# Patient Record
Sex: MALE | Race: WHITE | Employment: OTHER | URBAN - METROPOLITAN AREA
[De-identification: names, ages, dates, MRNs, and addresses within clinical notes are randomized per-mention and may not be internally consistent; named-entity substitution may affect disease eponyms.]

---

## 2017-02-09 ENCOUNTER — TELEPHONE (OUTPATIENT)
Dept: PULMONOLOGY | Age: 67
End: 2017-02-09

## 2017-05-04 ENCOUNTER — OFFICE VISIT (OUTPATIENT)
Dept: PULMONOLOGY | Age: 67
End: 2017-05-04

## 2017-05-04 VITALS
SYSTOLIC BLOOD PRESSURE: 138 MMHG | WEIGHT: 194 LBS | RESPIRATION RATE: 18 BRPM | HEART RATE: 81 BPM | TEMPERATURE: 98 F | DIASTOLIC BLOOD PRESSURE: 84 MMHG | HEIGHT: 72 IN | OXYGEN SATURATION: 92 % | BODY MASS INDEX: 26.28 KG/M2

## 2017-05-04 DIAGNOSIS — F17.200 CURRENT SMOKER: ICD-10-CM

## 2017-05-04 DIAGNOSIS — J44.9 COPD, SEVERE (HCC): Primary | ICD-10-CM

## 2017-05-04 DIAGNOSIS — R09.02 HYPOXIA: ICD-10-CM

## 2017-05-04 PROCEDURE — 99214 OFFICE O/P EST MOD 30 MIN: CPT | Performed by: INTERNAL MEDICINE

## 2017-05-04 RX ORDER — PREDNISONE 10 MG/1
TABLET ORAL
Qty: 30 TABLET | Refills: 0 | Status: SHIPPED | OUTPATIENT
Start: 2017-05-04 | End: 2017-05-14

## 2017-05-04 RX ORDER — BUDESONIDE AND FORMOTEROL FUMARATE DIHYDRATE 160; 4.5 UG/1; UG/1
AEROSOL RESPIRATORY (INHALATION)
Qty: 1 INHALER | Refills: 6 | Status: SHIPPED | OUTPATIENT
Start: 2017-05-04 | End: 2017-10-26 | Stop reason: SDUPTHER

## 2017-05-04 RX ORDER — IPRATROPIUM BROMIDE AND ALBUTEROL SULFATE 2.5; .5 MG/3ML; MG/3ML
1 SOLUTION RESPIRATORY (INHALATION) 4 TIMES DAILY
Qty: 360 ML | Refills: 6 | Status: SHIPPED | OUTPATIENT
Start: 2017-05-04 | End: 2017-10-26 | Stop reason: SDUPTHER

## 2017-05-04 RX ORDER — DOXYCYCLINE HYCLATE 100 MG/1
100 CAPSULE ORAL 2 TIMES DAILY
Qty: 10 CAPSULE | Refills: 0 | Status: SHIPPED | OUTPATIENT
Start: 2017-05-04 | End: 2017-05-09

## 2017-05-11 RX ORDER — BUDESONIDE AND FORMOTEROL FUMARATE DIHYDRATE 160; 4.5 UG/1; UG/1
AEROSOL RESPIRATORY (INHALATION)
Qty: 10.2 G | Refills: 0 | OUTPATIENT
Start: 2017-05-11

## 2017-07-17 ENCOUNTER — TELEPHONE (OUTPATIENT)
Dept: PULMONOLOGY | Age: 67
End: 2017-07-17

## 2017-10-26 ENCOUNTER — HOSPITAL ENCOUNTER (OUTPATIENT)
Dept: CT IMAGING | Age: 67
Discharge: HOME | End: 2017-10-26
Attending: INTERNAL MEDICINE | Admitting: INTERNAL MEDICINE

## 2017-10-26 ENCOUNTER — OFFICE VISIT (OUTPATIENT)
Dept: PULMONOLOGY | Age: 67
End: 2017-10-26

## 2017-10-26 VITALS
SYSTOLIC BLOOD PRESSURE: 128 MMHG | RESPIRATION RATE: 18 BRPM | BODY MASS INDEX: 24.24 KG/M2 | DIASTOLIC BLOOD PRESSURE: 84 MMHG | HEIGHT: 72 IN | WEIGHT: 179 LBS | OXYGEN SATURATION: 93 % | HEART RATE: 80 BPM | TEMPERATURE: 98.3 F

## 2017-10-26 DIAGNOSIS — R09.02 HYPOXIA: ICD-10-CM

## 2017-10-26 DIAGNOSIS — R06.02 SOB (SHORTNESS OF BREATH): ICD-10-CM

## 2017-10-26 DIAGNOSIS — F17.200 CURRENT SMOKER: ICD-10-CM

## 2017-10-26 DIAGNOSIS — Z87.891 FORMER SMOKER: ICD-10-CM

## 2017-10-26 DIAGNOSIS — J44.9 COPD, SEVERE (HCC): ICD-10-CM

## 2017-10-26 DIAGNOSIS — F17.200 NICOTINE DEPENDENCE, UNCOMPLICATED: ICD-10-CM

## 2017-10-26 DIAGNOSIS — J44.1 COPD WITH ACUTE EXACERBATION (HCC): Primary | ICD-10-CM

## 2017-10-26 PROCEDURE — 1036F TOBACCO NON-USER: CPT | Performed by: INTERNAL MEDICINE

## 2017-10-26 PROCEDURE — 3017F COLORECTAL CA SCREEN DOC REV: CPT | Performed by: INTERNAL MEDICINE

## 2017-10-26 PROCEDURE — 1123F ACP DISCUSS/DSCN MKR DOCD: CPT | Performed by: INTERNAL MEDICINE

## 2017-10-26 PROCEDURE — G8420 CALC BMI NORM PARAMETERS: HCPCS | Performed by: INTERNAL MEDICINE

## 2017-10-26 PROCEDURE — 4040F PNEUMOC VAC/ADMIN/RCVD: CPT | Performed by: INTERNAL MEDICINE

## 2017-10-26 PROCEDURE — 3023F SPIROM DOC REV: CPT | Performed by: INTERNAL MEDICINE

## 2017-10-26 PROCEDURE — 99214 OFFICE O/P EST MOD 30 MIN: CPT | Performed by: INTERNAL MEDICINE

## 2017-10-26 PROCEDURE — G8926 SPIRO NO PERF OR DOC: HCPCS | Performed by: INTERNAL MEDICINE

## 2017-10-26 PROCEDURE — G8427 DOCREV CUR MEDS BY ELIG CLIN: HCPCS | Performed by: INTERNAL MEDICINE

## 2017-10-26 PROCEDURE — G8484 FLU IMMUNIZE NO ADMIN: HCPCS | Performed by: INTERNAL MEDICINE

## 2017-10-26 RX ORDER — DOXYCYCLINE HYCLATE 100 MG/1
100 CAPSULE ORAL 2 TIMES DAILY
Qty: 10 CAPSULE | Refills: 0 | Status: SHIPPED | OUTPATIENT
Start: 2017-10-26 | End: 2017-10-31

## 2017-10-26 RX ORDER — PREDNISONE 10 MG/1
TABLET ORAL
Qty: 30 TABLET | Refills: 0 | Status: SHIPPED | OUTPATIENT
Start: 2017-10-26 | End: 2017-11-05

## 2017-10-26 RX ORDER — GUAIFENESIN 600 MG/1
600 TABLET, EXTENDED RELEASE ORAL 2 TIMES DAILY PRN
Qty: 60 TABLET | Refills: 6 | Status: SHIPPED | OUTPATIENT
Start: 2017-10-26 | End: 2018-04-11 | Stop reason: ALTCHOICE

## 2017-10-26 RX ORDER — BUDESONIDE AND FORMOTEROL FUMARATE DIHYDRATE 160; 4.5 UG/1; UG/1
AEROSOL RESPIRATORY (INHALATION)
Qty: 1 INHALER | Refills: 6 | Status: SHIPPED | OUTPATIENT
Start: 2017-10-26 | End: 2018-03-05 | Stop reason: SDUPTHER

## 2017-10-26 RX ORDER — IPRATROPIUM BROMIDE AND ALBUTEROL SULFATE 2.5; .5 MG/3ML; MG/3ML
1 SOLUTION RESPIRATORY (INHALATION) 4 TIMES DAILY
Qty: 360 ML | Refills: 6 | Status: SHIPPED | OUTPATIENT
Start: 2017-10-26 | End: 2018-08-27 | Stop reason: SDUPTHER

## 2017-10-26 NOTE — PROGRESS NOTES
(Mucinex) Extended-release tablet 600 mg every 12 hours as needed  · Acapella QID to mobilize respiratory secretions. · CT chest, low dose protocol, screening for lung cancer 10/2018. Risks, benefits and alternatives including doing nothing were discussed with patient.

## 2017-10-27 ENCOUNTER — TELEPHONE (OUTPATIENT)
Dept: CASE MANAGEMENT | Age: 67
End: 2017-10-27

## 2017-10-27 NOTE — TELEPHONE ENCOUNTER
Baseline lung screen on 10-26-17. LRAD1. Recommended screen in one year. Reviewed by ordering physician. Ordering office has contacted pt with results.

## 2018-01-11 PROBLEM — J96.21 ACUTE ON CHRONIC RESPIRATORY FAILURE WITH HYPOXIA AND HYPERCAPNIA (HCC): Status: ACTIVE | Noted: 2018-01-11

## 2018-01-11 PROBLEM — R79.89 ELEVATED BRAIN NATRIURETIC PEPTIDE (BNP) LEVEL: Status: ACTIVE | Noted: 2018-01-11

## 2018-01-11 PROBLEM — I48.91 ATRIAL FIBRILLATION (HCC): Status: ACTIVE | Noted: 2018-01-11

## 2018-01-11 PROBLEM — A41.9 SEPSIS (HCC): Status: ACTIVE | Noted: 2018-01-11

## 2018-01-11 PROBLEM — E80.6 HYPERBILIRUBINEMIA: Status: ACTIVE | Noted: 2018-01-11

## 2018-01-11 PROBLEM — E87.8 HYPOCHLOREMIA: Status: ACTIVE | Noted: 2018-01-11

## 2018-01-11 PROBLEM — J96.22 ACUTE ON CHRONIC RESPIRATORY FAILURE WITH HYPOXIA AND HYPERCAPNIA (HCC): Status: ACTIVE | Noted: 2018-01-11

## 2018-01-11 PROBLEM — J10.1 INFLUENZA A: Status: ACTIVE | Noted: 2018-01-11

## 2018-01-11 PROBLEM — E87.1 HYPONATREMIA: Status: ACTIVE | Noted: 2018-01-11

## 2018-01-19 PROBLEM — A41.9 SEPSIS (HCC): Status: RESOLVED | Noted: 2018-01-11 | Resolved: 2018-01-19

## 2018-01-19 PROBLEM — J96.21 ACUTE ON CHRONIC RESPIRATORY FAILURE WITH HYPOXIA AND HYPERCAPNIA (HCC): Status: RESOLVED | Noted: 2018-01-11 | Resolved: 2018-01-19

## 2018-01-19 PROBLEM — J96.22 ACUTE ON CHRONIC RESPIRATORY FAILURE WITH HYPOXIA AND HYPERCAPNIA (HCC): Status: RESOLVED | Noted: 2018-01-11 | Resolved: 2018-01-19

## 2018-02-01 ENCOUNTER — TELEPHONE (OUTPATIENT)
Dept: PULMONOLOGY | Age: 68
End: 2018-02-01

## 2018-02-01 ENCOUNTER — OFFICE VISIT (OUTPATIENT)
Dept: PULMONOLOGY | Age: 68
End: 2018-02-01

## 2018-02-01 VITALS
OXYGEN SATURATION: 93 % | SYSTOLIC BLOOD PRESSURE: 122 MMHG | HEART RATE: 72 BPM | TEMPERATURE: 98 F | DIASTOLIC BLOOD PRESSURE: 88 MMHG | RESPIRATION RATE: 20 BRPM | HEIGHT: 68 IN | BODY MASS INDEX: 27.43 KG/M2 | WEIGHT: 181 LBS

## 2018-02-01 DIAGNOSIS — R93.89 ABNORMAL CXR: Primary | ICD-10-CM

## 2018-02-01 DIAGNOSIS — J44.9 COPD, SEVERE (HCC): ICD-10-CM

## 2018-02-01 DIAGNOSIS — R21 SKIN RASH: ICD-10-CM

## 2018-02-01 PROCEDURE — G8926 SPIRO NO PERF OR DOC: HCPCS | Performed by: INTERNAL MEDICINE

## 2018-02-01 PROCEDURE — 1123F ACP DISCUSS/DSCN MKR DOCD: CPT | Performed by: INTERNAL MEDICINE

## 2018-02-01 PROCEDURE — G8484 FLU IMMUNIZE NO ADMIN: HCPCS | Performed by: INTERNAL MEDICINE

## 2018-02-01 PROCEDURE — 4040F PNEUMOC VAC/ADMIN/RCVD: CPT | Performed by: INTERNAL MEDICINE

## 2018-02-01 PROCEDURE — 3017F COLORECTAL CA SCREEN DOC REV: CPT | Performed by: INTERNAL MEDICINE

## 2018-02-01 PROCEDURE — 3023F SPIROM DOC REV: CPT | Performed by: INTERNAL MEDICINE

## 2018-02-01 PROCEDURE — G8427 DOCREV CUR MEDS BY ELIG CLIN: HCPCS | Performed by: INTERNAL MEDICINE

## 2018-02-01 PROCEDURE — G8419 CALC BMI OUT NRM PARAM NOF/U: HCPCS | Performed by: INTERNAL MEDICINE

## 2018-02-01 PROCEDURE — 1111F DSCHRG MED/CURRENT MED MERGE: CPT | Performed by: INTERNAL MEDICINE

## 2018-02-01 PROCEDURE — 99214 OFFICE O/P EST MOD 30 MIN: CPT | Performed by: INTERNAL MEDICINE

## 2018-02-01 PROCEDURE — 1036F TOBACCO NON-USER: CPT | Performed by: INTERNAL MEDICINE

## 2018-02-01 RX ORDER — PREDNISONE 10 MG/1
TABLET ORAL
Qty: 30 TABLET | Refills: 0 | Status: SHIPPED | OUTPATIENT
Start: 2018-02-01 | End: 2018-02-11

## 2018-02-01 NOTE — PATIENT INSTRUCTIONS
Please keep all of your future appointments scheduled by [facility] Pulmonary office.

## 2018-02-01 NOTE — TELEPHONE ENCOUNTER
[facility] received order for oxygen. They need testing within the last 30 days (ie 6MW). Last PFT/6MW was in October 2017. Last OV 2/1/18  Assessment:       · Severe COPD   · Abnormal CXR- Treated for pneumonia   · Nocturnal hypoxia on 2.5 LPM   · Anterior chest skin rash- unclear etiology. Probable drug reaction. · Hypoxia on exertion   · 30 pack year smoking - quit smoking 2012       Plan:       · CXR PA and lateral in 4 weeks to document resolution of infiltrates  · Trial of Prednisone taper   · Benadryl when necessary  · H2 blocker  · ER if worsening  · DC lactobacillus  · Symbicort BID and DuoNeb   · Daliresp 500 mcg PO daily  · O2 2LPM on exertion. Advised to titrate O2 using her pulse oximeter- target O2 sat 90-92%. · Order for O2 concentrator   · Pulmonary rehab referral   · Patient is up to date with Pneumococcal vaccine and influenza vaccine   · Acapella QID to mobilize respiratory secretions.   · CT chest, low dose protocol, screening for lung cancer 10/2018

## 2018-02-01 NOTE — PROGRESS NOTES
showed Bilateral LL ASD   Assessment:       · Severe COPD   · Abnormal CXR- Treated for pneumonia   · Nocturnal hypoxia on 2.5 LPM   · Anterior chest skin rash- unclear etiology. Probable drug reaction. · Hypoxia on exertion   · 30 pack year smoking - quit smoking 2012       Plan:      · CXR PA and lateral in 4 weeks to document resolution of infiltrates  · Trial of Prednisone taper   · Benadryl when necessary  · H2 blocker  · ER if worsening  · DC lactobacillus  · Symbicort BID and DuoNeb   · Daliresp 500 mcg PO daily  · O2 2LPM on exertion. Advised to titrate O2 using her pulse oximeter- target O2 sat 90-92%. · Order for O2 concentrator   · Pulmonary rehab referral   · Patient is up to date with Pneumococcal vaccine and influenza vaccine   · Acapella QID to mobilize respiratory secretions. · CT chest, low dose protocol, screening for lung cancer 10/2018.

## 2018-02-27 RX ORDER — DILTIAZEM HYDROCHLORIDE 180 MG/1
CAPSULE, COATED, EXTENDED RELEASE ORAL
Qty: 30 CAPSULE | Refills: 0 | OUTPATIENT
Start: 2018-02-27

## 2018-03-05 ENCOUNTER — HOSPITAL ENCOUNTER (OUTPATIENT)
Dept: OTHER | Age: 68
Discharge: HOME | End: 2018-03-05
Attending: INTERNAL MEDICINE | Admitting: INTERNAL MEDICINE

## 2018-03-05 ENCOUNTER — OFFICE VISIT (OUTPATIENT)
Dept: PULMONOLOGY | Age: 68
End: 2018-03-05

## 2018-03-05 VITALS
HEART RATE: 84 BPM | SYSTOLIC BLOOD PRESSURE: 126 MMHG | TEMPERATURE: 98.5 F | OXYGEN SATURATION: 94 % | HEIGHT: 68 IN | BODY MASS INDEX: 28.19 KG/M2 | DIASTOLIC BLOOD PRESSURE: 84 MMHG | WEIGHT: 186 LBS | RESPIRATION RATE: 22 BRPM

## 2018-03-05 DIAGNOSIS — R93.89 ABNORMAL CHEST X-RAY: ICD-10-CM

## 2018-03-05 DIAGNOSIS — J44.9 CHRONIC OBSTRUCTIVE PULMONARY DISEASE, UNSPECIFIED COPD TYPE (HCC): Primary | ICD-10-CM

## 2018-03-05 DIAGNOSIS — R93.89 ABNORMAL CXR (CHEST X-RAY): ICD-10-CM

## 2018-03-05 DIAGNOSIS — R09.02 HYPOXIA: ICD-10-CM

## 2018-03-05 PROCEDURE — 3023F SPIROM DOC REV: CPT | Performed by: INTERNAL MEDICINE

## 2018-03-05 PROCEDURE — 4040F PNEUMOC VAC/ADMIN/RCVD: CPT | Performed by: INTERNAL MEDICINE

## 2018-03-05 PROCEDURE — 1123F ACP DISCUSS/DSCN MKR DOCD: CPT | Performed by: INTERNAL MEDICINE

## 2018-03-05 PROCEDURE — G8926 SPIRO NO PERF OR DOC: HCPCS | Performed by: INTERNAL MEDICINE

## 2018-03-05 PROCEDURE — 3017F COLORECTAL CA SCREEN DOC REV: CPT | Performed by: INTERNAL MEDICINE

## 2018-03-05 PROCEDURE — G8484 FLU IMMUNIZE NO ADMIN: HCPCS | Performed by: INTERNAL MEDICINE

## 2018-03-05 PROCEDURE — 99213 OFFICE O/P EST LOW 20 MIN: CPT | Performed by: INTERNAL MEDICINE

## 2018-03-05 PROCEDURE — G8427 DOCREV CUR MEDS BY ELIG CLIN: HCPCS | Performed by: INTERNAL MEDICINE

## 2018-03-05 PROCEDURE — G8419 CALC BMI OUT NRM PARAM NOF/U: HCPCS | Performed by: INTERNAL MEDICINE

## 2018-03-05 PROCEDURE — 1036F TOBACCO NON-USER: CPT | Performed by: INTERNAL MEDICINE

## 2018-03-05 RX ORDER — BUDESONIDE AND FORMOTEROL FUMARATE DIHYDRATE 160; 4.5 UG/1; UG/1
AEROSOL RESPIRATORY (INHALATION)
Qty: 1 INHALER | Refills: 6 | Status: SHIPPED | OUTPATIENT
Start: 2018-03-05 | End: 2018-07-19 | Stop reason: SDUPTHER

## 2018-03-05 NOTE — PROGRESS NOTES
TWICE A DAY, Disp: 1 Inhaler, Rfl: 6    ipratropium-albuterol (DUONEB) 0.5-2.5 (3) MG/3ML SOLN nebulizer solution, Inhale 3 mLs into the lungs 4 times daily DX J44.9 COPD, Disp: 360 mL, Rfl: 6    guaiFENesin (MUCINEX) 600 MG extended release tablet, Take 1 tablet by mouth 2 times daily as needed for Congestion, Disp: 60 tablet, Rfl: 6    VENTOLIN  (90 Base) MCG/ACT inhaler, INHALE 2 PUFFS EVERY 6 HOURS AS NEEDED FOR WHEEZING OR SHORTNESS OF BREATH, Disp: 18 g, Rfl: 5    pramipexole (MIRAPEX) 0.5 MG tablet, Take 0.5 mg by mouth daily , Disp: , Rfl:     meloxicam (MOBIC) 7.5 MG tablet, Take 7.5 mg by mouth daily, Disp: , Rfl:     aspirin 81 MG tablet, Take 81 mg by mouth daily, Disp: , Rfl:       Objective:   PHYSICAL EXAM:    Blood pressure 126/84, pulse 84, temperature 98.5 °F (36.9 °C), temperature source Oral, resp. rate 22, height 5' 8\" (1.727 m), weight 186 lb (84.4 kg), SpO2 94 %.' on 3 LPM   Gen: No distress. Eyes: PERRL. No sclera icterus. No conjunctival injection. ENT: No discharge. Pharynx clear. Neck: Trachea midline. No obvious mass. Resp: No accessory muscle use. No crackles. No wheezes. No rhonchi. No dullness on percussion. Decreased air entry. CV: Regular rate. Regular rhythm. No murmur or rub. No edema. GI: Non-tender. Non-distended. No hernia. Skin: Warm and dry. No nodule on exposed extremities. No skin rash  Lymph: No cervical LAD. No supraclavicular LAD. M/S: No cyanosis. No joint deformity. No clubbing. Neuro: Awake. Alert. Moves all four extremities. Psych: Oriented x 3. No anxiety. DATA reviewed by me:   PFTs 10/26/2017 FVC 2.39(49%) FEV1 1.05(29%) FEV1/FVC 44% TLC 7.39(98%) DLCO 11.81 (37%) 6MW 640 F 2L exertion               LDCT chest 10/26/2017  images reviewed by me and showed: Moderate emphysema. No suspicious pulmonary nodules or masses   Remote appearing compression deformities.    Lobular contour of the liver raising the question of underlying cirrhosis                          CXR 1/16/2018 Images reviewed by me and showed Bilateral LL ASD   Chest x-ray 3/5/18 imaging reviewed by me and showed resolution of pulmonary infiltrates  Assessment:       · Severe COPD   · Abnormal CXR- Treated for pneumonia. Resolution on repeat chest x-ray 3/5/18   · Nocturnal hypoxia on 2.5 LPM   · Anterior chest skin rash- unclear etiology. Probable drug reaction. Resolved. · Hypoxia on exertion   · 30 pack year smoking - quit smoking 2012       Plan:      · Symbicort BID and DuoNeb   · Daliresp 500 mcg PO daily  · O2 2-3 LPM on exertion. Advised to titrate O2 using her pulse oximeter- target O2 sat 90-92%. · Saturation on room air today 91%   · Pulmonary rehab referral   · Patient is up to date with Pneumococcal vaccine and influenza vaccine   · Acapella QID to mobilize respiratory secretions. · CT chest, low dose protocol, screening for lung cancer 10/2018.

## 2018-03-07 RX ORDER — L. ACIDOPHILUS/PECTIN, CITRUS 25MM-100MG
TABLET ORAL
Qty: 60 TABLET | Refills: 11 | Status: SHIPPED | OUTPATIENT
Start: 2018-03-07 | End: 2018-04-11 | Stop reason: ALTCHOICE

## 2018-04-11 ENCOUNTER — OFFICE VISIT (OUTPATIENT)
Dept: CARDIOLOGY CLINIC | Age: 68
End: 2018-04-11

## 2018-04-11 VITALS
WEIGHT: 183.08 LBS | SYSTOLIC BLOOD PRESSURE: 132 MMHG | BODY MASS INDEX: 27.75 KG/M2 | HEIGHT: 68 IN | OXYGEN SATURATION: 95 % | HEART RATE: 80 BPM | DIASTOLIC BLOOD PRESSURE: 84 MMHG

## 2018-04-11 DIAGNOSIS — I48.91 ATRIAL FIBRILLATION, UNSPECIFIED TYPE (HCC): ICD-10-CM

## 2018-04-11 DIAGNOSIS — I10 ESSENTIAL HYPERTENSION: Primary | Chronic | ICD-10-CM

## 2018-04-11 PROBLEM — J10.1 INFLUENZA A: Status: RESOLVED | Noted: 2018-01-11 | Resolved: 2018-04-11

## 2018-04-11 PROCEDURE — G8427 DOCREV CUR MEDS BY ELIG CLIN: HCPCS | Performed by: INTERNAL MEDICINE

## 2018-04-11 PROCEDURE — 3017F COLORECTAL CA SCREEN DOC REV: CPT | Performed by: INTERNAL MEDICINE

## 2018-04-11 PROCEDURE — G8419 CALC BMI OUT NRM PARAM NOF/U: HCPCS | Performed by: INTERNAL MEDICINE

## 2018-04-11 PROCEDURE — 99214 OFFICE O/P EST MOD 30 MIN: CPT | Performed by: INTERNAL MEDICINE

## 2018-04-11 PROCEDURE — 1123F ACP DISCUSS/DSCN MKR DOCD: CPT | Performed by: INTERNAL MEDICINE

## 2018-04-11 PROCEDURE — 93000 ELECTROCARDIOGRAM COMPLETE: CPT | Performed by: INTERNAL MEDICINE

## 2018-04-11 PROCEDURE — 1036F TOBACCO NON-USER: CPT | Performed by: INTERNAL MEDICINE

## 2018-04-11 PROCEDURE — 4040F PNEUMOC VAC/ADMIN/RCVD: CPT | Performed by: INTERNAL MEDICINE

## 2018-04-11 RX ORDER — DILTIAZEM HYDROCHLORIDE 180 MG/1
180 CAPSULE, COATED, EXTENDED RELEASE ORAL DAILY
Qty: 30 CAPSULE | Refills: 11 | Status: SHIPPED | OUTPATIENT
Start: 2018-04-11 | End: 2019-04-09 | Stop reason: SDUPTHER

## 2018-04-12 DIAGNOSIS — I48.91 ATRIAL FIBRILLATION, UNSPECIFIED TYPE (HCC): Primary | ICD-10-CM

## 2018-04-13 ENCOUNTER — HOSPITAL ENCOUNTER (OUTPATIENT)
Dept: NON INVASIVE DIAGNOSTICS | Age: 68
Discharge: HOME | End: 2018-04-14
Attending: INTERNAL MEDICINE

## 2018-04-13 ENCOUNTER — HOSPITAL ENCOUNTER (OUTPATIENT)
Dept: OTHER | Age: 68
Discharge: HOME | End: 2018-04-13
Attending: INTERNAL MEDICINE | Admitting: INTERNAL MEDICINE

## 2018-04-13 VITALS
OXYGEN SATURATION: 98 % | TEMPERATURE: 98.7 F | DIASTOLIC BLOOD PRESSURE: 98 MMHG | HEART RATE: 86 BPM | SYSTOLIC BLOOD PRESSURE: 168 MMHG | RESPIRATION RATE: 14 BRPM

## 2018-04-13 LAB
ANION GAP SERPL CALCULATED.3IONS-SCNC: 8 MMOL/L (ref 3–16)
BUN BLDV-MCNC: 8 MG/DL (ref 7–20)
CALCIUM SERPL-MCNC: 9.1 MG/DL (ref 8.3–10.6)
CHLORIDE BLD-SCNC: 100 MMOL/L (ref 99–110)
CO2: 34 MMOL/L (ref 21–32)
CREAT SERPL-MCNC: 0.8 MG/DL (ref 0.8–1.3)
EKG ATRIAL RATE: 77 BPM
EKG ATRIAL RATE: 84 BPM
EKG DIAGNOSIS: NORMAL
EKG DIAGNOSIS: NORMAL
EKG Q-T INTERVAL: 386 MS
EKG Q-T INTERVAL: 398 MS
EKG QRS DURATION: 96 MS
EKG QRS DURATION: 98 MS
EKG QTC CALCULATION (BAZETT): 445 MS
EKG QTC CALCULATION (BAZETT): 447 MS
EKG R AXIS: 32 DEGREES
EKG R AXIS: 34 DEGREES
EKG T AXIS: 44 DEGREES
EKG T AXIS: 49 DEGREES
EKG VENTRICULAR RATE: 76 BPM
EKG VENTRICULAR RATE: 80 BPM
GFR AFRICAN AMERICAN: >60
GFR NON-AFRICAN AMERICAN: >60
GLUCOSE BLD-MCNC: 113 MG/DL (ref 70–99)
POTASSIUM SERPL-SCNC: 4.1 MMOL/L (ref 3.5–5.1)
SODIUM BLD-SCNC: 142 MMOL/L (ref 136–145)

## 2018-04-13 PROCEDURE — 93010 ELECTROCARDIOGRAM REPORT: CPT | Performed by: INTERNAL MEDICINE

## 2018-04-13 PROCEDURE — 92960 CARDIOVERSION ELECTRIC EXT: CPT | Performed by: INTERNAL MEDICINE

## 2018-04-13 ASSESSMENT — PAIN - FUNCTIONAL ASSESSMENT: PAIN_FUNCTIONAL_ASSESSMENT: 0-10

## 2018-04-18 ENCOUNTER — TELEPHONE (OUTPATIENT)
Dept: CARDIOLOGY CLINIC | Age: 68
End: 2018-04-18

## 2018-04-18 DIAGNOSIS — I48.91 ATRIAL FIBRILLATION, UNSPECIFIED TYPE (HCC): Primary | ICD-10-CM

## 2018-05-04 RX ORDER — FAMOTIDINE 20 MG/1
20 TABLET, FILM COATED ORAL 2 TIMES DAILY
Qty: 60 TABLET | Refills: 5 | Status: SHIPPED | OUTPATIENT
Start: 2018-05-04 | End: 2018-11-07 | Stop reason: SDUPTHER

## 2018-06-13 ENCOUNTER — OFFICE VISIT (OUTPATIENT)
Dept: CARDIOLOGY CLINIC | Age: 68
End: 2018-06-13

## 2018-06-13 VITALS
HEART RATE: 89 BPM | WEIGHT: 182.08 LBS | OXYGEN SATURATION: 94 % | HEIGHT: 69 IN | DIASTOLIC BLOOD PRESSURE: 84 MMHG | SYSTOLIC BLOOD PRESSURE: 138 MMHG | BODY MASS INDEX: 26.97 KG/M2

## 2018-06-13 DIAGNOSIS — I10 ESSENTIAL HYPERTENSION: Chronic | ICD-10-CM

## 2018-06-13 DIAGNOSIS — I50.31 ACUTE DIASTOLIC CONGESTIVE HEART FAILURE (HCC): ICD-10-CM

## 2018-06-13 DIAGNOSIS — I48.20 CHRONIC ATRIAL FIBRILLATION (HCC): Primary | ICD-10-CM

## 2018-06-13 PROCEDURE — G8419 CALC BMI OUT NRM PARAM NOF/U: HCPCS | Performed by: INTERNAL MEDICINE

## 2018-06-13 PROCEDURE — G8427 DOCREV CUR MEDS BY ELIG CLIN: HCPCS | Performed by: INTERNAL MEDICINE

## 2018-06-13 PROCEDURE — 99214 OFFICE O/P EST MOD 30 MIN: CPT | Performed by: INTERNAL MEDICINE

## 2018-06-13 PROCEDURE — 4040F PNEUMOC VAC/ADMIN/RCVD: CPT | Performed by: INTERNAL MEDICINE

## 2018-06-13 PROCEDURE — 3017F COLORECTAL CA SCREEN DOC REV: CPT | Performed by: INTERNAL MEDICINE

## 2018-06-13 PROCEDURE — 1036F TOBACCO NON-USER: CPT | Performed by: INTERNAL MEDICINE

## 2018-06-13 PROCEDURE — 1123F ACP DISCUSS/DSCN MKR DOCD: CPT | Performed by: INTERNAL MEDICINE

## 2018-06-13 RX ORDER — FUROSEMIDE 20 MG/1
20 TABLET ORAL DAILY
Qty: 30 TABLET | Refills: 5 | Status: SHIPPED | OUTPATIENT
Start: 2018-06-13 | End: 2018-10-17 | Stop reason: SDUPTHER

## 2018-07-11 ENCOUNTER — HOSPITAL ENCOUNTER (OUTPATIENT)
Dept: OTHER | Age: 68
Discharge: HOME | End: 2018-07-11
Attending: INTERNAL MEDICINE | Admitting: INTERNAL MEDICINE

## 2018-07-11 DIAGNOSIS — I10 ESSENTIAL HYPERTENSION: Chronic | ICD-10-CM

## 2018-07-11 LAB
ANION GAP SERPL CALCULATED.3IONS-SCNC: 13 MMOL/L (ref 3–16)
BUN BLDV-MCNC: 10 MG/DL (ref 7–20)
CALCIUM SERPL-MCNC: 9.2 MG/DL (ref 8.3–10.6)
CHLORIDE BLD-SCNC: 98 MMOL/L (ref 99–110)
CO2: 28 MMOL/L (ref 21–32)
CREAT SERPL-MCNC: 0.7 MG/DL (ref 0.8–1.3)
GFR AFRICAN AMERICAN: >60
GFR NON-AFRICAN AMERICAN: >60
GLUCOSE BLD-MCNC: 91 MG/DL (ref 70–99)
POTASSIUM SERPL-SCNC: 4 MMOL/L (ref 3.5–5.1)
SODIUM BLD-SCNC: 139 MMOL/L (ref 136–145)

## 2018-07-13 ENCOUNTER — TELEPHONE (OUTPATIENT)
Dept: CARDIOLOGY CLINIC | Age: 68
End: 2018-07-13

## 2018-07-20 RX ORDER — BUDESONIDE AND FORMOTEROL FUMARATE DIHYDRATE 160; 4.5 UG/1; UG/1
AEROSOL RESPIRATORY (INHALATION)
Qty: 10.2 G | Refills: 5 | Status: SHIPPED | OUTPATIENT
Start: 2018-07-20 | End: 2019-01-21 | Stop reason: SDUPTHER

## 2018-08-27 DIAGNOSIS — J44.9 CHRONIC OBSTRUCTIVE PULMONARY DISEASE, UNSPECIFIED COPD TYPE (HCC): Primary | ICD-10-CM

## 2018-08-27 RX ORDER — IPRATROPIUM BROMIDE AND ALBUTEROL SULFATE 2.5; .5 MG/3ML; MG/3ML
SOLUTION RESPIRATORY (INHALATION)
Qty: 360 ML | Refills: 5 | Status: SHIPPED | OUTPATIENT
Start: 2018-08-27 | End: 2019-09-12

## 2018-09-04 RX ORDER — ROFLUMILAST 500 UG/1
TABLET ORAL
Qty: 30 TABLET | Refills: 5 | Status: SHIPPED | OUTPATIENT
Start: 2018-09-04 | End: 2019-04-03 | Stop reason: SDUPTHER

## 2018-10-11 ENCOUNTER — TELEPHONE (OUTPATIENT)
Dept: CASE MANAGEMENT | Age: 68
End: 2018-10-11

## 2018-10-17 ENCOUNTER — OFFICE VISIT (OUTPATIENT)
Dept: CARDIOLOGY CLINIC | Age: 68
End: 2018-10-17
Payer: MEDICARE

## 2018-10-17 VITALS
SYSTOLIC BLOOD PRESSURE: 146 MMHG | HEIGHT: 69 IN | WEIGHT: 183.12 LBS | DIASTOLIC BLOOD PRESSURE: 80 MMHG | HEART RATE: 84 BPM | BODY MASS INDEX: 27.12 KG/M2 | OXYGEN SATURATION: 94 %

## 2018-10-17 DIAGNOSIS — J96.21 ACUTE ON CHRONIC RESPIRATORY FAILURE WITH HYPOXEMIA (HCC): Primary | Chronic | ICD-10-CM

## 2018-10-17 DIAGNOSIS — I48.91 ATRIAL FIBRILLATION, UNSPECIFIED TYPE (HCC): ICD-10-CM

## 2018-10-17 DIAGNOSIS — I48.19 PERSISTENT ATRIAL FIBRILLATION (HCC): ICD-10-CM

## 2018-10-17 DIAGNOSIS — I10 ESSENTIAL HYPERTENSION: Chronic | ICD-10-CM

## 2018-10-17 PROCEDURE — 99214 OFFICE O/P EST MOD 30 MIN: CPT | Performed by: INTERNAL MEDICINE

## 2018-10-17 RX ORDER — FUROSEMIDE 20 MG/1
20 TABLET ORAL DAILY
Qty: 30 TABLET | Refills: 11 | Status: SHIPPED | OUTPATIENT
Start: 2018-10-17 | End: 2019-10-30 | Stop reason: SDUPTHER

## 2018-10-17 NOTE — COMMUNICATION BODY
palpation  Joint:  no active joint inflammation  Musculoskeletal:  negative  Skin:  Warm and dry  Neck:  Negative for JVD and Carotid Bruits. Chest:  Clear to auscultation, respiration easy  Cardiovascular:  irreg irreg S2 normal, no murmur, no rub or thrill. Extremities: 1+ BLE edema, pretibiular  noclubbing, cyanosis,  Neuro: intact    Medications:   Outpatient Encounter Prescriptions as of 10/17/2018   Medication Sig Dispense Refill    VENTOLIN  (90 Base) MCG/ACT inhaler INHALE 2 PUFFS EVERY 6 HOURS AS NEEDED FOR WHEEZING OR SHORTNESS OF BREATH 18 g 5    DALIRESP 500 MCG tablet TAKE (1) TABLET DAILY 30 tablet 5    ipratropium-albuterol (DUONEB) 0.5-2.5 (3) MG/3ML SOLN nebulizer solution USE 1 VIAL IN NEBULIZER 4 TIMES DAILY 360 mL 5    SYMBICORT 160-4.5 MCG/ACT AERO INHALE 2 PUFFS TWICE A DAY 10.2 g 5    furosemide (LASIX) 20 MG tablet Take 1 tablet by mouth daily 30 tablet 5    famotidine (PEPCID) 20 MG tablet Take 1 tablet by mouth 2 times daily 60 tablet 5    apixaban (ELIQUIS) 5 MG TABS tablet Take 1 tablet by mouth 2 times daily 60 tablet 5    diltiazem (CARDIZEM CD) 180 MG extended release capsule Take 1 capsule by mouth daily 30 capsule 11     No facility-administered encounter medications on file as of 10/17/2018. Lab Data:  CBC: No results for input(s): WBC, HGB, HCT, MCV, PLT in the last 72 hours. BMP: No results for input(s): NA, K, CL, CO2, PHOS, BUN, CREATININE in the last 72 hours. Invalid input(s): CA  LIVER PROFILE: No results for input(s): AST, ALT, LIPASE, BILIDIR, BILITOT, ALKPHOS in the last 72 hours. Invalid input(s):   AMYLASE,  ALB  LIPID:   Lab Results   Component Value Date    CHOL 147 01/11/2018     Lab Results   Component Value Date    TRIG 76 01/11/2018     Lab Results   Component Value Date    HDL 31 (L) 01/11/2018     Lab Results   Component Value Date    LDLCALC 101 (H) 01/11/2018     Lab Results   Component Value Date    LABVLDL 15 01/11/2018     No

## 2018-10-17 NOTE — LETTER
last 72 hours. Invalid input(s): CA  LIVER PROFILE: No results for input(s): AST, ALT, LIPASE, BILIDIR, BILITOT, ALKPHOS in the last 72 hours. Invalid input(s): AMYLASE,  ALB  LIPID:   Lab Results   Component Value Date    CHOL 147 2018     Lab Results   Component Value Date    TRIG 76 2018     Lab Results   Component Value Date    HDL 31 (L) 2018     Lab Results   Component Value Date    LDLCALC 101 (H) 2018     Lab Results   Component Value Date    LABVLDL 15 2018     No results found for: CHOLHDLRATIO  PT/INR: No results for input(s): PROTIME, INR in the last 72 hours. A1C:   Lab Results   Component Value Date    LABA1C 5.7 2018     BNP:  No results for input(s): BNP in the last 72 hours. IMAGING:   EKG 4/3/18  Atrial fibrillation Controlled ventricular rate Abnormal ECG Confirmed by Mateusz Andrade MD, [address] () on 2018 9:25:35 PM     EKG 3.11.18   afib RVR     CXR 3/5/18  FINDINGS:   The lungs are hyperinflated.  There has been resolution of bibasilar   infiltrates. Kaylynn Munda is scarring in the right lower lobe.  Heart size and   pulmonary vessels are stable. IMAGING:   Echo doppler of heart 18   Normal left ventricular systolic function with an estimated ejection   fraction of 55%.   Normal left ventricular diastolic filling pressure.   The right ventricle is mildly enlarged.   The right atrium is mildly dilated.   Mild mitral annular calcification. Mild mitral regurgitation.   Aortic valve sclerosis without stenosis.   Mild tricuspid regurgitation.   Systolic pulmonary artery pressure (SPAP) is normal and estimated at 37 mmHg   (RA pressure 8 mmHg).   There is a left pleural effusion.   EK/10/18  Atrial fibrillation with rapid ventricular response Incomplete right bundle branch block   Abnormal ECG No previous ECGs available Confirmed by KELLE GUZMAN, [address] () on 2018 5:38:40 AM     CXR 1/10/18

## 2018-10-17 NOTE — PROGRESS NOTES
[address] Office Note  10/17/2018     Subjective:  Mr. Aixa Mcduffie is here for cardiology  follow up. Persistent AFIB, HTN    Today he states he is feeling ok  Good will have occasional palpitations but nothing worrisome. He is wearing NC O2 @ 3L. He denies chest pain, sob, dizziness or syncope. He failed cardioversion 4.13.18       HPI: History of Present Illness:  Jo Graham is a 76 y.o. patient with PMH COPD, former smoker, no prior CAD or AFIB. He uses O2  At night. Patient was placed on BIPAP and admitted to ICU for acute resp failure He is +ve for influenza A. EKG showed afib with relatively controlled vent response. He denies any chest pain palp or dizziness. I have been asked to provide consultation regarding further management and testing. Review of Systems:         12 point ROS negative in all areas as listed below except as in [place]  Constitutional, EENT, Cardiovascular, pulmonary, GI, , Musculoskeletal, skin, neurological, hematological, endocrine, Psychiatric    Reviewed past medical history, social, and family history. Non smoker  No alcohol   Past Medical History:   Diagnosis Date    Bronchitis chronic     Emphyse[facility] lung [facility] [address]     Influenza A 01/10/2018    Lung disease     copd    Pneumonia     12/2009     History reviewed. No pertinent surgical history. Objective:   BP (!) 146/80   Pulse 84   Ht 5' 9\" (1.753 m)   Wt 183 lb 1.9 oz (83.1 kg)   SpO2 94%   BMI 27.04 kg/m²     Wt Readings from Last 3 Encounters:   10/17/18 183 lb 1.9 oz (83.1 kg)   06/13/18 182 lb 1.3 oz (82.6 kg)   06/06/18 183 lb (83 kg)       Physical Exam:  General: No Respiratory distress, appears well developed and well nourished. On continuous supplemental O2 on demand.   Eyes:  Sclera nonicteric  Nose/Sinuses:  negative findings: nose shows no deformity, asymmetry, or inflammation, nasal mucosa normal, septum midline with no perforation or bleeding  Back:  no pain to palpation  Joint:

## 2018-11-02 ENCOUNTER — TELEPHONE (OUTPATIENT)
Dept: PULMONOLOGY | Age: 68
End: 2018-11-02

## 2018-11-05 ENCOUNTER — OFFICE VISIT (OUTPATIENT)
Dept: PULMONOLOGY | Age: 68
End: 2018-11-05
Payer: MEDICARE

## 2018-11-05 VITALS
OXYGEN SATURATION: 95 % | RESPIRATION RATE: 24 BRPM | WEIGHT: 185 LBS | DIASTOLIC BLOOD PRESSURE: 92 MMHG | HEIGHT: 69 IN | SYSTOLIC BLOOD PRESSURE: 142 MMHG | HEART RATE: 100 BPM | TEMPERATURE: 97.8 F | BODY MASS INDEX: 27.4 KG/M2

## 2018-11-05 DIAGNOSIS — Z87.891 PERSONAL HISTORY OF TOBACCO USE: ICD-10-CM

## 2018-11-05 DIAGNOSIS — J44.9 COPD, SEVERE (HCC): Primary | ICD-10-CM

## 2018-11-05 DIAGNOSIS — R09.02 HYPOXIA: ICD-10-CM

## 2018-11-05 PROCEDURE — 4040F PNEUMOC VAC/ADMIN/RCVD: CPT | Performed by: INTERNAL MEDICINE

## 2018-11-05 PROCEDURE — G8926 SPIRO NO PERF OR DOC: HCPCS | Performed by: INTERNAL MEDICINE

## 2018-11-05 PROCEDURE — G8419 CALC BMI OUT NRM PARAM NOF/U: HCPCS | Performed by: INTERNAL MEDICINE

## 2018-11-05 PROCEDURE — 3023F SPIROM DOC REV: CPT | Performed by: INTERNAL MEDICINE

## 2018-11-05 PROCEDURE — 1101F PT FALLS ASSESS-DOCD LE1/YR: CPT | Performed by: INTERNAL MEDICINE

## 2018-11-05 PROCEDURE — G8427 DOCREV CUR MEDS BY ELIG CLIN: HCPCS | Performed by: INTERNAL MEDICINE

## 2018-11-05 PROCEDURE — 3017F COLORECTAL CA SCREEN DOC REV: CPT | Performed by: INTERNAL MEDICINE

## 2018-11-05 PROCEDURE — 1123F ACP DISCUSS/DSCN MKR DOCD: CPT | Performed by: INTERNAL MEDICINE

## 2018-11-05 PROCEDURE — G8484 FLU IMMUNIZE NO ADMIN: HCPCS | Performed by: INTERNAL MEDICINE

## 2018-11-05 PROCEDURE — 1036F TOBACCO NON-USER: CPT | Performed by: INTERNAL MEDICINE

## 2018-11-05 PROCEDURE — 99214 OFFICE O/P EST MOD 30 MIN: CPT | Performed by: INTERNAL MEDICINE

## 2018-11-05 RX ORDER — GUAIFENESIN 600 MG/1
600 TABLET, EXTENDED RELEASE ORAL 2 TIMES DAILY PRN
Qty: 60 TABLET | Refills: 2 | Status: SHIPPED | OUTPATIENT
Start: 2018-11-05 | End: 2020-08-06 | Stop reason: SDUPTHER

## 2018-11-08 RX ORDER — FAMOTIDINE 20 MG/1
TABLET, FILM COATED ORAL
Qty: 180 TABLET | Refills: 3 | Status: SHIPPED | OUTPATIENT
Start: 2018-11-08 | End: 2019-08-27 | Stop reason: ALTCHOICE

## 2019-01-08 ENCOUNTER — TELEPHONE (OUTPATIENT)
Dept: PULMONOLOGY | Age: 69
End: 2019-01-08

## 2019-01-08 DIAGNOSIS — J44.9 CHRONIC OBSTRUCTIVE PULMONARY DISEASE, UNSPECIFIED COPD TYPE (HCC): Primary | ICD-10-CM

## 2019-01-10 ENCOUNTER — TELEPHONE (OUTPATIENT)
Dept: PULMONOLOGY | Age: 69
End: 2019-01-10

## 2019-01-16 ENCOUNTER — HOSPITAL ENCOUNTER (OUTPATIENT)
Age: 69
Discharge: HOME | End: 2019-01-16
Payer: MEDICARE

## 2019-01-16 DIAGNOSIS — I48.19 PERSISTENT ATRIAL FIBRILLATION (HCC): ICD-10-CM

## 2019-01-16 DIAGNOSIS — I10 ESSENTIAL HYPERTENSION: Chronic | ICD-10-CM

## 2019-01-16 LAB
A/G RATIO: 1.1 (ref 1.1–2.2)
ALBUMIN SERPL-MCNC: 4.1 G/DL (ref 3.4–5)
ALP BLD-CCNC: 91 U/L (ref 40–129)
ALT SERPL-CCNC: 12 U/L (ref 10–40)
ANION GAP SERPL CALCULATED.3IONS-SCNC: 15 MMOL/L (ref 3–16)
AST SERPL-CCNC: 14 U/L (ref 15–37)
BILIRUB SERPL-MCNC: 0.8 MG/DL (ref 0–1)
BUN BLDV-MCNC: 13 MG/DL (ref 7–20)
CALCIUM SERPL-MCNC: 9.4 MG/DL (ref 8.3–10.6)
CHLORIDE BLD-SCNC: 94 MMOL/L (ref 99–110)
CHOLESTEROL, TOTAL: 206 MG/DL (ref 0–199)
CO2: 30 MMOL/L (ref 21–32)
CREAT SERPL-MCNC: 0.9 MG/DL (ref 0.8–1.3)
GFR AFRICAN AMERICAN: >60
GFR NON-AFRICAN AMERICAN: >60
GLOBULIN: 3.7 G/DL
GLUCOSE BLD-MCNC: 104 MG/DL (ref 70–99)
HDLC SERPL-MCNC: 39 MG/DL (ref 40–60)
LDL CHOLESTEROL CALCULATED: 133 MG/DL
POTASSIUM SERPL-SCNC: 4.1 MMOL/L (ref 3.5–5.1)
SODIUM BLD-SCNC: 139 MMOL/L (ref 136–145)
TOTAL PROTEIN: 7.8 G/DL (ref 6.4–8.2)
TRIGL SERPL-MCNC: 171 MG/DL (ref 0–150)
VLDLC SERPL CALC-MCNC: 34 MG/DL

## 2019-01-16 PROCEDURE — 80061 LIPID PANEL: CPT

## 2019-01-16 PROCEDURE — 36415 COLL VENOUS BLD VENIPUNCTURE: CPT

## 2019-01-16 PROCEDURE — 80053 COMPREHEN METABOLIC PANEL: CPT

## 2019-01-21 ENCOUNTER — TELEPHONE (OUTPATIENT)
Dept: CARDIOLOGY CLINIC | Age: 69
End: 2019-01-21

## 2019-01-21 RX ORDER — BUDESONIDE AND FORMOTEROL FUMARATE DIHYDRATE 160; 4.5 UG/1; UG/1
AEROSOL RESPIRATORY (INHALATION)
Qty: 10.2 G | Refills: 5 | Status: SHIPPED | OUTPATIENT
Start: 2019-01-21 | End: 2019-07-12 | Stop reason: SDUPTHER

## 2019-03-25 DIAGNOSIS — J44.9 CHRONIC OBSTRUCTIVE PULMONARY DISEASE, UNSPECIFIED COPD TYPE (HCC): ICD-10-CM

## 2019-03-25 DIAGNOSIS — J45.909 UNCOMPLICATED ASTHMA, UNSPECIFIED ASTHMA SEVERITY, UNSPECIFIED WHETHER PERSISTENT: Primary | ICD-10-CM

## 2019-03-25 RX ORDER — IPRATROPIUM BROMIDE AND ALBUTEROL SULFATE 2.5; .5 MG/3ML; MG/3ML
SOLUTION RESPIRATORY (INHALATION)
Qty: 360 ML | Refills: 5 | Status: SHIPPED | OUTPATIENT
Start: 2019-03-25 | End: 2019-09-12

## 2019-04-03 RX ORDER — ROFLUMILAST 500 UG/1
TABLET ORAL
Qty: 30 TABLET | Refills: 5 | Status: SHIPPED | OUTPATIENT
Start: 2019-04-03 | End: 2019-11-04 | Stop reason: SDUPTHER

## 2019-04-09 RX ORDER — DILTIAZEM HYDROCHLORIDE 180 MG/1
CAPSULE, COATED, EXTENDED RELEASE ORAL
Qty: 30 CAPSULE | Refills: 0 | Status: SHIPPED | OUTPATIENT
Start: 2019-04-09 | End: 2019-10-30 | Stop reason: SDUPTHER

## 2019-04-17 ENCOUNTER — HOSPITAL ENCOUNTER (OUTPATIENT)
Age: 69
Discharge: HOME | End: 2019-04-17
Payer: MEDICARE

## 2019-04-17 ENCOUNTER — OFFICE VISIT (OUTPATIENT)
Dept: CARDIOLOGY CLINIC | Age: 69
End: 2019-04-17
Payer: MEDICARE

## 2019-04-17 VITALS
HEIGHT: 69 IN | HEART RATE: 77 BPM | BODY MASS INDEX: 27.56 KG/M2 | WEIGHT: 186.06 LBS | DIASTOLIC BLOOD PRESSURE: 90 MMHG | SYSTOLIC BLOOD PRESSURE: 140 MMHG | OXYGEN SATURATION: 94 %

## 2019-04-17 DIAGNOSIS — I20.0 UNSTABLE ANGINA PECTORIS (HCC): Primary | ICD-10-CM

## 2019-04-17 DIAGNOSIS — I48.20 CHRONIC ATRIAL FIBRILLATION (HCC): ICD-10-CM

## 2019-04-17 DIAGNOSIS — I48.19 PERSISTENT ATRIAL FIBRILLATION (HCC): ICD-10-CM

## 2019-04-17 DIAGNOSIS — I20.0 UNSTABLE ANGINA PECTORIS (HCC): ICD-10-CM

## 2019-04-17 LAB
HCT VFR BLD CALC: 50.7 % (ref 40.5–52.5)
HEMOGLOBIN: 16.5 G/DL (ref 13.5–17.5)
MCH RBC QN AUTO: 30.4 PG (ref 26–34)
MCHC RBC AUTO-ENTMCNC: 32.5 G/DL (ref 31–36)
MCV RBC AUTO: 93.5 FL (ref 80–100)
PDW BLD-RTO: 13.9 % (ref 12.4–15.4)
PLATELET # BLD: 358 K/UL (ref 135–450)
PMV BLD AUTO: 8.6 FL (ref 5–10.5)
RBC # BLD: 5.43 M/UL (ref 4.2–5.9)
WBC # BLD: 11.9 K/UL (ref 4–11)

## 2019-04-17 PROCEDURE — 1123F ACP DISCUSS/DSCN MKR DOCD: CPT | Performed by: INTERNAL MEDICINE

## 2019-04-17 PROCEDURE — G8427 DOCREV CUR MEDS BY ELIG CLIN: HCPCS | Performed by: INTERNAL MEDICINE

## 2019-04-17 PROCEDURE — 1036F TOBACCO NON-USER: CPT | Performed by: INTERNAL MEDICINE

## 2019-04-17 PROCEDURE — G8419 CALC BMI OUT NRM PARAM NOF/U: HCPCS | Performed by: INTERNAL MEDICINE

## 2019-04-17 PROCEDURE — 99214 OFFICE O/P EST MOD 30 MIN: CPT | Performed by: INTERNAL MEDICINE

## 2019-04-17 PROCEDURE — 4040F PNEUMOC VAC/ADMIN/RCVD: CPT | Performed by: INTERNAL MEDICINE

## 2019-04-17 PROCEDURE — G8598 ASA/ANTIPLAT THER USED: HCPCS | Performed by: INTERNAL MEDICINE

## 2019-04-17 PROCEDURE — 85027 COMPLETE CBC AUTOMATED: CPT

## 2019-04-17 PROCEDURE — 3017F COLORECTAL CA SCREEN DOC REV: CPT | Performed by: INTERNAL MEDICINE

## 2019-04-17 PROCEDURE — 36415 COLL VENOUS BLD VENIPUNCTURE: CPT

## 2019-04-17 RX ORDER — NITROGLYCERIN 0.4 MG/1
0.4 TABLET SUBLINGUAL EVERY 5 MIN PRN
Qty: 25 TABLET | Refills: 3 | Status: SHIPPED | OUTPATIENT
Start: 2019-04-17 | End: 2020-07-01 | Stop reason: SDUPTHER

## 2019-04-17 NOTE — PATIENT INSTRUCTIONS
Plan:  1. Will check lexiscan stress test. We will schedule and call you with. 2. Nitro sublingual for chest pain. 3. EKG today in office- afib w/ controled ventricular response    4.  Follow up in 6 months

## 2019-04-17 NOTE — LETTER
CHOL 147 2018     Lab Results   Component Value Date    TRIG 171 (H) 2019    TRIG 76 2018     Lab Results   Component Value Date    HDL 39 (L) 2019    HDL 31 (L) 2018     Lab Results   Component Value Date    LDLCALC 133 (H) 2019    LDLCALC 101 (H) 2018     Lab Results   Component Value Date    LABVLDL 34 2019    LABVLDL 15 2018     No results found for: CHOLHDLRATIO  PT/INR: No results for input(s): PROTIME, INR in the last 72 hours. A1C:   Lab Results   Component Value Date    LABA1C 5.7 2018     BNP:  No results for input(s): BNP in the last 72 hours. IMAGING:   EKG 19  afib controlled ventricular response no ischemia or infarct. EKG 4/3/18  Atrial fibrillation Controlled ventricular rate Abnormal ECG Confirmed by Corrie Cooks MD, [address] () on 2018 9:25:35 PM     EKG 3.18   afib RVR     CXR 3/5/18  FINDINGS:   The lungs are hyperinflated.  There has been resolution of bibasilar   infiltrates. Gerlean Martinez is scarring in the right lower lobe.  Heart size and   pulmonary vessels are stable. IMAGING:   Echo doppler of heart 18   Normal left ventricular systolic function with an estimated ejection   fraction of 55%.   Normal left ventricular diastolic filling pressure.   The right ventricle is mildly enlarged.   The right atrium is mildly dilated.   Mild mitral annular calcification. Mild mitral regurgitation.   Aortic valve sclerosis without stenosis.   Mild tricuspid regurgitation.   Systolic pulmonary artery pressure (SPAP) is normal and estimated at 37 mmHg   (RA pressure 8 mmHg).   There is a left pleural effusion.   EK/10/18  Atrial fibrillation with rapid ventricular response Incomplete right bundle branch block   Abnormal ECG No previous ECGs available Confirmed by Corrie Cooks MD, [address] () on 2018 5:38:40 AM     CXR 1/10/18  Increased interstitial opacity.  Correlate with any clinical evidence developing interstitial pulmonary edema.   No focal infiltrate is identified.         Assessment:  1. Unstable angina pectoris ([address]    2. Chronic atrial fibrillation (HCC)     [id number] Score for Atrial Fibrillation Stroke Risk   Risk   Factors  Component Value   C CHF Yes 1   H HTN Yes 1   A2 Age >= 76 No,  (75 y.o.) 0   D DM No 0   S2 Prior Stroke/TIA No 0   V Vascular Disease No 0   A Age 74-69 Yes,  (75 y.o.) 1   Sc Sex male 0    [id number]  Score  3   Score last updated 1/66/00 8:51 PM  Diastolic CHF ACUTE        Plan:  1. Will check lexiscan stress test. We will schedule and call you with. 2. Nitro sublingual for chest pain. 3. EKG today in office- afib w/ controled ventricular response    4. Follow up in 6 months     QUALITY MEASURES  1. Tobacco Cessation Counseling: YES  2. Retake of BP if >140/90:   NA  3. Documentation to PCP/referring for new patient:  Sent to PCP at close of office visit  4. CAD patient on anti-platelet: NA anticaogulated  5. CAD patient on STATIN therapy:  NA  6. Patient with CHF and aFib on anticoagulation:  Yes  eliquis     This note was scribed in the presence of Pritesh Dumont MD by Burgess Chris RN  I, Dr. Rodney Pichardo, personally performed the services described in this documentation, as scribed by the above signed scribe in my presence. It is both accurate and complete to my knowledge. I agree with the details independently gathered by the clinical support staff, while the remaining scribed note accurately describes my personal service to the patient.       Lula Mandel MD 4/17/2019 3:07 PM

## 2019-04-17 NOTE — PROGRESS NOTES
[address] Office Note  4/17/2019     Subjective:  Mr. Kenia Villar is here for  cardiology  follow up. Persistent AFIB, HTN, CP  "[employer]  Today he report chest pain once weekly over the past year. The pain feels like a pressure he describes as a nuisance. The pain only last for a couple seconds. Nothing makes the pain better or worse. He presents to the office today with his portable oxygen. He reports he is able to walk while grocery shopping without complication. Denies shortness of breath, edema, dizziness, palpitations and syncope. PMH COPD, former smoker,  AFIB. He failed cardioversion 4.13.18  Review of Systems:         12 point ROS negative in all areas as listed below except as in [address], EENT, Cardiovascular, pulmonary, GI, , Musculoskeletal, skin, neurological, hematological, endocrine, Psychiatric    Reviewed past medical history, social, and family history. Non smoker  No alcohol   Past Medical History:   Diagnosis Date    Bronchitis chronic     Emphy[facility]ma [facility] lung ([facility] [address]     Influenza A 01/10/2018    Lung disease     copd    Pneumonia     12/2009     History reviewed. No pertinent surgical history. Objective:   BP (!) 140/90   Pulse 77   Ht 5' 9\" (1.753 m)   Wt 186 lb 1 oz (84.4 kg)   SpO2 94%   BMI 27.48 kg/m²     Wt Readings from Last 3 Encounters:   04/17/19 186 lb 1 oz (84.4 kg)   11/05/18 185 lb (83.9 kg)   10/17/18 183 lb 1.9 oz (83.1 kg)       Physical Exam:  General: No Respiratory distress, appears well developed and well nourished. On continuous supplemental O2 on demand. Eyes:  Sclera nonicteric  Nose/Sinuses:  negative findings: nose shows no deformity, asymmetry, or inflammation, nasal mucosa normal, septum midline with no perforation or bleeding  Back:  no pain to palpation  Joint:  no active joint inflammation  Musculoskeletal:  negative  Skin:  Warm and dry  Neck:  Negative for JVD and Carotid Bruits.    Chest:  Clear to auscultation, respiration Date    TRIG 171 (H) 2019    TRIG 76 2018     Lab Results   Component Value Date    HDL 39 (L) 2019    HDL 31 (L) 2018     Lab Results   Component Value Date    LDLCALC 133 (H) 2019    LDLCALC 101 (H) 2018     Lab Results   Component Value Date    LABVLDL 34 2019    LABVLDL 15 2018     No results found for: CHOLHDLRATIO  PT/INR: No results for input(s): PROTIME, INR in the last 72 hours. A1C:   Lab Results   Component Value Date    LABA1C 5.7 2018     BNP:  No results for input(s): BNP in the last 72 hours. IMAGING:   EKG 19  afib controlled ventricular response no ischemia or infarct. EKG 4/3/18  Atrial fibrillation Controlled ventricular rate Abnormal ECG Confirmed by Betsy Montero MD, [address] () on 2018 9:25:35 PM     EKG 3.11.18   afib RVR     CXR 3/5/18  FINDINGS:   The lungs are hyperinflated.  There has been resolution of bibasilar   infiltrates. Dennie Arms is scarring in the right lower lobe.  Heart size and   pulmonary vessels are stable. IMAGING:   Echo doppler of heart 18   Normal left ventricular systolic function with an estimated ejection   fraction of 55%.   Normal left ventricular diastolic filling pressure.   The right ventricle is mildly enlarged.   The right atrium is mildly dilated.   Mild mitral annular calcification. Mild mitral regurgitation.   Aortic valve sclerosis without stenosis.   Mild tricuspid regurgitation.   Systolic pulmonary artery pressure (SPAP) is normal and estimated at 37 mmHg   (RA pressure 8 mmHg).   There is a left pleural effusion.   EK/10/18  Atrial fibrillation with rapid ventricular response Incomplete right bundle branch block   Abnormal ECG No previous ECGs available Confirmed by Betsy Montero MD, [address] () on 2018 5:38:40 AM     CXR 1/10/18  Increased interstitial opacity.  Correlate with any clinical evidence developing interstitial pulmonary edema.   No focal infiltrate is identified.         Assessment:  1. Unstable angina pectoris ([address]    2. Chronic atrial fibrillation (HCC)     [id number] Score for Atrial Fibrillation Stroke Risk   Risk   Factors  Component Value   C CHF Yes 1   H HTN Yes 1   A2 Age >= 76 No,  (75 y.o.) 0   D DM No 0   S2 Prior Stroke/TIA No 0   V Vascular Disease No 0   A Age 74-69 Yes,  (75 y.o.) 1   Sc Sex male 0    [id number]  Score  3   Score last updated 0/57/03 0:21 PM  Diastolic CHF ACUTE        Plan:  1. Will check lexiscan stress test. We will schedule and call you with. 2. Nitro sublingual for chest pain. 3. EKG today in office- afib w/ controled ventricular response    4. Follow up in 6 months     QUALITY MEASURES  1. Tobacco Cessation Counseling: YES  2. Retake of BP if >140/90:   NA  3. Documentation to PCP/referring for new patient:  Sent to PCP at close of office visit  4. CAD patient on anti-platelet: NA anticaogulated  5. CAD patient on STATIN therapy:  NA  6. Patient with CHF and aFib on anticoagulation:  Yes  eliquis     This note was scribed in the presence of Katelynn Cardozo MD by Ana Villegas, RN  I, Dr. Michel Reyna, personally performed the services described in this documentation, as scribed by the above signed scribe in my presence. It is both accurate and complete to my knowledge. I agree with the details independently gathered by the clinical support staff, while the remaining scribed note accurately describes my personal service to the patient.       Giana Fletcher MD 4/17/2019 3:07 PM

## 2019-04-19 ENCOUNTER — TELEPHONE (OUTPATIENT)
Dept: CARDIOLOGY CLINIC | Age: 69
End: 2019-04-19

## 2019-04-19 NOTE — TELEPHONE ENCOUNTER
Notes recorded by Nancy Chavez on 4/19/2019 at 2:52 PM EDT  Tried calling. [employer] customer not available message.   ------    Notes recorded by Oscar Tobin MD on 4/19/2019 at 2:22 PM EDT  Blood count is good

## 2019-05-02 ENCOUNTER — HOSPITAL ENCOUNTER (OUTPATIENT)
Dept: NON INVASIVE DIAGNOSTICS | Age: 69
Discharge: HOME | End: 2019-05-02
Payer: MEDICARE

## 2019-05-02 ENCOUNTER — HOSPITAL ENCOUNTER (OUTPATIENT)
Dept: NUCLEAR MEDICINE | Age: 69
Discharge: HOME | End: 2019-05-02
Payer: MEDICARE

## 2019-05-02 DIAGNOSIS — I48.19 PERSISTENT ATRIAL FIBRILLATION (HCC): ICD-10-CM

## 2019-05-02 DIAGNOSIS — I20.0 UNSTABLE ANGINA PECTORIS (HCC): ICD-10-CM

## 2019-05-02 LAB
LV EF: 60 %
LVEF MODALITY: NORMAL

## 2019-05-02 PROCEDURE — A9502 TC99M TETROFOSMIN: HCPCS | Performed by: INTERNAL MEDICINE

## 2019-05-02 PROCEDURE — 78452 HT MUSCLE IMAGE SPECT MULT: CPT

## 2019-05-02 PROCEDURE — 6360000002 HC RX W HCPCS: Performed by: INTERNAL MEDICINE

## 2019-05-02 PROCEDURE — 3430000000 HC RX DIAGNOSTIC RADIOPHARMACEUTICAL: Performed by: INTERNAL MEDICINE

## 2019-05-02 PROCEDURE — 93017 CV STRESS TEST TRACING ONLY: CPT

## 2019-05-02 RX ADMIN — TETROFOSMIN 32 MILLICURIE: 1.38 INJECTION, POWDER, LYOPHILIZED, FOR SOLUTION INTRAVENOUS at 11:39

## 2019-05-02 RX ADMIN — REGADENOSON 0.4 MG: 0.08 INJECTION, SOLUTION INTRAVENOUS at 11:35

## 2019-05-02 RX ADMIN — TETROFOSMIN 10.6 MILLICURIE: 1.38 INJECTION, POWDER, LYOPHILIZED, FOR SOLUTION INTRAVENOUS at 09:49

## 2019-05-07 ENCOUNTER — TELEPHONE (OUTPATIENT)
Dept: PULMONOLOGY | Age: 69
End: 2019-05-07

## 2019-05-07 NOTE — TELEPHONE ENCOUNTER
Patient did not show for 6 month follow-up appointment  with  on 5/7/19    Same Day Cancellation: No    Patient rescheduled:  NA    New appointment: n/a    Patient was also no show on: 2/9/17    LOV        Assessment: 11/5/18      · Severe COPD   · Nocturnal hypoxia on 2.5 LPM   · Hypoxia on exertion   · 30 pack year smoking - quit smoking 2012       Plan:       · Symbicort BID and DuoNeb   · Daliresp 500 mcg PO daily  · O2 2-3 LPM on exertion. Advised to titrate O2 using her pulse oximeter- target O2 sat 90-92%. · Pulmonary rehab referral   · Patient is up to date with Pneumococcal vaccine and influenza vaccine   · Acapella and Mucinex   · CT chest, low dose protocol, screening for lung cancer. Risks, benefits and alternatives including doing nothing were discussed with patient- patient declined at this time.

## 2019-05-11 ENCOUNTER — HOSPITAL ENCOUNTER (EMERGENCY)
Age: 69
Discharge: HOME | End: 2019-05-12
Payer: MEDICARE

## 2019-05-11 DIAGNOSIS — S20.212A RIB CONTUSION, LEFT, INITIAL ENCOUNTER: ICD-10-CM

## 2019-05-11 DIAGNOSIS — S70.01XA CONTUSION OF RIGHT HIP, INITIAL ENCOUNTER: Primary | ICD-10-CM

## 2019-05-11 PROCEDURE — 99283 EMERGENCY DEPT VISIT LOW MDM: CPT

## 2019-05-11 RX ORDER — OXYCODONE HYDROCHLORIDE AND ACETAMINOPHEN 5; 325 MG/1; MG/1
1 TABLET ORAL ONCE
Status: COMPLETED | OUTPATIENT
Start: 2019-05-12 | End: 2019-05-12

## 2019-05-11 ASSESSMENT — PAIN DESCRIPTION - LOCATION: LOCATION: HIP;BACK

## 2019-05-11 ASSESSMENT — PAIN SCALES - GENERAL: PAINLEVEL_OUTOF10: 9

## 2019-05-11 ASSESSMENT — PAIN DESCRIPTION - ORIENTATION: ORIENTATION: RIGHT

## 2019-05-12 ENCOUNTER — APPOINTMENT (OUTPATIENT)
Dept: GENERAL RADIOLOGY | Age: 69
End: 2019-05-12
Payer: MEDICARE

## 2019-05-12 VITALS
WEIGHT: 184 LBS | HEIGHT: 69 IN | OXYGEN SATURATION: 94 % | DIASTOLIC BLOOD PRESSURE: 74 MMHG | BODY MASS INDEX: 27.25 KG/M2 | TEMPERATURE: 98.2 F | HEART RATE: 72 BPM | RESPIRATION RATE: 18 BRPM | SYSTOLIC BLOOD PRESSURE: 132 MMHG

## 2019-05-12 PROCEDURE — 73502 X-RAY EXAM HIP UNI 2-3 VIEWS: CPT

## 2019-05-12 PROCEDURE — 6370000000 HC RX 637 (ALT 250 FOR IP): Performed by: NURSE PRACTITIONER

## 2019-05-12 PROCEDURE — 71101 X-RAY EXAM UNILAT RIBS/CHEST: CPT

## 2019-05-12 RX ORDER — HYDROCODONE BITARTRATE AND ACETAMINOPHEN 5; 325 MG/1; MG/1
1 TABLET ORAL EVERY 6 HOURS PRN
Qty: 15 TABLET | Refills: 0 | Status: SHIPPED | OUTPATIENT
Start: 2019-05-12 | End: 2019-05-19

## 2019-05-12 RX ADMIN — OXYCODONE AND ACETAMINOPHEN 1 TABLET: 5; 325 TABLET ORAL at 00:05

## 2019-05-12 ASSESSMENT — PAIN SCALES - GENERAL
PAINLEVEL_OUTOF10: 3
PAINLEVEL_OUTOF10: 9

## 2019-05-12 NOTE — ED NOTES
Patient ambulated approx 50ft on 3L nasal cannula without the use of assistive devices. Patient stated he felt \"tired but good\" when asked.        Radha Medrano  05/12/19 [id number]

## 2019-05-12 NOTE — ED NOTES
D/c paperwork, prescriptions and f.u given to pt. Pt verbalized understanding and had no questions or concerns at this time. Pt vs stable.  Pt d/c in good condition     Keena Matamoros RN  05/12/19 0135

## 2019-05-13 NOTE — ED PROVIDER NOTES
needs: Medical: None     Non-medical: None   Tobacco Use    Smoking status: Former Smoker     Packs/day: 1.00     Years: 30.00     Pack years: 30.00     Types: Cigarettes     Last attempt to quit: 2016     Years since quittin.8    Smokeless tobacco: Never Used    Tobacco comment: Just quit about 3 months ago 16   Substance and Sexual Activity    Alcohol use: No    Drug use: No    Sexual activity: Not Currently   Lifestyle    Physical activity:     Days per week: None     Minutes per session: None    Stress: None   Relationships    Social connections:     Talks on phone: None     Gets together: None     Attends Hoahaoism service: None     Active member of club or organization: None     Attends meetings of clubs or organizations: None     Relationship status: None    Intimate partner violence:     Fear of current or ex partner: None     Emotionally abused: None     Physically abused: None     Forced sexual activity: None   Other Topics Concern    None   Social History Narrative    None       SCREENINGS:             PHYSICAL EXAM:       ED Triage Vitals [199]   BP Temp Temp Source Pulse Resp SpO2 Height Weight   (!) 144/88 98.2 °F (36.8 °C) Oral 68 18 93 % 5' 9\" (1.753 m) 184 lb (83.5 kg)       Physical Exam    CONSTITUTIONAL: Awake and alert. Cooperative. Well-developed. Well-nourished. Non-toxic. No acute distress. Vitals:    19 2329 19 0059 19 0123   BP: (!) 144/88  132/74   Pulse: 68 62 72   Resp: 18 18    Temp: 98.2 °F (36.8 °C)     TempSrc: Oral     SpO2: 93% 94% 94%   Weight: 184 lb (83.5 kg)     Height: 5' 9\" (1.753 m)       HENT: Normocephalic. Atraumatic. External ears normal, without discharge. TMs clear bilaterally. Nonasal discharge. Oropharynx clear, no erythema. Mucous membranes moist.  EYES: Conjunctiva non-injected, nolid abnormalities noted. No scleral icterus. PERRL. EOM's grossly intact. Anterior chambers clear. NECK: Supple. Normal ROM.  No meningismus. No thyroid tenderness or swelling noted. CARDIOVASCULAR: RRR. No Murmer. Intact distal pulses with no edema. No carotid bruits. PULMONARY/CHEST WALL: Effort normal. No tachypnea. Lungs clear to ausculation. There is some mild tenderness along the left lateral rib cage without ecchymosis or crepitus. ABDOMEN: Normal BS. Soft. Nondistended. No tenderness topalpate. No guarding. No hernias noted. No splenomegaly. Back: Spine is midline. No ecchymosis. No crepituson palpation. No obvious subluxation of vertebral column. No saddle anesthesia or evidence of cauda equina. No focalized bony tenderness. /ANORECTAL: Not assessed  MUSKULOSKELETAL: Limited range of motion of the right hip secondary to pain, there is mild global tenderness noted. Other extremities are atraumatic. 2+ dorsalis pedis and posterior tibial pulses present distally. SKIN: Warm and dry. NEUROLOGICAL:  GCS 15. CN II-XII grossly intact. Strength is 5/5 in all extremities and sensation is intact. DTRs normal and symmetric. PSYCHIATRIC: Normal affect, normal insight and judgement. Alert and oriented x 3. DIAGNOSTIC RESULTS:     LABS:    No results found for this visit on 05/11/19. RADIOLOGY:  All x-ray studies are viewed/reviewed by me. Formal interpretations per the radiologist are as follows:      XR HIP RIGHT (2-3 VIEWS)   Final Result   No acute osseous abnormality of the pelvis or right hip. XR RIBS LEFT INCLUDE CHEST (MIN 3 VIEWS)   Final Result   Old rib fractures on the left      No acute rib fracture. EKG:  See EKG interpretation by an attending physician.       PROCEDURES:   N/A    CRITICAL CARE TIME:   N/A    CONSULTS:  None      EMERGENCY DEPARTMENT COURSE andDIFFERENTIAL DIAGNOSIS/MDM:   Vitals:    Vitals:    05/11/19 2329 05/12/19 0059 05/12/19 0123   BP: (!) 144/88  132/74   Pulse: 68 62 72   Resp: 18 18    Temp: 98.2 °F (36.8 °C)     TempSrc: Oral     SpO2: 93% 94% 94% Weight: 184 lb (83.5 kg)     Height: 5' 9\" (1.753 m)         Patient wasgiven the following medications:  Medications   oxyCODONE-acetaminophen (PERCOCET) 5-325 MG per tablet 1 tablet (1 tablet Oral Given 5/12/19 0005)         Patient was evaluated independently by myself with the attending physician available for consultation. ED Course as of May 12 2012   Sat May 11, 2019   2351 Patient evaluated. The patient had a fall on Wednesday. Fall was mechanical.  No CP. Patient complains of right hip pain started today, states that it hurt to bear weight. Patient states that he's had left-sided rib pain since the fall. No difficulty breathing or shortness of breath. He does not appear to be distressed. He normally walks without aid. He is here for further evaluation. Radiographic imaging is pending    [RH]   Sun May 12, 2019   0117 Patient radiologic imaging showed no evidence of acute fracture. This patient will be for ambulation to ensure that he can bear weight and then discharged home, family is in agreement with this plan. Patient good relief with pain control here in the ED. I will supply patient with pain medication for home.    [RH]   0124 Patient ambulated without difficulty. Plan will be for discharge home, he can return the ED for any worsening symptoms. [RH]      ED Course User Index  [RH] ERIN Jones - CNP        Patient laboratory studies, radiographic imaging, and assessment were all discussed with the patient and/orpatient family. There was shared decision-making between myself as well as the patient and/or their surrogate and we are all in agreement with discharge home. There was an opportunity for questions and all questions were answered tothe best of my ability and to the satisfaction of the patient and/or patient family. FINAL IMPRESSION:      1. Contusion of right hip, initial encounter    2.  Rib contusion, left, initial encounter          DISPOSITION/PLAN: DISPOSITION Decision To Discharge      PATIENT REFERRED TO:  Alicja Brar  [address]   [phone]    Call   For follow up      DISCHARGE MEDICATIONS:  Discharge Medication List as of 5/12/2019  1:26 AM      START taking these medications    Details   HYDROcodone-acetaminophen (NORCO) 5-325 MG per tablet Take 1 tablet by mouth every 6 hours as needed for Pain for up to 7 days. , Disp-15 tablet, R-0Print                        (Please note thatportions of this note were completed with a voice recognition program.  Efforts were made to edit the dictations, but occasionally words are mis-transcribed.)    ERIN Torre - CNP-C (electronicallysigned)        ERIN Torre CNP  05/12/19 2028

## 2019-06-06 RX ORDER — DILTIAZEM HYDROCHLORIDE 180 MG/1
CAPSULE, COATED, EXTENDED RELEASE ORAL
Qty: 90 CAPSULE | Refills: 1 | Status: SHIPPED | OUTPATIENT
Start: 2019-06-06 | End: 2019-10-30 | Stop reason: SDUPTHER

## 2019-07-15 RX ORDER — BUDESONIDE AND FORMOTEROL FUMARATE DIHYDRATE 160; 4.5 UG/1; UG/1
AEROSOL RESPIRATORY (INHALATION)
Qty: 10.2 G | Refills: 5 | Status: SHIPPED | OUTPATIENT
Start: 2019-07-15 | End: 2020-01-09

## 2019-08-27 RX ORDER — FAMOTIDINE 20 MG/1
TABLET, FILM COATED ORAL
Qty: 180 TABLET | Refills: 0 | Status: SHIPPED | OUTPATIENT
Start: 2019-08-27 | End: 2020-04-28

## 2019-08-27 NOTE — TELEPHONE ENCOUNTER
4/17/19  Plan:  1. Will check lexiscan stress test. We will schedule and call you with. 2. Nitro sublingual for chest pain. 3. EKG today in office- afib w/ controled ventricular response    4.  Follow up in 6 months

## 2019-08-31 ENCOUNTER — HOSPITAL ENCOUNTER (EMERGENCY)
Age: 69
Discharge: HOME | End: 2019-08-31
Attending: EMERGENCY MEDICINE
Payer: MEDICARE

## 2019-08-31 ENCOUNTER — APPOINTMENT (OUTPATIENT)
Dept: GENERAL RADIOLOGY | Age: 69
End: 2019-08-31
Payer: MEDICARE

## 2019-08-31 VITALS
RESPIRATION RATE: 20 BRPM | HEIGHT: 69 IN | HEART RATE: 70 BPM | DIASTOLIC BLOOD PRESSURE: 87 MMHG | OXYGEN SATURATION: 96 % | WEIGHT: 185 LBS | TEMPERATURE: 98.9 F | BODY MASS INDEX: 27.4 KG/M2 | SYSTOLIC BLOOD PRESSURE: 111 MMHG

## 2019-08-31 DIAGNOSIS — J44.1 COPD EXACERBATION (HCC): Primary | ICD-10-CM

## 2019-08-31 PROCEDURE — 99283 EMERGENCY DEPT VISIT LOW MDM: CPT

## 2019-08-31 PROCEDURE — 71046 X-RAY EXAM CHEST 2 VIEWS: CPT

## 2019-08-31 PROCEDURE — 6370000000 HC RX 637 (ALT 250 FOR IP): Performed by: EMERGENCY MEDICINE

## 2019-08-31 RX ORDER — PREDNISONE 20 MG/1
40 TABLET ORAL ONCE
Status: COMPLETED | OUTPATIENT
Start: 2019-08-31 | End: 2019-08-31

## 2019-08-31 RX ORDER — IPRATROPIUM BROMIDE AND ALBUTEROL SULFATE 2.5; .5 MG/3ML; MG/3ML
1 SOLUTION RESPIRATORY (INHALATION) EVERY 4 HOURS
Qty: 360 ML | Refills: 0 | Status: SHIPPED | OUTPATIENT
Start: 2019-08-31 | End: 2019-10-22

## 2019-08-31 RX ORDER — ACETAMINOPHEN 325 MG/1
650 TABLET ORAL ONCE
Status: COMPLETED | OUTPATIENT
Start: 2019-08-31 | End: 2019-08-31

## 2019-08-31 RX ORDER — PREDNISONE 10 MG/1
10 TABLET ORAL SEE ADMIN INSTRUCTIONS
Qty: 30 TABLET | Refills: 0 | Status: SHIPPED | OUTPATIENT
Start: 2019-09-01 | End: 2019-10-30 | Stop reason: ALTCHOICE

## 2019-08-31 RX ORDER — IPRATROPIUM BROMIDE AND ALBUTEROL SULFATE 2.5; .5 MG/3ML; MG/3ML
1 SOLUTION RESPIRATORY (INHALATION) ONCE
Status: COMPLETED | OUTPATIENT
Start: 2019-08-31 | End: 2019-08-31

## 2019-08-31 RX ADMIN — ACETAMINOPHEN 650 MG: 325 TABLET ORAL at 15:09

## 2019-08-31 RX ADMIN — PREDNISONE 40 MG: 20 TABLET ORAL at 15:09

## 2019-08-31 RX ADMIN — IPRATROPIUM BROMIDE AND ALBUTEROL SULFATE 1 AMPULE: .5; 3 SOLUTION RESPIRATORY (INHALATION) at 15:09

## 2019-08-31 ASSESSMENT — PAIN SCALES - GENERAL
PAINLEVEL_OUTOF10: 5
PAINLEVEL_OUTOF10: 9

## 2019-08-31 ASSESSMENT — ENCOUNTER SYMPTOMS
STRIDOR: 0
BACK PAIN: 0
ABDOMINAL PAIN: 0
CHEST TIGHTNESS: 0
WHEEZING: 1
SHORTNESS OF BREATH: 1
COUGH: 1

## 2019-08-31 ASSESSMENT — PAIN DESCRIPTION - LOCATION
LOCATION: BACK
LOCATION: BACK

## 2019-08-31 ASSESSMENT — PAIN DESCRIPTION - PAIN TYPE
TYPE: CHRONIC PAIN
TYPE: CHRONIC PAIN

## 2019-08-31 NOTE — ED PROVIDER NOTES
08/31/19. EMERGENCY DEPARTMENT COURSE and DIFFERENTIAL DIAGNOSIS/MDM:     Vitals:    08/31/19 1502   BP: (!) 119/55   Pulse: 78   Resp: 22   Temp: 98.9 °F (37.2 °C)   TempSrc: Oral   SpO2: 96%   Weight: 185 lb (83.9 kg)   Height: 5' 9\" (1.753 m)           MDM      REASSESSMENT          CRITICAL CARE TIME     CONSULTS:  None      PROCEDURES:     Procedures    Patient was given a hand-held nebulizer DuoNeb. Patient was given prednisone 40 mg p.o. he is feeling 100% better at this point  Patient underwent chest x-ray there was no pneumonia collapsed lung or pneumothorax at this point I did offer him admission but he said he would rather try it back at home at this point I will give him some DuoNeb solution because he says he is not sure if he has had or not  Also recommended another tapering dose of prednisone and follow-up closely with his primary care team    FINAL IMPRESSION      1. COPD exacerbation [facility])            DISPOSITION/PLAN   DISPOSITION Decision To Discharge 08/31/2019 04:55:07 PM      PATIENT REFERRED TO:  Jessica Javedrick  Carlos Lira  [phone]    In 3 days        DISCHARGE MEDICATIONS:  New Prescriptions    IPRATROPIUM-ALBUTEROL (DUONEB) 0.5-2.5 (3) MG/3ML SOLN NEBULIZER SOLUTION    Inhale 3 mLs into the lungs every 4 hours    PREDNISONE (DELTASONE) 10 MG TABLET    Take 1 tablet by mouth See Admin Instructions Take 4 tablets ×4 days  Take 3 tablets ×3 days  Take 2 tablets ×2 days  Take 1 tablet ×1 day       Controlled Substances Monitoring  No flowsheet data found. (Please note that portions of this note were completed with a voice recognition program.  Efforts were made to edit the dictations but occasionally words are mis-transcribed.)    Patient was advised to return to the Emergency Department if there was any worsening.     Zeny Harris MD (electronically signed)  Attending Emergency Physician         Kayla Duarte MD  08/31/19 5985

## 2019-09-04 LAB
ALBUMIN SERPL-MCNC: 4.1 G/DL
ALP BLD-CCNC: 81 U/L
ALT SERPL-CCNC: 13 U/L
ANION GAP SERPL CALCULATED.3IONS-SCNC: NORMAL MMOL/L
AST SERPL-CCNC: 11 U/L
BILIRUB SERPL-MCNC: 0.5 MG/DL (ref 0.1–1.4)
BUN BLDV-MCNC: 19 MG/DL
CALCIUM SERPL-MCNC: 9 MG/DL
CHLORIDE BLD-SCNC: 97 MMOL/L
CO2: 25 MMOL/L
CREAT SERPL-MCNC: 0.95 MG/DL
GFR CALCULATED: NORMAL
GLUCOSE BLD-MCNC: 114 MG/DL
POTASSIUM SERPL-SCNC: 4.5 MMOL/L
SODIUM BLD-SCNC: 139 MMOL/L
TOTAL PROTEIN: 7.1

## 2019-09-06 ENCOUNTER — HOSPITAL ENCOUNTER (EMERGENCY)
Age: 69
Discharge: HOME | End: 2019-09-07
Attending: EMERGENCY MEDICINE
Payer: MEDICARE

## 2019-09-06 ENCOUNTER — APPOINTMENT (OUTPATIENT)
Dept: CT IMAGING | Age: 69
End: 2019-09-06
Payer: MEDICARE

## 2019-09-06 ENCOUNTER — APPOINTMENT (OUTPATIENT)
Dept: GENERAL RADIOLOGY | Age: 69
End: 2019-09-06
Payer: MEDICARE

## 2019-09-06 VITALS
RESPIRATION RATE: 18 BRPM | SYSTOLIC BLOOD PRESSURE: 114 MMHG | BODY MASS INDEX: 27.4 KG/M2 | TEMPERATURE: 98 F | OXYGEN SATURATION: 97 % | WEIGHT: 185 LBS | DIASTOLIC BLOOD PRESSURE: 85 MMHG | HEIGHT: 69 IN | HEART RATE: 63 BPM

## 2019-09-06 DIAGNOSIS — M48.54XA NON-TRAUMATIC COMPRESSION FRACTURE OF ELEVENTH THORACIC VERTEBRA, INITIAL ENCOUNTER: Primary | ICD-10-CM

## 2019-09-06 LAB
BILIRUBIN URINE: NEGATIVE
BLOOD, URINE: NEGATIVE
CLARITY: CLEAR
COLOR: YELLOW
GLUCOSE URINE: NEGATIVE MG/DL
KETONES, URINE: NEGATIVE MG/DL
LEUKOCYTE ESTERASE, URINE: NEGATIVE
MICROSCOPIC EXAMINATION: NORMAL
NITRITE, URINE: NEGATIVE
PH UA: 6.5 (ref 5–8)
PROTEIN UA: NEGATIVE MG/DL
SPECIFIC GRAVITY UA: 1.01 (ref 1–1.03)
URINE TYPE: NORMAL
UROBILINOGEN, URINE: 0.2 E.U./DL

## 2019-09-06 PROCEDURE — 74176 CT ABD & PELVIS W/O CONTRAST: CPT

## 2019-09-06 PROCEDURE — 72100 X-RAY EXAM L-S SPINE 2/3 VWS: CPT

## 2019-09-06 PROCEDURE — 6370000000 HC RX 637 (ALT 250 FOR IP): Performed by: NURSE PRACTITIONER

## 2019-09-06 PROCEDURE — 6360000002 HC RX W HCPCS: Performed by: NURSE PRACTITIONER

## 2019-09-06 PROCEDURE — 99284 EMERGENCY DEPT VISIT MOD MDM: CPT

## 2019-09-06 PROCEDURE — 72131 CT LUMBAR SPINE W/O DYE: CPT

## 2019-09-06 PROCEDURE — 81003 URINALYSIS AUTO W/O SCOPE: CPT

## 2019-09-06 PROCEDURE — 72128 CT CHEST SPINE W/O DYE: CPT

## 2019-09-06 PROCEDURE — 96372 THER/PROPH/DIAG INJ SC/IM: CPT

## 2019-09-06 RX ORDER — OXYCODONE HYDROCHLORIDE AND ACETAMINOPHEN 5; 325 MG/1; MG/1
1 TABLET ORAL ONCE
Status: COMPLETED | OUTPATIENT
Start: 2019-09-06 | End: 2019-09-06

## 2019-09-06 RX ORDER — ORPHENADRINE CITRATE 30 MG/ML
60 INJECTION INTRAMUSCULAR; INTRAVENOUS ONCE
Status: COMPLETED | OUTPATIENT
Start: 2019-09-06 | End: 2019-09-06

## 2019-09-06 RX ORDER — KETOROLAC TROMETHAMINE 30 MG/ML
30 INJECTION, SOLUTION INTRAMUSCULAR; INTRAVENOUS ONCE
Status: COMPLETED | OUTPATIENT
Start: 2019-09-06 | End: 2019-09-06

## 2019-09-06 RX ORDER — LIDOCAINE 4 G/G
1 PATCH TOPICAL DAILY
Status: DISCONTINUED | OUTPATIENT
Start: 2019-09-06 | End: 2019-09-07 | Stop reason: HOSPADM

## 2019-09-06 RX ADMIN — KETOROLAC TROMETHAMINE 30 MG: 30 INJECTION, SOLUTION INTRAMUSCULAR at 21:11

## 2019-09-06 RX ADMIN — OXYCODONE HYDROCHLORIDE AND ACETAMINOPHEN 1 TABLET: 5; 325 TABLET ORAL at 21:12

## 2019-09-06 RX ADMIN — ORPHENADRINE CITRATE 60 MG: 30 INJECTION INTRAMUSCULAR; INTRAVENOUS at 21:11

## 2019-09-06 ASSESSMENT — PAIN DESCRIPTION - LOCATION
LOCATION: BACK
LOCATION: BACK

## 2019-09-06 ASSESSMENT — PAIN SCALES - GENERAL
PAINLEVEL_OUTOF10: 5
PAINLEVEL_OUTOF10: 10
PAINLEVEL_OUTOF10: 10

## 2019-09-06 ASSESSMENT — PAIN DESCRIPTION - PAIN TYPE
TYPE: ACUTE PAIN;CHRONIC PAIN
TYPE: ACUTE PAIN;CHRONIC PAIN

## 2019-09-07 PROCEDURE — 6370000000 HC RX 637 (ALT 250 FOR IP): Performed by: NURSE PRACTITIONER

## 2019-09-07 RX ORDER — DIAZEPAM 2 MG/1
2 TABLET ORAL ONCE
Status: COMPLETED | OUTPATIENT
Start: 2019-09-07 | End: 2019-09-07

## 2019-09-07 RX ORDER — DIAZEPAM 2 MG/1
2 TABLET ORAL EVERY 8 HOURS PRN
Qty: 10 TABLET | Refills: 0 | Status: SHIPPED | OUTPATIENT
Start: 2019-09-07 | End: 2019-09-17

## 2019-09-07 RX ORDER — OXYCODONE HYDROCHLORIDE AND ACETAMINOPHEN 5; 325 MG/1; MG/1
1 TABLET ORAL EVERY 6 HOURS PRN
Qty: 12 TABLET | Refills: 0 | Status: SHIPPED | OUTPATIENT
Start: 2019-09-07 | End: 2019-09-10

## 2019-09-07 RX ADMIN — DIAZEPAM 2 MG: 2 TABLET ORAL at 00:31

## 2019-09-07 ASSESSMENT — PAIN DESCRIPTION - LOCATION: LOCATION: BACK

## 2019-09-07 ASSESSMENT — PAIN SCALES - GENERAL: PAINLEVEL_OUTOF10: 5

## 2019-09-07 ASSESSMENT — PAIN DESCRIPTION - PAIN TYPE: TYPE: ACUTE PAIN

## 2019-09-07 NOTE — ED PROVIDER NOTES
achievable. COMPARISON: None. HISTORY: ORDERING SYSTEM PROVIDED HISTORY: low back pain TECHNOLOGIST PROVIDED HISTORY: If patient is on cardiac monitor and/or pulse ox, they may be taken off cardiac monitor and pulse ox, left on O2 if currently on. All monitors reattached when patient returns to room. Reason for Exam: low back pain Acuity: Acute Type of Exam: Initial Relevant Medical/Surgical History: hernia repair. FINDINGS: Lower Chest: Mild bronchiectasis. Organs: Liver, gallbladder, pancreas, and spleen are unremarkable. GI/Bowel: No bowel obstruction. Appendix not visualized however no findings to suggest acute appendicitis. Pelvis: No bladder stone. No pelvic fluid collection. Vascular calcifications. Peritoneum/Retroperitoneum: No abdominal aortic aneurysm. Adrenal glands unremarkable. Kidneys are unremarkable. Bones/Soft Tissues: Compression deformities of T12 and T11. loss of approximately 40% vertebral body height centrally. Compression deformity of T12, unchanged. Compression deformity of T11 which is age indeterminate but new since February 19, 2018. Xr Lumbar Spine (2-3 Views)    Result Date: 9/6/2019  EXAMINATION: THREE XRAY VIEWS OF THE LUMBAR SPINE 9/6/2019 6:59 pm COMPARISON: February 19, 2018 HISTORY: ORDERING SYSTEM PROVIDED HISTORY: pain with movement; no fall. TECHNOLOGIST PROVIDED HISTORY: Reason for exam:->pain with movement; no fall. FINDINGS: Alignment is within normal limits. Moderate disc height loss at L5-S1. Facet arthropathy L4 through S1. No fracture. Vascular calcifications. Compression deformity of T12, unchanged.      No acute findings     Ct Thoracic Spine Wo Contrast    Result Date: 9/6/2019  EXAMINATION: CT OF THE THORACIC SPINE WITHOUT CONTRAST; CT OF THE LUMBAR SPINE WITHOUT CONTRAST  9/6/2019 8:36 pm; 9/6/2019 8:37 pm: TECHNIQUE: CT of the thoracic spine was performed without the administration of intravenous contrast. Multiplanar reformatted images are provided

## 2019-09-12 ENCOUNTER — OFFICE VISIT (OUTPATIENT)
Dept: PULMONOLOGY | Age: 69
End: 2019-09-12
Payer: MEDICARE

## 2019-09-12 VITALS
WEIGHT: 185 LBS | HEIGHT: 69 IN | HEART RATE: 68 BPM | BODY MASS INDEX: 27.4 KG/M2 | DIASTOLIC BLOOD PRESSURE: 83 MMHG | OXYGEN SATURATION: 95 % | TEMPERATURE: 98.2 F | SYSTOLIC BLOOD PRESSURE: 125 MMHG | RESPIRATION RATE: 16 BRPM

## 2019-09-12 DIAGNOSIS — J44.9 COPD, SEVERE (HCC): Primary | ICD-10-CM

## 2019-09-12 DIAGNOSIS — Z87.891 PERSONAL HISTORY OF TOBACCO USE: ICD-10-CM

## 2019-09-12 DIAGNOSIS — R09.02 HYPOXIA: ICD-10-CM

## 2019-09-12 DIAGNOSIS — Z23 NEED FOR INFLUENZA VACCINATION: ICD-10-CM

## 2019-09-12 PROCEDURE — G0008 ADMIN INFLUENZA VIRUS VAC: HCPCS | Performed by: INTERNAL MEDICINE

## 2019-09-12 PROCEDURE — G0296 VISIT TO DETERM LDCT ELIG: HCPCS | Performed by: INTERNAL MEDICINE

## 2019-09-12 PROCEDURE — 99214 OFFICE O/P EST MOD 30 MIN: CPT | Performed by: INTERNAL MEDICINE

## 2019-09-12 PROCEDURE — G8926 SPIRO NO PERF OR DOC: HCPCS | Performed by: INTERNAL MEDICINE

## 2019-09-12 PROCEDURE — G8419 CALC BMI OUT NRM PARAM NOF/U: HCPCS | Performed by: INTERNAL MEDICINE

## 2019-09-12 PROCEDURE — 1123F ACP DISCUSS/DSCN MKR DOCD: CPT | Performed by: INTERNAL MEDICINE

## 2019-09-12 PROCEDURE — 4040F PNEUMOC VAC/ADMIN/RCVD: CPT | Performed by: INTERNAL MEDICINE

## 2019-09-12 PROCEDURE — 1036F TOBACCO NON-USER: CPT | Performed by: INTERNAL MEDICINE

## 2019-09-12 PROCEDURE — 90662 IIV NO PRSV INCREASED AG IM: CPT | Performed by: INTERNAL MEDICINE

## 2019-09-12 PROCEDURE — 3017F COLORECTAL CA SCREEN DOC REV: CPT | Performed by: INTERNAL MEDICINE

## 2019-09-12 PROCEDURE — G8598 ASA/ANTIPLAT THER USED: HCPCS | Performed by: INTERNAL MEDICINE

## 2019-09-12 PROCEDURE — G8427 DOCREV CUR MEDS BY ELIG CLIN: HCPCS | Performed by: INTERNAL MEDICINE

## 2019-09-12 PROCEDURE — 3023F SPIROM DOC REV: CPT | Performed by: INTERNAL MEDICINE

## 2019-09-12 RX ORDER — PREDNISONE 10 MG/1
TABLET ORAL
Qty: 30 TABLET | Refills: 0 | Status: SHIPPED | OUTPATIENT
Start: 2019-09-12 | End: 2019-09-22

## 2019-09-12 RX ORDER — DOXYCYCLINE HYCLATE 100 MG
100 TABLET ORAL 2 TIMES DAILY
Qty: 10 TABLET | Refills: 0 | Status: SHIPPED | OUTPATIENT
Start: 2019-09-12 | End: 2019-09-17

## 2019-09-12 NOTE — PROGRESS NOTES
Low Dose CT (LDCT) Lung Screening criteria met   Age 50-69   Pack year smoking >30   Still smoking or less than 15 year since quit   No sign or symptoms of lung cancer   > 11 months since last LDCT     Risks and benefits of lung cancer screening with LDCT scans discussed:    Significance of positive screen - False-positive LDCT results often occur. 95% of all positive results do not lead to a diagnosis of cancer. Usually further imaging can resolve most false-positive results; however, some patients may require invasive procedures. Over diagnosis risk - 10% to 12% of screen-detected lung cancer cases are over diagnosed--that is, the cancer would not have been detected in the patient's lifetime without the screening. Need for follow up screens annually to continue lung cancer screening effectiveness     Risks associated with radiation from annual LDCT- Radiation exposure is about the same as for a mammogram, which is about 1/3 of the annual background radiation exposure from everyday life. Starting screening at age 54 is not likely to increase cancer risk from radiation exposure. Patients with comorbidities resulting in life expectancy of < 10 years, or that would preclude treatment of an abnormality identified on CT, should not be screened due to lack of benefit.     To obtain maximal benefit from this screening, smoking cessation and long-term abstinence from smoking is critical
DATA reviewed by me:   PFTs 10/26/2017 FVC 2.39(49%) FEV1 1.05(29%) FEV1/FVC 44% TLC 7.39(98%) DLCO 11.81 (37%) 6MW 640 F 2L exertion               LDCT chest 10/26/2017    Moderate emphysema. No suspicious pulmonary nodules or masses   Remote appearing compression deformities. Lobular contour of the liver raising the question of underlying cirrhosis                            CXR 8/31/2019  images reviewed by me and showed: The cardiomediastinal silhouette is borderline in size. Bibasilar atelectasis and scarring noted, superimposed on COPD. No acute infiltrate, pleural effusion or pneumothorax. Assessment:       · Severe COPD   · Nocturnal hypoxia on 2.5 LPM   · Hypoxia on exertion   · 30 pack year smoking - quit smoking 2016      Plan:      · Symbicort BID and DuoNeb   · Daliresp 500 mcg PO daily  · O2 2-3 LPM on exertion. Advised to titrate O2 using her pulse oximeter- target O2 sat 90-92%. · Pulmonary rehab referral   · Prednisone taper and Doxycycline 100 mg BID x 5 days for COPD AE self management if needed. · Patient is up to date with Pneumococcal vaccine   · Influenza vaccine today   · Acapella and Mucinex   · Advised to continue with smoking cessation. · CT chest, low dose protocol, screening for lung cancer. Risks, benefits and alternatives including doing nothing were discussed with patient- patient declined at this time.

## 2019-10-14 DIAGNOSIS — I48.91 ATRIAL FIBRILLATION, UNSPECIFIED TYPE (HCC): ICD-10-CM

## 2019-10-14 RX ORDER — APIXABAN 5 MG/1
TABLET, FILM COATED ORAL
Qty: 60 TABLET | Refills: 11 | Status: SHIPPED | OUTPATIENT
Start: 2019-10-14 | End: 2020-07-01 | Stop reason: SDUPTHER

## 2019-10-22 RX ORDER — IPRATROPIUM BROMIDE AND ALBUTEROL SULFATE 2.5; .5 MG/3ML; MG/3ML
SOLUTION RESPIRATORY (INHALATION)
Qty: 360 ML | Refills: 2 | Status: SHIPPED | OUTPATIENT
Start: 2019-10-22 | End: 2020-01-09

## 2019-10-29 PROBLEM — E87.1 HYPONATREMIA: Status: RESOLVED | Noted: 2018-01-11 | Resolved: 2019-10-29

## 2019-10-29 PROBLEM — E87.8 HYPOCHLOREMIA: Status: RESOLVED | Noted: 2018-01-11 | Resolved: 2019-10-29

## 2019-10-29 PROBLEM — R79.89 ELEVATED BRAIN NATRIURETIC PEPTIDE (BNP) LEVEL: Status: RESOLVED | Noted: 2018-01-11 | Resolved: 2019-10-29

## 2019-10-30 ENCOUNTER — OFFICE VISIT (OUTPATIENT)
Dept: CARDIOLOGY CLINIC | Age: 69
End: 2019-10-30
Payer: MEDICARE

## 2019-10-30 VITALS
OXYGEN SATURATION: 93 % | DIASTOLIC BLOOD PRESSURE: 90 MMHG | SYSTOLIC BLOOD PRESSURE: 170 MMHG | WEIGHT: 183 LBS | HEIGHT: 69 IN | BODY MASS INDEX: 27.11 KG/M2 | HEART RATE: 87 BPM

## 2019-10-30 DIAGNOSIS — I48.21 PERMANENT ATRIAL FIBRILLATION (HCC): Primary | ICD-10-CM

## 2019-10-30 DIAGNOSIS — I10 ESSENTIAL HYPERTENSION: Chronic | ICD-10-CM

## 2019-10-30 PROCEDURE — G8482 FLU IMMUNIZE ORDER/ADMIN: HCPCS | Performed by: INTERNAL MEDICINE

## 2019-10-30 PROCEDURE — G8427 DOCREV CUR MEDS BY ELIG CLIN: HCPCS | Performed by: INTERNAL MEDICINE

## 2019-10-30 PROCEDURE — 1123F ACP DISCUSS/DSCN MKR DOCD: CPT | Performed by: INTERNAL MEDICINE

## 2019-10-30 PROCEDURE — 99214 OFFICE O/P EST MOD 30 MIN: CPT | Performed by: INTERNAL MEDICINE

## 2019-10-30 PROCEDURE — G8598 ASA/ANTIPLAT THER USED: HCPCS | Performed by: INTERNAL MEDICINE

## 2019-10-30 PROCEDURE — 4040F PNEUMOC VAC/ADMIN/RCVD: CPT | Performed by: INTERNAL MEDICINE

## 2019-10-30 PROCEDURE — 1036F TOBACCO NON-USER: CPT | Performed by: INTERNAL MEDICINE

## 2019-10-30 PROCEDURE — 3017F COLORECTAL CA SCREEN DOC REV: CPT | Performed by: INTERNAL MEDICINE

## 2019-10-30 PROCEDURE — G8417 CALC BMI ABV UP PARAM F/U: HCPCS | Performed by: INTERNAL MEDICINE

## 2019-10-30 RX ORDER — LISINOPRIL 5 MG/1
5 TABLET ORAL DAILY
Qty: 30 TABLET | Refills: 11 | Status: SHIPPED | OUTPATIENT
Start: 2019-10-30 | End: 2020-07-01 | Stop reason: SDUPTHER

## 2019-10-30 RX ORDER — DILTIAZEM HYDROCHLORIDE 180 MG/1
180 CAPSULE, COATED, EXTENDED RELEASE ORAL DAILY
Qty: 30 CAPSULE | Refills: 11 | Status: SHIPPED | OUTPATIENT
Start: 2019-10-30 | End: 2020-07-01 | Stop reason: SDUPTHER

## 2019-10-30 RX ORDER — FUROSEMIDE 20 MG/1
20 TABLET ORAL DAILY
Qty: 30 TABLET | Refills: 11 | Status: SHIPPED | OUTPATIENT
Start: 2019-10-30 | End: 2020-07-01 | Stop reason: SDUPTHER

## 2019-11-04 RX ORDER — ROFLUMILAST 500 UG/1
TABLET ORAL
Qty: 30 TABLET | Refills: 5 | Status: SHIPPED | OUTPATIENT
Start: 2019-11-04 | End: 2020-06-04

## 2020-01-09 ENCOUNTER — OFFICE VISIT (OUTPATIENT)
Dept: PULMONOLOGY | Age: 70
End: 2020-01-09
Payer: MEDICARE

## 2020-01-09 VITALS
RESPIRATION RATE: 20 BRPM | WEIGHT: 181 LBS | HEIGHT: 69 IN | DIASTOLIC BLOOD PRESSURE: 88 MMHG | OXYGEN SATURATION: 94 % | SYSTOLIC BLOOD PRESSURE: 138 MMHG | BODY MASS INDEX: 26.81 KG/M2 | HEART RATE: 89 BPM

## 2020-01-09 PROCEDURE — 3017F COLORECTAL CA SCREEN DOC REV: CPT | Performed by: INTERNAL MEDICINE

## 2020-01-09 PROCEDURE — G8417 CALC BMI ABV UP PARAM F/U: HCPCS | Performed by: INTERNAL MEDICINE

## 2020-01-09 PROCEDURE — 99214 OFFICE O/P EST MOD 30 MIN: CPT | Performed by: INTERNAL MEDICINE

## 2020-01-09 PROCEDURE — G8482 FLU IMMUNIZE ORDER/ADMIN: HCPCS | Performed by: INTERNAL MEDICINE

## 2020-01-09 PROCEDURE — 1036F TOBACCO NON-USER: CPT | Performed by: INTERNAL MEDICINE

## 2020-01-09 PROCEDURE — 4040F PNEUMOC VAC/ADMIN/RCVD: CPT | Performed by: INTERNAL MEDICINE

## 2020-01-09 PROCEDURE — G8427 DOCREV CUR MEDS BY ELIG CLIN: HCPCS | Performed by: INTERNAL MEDICINE

## 2020-01-09 PROCEDURE — G8926 SPIRO NO PERF OR DOC: HCPCS | Performed by: INTERNAL MEDICINE

## 2020-01-09 PROCEDURE — 1123F ACP DISCUSS/DSCN MKR DOCD: CPT | Performed by: INTERNAL MEDICINE

## 2020-01-09 PROCEDURE — 3023F SPIROM DOC REV: CPT | Performed by: INTERNAL MEDICINE

## 2020-01-09 RX ORDER — IPRATROPIUM BROMIDE AND ALBUTEROL SULFATE 2.5; .5 MG/3ML; MG/3ML
1 SOLUTION RESPIRATORY (INHALATION) EVERY 6 HOURS PRN
Qty: 120 VIAL | Refills: 6 | Status: SHIPPED | OUTPATIENT
Start: 2020-01-09 | End: 2020-08-06 | Stop reason: SDUPTHER

## 2020-01-09 RX ORDER — BUDESONIDE AND FORMOTEROL FUMARATE DIHYDRATE 160; 4.5 UG/1; UG/1
2 AEROSOL RESPIRATORY (INHALATION) 2 TIMES DAILY
Qty: 1 INHALER | Refills: 6 | Status: SHIPPED | OUTPATIENT
Start: 2020-01-09 | End: 2020-08-06 | Stop reason: SDUPTHER

## 2020-01-09 NOTE — PROGRESS NOTES
[facility]                                                            Outpatient Follow Up Note      CHIEF COMPLAINT: Follow up COPD        HPI:   Did not get his low-dose CT-has been busy. Stable breathing   Some cough with yellow sputum in am   No hemoptysis   Patient is compliant with inhaled bronchodilators and O2- 3LPM   Uses Neb 4 times/day. No smoking         Past Medical History:   Diagnosis Date    Bronchitis chronic     Emphysema of lung ([address]     Influenza A 01/10/2018    Lung disease     copd    Pneumonia     12/2009       Past Surgical History:        Procedure Laterality Date    FRACTURE SURGERY      johnson in femur/hip on right    HERNIA REPAIR         Allergies:  has No Known Allergies. Social History:    TOBACCO:   reports that he quit smoking about 3 years ago. His smoking use included cigarettes. He has a 30.00 pack-year smoking history. He has never used smokeless tobacco.  ETOH:   reports no history of alcohol use.       Family History:       Problem Relation Age of Onset    Diabetes Sister     Cancer Sister     Cancer Sister     Cancer Brother     Asthma Daughter     Asthma Son     Diabetes Mother     Cancer Father        Current Medications:    Current Outpatient Medications:     DALIRESP 500 MCG tablet, TAKE (1) TABLET DAILY, Disp: 30 tablet, Rfl: 5    lisinopril (PRINIVIL;ZESTRIL) 5 MG tablet, Take 1 tablet by mouth daily, Disp: 30 tablet, Rfl: 11    diltiazem (CARDIZEM CD) 180 MG extended release capsule, Take 1 capsule by mouth daily, Disp: 30 capsule, Rfl: 11    furosemide (LASIX) 20 MG tablet, Take 1 tablet by mouth daily, Disp: 30 tablet, Rfl: 11    ipratropium-albuterol (DUONEB) 0.5-2.5 (3) MG/3ML SOLN nebulizer solution, USE 1 VIAL IN NEBULIZER 4 TIMES DAILY, Disp: 360 mL, Rfl: 2    ELIQUIS 5 MG TABS tablet, TAKE 1 TABLET TWICE DAILY, Disp: 60 tablet, Rfl: 11    famotidine (PEPCID) 20 MG tablet, TAKE (1) TABLET TWICE A DAY BEFORE MEALS., Disp: 180 tablet, Rfl: 0    VENTOLIN  (90 Base) MCG/ACT inhaler, INHALE 2 PUFFS EVERY 6 HOURS AS NEEDED FOR WHEEZING OR SHORTNESS OF BREATH, Disp: 18 g, Rfl: 5    SYMBICORT 160-4.5 MCG/ACT AERO, INHALE 2 PUFFS TWICE A DAY, Disp: 10.2 g, Rfl: 5    aspirin 81 MG tablet, Take 81 mg by mouth daily, Disp: , Rfl:     nitroGLYCERIN (NITROSTAT) 0.4 MG SL tablet, Place 1 tablet under the tongue every 5 minutes as needed for Chest pain, Disp: 25 tablet, Rfl: 3    guaiFENesin (MUCINEX) 600 MG extended release tablet, Take 1 tablet by mouth 2 times daily as needed for Congestion, Disp: 60 tablet, Rfl: 2      Objective:   PHYSICAL EXAM:    Blood pressure 138/88, pulse 89, resp. rate 20, height 5' 9\" (1.753 m), weight 181 lb (82.1 kg), SpO2 94 %.' on 3 LPM   Gen: No distress. Ill-appearing  Eyes: PERRL. No sclera icterus. No conjunctival injection. ENT: No discharge. Pharynx clear. Neck: Trachea midline. No obvious mass. Resp: No accessory muscle use. No crackles. Few wheezes. No rhonchi. No dullness on percussion. Decreased air entry  CV: Regular rate. Regular rhythm. No murmur or rub. No edema. GI: Non-tender. Non-distended. No hernia. Skin: Warm and dry. No nodule on exposed extremities. No skin rash  Lymph: No cervical LAD. No supraclavicular LAD. M/S: No cyanosis. No joint deformity. No clubbing. Neuro: Awake. Alert. Moves all four extremities. Psych: Oriented x 3. No anxiety. DATA reviewed by me:   PFTs 10/26/2017 FVC 2.39(49%) FEV1 1.05(29%) FEV1/FVC 44% TLC 7.39(98%) DLCO 11.81 (37%) 6MW 640 F 2L exertion               LDCT chest 10/26/2017    Moderate emphysema. No suspicious pulmonary nodules or masses   Remote appearing compression deformities. Lobular contour of the liver raising the question of underlying cirrhosis                            CXR 8/31/2019   The cardiomediastinal silhouette is borderline in size.   Bibasilar

## 2020-04-28 RX ORDER — FAMOTIDINE 20 MG/1
TABLET, FILM COATED ORAL
Qty: 180 TABLET | Refills: 0 | Status: SHIPPED | OUTPATIENT
Start: 2020-04-28 | End: 2020-11-02

## 2020-06-04 RX ORDER — ROFLUMILAST 500 UG/1
TABLET ORAL
Qty: 30 TABLET | Refills: 5 | Status: SHIPPED | OUTPATIENT
Start: 2020-06-04 | End: 2020-08-06 | Stop reason: SDUPTHER

## 2020-06-30 LAB
ALBUMIN SERPL-MCNC: 4.3 G/DL
ALP BLD-CCNC: 87 U/L
ALT SERPL-CCNC: 14 U/L
ANION GAP SERPL CALCULATED.3IONS-SCNC: NORMAL MMOL/L
AST SERPL-CCNC: 15 U/L
BASOPHILS ABSOLUTE: 0.1 /ΜL
BASOPHILS RELATIVE PERCENT: 1 %
BILIRUB SERPL-MCNC: 0.5 MG/DL (ref 0.1–1.4)
BUN BLDV-MCNC: 10 MG/DL
CALCIUM SERPL-MCNC: 8.9 MG/DL
CHLORIDE BLD-SCNC: 98 MMOL/L
CO2: 24 MMOL/L
CREAT SERPL-MCNC: 0.85 MG/DL
EOSINOPHILS ABSOLUTE: 0.4 /ΜL
EOSINOPHILS RELATIVE PERCENT: 4 %
GFR CALCULATED: NORMAL
GLUCOSE BLD-MCNC: 95 MG/DL
HCT VFR BLD CALC: 48.8 % (ref 41–53)
HEMOGLOBIN: 15.8 G/DL (ref 13.5–17.5)
LYMPHOCYTES ABSOLUTE: 2.2 /ΜL
LYMPHOCYTES RELATIVE PERCENT: 22 %
MCH RBC QN AUTO: 29.7 PG
MCHC RBC AUTO-ENTMCNC: 32.4 G/DL
MCV RBC AUTO: 92 FL
MONOCYTES ABSOLUTE: 0.7 /ΜL
MONOCYTES RELATIVE PERCENT: 7 %
NEUTROPHILS ABSOLUTE: 6.4 /ΜL
NEUTROPHILS RELATIVE PERCENT: 65 %
PLATELET # BLD: 291 K/ΜL
PMV BLD AUTO: NORMAL FL
POTASSIUM SERPL-SCNC: 4.3 MMOL/L
RBC # BLD: 5.32 10^6/ΜL
SODIUM BLD-SCNC: 138 MMOL/L
TOTAL PROTEIN: 7.5
WBC # BLD: 9.7 10^3/ML

## 2020-07-01 ENCOUNTER — OFFICE VISIT (OUTPATIENT)
Dept: CARDIOLOGY CLINIC | Age: 70
End: 2020-07-01
Payer: MEDICARE

## 2020-07-01 VITALS
WEIGHT: 185 LBS | SYSTOLIC BLOOD PRESSURE: 122 MMHG | TEMPERATURE: 97.5 F | HEART RATE: 78 BPM | BODY MASS INDEX: 27.4 KG/M2 | OXYGEN SATURATION: 94 % | HEIGHT: 69 IN | DIASTOLIC BLOOD PRESSURE: 80 MMHG

## 2020-07-01 PROCEDURE — G8926 SPIRO NO PERF OR DOC: HCPCS | Performed by: INTERNAL MEDICINE

## 2020-07-01 PROCEDURE — 1123F ACP DISCUSS/DSCN MKR DOCD: CPT | Performed by: INTERNAL MEDICINE

## 2020-07-01 PROCEDURE — 3023F SPIROM DOC REV: CPT | Performed by: INTERNAL MEDICINE

## 2020-07-01 PROCEDURE — 3017F COLORECTAL CA SCREEN DOC REV: CPT | Performed by: INTERNAL MEDICINE

## 2020-07-01 PROCEDURE — 93000 ELECTROCARDIOGRAM COMPLETE: CPT | Performed by: INTERNAL MEDICINE

## 2020-07-01 PROCEDURE — G8417 CALC BMI ABV UP PARAM F/U: HCPCS | Performed by: INTERNAL MEDICINE

## 2020-07-01 PROCEDURE — 1036F TOBACCO NON-USER: CPT | Performed by: INTERNAL MEDICINE

## 2020-07-01 PROCEDURE — G8427 DOCREV CUR MEDS BY ELIG CLIN: HCPCS | Performed by: INTERNAL MEDICINE

## 2020-07-01 PROCEDURE — 4040F PNEUMOC VAC/ADMIN/RCVD: CPT | Performed by: INTERNAL MEDICINE

## 2020-07-01 PROCEDURE — 99214 OFFICE O/P EST MOD 30 MIN: CPT | Performed by: INTERNAL MEDICINE

## 2020-07-01 RX ORDER — FUROSEMIDE 40 MG/1
40 TABLET ORAL DAILY
Qty: 90 TABLET | Refills: 3 | Status: SHIPPED | OUTPATIENT
Start: 2020-07-01 | End: 2021-04-26 | Stop reason: SDUPTHER

## 2020-07-01 RX ORDER — LISINOPRIL 5 MG/1
5 TABLET ORAL DAILY
Qty: 90 TABLET | Refills: 3 | Status: SHIPPED | OUTPATIENT
Start: 2020-07-01 | End: 2021-04-26 | Stop reason: SDUPTHER

## 2020-07-01 RX ORDER — NITROGLYCERIN 0.4 MG/1
0.4 TABLET SUBLINGUAL EVERY 5 MIN PRN
Qty: 25 TABLET | Refills: 3 | Status: SHIPPED | OUTPATIENT
Start: 2020-07-01 | End: 2022-05-04

## 2020-07-01 RX ORDER — DILTIAZEM HYDROCHLORIDE 180 MG/1
180 CAPSULE, COATED, EXTENDED RELEASE ORAL DAILY
Qty: 90 CAPSULE | Refills: 3 | Status: SHIPPED | OUTPATIENT
Start: 2020-07-01 | End: 2021-04-26 | Stop reason: SDUPTHER

## 2020-07-01 NOTE — LETTER
[id number]  [address] [address]  Phone: [phone]  Fax: [phone]     Gaetano Omalley MD     7/1/2020     Cynthia Sewell   [facility]     Patient: Nena Rubin   MR Number: <[id number]>   YOB: 1950   Date of Visit: 7/1/2020     Dear Dr. Cynthia Sewell     Today I saw our mutual patient named above. Below are the relevant portions of my assessment and plan of care. If you have questions, please do not hesitate to call me. I look forward to following Lacy Swain along with you. Mir Cardenas Office Note  7/1/2020     Subjective:  Mr. Hill Landaverde is here for  cardiology  follow up. Permanent AFIB, HTN     [place]:    He was on 1st floor lobby SOB. Was assisted to office by JANNA Mckeon via wheelchair. He is on portable O2 3-L. He wears 3-L continuously. He has BLE edema. Denies chest pain,  dizziness, palpitations and syncope. PMH:   COPD, former smoker,  AFIB. He failed cardioversion 4.13.18    Review of Systems:         12 point ROS negative in all areas as listed below except as in [address], EENT, Cardiovascular, pulmonary, GI, , Musculoskeletal, skin, neurological, hematological, endocrine, Psychiatric    Reviewed past medical history, social, and family history.    Non smoker  No alcohol   Past Medical History:   Diagnosis Date    Bronchitis chronic     Emphysema of lung ([address]     Influenza A 01/10/2018    Lung disease     copd    Pneumonia     12/2009     Past Surgical History:   Procedure Laterality Date    FRACTURE SURGERY      johnson in femur/hip on right    HERNIA REPAIR         Objective:   /80   Pulse 78   Temp 97.5 °F (36.4 °C)   Ht 5' 9\" (1.753 m)   Wt 185 lb (83.9 kg)   SpO2 94% Comment: 3 lit  BMI 27.32 kg/m²      Wt Readings from Last 3 Encounters:   07/01/20 185 lb (83.9 kg)   01/09/20 181 lb (82.1 kg)   10/30/19 183 lb (83 kg)       Physical Exam: General: No Respiratory distress, appears well developed and well nourished. On continuous supplemental O2 on demand. Eyes:  Sclera nonicteric  Nose/Sinuses:  negative findings: nose shows no deformity, asymmetry, or inflammation, nasal mucosa normal, septum midline with no perforation or bleeding  Back:  no pain to palpation  Joint:  no active joint inflammation  Musculoskeletal:  negative  Skin:  Warm and dry  Neck:  Negative for JVD and Carotid Bruits. Chest:  Crackles bilateral bases to auscultation, respiration easy  Cardiovascular:  irreg irreg S2 normal, no murmur, no rub or thrill.   Extremities:trace BLE ankle edema,   noclubbing, cyanosis,  Neuro: intact    Medications:   Outpatient Encounter Medications as of 7/1/2020   Medication Sig Dispense Refill    lisinopril (PRINIVIL;ZESTRIL) 5 MG tablet Take 1 tablet by mouth daily 90 tablet 3    dilTIAZem (CARDIZEM CD) 180 MG extended release capsule Take 1 capsule by mouth daily 90 capsule 3    furosemide (LASIX) 40 MG tablet Take 1 tablet by mouth daily 90 tablet 3    apixaban (ELIQUIS) 5 MG TABS tablet TAKE 1 TABLET TWICE DAILY 90 tablet 3    nitroGLYCERIN (NITROSTAT) 0.4 MG SL tablet Place 1 tablet under the tongue every 5 minutes as needed for Chest pain 25 tablet 3    DALIRESP 500 MCG tablet TAKE (1) TABLET DAILY 30 tablet 5    famotidine (PEPCID) 20 MG tablet TAKE (1) TABLET TWICE A DAY BEFORE MEALS. 180 tablet 0    budesonide-formoterol (SYMBICORT) 160-4.5 MCG/ACT AERO Inhale 2 puffs into the lungs 2 times daily 1 Inhaler 6    ipratropium-albuterol (DUONEB) 0.5-2.5 (3) MG/3ML SOLN nebulizer solution Inhale 3 mLs into the lungs every 6 hours as needed for Shortness of Breath DX COPD J44.9 120 vial 6    aspirin 81 MG tablet Take 81 mg by mouth daily      guaiFENesin (MUCINEX) 600 MG extended release tablet Take 1 tablet by mouth 2 times daily as needed for Congestion 60 tablet 2  VENTOLIN  (90 Base) MCG/ACT inhaler INHALE 2 PUFFS EVERY 6 HOURS AS NEEDED FOR WHEEZING OR SHORTNESS OF BREATH 18 g 5    [DISCONTINUED] lisinopril (PRINIVIL;ZESTRIL) 5 MG tablet Take 1 tablet by mouth daily 30 tablet 11    [DISCONTINUED] diltiazem (CARDIZEM CD) 180 MG extended release capsule Take 1 capsule by mouth daily 30 capsule 11    [DISCONTINUED] furosemide (LASIX) 20 MG tablet Take 1 tablet by mouth daily 30 tablet 11    [DISCONTINUED] ELIQUIS 5 MG TABS tablet TAKE 1 TABLET TWICE DAILY 60 tablet 11    [DISCONTINUED] nitroGLYCERIN (NITROSTAT) 0.4 MG SL tablet Place 1 tablet under the tongue every 5 minutes as needed for Chest pain 25 tablet 3     No facility-administered encounter medications on file as of 7/1/2020. Lab Data:  CBC: No results for input(s): WBC, HGB, HCT, MCV, PLT in the last 72 hours. BMP: No results for input(s): NA, K, CL, CO2, PHOS, BUN, CREATININE in the last 72 hours. Invalid input(s): CA  LIVER PROFILE: No results for input(s): AST, ALT, LIPASE, BILIDIR, BILITOT, ALKPHOS in the last 72 hours. Invalid input(s): AMYLASE,  ALB  LIPID:   Lab Results   Component Value Date    CHOL 206 (H) 01/16/2019    CHOL 147 01/11/2018     Lab Results   Component Value Date    TRIG 171 (H) 01/16/2019    TRIG 76 01/11/2018     Lab Results   Component Value Date    HDL 39 (L) 01/16/2019    HDL 31 (L) 01/11/2018     Lab Results   Component Value Date    LDLCALC 133 (H) 01/16/2019    LDLCALC 101 (H) 01/11/2018     Lab Results   Component Value Date    LABVLDL 34 01/16/2019    LABVLDL 15 01/11/2018     No results found for: CHOLHDLRATIO  PT/INR: No results for input(s): PROTIME, INR in the last 72 hours. A1C:   Lab Results   Component Value Date    LABA1C 5.7 01/11/2018     BNP:  No results for input(s): BNP in the last 72 hours. IMAGING:   EKG 7.1.20  Atrial fibrillations controlled ventricular response.   CT scan abdomen 9/6/19 FINDINGS: Lower Chest: Mild bronchiectasis. Organs: Liver, gallbladder, pancreas, and spleen are unremarkable. GI/Bowel: No bowel obstruction. Appendix not visualized however no findings to suggest acute appendicitis. Pelvis: No bladder stone. No pelvic fluid collection. Vascular calcifications. Peritoneum/Retroperitoneum: No abdominal aortic aneurysm. Adrenal glands unremarkable. Kidneys are unremarkable. Bones/Soft Tissues: Compression deformities of T12 and T11. loss of approximately 40% vertebral body height centrally. Lexiscan 19  Summary  Normal LV function. There is normal isotope uptake at stress and rest. There is no evidence of  myocardial ischemia or scar. EKG 19  afib controlled ventricular response no ischemia or infarct. EKG 4/3/18  Atrial fibrillation Controlled ventricular rate Abnormal ECG Confirmed by Eliana Whitt MD, [address] () on 2018 9:25:35 PM     EKG 3.11.18   afib RVR     CXR 3/5/18  FINDINGS:   The lungs are hyperinflated.  There has been resolution of bibasilar   infiltrates. Roger Hearing is scarring in the right lower lobe.  Heart size and   pulmonary vessels are stable. IMAGING:   Echo doppler of heart 18   Normal left ventricular systolic function with an estimated ejection   fraction of 55%.   Normal left ventricular diastolic filling pressure.   The right ventricle is mildly enlarged.   The right atrium is mildly dilated.   Mild mitral annular calcification. Mild mitral regurgitation.   Aortic valve sclerosis without stenosis.   Mild tricuspid regurgitation.   Systolic pulmonary artery pressure (SPAP) is normal and estimated at 37 mmHg   (RA pressure 8 mmHg).   There is a left pleural effusion.   EK/10/18  Atrial fibrillation with rapid ventricular response Incomplete right bundle branch block   Abnormal ECG No previous ECGs available Confirmed by KELLE GUZMAN, [address] () on 2018 5:38:40 AM     CXR 1/10/18 Increased interstitial opacity.  Correlate with any clinical evidence developing interstitial pulmonary edema.   No focal infiltrate is identified.         Assessment:  1. Permanent atrial fibrillation    2. Pulmonary emphysema, unspecified emphysema type ([address]    3. Chronic respiratory failure with hypoxia (HCC)     [id number] Score for Atrial Fibrillation Stroke Risk   Risk   Factors  Component Value   C CHF No 0   H HTN Yes 1   A2 Age >= 76 No,  (69 y.o.) 0   D DM No 0   S2 Prior Stroke/TIA No 0   V Vascular Disease No 0   A Age 74-69 Yes,  (69 y.o.) 1   Sc Sex male 0    [id number]  Score  2   Score last updated 8/35/40 8:01 PM  Diastolic CHF ACUTE        Plan:  1. Meds reviewed. Refills as warranted   2. Will increase lasix to 40mg daily due to edema of ankles   3. Will obtain recent lab work done from [employer]   4. Follow up with me in 6 months     This note was scribed in the presence of Lian Banuelos MD by Caren Goel RN    QUALITY MEASURES  1. Tobacco Cessation Counseling: YES  2. Retake of BP if >140/90:   NA  3. Documentation to PCP/referring for new patient:  Sent to PCP at close of office visit  4. CAD patient on anti-platelet: NA anticaogulated  5. CAD patient on STATIN therapy:  NA  6. Patient with CHF and aFib on anticoagulation:  Yes  vaishali GREY, Dr. Cinthya Jean, personally performed the services described in this documentation, as scribed by the above signed scribe in my presence. It is both accurate and complete to my knowledge. I agree with the details independently gathered by the clinical support staff, while the remaining scribed note accurately describes my personal service to the patient.         Jeffry Becerra MD 7/1/2020 3:58 PM         Sincerely,      Jeffry Becerra MD

## 2020-07-01 NOTE — LETTER
[id number]  [address] [address]  Phone: [phone]  Fax: [phone]     Niya Mishra MD     7/1/2020     Jenaro Santana   [facility]     Patient: Meri Huston   MR Number: <[id number]>   YOB: 1950   Date of Visit: 7/1/2020     Dear Dr. Jenaro Santana     Today I saw our mutual patient named above. Below are the relevant portions of my assessment and plan of care. If you have questions, please do not hesitate to call me. I look forward to following Krishna Perez along with you. [address] Office Note  7/1/2020     Subjective:  Mr. Pradeep Ramos is here for  cardiology  follow up. Permanent AFIB, HTN     [place]:    He was on 1st floor lobby SOB. Was assisted to office by JANNA Mckeon via wheelchair. He is on portable O2 3-L. He wears 3-L continuously. He has BLE edema. Denies chest pain,  dizziness, palpitations and syncope. PMH:   COPD, former smoker,  AFIB. He failed cardioversion 4.13.18    Review of Systems:         12 point ROS negative in all areas as listed below except as in [address], EENT, Cardiovascular, pulmonary, GI, , Musculoskeletal, skin, neurological, hematological, endocrine, Psychiatric    Reviewed past medical history, social, and family history.    Non smoker  No alcohol   Past Medical History:   Diagnosis Date    Bronchitis chronic     Emphysema of lung ([address]     Influenza A 01/10/2018    Lung disease     copd    Pneumonia     12/2009     Past Surgical History:   Procedure Laterality Date    FRACTURE SURGERY      johnson in femur/hip on right    HERNIA REPAIR         Objective:   /80   Pulse 78   Temp 97.5 °F (36.4 °C)   Ht 5' 9\" (1.753 m)   Wt 185 lb (83.9 kg)   SpO2 94% Comment: 3 lit  BMI 27.32 kg/m²      Wt Readings from Last 3 Encounters:   07/01/20 185 lb (83.9 kg)   01/09/20 181 lb (82.1 kg)   10/30/19 183 lb (83 kg)       Physical Exam: General: No Respiratory distress, appears well developed and well nourished. On continuous supplemental O2 on demand. Eyes:  Sclera nonicteric  Nose/Sinuses:  negative findings: nose shows no deformity, asymmetry, or inflammation, nasal mucosa normal, septum midline with no perforation or bleeding  Back:  no pain to palpation  Joint:  no active joint inflammation  Musculoskeletal:  negative  Skin:  Warm and dry  Neck:  Negative for JVD and Carotid Bruits. Chest:  Crackles bilateral bases to auscultation, respiration easy  Cardiovascular:  irreg irreg S2 normal, no murmur, no rub or thrill.   Extremities:trace BLE ankle edema,   noclubbing, cyanosis,  Neuro: intact    Medications:   Outpatient Encounter Medications as of 7/1/2020   Medication Sig Dispense Refill    lisinopril (PRINIVIL;ZESTRIL) 5 MG tablet Take 1 tablet by mouth daily 90 tablet 3    dilTIAZem (CARDIZEM CD) 180 MG extended release capsule Take 1 capsule by mouth daily 90 capsule 3    furosemide (LASIX) 40 MG tablet Take 1 tablet by mouth daily 90 tablet 3    apixaban (ELIQUIS) 5 MG TABS tablet TAKE 1 TABLET TWICE DAILY 90 tablet 3    nitroGLYCERIN (NITROSTAT) 0.4 MG SL tablet Place 1 tablet under the tongue every 5 minutes as needed for Chest pain 25 tablet 3    DALIRESP 500 MCG tablet TAKE (1) TABLET DAILY 30 tablet 5    famotidine (PEPCID) 20 MG tablet TAKE (1) TABLET TWICE A DAY BEFORE MEALS. 180 tablet 0    budesonide-formoterol (SYMBICORT) 160-4.5 MCG/ACT AERO Inhale 2 puffs into the lungs 2 times daily 1 Inhaler 6    ipratropium-albuterol (DUONEB) 0.5-2.5 (3) MG/3ML SOLN nebulizer solution Inhale 3 mLs into the lungs every 6 hours as needed for Shortness of Breath DX COPD J44.9 120 vial 6    aspirin 81 MG tablet Take 81 mg by mouth daily      guaiFENesin (MUCINEX) 600 MG extended release tablet Take 1 tablet by mouth 2 times daily as needed for Congestion 60 tablet 2  VENTOLIN  (90 Base) MCG/ACT inhaler INHALE 2 PUFFS EVERY 6 HOURS AS NEEDED FOR WHEEZING OR SHORTNESS OF BREATH 18 g 5    [DISCONTINUED] lisinopril (PRINIVIL;ZESTRIL) 5 MG tablet Take 1 tablet by mouth daily 30 tablet 11    [DISCONTINUED] diltiazem (CARDIZEM CD) 180 MG extended release capsule Take 1 capsule by mouth daily 30 capsule 11    [DISCONTINUED] furosemide (LASIX) 20 MG tablet Take 1 tablet by mouth daily 30 tablet 11    [DISCONTINUED] ELIQUIS 5 MG TABS tablet TAKE 1 TABLET TWICE DAILY 60 tablet 11    [DISCONTINUED] nitroGLYCERIN (NITROSTAT) 0.4 MG SL tablet Place 1 tablet under the tongue every 5 minutes as needed for Chest pain 25 tablet 3     No facility-administered encounter medications on file as of 7/1/2020. Lab Data:  CBC: No results for input(s): WBC, HGB, HCT, MCV, PLT in the last 72 hours. BMP: No results for input(s): NA, K, CL, CO2, PHOS, BUN, CREATININE in the last 72 hours. Invalid input(s): CA  LIVER PROFILE: No results for input(s): AST, ALT, LIPASE, BILIDIR, BILITOT, ALKPHOS in the last 72 hours. Invalid input(s): AMYLASE,  ALB  LIPID:   Lab Results   Component Value Date    CHOL 206 (H) 01/16/2019    CHOL 147 01/11/2018     Lab Results   Component Value Date    TRIG 171 (H) 01/16/2019    TRIG 76 01/11/2018     Lab Results   Component Value Date    HDL 39 (L) 01/16/2019    HDL 31 (L) 01/11/2018     Lab Results   Component Value Date    LDLCALC 133 (H) 01/16/2019    LDLCALC 101 (H) 01/11/2018     Lab Results   Component Value Date    LABVLDL 34 01/16/2019    LABVLDL 15 01/11/2018     No results found for: CHOLHDLRATIO  PT/INR: No results for input(s): PROTIME, INR in the last 72 hours. A1C:   Lab Results   Component Value Date    LABA1C 5.7 01/11/2018     BNP:  No results for input(s): BNP in the last 72 hours. IMAGING:   EKG 7.1.20  Atrial fibrillations controlled ventricular response.   CT scan abdomen 9/6/19 FINDINGS: Lower Chest: Mild bronchiectasis. Organs: Liver, gallbladder, pancreas, and spleen are unremarkable. GI/Bowel: No bowel obstruction. Appendix not visualized however no findings to suggest acute appendicitis. Pelvis: No bladder stone. No pelvic fluid collection. Vascular calcifications. Peritoneum/Retroperitoneum: No abdominal aortic aneurysm. Adrenal glands unremarkable. Kidneys are unremarkable. Bones/Soft Tissues: Compression deformities of T12 and T11. loss of approximately 40% vertebral body height centrally. Lexiscan 19  Summary  Normal LV function. There is normal isotope uptake at stress and rest. There is no evidence of  myocardial ischemia or scar. EKG 19  afib controlled ventricular response no ischemia or infarct. EKG 4/3/18  Atrial fibrillation Controlled ventricular rate Abnormal ECG Confirmed by Nicole Gilliland MD, [address] () on 2018 9:25:35 PM     EKG 3.11.18   afib RVR     CXR 3/5/18  FINDINGS:   The lungs are hyperinflated.  There has been resolution of bibasilar   infiltrates. Elesa Pound is scarring in the right lower lobe.  Heart size and   pulmonary vessels are stable. IMAGING:   Echo doppler of heart 18   Normal left ventricular systolic function with an estimated ejection   fraction of 55%.   Normal left ventricular diastolic filling pressure.   The right ventricle is mildly enlarged.   The right atrium is mildly dilated.   Mild mitral annular calcification. Mild mitral regurgitation.   Aortic valve sclerosis without stenosis.   Mild tricuspid regurgitation.   Systolic pulmonary artery pressure (SPAP) is normal and estimated at 37 mmHg   (RA pressure 8 mmHg).   There is a left pleural effusion.   EK/10/18  Atrial fibrillation with rapid ventricular response Incomplete right bundle branch block   Abnormal ECG No previous ECGs available Confirmed by KELLE GUZMAN, [address] () on 2018 5:38:40 AM     CXR 1/10/18 Increased interstitial opacity.  Correlate with any clinical evidence developing interstitial pulmonary edema.   No focal infiltrate is identified.         Assessment:  1. Permanent atrial fibrillation    2. Pulmonary emphysema, unspecified emphysema type ([address]    3. Chronic respiratory failure with hypoxia (HCC)     [id number] Score for Atrial Fibrillation Stroke Risk   Risk   Factors  Component Value   C CHF No 0   H HTN Yes 1   A2 Age >= 76 No,  (69 y.o.) 0   D DM No 0   S2 Prior Stroke/TIA No 0   V Vascular Disease No 0   A Age 74-69 Yes,  (69 y.o.) 1   Sc Sex male 0    [id number]  Score  2   Score last updated 1/82/26 5:87 PM  Diastolic CHF ACUTE        Plan:  1. Meds reviewed. Refills as warranted   2. Will increase lasix to 40mg daily due to edema of ankles   3. Will obtain recent lab work done from [employer]   4. Follow up with me in 6 months     This note was scribed in the presence of Goyo Santana MD by Tyrone Calhoun RN    QUALITY MEASURES  1. Tobacco Cessation Counseling: YES  2. Retake of BP if >140/90:   NA  3. Documentation to PCP/referring for new patient:  Sent to PCP at close of office visit  4. CAD patient on anti-platelet: NA anticaogulated  5. CAD patient on STATIN therapy:  NA  6. Patient with CHF and aFib on anticoagulation:  Yes  vaishali GREY, Dr. Leopoldo Gonzalez, personally performed the services described in this documentation, as scribed by the above signed scribe in my presence. It is both accurate and complete to my knowledge. I agree with the details independently gathered by the clinical support staff, while the remaining scribed note accurately describes my personal service to the patient.         Mitch Coker MD 7/1/2020 3:58 PM         Sincerely,      Mitch Coker MD

## 2020-07-01 NOTE — PATIENT INSTRUCTIONS
Plan:  1. Meds reviewed. Refills as warranted   2. Will increase lasix to 40mg daily   3. Will obtain recent lab work done from [employer]   4.  Follow up with me in 6 months

## 2020-07-01 NOTE — PROGRESS NOTES
[facility] Office Note  7/1/2020     Subjective:  Mr. Namita Espinoza is here for  cardiology  follow up. Permanent AFIB, HTN     [place]:    He was on 1st floor lobby SOB. Was assisted to office by JANNA Mckeon via wheelchair. He is on portable O2 3-L. He wears 3-L continuously. He has BLE edema. Denies chest pain,  dizziness, palpitations and syncope. PMH:   COPD, former smoker,  AFIB. He failed cardioversion 4.13.18    Review of Systems:         12 point ROS negative in all areas as listed below except as in [address], EENT, Cardiovascular, pulmonary, GI, , Musculoskeletal, skin, neurological, hematological, endocrine, Psychiatric    Reviewed past medical history, social, and family history. Non smoker  No alcohol   Past Medical History:   Diagnosis Date    Bronchitis chronic     Emphysema of lung ([facility] [address]     Influenza A 01/10/2018    Lung disease     copd    Pneumonia     12/2009     Past Surgical History:   Procedure Laterality Date    FRACTURE SURGERY      johnson in femur/hip on right    HERNIA REPAIR         Objective:   /80   Pulse 78   Temp 97.5 °F (36.4 °C)   Ht 5' 9\" (1.753 m)   Wt 185 lb (83.9 kg)   SpO2 94% Comment: 3 lit  BMI 27.32 kg/m²     Wt Readings from Last 3 Encounters:   07/01/20 185 lb (83.9 kg)   01/09/20 181 lb (82.1 kg)   10/30/19 183 lb (83 kg)       Physical Exam:  General: No Respiratory distress, appears well developed and well nourished. On continuous supplemental O2 on demand. Eyes:  Sclera nonicteric  Nose/Sinuses:  negative findings: nose shows no deformity, asymmetry, or inflammation, nasal mucosa normal, septum midline with no perforation or bleeding  Back:  no pain to palpation  Joint:  no active joint inflammation  Musculoskeletal:  negative  Skin:  Warm and dry  Neck:  Negative for JVD and Carotid Bruits.    Chest:  Crackles bilateral bases to auscultation, respiration easy  Cardiovascular:  irreg irreg S2 normal, no murmur, no rub or thrill.   Extremities:trace BLE ankle edema,   noclubbing, cyanosis,  Neuro: intact    Medications:   Outpatient Encounter Medications as of 7/1/2020   Medication Sig Dispense Refill    lisinopril (PRINIVIL;ZESTRIL) 5 MG tablet Take 1 tablet by mouth daily 90 tablet 3    dilTIAZem (CARDIZEM CD) 180 MG extended release capsule Take 1 capsule by mouth daily 90 capsule 3    furosemide (LASIX) 40 MG tablet Take 1 tablet by mouth daily 90 tablet 3    apixaban (ELIQUIS) 5 MG TABS tablet TAKE 1 TABLET TWICE DAILY 90 tablet 3    nitroGLYCERIN (NITROSTAT) 0.4 MG SL tablet Place 1 tablet under the tongue every 5 minutes as needed for Chest pain 25 tablet 3    DALIRESP 500 MCG tablet TAKE (1) TABLET DAILY 30 tablet 5    famotidine (PEPCID) 20 MG tablet TAKE (1) TABLET TWICE A DAY BEFORE MEALS. 180 tablet 0    budesonide-formoterol (SYMBICORT) 160-4.5 MCG/ACT AERO Inhale 2 puffs into the lungs 2 times daily 1 Inhaler 6    ipratropium-albuterol (DUONEB) 0.5-2.5 (3) MG/3ML SOLN nebulizer solution Inhale 3 mLs into the lungs every 6 hours as needed for Shortness of Breath DX COPD J44.9 120 vial 6    aspirin 81 MG tablet Take 81 mg by mouth daily      guaiFENesin (MUCINEX) 600 MG extended release tablet Take 1 tablet by mouth 2 times daily as needed for Congestion 60 tablet 2    VENTOLIN  (90 Base) MCG/ACT inhaler INHALE 2 PUFFS EVERY 6 HOURS AS NEEDED FOR WHEEZING OR SHORTNESS OF BREATH 18 g 5    [DISCONTINUED] lisinopril (PRINIVIL;ZESTRIL) 5 MG tablet Take 1 tablet by mouth daily 30 tablet 11    [DISCONTINUED] diltiazem (CARDIZEM CD) 180 MG extended release capsule Take 1 capsule by mouth daily 30 capsule 11    [DISCONTINUED] furosemide (LASIX) 20 MG tablet Take 1 tablet by mouth daily 30 tablet 11    [DISCONTINUED] ELIQUIS 5 MG TABS tablet TAKE 1 TABLET TWICE DAILY 60 tablet 11    [DISCONTINUED] nitroGLYCERIN (NITROSTAT) 0.4 MG SL tablet Place 1 tablet under the tongue every 5 minutes as needed for Chest pain 25 tablet 3     No facility-administered encounter medications on file as of 7/1/2020. Lab Data:  CBC: No results for input(s): WBC, HGB, HCT, MCV, PLT in the last 72 hours. BMP: No results for input(s): NA, K, CL, CO2, PHOS, BUN, CREATININE in the last 72 hours. Invalid input(s): CA  LIVER PROFILE: No results for input(s): AST, ALT, LIPASE, BILIDIR, BILITOT, ALKPHOS in the last 72 hours. Invalid input(s): AMYLASE,  ALB  LIPID:   Lab Results   Component Value Date    CHOL 206 (H) 01/16/2019    CHOL 147 01/11/2018     Lab Results   Component Value Date    TRIG 171 (H) 01/16/2019    TRIG 76 01/11/2018     Lab Results   Component Value Date    HDL 39 (L) 01/16/2019    HDL 31 (L) 01/11/2018     Lab Results   Component Value Date    LDLCALC 133 (H) 01/16/2019    LDLCALC 101 (H) 01/11/2018     Lab Results   Component Value Date    LABVLDL 34 01/16/2019    LABVLDL 15 01/11/2018     No results found for: CHOLHDLRATIO  PT/INR: No results for input(s): PROTIME, INR in the last 72 hours. A1C:   Lab Results   Component Value Date    LABA1C 5.7 01/11/2018     BNP:  No results for input(s): BNP in the last 72 hours. IMAGING:   EKG 7.1.20  Atrial fibrillations controlled ventricular response. CT scan abdomen 9/6/19  FINDINGS: Lower Chest: Mild bronchiectasis. Organs: Liver, gallbladder, pancreas, and spleen are unremarkable. GI/Bowel: No bowel obstruction. Appendix not visualized however no findings to suggest acute appendicitis. Pelvis: No bladder stone. No pelvic fluid collection. Vascular calcifications. Peritoneum/Retroperitoneum: No abdominal aortic aneurysm. Adrenal glands unremarkable. Kidneys are unremarkable. Bones/Soft Tissues: Compression deformities of T12 and T11. loss of approximately 40% vertebral body height centrally. Lexiscan 5/2/19  Summary  Normal LV function. There is normal isotope uptake at stress and rest. There is no evidence of  myocardial ischemia or scar. EKG 19  afib controlled ventricular response no ischemia or infarct. EKG 4/3/18  Atrial fibrillation Controlled ventricular rate Abnormal ECG Confirmed by Brien Emerson MD, [address] () on 2018 9:25:35 PM     EKG 3.11.18   afib RVR     CXR 3/5/18  FINDINGS:   The lungs are hyperinflated.  There has been resolution of bibasilar   infiltrates. Lovina Alex is scarring in the right lower lobe.  Heart size and   pulmonary vessels are stable. IMAGING:   Echo doppler of heart 18   Normal left ventricular systolic function with an estimated ejection   fraction of 55%.   Normal left ventricular diastolic filling pressure.   The right ventricle is mildly enlarged.   The right atrium is mildly dilated.   Mild mitral annular calcification. Mild mitral regurgitation.   Aortic valve sclerosis without stenosis.   Mild tricuspid regurgitation.   Systolic pulmonary artery pressure (SPAP) is normal and estimated at 37 mmHg   (RA pressure 8 mmHg).   There is a left pleural effusion. EK/10/18  Atrial fibrillation with rapid ventricular response Incomplete right bundle branch block   Abnormal ECG No previous ECGs available Confirmed by Brien Emerson MD, [address] () on 2018 5:38:40 AM     CXR 1/10/18  Increased interstitial opacity.  Correlate with any clinical evidence developing interstitial pulmonary edema.   No focal infiltrate is identified.         Assessment:  1. Permanent atrial fibrillation    2. Pulmonary emphysema, unspecified emphysema type ([address]    3. Chronic respiratory failure with hypoxia (HCC)     [id number] Score for Atrial Fibrillation Stroke Risk   Risk   Factors  Component Value   C CHF No 0   H HTN Yes 1   A2 Age >= 76 No,  (69 y.o.) 0   D DM No 0   S2 Prior Stroke/TIA No 0   V Vascular Disease No 0   A Age 74-69 Yes,  (69 y.o.) 1   Sc Sex male 0    [id number]  Score  2   Score last updated  0:48 PM  Diastolic CHF ACUTE        Plan:  1. Meds reviewed. Refills as warranted   2.  Will increase

## 2020-08-06 ENCOUNTER — VIRTUAL VISIT (OUTPATIENT)
Dept: PULMONOLOGY | Age: 70
End: 2020-08-06
Payer: MEDICARE

## 2020-08-06 PROCEDURE — 99443 PR PHYS/QHP TELEPHONE EVALUATION 21-30 MIN: CPT | Performed by: INTERNAL MEDICINE

## 2020-08-06 RX ORDER — GUAIFENESIN 600 MG/1
600 TABLET, EXTENDED RELEASE ORAL 2 TIMES DAILY PRN
Qty: 60 TABLET | Refills: 6 | Status: SHIPPED | OUTPATIENT
Start: 2020-08-06

## 2020-08-06 RX ORDER — BUDESONIDE AND FORMOTEROL FUMARATE DIHYDRATE 160; 4.5 UG/1; UG/1
2 AEROSOL RESPIRATORY (INHALATION) 2 TIMES DAILY
Qty: 1 INHALER | Refills: 6 | Status: SHIPPED | OUTPATIENT
Start: 2020-08-06 | End: 2021-04-05

## 2020-08-06 RX ORDER — IPRATROPIUM BROMIDE AND ALBUTEROL SULFATE 2.5; .5 MG/3ML; MG/3ML
1 SOLUTION RESPIRATORY (INHALATION) EVERY 6 HOURS PRN
Qty: 120 VIAL | Refills: 6 | Status: SHIPPED | OUTPATIENT
Start: 2020-08-06 | End: 2021-04-05

## 2020-08-06 NOTE — PROGRESS NOTES
[facility]                                                            Outpatient Follow Up Note  TELEHEALTH EVALUATION: Service performed was Audio (During NPSNV-15 public health emergency) and not a face-to-face visit     CHIEF COMPLAINT: Follow up COPD         HPI:   Declined his screening test last time. Doing okay   Some cough with yellow sputum  No hemoptysis   Patient is compliant with inhaled bronchodilators and O2- 3LPM   Uses Neb 4 times/day. No smoking   Sat 92% without O2 RA      Past Medical History:   Diagnosis Date    Bronchitis chronic     Emphysema of lung ([address]     Influenza A 01/10/2018    Lung disease     copd    Pneumonia     12/2009       Past Surgical History:        Procedure Laterality Date    FRACTURE SURGERY      johnson in femur/hip on right    HERNIA REPAIR         Allergies:  has No Known Allergies. Social History:    TOBACCO:   reports that he quit smoking about 4 years ago. His smoking use included cigarettes. He has a 30.00 pack-year smoking history. He has never used smokeless tobacco.  ETOH:   reports no history of alcohol use.       Family History:       Problem Relation Age of Onset    Diabetes Sister     Cancer Sister     Cancer Sister     Cancer Brother     Asthma Daughter     Asthma Son     Diabetes Mother     Cancer Father        Current Medications:    Current Outpatient Medications:     lisinopril (PRINIVIL;ZESTRIL) 5 MG tablet, Take 1 tablet by mouth daily, Disp: 90 tablet, Rfl: 3    dilTIAZem (CARDIZEM CD) 180 MG extended release capsule, Take 1 capsule by mouth daily, Disp: 90 capsule, Rfl: 3    furosemide (LASIX) 40 MG tablet, Take 1 tablet by mouth daily, Disp: 90 tablet, Rfl: 3    apixaban (ELIQUIS) 5 MG TABS tablet, TAKE 1 TABLET TWICE DAILY, Disp: 90 tablet, Rfl: 3    nitroGLYCERIN (NITROSTAT) 0.4 MG SL tablet, Place 1 tablet under the tongue every 5 minutes as needed for Chest pain, Disp: 25 tablet, Rfl: 3    DALIRESP 500 MCG tablet, TAKE (1) TABLET DAILY, Disp: 30 tablet, Rfl: 5    famotidine (PEPCID) 20 MG tablet, TAKE (1) TABLET TWICE A DAY BEFORE MEALS., Disp: 180 tablet, Rfl: 0    VENTOLIN  (90 Base) MCG/ACT inhaler, INHALE 2 PUFFS EVERY 6 HOURS AS NEEDED FOR WHEEZING OR SHORTNESS OF BREATH, Disp: 18 g, Rfl: 5    budesonide-formoterol (SYMBICORT) 160-4.5 MCG/ACT AERO, Inhale 2 puffs into the lungs 2 times daily, Disp: 1 Inhaler, Rfl: 6    ipratropium-albuterol (DUONEB) 0.5-2.5 (3) MG/3ML SOLN nebulizer solution, Inhale 3 mLs into the lungs every 6 hours as needed for Shortness of Breath DX COPD J44.9, Disp: 120 vial, Rfl: 6    aspirin 81 MG tablet, Take 81 mg by mouth daily, Disp: , Rfl:     guaiFENesin (MUCINEX) 600 MG extended release tablet, Take 1 tablet by mouth 2 times daily as needed for Congestion, Disp: 60 tablet, Rfl: 2      Objective:   PHYSICAL EXAM:    Telephone visit not able to obtain physical exam             DATA reviewed by me:   PFTs 10/26/2017 FVC 2.39(49%) FEV1 1.05(29%) FEV1/FVC 44% TLC 7.39(98%) DLCO 11.81 (37%) 6MW 640 F 2L exertion               LDCT chest 10/26/2017    Moderate emphysema. No suspicious pulmonary nodules or masses   Remote appearing compression deformities. Lobular contour of the liver raising the question of underlying cirrhosis                            CXR 8/31/2019   The cardiomediastinal silhouette is borderline in size. Bibasilar atelectasis and scarring noted, superimposed on COPD. No acute infiltrate, pleural effusion or pneumothorax.         Assessment:       · Severe COPD   · Nocturnal hypoxia on 2.5 LPM   · Hypoxia on exertion   · 30 pack year smoking - quit smoking 2016  · Covid 19 1 test positive and 2 tests negative without symptoms 6/2020         Plan:      · Continue Symbicort BID and DuoNeb QID- refills today   · Continue Daliresp 500 mcg PO daily  · Medications refills today   · Continue O2 2-3 LPM on exertion. Advised to titrate O2 using her pulse oximeter- target O2 sat 90-92%. · Pulmonary rehab referral   · Patient is up to date with Pneumococcal vaccine   · Advised to get influenza vaccine this year   · Acapella and Mucinex   · Advised to continue with smoking cessation. · Advised to get CT chest, low dose protocol, screening for lung cancer. Risks, benefits and alternatives including doing nothing were discussed with patient-patient declined to get it today. · Follow up in 6 months             Riley Mariee is a 79 y.o. male evaluated via telephone on 8/6/2020. Consent:  He and/or health care decision maker is aware that that he may receive a bill for this telephone service, depending on his insurance coverage, and has provided verbal consent to proceed: Yes       Documentation:  I communicated with the patient and/or health care decision maker about: See above   Details of this discussion including any medical advice provided: See above       I Affirm this is a Patient Initiated Episode with an Established Patient who has not had a related appointment within my department in the past 7 days or scheduled within the next 24 hours.     Total Time: 21-30 minutes     Note: not billable if this call serves to triage the patient into an appointment for the relevant concern      Shilpa Zaldivar

## 2020-09-02 RX ORDER — BUDESONIDE AND FORMOTEROL FUMARATE DIHYDRATE 160; 4.5 UG/1; UG/1
AEROSOL RESPIRATORY (INHALATION)
Qty: 10.2 G | Refills: 6 | OUTPATIENT
Start: 2020-09-02

## 2020-09-11 RX ORDER — IPRATROPIUM BROMIDE AND ALBUTEROL SULFATE 2.5; .5 MG/3ML; MG/3ML
SOLUTION RESPIRATORY (INHALATION)
Qty: 360 ML | Refills: 6 | OUTPATIENT
Start: 2020-09-11

## 2020-10-07 NOTE — PROGRESS NOTES
[facility] Office Note  10/9/2020     Subjective:  Mr. Joycelyn Marina is here for  cardiology  follow up. Permanent AFIB, HTN     [place]:   Today he reports cataract surgery 7/14/20. He present to office today in in wheelchair on supplemental oxygen. Denies chest pain, shortness of breath, edema, dizziness, palpitations and syncope. PMH:   COPD, former smoker,  AFIB. He failed cardioversion 4.13.18    Review of Systems:         12 point ROS negative in all areas as listed below except as in [address], EENT, Cardiovascular, pulmonary, GI, , Musculoskeletal, skin, neurological, hematological, endocrine, Psychiatric    Reviewed past medical history, social, and family history. Non smoker  No alcohol   Past Medical History:   Diagnosis Date    Bronchitis chronic     Emphysema of lung ([facility] [address]     Influenza A 01/10/2018    Lung disease     copd    Pneumonia     12/2009     Past Surgical History:   Procedure Laterality Date    FRACTURE SURGERY      johnson in femur/hip on right    HERNIA REPAIR         Objective:   /60   Pulse 70   Temp 97.8 °F (36.6 °C)   Ht 5' 9\" (1.753 m)   Wt 178 lb (80.7 kg)   SpO2 95%   BMI 26.29 kg/m²     Wt Readings from Last 3 Encounters:   10/09/20 178 lb (80.7 kg)   07/01/20 185 lb (83.9 kg)   01/09/20 181 lb (82.1 kg)       Physical Exam:  General: No Respiratory distress, appears well developed and well nourished. On continuous supplemental O2 on demand. Eyes:  Sclera nonicteric  Nose/Sinuses:  negative findings: nose shows no deformity, asymmetry, or inflammation, nasal mucosa normal, septum midline with no perforation or bleeding  Back:  no pain to palpation  Joint:  no active joint inflammation  Musculoskeletal:  negative  Skin:  Warm and dry  Neck:  Negative for JVD and Carotid Bruits. Chest:clear  to auscultation, respiration easy  Cardiovascular:  irreg irreg S2 normal, no murmur, no rub or thrill.   Extremities: no edema,   noclubbing, cyanosis,  Neuro: intact    Medications:   Outpatient Encounter Medications as of 10/9/2020   Medication Sig Dispense Refill    budesonide-formoterol (SYMBICORT) 160-4.5 MCG/ACT AERO Inhale 2 puffs into the lungs 2 times daily 1 Inhaler 6    ipratropium-albuterol (DUONEB) 0.5-2.5 (3) MG/3ML SOLN nebulizer solution Inhale 3 mLs into the lungs every 6 hours as needed for Shortness of Breath DX COPD J44.9 120 vial 6    Roflumilast (DALIRESP) 500 MCG tablet TAKE (1) TABLET DAILY 30 tablet 6    guaiFENesin (MUCINEX) 600 MG extended release tablet Take 1 tablet by mouth 2 times daily as needed for Congestion 60 tablet 6    lisinopril (PRINIVIL;ZESTRIL) 5 MG tablet Take 1 tablet by mouth daily 90 tablet 3    dilTIAZem (CARDIZEM CD) 180 MG extended release capsule Take 1 capsule by mouth daily 90 capsule 3    furosemide (LASIX) 40 MG tablet Take 1 tablet by mouth daily 90 tablet 3    apixaban (ELIQUIS) 5 MG TABS tablet TAKE 1 TABLET TWICE DAILY 90 tablet 3    nitroGLYCERIN (NITROSTAT) 0.4 MG SL tablet Place 1 tablet under the tongue every 5 minutes as needed for Chest pain 25 tablet 3    famotidine (PEPCID) 20 MG tablet TAKE (1) TABLET TWICE A DAY BEFORE MEALS. 180 tablet 0    VENTOLIN  (90 Base) MCG/ACT inhaler INHALE 2 PUFFS EVERY 6 HOURS AS NEEDED FOR WHEEZING OR SHORTNESS OF BREATH 18 g 5    [DISCONTINUED] aspirin 81 MG tablet Take 81 mg by mouth daily       No facility-administered encounter medications on file as of 10/9/2020. Lab Data:  CBC: No results for input(s): WBC, HGB, HCT, MCV, PLT in the last 72 hours. BMP: No results for input(s): NA, K, CL, CO2, PHOS, BUN, CREATININE in the last 72 hours. Invalid input(s): CA  LIVER PROFILE: No results for input(s): AST, ALT, LIPASE, BILIDIR, BILITOT, ALKPHOS in the last 72 hours. Invalid input(s):   AMYLASE,  ALB  LIPID:   Lab Results   Component Value Date    CHOL 206 (H) 01/16/2019    CHOL 147 01/11/2018     Lab Results   Component Value Date    TRIG 171 (H) 01/16/2019    TRIG 76 01/11/2018     Lab Results   Component Value Date    HDL 39 (L) 01/16/2019    HDL 31 (L) 01/11/2018     Lab Results   Component Value Date    LDLCALC 133 (H) 01/16/2019    LDLCALC 101 (H) 01/11/2018     Lab Results   Component Value Date    LABVLDL 34 01/16/2019    LABVLDL 15 01/11/2018     No results found for: CHOLHDLRATIO  PT/INR: No results for input(s): PROTIME, INR in the last 72 hours. A1C:   Lab Results   Component Value Date    LABA1C 5.7 01/11/2018     BNP:  No results for input(s): BNP in the last 72 hours. IMAGING:   EKG 7.1.20  Atrial fibrillations controlled ventricular response. CT scan abdomen 9/6/19  FINDINGS: Lower Chest: Mild bronchiectasis. Organs: Liver, gallbladder, pancreas, and spleen are unremarkable. GI/Bowel: No bowel obstruction. Appendix not visualized however no findings to suggest acute appendicitis. Pelvis: No bladder stone. No pelvic fluid collection. Vascular calcifications. Peritoneum/Retroperitoneum: No abdominal aortic aneurysm. Adrenal glands unremarkable. Kidneys are unremarkable. Bones/Soft Tissues: Compression deformities of T12 and T11. loss of approximately 40% vertebral body height centrally. Lexiscan 5/2/19  Summary  Normal LV function. There is normal isotope uptake at stress and rest. There is no evidence of  myocardial ischemia or scar. EKG 4.17.19  afib controlled ventricular response no ischemia or infarct. EKG 4/3/18  Atrial fibrillation Controlled ventricular rate Abnormal ECG Confirmed by Mercy Cabot MD, [address] ([id number]) on 4/13/2018 9:25:35 PM     EKG 3.11.18   afib RVR     CXR 3/5/18  FINDINGS:   The lungs are hyperinflated.  There has been resolution of bibasilar   infiltrates. Teena Ronald is scarring in the right lower lobe.  Heart size and   pulmonary vessels are stable.    IMAGING:   Echo doppler of heart 1.11.18   Normal left ventricular systolic function with an estimated ejection   fraction of 55%.   Normal left ventricular diastolic filling pressure.   The right ventricle is mildly enlarged.   The right atrium is mildly dilated.   Mild mitral annular calcification. Mild mitral regurgitation.   Aortic valve sclerosis without stenosis.   Mild tricuspid regurgitation.   Systolic pulmonary artery pressure (SPAP) is normal and estimated at 37 mmHg   (RA pressure 8 mmHg).   There is a left pleural effusion. EK/10/18  Atrial fibrillation with rapid ventricular response Incomplete right bundle branch block   Abnormal ECG No previous ECGs available Confirmed by Rossy Dozier MD, [address] () on 2018 5:38:40 AM     CXR 1/10/18  Increased interstitial opacity.  Correlate with any clinical evidence developing interstitial pulmonary edema.   No focal infiltrate is identified.         Assessment:  1. Permanent atrial fibrillation ([address]    2. Essential hypertension     [id number] Score for Atrial Fibrillation Stroke Risk   Risk   Factors  Component Value   C CHF No 0   H HTN Yes 1   A2 Age >= 76 No,  (69 y.o.) 0   D DM No 0   S2 Prior Stroke/TIA No 0   V Vascular Disease No 0   A Age 74-69 Yes,  (69 y.o.) 1   Sc Sex male 0    [id number]  Score  2   Score last updated  9:90 PM  Diastolic CHF ACUTE        Plan:  1. Meds reviewed. No refills warranted today  2. Stop taking aspirin he is on eliquis  3. Hold eliquis 10/13, and 10/14. No lasix day of surgery resume lasix and eliquis day after surgery  4. Continue  All other current medications   5. It is my medical opinion that Kristofer Mitchell is considered an Low risk clinically for a lower risk surgery. Proceed as planned. 6. Labs from 2020 satisfactory  7. EKG afib from 2020 rate controlled      This note was scribed in the presence of Ernst Thibodeaux MD by Kristofer Mitchell RN      QUALITY MEASURES  1. Tobacco Cessation Counseling: YES  2. Retake of BP if >140/90:   NA  3.  Documentation to PCP/referring for new patient: Sent to PCP at close of office visit  4. CAD patient on anti-platelet:  anticaogulated  5. CAD patient on STATIN therapy:  NA  6. Patient with CHF and aFib on anticoagulation:  Yes  vaishali GREY, Dr. Lyda Mcardle, personally performed the services described in this documentation, as scribed by the above signed scribe in my presence. It is both accurate and complete to my knowledge. I agree with the details independently gathered by the clinical support staff, while the remaining scribed note accurately describes my personal service to the patient.           Danii Sutton MD 10/9/2020 2:35 PM

## 2020-10-09 ENCOUNTER — OFFICE VISIT (OUTPATIENT)
Dept: CARDIOLOGY CLINIC | Age: 70
End: 2020-10-09
Payer: MEDICARE

## 2020-10-09 VITALS
WEIGHT: 178 LBS | OXYGEN SATURATION: 95 % | SYSTOLIC BLOOD PRESSURE: 124 MMHG | HEART RATE: 70 BPM | BODY MASS INDEX: 26.36 KG/M2 | HEIGHT: 69 IN | TEMPERATURE: 97.8 F | DIASTOLIC BLOOD PRESSURE: 60 MMHG

## 2020-10-09 PROCEDURE — G8427 DOCREV CUR MEDS BY ELIG CLIN: HCPCS | Performed by: INTERNAL MEDICINE

## 2020-10-09 PROCEDURE — G8484 FLU IMMUNIZE NO ADMIN: HCPCS | Performed by: INTERNAL MEDICINE

## 2020-10-09 PROCEDURE — 3017F COLORECTAL CA SCREEN DOC REV: CPT | Performed by: INTERNAL MEDICINE

## 2020-10-09 PROCEDURE — G8417 CALC BMI ABV UP PARAM F/U: HCPCS | Performed by: INTERNAL MEDICINE

## 2020-10-09 PROCEDURE — 99214 OFFICE O/P EST MOD 30 MIN: CPT | Performed by: INTERNAL MEDICINE

## 2020-10-09 PROCEDURE — 1123F ACP DISCUSS/DSCN MKR DOCD: CPT | Performed by: INTERNAL MEDICINE

## 2020-10-09 PROCEDURE — 4040F PNEUMOC VAC/ADMIN/RCVD: CPT | Performed by: INTERNAL MEDICINE

## 2020-10-09 PROCEDURE — 1036F TOBACCO NON-USER: CPT | Performed by: INTERNAL MEDICINE

## 2020-10-09 NOTE — LETTER
[address] [address]  Phone: [phone]  Fax: [phone]    Sam Rose MD        October 9, 2020    [address] [place] [address]      To whom it may concern: It is my medical opinion that Maria Esther Pool is considered an Low risk clinically for a lower risk surgery. Proceed as planned. Ok to hold eliquis 1 day before surgery. If you have any questions or concerns, please don't hesitate to call.     Sincerely,        Sam Rose MD

## 2020-10-09 NOTE — LETTER
[address]  [address] [place] [address] Drive [address]  Phone: [phone]  Fax: [phone]    Jorie Castleman, MD        October 9, 2020    [address] [facility] [address]      To whom it may concern      It is my medical opinion that Jessie Crew is considered an Low risk clinically. Proceed as planned with surgery. Ok to hold eliquis 1 day before surgery. If you have any questions or concerns, please don't hesitate to call.     Sincerely,        Jorie Castleman, MD

## 2020-10-09 NOTE — PATIENT INSTRUCTIONS
Plan:  1. Meds reviewed. No refills warranted today  2. Stap taking aspirin   3. Hold eliquis 10/13, and 10/14. No lasix day of surgery   4. No changes today. Continue current medications   5. It is my medical opinion that Emmanuel Mask is considered an Low risk clinically for a lower risk surgery. Proceed as planned.

## 2020-11-02 RX ORDER — FAMOTIDINE 20 MG/1
TABLET, FILM COATED ORAL
Qty: 180 TABLET | Refills: 3 | Status: SHIPPED | OUTPATIENT
Start: 2020-11-02 | End: 2021-11-08

## 2020-12-29 NOTE — TELEPHONE ENCOUNTER
Last ov 10/9/20 RKG  Assessment:  1. Permanent atrial fibrillation ([address]    2. Essential hypertension     [id number] Score for Atrial Fibrillation Stroke Risk    Risk   Factors   Component Value   C CHF No 0   H HTN Yes 1   A2 Age >= 76 No,  (69 y.o.) 0   D DM No 0   S2 Prior Stroke/TIA No 0   V Vascular Disease No 0   A Age 74-69 Yes,  (69 y.o.) 1   Sc Sex male 0     [id number]  Score   2   Score last updated 6/98/11 7:84 PM  Diastolic CHF ACUTE           Plan:  1. Meds reviewed. No refills warranted today  2. Stop taking aspirin he is on eliquis  3. Hold eliquis 10/13, and 10/14. No lasix day of surgery resume lasix and eliquis day after surgery  4. Continue  All other current medications   5. It is my medical opinion that Jian Wray is considered an Low risk clinically for a lower risk surgery. Proceed as planned. 6. Labs from July 2020 satisfactory  7.  EKG afib from July 2020 rate controlled

## 2021-02-10 ENCOUNTER — VIRTUAL VISIT (OUTPATIENT)
Dept: PULMONOLOGY | Age: 71
End: 2021-02-10
Payer: MEDICARE

## 2021-02-10 ENCOUNTER — TELEPHONE (OUTPATIENT)
Dept: PULMONOLOGY | Age: 71
End: 2021-02-10

## 2021-02-10 DIAGNOSIS — J44.9 STAGE 3 SEVERE COPD BY GOLD CLASSIFICATION (HCC): Primary | ICD-10-CM

## 2021-02-10 DIAGNOSIS — G47.34 NOCTURNAL HYPOXIA: ICD-10-CM

## 2021-02-10 PROCEDURE — 99442 PR PHYS/QHP TELEPHONE EVALUATION 11-20 MIN: CPT | Performed by: INTERNAL MEDICINE

## 2021-02-10 NOTE — TELEPHONE ENCOUNTER
Per ov 2/12/21 need 6 mo f/u. See appt desk. Patient called with message left for patient to call back to office.

## 2021-02-10 NOTE — TELEPHONE ENCOUNTER
Within this Telehealth Consent, the terms you and yours refer to the person using the Telehealth Service (Service), or in the case of a use of the Service by or on behalf of a minor, you and yours refer to and include (i) the parent or legal guardian who provides consent to the use of the Service by such minor or uses the Service on behalf of such minor, and (ii) the minor for whom consent is being provided or on whose behalf the Service is being utilized. When using Service, you will be consulting with your health care providers via the use of Telehealth.   Telehealth involves the delivery of healthcare services using electronic communications, information technology or other means between a healthcare provider and a patient who are not in the same physical location. Telehealth may be used for diagnosis, treatment, follow-up and/or patient education, and may include, but is not limited to, one or more of the following:    Electronic transmission of medical records, photo images, personal health information or other data between a patient and a healthcare provider    Interactions between a patient and healthcare provider via audio, video and/or data communications    Use of output data from medical devices, sound and video files    Anticipated Benefits   The use of Telehealth by your Provider(s) through the Service may have the following possible benefits:    Making it easier and more efficient for you to access medical care and treatment for the conditions treated by such Provider(s) utilizing the Service    Allowing you to obtain medical care and treatment by Provider(s) at times that are convenient for you    Enabling you to interact with Provider(s) without the necessity of an in-office appointment     Possible Risks   While the use of Telehealth can provide potential benefits for you, there are also potential risks associated with the use of Telehealth.  These risks include, but may not be limited to the following:    Your Provider(s) may not able to provide medical treatment for your particular condition and you may be required to seek alternative healthcare or emergency care services.  The electronic systems or other security protocols or safeguards used in the Service could fail, causing a breach of privacy of your medical or other information.  Given regulatory requirements in certain jurisdictions, your Provider(s) diagnosis and/or treatment options, especially pertaining to certain prescriptions, may be limited. Acceptance   1. You understand that Services will be provided via Telehealth. This process involves the use of HIPAA compliant and secure, real-time audio-visual interfacing with a qualified and appropriately trained provider located at [facility]. 2. You understand that, under no circumstances, will this session be recorded. 3. You understand that the Provider(s) at [facility] and other clinical participants will be party to the information obtained during the Telehealth session in accordance with best medical practices. 4. You understand that the information obtained during the Telehealth session will be used to help determine the most appropriate treatment options. 5. You understand that You have the right to revoke this consent at any point in time. 6. You understand that Telehealth is voluntary, and that continued treatment is not dependent upon consent. 7. You understand that, in the event of non-consent to Telehealth services and/or technical difficulties, you will obtain services as typically provided in the absence of Telehealth technology. 8. You understand that this consent will be kept in Your medical record. 9. No potential benefits from the use of Telehealth or specific results can be guaranteed. Your condition may not be cured or improved and, in some cases, may get worse.    10. There are limitations in the provision of medical care and treatment via Telehealth and the Service and you may not be able to receive diagnosis and/or treatment through the Service for every condition for which you seek diagnosis and/or treatment. 11. There are potential risks to the use of Telehealth, including but not limited to the risks described in this Telehealth Consent. 12. Your Provider(s) have discussed the use of Telehealth and the Service with you, including the benefits and risks of such and you have provided oral consent to your Provider(s) for the use of Telehealth and the Service. 15. You understand that it is your duty to provide your Provider(s) truthful, accurate and complete information, including all relevant information regarding care that you may have received or may be receiving from other healthcare providers outside of the Service. 14. You understand that each of your Provider(s) may determine in his or sole discretion that your condition is not suitable for diagnosis and/or treatment using the Service, and that you may need to seek medical care and treatment a specialist or other healthcare provider, outside of the Service. 15. You understand that you are fully responsible for payment for all services provided by Provider(s) or through use of the Service and that you may not be able to use third-party insurance. 16. You represent that (a) you have read this Telehealth Consent carefully, (b) you understand the risks and benefits of the Service and the use of Telehealth in the medical care and treatment provided to you by Provider(s) using the Service, and (c) you have the legal capacity and authority to provide this consent for yourself and/or the minor for which you are consenting under applicable federal and state laws, including laws relating to the age of [de-identified] and/or parental/guardian consent.    17. You give your informed consent to the use of Telehealth by Provider(s) using the Service under the terms described in the Terms of Service and this Telehealth Consent. The patient was read the following statement and has consented to the visit as of 2/10/21. The patient has been scheduled for their first telehealth visit on 2/10/21 with .

## 2021-02-10 NOTE — PROGRESS NOTES
Rfl: 6    ipratropium-albuterol (DUONEB) 0.5-2.5 (3) MG/3ML SOLN nebulizer solution, Inhale 3 mLs into the lungs every 6 hours as needed for Shortness of Breath DX COPD J44.9, Disp: 120 vial, Rfl: 6    guaiFENesin (MUCINEX) 600 MG extended release tablet, Take 1 tablet by mouth 2 times daily as needed for Congestion, Disp: 60 tablet, Rfl: 6    lisinopril (PRINIVIL;ZESTRIL) 5 MG tablet, Take 1 tablet by mouth daily, Disp: 90 tablet, Rfl: 3    dilTIAZem (CARDIZEM CD) 180 MG extended release capsule, Take 1 capsule by mouth daily, Disp: 90 capsule, Rfl: 3    furosemide (LASIX) 40 MG tablet, Take 1 tablet by mouth daily, Disp: 90 tablet, Rfl: 3    nitroGLYCERIN (NITROSTAT) 0.4 MG SL tablet, Place 1 tablet under the tongue every 5 minutes as needed for Chest pain, Disp: 25 tablet, Rfl: 3      Objective:   PHYSICAL EXAM:    Telephone visit not able to obtain physical exam             DATA reviewed by me:   PFTs 10/26/2017 FVC 2.39(49%) FEV1 1.05(29%) FEV1/FVC 44% TLC 7.39(98%) DLCO 11.81 (37%) 6MW 640 F 2L exertion               LDCT chest 10/26/2017    Moderate emphysema. No suspicious pulmonary nodules or masses   Remote appearing compression deformities. Lobular contour of the liver raising the question of underlying cirrhosis                            CXR 8/31/2019   The cardiomediastinal silhouette is borderline in size. Bibasilar atelectasis and scarring noted, superimposed on COPD. No acute infiltrate, pleural effusion or pneumothorax. Assessment:       · Severe COPD   · Nocturnal hypoxia on 2.5 LPM   · Hypoxia on exertion   · 30 pack year smoking - quit smoking 2016  · Covid 19 1 test positive and 2 tests negative without symptoms 6/2020         Plan:      · Continue Symbicort BID and DuoNeb QID- refills today   · Continue Daliresp 500 mcg PO daily  · Continue O2 2-3 LPM on exertion. Advised to titrate O2 using her pulse oximeter- target O2 sat 90-92%.   · Patient is up to date with Pneumococcal vaccine and influenza vaccine   · Acapella and Mucinex   · Advised to continue with smoking cessation. · Advised to get CT chest, low dose protocol, screening for lung cancer. Risks, benefits and alternatives including doing nothing were discussed with patient-patient declined to get it today. · Follow up in 6 months             Jacques Hurst is a 79 y.o. male evaluated via telephone on 2/10/2021. Consent:  He and/or health care decision maker is aware that that he may receive a bill for this telephone service, depending on his insurance coverage, and has provided verbal consent to proceed: Yes       Documentation:  I communicated with the patient and/or health care decision maker about: See above   Details of this discussion including any medical advice provided: See above       I Affirm this is a Patient Initiated Episode with an Established Patient who has not had a related appointment within my department in the past 7 days or scheduled within the next 24 hours.     Total Time: 11-20 minutes     Note: not billable if this call serves to triage the patient into an appointment for the relevant concern      Odette Jackson

## 2021-04-05 RX ORDER — BUDESONIDE AND FORMOTEROL FUMARATE DIHYDRATE 160; 4.5 UG/1; UG/1
AEROSOL RESPIRATORY (INHALATION)
Qty: 10.2 G | Refills: 6 | Status: SHIPPED | OUTPATIENT
Start: 2021-04-05 | End: 2021-12-02

## 2021-04-05 RX ORDER — IPRATROPIUM BROMIDE AND ALBUTEROL SULFATE 2.5; .5 MG/3ML; MG/3ML
SOLUTION RESPIRATORY (INHALATION)
Qty: 360 ML | Refills: 6 | Status: SHIPPED | OUTPATIENT
Start: 2021-04-05 | End: 2021-11-02

## 2021-04-20 NOTE — PROGRESS NOTES
[address] Office Note  4/26/2021     Subjective:  Mr. Kandis Neil is here for  cardiology  follow up. Permanent AFIB, HTN COPD d-CHF chronic hypoxic resp failure. [place]:   Today he present to office today in in wheelchair on supplemental oxygen 3 liters. He walks on his own at home but is limited with activity. He reports feeling good. Denies chest pain, shortness of breath,  dizziness, palpitations and syncope. PMH:   COPD, former smoker,  AFIB. He failed cardioversion 4.13.18    Review of Systems:         12 point ROS negative in all areas as listed below except as in [place]  Constitutional, EENT,  GI, , Musculoskeletal, skin, neurological, hematological, endocrine, Psychiatric    Reviewed past medical history, social, and family history. Non smoker  No alcohol   Family history is negative for significant premature cardiac disease  Past Medical History:   Diagnosis Date    Bronchitis chronic     Emphysema of lung ([facility] [address]     Influenza A 01/10/2018    Lung disease     copd    Pneumonia     12/2009     Past Surgical History:   Procedure Laterality Date    FRACTURE SURGERY      johnson in femur/hip on right    HERNIA REPAIR         Objective:   /80   Pulse 98   Temp 97.1 °F (36.2 °C)   Ht 6' (1.829 m)   Wt 192 lb 6.4 oz (87.3 kg)   SpO2 90%   BMI 26.09 kg/m²     Wt Readings from Last 3 Encounters:   04/26/21 192 lb 6.4 oz (87.3 kg)   10/09/20 178 lb (80.7 kg)   07/01/20 185 lb (83.9 kg)       Physical Exam:  General: No Respiratory distress, appears well developed and well nourished. On continuous supplemental O2 on demand.   Eyes:  Sclera nonicteric  Nose/Sinuses:  negative findings: nose shows no deformity, asymmetry, or inflammation, nasal mucosa normal, septum midline with no perforation or bleeding  Back:  no pain to palpation  Joint:  no active joint inflammation  Musculoskeletal:  negative  Skin:  Warm and dry  Neck:  Negative for JVD and Carotid 4/26/2021. Lab Data:  CBC: No results for input(s): WBC, HGB, HCT, MCV, PLT in the last 72 hours. BMP: No results for input(s): NA, K, CL, CO2, PHOS, BUN, CREATININE in the last 72 hours. Invalid input(s): CA  LIVER PROFILE: No results for input(s): AST, ALT, LIPASE, BILIDIR, BILITOT, ALKPHOS in the last 72 hours. Invalid input(s): AMYLASE,  ALB  LIPID:   Lab Results   Component Value Date    CHOL 206 (H) 01/16/2019    CHOL 147 01/11/2018     Lab Results   Component Value Date    TRIG 171 (H) 01/16/2019    TRIG 76 01/11/2018     Lab Results   Component Value Date    HDL 39 (L) 01/16/2019    HDL 31 (L) 01/11/2018     Lab Results   Component Value Date    LDLCALC 133 (H) 01/16/2019    LDLCALC 101 (H) 01/11/2018     Lab Results   Component Value Date    LABVLDL 34 01/16/2019    LABVLDL 15 01/11/2018     No results found for: CHOLHDLRATIO  PT/INR: No results for input(s): PROTIME, INR in the last 72 hours. A1C:   Lab Results   Component Value Date    LABA1C 5.7 01/11/2018     BNP:  No results for input(s): BNP in the last 72 hours. IMAGING:   I have reviewed the following tests and documented in this encounter as follows:   Discussed with the patient. EKG 7.1.20  Atrial fibrillations controlled ventricular response. CT scan abdomen 9/6/19  FINDINGS: Lower Chest: Mild bronchiectasis. Organs: Liver, gallbladder, pancreas, and spleen are unremarkable. GI/Bowel: No bowel obstruction. Appendix not visualized however no findings to suggest acute appendicitis. Pelvis: No bladder stone. No pelvic fluid collection. Vascular calcifications. Peritoneum/Retroperitoneum: No abdominal aortic aneurysm. Adrenal glands unremarkable. Kidneys are unremarkable. Bones/Soft Tissues: Compression deformities of T12 and T11. loss of approximately 40% vertebral body height centrally. Lexiscan 5/2/19  Summary  Normal LV function.   There is normal isotope uptake at stress and rest. There is no evidence of myocardial ischemia or scar. EKG 19  afib controlled ventricular response no ischemia or infarct. EKG 4/3/18  Atrial fibrillation Controlled ventricular rate Abnormal ECG Confirmed by Elda Cruz MD, [address] () on 2018 9:25:35 PM     EKG 3.11.18   afib RVR     CXR 3/5/18  FINDINGS:   The lungs are hyperinflated.  There has been resolution of bibasilar   infiltrates. Loulou Finley is scarring in the right lower lobe.  Heart size and   pulmonary vessels are stable. IMAGING:   Echo doppler of heart 18   Normal left ventricular systolic function with an estimated ejection   fraction of 55%.   Normal left ventricular diastolic filling pressure.   The right ventricle is mildly enlarged.   The right atrium is mildly dilated.   Mild mitral annular calcification. Mild mitral regurgitation.   Aortic valve sclerosis without stenosis.   Mild tricuspid regurgitation.   Systolic pulmonary artery pressure (SPAP) is normal and estimated at 37 mmHg   (RA pressure 8 mmHg).   There is a left pleural effusion. EK/10/18  Atrial fibrillation with rapid ventricular response Incomplete right bundle branch block   Abnormal ECG No previous ECGs available Confirmed by Elda Cruz MD, [address] () on 2018 5:38:40 AM     CXR 1/10/18  Increased interstitial opacity.  Correlate with any clinical evidence developing interstitial pulmonary edema.   No focal infiltrate is identified.         Assessment:  1. Permanent atrial fibrillation ([address]    2. Essential hypertension    3. Chronic respiratory failure with hypoxia (HCC)    4. Chronic diastolic congestive heart failure (HCC)     [id number] Score for Atrial Fibrillation Stroke Risk   Risk   Factors  Component Value   C CHF Yes 1   H HTN Yes 1   A2 Age >= 75 No,  (75 y.o.) 0   D DM No 0   S2 Prior Stroke/TIA No 0   V Vascular Disease No 0   A Age 74-69 Yes,  (75 y.o.) 1   Sc Sex male 0    [id number]  Score  3   Score last updated 18 2:12 PM    Plan:  1. Meds reviewed.  4 refills warranted today  2. No need for cardiac testing at this time. Labs in epic from June 2020 reviewed with the patient. 3.  Will check cmp, cbc, lipids  4. Continue  All other current medications    5. Follow up in 6 monthd          QUALITY MEASURES  1. Tobacco Cessation Counseling: YES  2. Retake of BP if >140/90:   NA  3. Documentation to PCP/referring for new patient:  Sent to PCP at close of office visit  4. CAD patient on anti-platelet:  anticaogulated  5. CAD patient on STATIN therapy:  NA  6. Patient with CHF and aFib on anticoagulation:  Yes  eliquis       This note was scribed in the presence of  Hannah Sanchez MD by Melisa Orr RN  I, Dr. Hannah Sanchez, personally performed the services described in this documentation, as scribed by the above signed scribe in my presence. It is both accurate and complete to my knowledge. I agree with the details independently gathered by the clinical support staff, while the remaining scribed note accurately describes my personal service to the patient.       Ochoa Birmingham MD 4/26/2021 4:01 PM

## 2021-04-26 ENCOUNTER — OFFICE VISIT (OUTPATIENT)
Dept: CARDIOLOGY CLINIC | Age: 71
End: 2021-04-26
Payer: MEDICARE

## 2021-04-26 VITALS
HEIGHT: 72 IN | BODY MASS INDEX: 26.06 KG/M2 | TEMPERATURE: 97.1 F | OXYGEN SATURATION: 90 % | HEART RATE: 98 BPM | SYSTOLIC BLOOD PRESSURE: 128 MMHG | WEIGHT: 192.4 LBS | DIASTOLIC BLOOD PRESSURE: 80 MMHG

## 2021-04-26 DIAGNOSIS — I50.32 CHRONIC DIASTOLIC CONGESTIVE HEART FAILURE (HCC): ICD-10-CM

## 2021-04-26 DIAGNOSIS — I48.21 PERMANENT ATRIAL FIBRILLATION (HCC): Primary | ICD-10-CM

## 2021-04-26 DIAGNOSIS — J96.11 CHRONIC RESPIRATORY FAILURE WITH HYPOXIA (HCC): ICD-10-CM

## 2021-04-26 DIAGNOSIS — I10 ESSENTIAL HYPERTENSION: Chronic | ICD-10-CM

## 2021-04-26 PROCEDURE — 4040F PNEUMOC VAC/ADMIN/RCVD: CPT | Performed by: INTERNAL MEDICINE

## 2021-04-26 PROCEDURE — G8427 DOCREV CUR MEDS BY ELIG CLIN: HCPCS | Performed by: INTERNAL MEDICINE

## 2021-04-26 PROCEDURE — 1123F ACP DISCUSS/DSCN MKR DOCD: CPT | Performed by: INTERNAL MEDICINE

## 2021-04-26 PROCEDURE — G8417 CALC BMI ABV UP PARAM F/U: HCPCS | Performed by: INTERNAL MEDICINE

## 2021-04-26 PROCEDURE — 99214 OFFICE O/P EST MOD 30 MIN: CPT | Performed by: INTERNAL MEDICINE

## 2021-04-26 PROCEDURE — 3017F COLORECTAL CA SCREEN DOC REV: CPT | Performed by: INTERNAL MEDICINE

## 2021-04-26 PROCEDURE — 1036F TOBACCO NON-USER: CPT | Performed by: INTERNAL MEDICINE

## 2021-04-26 RX ORDER — DILTIAZEM HYDROCHLORIDE 180 MG/1
180 CAPSULE, COATED, EXTENDED RELEASE ORAL DAILY
Qty: 90 CAPSULE | Refills: 3 | Status: SHIPPED | OUTPATIENT
Start: 2021-04-26 | End: 2021-07-02

## 2021-04-26 RX ORDER — LISINOPRIL 5 MG/1
5 TABLET ORAL DAILY
Qty: 90 TABLET | Refills: 3 | Status: SHIPPED | OUTPATIENT
Start: 2021-04-26 | End: 2021-07-02

## 2021-04-26 RX ORDER — FUROSEMIDE 40 MG/1
40 TABLET ORAL DAILY
Qty: 90 TABLET | Refills: 3 | Status: SHIPPED | OUTPATIENT
Start: 2021-04-26 | End: 2021-07-02

## 2021-04-26 NOTE — PATIENT INSTRUCTIONS
Plan:  1. Meds reviewed. 4 refills warranted today  2. No need for cardiac testing at this time. 3.  Will check cmp, cbc, lipids  4. Continue  All other current medications    5.  Follow up in 6 monthd

## 2021-07-02 DIAGNOSIS — I48.21 PERMANENT ATRIAL FIBRILLATION (HCC): ICD-10-CM

## 2021-07-02 RX ORDER — LISINOPRIL 5 MG/1
5 TABLET ORAL DAILY
Qty: 90 TABLET | Refills: 3 | Status: SHIPPED | OUTPATIENT
Start: 2021-07-02 | End: 2022-04-14

## 2021-07-02 RX ORDER — DILTIAZEM HYDROCHLORIDE 180 MG/1
180 CAPSULE, COATED, EXTENDED RELEASE ORAL DAILY
Qty: 90 CAPSULE | Refills: 3 | Status: SHIPPED | OUTPATIENT
Start: 2021-07-02 | End: 2022-07-01

## 2021-07-02 RX ORDER — FUROSEMIDE 40 MG/1
40 TABLET ORAL DAILY
Qty: 90 TABLET | Refills: 3 | Status: SHIPPED | OUTPATIENT
Start: 2021-07-02 | End: 2022-02-02

## 2021-08-11 ENCOUNTER — OFFICE VISIT (OUTPATIENT)
Dept: PULMONOLOGY | Age: 71
End: 2021-08-11
Payer: MEDICARE

## 2021-08-11 VITALS
RESPIRATION RATE: 24 BRPM | HEIGHT: 72 IN | HEART RATE: 90 BPM | WEIGHT: 192 LBS | DIASTOLIC BLOOD PRESSURE: 98 MMHG | BODY MASS INDEX: 26.01 KG/M2 | SYSTOLIC BLOOD PRESSURE: 134 MMHG | OXYGEN SATURATION: 90 %

## 2021-08-11 DIAGNOSIS — Z87.891 PERSONAL HISTORY OF TOBACCO USE: ICD-10-CM

## 2021-08-11 DIAGNOSIS — Z87.891 HISTORY OF TOBACCO USE: Primary | ICD-10-CM

## 2021-08-11 DIAGNOSIS — J44.9 STAGE 3 SEVERE COPD BY GOLD CLASSIFICATION (HCC): ICD-10-CM

## 2021-08-11 DIAGNOSIS — G47.34 NOCTURNAL HYPOXIA: ICD-10-CM

## 2021-08-11 PROCEDURE — 4040F PNEUMOC VAC/ADMIN/RCVD: CPT | Performed by: INTERNAL MEDICINE

## 2021-08-11 PROCEDURE — G8417 CALC BMI ABV UP PARAM F/U: HCPCS | Performed by: INTERNAL MEDICINE

## 2021-08-11 PROCEDURE — G8427 DOCREV CUR MEDS BY ELIG CLIN: HCPCS | Performed by: INTERNAL MEDICINE

## 2021-08-11 PROCEDURE — 99214 OFFICE O/P EST MOD 30 MIN: CPT | Performed by: INTERNAL MEDICINE

## 2021-08-11 PROCEDURE — G0296 VISIT TO DETERM LDCT ELIG: HCPCS | Performed by: INTERNAL MEDICINE

## 2021-08-11 PROCEDURE — 1123F ACP DISCUSS/DSCN MKR DOCD: CPT | Performed by: INTERNAL MEDICINE

## 2021-08-11 PROCEDURE — 3017F COLORECTAL CA SCREEN DOC REV: CPT | Performed by: INTERNAL MEDICINE

## 2021-08-11 PROCEDURE — 1036F TOBACCO NON-USER: CPT | Performed by: INTERNAL MEDICINE

## 2021-08-11 PROCEDURE — G8926 SPIRO NO PERF OR DOC: HCPCS | Performed by: INTERNAL MEDICINE

## 2021-08-11 PROCEDURE — 3023F SPIROM DOC REV: CPT | Performed by: INTERNAL MEDICINE

## 2021-08-11 RX ORDER — PREDNISONE 10 MG/1
TABLET ORAL
Qty: 30 TABLET | Refills: 0 | Status: SHIPPED | OUTPATIENT
Start: 2021-08-11 | End: 2021-08-23

## 2021-08-11 RX ORDER — DOXYCYCLINE HYCLATE 100 MG/1
100 CAPSULE ORAL 2 TIMES DAILY
Qty: 10 CAPSULE | Refills: 0 | Status: SHIPPED | OUTPATIENT
Start: 2021-08-11 | End: 2021-08-16

## 2021-08-11 NOTE — PROGRESS NOTES
[facility]                                                            Outpatient Follow Up Note    CHIEF COMPLAINT: Follow up COPD         HPI:   Doing okay   No hemoptysis   Patient is compliant with inhaled bronchodilators   Uses his Neb 4 times/day   Has not had screening CT chest     Past Medical History:   Diagnosis Date    Bronchitis chronic     Emphysema of lung ([address]     Influenza A 01/10/2018    Lung disease     copd    Pneumonia     12/2009       Past Surgical History:        Procedure Laterality Date    FRACTURE SURGERY      johnson in femur/hip on right    HERNIA REPAIR         Allergies:  has No Known Allergies. Social History:    TOBACCO:   reports that he quit smoking about 5 years ago. His smoking use included cigarettes. He has a 30.00 pack-year smoking history. He has never used smokeless tobacco.  ETOH:   reports no history of alcohol use.       Family History:       Problem Relation Age of Onset    Diabetes Sister     Cancer Sister     Cancer Sister     Cancer Brother     Asthma Daughter     Asthma Son     Diabetes Mother     Cancer Father        Current Medications:    Current Outpatient Medications:     lisinopril (PRINIVIL;ZESTRIL) 5 MG tablet, TAKE 1 TABLET BY MOUTH DAILY, Disp: 90 tablet, Rfl: 3    furosemide (LASIX) 40 MG tablet, TAKE 1 TABLET BY MOUTH DAILY, Disp: 90 tablet, Rfl: 3    apixaban (ELIQUIS) 5 MG TABS tablet, TAKE 1 TABLET TWICE DAILY, Disp: 60 tablet, Rfl: 5    dilTIAZem (CARDIZEM CD) 180 MG extended release capsule, TAKE 1 CAPSULE BY MOUTH DAILY, Disp: 90 capsule, Rfl: 3    VENTOLIN  (90 Base) MCG/ACT inhaler, INHALE 2 PUFFS EVERY 6 HOURS AS NEEDED FOR WHEEZING OR SHORTNESS OF BREATH, Disp: 18 g, Rfl: 6    Roflumilast (DALIRESP) 500 MCG tablet, TAKE (1) TABLET DAILY, Disp: 30 tablet, Rfl: 5    SYMBICORT 160-4.5 MCG/ACT AERO, INHALE 2 PUFFS INTO THE LUNGS 2 TIMES DAILY, Disp: 10.2 g, Rfl: 6    ipratropium-albuterol (DUONEB) 0.5-2.5 (3) MG/3ML SOLN nebulizer solution, INHALE 3 MILLILIERS (1 VIAL) VIA NEBULIZATION ROUTE EVERY 6 HOURS AS NEEDED FOR SHORTNESS OF BREATH, Disp: 360 mL, Rfl: 6    famotidine (PEPCID) 20 MG tablet, TAKE (1) TABLET TWICE A DAY BEFORE MEALS., Disp: 180 tablet, Rfl: 3    guaiFENesin (MUCINEX) 600 MG extended release tablet, Take 1 tablet by mouth 2 times daily as needed for Congestion, Disp: 60 tablet, Rfl: 6    nitroGLYCERIN (NITROSTAT) 0.4 MG SL tablet, Place 1 tablet under the tongue every 5 minutes as needed for Chest pain, Disp: 25 tablet, Rfl: 3      Objective:   PHYSICAL EXAM:    BP (!) 134/98   Pulse 90   Resp 24   Ht 6' (1.829 m)   Wt 192 lb (87.1 kg)   SpO2 90% Comment: on 3 LPM  BMI 26.04 kg/m²   Gen: No distress. Ill appearing   Eyes: PERRL. No sclera icterus. No conjunctival injection. ENT: No discharge. Pharynx clear. Neck: Trachea midline. No obvious mass. Resp: No accessory muscle use. No crackles. Few wheezes. No rhonchi. No dullness on percussion. Good air entry. CV: Regular rate. Regular rhythm. No murmur or rub. No edema. GI: Non-tender. Non-distended. No hernia. Skin: Warm and dry. No nodule on exposed extremities. Lymph: No cervical LAD. No supraclavicular LAD. M/S: No cyanosis. No joint deformity. No clubbing. Neuro: Awake. Alert. Moves all four extremities. Psych: Oriented x 3. No anxiety. DATA reviewed by me:   PFTs 10/26/2017 FVC 2.39(49%) FEV1 1.05(29%) FEV1/FVC 44% TLC 7.39(98%) DLCO 11.81 (37%) 6MW 640 F 2L exertion               LDCT chest 10/26/2017    Moderate emphysema. No suspicious pulmonary nodules or masses   Remote appearing compression deformities. Lobular contour of the liver raising the question of underlying cirrhosis                            CXR 8/31/2019   The cardiomediastinal silhouette is borderline in size.   Bibasilar atelectasis and scarring noted, superimposed on COPD.  No acute infiltrate, pleural effusion or pneumothorax. Assessment:       · Severe COPD   · Nocturnal hypoxia on 2.5 LPM   · Hypoxia on exertion   · 30 pack year smoking - quit smoking 2016  · Covid 19 1 test positive and 2 tests negative without symptoms 6/2020         Plan:      · Continue Symbicort BID and DuoNeb QID- refills today   · Continue Daliresp 500 mcg PO daily  · Continue O2 2-4 LPM on exertion. Advised to titrate O2 using her pulse oximeter- target O2 sat 90-92%. · O2 2. 5LPM at night   · Patient is up to date with Covid, Pneumococcal vaccine and influenza vaccine   · Acapella and Mucinex   · Advised to continue with smoking cessation. · Prednisone taper and Doxycycline 100 mg BID x 5 days for COPD AE self management if needed. · CT chest, low dose protocol, screening for lung cancer.  Risks, benefits and alternatives including doing nothing were discussed with patient- he is thinking about it   · Follow up in 6 months

## 2021-10-28 ENCOUNTER — TELEPHONE (OUTPATIENT)
Dept: CARDIOLOGY CLINIC | Age: 71
End: 2021-10-28

## 2021-11-02 RX ORDER — IPRATROPIUM BROMIDE AND ALBUTEROL SULFATE 2.5; .5 MG/3ML; MG/3ML
SOLUTION RESPIRATORY (INHALATION)
Qty: 360 ML | Refills: 6 | Status: SHIPPED | OUTPATIENT
Start: 2021-11-02 | End: 2022-02-28 | Stop reason: SDUPTHER

## 2021-11-08 RX ORDER — FAMOTIDINE 20 MG/1
TABLET, FILM COATED ORAL
Qty: 180 TABLET | Refills: 3 | Status: SHIPPED | OUTPATIENT
Start: 2021-11-08 | End: 2022-04-14 | Stop reason: SDUPTHER

## 2021-12-02 RX ORDER — BUDESONIDE AND FORMOTEROL FUMARATE DIHYDRATE 160; 4.5 UG/1; UG/1
AEROSOL RESPIRATORY (INHALATION)
Qty: 10.2 G | Refills: 5 | Status: SHIPPED | OUTPATIENT
Start: 2021-12-02 | End: 2022-06-02

## 2022-02-02 ENCOUNTER — OFFICE VISIT (OUTPATIENT)
Dept: CARDIOLOGY CLINIC | Age: 72
End: 2022-02-02
Payer: MEDICARE

## 2022-02-02 ENCOUNTER — HOSPITAL ENCOUNTER (OUTPATIENT)
Age: 72
Discharge: HOME | End: 2022-02-02
Payer: MEDICARE

## 2022-02-02 VITALS
SYSTOLIC BLOOD PRESSURE: 124 MMHG | WEIGHT: 187 LBS | BODY MASS INDEX: 25.33 KG/M2 | DIASTOLIC BLOOD PRESSURE: 70 MMHG | OXYGEN SATURATION: 96 % | HEIGHT: 72 IN | HEART RATE: 89 BPM

## 2022-02-02 DIAGNOSIS — Z79.899 MEDICATION MANAGEMENT: ICD-10-CM

## 2022-02-02 DIAGNOSIS — I50.32 CHRONIC DIASTOLIC CONGESTIVE HEART FAILURE (HCC): Primary | ICD-10-CM

## 2022-02-02 DIAGNOSIS — I48.21 PERMANENT ATRIAL FIBRILLATION (HCC): ICD-10-CM

## 2022-02-02 DIAGNOSIS — I10 ESSENTIAL HYPERTENSION: Chronic | ICD-10-CM

## 2022-02-02 LAB — TSH REFLEX FT4: 0.94 UIU/ML (ref 0.27–4.2)

## 2022-02-02 PROCEDURE — 1036F TOBACCO NON-USER: CPT | Performed by: INTERNAL MEDICINE

## 2022-02-02 PROCEDURE — 3017F COLORECTAL CA SCREEN DOC REV: CPT | Performed by: INTERNAL MEDICINE

## 2022-02-02 PROCEDURE — 84443 ASSAY THYROID STIM HORMONE: CPT

## 2022-02-02 PROCEDURE — 99214 OFFICE O/P EST MOD 30 MIN: CPT | Performed by: INTERNAL MEDICINE

## 2022-02-02 PROCEDURE — G8484 FLU IMMUNIZE NO ADMIN: HCPCS | Performed by: INTERNAL MEDICINE

## 2022-02-02 PROCEDURE — G8427 DOCREV CUR MEDS BY ELIG CLIN: HCPCS | Performed by: INTERNAL MEDICINE

## 2022-02-02 PROCEDURE — G8417 CALC BMI ABV UP PARAM F/U: HCPCS | Performed by: INTERNAL MEDICINE

## 2022-02-02 PROCEDURE — 36415 COLL VENOUS BLD VENIPUNCTURE: CPT

## 2022-02-02 PROCEDURE — 1123F ACP DISCUSS/DSCN MKR DOCD: CPT | Performed by: INTERNAL MEDICINE

## 2022-02-02 PROCEDURE — 4040F PNEUMOC VAC/ADMIN/RCVD: CPT | Performed by: INTERNAL MEDICINE

## 2022-02-02 RX ORDER — TORSEMIDE 20 MG/1
40 TABLET ORAL DAILY
Qty: 180 TABLET | Refills: 3 | Status: ON HOLD | OUTPATIENT
Start: 2022-02-02 | End: 2022-03-31 | Stop reason: SDUPTHER

## 2022-02-02 NOTE — PATIENT INSTRUCTIONS
Plan:  1. Current medications reviewed. No changes at this time. Refills given as warranted. 2. Call to schedule an Echocardiogram to look at heart size and strength    -try to do in the test in the next 2-4 weeks  - This test is an ultrasound of your heart just like when you see an ultrasound that a woman has when she is pregnant.  -The test records the movement of your heart valves and chambers.  -It evaluates heart valves, chamber enlargement, abnormal openings, or any fluid in the sac surrounding the heart. 3. Stop taking lasix   4. Start taking Demadix  5. Have blood work completed today    Follow up with me in 6 months    Your provider has ordered testing for further evaluation. An order/prescription has been included in your paper work.  To schedule outpatient testing, contact Central Scheduling by calling [address] [place] [address] ([phone]).

## 2022-02-02 NOTE — PROGRESS NOTES
dry  Neck:  Negative for JVD and Carotid Bruits. Chest:clear  to auscultation, respiration easy  Cardiovascular:  irreg irreg S2 normal, no murmur, no rub or thrill. Extremities: 1+ right, 2+ left lower edema,   noclubbing, cyanosis,  Neuro: intact    Medications:   Outpatient Encounter Medications as of 2/2/2022   Medication Sig Dispense Refill    VENTOLIN  (90 Base) MCG/ACT inhaler INHALE 2 PUFFS EVERY 6 HOURS AS NEEDED FOR WHEEZING OR SHORTNESS OF BREATH 18 g 5    torsemide (DEMADEX) 20 MG tablet Take 2 tablets by mouth daily 180 tablet 3    SYMBICORT 160-4.5 MCG/ACT AERO INHALE 2 PUFFS INTO THE LUNGS 2 TIMES DAILY 10.2 g 5    Roflumilast (DALIRESP) 500 MCG tablet TAKE (1) TABLET DAILY 30 tablet 5    famotidine (PEPCID) 20 MG tablet TAKE (1) TABLET TWICE A DAY BEFORE MEALS. 180 tablet 3    ipratropium-albuterol (DUONEB) 0.5-2.5 (3) MG/3ML SOLN nebulizer solution INHALE 3 MILLILIERS (1 VIAL) VIA NEBULIZATION ROUTE EVERY 6 HOURS AS NEEDED FOR SHORTNESS OF BREATH 360 mL 6    lisinopril (PRINIVIL;ZESTRIL) 5 MG tablet TAKE 1 TABLET BY MOUTH DAILY 90 tablet 3    apixaban (ELIQUIS) 5 MG TABS tablet TAKE 1 TABLET TWICE DAILY 60 tablet 5    dilTIAZem (CARDIZEM CD) 180 MG extended release capsule TAKE 1 CAPSULE BY MOUTH DAILY 90 capsule 3    guaiFENesin (MUCINEX) 600 MG extended release tablet Take 1 tablet by mouth 2 times daily as needed for Congestion 60 tablet 6    nitroGLYCERIN (NITROSTAT) 0.4 MG SL tablet Place 1 tablet under the tongue every 5 minutes as needed for Chest pain 25 tablet 3    [DISCONTINUED] VENTOLIN  (90 Base) MCG/ACT inhaler INHALE 2 PUFFS EVERY 6 HOURS AS NEEDED FOR WHEEZING OR SHORTNESS OF BREATH 18 g 6    [DISCONTINUED] furosemide (LASIX) 40 MG tablet TAKE 1 TABLET BY MOUTH DAILY 90 tablet 3     No facility-administered encounter medications on file as of 2/2/2022. Lab Data:  CBC: No results for input(s): WBC, HGB, HCT, MCV, PLT in the last 72 hours.   BMP: No results for input(s): NA, K, CL, CO2, PHOS, BUN, CREATININE, CA in the last 72 hours. LIVER PROFILE: No results for input(s): AST, ALT, LIPASE, BILIDIR, BILITOT, ALKPHOS in the last 72 hours. Invalid input(s): AMYLASE,  ALB  LIPID:   Lab Results   Component Value Date    CHOL 206 (H) 01/16/2019    CHOL 147 01/11/2018     Lab Results   Component Value Date    TRIG 171 (H) 01/16/2019    TRIG 76 01/11/2018     Lab Results   Component Value Date    HDL 39 (L) 01/16/2019    HDL 31 (L) 01/11/2018     Lab Results   Component Value Date    LDLCALC 133 (H) 01/16/2019    LDLCALC 101 (H) 01/11/2018     Lab Results   Component Value Date    LABVLDL 34 01/16/2019    LABVLDL 15 01/11/2018     No results found for: CHOLHDLRATIO  PT/INR: No results for input(s): PROTIME, INR in the last 72 hours. A1C:   Lab Results   Component Value Date    LABA1C 5.7 01/11/2018     BNP:  No results for input(s): BNP in the last 72 hours. IMAGING:   I have reviewed the following tests and documented in this encounter as follows:   Discussed with the patient. EKG 7.1.20  Atrial fibrillations controlled ventricular response. CT scan abdomen 9/6/19  FINDINGS: Lower Chest: Mild bronchiectasis. Organs: Liver, gallbladder, pancreas, and spleen are unremarkable. GI/Bowel: No bowel obstruction. Appendix not visualized however no findings to suggest acute appendicitis. Pelvis: No bladder stone. No pelvic fluid collection. Vascular calcifications. Peritoneum/Retroperitoneum: No abdominal aortic aneurysm. Adrenal glands unremarkable. Kidneys are unremarkable. Bones/Soft Tissues: Compression deformities of T12 and T11. loss of approximately 40% vertebral body height centrally. Lexiscan 5/2/19  Summary  Normal LV function. There is normal isotope uptake at stress and rest. There is no evidence of  myocardial ischemia or scar. EKG 4.17.19  afib controlled ventricular response no ischemia or infarct.   EKG 4/3/18  Atrial fibrillation Controlled ventricular rate Abnormal ECG Confirmed by KELLE GUZMAN, [address] () on 2018 9:25:35 PM     EKG 3.11.18   afib RVR     CXR 3/5/18  FINDINGS:   The lungs are hyperinflated.  There has been resolution of bibasilar   infiltrates. Amrita Cambric is scarring in the right lower lobe.  Heart size and   pulmonary vessels are stable. IMAGING:   Echo doppler of heart 18   Normal left ventricular systolic function with an estimated ejection   fraction of 55%.   Normal left ventricular diastolic filling pressure.   The right ventricle is mildly enlarged.   The right atrium is mildly dilated.   Mild mitral annular calcification. Mild mitral regurgitation.   Aortic valve sclerosis without stenosis.   Mild tricuspid regurgitation.   Systolic pulmonary artery pressure (SPAP) is normal and estimated at 37 mmHg   (RA pressure 8 mmHg).   There is a left pleural effusion. EK/10/18  Atrial fibrillation with rapid ventricular response Incomplete right bundle branch block   Abnormal ECG No previous ECGs available Confirmed by Zina Garduno MD, [address] () on 2018 5:38:40 AM     CXR 1/10/18  Increased interstitial opacity.  Correlate with any clinical evidence developing interstitial pulmonary edema.   No focal infiltrate is identified.         Assessment:  1. Chronic diastolic congestive heart failure ([address]    2. Essential hypertension    3. Permanent atrial fibrillation ([address]    4. Medication management     [id number] Score for Atrial Fibrillation Stroke Risk   Risk   Factors  Component Value   C CHF Yes 1   H HTN Yes 1   A2 Age >= 75 No,  (75 y.o.) 0   D DM No 0   S2 Prior Stroke/TIA No 0   V Vascular Disease No 0   A Age 74-69 Yes,  (75 y.o.) 1   Sc Sex male 0    [id number]  Score  3   Score last updated 18 2:12 PM    Plan:  1. Current medications reviewed. No changes at this time. Refills given as warranted.   2. Call to schedule an Echocardiogram to look at heart size and strength    -try to do in the test in the next 2-4 weeks  - This test is an ultrasound of your heart just like when you see an ultrasound that a woman has when she is pregnant.  -The test records the movement of your heart valves and chambers.  -It evaluates heart valves, chamber enlargement, abnormal openings, or any fluid in the sac surrounding the heart. 3. Stop taking lasix   4. Start taking Demadix  5. Have blood work completed today    Follow up with me in 6 months    This note is scribed in the presence of Dr. Halina Canas MD by Nathaniel Zhang RN.  I, Dr. Sallie Mattson, personally performed the services described in this documentation, as scribed by the above signed scribe in my presence. It is both accurate and complete to my knowledge. I agree with the details independently gathered by the clinical support staff, while the remaining scribed note accurately describes my personal service to the patient. QUALITY MEASURES  1. Tobacco Cessation Counseling: YES  2. Retake of BP if >140/90:   NA  3. Documentation to PCP/referring for new patient:  Sent to PCP at close of office visit  4. CAD patient on anti-platelet:  anticaogulated  5. CAD patient on STATIN therapy:  NA  6. Patient with CHF and aFib on anticoagulation:  Yes  eliquis       This note was scribed in the presence of  Sallie Mattson MD by Nathaniel Zhang RN  I, Dr. Sallie Mattson, personally performed the services described in this documentation, as scribed by the above signed scribe in my presence. It is both accurate and complete to my knowledge. I agree with the details independently gathered by the clinical support staff, while the remaining scribed note accurately describes my personal service to the patient.       Ray Collier MD, MD 2/2/2022 3:26 PM

## 2022-02-04 ENCOUNTER — TELEPHONE (OUTPATIENT)
Dept: CARDIOLOGY CLINIC | Age: 72
End: 2022-02-04

## 2022-02-04 NOTE — TELEPHONE ENCOUNTER
----- Message from Owen Warren MD sent at 2/4/2022  2:43 PM EST -----  Thyroid function is normal. Call patient

## 2022-02-28 ENCOUNTER — OFFICE VISIT (OUTPATIENT)
Dept: PULMONOLOGY | Age: 72
End: 2022-02-28
Payer: MEDICARE

## 2022-02-28 VITALS
DIASTOLIC BLOOD PRESSURE: 56 MMHG | BODY MASS INDEX: 25.35 KG/M2 | WEIGHT: 187.2 LBS | HEIGHT: 72 IN | SYSTOLIC BLOOD PRESSURE: 100 MMHG | HEART RATE: 96 BPM | OXYGEN SATURATION: 96 % | RESPIRATION RATE: 24 BRPM

## 2022-02-28 DIAGNOSIS — R09.02 HYPOXIA: ICD-10-CM

## 2022-02-28 DIAGNOSIS — G47.34 NOCTURNAL HYPOXIA: ICD-10-CM

## 2022-02-28 DIAGNOSIS — J44.1 COPD WITH ACUTE EXACERBATION (HCC): Primary | ICD-10-CM

## 2022-02-28 DIAGNOSIS — Z87.891 PERSONAL HISTORY OF TOBACCO USE: ICD-10-CM

## 2022-02-28 PROCEDURE — 4040F PNEUMOC VAC/ADMIN/RCVD: CPT | Performed by: INTERNAL MEDICINE

## 2022-02-28 PROCEDURE — G8482 FLU IMMUNIZE ORDER/ADMIN: HCPCS | Performed by: INTERNAL MEDICINE

## 2022-02-28 PROCEDURE — G8427 DOCREV CUR MEDS BY ELIG CLIN: HCPCS | Performed by: INTERNAL MEDICINE

## 2022-02-28 PROCEDURE — 1123F ACP DISCUSS/DSCN MKR DOCD: CPT | Performed by: INTERNAL MEDICINE

## 2022-02-28 PROCEDURE — 3017F COLORECTAL CA SCREEN DOC REV: CPT | Performed by: INTERNAL MEDICINE

## 2022-02-28 PROCEDURE — 99214 OFFICE O/P EST MOD 30 MIN: CPT | Performed by: INTERNAL MEDICINE

## 2022-02-28 PROCEDURE — G8417 CALC BMI ABV UP PARAM F/U: HCPCS | Performed by: INTERNAL MEDICINE

## 2022-02-28 PROCEDURE — 3023F SPIROM DOC REV: CPT | Performed by: INTERNAL MEDICINE

## 2022-02-28 PROCEDURE — 1036F TOBACCO NON-USER: CPT | Performed by: INTERNAL MEDICINE

## 2022-02-28 RX ORDER — PREDNISONE 20 MG/1
20 TABLET ORAL DAILY
Qty: 5 TABLET | Refills: 0 | Status: SHIPPED | OUTPATIENT
Start: 2022-02-28 | End: 2022-03-05

## 2022-02-28 RX ORDER — IPRATROPIUM BROMIDE AND ALBUTEROL SULFATE 2.5; .5 MG/3ML; MG/3ML
SOLUTION RESPIRATORY (INHALATION)
Qty: 360 ML | Refills: 6 | Status: SHIPPED | OUTPATIENT
Start: 2022-02-28

## 2022-02-28 NOTE — PROGRESS NOTES
[facility]                                                            Outpatient Follow Up Note    CHIEF COMPLAINT: Follow up COPD        HPI:   Patient has not had his screening CT chest- declined to have done today   Feels somewhat worse for 2 weeks   SOB cough and wheezes   Cough with white sputum  No hemoptysis   Uses his Neb 4 times/day   On O2 3LPM- feels better when he uses it     Past Medical History:   Diagnosis Date    Bronchitis chronic     Emphysema of lung ([address]     Influenza A 01/10/2018    Lung disease     copd    Pneumonia     12/2009       Past Surgical History:        Procedure Laterality Date    FRACTURE SURGERY      johnson in femur/hip on right    HERNIA REPAIR         Allergies:  has No Known Allergies. Social History:    TOBACCO:   reports that he quit smoking about 5 years ago. His smoking use included cigarettes. He has a 30.00 pack-year smoking history. He has never used smokeless tobacco.  ETOH:   reports no history of alcohol use.       Family History:       Problem Relation Age of Onset    Diabetes Sister     Cancer Sister     Cancer Sister     Cancer Brother     Asthma Daughter     Asthma Son     Diabetes Mother     Cancer Father        Current Medications:    Current Outpatient Medications:     VENTOLIN  (90 Base) MCG/ACT inhaler, INHALE 2 PUFFS EVERY 6 HOURS AS NEEDED FOR WHEEZING OR SHORTNESS OF BREATH, Disp: 18 g, Rfl: 5    torsemide (DEMADEX) 20 MG tablet, Take 2 tablets by mouth daily, Disp: 180 tablet, Rfl: 3    SYMBICORT 160-4.5 MCG/ACT AERO, INHALE 2 PUFFS INTO THE LUNGS 2 TIMES DAILY, Disp: 10.2 g, Rfl: 5    Roflumilast (DALIRESP) 500 MCG tablet, TAKE (1) TABLET DAILY, Disp: 30 tablet, Rfl: 5    ipratropium-albuterol (DUONEB) 0.5-2.5 (3) MG/3ML SOLN nebulizer solution, INHALE 3 MILLILIERS (1 VIAL) VIA NEBULIZATION ROUTE EVERY 6 HOURS AS NEEDED FOR SHORTNESS OF BREATH, Disp: 360 mL, Rfl: 6    lisinopril (PRINIVIL;ZESTRIL) 5 MG tablet, TAKE 1 TABLET BY MOUTH DAILY, Disp: 90 tablet, Rfl: 3    apixaban (ELIQUIS) 5 MG TABS tablet, TAKE 1 TABLET TWICE DAILY, Disp: 60 tablet, Rfl: 5    dilTIAZem (CARDIZEM CD) 180 MG extended release capsule, TAKE 1 CAPSULE BY MOUTH DAILY, Disp: 90 capsule, Rfl: 3    guaiFENesin (MUCINEX) 600 MG extended release tablet, Take 1 tablet by mouth 2 times daily as needed for Congestion, Disp: 60 tablet, Rfl: 6    nitroGLYCERIN (NITROSTAT) 0.4 MG SL tablet, Place 1 tablet under the tongue every 5 minutes as needed for Chest pain, Disp: 25 tablet, Rfl: 3    famotidine (PEPCID) 20 MG tablet, TAKE (1) TABLET TWICE A DAY BEFORE MEALS. (Patient not taking: Reported on 2/28/2022), Disp: 180 tablet, Rfl: 3      Objective:   PHYSICAL EXAM:    BP (!) 100/56   Pulse 96   Resp 24   Ht 6' (1.829 m)   Wt 187 lb 3.2 oz (84.9 kg)   SpO2 96% Comment: 3LPM  BMI 25.39 kg/m²   Gen: No distress. Ill appearing   Eyes: PERRL. No sclera icterus. No conjunctival injection. ENT: No discharge. Pharynx clear. Neck: Trachea midline. No obvious mass. Resp: No accessory muscle use. No crackles. Few wheezes. No rhonchi. No dullness on percussion. Good air entry. CV: Regular rate. Regular rhythm. No murmur or rub. 1+ LE edema. GI: Non-tender. Non-distended. No hernia. Skin: Warm and dry. No nodule on exposed extremities. Lymph: No cervical LAD. No supraclavicular LAD. M/S: No cyanosis. No joint deformity. No clubbing. Neuro: Awake. Alert. Moves all four extremities. Psych: Oriented x 3. No anxiety. DATA reviewed by me:   PFTs 10/26/2017 FVC 2.39(49%) FEV1 1.05(29%) FEV1/FVC 44% TLC 7.39(98%) DLCO 11.81 (37%) 6MW 640 F 2L exertion               LDCT chest 10/26/2017    Moderate emphysema. No suspicious pulmonary nodules or masses   Remote appearing compression deformities.    Lobular contour of the liver raising the question of underlying cirrhosis CXR 8/31/2019   The cardiomediastinal silhouette is borderline in size. Bibasilar atelectasis and scarring noted, superimposed on COPD. No acute infiltrate, pleural effusion or pneumothorax. Assessment:       · Severe COPD with AE   · Nocturnal hypoxia on 2.5 LPM   · Hypoxia on exertion   · 30 pack year smoking - quit smoking 2016  · Covid 19 1 test positive and 2 tests negative without symptoms 6/2020         Plan:      · Prednisone 20 mg po daily for 5 days   · Continue Symbicort BID and DuoNeb QID- refills today   · Continue Daliresp 500 mcg PO daily  · Continue O2 2-4 LPM on exertion. Advised to titrate O2 using her pulse oximeter- target O2 sat 90-92%. · O2 2. 5LPM at night   · Advised to schedule his LDCT screening for lung cancer- he is considering   · Patient is up to date with Covid, Pneumococcal vaccine and influenza vaccine   · Acapella and Mucinex   · Advised to continue with smoking cessation.    · Follow up in 6 months

## 2022-03-14 ENCOUNTER — HOSPITAL ENCOUNTER (OUTPATIENT)
Dept: NON INVASIVE DIAGNOSTICS | Age: 72
Discharge: HOME | End: 2022-03-14
Payer: MEDICARE

## 2022-03-14 DIAGNOSIS — I48.21 PERMANENT ATRIAL FIBRILLATION (HCC): ICD-10-CM

## 2022-03-14 LAB
LV EF: 55 %
LVEF MODALITY: NORMAL

## 2022-03-14 PROCEDURE — 93306 TTE W/DOPPLER COMPLETE: CPT

## 2022-03-15 RX ORDER — METOLAZONE 2.5 MG/1
2.5 TABLET ORAL
Qty: 10 TABLET | Refills: 1 | Status: SHIPPED | OUTPATIENT
Start: 2022-03-15 | End: 2022-03-16 | Stop reason: SDUPTHER

## 2022-03-16 DIAGNOSIS — I50.32 CHRONIC DIASTOLIC CONGESTIVE HEART FAILURE (HCC): ICD-10-CM

## 2022-03-16 DIAGNOSIS — Z79.899 MEDICATION MANAGEMENT: Primary | ICD-10-CM

## 2022-03-16 RX ORDER — METOLAZONE 2.5 MG/1
2.5 TABLET ORAL
Qty: 10 TABLET | Refills: 1 | Status: ON HOLD | OUTPATIENT
Start: 2022-03-16 | End: 2022-03-31 | Stop reason: HOSPADM

## 2022-03-16 NOTE — TELEPHONE ENCOUNTER
Spoke with Kristy Norris she VU to have patient lab work completed at [facility] within one week. She request pharmacy be changed to [facility] for metolazone. VU to continue toresemide 40 mg daily for patient. She states her  will call back this afternoon to schedule one month follow up with ANDREW.

## 2022-03-16 NOTE — TELEPHONE ENCOUNTER
----- Message from Luisa Zepeda MD sent at 3/15/2022  5:26 PM EDT -----  He needs get his blood work completed if he does not have lab order please have him do CBC CMP TSH  this coming week or this week. I have ordered a metolazone 2.5 mg once a week   Continue demadex as before  Move his appointment sooner in about a month.

## 2022-03-29 ENCOUNTER — APPOINTMENT (OUTPATIENT)
Dept: GENERAL RADIOLOGY | Age: 72
DRG: 641 | End: 2022-03-29
Payer: MEDICARE

## 2022-03-29 ENCOUNTER — APPOINTMENT (OUTPATIENT)
Dept: CT IMAGING | Age: 72
DRG: 641 | End: 2022-03-29
Payer: MEDICARE

## 2022-03-29 ENCOUNTER — HOSPITAL ENCOUNTER (INPATIENT)
Age: 72
LOS: 2 days | Discharge: HOME | DRG: 641 | End: 2022-03-31
Attending: EMERGENCY MEDICINE | Admitting: HOSPITALIST
Payer: MEDICARE

## 2022-03-29 DIAGNOSIS — I48.21 PERMANENT ATRIAL FIBRILLATION (HCC): ICD-10-CM

## 2022-03-29 DIAGNOSIS — E87.6 HYPOKALEMIA: Primary | ICD-10-CM

## 2022-03-29 DIAGNOSIS — R11.0 NAUSEA: ICD-10-CM

## 2022-03-29 DIAGNOSIS — E83.39 HYPOPHOSPHATEMIA: ICD-10-CM

## 2022-03-29 DIAGNOSIS — R10.9 ABDOMINAL DISCOMFORT: ICD-10-CM

## 2022-03-29 PROBLEM — Z79.899 LONG TERM CURRENT USE OF DIURETIC: Status: ACTIVE | Noted: 2022-03-29

## 2022-03-29 LAB
A/G RATIO: 1.6 (ref 1.1–2.2)
ALBUMIN SERPL-MCNC: 4.2 G/DL (ref 3.4–5)
ALBUMIN SERPL-MCNC: 4.6 G/DL (ref 3.4–5)
ALP BLD-CCNC: 94 U/L (ref 40–129)
ALT SERPL-CCNC: 20 U/L (ref 10–40)
ANION GAP SERPL CALCULATED.3IONS-SCNC: 11 MMOL/L (ref 3–16)
ANION GAP SERPL CALCULATED.3IONS-SCNC: 12 MMOL/L (ref 3–16)
AST SERPL-CCNC: 26 U/L (ref 15–37)
BASE EXCESS VENOUS: 19.5 MMOL/L (ref -3–3)
BASOPHILS ABSOLUTE: 0 K/UL (ref 0–0.2)
BASOPHILS RELATIVE PERCENT: 0.4 %
BILIRUB SERPL-MCNC: 0.9 MG/DL (ref 0–1)
BILIRUBIN URINE: NEGATIVE
BLOOD, URINE: ABNORMAL
BUN BLDV-MCNC: 15 MG/DL (ref 7–20)
BUN BLDV-MCNC: 19 MG/DL (ref 7–20)
CALCIUM SERPL-MCNC: 8.7 MG/DL (ref 8.3–10.6)
CALCIUM SERPL-MCNC: 9.2 MG/DL (ref 8.3–10.6)
CARBOXYHEMOGLOBIN: 1.1 % (ref 0–1.5)
CHLORIDE BLD-SCNC: 73 MMOL/L (ref 99–110)
CHLORIDE BLD-SCNC: 78 MMOL/L (ref 99–110)
CHLORIDE URINE RANDOM: <20 MMOL/L
CLARITY: CLEAR
CO2: 41 MMOL/L (ref 21–32)
CO2: 46 MMOL/L (ref 21–32)
COLOR: YELLOW
CREAT SERPL-MCNC: 0.9 MG/DL (ref 0.8–1.3)
CREAT SERPL-MCNC: 0.9 MG/DL (ref 0.8–1.3)
CREATININE URINE: 93.4 MG/DL (ref 39–259)
EKG ATRIAL RATE: 62 BPM
EKG DIAGNOSIS: NORMAL
EKG Q-T INTERVAL: 476 MS
EKG QRS DURATION: 104 MS
EKG QTC CALCULATION (BAZETT): 521 MS
EKG R AXIS: -17 DEGREES
EKG T AXIS: -22 DEGREES
EKG VENTRICULAR RATE: 72 BPM
EOSINOPHILS ABSOLUTE: 0.2 K/UL (ref 0–0.6)
EOSINOPHILS RELATIVE PERCENT: 1.5 %
EPITHELIAL CELLS, UA: NORMAL /HPF (ref 0–5)
GFR AFRICAN AMERICAN: >60
GFR AFRICAN AMERICAN: >60
GFR NON-AFRICAN AMERICAN: >60
GFR NON-AFRICAN AMERICAN: >60
GLUCOSE BLD-MCNC: 123 MG/DL (ref 70–99)
GLUCOSE BLD-MCNC: 128 MG/DL (ref 70–99)
GLUCOSE URINE: NEGATIVE MG/DL
HCO3 VENOUS: 47.5 MMOL/L (ref 23–29)
HCT VFR BLD CALC: 44.8 % (ref 40.5–52.5)
HEMOGLOBIN: 15.1 G/DL (ref 13.5–17.5)
KETONES, URINE: NEGATIVE MG/DL
LEUKOCYTE ESTERASE, URINE: NEGATIVE
LIPASE: 52 U/L (ref 13–60)
LYMPHOCYTES ABSOLUTE: 1.7 K/UL (ref 1–5.1)
LYMPHOCYTES RELATIVE PERCENT: 16.5 %
MAGNESIUM: 2.2 MG/DL (ref 1.8–2.4)
MCH RBC QN AUTO: 30.4 PG (ref 26–34)
MCHC RBC AUTO-ENTMCNC: 33.8 G/DL (ref 31–36)
MCV RBC AUTO: 90 FL (ref 80–100)
METHEMOGLOBIN VENOUS: 0.5 %
MICROSCOPIC EXAMINATION: YES
MONOCYTES ABSOLUTE: 1.1 K/UL (ref 0–1.3)
MONOCYTES RELATIVE PERCENT: 10.4 %
NEUTROPHILS ABSOLUTE: 7.4 K/UL (ref 1.7–7.7)
NEUTROPHILS RELATIVE PERCENT: 71.2 %
NITRITE, URINE: NEGATIVE
O2 SAT, VEN: 81 %
O2 THERAPY: ABNORMAL
PARATHYROID HORMONE INTACT: 91.2 PG/ML (ref 14–72)
PCO2, VEN: 66.6 MMHG (ref 40–50)
PDW BLD-RTO: 14.4 % (ref 12.4–15.4)
PH UA: 6 (ref 5–8)
PH VENOUS: 7.47 (ref 7.35–7.45)
PHOSPHORUS: 1.4 MG/DL (ref 2.5–4.9)
PHOSPHORUS: 2.2 MG/DL (ref 2.5–4.9)
PLATELET # BLD: 347 K/UL (ref 135–450)
PMV BLD AUTO: 8.7 FL (ref 5–10.5)
PO2, VEN: 43.4 MMHG (ref 25–40)
POTASSIUM SERPL-SCNC: 2.6 MMOL/L (ref 3.5–5.1)
POTASSIUM SERPL-SCNC: 3.5 MMOL/L (ref 3.5–5.1)
POTASSIUM, UR: 33.7 MMOL/L
PRO-BNP: 810 PG/ML (ref 0–124)
PROTEIN UA: ABNORMAL MG/DL
RBC # BLD: 4.98 M/UL (ref 4.2–5.9)
RBC UA: NORMAL /HPF (ref 0–4)
SODIUM BLD-SCNC: 130 MMOL/L (ref 136–145)
SODIUM BLD-SCNC: 131 MMOL/L (ref 136–145)
SODIUM URINE: <20 MMOL/L
SPECIFIC GRAVITY UA: 1.01 (ref 1–1.03)
SPECIMEN STATUS: NORMAL
TCO2 CALC VENOUS: 50 MMOL/L
TOTAL CK: 282 U/L (ref 39–308)
TOTAL PROTEIN: 7.5 G/DL (ref 6.4–8.2)
TROPONIN: <0.01 NG/ML
URINE REFLEX TO CULTURE: ABNORMAL
URINE TYPE: ABNORMAL
UROBILINOGEN, URINE: 0.2 E.U./DL
WBC # BLD: 10.4 K/UL (ref 4–11)
WBC UA: NORMAL /HPF (ref 0–5)

## 2022-03-29 PROCEDURE — 6370000000 HC RX 637 (ALT 250 FOR IP): Performed by: PHYSICIAN ASSISTANT

## 2022-03-29 PROCEDURE — 6370000000 HC RX 637 (ALT 250 FOR IP): Performed by: HOSPITALIST

## 2022-03-29 PROCEDURE — 2700000000 HC OXYGEN THERAPY PER DAY

## 2022-03-29 PROCEDURE — 96361 HYDRATE IV INFUSION ADD-ON: CPT

## 2022-03-29 PROCEDURE — 82436 ASSAY OF URINE CHLORIDE: CPT

## 2022-03-29 PROCEDURE — 84300 ASSAY OF URINE SODIUM: CPT

## 2022-03-29 PROCEDURE — 82803 BLOOD GASES ANY COMBINATION: CPT

## 2022-03-29 PROCEDURE — 93010 ELECTROCARDIOGRAM REPORT: CPT | Performed by: INTERNAL MEDICINE

## 2022-03-29 PROCEDURE — 85025 COMPLETE CBC W/AUTO DIFF WBC: CPT

## 2022-03-29 PROCEDURE — 6360000002 HC RX W HCPCS: Performed by: HOSPITALIST

## 2022-03-29 PROCEDURE — 94761 N-INVAS EAR/PLS OXIMETRY MLT: CPT

## 2022-03-29 PROCEDURE — 71045 X-RAY EXAM CHEST 1 VIEW: CPT

## 2022-03-29 PROCEDURE — 2580000003 HC RX 258: Performed by: HOSPITALIST

## 2022-03-29 PROCEDURE — 96366 THER/PROPH/DIAG IV INF ADDON: CPT

## 2022-03-29 PROCEDURE — 84100 ASSAY OF PHOSPHORUS: CPT

## 2022-03-29 PROCEDURE — 81001 URINALYSIS AUTO W/SCOPE: CPT

## 2022-03-29 PROCEDURE — 83690 ASSAY OF LIPASE: CPT

## 2022-03-29 PROCEDURE — 82306 VITAMIN D 25 HYDROXY: CPT

## 2022-03-29 PROCEDURE — 82550 ASSAY OF CK (CPK): CPT

## 2022-03-29 PROCEDURE — 1200000000 HC SEMI PRIVATE

## 2022-03-29 PROCEDURE — 84133 ASSAY OF URINE POTASSIUM: CPT

## 2022-03-29 PROCEDURE — 82570 ASSAY OF URINE CREATININE: CPT

## 2022-03-29 PROCEDURE — 82010 KETONE BODYS QUAN: CPT

## 2022-03-29 PROCEDURE — 36415 COLL VENOUS BLD VENIPUNCTURE: CPT

## 2022-03-29 PROCEDURE — 83935 ASSAY OF URINE OSMOLALITY: CPT

## 2022-03-29 PROCEDURE — 96375 TX/PRO/DX INJ NEW DRUG ADDON: CPT

## 2022-03-29 PROCEDURE — 83735 ASSAY OF MAGNESIUM: CPT

## 2022-03-29 PROCEDURE — 74177 CT ABD & PELVIS W/CONTRAST: CPT

## 2022-03-29 PROCEDURE — 83930 ASSAY OF BLOOD OSMOLALITY: CPT

## 2022-03-29 PROCEDURE — 6360000002 HC RX W HCPCS: Performed by: PHYSICIAN ASSISTANT

## 2022-03-29 PROCEDURE — 94640 AIRWAY INHALATION TREATMENT: CPT

## 2022-03-29 PROCEDURE — 6360000004 HC RX CONTRAST MEDICATION: Performed by: PHYSICIAN ASSISTANT

## 2022-03-29 PROCEDURE — 96365 THER/PROPH/DIAG IV INF INIT: CPT

## 2022-03-29 PROCEDURE — 2580000003 HC RX 258: Performed by: PHYSICIAN ASSISTANT

## 2022-03-29 PROCEDURE — 93005 ELECTROCARDIOGRAM TRACING: CPT | Performed by: PHYSICIAN ASSISTANT

## 2022-03-29 PROCEDURE — 83970 ASSAY OF PARATHORMONE: CPT

## 2022-03-29 PROCEDURE — 80053 COMPREHEN METABOLIC PANEL: CPT

## 2022-03-29 PROCEDURE — 83880 ASSAY OF NATRIURETIC PEPTIDE: CPT

## 2022-03-29 PROCEDURE — 84484 ASSAY OF TROPONIN QUANT: CPT

## 2022-03-29 PROCEDURE — 99284 EMERGENCY DEPT VISIT MOD MDM: CPT

## 2022-03-29 RX ORDER — POTASSIUM CHLORIDE 7.45 MG/ML
10 INJECTION INTRAVENOUS
Status: DISPENSED | OUTPATIENT
Start: 2022-03-29 | End: 2022-03-29

## 2022-03-29 RX ORDER — POTASSIUM CHLORIDE 20 MEQ/1
40 TABLET, EXTENDED RELEASE ORAL ONCE
Status: COMPLETED | OUTPATIENT
Start: 2022-03-29 | End: 2022-03-29

## 2022-03-29 RX ORDER — ROFLUMILAST 500 UG/1
500 TABLET ORAL DAILY
Status: DISCONTINUED | OUTPATIENT
Start: 2022-03-29 | End: 2022-03-31 | Stop reason: HOSPADM

## 2022-03-29 RX ORDER — 0.9 % SODIUM CHLORIDE 0.9 %
1000 INTRAVENOUS SOLUTION INTRAVENOUS ONCE
Status: COMPLETED | OUTPATIENT
Start: 2022-03-29 | End: 2022-03-29

## 2022-03-29 RX ORDER — SODIUM CHLORIDE 0.9 % (FLUSH) 0.9 %
10 SYRINGE (ML) INJECTION EVERY 12 HOURS SCHEDULED
Status: DISCONTINUED | OUTPATIENT
Start: 2022-03-29 | End: 2022-03-31 | Stop reason: HOSPADM

## 2022-03-29 RX ORDER — ACETAMINOPHEN 325 MG/1
650 TABLET ORAL EVERY 6 HOURS PRN
Status: DISCONTINUED | OUTPATIENT
Start: 2022-03-29 | End: 2022-03-31 | Stop reason: HOSPADM

## 2022-03-29 RX ORDER — TORSEMIDE 20 MG/1
40 TABLET ORAL DAILY
Status: DISCONTINUED | OUTPATIENT
Start: 2022-03-29 | End: 2022-03-31 | Stop reason: HOSPADM

## 2022-03-29 RX ORDER — ONDANSETRON 4 MG/1
4 TABLET, ORALLY DISINTEGRATING ORAL EVERY 8 HOURS PRN
Status: DISCONTINUED | OUTPATIENT
Start: 2022-03-29 | End: 2022-03-31 | Stop reason: HOSPADM

## 2022-03-29 RX ORDER — POTASSIUM CHLORIDE 7.45 MG/ML
10 INJECTION INTRAVENOUS PRN
Status: DISCONTINUED | OUTPATIENT
Start: 2022-03-29 | End: 2022-03-31 | Stop reason: HOSPADM

## 2022-03-29 RX ORDER — METOLAZONE 2.5 MG/1
2.5 TABLET ORAL
Status: DISCONTINUED | OUTPATIENT
Start: 2022-03-29 | End: 2022-03-30

## 2022-03-29 RX ORDER — ONDANSETRON 2 MG/ML
4 INJECTION INTRAMUSCULAR; INTRAVENOUS ONCE
Status: COMPLETED | OUTPATIENT
Start: 2022-03-29 | End: 2022-03-29

## 2022-03-29 RX ORDER — ACETAMINOPHEN 650 MG/1
650 SUPPOSITORY RECTAL EVERY 6 HOURS PRN
Status: DISCONTINUED | OUTPATIENT
Start: 2022-03-29 | End: 2022-03-31 | Stop reason: HOSPADM

## 2022-03-29 RX ORDER — SENNA PLUS 8.6 MG/1
1 TABLET ORAL DAILY PRN
Status: DISCONTINUED | OUTPATIENT
Start: 2022-03-29 | End: 2022-03-31 | Stop reason: HOSPADM

## 2022-03-29 RX ORDER — SODIUM CHLORIDE 9 MG/ML
INJECTION, SOLUTION INTRAVENOUS CONTINUOUS
Status: ACTIVE | OUTPATIENT
Start: 2022-03-29 | End: 2022-03-30

## 2022-03-29 RX ORDER — SODIUM CHLORIDE 0.9 % (FLUSH) 0.9 %
10 SYRINGE (ML) INJECTION PRN
Status: DISCONTINUED | OUTPATIENT
Start: 2022-03-29 | End: 2022-03-31 | Stop reason: HOSPADM

## 2022-03-29 RX ORDER — POTASSIUM CHLORIDE 20 MEQ/1
40 TABLET, EXTENDED RELEASE ORAL PRN
Status: DISCONTINUED | OUTPATIENT
Start: 2022-03-29 | End: 2022-03-31 | Stop reason: HOSPADM

## 2022-03-29 RX ORDER — SODIUM CHLORIDE 9 MG/ML
INJECTION, SOLUTION INTRAVENOUS PRN
Status: DISCONTINUED | OUTPATIENT
Start: 2022-03-29 | End: 2022-03-31 | Stop reason: HOSPADM

## 2022-03-29 RX ORDER — ONDANSETRON 2 MG/ML
4 INJECTION INTRAMUSCULAR; INTRAVENOUS EVERY 6 HOURS PRN
Status: DISCONTINUED | OUTPATIENT
Start: 2022-03-29 | End: 2022-03-30 | Stop reason: ALTCHOICE

## 2022-03-29 RX ORDER — IPRATROPIUM BROMIDE AND ALBUTEROL SULFATE 2.5; .5 MG/3ML; MG/3ML
1 SOLUTION RESPIRATORY (INHALATION)
Status: DISCONTINUED | OUTPATIENT
Start: 2022-03-30 | End: 2022-03-31 | Stop reason: HOSPADM

## 2022-03-29 RX ORDER — IPRATROPIUM BROMIDE AND ALBUTEROL SULFATE 2.5; .5 MG/3ML; MG/3ML
1 SOLUTION RESPIRATORY (INHALATION) 4 TIMES DAILY
Status: DISCONTINUED | OUTPATIENT
Start: 2022-03-29 | End: 2022-03-29

## 2022-03-29 RX ORDER — LISINOPRIL 5 MG/1
5 TABLET ORAL DAILY
Status: DISCONTINUED | OUTPATIENT
Start: 2022-03-30 | End: 2022-03-31 | Stop reason: HOSPADM

## 2022-03-29 RX ORDER — MAGNESIUM SULFATE IN WATER 40 MG/ML
2000 INJECTION, SOLUTION INTRAVENOUS PRN
Status: DISCONTINUED | OUTPATIENT
Start: 2022-03-29 | End: 2022-03-31 | Stop reason: HOSPADM

## 2022-03-29 RX ADMIN — POTASSIUM CHLORIDE 10 MEQ: 7.46 INJECTION, SOLUTION INTRAVENOUS at 18:05

## 2022-03-29 RX ADMIN — ONDANSETRON 4 MG: 2 INJECTION INTRAMUSCULAR; INTRAVENOUS at 15:11

## 2022-03-29 RX ADMIN — SODIUM CHLORIDE: 9 INJECTION, SOLUTION INTRAVENOUS at 18:51

## 2022-03-29 RX ADMIN — POTASSIUM CHLORIDE 10 MEQ: 7.46 INJECTION, SOLUTION INTRAVENOUS at 15:11

## 2022-03-29 RX ADMIN — IOPAMIDOL 75 ML: 755 INJECTION, SOLUTION INTRAVENOUS at 15:32

## 2022-03-29 RX ADMIN — IPRATROPIUM BROMIDE AND ALBUTEROL SULFATE 1 AMPULE: .5; 2.5 SOLUTION RESPIRATORY (INHALATION) at 23:29

## 2022-03-29 RX ADMIN — POTASSIUM CHLORIDE 40 MEQ: 20 TABLET, EXTENDED RELEASE ORAL at 16:54

## 2022-03-29 RX ADMIN — DIBASIC SODIUM PHOSPHATE, MONOBASIC POTASSIUM PHOSPHATE AND MONOBASIC SODIUM PHOSPHATE 2 TABLET: 852; 155; 130 TABLET ORAL at 21:00

## 2022-03-29 RX ADMIN — SODIUM CHLORIDE 1000 ML: 9 INJECTION, SOLUTION INTRAVENOUS at 15:09

## 2022-03-29 RX ADMIN — APIXABAN 5 MG: 5 TABLET, FILM COATED ORAL at 21:00

## 2022-03-29 RX ADMIN — POTASSIUM CHLORIDE 10 MEQ: 10 INJECTION, SOLUTION INTRAVENOUS at 19:44

## 2022-03-29 RX ADMIN — POTASSIUM CHLORIDE 10 MEQ: 7.46 INJECTION, SOLUTION INTRAVENOUS at 16:53

## 2022-03-29 RX ADMIN — IPRATROPIUM BROMIDE AND ALBUTEROL SULFATE 1 AMPULE: .5; 3 SOLUTION RESPIRATORY (INHALATION) at 20:23

## 2022-03-29 RX ADMIN — ROFLUMILAST 500 MCG: 500 TABLET ORAL at 21:00

## 2022-03-29 ASSESSMENT — ENCOUNTER SYMPTOMS
DIARRHEA: 0
COUGH: 0
CHEST TIGHTNESS: 1
NAUSEA: 1
BACK PAIN: 0
ABDOMINAL PAIN: 1
SHORTNESS OF BREATH: 1
VOMITING: 0
ABDOMINAL DISTENTION: 1
COLOR CHANGE: 0
EYES NEGATIVE: 1

## 2022-03-29 ASSESSMENT — PAIN SCALES - GENERAL
PAINLEVEL_OUTOF10: 0
PAINLEVEL_OUTOF10: 6

## 2022-03-29 ASSESSMENT — PAIN - FUNCTIONAL ASSESSMENT: PAIN_FUNCTIONAL_ASSESSMENT: 0-10

## 2022-03-29 ASSESSMENT — PAIN DESCRIPTION - FREQUENCY: FREQUENCY: CONTINUOUS

## 2022-03-29 ASSESSMENT — PAIN DESCRIPTION - PAIN TYPE: TYPE: ACUTE PAIN

## 2022-03-29 ASSESSMENT — PAIN DESCRIPTION - LOCATION: LOCATION: ABDOMEN

## 2022-03-29 NOTE — ED PROVIDER NOTES
[address] [facility]  ED  EMERGENCY DEPARTMENT ENCOUNTER        Pt Name: Kristyn Zee  MRN: [id number]  [place] 1950  Date of evaluation: 3/29/2022  Provider: Josh Pfeiffer PA-C  PCP: Chanelle Hdz  ED Attending: Luba Morales MD      This patient was seen by the attending provider      History provided by the patient    CHIEF COMPLAINT:     Chief Complaint   Patient presents with    Abnormal Lab     patient had blood work drawn yesterday, states \"I think was K+ is low\".  Abdominal Pain     patient with abdominal pain for few days, +nausea, -emesis, -diarrhea       HISTORY OF PRESENT ILLNESS:      Kristyn Zee is a 67 y.o. male who arrives to the ED by private vehicle. Patient states he has not been feeling well for the last few days. He reports abdominal discomfort/bloating and feeling nauseous. No vomiting or diarrhea. No fevers or chills. He reports some chronic shortness of breath and chest tightness from emphysema that is very slightly worse than normal.  Family took him to the doctor yesterday. He had some blood work done. He received a call today that some of his lab values were low including his potassium. He was told to come to the ED. No identifiable exacerbating or alleviating factors to symptoms. Nursing Notes were reviewed     REVIEW OF SYSTEMS:     Review of Systems   Constitutional: Negative for appetite change, chills and fever. HENT: Negative. Eyes: Negative. Respiratory: Positive for chest tightness and shortness of breath. Negative for cough. Cardiovascular: Negative for chest pain. Gastrointestinal: Positive for abdominal distention, abdominal pain and nausea. Negative for diarrhea and vomiting. Genitourinary: Negative. Musculoskeletal: Negative for back pain, gait problem and neck pain. Skin: Negative for color change. Neurological: Negative for dizziness, weakness and headaches. All other systems reviewed and are negative.       Except as noted above in the ROS, all other systems were reviewed and negative. PAST MEDICAL HISTORY:     Past Medical History:   Diagnosis Date    Bronchitis chronic     Emphysema of lung ([address]     Influenza A 01/10/2018    Lung disease     copd    Pneumonia     12/2009         SURGICAL HISTORY:      Past Surgical History:   Procedure Laterality Date    FRACTURE SURGERY      johnson in femur/hip on right    HERNIA REPAIR           CURRENT MEDICATIONS:       Previous Medications    APIXABAN (ELIQUIS) 5 MG TABS TABLET    TAKE 1 TABLET TWICE DAILY    DILTIAZEM (CARDIZEM CD) 180 MG EXTENDED RELEASE CAPSULE    TAKE 1 CAPSULE BY MOUTH DAILY    FAMOTIDINE (PEPCID) 20 MG TABLET    TAKE (1) TABLET TWICE A DAY BEFORE MEALS. GUAIFENESIN (MUCINEX) 600 MG EXTENDED RELEASE TABLET    Take 1 tablet by mouth 2 times daily as needed for Congestion    IPRATROPIUM-ALBUTEROL (DUONEB) 0.5-2.5 (3) MG/3ML SOLN NEBULIZER SOLUTION    INHALE 3 MILLILIERS (1 VIAL) VIA NEBULIZATION ROUTE EVERY 6 HOURS AS NEEDED FOR SHORTNESS OF BREATH    LISINOPRIL (PRINIVIL;ZESTRIL) 5 MG TABLET    TAKE 1 TABLET BY MOUTH DAILY    METOLAZONE (ZAROXOLYN) 2.5 MG TABLET    Take 1 tablet by mouth every 7 days    NITROGLYCERIN (NITROSTAT) 0.4 MG SL TABLET    Place 1 tablet under the tongue every 5 minutes as needed for Chest pain    ROFLUMILAST (DALIRESP) 500 MCG TABLET    TAKE (1) TABLET DAILY    SYMBICORT 160-4.5 MCG/ACT AERO    INHALE 2 PUFFS INTO THE LUNGS 2 TIMES DAILY    TORSEMIDE (DEMADEX) 20 MG TABLET    Take 2 tablets by mouth daily    VENTOLIN  (90 BASE) MCG/ACT INHALER    INHALE 2 PUFFS EVERY 6 HOURS AS NEEDED FOR WHEEZING OR SHORTNESS OF BREATH         ALLERGIES:    Patient has no known allergies.     FAMILY HISTORY:       Family History   Problem Relation Age of Onset    Diabetes Sister     Cancer Sister     Cancer Sister     Cancer Brother     Asthma Daughter     Asthma Son     Diabetes Mother     Cancer Father           SOCIAL HISTORY:       Social History     Socioeconomic History    Marital status:      Spouse name: Not on file    Number of children: Not on file    Years of education: Not on file    Highest education level: Not on file   Occupational History    Not on file   Tobacco Use    Smoking status: Former Smoker     Packs/day: 1.00     Years: 30.00     Pack years: 30.00     Types: Cigarettes     Quit date: 2016     Years since quittin.7    Smokeless tobacco: Never Used    Tobacco comment: Just quit about 3 months ago 16   Vaping Use    Vaping Use: Never used   Substance and Sexual Activity    Alcohol use: No    Drug use: No    Sexual activity: Not Currently   Other Topics Concern    Not on file   Social History Narrative    Not on file     Social Determinants of Health     Financial Resource Strain:     Difficulty of Paying Living Expenses: Not on file   Food Insecurity:     Worried About Running Out of Food in the Last Year: Not on file    Fabiola of Food in the Last Year: Not on file   Transportation Needs:     Lack of Transportation (Medical): Not on file    Lack of Transportation (Non-Medical):  Not on file   Physical Activity:     Days of Exercise per Week: Not on file    Minutes of Exercise per Session: Not on file   Stress:     Feeling of Stress : Not on file   Social Connections:     Frequency of Communication with Friends and Family: Not on file    Frequency of Social Gatherings with Friends and Family: Not on file    Attends Taoism Services: Not on file    Active Member of Clubs or Organizations: Not on file    Attends Club or Organization Meetings: Not on file    Marital Status: Not on file   Intimate Partner Violence:     Fear of Current or Ex-Partner: Not on file    Emotionally Abused: Not on file    Physically Abused: Not on file    Sexually Abused: Not on file   Housing Stability:     Unable to Pay for Housing in the Last Year: Not on file    Number of Places Lived in the Last Year: Not on file    Unstable Housing in the Last Year: Not on file       SCREENINGS:    Laverne Coma Scale  Eye Opening: Spontaneous  Best Verbal Response: Oriented  Best Motor Response: Obeys commands  Laverne Coma Scale Score: 15        PHYSICAL EXAM:       ED Triage Vitals [03/29/22 1323]   BP Temp Temp Source Pulse Resp SpO2 Height Weight   121/70 98.2 °F (36.8 °C) Oral 82 20 90 % -- 176 lb (79.8 kg)       Physical Exam    CONSTITUTIONAL: Awake and alert. Cooperative. Well-developed. Well-nourished. Non-toxic. No acute distress. HENT: Normocephalic. Atraumatic. External ears normal, without discharge. No nasal discharge. Oropharynx clear. Mucous membranes moist.  EYES: Conjunctiva non-injected. No scleral icterus. PERRL. EOM's grossly intact. NECK: Supple. Normal ROM. CARDIOVASCULAR: RRR. No Murmer. Intact distal pulses. PULMONARY/CHEST WALL: Effort normal. No tachypnea. Lungs clear to ausculation. ABDOMEN: Hyperactive BS. Soft. Mild distention. No tenderness to palpate. No guarding. /ANORECTAL: Not assessed  MUSKULOSKELETAL: Normal ROM. No acute deformities. No edema. No tenderness to palpate. SKIN: Warm and dry. No rash. NEUROLOGICAL: Alert and oriented x 3. GCS 15. CN II-XII grossly intact. Strength is 5/5 in all extremities and sensation is intact. Normal gait.    PSYCHIATRIC: Normal affect        DIAGNOSTICRESULTS:     LABS:    Results for orders placed or performed during the hospital encounter of 03/29/22   CBC with Auto Differential   Result Value Ref Range    WBC 10.4 4.0 - 11.0 K/uL    RBC 4.98 4.20 - 5.90 M/uL    Hemoglobin 15.1 13.5 - 17.5 g/dL    Hematocrit 44.8 40.5 - 52.5 %    MCV 90.0 80.0 - 100.0 fL    MCH 30.4 26.0 - 34.0 pg    MCHC 33.8 31.0 - 36.0 g/dL    RDW 14.4 12.4 - 15.4 %    Platelets 227 260 - 604 K/uL    MPV 8.7 5.0 - 10.5 fL    Neutrophils % 71.2 %    Lymphocytes % 16.5 %    Monocytes % 10.4 %    Eosinophils % 1.5 %    Basophils % 0.4 % Neutrophils Absolute 7.4 1.7 - 7.7 K/uL    Lymphocytes Absolute 1.7 1.0 - 5.1 K/uL    Monocytes Absolute 1.1 0.0 - 1.3 K/uL    Eosinophils Absolute 0.2 0.0 - 0.6 K/uL    Basophils Absolute 0.0 0.0 - 0.2 K/uL   Comprehensive Metabolic Panel   Result Value Ref Range    Sodium 131 (L) 136 - 145 mmol/L    Potassium 2.6 (LL) 3.5 - 5.1 mmol/L    Chloride 73 (L) 99 - 110 mmol/L    CO2 46 (HH) 21 - 32 mmol/L    Anion Gap 12 3 - 16    Glucose 128 (H) 70 - 99 mg/dL    BUN 19 7 - 20 mg/dL    CREATININE 0.9 0.8 - 1.3 mg/dL    GFR Non-African American >60 >60    GFR African American >60 >60    Calcium 9.2 8.3 - 10.6 mg/dL    Total Protein 7.5 6.4 - 8.2 g/dL    Albumin 4.6 3.4 - 5.0 g/dL    Albumin/Globulin Ratio 1.6 1.1 - 2.2    Total Bilirubin 0.9 0.0 - 1.0 mg/dL    Alkaline Phosphatase 94 40 - 129 U/L    ALT 20 10 - 40 U/L    AST 26 15 - 37 U/L   Magnesium   Result Value Ref Range    Magnesium 2.20 1.80 - 2.40 mg/dL   Phosphorus   Result Value Ref Range    Phosphorus 1.4 (L) 2.5 - 4.9 mg/dL   Lipase   Result Value Ref Range    Lipase 52.0 13.0 - 60.0 U/L   Troponin   Result Value Ref Range    Troponin <0.01 <0.01 ng/mL   Sample possible blood bank testing   Result Value Ref Range    Specimen Status ADRIÁN    Urinalysis with Reflex to Culture   Result Value Ref Range    Color, UA Yellow Straw/Yellow    Clarity, UA Clear Clear    Glucose, Ur Negative Negative mg/dL    Bilirubin Urine Negative Negative    Ketones, Urine Negative Negative mg/dL    Specific Gravity, UA 1.010 1.005 - 1.030    Blood, Urine TRACE-INTACT (A) Negative    pH, UA 6.0 5.0 - 8.0    Protein, UA TRACE (A) Negative mg/dL    Urobilinogen, Urine 0.2 <2.0 E.U./dL    Nitrite, Urine Negative Negative    Leukocyte Esterase, Urine Negative Negative    Microscopic Examination YES     Urine Type NotGiven     Urine Reflex to Culture Not Indicated    Microscopic Urinalysis   Result Value Ref Range    WBC, UA 0-2 0 - 5 /HPF    RBC, UA 0-2 0 - 4 /HPF    Epithelial Cells, UA 0-1 0 - 5 /HPF   EKG 12 Lead   Result Value Ref Range    Ventricular Rate 72 BPM    Atrial Rate 62 BPM    QRS Duration 104 ms    Q-T Interval 476 ms    QTc Calculation (Bazett) 521 ms    R Axis -17 degrees    T Axis -22 degrees    Diagnosis       **Poor data quality, interpretation may be adversely affectedAtrial fibrillationIncomplete right bundle branch blockNonspecific ST and T wave abnormalityProlonged QTAbnormal ECGWhen compared with ECG of 02-MAY-2019 11:21,T wave inversion now evident in Inferior leadsQT has lengthenedConfirmed by Manda Jimenez ([id number]) on 3/29/2022 4:24:43 PM         RADIOLOGY:  All x-ray studies areviewed/reviewed by me. Formal interpretations per the radiologist are as follows:      CT ABDOMEN PELVIS W IV CONTRAST Additional Contrast? None    Result Date: 3/29/2022  EXAMINATION: CT OF THE ABDOMEN AND PELVIS WITH CONTRAST 3/29/2022 3:17 pm TECHNIQUE: CT of the abdomen and pelvis was performed with the administration of intravenous contrast. Multiplanar reformatted images are provided for review. Dose modulation, iterative reconstruction, and/or weight based adjustment of the mA/kV was utilized to reduce the radiation dose to as low as reasonably achievable. COMPARISON: CT abdomen pelvis dated 09/06/2019 HISTORY: ORDERING SYSTEM PROVIDED HISTORY: abd pain/bloating, nausea TECHNOLOGIST PROVIDED HISTORY: Reason for exam:->abd pain/bloating, nausea Additional Contrast?->None Decision Support Exception - unselect if not a suspected or confirmed emergency medical condition->Emergency Medical Condition (MA) Reason for Exam: abominal pain with bloating and nausea x several days. Relevant Medical/Surgical History: emphysema, couple of hernia repairs FINDINGS: Lower Chest:  Visualized portion of the lower chest demonstrates no acute abnormality. Organs:  Liver enhances normally without evidence of intrahepatic biliary ductal dilatation. The gallbladder is unremarkable.  The spleen, pancreas and adrenal glands are unremarkable. The kidneys enhance symmetrically without evidence of hydronephrosis. GI/Bowel: There is no evidence of bowel obstruction. No evidence of abnormal bowel wall thickening or distension. The appendix is visualized and is unremarkable. No evidence of acute appendicitis. Stomach and duodenal sweep demonstrate no acute abnormality. Pelvis:  Bladder is unremarkable in appearance. No acute abnormality of the prostate. Peritoneum/Retroperitoneum: No evidence of ascites or free air. No evidence of lymphadenopathy. Aorta is normal in caliber. Bones/Soft Tissues: Patient is status post right hip replacement. Age related degenerative changes of the visualized osseous structures without focal destructive lesion. Visualized soft tissues are unremarkable. No acute pathology in the abdomen and pelvis. RECOMMENDATIONS: Unavailable     XR CHEST PORTABLE    Result Date: 3/29/2022  EXAMINATION: ONE XRAY VIEW OF THE CHEST 3/29/2022 3:03 pm COMPARISON: 08/31/2019 HISTORY: ORDERING SYSTEM PROVIDED HISTORY: SOB, abd distension TECHNOLOGIST PROVIDED HISTORY: Reason for exam:->SOB, abd distension Reason for Exam: sob FINDINGS: The lungs are without acute focal process. There is no effusion or pneumothorax. The cardiomediastinal silhouette is stable. The osseous structures are stable. No acute process. EKG: The Ekg interpreted by me in the absence of a cardiologist shows. atrial fibrillation with a rate of 72    See also interpretation by MD Yaa      PROCEDURES:   N/A    CRITICAL CARE TIME:       Due to the immediate potential for life-threatening deterioration due to significant electrolyte deficiencies requiring oral and IV replacement and ultimately hospitalization, I spent 32 minutes providing critical care. This time is excluding time spent performing procedures.       CONSULTS:  IP CONSULT TO HOSPITALIST  IP CONSULT TO NEPHROLOGY      EMERGENCY DEPARTMENT COURSE and DIFFERENTIAL DIAGNOSIS/MDM:   Vitals:    Vitals:    03/29/22 1550 03/29/22 1605 03/29/22 1635 03/29/22 1706   BP: 101/86 (!) 102/59 127/81 115/72   Pulse: 68 73 80 73   Resp: 13 11 15 14   Temp:       TempSrc:       SpO2: 97% 95%  95%   Weight:           Patient was given the following medications:  Medications   potassium chloride 10 mEq/100 mL IVPB (Peripheral Line) (10 mEq IntraVENous New Bag 3/29/22 1511)   potassium chloride (KLOR-CON M) extended release tablet 40 mEq (has no administration in time range)   ondansetron (ZOFRAN) injection 4 mg (4 mg IntraVENous Given 3/29/22 1511)   0.9 % sodium chloride bolus (1,000 mLs IntraVENous New Bag 3/29/22 1509)   iopamidol (ISOVUE-370) 76 % injection 75 mL (75 mLs IntraVENous Given 3/29/22 1532)         Patient was evaluated by both myself and Zuleika Montes MD.   Old records were reviewed. Patient arrives to the ED after he saw his PCP yesterday, had outpatient labs and was told he had electrolyte deficiencies. Generally he is feeling ill, has some abdominal discomfort, nausea and mild chest discomfort as well. Work-up initiated given his age and complaints. CBC white count 10.4, H&H 15.1 and 44.8  CMP sodium 131, K2.6, chloride 73, bicarb is up 46, anion gap 12, glucose 128, BUN and creatinine are normal at 19.9. Normal LFTs. Magnesium normal at 2.2 but phosphorus markedly low at 1.4. Lipase 52  Troponin negative  Portable chest x-ray negative  CT abdomen and pelvis with IV contrast shows no acute pathology in the abdomen pelvis. Patient was ordered KCl 40 mEq IV over 4 hours and 40 mEq orally. He received normal saline through his IV while here as well as a dose of Zofran 4 mg IV for nausea. He has been hemodynamically stable throughout his stay. Given his age and the way he is feeling along with these marked electrolyte deficiencies, I will consult the hospitalist and request admission. I spoke with Dr. Noemy Reid.  We thoroughly discussed the history, physical exam, laboratory and imaging studies, as well as, emergency department course. Based upon that discussion, we've decided to admit Lucia Colón for further observation and evaluation of Bang Jean Baptiste's significant electrolyte deficiencies. As I have deemed necessary from their history, physical and studies, I have considered and evaluated Lucia Colón for the following diagnoses:  ACUTE APPENDICITIS, CHOLECYSTITIS, DIVERTICULITIS, PANCREATITIS, PYELONEPHRITIS, BOWEL OBSTRUCTION, INCARCERATED HERNIA, ISCHEMIC GUT, GI BLEED, PERFORATED BOWEL or ULCER. FINAL IMPRESSION:      1. Hypokalemia    2. Hypophosphatemia    3. Abdominal discomfort    4.  Nausea          DISPOSITION/PLAN:   DISPOSITION     ADMIT                 (Please note thatportions of this note were completed with a voice recognition program.  Efforts were made to edit the dictations, but occasionally words are mis-transcribed.)    Lori Menendez PA-C (electronicallysigned)              JD Gonzalez  03/29/22 0147

## 2022-03-29 NOTE — ED PROVIDER NOTES
I independently performed a history and physical on Yasmin Dickinson. All diagnostic, treatment, and disposition decisions were made by myself in conjunction with the advanced practice provider. I have participated in the medical decision making and directed the treatment plan and disposition of the patient. For [facility], please see the advanced practice provider's documentation. CHIEF COMPLAINT  Chief Complaint   Patient presents with    Abnormal Lab     patient had blood work drawn yesterday, states \"I think was K+ is low\".  Abdominal Pain     patient with abdominal pain for few days, +nausea, -emesis, -diarrhea       Briefly, Yasmin Dickinson is a 67 y.o. male  who presents to the ED complaining of hypertension, hyperlipidemia, CHF and atrial fibrillation here today with some abdominal discomfort, nausea and generalized weakness    FOCUSED PHYSICAL EXAMINATION  BP (!) 102/59   Pulse 73   Temp 98.2 °F (36.8 °C) (Oral)   Resp (!) 7   Wt 176 lb (79.8 kg)   SpO2 95%   BMI 23.87 kg/m²      Focused physical examination:  General appearance:  Cooperative. No acute distress. Skin:  Warm. Dry. Eye:  Extraocular movements intact. Ears, nose, mouth and throat:  Oral mucosa moist,  Neck:  Trachea midline. Heart:  Regular rate and rhythm  Perfusion:  intact  Respiratory:  Lungs clear to auscultation bilaterally. Respirations nonlabored. Abdominal:   Abdominal distention with slight generalized tenderness but no rebound or guarding  Neurological:  Alert and oriented x 3. Moves all extremities spontaneously  Musculoskeletal:   Normal ROM, no deformities          Psychiatric:  Normal mood      EKG: Atrial fibrillation rate of 72 bpm with incomplete right bundle branch block. Poor baseline artifact. Nonspecific lateral ST changes. No ST elevation. Prolonged QTC at 4 521 ms.     MDM: Patient presents emergency department today with generalized weakness and some nausea and abdominal discomfort. Found to have significant hypokalemia which is the likely underlying etiology. Presumably secondary to potassium wasting diuretics. Given oral and IV potassium repletion here and will be admitted to the [facility] time was 30 minutes, excluding separately reportable procedures. There was a high probability of clinically significant/life threatening deterioration in the patient's condition which required my urgent intervention. This time was spent reviewing the patient chart, interpreting diagnostic/laboratory data, administration of IV potassium for symptomatic hypokalemia      During the patient's ED course, the patient was given:  Medications   potassium chloride 10 mEq/100 mL IVPB (Peripheral Line) (10 mEq IntraVENous New Bag 3/29/22 1511)   potassium chloride (KLOR-CON M) extended release tablet 40 mEq (has no administration in time range)   ondansetron (ZOFRAN) injection 4 mg (4 mg IntraVENous Given 3/29/22 1511)   0.9 % sodium chloride bolus (1,000 mLs IntraVENous New Bag 3/29/22 1509)   iopamidol (ISOVUE-370) 76 % injection 75 mL (75 mLs IntraVENous Given 3/29/22 1532)        CLINICAL IMPRESSION  1. Hypokalemia    2. Hypophosphatemia    3. Abdominal discomfort    4. Nausea        DISPOSITION  Admission      This chart was created using Dragon dictation software. Efforts were made by me to ensure accuracy, however some errors may be present due to limitations of this technology.             Tanya López MD  03/29/22 3830

## 2022-03-29 NOTE — H&P
HOSPITALISTS HISTORY AND PHYSICAL    3/29/2022 7:49 PM    Patient Information:  Beth Rubio is a 67 y.o. male [id number]  PCP:  Jaime Zavala (Tel: [phone] )    Chief complaint:    Chief Complaint   Patient presents with    Abnormal Lab     patient had blood work drawn yesterday, states \"I think was K+ is low\".  Abdominal Pain     patient with abdominal pain for few days, +nausea, -emesis, -diarrhea        History of Present Illness:  Ovidio Pryor is a 67 y.o. male who presented to the ED to be evaluated for a 2-day history of abdominal pain and nausea, not associated with diarrhea or vomiting. He has chronic hypoxic respiratory failure due to centrilobular emphysema, and he states that his respiratory status is slightly worse than his usual.  Patient also reports that his cardiologist, Dr. Gilford Fines, recently added a once weekly dosage of Zaroxolyn to his current Demadex diuretic regimen. Patient had a PCP appointment yesterday, during which labs were collected. He received a phone call today from his provider reporting that several of his electrolytes were abnormal and warranted an ED visit. Upon arrival to the ED EKG was obtained revealing A. fib with incomplete RBBB, and prolonged QTC. CXR and CT of abdomen and pelvis negative for acute findings. Notable labs include: Hyponatremia 131, hypokalemia 2.6, hypochloremia 73, hypophosphatemia 1.4, and proBNP 810. Patient received NS bolus as well as oral and IV potassium supplementation per ED provider, prior to request for admission    History obtained from patient and review of Epic chart reveals pt recently underwent ECHO testing with the following results:  Left ventricular systolic function is normal with ejection fraction   estimated at 55%. No regional wall motion abnormalities. There is mild concentric left ventricular hypertrophy. Elevated left ventricular filling pressure. Mild bi-atrial enlargement. Mild mitral regurgitation. Mild tricuspid regurgitation. Systolic pulmonary artery pressure (SPAP) is normal estimated at 38 mmHg   (Right atrial pressure of 3 mmHg). No significant change from exam done 1/11/2018. REVIEW OF SYSTEMS:   Constitutional: Negative for fever,chills; positive generalized weakness  ENT: Negative for headache, rhinorrhea, and sore throat. Respiratory: Positive acute on chronic dyspnea with wheezing, and cough  Cardiovascular: Negative for chest pain, palpitations, peripheral edema, orthopnea or PND  Gastrointestinal: Positive abdominal pain and nausea without V/D; no hematemesis, hematochezia, or melena; no anorexia  Genitourinary: Negative for dysuria, retention; no incontinence  Hematologic/Lymphatic: Negative for bleeding tendency/excessive bruising  Musculoskeletal: Positive diffuse myalgias and arthalgias; unable to ambulate without difficulty  Neurologic: Negative for LOC, seizure activity, paresthesias, dysarthria, vertigo, and gait disturbance  Skin: Negative for itching,rash, decubitus  Psychiatric: Negative for depression,anxiety, and agitation; no hallucinations; denies SI/HI  Endocrine: Negative for polyuria/polydipsia/polyphagia; no heat/cold intolerance    Past Medical History:   has a past medical history of Bronchitis chronic, Emphysema of lung ([address] Influenza A, Lung disease, and Pneumonia. Past Surgical History:   has a past surgical history that includes fracture surgery and hernia repair. Medications:  No current facility-administered medications on file prior to encounter.      Current Outpatient Medications on File Prior to Encounter   Medication Sig Dispense Refill    metOLazone (ZAROXOLYN) 2.5 MG tablet Take 1 tablet by mouth every 7 days 10 tablet 1    ipratropium-albuterol (DUONEB) 0.5-2.5 (3) MG/3ML SOLN nebulizer solution INHALE 3 MILLILIERS (1 VIAL) VIA NEBULIZATION ROUTE EVERY 6 HOURS AS NEEDED FOR SHORTNESS OF BREATH 360 mL 6    VENTOLIN  (90 Base) MCG/ACT inhaler INHALE 2 PUFFS EVERY 6 HOURS AS NEEDED FOR WHEEZING OR SHORTNESS OF BREATH 18 g 5    torsemide (DEMADEX) 20 MG tablet Take 2 tablets by mouth daily 180 tablet 3    SYMBICORT 160-4.5 MCG/ACT AERO INHALE 2 PUFFS INTO THE LUNGS 2 TIMES DAILY 10.2 g 5    Roflumilast (DALIRESP) 500 MCG tablet TAKE (1) TABLET DAILY 30 tablet 5    famotidine (PEPCID) 20 MG tablet TAKE (1) TABLET TWICE A DAY BEFORE MEALS. (Patient not taking: Reported on 2/28/2022) 180 tablet 3    lisinopril (PRINIVIL;ZESTRIL) 5 MG tablet TAKE 1 TABLET BY MOUTH DAILY 90 tablet 3    apixaban (ELIQUIS) 5 MG TABS tablet TAKE 1 TABLET TWICE DAILY 60 tablet 5    dilTIAZem (CARDIZEM CD) 180 MG extended release capsule TAKE 1 CAPSULE BY MOUTH DAILY 90 capsule 3    guaiFENesin (MUCINEX) 600 MG extended release tablet Take 1 tablet by mouth 2 times daily as needed for Congestion 60 tablet 6    nitroGLYCERIN (NITROSTAT) 0.4 MG SL tablet Place 1 tablet under the tongue every 5 minutes as needed for Chest pain 25 tablet 3       Allergies:  No Known Allergies     Social History:   reports that he quit smoking about 5 years ago. His smoking use included cigarettes. He has a 30.00 pack-year smoking history. He has never used smokeless tobacco. He reports that he does not drink alcohol and does not use drugs. Family History:  family history includes Asthma in his daughter and son; Cancer in his brother, father, sister, and sister; Diabetes in his mother and sister.      Physical Exam:  /71   Pulse 73   Temp 97.9 °F (36.6 °C) (Oral)   Resp 16   Ht 6' (1.829 m)   Wt 189 lb 6 oz (85.9 kg)   SpO2 95%   BMI 25.68 kg/m²     General appearance: Pleasant elderly male resting comfortably in bed with mild tachypnea  Eyes: Sclera clear without conjunctival injection; PERRLA; EOMI  ENT: Mucous membranes moist without thrush; normal dentition  Neck: ED provider    Problem List:  Principal Problem:    Hypophosphatemia  Active Problems:    Acute hyponatremia    Acute hypokalemia    Essential hypertension    Acute on chronic respiratory failure with hypoxemia [facility])    Former smoker    Atrial fibrillation ([facility] [address]    Long term current use of diuretic  Resolved Problems:    * No resolved hospital problems. *        Consults:  IP CONSULT TO HOSPITALIST  IP CONSULT TO NEPHROLOGY  IP CONSULT TO CARDIOLOGY      Assessment/Plan:     Acute on chronic respiratory failure with hypoxemia  -Continuous pulse oximetry monitoring initiated with continuous supplemental O2 per NC  -Continue home maintenance MDIs including Daliresp and Symbicort   -Encourage aggressive pulmonary toilet including incentive spirometry every 4H while awake  -DuoNebs scheduled 4 times daily during stay  -CXR without evidence of pneumonia or pulmonary edema  -Hypophosphatemia is a known contributor of respiratory failure, with repletion initiated orally overnight    Profound hypophosphatemia with hyponatremia/hypokalemia  -Etiology unclear at this time  -May be secondary to long-term effects of diuretic use, exacerbated by addition of Zaroxolyn recently; diuretics on hold overnight  -Hypokalemia can exacerbate hypophosphatemia; IV KCl and K. Dur ordered for repletion  -Respiratory alkalosis due to hyperventilation may be a precipitating factor  -PTH and vitamin D levels ordered to complete work-up  -K-Phos 500 mg 3 times daily initiated overnight to assist with phosphorus repletion  -Consult placed to nephrology for further management of patient's electrolyte disturbances    Chronic A. Fib  -Admit to medical floor for continuous telemetry and pulse oximetry monitoring  -Patient rate controlled on Cardizem therefore continue current dosage  -Diuretics on hold overnight pending a.m.  NEPHRO consult  -Continue twice daily Eliquis anticoagulation    DVT prophylaxis-Eliquis 5 mg twice daily  Code status-full code  Diet-cardiac JENSEN  IV access-PIV established in ED      Admit as inpatient. I anticipate hospitalization spanning more than two midnights for investigation and treatment of the above medically necessary diagnoses. Comment: Please note this report has been produced using speech recognition software and may contain errors related to that system including errors in grammar, punctuation, and spelling, as well as words and phrases that may be inappropriate. If there are any questions or concerns please feel free to contact the dictating provider for clarification.          Demi Padron MD    3/29/2022 7:49 PM

## 2022-03-29 NOTE — CONSULTS
Consult placed    [facility] butt  Date:3/29/2022,  Time:7:17 PM        Electronically signed by Zora Wang on 3/29/2022 at 7:17 PM

## 2022-03-30 LAB
A/G RATIO: 1.5 (ref 1.1–2.2)
ALBUMIN SERPL-MCNC: 3.7 G/DL (ref 3.4–5)
ALBUMIN SERPL-MCNC: 3.8 G/DL (ref 3.4–5)
ALBUMIN SERPL-MCNC: 4 G/DL (ref 3.4–5)
ALBUMIN SERPL-MCNC: 4.2 G/DL (ref 3.4–5)
ALP BLD-CCNC: 80 U/L (ref 40–129)
ALT SERPL-CCNC: 17 U/L (ref 10–40)
ANION GAP SERPL CALCULATED.3IONS-SCNC: 10 MMOL/L (ref 3–16)
ANION GAP SERPL CALCULATED.3IONS-SCNC: 10 MMOL/L (ref 3–16)
ANION GAP SERPL CALCULATED.3IONS-SCNC: 7 MMOL/L (ref 3–16)
ANION GAP SERPL CALCULATED.3IONS-SCNC: 9 MMOL/L (ref 3–16)
AST SERPL-CCNC: 20 U/L (ref 15–37)
BASOPHILS ABSOLUTE: 0.1 K/UL (ref 0–0.2)
BASOPHILS RELATIVE PERCENT: 0.5 %
BETA-HYDROXYBUTYRATE: 0.09 MMOL/L (ref 0–0.27)
BILIRUB SERPL-MCNC: 0.8 MG/DL (ref 0–1)
BUN BLDV-MCNC: 10 MG/DL (ref 7–20)
BUN BLDV-MCNC: 11 MG/DL (ref 7–20)
CALCIUM SERPL-MCNC: 8.4 MG/DL (ref 8.3–10.6)
CALCIUM SERPL-MCNC: 8.5 MG/DL (ref 8.3–10.6)
CALCIUM SERPL-MCNC: 8.9 MG/DL (ref 8.3–10.6)
CALCIUM SERPL-MCNC: 9 MG/DL (ref 8.3–10.6)
CHLORIDE BLD-SCNC: 83 MMOL/L (ref 99–110)
CHLORIDE BLD-SCNC: 86 MMOL/L (ref 99–110)
CHLORIDE BLD-SCNC: 87 MMOL/L (ref 99–110)
CHLORIDE BLD-SCNC: 88 MMOL/L (ref 99–110)
CO2: 38 MMOL/L (ref 21–32)
CO2: 42 MMOL/L (ref 21–32)
CO2: 43 MMOL/L (ref 21–32)
CO2: 45 MMOL/L (ref 21–32)
CREAT SERPL-MCNC: 0.7 MG/DL (ref 0.8–1.3)
CREAT SERPL-MCNC: 0.7 MG/DL (ref 0.8–1.3)
CREAT SERPL-MCNC: 0.8 MG/DL (ref 0.8–1.3)
CREAT SERPL-MCNC: 0.8 MG/DL (ref 0.8–1.3)
EOSINOPHILS ABSOLUTE: 0.3 K/UL (ref 0–0.6)
EOSINOPHILS RELATIVE PERCENT: 3 %
GFR AFRICAN AMERICAN: >60
GFR NON-AFRICAN AMERICAN: >60
GLUCOSE BLD-MCNC: 122 MG/DL (ref 70–99)
GLUCOSE BLD-MCNC: 128 MG/DL (ref 70–99)
GLUCOSE BLD-MCNC: 128 MG/DL (ref 70–99)
GLUCOSE BLD-MCNC: 141 MG/DL (ref 70–99)
HCT VFR BLD CALC: 41 % (ref 40.5–52.5)
HEMOGLOBIN: 13.8 G/DL (ref 13.5–17.5)
LYMPHOCYTES ABSOLUTE: 1.8 K/UL (ref 1–5.1)
LYMPHOCYTES RELATIVE PERCENT: 17.8 %
MAGNESIUM: 2.2 MG/DL (ref 1.8–2.4)
MCH RBC QN AUTO: 30.2 PG (ref 26–34)
MCHC RBC AUTO-ENTMCNC: 33.8 G/DL (ref 31–36)
MCV RBC AUTO: 89.5 FL (ref 80–100)
MONOCYTES ABSOLUTE: 0.8 K/UL (ref 0–1.3)
MONOCYTES RELATIVE PERCENT: 8.4 %
NEUTROPHILS ABSOLUTE: 6.9 K/UL (ref 1.7–7.7)
NEUTROPHILS RELATIVE PERCENT: 70.3 %
OSMOLALITY URINE: 377 MOSM/KG (ref 390–1070)
OSMOLALITY: 275 MOSM/KG (ref 280–301)
PDW BLD-RTO: 14.6 % (ref 12.4–15.4)
PHOSPHORUS: 1.5 MG/DL (ref 2.5–4.9)
PHOSPHORUS: 2 MG/DL (ref 2.5–4.9)
PHOSPHORUS: 2 MG/DL (ref 2.5–4.9)
PHOSPHORUS: 2.7 MG/DL (ref 2.5–4.9)
PLATELET # BLD: 304 K/UL (ref 135–450)
PMV BLD AUTO: 9 FL (ref 5–10.5)
POTASSIUM REFLEX MAGNESIUM: 2.7 MMOL/L (ref 3.5–5.1)
POTASSIUM SERPL-SCNC: 2.7 MMOL/L (ref 3.5–5.1)
POTASSIUM SERPL-SCNC: 2.8 MMOL/L (ref 3.5–5.1)
POTASSIUM SERPL-SCNC: 3.3 MMOL/L (ref 3.5–5.1)
POTASSIUM SERPL-SCNC: 3.5 MMOL/L (ref 3.5–5.1)
RBC # BLD: 4.58 M/UL (ref 4.2–5.9)
SODIUM BLD-SCNC: 135 MMOL/L (ref 136–145)
SODIUM BLD-SCNC: 135 MMOL/L (ref 136–145)
SODIUM BLD-SCNC: 138 MMOL/L (ref 136–145)
SODIUM BLD-SCNC: 140 MMOL/L (ref 136–145)
TOTAL PROTEIN: 6.3 G/DL (ref 6.4–8.2)
VITAMIN D 25-HYDROXY: 10.3 NG/ML
WBC # BLD: 9.8 K/UL (ref 4–11)

## 2022-03-30 PROCEDURE — 6370000000 HC RX 637 (ALT 250 FOR IP): Performed by: INTERNAL MEDICINE

## 2022-03-30 PROCEDURE — 6360000002 HC RX W HCPCS: Performed by: HOSPITALIST

## 2022-03-30 PROCEDURE — 94761 N-INVAS EAR/PLS OXIMETRY MLT: CPT

## 2022-03-30 PROCEDURE — 94640 AIRWAY INHALATION TREATMENT: CPT

## 2022-03-30 PROCEDURE — 85025 COMPLETE CBC W/AUTO DIFF WBC: CPT

## 2022-03-30 PROCEDURE — 6370000000 HC RX 637 (ALT 250 FOR IP): Performed by: HOSPITALIST

## 2022-03-30 PROCEDURE — 2580000003 HC RX 258: Performed by: HOSPITALIST

## 2022-03-30 PROCEDURE — 99223 1ST HOSP IP/OBS HIGH 75: CPT | Performed by: INTERNAL MEDICINE

## 2022-03-30 PROCEDURE — 94669 MECHANICAL CHEST WALL OSCILL: CPT

## 2022-03-30 PROCEDURE — 83735 ASSAY OF MAGNESIUM: CPT

## 2022-03-30 PROCEDURE — 96366 THER/PROPH/DIAG IV INF ADDON: CPT

## 2022-03-30 PROCEDURE — 2700000000 HC OXYGEN THERAPY PER DAY

## 2022-03-30 PROCEDURE — 96361 HYDRATE IV INFUSION ADD-ON: CPT

## 2022-03-30 PROCEDURE — 80053 COMPREHEN METABOLIC PANEL: CPT

## 2022-03-30 PROCEDURE — 36415 COLL VENOUS BLD VENIPUNCTURE: CPT

## 2022-03-30 PROCEDURE — 1200000000 HC SEMI PRIVATE

## 2022-03-30 RX ORDER — DILTIAZEM HYDROCHLORIDE 180 MG/1
180 CAPSULE, COATED, EXTENDED RELEASE ORAL DAILY
Status: DISCONTINUED | OUTPATIENT
Start: 2022-03-30 | End: 2022-03-31 | Stop reason: HOSPADM

## 2022-03-30 RX ORDER — ERGOCALCIFEROL 1.25 MG/1
50000 CAPSULE ORAL WEEKLY
Status: DISCONTINUED | OUTPATIENT
Start: 2022-03-30 | End: 2022-03-31 | Stop reason: HOSPADM

## 2022-03-30 RX ORDER — POTASSIUM CHLORIDE 20 MEQ/1
60 TABLET, EXTENDED RELEASE ORAL 2 TIMES DAILY
Status: DISCONTINUED | OUTPATIENT
Start: 2022-03-30 | End: 2022-03-31

## 2022-03-30 RX ORDER — PROCHLORPERAZINE EDISYLATE 5 MG/ML
10 INJECTION INTRAMUSCULAR; INTRAVENOUS EVERY 6 HOURS PRN
Status: DISCONTINUED | OUTPATIENT
Start: 2022-03-30 | End: 2022-03-31 | Stop reason: HOSPADM

## 2022-03-30 RX ADMIN — IPRATROPIUM BROMIDE AND ALBUTEROL SULFATE 1 AMPULE: .5; 2.5 SOLUTION RESPIRATORY (INHALATION) at 19:32

## 2022-03-30 RX ADMIN — POTASSIUM CHLORIDE 10 MEQ: 10 INJECTION, SOLUTION INTRAVENOUS at 16:35

## 2022-03-30 RX ADMIN — DILTIAZEM HYDROCHLORIDE 180 MG: 180 CAPSULE, COATED, EXTENDED RELEASE ORAL at 12:10

## 2022-03-30 RX ADMIN — POTASSIUM CHLORIDE 10 MEQ: 10 INJECTION, SOLUTION INTRAVENOUS at 09:23

## 2022-03-30 RX ADMIN — Medication 2 PUFF: at 07:54

## 2022-03-30 RX ADMIN — Medication 2 PUFF: at 19:39

## 2022-03-30 RX ADMIN — ERGOCALCIFEROL 50000 UNITS: 1.25 CAPSULE ORAL at 17:40

## 2022-03-30 RX ADMIN — IPRATROPIUM BROMIDE AND ALBUTEROL SULFATE 1 AMPULE: .5; 2.5 SOLUTION RESPIRATORY (INHALATION) at 15:46

## 2022-03-30 RX ADMIN — POTASSIUM CHLORIDE 60 MEQ: 20 TABLET, EXTENDED RELEASE ORAL at 21:48

## 2022-03-30 RX ADMIN — IPRATROPIUM BROMIDE AND ALBUTEROL SULFATE 1 AMPULE: .5; 2.5 SOLUTION RESPIRATORY (INHALATION) at 07:54

## 2022-03-30 RX ADMIN — POTASSIUM CHLORIDE 10 MEQ: 10 INJECTION, SOLUTION INTRAVENOUS at 10:33

## 2022-03-30 RX ADMIN — POTASSIUM CHLORIDE 60 MEQ: 20 TABLET, EXTENDED RELEASE ORAL at 09:15

## 2022-03-30 RX ADMIN — DIBASIC SODIUM PHOSPHATE, MONOBASIC POTASSIUM PHOSPHATE AND MONOBASIC SODIUM PHOSPHATE 2 TABLET: 852; 155; 130 TABLET ORAL at 09:15

## 2022-03-30 RX ADMIN — SODIUM CHLORIDE, PRESERVATIVE FREE 10 ML: 5 INJECTION INTRAVENOUS at 09:24

## 2022-03-30 RX ADMIN — POTASSIUM CHLORIDE 10 MEQ: 10 INJECTION, SOLUTION INTRAVENOUS at 12:07

## 2022-03-30 RX ADMIN — POTASSIUM CHLORIDE 10 MEQ: 10 INJECTION, SOLUTION INTRAVENOUS at 17:40

## 2022-03-30 RX ADMIN — IPRATROPIUM BROMIDE AND ALBUTEROL SULFATE 1 AMPULE: .5; 2.5 SOLUTION RESPIRATORY (INHALATION) at 11:38

## 2022-03-30 RX ADMIN — DIBASIC SODIUM PHOSPHATE, MONOBASIC POTASSIUM PHOSPHATE AND MONOBASIC SODIUM PHOSPHATE 2 TABLET: 852; 155; 130 TABLET ORAL at 14:34

## 2022-03-30 RX ADMIN — APIXABAN 5 MG: 5 TABLET, FILM COATED ORAL at 21:48

## 2022-03-30 RX ADMIN — ROFLUMILAST 500 MCG: 500 TABLET ORAL at 09:33

## 2022-03-30 RX ADMIN — LISINOPRIL 5 MG: 5 TABLET ORAL at 09:15

## 2022-03-30 RX ADMIN — APIXABAN 5 MG: 5 TABLET, FILM COATED ORAL at 09:15

## 2022-03-30 RX ADMIN — POTASSIUM CHLORIDE 10 MEQ: 10 INJECTION, SOLUTION INTRAVENOUS at 14:38

## 2022-03-30 NOTE — PROGRESS NOTES
A/O x4. Makes needs known. Remains on 1800ml fluid restriction; tolerating well. Lung sounds with expiratory wheezing throughout. Dyspnea at rest and with exertion. 02 increased to 4L via NC per respiratory. Desats to mid to high 70s when pivots to [facility]. 02 sats maintaining above 90% on 4L. Abdomen round, soft, tenderness at mid upper abdomen, states \"It's getting better\". Denies any nausea at this time. BLE with non pitting edema. Up to [facility] independently. Call light within reach. Bed locked and in lowest position. Gripper socks on.

## 2022-03-30 NOTE — CARE COORDINATION
CASE MANAGEMENT INITIAL ASSESSMENT    Reviewed chart and completed assessment with patient  Family present: None   Explained Case Management role/services. Primary contact information: CAPTAIN MAGUE WOODY [facility] Decision Maker :   Primary Decision Maker: Vinicio Pires - [phone]    Secondary Decision Maker: Tam Lopez - [phone]        Can this person be reached and be able to respond quickly, such as within a few minutes or hours? Yes    Admit date/status:03/29/2022 Inpatient   Diagnosis: Hypophosphatemia  Is this a Readmission?:  No      Insurance:[employer]    Precert required for SNF: Yes       3 night stay required: No    Living arrangements, Adls, care needs, prior to admission: Home with son( does not work and assist as needed) and dtr in law. Patient reports uses 4WW for ambualtion and 1 CLAU. Durable Medical Equipment at home:  Walker_x_Cane__RTS__ BSC__Shower Chair__  02_x [employer] 3L _ HHN__ CPAP__  BiPap__  Hospital Bed__ W/C___ Other_____    Services in the home and/or outpatient, prior to admission: Denies, reports will likely decline at dc     Current PCP:Grace Davila Welcome Real-time needs:  Reports son takes to MD appointments , family will transport at [employer]      PT/OT recs: not ordered     Hospital Exemption Notification (HEN):NA    Barriers to discharge: IV ATB    Plan/comments: Patient from home with family. Patient reports uses 4 WW for ambulation. Patient reports has portable 02 in room closet. Patient reports will like decline HHC, will re approach if needed. SW will ct to follow. CLAUDIA Byrne        ECOC on chart for MD signature

## 2022-03-30 NOTE — PROGRESS NOTES
Shift assessment completed and charted. VSS. A&OX4. K currently replacing IV, pt tolerating at this time. 4L NC, SOB noted. Bed locked and in lowest position. Call light within reach. Pt denies any other needs at this time. Will continue to monitor.

## 2022-03-30 NOTE — PROGRESS NOTES
Comprehensive Nutrition Assessment    Type and Reason for Visit:  Positive Nutrition Screen    Nutrition Recommendations/Plan:   1. Continue low fat/ low chol/ high fiber/ 2gm diet  2. Encourage PO intakes  3. Monitor nutrition adequacy, pertinent labs, bowel habits, wt changes, and clinical progress    Nutrition Assessment:  Positive nutrition screen r/t weight loss, poor intakes. Pt admitted with abdominal pain and nausea, hypophosphatemia with hyponatremia/hypokalemia. PMH HFpEF, COPD, chronic respiratory failure. Pt reports decreased appetite/intakes for a few days PTA. States appetite has returned to normal with good PO intakes, % of breakfast today per EMR. Pt reports UBW is ~187#. # stated weight. Will order updated weight. Pt denies any nutritional needs or questions at this time. Will continue to monitor. Malnutrition Assessment:  Malnutrition Status: At risk for malnutrition (Comment)    Context:  Acute Illness     Findings of the 6 clinical characteristics of malnutrition:  Energy Intake:  Mild decrease in energy intake (Comment)    Estimated Daily Nutrient Needs:  Energy (kcal):  0274-2216 kcals/day; Weight Used for Energy Requirements:  Ideal (81kg)     Protein (g):  81-97 g/day; Weight Used for Protein Requirements:  Ideal (1-1.2)        Fluid (ml/day): 1 ml/kcal      Nutrition Related Findings:  K+ 2.7. BM 3/29. Non-pitting BLE edema. Wounds:  None       Current Nutrition Therapies:    ADULT DIET; Regular; Low Fat/Low Chol/High Fiber/2 gm Na    Anthropometric Measures:  · Height: 6' (182.9 cm)  · Current Body Weight: 189 lb (85.7 kg)   · Usual Body Weight: 187 lb (84.8 kg) (per pt)     · Ideal Body Weight: 178 lbs; % Ideal Body Weight 106.2 %   · BMI: 25.6  · BMI Categories: Overweight (BMI 25.0-29. 9)       Nutrition Diagnosis:   · Inadequate oral intake related to pain,acute injury/trauma as evidenced by poor intake prior to admission,nausea    Nutrition Interventions: Food and/or Nutrient Delivery:  Continue Current Diet  Nutrition Education/Counseling:  Education declined   Coordination of Nutrition Care:  Continue to monitor while inpatient    Goals:  Pt will consume greater than 50% of meals this admission       Nutrition Monitoring and Evaluation:   Behavioral-Environmental Outcomes:  None Identified   Food/Nutrient Intake Outcomes:  Food and Nutrient Intake  Physical Signs/Symptoms Outcomes:  Weight,Biochemical Data,Nutrition Focused Physical Findings,Fluid Status or Edema     Discharge Planning:    Continue current diet     Electronically signed by Candice Dick RD on 3/30/22 at 12:36 PM EDT    Contact: [phone]

## 2022-03-30 NOTE — CONSULTS
[facility]  [phone]      Attending Physician: Lolis Larsen MD  Reason for Consultation/Chief Complaint: HFpEF, chronic atrial fibrillation    Subjective   History of Present Illness:  Arleen Morton is a 67 y.o. male with a history of chronic atrial fibrillation, HFpEF, COPD, chronic hypoxic respiratory failure (on 3L supplemental O2 at baseline), and former tobacco use admitted with nausea, abdominal pain, and weakness. The patient is well-established with the [place] [address] and follows in our clinic with Dr. Beth Delacruz. He was switched from lasix to torsemide 40 mg daily at his last clinic visit on 2/2/22 and was also recently started on metolazone 2.5 mg once weekly. He says that he has been taking his diuretics as prescribed and denies taking more than prescribed. Recently, he has been feeling bloated and has had some abdominal pain and nausea. He says that his lower extremity edema has improved. He is chronically short of breath which he says has been stable and is on 3L supplemental O2 at baseline. He denies any chest pain or palpitations. On arrival to the ED, the patient was afebrile and hemodynamically stable with oxygen saturation of 90% on 3L. His ECG showed atrial fibrillation with incomplete RBBB, non-specific ST-T wave abnormality, and prolonged QT. Troponin T was negative and chest x-ray was negative for any acute cardiopulmonary abnormality. CT abdomen/pelvis was also negative for any acute pathology. Past Medical History:   has a past medical history of Bronchitis chronic, Emphysema of lung ([address] Influenza A, Lung disease, and Pneumonia. Surgical History:   has a past surgical history that includes fracture surgery and hernia repair. Social History:   reports that he quit smoking about 5 years ago. His smoking use included cigarettes. He has a 30.00 pack-year smoking history.  He has never used smokeless tobacco. He reports that he does not drink alcohol and does not use drugs. Family History:  family history includes Asthma in his daughter and son; Cancer in his brother, father, sister, and sister; Diabetes in his mother and sister. Home Medications:  Were reviewed and are listed in nursing record and/or below  Prior to Admission medications    Medication Sig Start Date End Date Taking? Authorizing Provider   metOLazone (ZAROXOLYN) 2.5 MG tablet Take 1 tablet by mouth every 7 days 3/16/22   Christine Brower MD   ipratropium-albuterol (DUONEB) 0.5-2.5 (3) MG/3ML SOLN nebulizer solution INHALE 3 MILLILIERS (1 VIAL) VIA NEBULIZATION ROUTE EVERY 6 HOURS AS NEEDED FOR SHORTNESS OF BREATH 2/28/22   Gilmar Valles MD   VENTOLIN  (90 Base) MCG/ACT inhaler INHALE 2 PUFFS EVERY 6 HOURS AS NEEDED FOR WHEEZING OR SHORTNESS OF BREATH 2/2/22   Gilmar Valles MD   torsemide (DEMADEX) 20 MG tablet Take 2 tablets by mouth daily 2/2/22   Christine Brower MD   SYMBICORT 160-4.5 MCG/ACT AERO INHALE 2 PUFFS INTO THE LUNGS 2 TIMES DAILY 12/2/21   Gilmar Valles MD   Roflumilast (DALIRESP) 500 MCG tablet TAKE (1) TABLET DAILY 12/2/21   Gilmar Valles MD   famotidine (PEPCID) 20 MG tablet TAKE (1) TABLET TWICE A DAY BEFORE MEALS.   Patient not taking: Reported on 2/28/2022 11/8/21   Christine Brower MD   lisinopril (PRINIVIL;ZESTRIL) 5 MG tablet TAKE 1 TABLET BY MOUTH DAILY 7/2/21   Christine Brower MD   apixaban (ELIQUIS) 5 MG TABS tablet TAKE 1 TABLET TWICE DAILY 7/2/21   Christine Brower MD   dilTIAZem (CARDIZEM CD) 180 MG extended release capsule TAKE 1 CAPSULE BY MOUTH DAILY 7/2/21   Christine Brower MD   guaiFENesin (MUCINEX) 600 MG extended release tablet Take 1 tablet by mouth 2 times daily as needed for Congestion 8/6/20   Gilmar Valles MD   nitroGLYCERIN (NITROSTAT) 0.4 MG SL tablet Place 1 tablet under the tongue every 5 minutes as needed for Chest pain 7/1/20   Christine Brower MD        CURRENT Medications:  phosphorus (K PHOS NEUTRAL) tablet 2 tablet, TID  apixaban (ELIQUIS) tablet 5 mg, BID  lisinopril (PRINIVIL;ZESTRIL) tablet 5 mg, Daily  Roflumilast (DALIRESP) tablet 500 mcg, Daily  mometasone-formoterol (DULERA) 200-5 MCG/ACT inhaler 2 puff, BID  [Held by provider] torsemide (DEMADEX) tablet 40 mg, Daily  [Held by provider] metOLazone (ZAROXOLYN) tablet 2.5 mg, Q7 Days  sodium chloride flush 0.9 % injection 10 mL, 2 times per day  sodium chloride flush 0.9 % injection 10 mL, PRN  0.9 % sodium chloride infusion, PRN  potassium chloride (KLOR-CON M) extended release tablet 40 mEq, PRN   Or  potassium bicarb-citric acid (EFFER-K) effervescent tablet 40 mEq, PRN   Or  potassium chloride 10 mEq/100 mL IVPB (Peripheral Line), PRN  magnesium sulfate 2000 mg in 50 mL IVPB premix, PRN  senna (SENOKOT) tablet 8.6 mg, Daily PRN  acetaminophen (TYLENOL) tablet 650 mg, Q6H PRN   Or  acetaminophen (TYLENOL) suppository 650 mg, Q6H PRN  ondansetron (ZOFRAN-ODT) disintegrating tablet 4 mg, Q8H PRN   Or  ondansetron (ZOFRAN) injection 4 mg, Q6H PRN  ipratropium-albuterol (DUONEB) nebulizer solution 1 ampule, Q4H WA        Allergies:  Patient has no known allergies. Review of Systems:   A 14 point review of symptoms was completed. Pertinent positives were identified in the HPI. All other review of symptoms negative unless otherwise noted below. Objective   PHYSICAL EXAM:    Vitals:    03/30/22 0756   BP:    Pulse:    Resp: 22   Temp:    SpO2: 90%    Weight: 189 lb 6 oz (85.9 kg)       General: Adult male lying in bed in no acute distress. Pleasant and interactive on exam.  HEENT: Normocephalic, atraumatic, non-icteric, hearing intact, nares normal, mucous membranes moist.  Neck: Supple, trachea midline. No adenopathy. No thyromegaly. No JVD. Heart: Irregularly irregular rate and rhythm. Normal S1 and S2. No murmurs, gallops or rubs. Lungs: Normal respiratory effort. Breath sounds diminished bilaterally. No wheezes, rales, or rhonchi.   Abdomen: Soft, non-tender. Normoactive bowel sounds. No masses or organomegaly. Skin: No rashes, wounds, or lesions. Pulses: Radial pulses 1+ bilaterally. Extremities: No clubbing, cyanosis, or edema. Musculoskeletal: Spontaneously moves all four extremities. Psych: Normal mood and affect. Neuro: Alert and oriented to person, place, and time. No focal deficits noted. Labs   CBC:   Lab Results   Component Value Date    WBC 9.8 03/30/2022    RBC 4.58 03/30/2022    HGB 13.8 03/30/2022    HCT 41.0 03/30/2022    MCV 89.5 03/30/2022    RDW 14.6 03/30/2022     03/30/2022     CMP:  Lab Results   Component Value Date     03/30/2022    K 2.7 03/30/2022    K 2.7 03/30/2022    CL 86 03/30/2022    CO2 43 03/30/2022    BUN 11 03/30/2022    CREATININE 0.7 03/30/2022    GFRAA >60 03/30/2022    AGRATIO 1.5 03/30/2022    LABGLOM >60 03/30/2022    GLUCOSE 122 03/30/2022    PROT 6.3 03/30/2022    CALCIUM 8.4 03/30/2022    BILITOT 0.8 03/30/2022    ALKPHOS 80 03/30/2022    AST 20 03/30/2022    ALT 17 03/30/2022     PT/INR:  No results found for: PTINR  HgBA1c:  Lab Results   Component Value Date    LABA1C 5.7 01/11/2018     Lab Results   Component Value Date    CKTOTAL 282 03/29/2022    TROPONINI <0.01 03/29/2022         Cardiac Data     Last EKG: Atrial fibrillation, incomplete RBBB, non-specific ST-T wave abnormality, prolonged QT    Echo:  TTE 3/14/22:  Conclusions   Summary   Left ventricular systolic function is normal with ejection fraction   estimated at 55%. No regional wall motion abnormalities. There is mild concentric left ventricular hypertrophy. Elevated left ventricular filling pressure. Mild bi-atrial enlargement. Mild mitral regurgitation. Mild tricuspid regurgitation. Systolic pulmonary artery pressure (SPAP) is normal estimated at 38 mmHg   (Right atrial pressure of 3 mmHg). No significant change from exam done 1/11/2018.     Stress Test:  Pharmacologic Nuclear SPECT Stress 5/2/19:  Conclusions      Summary    Normal LV function.    There is normal isotope uptake at stress and rest. There is no evidence of    myocardial ischemia or scar. Cath: N/A    Other Studies:   Chest X-ray 3/29/22:  No acute process. CT abdomen/pelvis with IV contrast 3/29/22:  No acute pathology in the abdomen and pelvis. Assessment and Plan      1. Chronic HFpEF - Not currently volume overloaded and is actually likely mildly hypovolemic. His recent electrolyte derangements are likely attributable to use of diuretics as explained further below.  -Hold torsemide for now and will likely decrease dose to 20 mg daily at discharge pending clinical course  -Discontinue metolazone  -Continue lisinopril 5 mg daily  -Can release PO fluid restriction and encourage adequate PO fluid intake  -Monitor I/Os    2. Hyponatremia/hypokalemia/hypophosphatemia - Likely secondary to diuretic use. -Hold torsemide and plan to discontinue metolazone as above  -Sodium and phosphorous levels have improved and would continue to replete potassium with PO potassium chloride for goal K>4    3. Chronic atrial fibrillation - Ventricular rate currently controlled. -Can resume diltiazem  mg daily  -Continue Eliquis 5 mg BID    4. Prolonged QT - Likely exacerbated by hypokalemia. Magnesium was WNL. -Continue to replete electrolytes as above    5. Abdominal pain/nausea  -Further work-up per primary team  -Would limit use of antiemetics as able given prolonged QT    6. COPD/chronic hypoxic respiratory failure  -Continue current inhaler regimen      Thank you for allowing us to participate in the care of Caitie Bear. Please call me with any questions [phone]. DO Lisa Guevara [address]  [phone] [facility]  [phone] [address] [place] [address]  3/30/2022 8:09 AM      I will address the patient's cardiac risk factors and adjusted pharmacologic treatment as needed.  In addition, I have reinforced the need for patient directed risk factor modification. All questions and concerns were addressed to the patient/family. Alternatives to my treatment were discussed. The note was completed using EMR. Every effort was made to ensure accuracy; however, inadvertent computerized transcription errors may be present.

## 2022-03-30 NOTE — CONSULTS
Consult placed    Who:DAISY  Date:3/29/2022,  Time:9:48 PM        Electronically signed by Candis Forbes on 3/29/2022 at 9:48 PM

## 2022-03-30 NOTE — PROGRESS NOTES
03/29/22 2030   RT Protocol   History Pulmonary Disease 2   Respiratory pattern 2   Breath sounds 2   Cough 1   Indications for Bronchodilator Therapy On home bronchodilators;Decreased or absent breath sounds   Bronchodilator Assessment Score 7

## 2022-03-30 NOTE — CONSULTS
[facility] Consult Note           Reason for Consult:  Electrolyte imbalances  Requesting Physician:  Dr. Alise Prince    Chief Complaint:  Abnormal labs  History Obtained From:  patient, electronic medical record    History of Present Ilness:    67year old male with dCHF, Atrial fibrillation, & COPD admitted with abnormal labs. We have been asked to assist in further electrolyte imbalances. Cardiology had started him on trial of demadex in place of lasix in Feb 2022 & added metolazone weekly dosing on 3/15/22. Sent into ER due to abnormal labs with low K, Phos, and Na levels. States has chronic shortness of breath, not as swollen as usual with new diuretics. Unclear on change in weight, was at 187 pounds when he saw Dr. Cuate Telles on 2/28, 189 pounds on 3/29 on admission. States feels weak, intake adequate, abdominal pain better, no fevers. Given one liter of NS in ER, then started on NS since this AM, now off. Given K, Phos replacement IV and po. Past Medical History:        Diagnosis Date    Bronchitis chronic     Emphysema of lung ([facility] [address]     Influenza A 01/10/2018    Lung disease     copd    Pneumonia     12/2009       Past Surgical History:        Procedure Laterality Date    FRACTURE SURGERY      johnson in femur/hip on right    HERNIA REPAIR         Home Medications:    No current facility-administered medications on file prior to encounter.      Current Outpatient Medications on File Prior to Encounter   Medication Sig Dispense Refill    metOLazone (ZAROXOLYN) 2.5 MG tablet Take 1 tablet by mouth every 7 days 10 tablet 1    ipratropium-albuterol (DUONEB) 0.5-2.5 (3) MG/3ML SOLN nebulizer solution INHALE 3 MILLILIERS (1 VIAL) VIA NEBULIZATION ROUTE EVERY 6 HOURS AS NEEDED FOR SHORTNESS OF BREATH 360 mL 6    VENTOLIN  (90 Base) MCG/ACT inhaler INHALE 2 PUFFS EVERY 6 HOURS AS NEEDED FOR WHEEZING OR SHORTNESS OF BREATH 18 g 5    torsemide (DEMADEX) 20 MG tablet Take 2 tablets by mouth daily 180 tablet 3    SYMBICORT 160-4.5 MCG/ACT AERO INHALE 2 PUFFS INTO THE LUNGS 2 TIMES DAILY 10.2 g 5    Roflumilast (DALIRESP) 500 MCG tablet TAKE (1) TABLET DAILY 30 tablet 5    famotidine (PEPCID) 20 MG tablet TAKE (1) TABLET TWICE A DAY BEFORE MEALS. (Patient not taking: Reported on 2022) 180 tablet 3    lisinopril (PRINIVIL;ZESTRIL) 5 MG tablet TAKE 1 TABLET BY MOUTH DAILY 90 tablet 3    apixaban (ELIQUIS) 5 MG TABS tablet TAKE 1 TABLET TWICE DAILY 60 tablet 5    dilTIAZem (CARDIZEM CD) 180 MG extended release capsule TAKE 1 CAPSULE BY MOUTH DAILY 90 capsule 3    guaiFENesin (MUCINEX) 600 MG extended release tablet Take 1 tablet by mouth 2 times daily as needed for Congestion 60 tablet 6    nitroGLYCERIN (NITROSTAT) 0.4 MG SL tablet Place 1 tablet under the tongue every 5 minutes as needed for Chest pain 25 tablet 3       Allergies:  Patient has no known allergies. Social History:    Social History     Socioeconomic History    Marital status:       Spouse name: Not on file    Number of children: Not on file    Years of education: Not on file    Highest education level: Not on file   Occupational History    Not on file   Tobacco Use    Smoking status: Former Smoker     Packs/day: 1.00     Years: 30.00     Pack years: 30.00     Types: Cigarettes     Quit date: 2016     Years since quittin.7    Smokeless tobacco: Never Used    Tobacco comment: Just quit about 3 months ago 16   Vaping Use    Vaping Use: Never used   Substance and Sexual Activity    Alcohol use: No    Drug use: No    Sexual activity: Not Currently   Other Topics Concern    Not on file   Social History Narrative    Not on file     Social Determinants of Health     Financial Resource Strain:     Difficulty of Paying Living Expenses: Not on file   Food Insecurity:     Worried About Running Out of Food in the Last Year: Not on file    Fabiola of Food in the Last Year: Not on file   Transportation Needs:     Lack of Transportation (Medical): Not on file    Lack of Transportation (Non-Medical): Not on file   Physical Activity:     Days of Exercise per Week: Not on file    Minutes of Exercise per Session: Not on file   Stress:     Feeling of Stress : Not on file   Social Connections:     Frequency of Communication with Friends and Family: Not on file    Frequency of Social Gatherings with Friends and Family: Not on file    Attends Holiness Services: Not on file    Active Member of Clubs or Organizations: Not on file    Attends Club or Organization Meetings: Not on file    Marital Status: Not on file   Intimate Partner Violence:     Fear of Current or Ex-Partner: Not on file    Emotionally Abused: Not on file    Physically Abused: Not on file    Sexually Abused: Not on file   Housing Stability:     Unable to Pay for Housing in the Last Year: Not on file    Number of [place] in the Last Year: Not on file    Unstable Housing in the Last Year: Not on file       Family History:   Family History   Problem Relation Age of Onset    Diabetes Sister     Cancer Sister     Cancer Sister     Cancer Brother     Asthma Daughter     Asthma Son     Diabetes Mother     Cancer Father        Review of Systems:   Pertinent positives stated above in HPI. Remainder of 10 point review of systems were reviewed and were negative.     Physical exam:   Constitutional:  VITALS:  /61   Pulse 96   Temp 98.6 °F (37 °C) (Oral)   Resp 18   Ht 6' (1.829 m)   Wt 189 lb 6 oz (85.9 kg)   SpO2 91%   BMI 25.68 kg/m²   Gen: alert, awake, ill-appearing  Skin: no rash, turgor wnl  Heent:  eomi, mmm  Neck: no bruits or jvd noted, thyroid normal  Cardiovascular:  S1, S2, irregular without m/r/g  Respiratory: CTA B without w/r/r; respiratory effort normal  Abdomen:  +bs, soft, nt, nd, no hepatosplenomegaly  Ext: no lower extremity edema  Psychiatric: mood and affect appropriate; judgement and insight intact  Musculoskeletal:  Rom, muscular strength limited; digits, nails normal    Data/  CBC:   Lab Results   Component Value Date    WBC 9.8 03/30/2022    RBC 4.58 03/30/2022    HGB 13.8 03/30/2022    HCT 41.0 03/30/2022    MCV 89.5 03/30/2022    MCH 30.2 03/30/2022    MCHC 33.8 03/30/2022    RDW 14.6 03/30/2022     03/30/2022    MPV 9.0 03/30/2022     BMP:    Lab Results   Component Value Date     03/30/2022    K 2.8 03/30/2022    K 2.7 03/30/2022    CL 83 03/30/2022    CO2 45 03/30/2022    BUN 10 03/30/2022    LABALBU 4.0 03/30/2022    CREATININE 0.8 03/30/2022    CALCIUM 8.9 03/30/2022    GFRAA >60 03/30/2022    LABGLOM >60 03/30/2022    GLUCOSE 128 03/30/2022     Urine sodium and chloride less than 20  Urine osmolality 377      Assessment/    - Hypokalemia - secondary to diuresis from torsemide & metolazone, improving with replacement    - Hyponatremia - secondary to metolazone effect + translocation effect from Hypokalemia - better    - Metabolic alkalosis - from diuresis    - Hypophosphatemia - suspect nutritional + Vitamin D deficiency    - Vitamin D deficiency - Vitamin D level 10    - dCHF - compensated    - Atrial fibrillation - rate controled    - COPD - stable      Plan/    - Monitoring off of IVF's & diuretic at the moment until electrolytes replaced  - Agree with current electrolyte replacement - reviewed  - Start vitamin D replacement with ergocalciferol  - Trend labs closely      Thank you for the consultation. Please do not hesitate to call with questions. ____________________________________  Nacho Adams MD  [facility]  [email or web address]  Office: [phone]

## 2022-03-30 NOTE — PROGRESS NOTES
Hospitalist Progress Note      PCP: Rochelle Garcia    Date of Admission: 3/29/2022        Subjective:     Feels better today. No nausea, vomiting abdominal pain      Medications:  Reviewed    Infusion Medications    sodium chloride       Scheduled Medications    potassium chloride  60 mEq Oral BID    phosphorus  500 mg Oral TID    apixaban  5 mg Oral BID    lisinopril  5 mg Oral Daily    Roflumilast  500 mcg Oral Daily    mometasone-formoterol  2 puff Inhalation BID    [Held by provider] torsemide  40 mg Oral Daily    sodium chloride flush  10 mL IntraVENous 2 times per day    ipratropium-albuterol  1 ampule Inhalation Q4H WA     PRN Meds: sodium chloride flush, sodium chloride, potassium chloride **OR** potassium alternative oral replacement **OR** potassium chloride, magnesium sulfate, senna, acetaminophen **OR** acetaminophen, ondansetron **OR** ondansetron      Intake/Output Summary (Last 24 hours) at 3/30/2022 0908  Last data filed at 3/30/2022 5266  Gross per 24 hour   Intake 2623.92 ml   Output 1300 ml   Net 1323.92 ml       Physical Exam Performed:    /63   Pulse 70   Temp 98 °F (36.7 °C) (Oral)   Resp 22   Ht 6' (1.829 m)   Wt 189 lb 6 oz (85.9 kg)   SpO2 90% Comment: patient eating  BMI 25.68 kg/m²     General appearance: No apparent distress, appears stated age and cooperative. HEENT: Pupils equal, round, and reactive to light. Conjunctivae/corneas clear. Neck: Supple, with full range of motion. No jugular venous distention. Trachea midline. Respiratory:  Normal respiratory effort. Clear to auscultation, bilaterally without Rales/Wheezes/Rhonchi. Cardiovascular: Regular rate and rhythm with normal S1/S2 without murmurs, rubs or gallops. Abdomen: Soft, non-tender, non-distended with normal bowel sounds. Musculoskeletal: No clubbing, cyanosis or edema bilaterally. Full range of motion without deformity.   Skin: Skin color, texture, turgor normal.  No rashes or lesions. Neurologic:  Neurovascularly intact without any focal sensory/motor deficits. Cranial nerves: II-XII intact, grossly non-focal.  Psychiatric: Alert and oriented, thought content appropriate, normal insight  Capillary Refill: Brisk,3 seconds, normal   Peripheral Pulses: +2 palpable, equal bilaterally       Labs:   Recent Labs     03/29/22  1352 03/30/22  0535   WBC 10.4 9.8   HGB 15.1 13.8   HCT 44.8 41.0    304     Recent Labs     03/29/22  1352 03/29/22  1352 03/29/22  2256 03/30/22  0535   *  --  130* 138   K 2.6*   < > 3.5 2.7*  2.7*   CL 73*  --  78* 86*   CO2 46*  --  41* 43*   BUN 19  --  15 11   CREATININE 0.9  --  0.9 0.7*   CALCIUM 9.2  --  8.7 8.4   PHOS 1.4*  --  2.2* 2.7    < > = values in this interval not displayed. Recent Labs     03/29/22  1352 03/30/22  0535   AST 26 20   ALT 20 17   BILITOT 0.9 0.8   ALKPHOS 94 80     No results for input(s): INR in the last 72 hours. Recent Labs     03/29/22 1352   CKTOTAL 282   TROPONINI <0.01       Urinalysis:      Lab Results   Component Value Date    NITRU Negative 03/29/2022    WBCUA 0-2 03/29/2022    BACTERIA 1+ 01/11/2018    RBCUA 0-2 03/29/2022    BLOODU TRACE-INTACT 03/29/2022    SPECGRAV 1.010 03/29/2022    GLUCOSEU Negative 03/29/2022       Radiology:  CT ABDOMEN PELVIS W IV CONTRAST Additional Contrast? None   Final Result   No acute pathology in the abdomen and pelvis. RECOMMENDATIONS:   Unavailable         XR CHEST PORTABLE   Final Result   No acute process. Assessment/Plan:    -Severe hypokalemia--- diuretic therapy at home--continue to replete  -Hyponatremia--due to diuretic therapy--resolved with IV fluids  -Hypophosphatemia--repleted  -Chronic diastolic heart failure--compensated--diuretics held  -Chronic A. Fib--rate controlled--continue Eliquis and Cardizem  -Abdominal pain/nausea--- resolved  -Prolonged QT--continue to monitor--avoid meds that will  Prolonged QT  -COPD-stable. --Continue inhaled steroids and bronchodilators    DVT Prophylaxis: Eliquis  Diet: ADULT DIET; Regular; Low Fat/Low Chol/High Fiber/2 gm Na; 1800 ml  Code Status: Full Code     Disposition. ..  Home in 24 hours after electrolytes repleted      Dez June MD

## 2022-03-31 ENCOUNTER — APPOINTMENT (OUTPATIENT)
Dept: GENERAL RADIOLOGY | Age: 72
DRG: 641 | End: 2022-03-31
Payer: MEDICARE

## 2022-03-31 VITALS
DIASTOLIC BLOOD PRESSURE: 72 MMHG | WEIGHT: 180.44 LBS | HEART RATE: 90 BPM | TEMPERATURE: 97.9 F | SYSTOLIC BLOOD PRESSURE: 119 MMHG | RESPIRATION RATE: 22 BRPM | BODY MASS INDEX: 24.44 KG/M2 | HEIGHT: 72 IN | OXYGEN SATURATION: 92 %

## 2022-03-31 LAB
ALBUMIN SERPL-MCNC: 4 G/DL (ref 3.4–5)
ANION GAP SERPL CALCULATED.3IONS-SCNC: 12 MMOL/L (ref 3–16)
BUN BLDV-MCNC: 9 MG/DL (ref 7–20)
CALCIUM SERPL-MCNC: 8.9 MG/DL (ref 8.3–10.6)
CHLORIDE BLD-SCNC: 91 MMOL/L (ref 99–110)
CO2: 35 MMOL/L (ref 21–32)
CREAT SERPL-MCNC: 0.6 MG/DL (ref 0.8–1.3)
GFR AFRICAN AMERICAN: >60
GFR NON-AFRICAN AMERICAN: >60
GLUCOSE BLD-MCNC: 119 MG/DL (ref 70–99)
PHOSPHORUS: 2.5 MG/DL (ref 2.5–4.9)
POTASSIUM SERPL-SCNC: 4.3 MMOL/L (ref 3.5–5.1)
SODIUM BLD-SCNC: 138 MMOL/L (ref 136–145)

## 2022-03-31 PROCEDURE — 80069 RENAL FUNCTION PANEL: CPT

## 2022-03-31 PROCEDURE — 6370000000 HC RX 637 (ALT 250 FOR IP): Performed by: INTERNAL MEDICINE

## 2022-03-31 PROCEDURE — 6370000000 HC RX 637 (ALT 250 FOR IP): Performed by: HOSPITALIST

## 2022-03-31 PROCEDURE — 36415 COLL VENOUS BLD VENIPUNCTURE: CPT

## 2022-03-31 PROCEDURE — 97166 OT EVAL MOD COMPLEX 45 MIN: CPT

## 2022-03-31 PROCEDURE — 97161 PT EVAL LOW COMPLEX 20 MIN: CPT

## 2022-03-31 PROCEDURE — 99233 SBSQ HOSP IP/OBS HIGH 50: CPT | Performed by: NURSE PRACTITIONER

## 2022-03-31 PROCEDURE — 2700000000 HC OXYGEN THERAPY PER DAY

## 2022-03-31 PROCEDURE — 97116 GAIT TRAINING THERAPY: CPT

## 2022-03-31 PROCEDURE — 94761 N-INVAS EAR/PLS OXIMETRY MLT: CPT

## 2022-03-31 PROCEDURE — 94640 AIRWAY INHALATION TREATMENT: CPT

## 2022-03-31 PROCEDURE — 97530 THERAPEUTIC ACTIVITIES: CPT

## 2022-03-31 PROCEDURE — 71045 X-RAY EXAM CHEST 1 VIEW: CPT

## 2022-03-31 PROCEDURE — 2580000003 HC RX 258: Performed by: HOSPITALIST

## 2022-03-31 PROCEDURE — 94669 MECHANICAL CHEST WALL OSCILL: CPT

## 2022-03-31 PROCEDURE — 97535 SELF CARE MNGMENT TRAINING: CPT

## 2022-03-31 RX ORDER — ERGOCALCIFEROL 1.25 MG/1
50000 CAPSULE ORAL WEEKLY
Qty: 5 CAPSULE | Refills: 0 | Status: SHIPPED | OUTPATIENT
Start: 2022-04-06

## 2022-03-31 RX ORDER — TORSEMIDE 20 MG/1
20 TABLET ORAL DAILY
Qty: 180 TABLET | Refills: 3 | Status: ON HOLD
Start: 2022-03-31 | End: 2022-04-07 | Stop reason: SDUPTHER

## 2022-03-31 RX ORDER — POTASSIUM CHLORIDE 20 MEQ/1
20 TABLET, EXTENDED RELEASE ORAL DAILY
Qty: 90 TABLET | Refills: 1 | Status: SHIPPED | OUTPATIENT
Start: 2022-03-31 | End: 2022-08-18 | Stop reason: SDUPTHER

## 2022-03-31 RX ADMIN — IPRATROPIUM BROMIDE AND ALBUTEROL SULFATE 1 AMPULE: .5; 2.5 SOLUTION RESPIRATORY (INHALATION) at 15:44

## 2022-03-31 RX ADMIN — ROFLUMILAST 500 MCG: 500 TABLET ORAL at 08:02

## 2022-03-31 RX ADMIN — IPRATROPIUM BROMIDE AND ALBUTEROL SULFATE 1 AMPULE: .5; 2.5 SOLUTION RESPIRATORY (INHALATION) at 12:01

## 2022-03-31 RX ADMIN — DILTIAZEM HYDROCHLORIDE 180 MG: 180 CAPSULE, COATED, EXTENDED RELEASE ORAL at 07:53

## 2022-03-31 RX ADMIN — Medication 2 PUFF: at 07:37

## 2022-03-31 RX ADMIN — SODIUM CHLORIDE, PRESERVATIVE FREE 10 ML: 5 INJECTION INTRAVENOUS at 07:53

## 2022-03-31 RX ADMIN — IPRATROPIUM BROMIDE AND ALBUTEROL SULFATE 1 AMPULE: .5; 2.5 SOLUTION RESPIRATORY (INHALATION) at 07:37

## 2022-03-31 RX ADMIN — POTASSIUM CHLORIDE 60 MEQ: 20 TABLET, EXTENDED RELEASE ORAL at 07:53

## 2022-03-31 RX ADMIN — LISINOPRIL 5 MG: 5 TABLET ORAL at 07:53

## 2022-03-31 RX ADMIN — APIXABAN 5 MG: 5 TABLET, FILM COATED ORAL at 07:53

## 2022-03-31 NOTE — CARE COORDINATION
Hospital day 2: Patient on C3 re Hypophosphatemia followed by IM and Cardiology. Chest XR ordered re SOB. Patient from home with family. [facility]. Patient with personal portable tank in room for dc home  . Raman Pate 19:  CASE MANAGEMENT DISCHARGE SUMMARY    Discharge to: Home with family help and skilled home health care     IMM given: (date) 03/29/2022    New Durable Medical Equipment ordered/agency: no new has portable tank in room     Transportation: via private car     Confirmed discharge plan with: Patient bedside     Agency, name:  [address] faxed     RN, name: Adelaida Murcia     . CLAUDIA Pate

## 2022-03-31 NOTE — DISCHARGE INSTR - COC
Continuity of Care Form    Patient Name: Hailey Staton   :  1950  MRN:  [id number]    Admit date:  3/29/2022  Discharge date:  3/31/2022    Code Status Order: Full Code   Advance Directives:      Admitting Physician:  Gisell Purcell MD  PCP: Maximilian Vu    Discharging Nurse: Gomez [address] Unit/Room#: 1516/5050-04  Discharging Unit Phone Number: [phone]    Emergency Contact:   Extended Emergency Contact Information  Primary Emergency Contact: Jean BaptisteDiana crawford  [address] Phone: [phone]  Relation: Child  Secondary Emergency Contact: [address] Phone: [phone]  Relation: Grandchild    Past Surgical History:  Past Surgical History:   Procedure Laterality Date    FRACTURE SURGERY      johnson in femur/hip on right    HERNIA REPAIR         Immunization History:   Immunization History   Administered Date(s) Administered    COVID-19, Cesar Fox, Primary or Immunocompromised, PF, 100mcg/0.5mL 2021, 04/10/2021    COVID-19, Pfizer Purple top, DILUTE for use, 12+ yrs, 30mcg/0.3mL dose 2021    Influenza A (B6A9-77) Vaccine IM 2010    Influenza A (B7C5-45) Vaccine PF IM 2010    Influenza Virus Vaccine 2010, 2012, 10/06/2014    Influenza, High Dose (Fluzone 65 yrs and older) 10/13/2015, 2019, 2021    Influenza, High-dose, Quadv, 65 yrs +, IM (Fluzone) 10/06/2020    Influenza, Quadv, IM, PF (6 mo and older Fluzone, Flulaval, Fluarix, and 3 yrs and older Afluria) 2018, 10/22/2018    Pneumococcal Conjugate 13-valent (Vosjdnb94) 2016    Pneumococcal Polysaccharide (Erujnzjol99) 2009, 2012, 2018    Td vaccine (adult) 2007    Tdap (Boostrix, Adacel) 2012       Active Problems:  Patient Active Problem List   Diagnosis Code    Acute exacerbation of chronic obstructive pulmonary disease (COPD) ([facility] J44.1    Essential hypertension I10    Acute on chronic respiratory failure with hypoxemia (HCC) J96.21    Former smoker Z87.891    Atrial fibrillation (HCC) I48.91    Acute hyponatremia E87.1    Hyperbilirubinemia E80.6    Influenzal pneumonia J11.00    Unstable angina pectoris ([facility]) I20.0    Chronic diastolic congestive heart failure (HCC) I50.32    Acute hypokalemia E87.6    Hypophosphatemia E83.39    Long term current use of diuretic Z79.899       Isolation/Infection:   Isolation            No Isolation          Patient Infection Status       None to display            Nurse Assessment:  Last Vital Signs: /72   Pulse 90   Temp 97.9 °F (36.6 °C) (Oral)   Resp 22   Ht 6' (1.829 m)   Wt 180 lb 7 oz (81.8 kg)   SpO2 92%   BMI 24.47 kg/m²     Last documented pain score (0-10 scale): Pain Level: 0  Last Weight:   Wt Readings from Last 1 Encounters:   03/31/22 180 lb 7 oz (81.8 kg)     Mental Status:  oriented and alert    IV Access:  - None    Nursing Mobility/ADLs:  Walking   Assisted  Transfer  Assisted  Bathing  Assisted  Dressing  Assisted  Toileting  Assisted  Feeding  Independent  Med Admin  Assisted  Med Delivery   whole    Wound Care Documentation and Therapy:        Elimination:  Continence: Bowel: Yes  Bladder: Yes  Urinary Catheter: None   Colostomy/Ileostomy/Ileal Conduit: No       Date of Last BM: 3/31/2022    Intake/Output Summary (Last 24 hours) at 3/31/2022 1442  Last data filed at 3/31/2022 1411  Gross per 24 hour   Intake 2716.41 ml   Output 225 ml   Net 2491.41 ml     I/O last 3 completed shifts: In: 3865.2 [P.O.:1680; I.V.:1432.2; IV [id number]:478]  Out: 0177 [Urine:1825]    Safety Concerns:      At Risk for Falls    Impairments/Disabilities:      None    Nutrition Therapy:  Current Nutrition Therapy:   - Oral Diet:  General    Routes of Feeding: Oral  Liquids: No Restrictions  Daily Fluid Restriction: yes - amount 1800  Last Modified Barium Swallow with Video (Video Swallowing Test): not done    Treatments at the Time of Hospital Discharge:   Respiratory Treatments: pt manages. Oxygen Therapy:  is on oxygen at 4 L/min per nasal cannula. Ventilator:    - No ventilator support    Rehab Therapies: Physical Therapy and Occupational Therapy  Weight Bearing Status/Restrictions: No weight bearing restrictions  Other Medical Equipment (for information only, NOT a DME order):  wheelchair, cane, walker, bedside commode, and hospital bed  Other Treatments: n/a    Patient's personal belongings (please select all that are sent with patient):  None    RN SIGNATURE:  Electronically signed by Zeny Rodriguez RN on 3/31/22 at 4:39 PM EDT    CASE MANAGEMENT/SOCIAL WORK SECTION    Inpatient Status Date: 03/29/2022    Readmission Risk Assessment Score:  Readmission Risk              Risk of Unplanned Readmission:  15           Discharging to [facility]/ [address] [place] [address]     Mikayla Anguiano is Oxygen supplier- connected prior to inpatient stay  / signature: Electronically signed by CLAUDIA Puentes on 3/31/22 at 2:43 PM EDT    PHYSICIAN SECTION    Prognosis: Fair    Condition at Discharge: Stable    Rehab Potential (if transferring to Rehab): Fair    Recommended Labs or Other Treatments After Discharge:     Physician Certification: I certify the above information and transfer of Raegan Arroyo  is necessary for the continuing treatment of the diagnosis listed and that he requires [address] for greater 30 days.      Update Admission H&P: No change in H&P    PHYSICIAN SIGNATURE:  Electronically signed by Ministerio Dobson MD on 3/31/22 at 3:56 PM EDT

## 2022-03-31 NOTE — PROGRESS NOTES
Occupational Therapy   Occupational Therapy Initial Assessment and Treatment Note   Date: 3/31/2022   Patient Name: Krissy De La Garza  MRN: [id number]     : 1950    Date of Service: 3/31/2022    Discharge Recommendations:  24 hour supervision or assist,Home with Home health OT     Assessment   Performance deficits / Impairments: Decreased functional mobility ; Decreased ADL status; Decreased endurance;Decreased balance;Decreased safe awareness  After evaluation, pt found to be presenting with the above mentioned occupational performance deficits which are affecting participation in daily living skills. Pt would benefit from continued skilled occupational therapy to address ADLs, functional mobility, and safety while in acute care. Prognosis: Good  Decision Making: Medium Complexity  OT Education: OT Role;Plan of Care;Transfer Training;Energy Conservation; ADL Adaptive Strategies  Disease Specific Education: Pt educated on importance of OOB mobility, prevention of complications of bedrest, and general safety during hospitalization. Pt verbalized understanding  REQUIRES OT FOLLOW UP: Yes  Activity Tolerance  Activity Tolerance: Patient Tolerated treatment well  Safety Devices  Safety Devices in place: Yes  Type of devices: Nurse notified;Call light within reach; Left in chair;Chair alarm in place;Gait belt         Patient Diagnosis(es): The primary encounter diagnosis was Hypokalemia. Diagnoses of Hypophosphatemia, Abdominal discomfort, and Nausea were also pertinent to this visit. has a past medical history of Bronchitis chronic, Emphysema of lung ([address] Influenza A, Lung disease, and Pneumonia. has a past surgical history that includes fracture surgery and hernia repair.        Restrictions  Restrictions/Precautions  Restrictions/Precautions: General Precautions,Fall Risk  Position Activity Restriction  Other position/activity restrictions: 4LO2, BR with bsc    Subjective   General  Chart Reviewed: Yes  Patient assessed for rehabilitation services?: Yes  Family / Caregiver Present: No  Referring Practitioner: MATILDA Alamo  Diagnosis: hypokalemia  Subjective  Subjective: Pt agreeable to OT  General Comment  Comments: RN approved therapy  Patient Currently in Pain: Denies  Vital Signs  Temp: 97.9 °F (36.6 °C)  Temp Source: Oral  Pulse: 90  Heart Rate Source: Monitor  Resp: 22  BP: 119/72  BP Location: Left upper arm  MAP (mmHg): 87  Patient Position: Sitting  Level of Consciousness: Alert (0)  MEWS Score: 2  Patient Currently in Pain: Denies  Oxygen Therapy  SpO2: 92 %  O2 Device: Nasal cannula  O2 Flow Rate (L/min): 4 L/min  Social/Functional History  Social/Functional History  Lives With: Family (son and daughter in law; does have 24-hr Sup/assist)  Type of Home: House  Home Layout: One level  Home Access: Stairs to enter without rails  Entrance Stairs - Number of Steps: 2  Bathroom Shower/Tub: Walk-in shower (has BSC that he uses as shower chair)  Bathroom Toilet: Standard  Home Equipment: Oxygen,4 wheeled walker (3L at all times baseline)  ADL Assistance: [facility] [facility] [address]: Independent  Homemaking Responsibilities: Yes (does small tasks including making breakfast and his own laundry)  Ambulation Assistance: Independent (intermittent use of 4WW)  Transfer Assistance: Independent  Active : No  Occupation: Retired  Type of occupation: [employer]  Additional Comments: denies falls     Objective   Vision: Impaired  Vision Exceptions: Wears glasses for reading  Hearing: Exceptions to [facility]    Orientation  Overall Orientation Status: Within Functional Limits     Balance  Sitting Balance: Supervision  Standing Balance: Contact guard assistance  Standing Balance  Activity: Ambulated in room to chair with CGA and RW, vc's for safety. Unable to tolerate bathroom mobility this am d/t fatigue.   ADL  Feeding: Independent  Grooming: Stand by assistance (seated)  UE Dressing: Minimal assistance (gown)  LE Dressing: Moderate assistance (Limited reach to BLE for socks)  Toileting: Stand by assistance (urinal)  Tone RUE  RUE Tone: Normotonic  Tone LUE  LUE Tone: Normotonic  Coordination  Movements Are Fluid And Coordinated: Yes     Bed mobility  Supine to Sit: Stand by assistance (HOB elevated)  Transfers  Stand Pivot Transfers: Contact guard assistance (RW)  Sit to stand: Contact guard assistance  Stand to sit: Contact guard assistance     Cognition  Overall Cognitive Status: Exceptions  Arousal/Alertness: Appropriate responses to stimuli  Following Commands:  Follows one step commands consistently  Attention Span: Appears intact  Safety Judgement: Decreased awareness of need for safety  Problem Solving: Assistance required to identify errors made;Assistance required to correct errors made  Insights: Decreased awareness of deficits        Sensation  Overall Sensation Status: Impaired (baseline numbness in feet (L>R))      LUE AROM (degrees)  LUE AROM : WFL  RUE AROM (degrees)  RUE AROM : WFL  LUE Strength  Gross LUE Strength: WFL  RUE Strength  Gross RUE Strength: WFL       Plan   Plan  Times per week: 3-5x  Current Treatment Recommendations: Self-Care / ADL,Equipment Evaluation, Education, & procurement,Endurance Training,Balance Training,Functional Mobility Training,Safety Education & Training    AM-PAC Score   AM-[facility] Inpatient Daily Activity Raw Score: 17 (03/31/22 1529)  AM-PAC Inpatient ADL T-Scale Score : 37.26 (03/31/22 1529)  ADL Inpatient CMS 0-100% Score: 50.11 (03/31/22 1529)  ADL Inpatient CMS G-Code Modifier : CK (03/31/22 1529)    Goals  Short term goals  Time Frame for Short term goals: 1 week (4/7) unless noted  Short term goal 1: Perform functional transfers with SBA and RW  Short term goal 2: Perform bathroom mobility with SBA and RW  Short term goal 3: Perform UE exer 15x each to improve endurance by 4/5  Short term goal 4: Perform toileting with SBA  Patient Goals   Patient goals : \"go home again\" Therapy Time   Individual Concurrent Group Co-treatment   Time In 1010         Time Out 1043         Minutes 33         Timed Code Treatment Minutes: 23 Minutes (10 min eval)    If pt is discharged prior to next OT session, this note will serve as the discharge summary.   Valentine Givens OT

## 2022-03-31 NOTE — DISCHARGE SUMMARY
Hospital Discharge Summary    Patient's PCP: Margarita Cerna  Admit Date: 3/29/2022   Discharge Date: 3/31/2022    Admitting Physician: Dr. Cathy Sidhu MD  Discharge Physician: Dr. Suzanne Read MD   Consults: none    Brief HPI:   aSmuel Vazquez is a 67 y.o. male who presented to the ED to be evaluated for a 2-day history of abdominal pain and nausea, not associated with diarrhea or vomiting. He has chronic hypoxic respiratory failure due to centrilobular emphysema, and he states that his respiratory status is slightly worse than his usual.  Patient also reports that his cardiologist, Dr. BRYSON MERCADO [facility], recently added a once weekly dosage of Zaroxolyn to his current Demadex diuretic regimen. Patient had a PCP appointment yesterday, during which labs were collected. He received a phone call today from his provider reporting that several of his electrolytes were abnormal and warranted an ED visit.     Upon arrival to the ED EKG was obtained revealing A. fib with incomplete RBBB, and prolonged QTC. CXR and CT of abdomen and pelvis negative for acute findings. Notable labs include: Hyponatremia 131, hypokalemia 2.6, hypochloremia 73, hypophosphatemia 1.4, and proBNP 810. Patient received NS bolus as well as oral and IV potassium supplementation per ED provider, prior to request for admission         Brief hospital course:     -Severe hypokalemia--- dt diuretic therapy--repleted--decreased Demadex to 20 mg on discharge discontinued metolazone--daily potassium supplementation added  -Hyponatremia--due to diuretic therapy--resolved with IV fluids  -Hypophosphatemia--repleted  -Vitamin D deficiency---start iron supplementation  -Chronic diastolic heart failure--compensated--diuretics held  -Chronic A. Fib--rate controlled--continue Eliquis and Cardizem  -Abdominal pain/nausea--- resolved  -Prolonged QT--continue to monitor--avoid meds that will  Prolonged QT  -COPD-stable. --Continue inhaled steroids and bronchodilators    Invasive procedures:  none    Discharge Diagnoses:   Principal Problem:    Hypophosphatemia  Active Problems:    Essential hypertension    Acute on chronic respiratory failure with hypoxemia [facility])    Former smoker    Atrial fibrillation (HCC)    Acute hyponatremia    Acute hypokalemia    Long term current use of diuretic  Resolved Problems:    * No resolved hospital problems. *      Physical Exam: /72   Pulse 90   Temp 97.9 °F (36.6 °C) (Oral)   Resp 22   Ht 6' (1.829 m)   Wt 180 lb 7 oz (81.8 kg)   SpO2 92%   BMI 24.47 kg/m²   Gen/overall appearance: Not in acute distress. Alert. Head: Normocephalic, atraumatic  Eyes: EOMI, good acuity  ENT:- Oral mucosa moist  Neck: No JVD, thyromegaly  CVS: Nml S1S2, no MRG, RRR  Pulm: Clear bilaterally. No crackles/wheezes  Gastrointestinal: Soft, NT/ND, +BS  Musculoskeletal: No edema. Warm  Neuro: No focal deficit. Moves extremity spontaneously. Psychiatry: Appropriate affect. Not agitated. Skin: Warm, dry with normal turgor. No rash        Significant Diagnostic Studies:    See above      Treatments: As above.       Discharge Medications:     Medication List      ASK your doctor about these medications    apixaban 5 MG Tabs tablet  Commonly known as: Eliquis  TAKE 1 TABLET TWICE DAILY     dilTIAZem 180 MG extended release capsule  Commonly known as: CARDIZEM CD  TAKE 1 CAPSULE BY MOUTH DAILY     famotidine 20 MG tablet  Commonly known as: PEPCID  TAKE (1) TABLET TWICE A DAY BEFORE MEALS.     guaiFENesin 600 MG extended release tablet  Commonly known as: Mucinex  Take 1 tablet by mouth 2 times daily as needed for Congestion     ipratropium-albuterol 0.5-2.5 (3) MG/3ML Soln nebulizer solution  Commonly known as: DUONEB  INHALE 3 MILLILIERS (1 VIAL) VIA NEBULIZATION ROUTE EVERY 6 HOURS AS NEEDED FOR SHORTNESS OF BREATH     lisinopril 5 MG tablet  Commonly known as: PRINIVIL;ZESTRIL  TAKE 1 TABLET BY MOUTH DAILY     metOLazone 2.5 MG tablet  Commonly known as: ZAROXOLYN  Take 1 tablet by mouth every 7 days     nitroGLYCERIN 0.4 MG SL tablet  Commonly known as: Nitrostat  Place 1 tablet under the tongue every 5 minutes as needed for Chest pain     Roflumilast 500 MCG tablet  Commonly known as: Daliresp  TAKE (1) TABLET DAILY     Symbicort 160-4.5 MCG/ACT Aero  Generic drug: budesonide-formoterol  INHALE 2 PUFFS INTO THE LUNGS 2 TIMES DAILY     torsemide 20 MG tablet  Commonly known as: DEMADEX  Take 2 tablets by mouth daily     Ventolin  (90 Base) MCG/ACT inhaler  Generic drug: albuterol sulfate HFA  INHALE 2 PUFFS EVERY 6 HOURS AS NEEDED FOR WHEEZING OR SHORTNESS OF BREATH            Activity: activity as tolerated  Diet: ADULT DIET; Regular; Low Fat/Low Chol/High Fiber/2 gm Na      Disposition: home  Discharged Condition: Stable  Follow Up:   [address] [address]  [phone]            Code status:  Full Code         Total time spent on discharge, finalizing medications, referrals and arranging outpatient follow up was more than 45 minutes      Thank you Dr. Margarita Cerna for the opportunity to be involved in this patients care.

## 2022-03-31 NOTE — PROGRESS NOTES
Physician Progress Note      PATIENT:               Jamal Livingston  CSN #:                  [id number]  :                       1950  ADMIT DATE:       3/29/2022 1:15 PM  DISCH DATE:  RESPONDING  PROVIDER #:        Brandon Sandra MD          QUERY TEXT:    Patient admitted with electrolyte imbalances. Documentation reflects acute on   chronic hypoxic respiratory failure noted on H/P. If possible, please   document in the progress notes and discharge summary if acute on chronic   hypoxic respiratory failure was: The medical record reflects the following:    Risk Factors: chronic respiratory failure with baseline 3 ltrs home 02 per   cardiology consult hpi    Clinical Indicators: supplemental oxygen at 4 L to keep sats 90-95%, nursing   notes: Lung sounds with expiratory wheezing throughout. Dyspnea at rest and   with exertion. 02 increased to 4L via NC per respiratory. Desats to mid to   high 70s when pivots to [address]    VBG 3/29 ph 7.47, pC02 66.6    Treatment: supplemental 02, imaging, labs, monitoring, supportive care    Thank you,  Bandar Thakur RN CDS  [email or web address]  Options provided:  -- acute on chronic hypoxic respiratory failure confirmed after study  -- acute on chronic hypoxic respiratory failure ruled out after study  -- Other - I will add my own diagnosis  -- Disagree - Not applicable / Not valid  -- Disagree - Clinically unable to determine / Unknown  -- Refer to Clinical Documentation Reviewer    PROVIDER RESPONSE TEXT:    Chronic respiratory failure dt severe COPD    Query created by:  Adrianne Lopez on 3/31/2022 10:43 AM      Electronically signed by:  Brandon Sandra MD 3/31/2022 2:30 PM

## 2022-03-31 NOTE — PROGRESS NOTES
A/O x4. VSS. Continues with 1800ml fluid restriction. Remains on tele; fluctuates between afib and SV rhythm. Lung sounds diminished. Productive cough with thick yellow phlegm. 02 sats maintaining above 90% on 4L via NC. Respirations labored. Dyspnea noted at rest and with exertion. Continues with limited activity and up to [facility] only. Independently transfers self to [facility]. Denies any pain/discomfort.

## 2022-03-31 NOTE — PROGRESS NOTES
[facility] Progress Note           Subjective/   67y.o. year old male who we are seeing in consultation for Electrolyte imbalances. HPI:  K, Na, Phos normalized. Intake adequate. ROS:  +chronic shortness of breath - stable. Weakness better. Objective/   GEN:  Chronically ill, /64   Pulse 95   Temp 98 °F (36.7 °C) (Oral)   Resp 22   Ht 6' (1.829 m)   Wt 180 lb 7 oz (81.8 kg)   SpO2 92%   BMI 24.47 kg/m²   HEENT: non-icteric, no JVD  CV: S1, S2, irregular without m/r/g; no LE edema  RESP: CTA B without w/r/r; breathing wnl  ABD: +bs, soft, nt, no hsm  SKIN: warm, no rashes    Data/  Recent Labs     03/29/22  1352 03/30/22  0535   WBC 10.4 9.8   HGB 15.1 13.8   HCT 44.8 41.0   MCV 90.0 89.5    304     Recent Labs     03/29/22  1352 03/29/22  2256 03/30/22  0535 03/30/22  1112 03/30/22  1714 03/30/22  1959 03/31/22  0737   *   < > 138   < > 140 135* 138   K 2.6*   < > 2.7*  2.7*   < > 3.5 3.3* 4.3   CL 73*   < > 86*   < > 88* 87* 91*   CO2 46*   < > 43*   < > 42* 38* 35*   GLUCOSE 128*   < > 122*   < > 128* 141* 119*   PHOS 1.4*   < > 2.7   < > 2.0* 2.0* 2.5   MG 2.20  --  2.20  --   --   --   --    BUN 19   < > 11   < > 11 11 9   CREATININE 0.9   < > 0.7*   < > 0.8 0.7* 0.6*   LABGLOM >60   < > >60   < > >60 >60 >60   GFRAA >60   < > >60   < > >60 >60 >60    < > = values in this interval not displayed.        Assessment/     - Hypokalemia - secondary to diuresis from torsemide & metolazone, improving with replacement     - Hyponatremia - secondary to metolazone effect + translocation effect from Hypokalemia - better     - Metabolic alkalosis - from diuresis     - Hypophosphatemia - suspect nutritional + Vitamin D deficiency     - Vitamin D deficiency - Vitamin D level 10, started on ergocalciferol weekly replacement     - dCHF - compensated     - Atrial fibrillation - rate controled     - COPD - stable       Plan/     - Monitoring off of IVF's & diuretic at the moment until electrolytes replaced  - Started vitamin D replacement with ergocalciferol weekly replacement - continue for 10 weeks  - Trend labs     Okay for discharge  Agree with lower dose of torsemide 20 mg a day on discharge and avoiding metolazone  Suggested repeat labs in one week - on d/c instructions  Case d/w patient and family    ____________________________________  José Miguel Carreon MD  [facility]  [email or web address]  Office: [phone]

## 2022-03-31 NOTE — PROGRESS NOTES
Physical Therapy    Facility/Department: [facility] C3 TELE/MED SURG/ONC  Initial Assessment    NAME: Ovidio Pryor  : 1950  MRN: [id number]    Date of Service: 3/31/2022    Discharge Recommendations:  24 hour supervision or assist,Home with Home health PT   PT Equipment Recommendations  Equipment Needed: No  Other: pt owns 4WW    Assessment   Body structures, Functions, Activity limitations: Decreased functional mobility ; Decreased balance;Decreased posture;Decreased endurance;Decreased coordination  Assessment: Pt is 68 yo male who presents with diagnosis of hypokalemia. Pt indep to mod I with mobility at baseline. Wears 3L O2 at baseline. Grossly CGA for mobility with RW this date. Desats to 88-90% on 4L with gait training. Limited d/t decreased activity tolerance. Pt would benefit from continued skilled therapy to address deficits. Recommend home with 24-hr Sup and home PT at d/c. Treatment Diagnosis: impaired functional mobility  Specific instructions for Next Treatment: progress mobility as tolerated  Prognosis: Good  Decision Making: Low Complexity  PT Education: Goals; General Safety;Gait Training;Disease Specific Education;PT Role;Functional Mobility Training;Plan of Care;Transfer Training  Patient Education: Pt educated on importance of OOB mobility, safe gait training and transfers, energy conservation, and monitoring oxygen levels with activity-- pt verbalized understanding  Barriers to Learning: none  REQUIRES PT FOLLOW UP: Yes  Activity Tolerance  Activity Tolerance: Patient Tolerated treatment well;Patient limited by fatigue;Patient limited by endurance  Activity Tolerance: /69, HR 95; SpO2 93% on 4L seated EOB. Post gait training seated in chair , SpO2 88-90% on 4L. Patient Diagnosis(es): The primary encounter diagnosis was Hypokalemia. Diagnoses of Hypophosphatemia, Abdominal discomfort, and Nausea were also pertinent to this visit.      has a past medical history of Bronchitis chronic, Emphysema of lung ([facility] [address] Influenza A, Lung disease, and Pneumonia. has a past surgical history that includes fracture surgery and hernia repair.     Restrictions  Restrictions/Precautions  Restrictions/Precautions: General Precautions,Fall Risk  Position Activity Restriction  Other position/activity restrictions: 4LO2, BR with bsc  Vision/Hearing  Vision: Impaired  Vision Exceptions: Wears glasses for reading  Hearing: Exceptions to [facility]  Hearing Exceptions: Hard of hearing/hearing concerns     Subjective  General  Chart Reviewed: Yes  Patient assessed for rehabilitation services?: Yes  Response To Previous Treatment: Not applicable  Family / Caregiver Present: No  Referring Practitioner: Dr. Elvira Fitzgerald MD  Referral Date : 03/31/22  Diagnosis: Hypokalemia  Follows Commands: Within Functional Limits  General Comment  Comments: Pt resting in bed on approach; RN cleared pt for therapy  Subjective  Subjective: pt agreeable to therapy  Pain Screening  Patient Currently in Pain: Denies    Orientation  Orientation  Overall Orientation Status: Within Functional Limits  Social/Functional History  Social/Functional History  Lives With: Family (son and daughter in law; does have 24-hr Sup/assist)  Type of Home: House  Home Layout: One level  Home Access: Stairs to enter without rails  Entrance Stairs - Number of Steps: 2  Bathroom Shower/Tub: Walk-in shower (has BSC that he uses as shower chair)  Bathroom Toilet: Standard  Home Equipment: Oxygen,4 wheeled walker (3L at all times baseline)  ADL Assistance: [address]: Independent  Homemaking Responsibilities: Yes (does small tasks including making breakfast and his own laundry)  Ambulation Assistance: Independent (intermittent use of 4WW)  Transfer Assistance: Independent  Active : No  Occupation: Retired  Type of occupation: [employer]  Additional Comments: denies falls    Objective     RLE AROM: WFL  LLE AROM : WFL  Strength RLE: WFL  Strength LLE: WFL       Sensation  Overall Sensation Status: Impaired (baseline numbness in feet (L>R))     Bed mobility  Supine to Sit: Stand by assistance (HOB elevated, use of rails)  Sit to Supine: Unable to assess (pt up in chair at end of session)     Transfers  Sit to Stand: Contact guard assistance (from EOB to RW. Cues for safe hand placement)  Stand to sit: Contact guard assistance     Ambulation  Ambulation?: Yes  Ambulation 1  Surface: level tile  Device: Rolling Walker  Other Apparatus: O2 (4L)  Assistance: Contact guard assistance  Quality of Gait: Cues for posture and to maintain ROBERT within RW especially with turns. Demonstrates decreased step length and foot clearance BLE  Gait Deviations: Shuffles;Decreased step length;Decreased step height  Distance: 30 ft        Balance  Sitting - Static: Good  Sitting - Dynamic: Good  Standing - Static: Fair;+  Standing - Dynamic: [address]  Times per week: 3-5x/wk  Times per day: Daily  Specific instructions for Next Treatment: progress mobility as tolerated  Current Treatment Recommendations: Strengthening,Neuromuscular Re-education,Home Exercise Program,Safety Education & Training,Balance Training,Endurance Training,Functional Mobility Training,Transfer Training,Gait Training,Stair training,Equipment Evaluation, Education, & procurement,Patient/Caregiver Education & Training  Safety Devices  Type of devices:  All fall risk precautions in place,Call light within reach,Chair alarm in place,Gait belt,Patient at risk for falls,Nurse notified,Left in chair                                   AM-PAC Score     AM-PAC Inpatient Mobility without Stair Climbing Raw Score : 16 (03/31/22 1445)  AM-PAC Inpatient without Stair Climbing T-Scale Score : 45.54 (03/31/22 1445)  Mobility Inpatient CMS 0-100% Score: 40.64 (03/31/22 1445)  Mobility Inpatient without Stair CMS G-Code Modifier : CK (03/31/22 1445)       Goals  Short term goals  Time Frame for Short term goals: 1 week (4/07) unless otherwise specified  Short term goal 1: Pt will be mod I with supine<>sit. Short term goal 2: Pt will be supervision with transfers with RW. Short term goal 3: Pt will ambulate 50 ft with RW and supervision. Short term goal 4: Pt will negotiate 2 stairs with SBA. Short term goal 5: 4/04: Pt will participate in 12-15 reps of BLE exercises to promote strength and activity tolerance. Patient Goals   Patient goals : \"to go home\"       Therapy Time   Individual Concurrent Group Co-treatment   Time In 1010         Time Out 1043         Minutes 33         Timed Code Treatment Minutes: Karen, PT, DPT  If pt is unable to be seen after this session, please let this note serve as discharge summary. Please see case management note for discharge disposition. Thank you.

## 2022-03-31 NOTE — PROGRESS NOTES
Shift assessment completed and charted. VSS. A&OX4. 4L NC. Labored and SOB noted, per pt this morning got very SOB and improved after breathing treatment. Self to [facility]. Bed locked and in lowest position. Call light within reach. Pt denies any other needs at this time. Will continue to monitor.

## 2022-03-31 NOTE — PROGRESS NOTES
Pt does get SOB when even sitting up on side of bed. While washing pt up today and him just standing at side of bed, pt did drop to 81%.  After sitting back down and taking deep breaths, o2 back up to 91% on 4L NC.

## 2022-03-31 NOTE — PROGRESS NOTES
Discharge: Pt discharged to home as per order. IV removed. Scripts plus instructions given. Pt verbalized understanding. Denied questions. All belongings sent home with pt.  Pt discharged on 4L NC.

## 2022-03-31 NOTE — PROGRESS NOTES
Paged MD Cally, \"pt is more SOB today even when talking per case management. o2 did drop to 81% when just standing up on the side of bed and when getting up in the chair. \" @4687. Addendum: MD replied with, \"He told me he was fine earlier but gets exersional sob . Lets get a CXR and get him some breathing treatments. \" @0490.     See orders by MD.

## 2022-03-31 NOTE — PLAN OF CARE
Problem: Infection:  Goal: Will remain free from infection  Description: Will remain free from infection  Outcome: Ongoing     Problem: Daily Care:  Goal: Daily care needs are met  Description: Daily care needs are met  Outcome: Ongoing     Problem: Pain:  Goal: Patient's pain/discomfort is manageable  Description: Patient's pain/discomfort is manageable  Outcome: Met This Shift

## 2022-03-31 NOTE — PROGRESS NOTES
[address]   Daily Progress Note    Admit Date:  3/29/2022  HPI:    Chief Complaint   Patient presents with    Abnormal Lab     patient had blood work drawn yesterday, states \"I think was K+ is low\".  Abdominal Pain     patient with abdominal pain for few days, +nausea, -emesis, -diarrhea        Interval history: Michael Lees is being followed for chronic heart failure    Subjective:  Mr. Ahmet Merino had an episode of shortness of breath  Due to dry throat. Shortness of breath resolved   Able to eat all of his lunch     Objective:   /64   Pulse 95   Temp 98 °F (36.7 °C) (Oral)   Resp 22   Ht 6' (1.829 m)   Wt 180 lb 7 oz (81.8 kg)   SpO2 92%   BMI 24.47 kg/m²       Intake/Output Summary (Last 24 hours) at 3/31/2022 0919  Last data filed at 3/31/2022 0800  Gross per 24 hour   Intake 2968. 41 ml   Output 525 ml   Net 2443.41 ml       NYHA: III    Physical Exam:  General:  Awake, alert, NAD, up in the chair, chronically ill appearing   Skin:  Warm and dry  Neck:  JVD<8  Chest:  Clear but dim throughout  to auscultation, no wheezes/rhonchi/rales  Telemetry: afib   Cardiovascular:  Irregular S1S2, no m/r/g   Abdomen:  Soft, nontender, +bowel sounds  Extremities: trace pedal bilateral lower extremity edema    Medications:    dilTIAZem  180 mg Oral Daily    vitamin D  50,000 Units Oral Weekly    apixaban  5 mg Oral BID    lisinopril  5 mg Oral Daily    Roflumilast  500 mcg Oral Daily    mometasone-formoterol  2 puff Inhalation BID    [Held by provider] torsemide  40 mg Oral Daily    sodium chloride flush  10 mL IntraVENous 2 times per day    ipratropium-albuterol  1 ampule Inhalation Q4H WA      sodium chloride         Lab Data:  CBC:   Recent Labs     03/29/22  1352 03/30/22  0535   WBC 10.4 9.8   HGB 15.1 13.8    304     BMP:    Recent Labs     03/30/22  1714 03/30/22  1959 03/31/22  0737    135* 138   K 3.5 3.3* 4.3   CO2 42* 38* 35*   BUN 11 11 9   CREATININE 0.8 0.7* 0.6*     INR:  No results for input(s): INR in the last 72 hours. BNP:    Recent Labs     03/29/22  1947   PROBNP 810*     Lab Results   Component Value Date    LVEF 55 03/14/2022     Testing:  TTE 3/14/22:  Conclusions   Summary   Left ventricular systolic function is normal with ejection fraction   estimated at 55%.   No regional wall motion abnormalities.   There is mild concentric left ventricular hypertrophy.   Elevated left ventricular filling pressure.   Mild bi-atrial enlargement.   Mild mitral regurgitation.   Mild tricuspid regurgitation.   Systolic pulmonary artery pressure (SPAP) is normal estimated at 38 mmHg (Right atrial pressure of 3 mmHg).   No significant change from exam done 1/11/2018.     Principal Problem:    Hypophosphatemia  Active Problems:    Essential hypertension    Acute on chronic respiratory failure with hypoxemia [facility])    Former smoker    Atrial fibrillation (HCC)    Acute hyponatremia    Acute hypokalemia    Long term current use of diuretic  Resolved Problems:    * No resolved hospital problems.  *    Assessment:  HFpEF- compensated  Chronic afib  COPD  Chronic hypoxemic resp failure  Hypokalemia- resolved     Plan:  eliquis  Lisinopril  Torsemide currently on hold- would recommend lower dose 20mg at discharge with repeat BMP early next week     No further recommendations, cardiology will sign off    ERIN Watson - CNP,  3/31/2022, 12:40 PM

## 2022-04-03 ENCOUNTER — TELEPHONE (OUTPATIENT)
Dept: OTHER | Facility: CLINIC | Age: 72
End: 2022-04-03

## 2022-04-03 ENCOUNTER — HOSPITAL ENCOUNTER (INPATIENT)
Age: 72
LOS: 4 days | Discharge: HOME HEALTH | DRG: 291 | End: 2022-04-07
Attending: EMERGENCY MEDICINE | Admitting: INTERNAL MEDICINE
Payer: MEDICARE

## 2022-04-03 ENCOUNTER — APPOINTMENT (OUTPATIENT)
Dept: GENERAL RADIOLOGY | Age: 72
DRG: 291 | End: 2022-04-03
Payer: MEDICARE

## 2022-04-03 DIAGNOSIS — I48.21 PERMANENT ATRIAL FIBRILLATION (HCC): ICD-10-CM

## 2022-04-03 DIAGNOSIS — J96.21 ACUTE ON CHRONIC RESPIRATORY FAILURE WITH HYPOXIA AND HYPERCAPNIA (HCC): Primary | ICD-10-CM

## 2022-04-03 DIAGNOSIS — J96.22 ACUTE ON CHRONIC RESPIRATORY FAILURE WITH HYPOXIA AND HYPERCAPNIA (HCC): Primary | ICD-10-CM

## 2022-04-03 DIAGNOSIS — I50.9 ACUTE ON CHRONIC CONGESTIVE HEART FAILURE, UNSPECIFIED HEART FAILURE TYPE (HCC): ICD-10-CM

## 2022-04-03 PROBLEM — I50.43 ACUTE ON CHRONIC COMBINED SYSTOLIC (CONGESTIVE) AND DIASTOLIC (CONGESTIVE) HEART FAILURE (HCC): Status: ACTIVE | Noted: 2022-04-03

## 2022-04-03 LAB
ANION GAP SERPL CALCULATED.3IONS-SCNC: 10 MMOL/L (ref 3–16)
BASE EXCESS VENOUS: 6.6 MMOL/L (ref -3–3)
BASOPHILS ABSOLUTE: 0 K/UL (ref 0–0.2)
BASOPHILS RELATIVE PERCENT: 0.3 %
BILIRUBIN URINE: NEGATIVE
BLOOD, URINE: NEGATIVE
BUN BLDV-MCNC: 14 MG/DL (ref 7–20)
CALCIUM SERPL-MCNC: 8.9 MG/DL (ref 8.3–10.6)
CARBOXYHEMOGLOBIN: 1.2 % (ref 0–1.5)
CHLORIDE BLD-SCNC: 97 MMOL/L (ref 99–110)
CLARITY: CLEAR
CO2: 31 MMOL/L (ref 21–32)
COLOR: YELLOW
CREAT SERPL-MCNC: 0.7 MG/DL (ref 0.8–1.3)
EKG ATRIAL RATE: 69 BPM
EKG DIAGNOSIS: NORMAL
EKG Q-T INTERVAL: 364 MS
EKG QRS DURATION: 90 MS
EKG QTC CALCULATION (BAZETT): 435 MS
EKG R AXIS: -6 DEGREES
EKG T AXIS: -16 DEGREES
EKG VENTRICULAR RATE: 86 BPM
EOSINOPHILS ABSOLUTE: 0.1 K/UL (ref 0–0.6)
EOSINOPHILS RELATIVE PERCENT: 0.6 %
GFR AFRICAN AMERICAN: >60
GFR NON-AFRICAN AMERICAN: >60
GLUCOSE BLD-MCNC: 150 MG/DL (ref 70–99)
GLUCOSE URINE: NEGATIVE MG/DL
HCO3 VENOUS: 32.7 MMOL/L (ref 23–29)
HCT VFR BLD CALC: 40.6 % (ref 40.5–52.5)
HEMOGLOBIN: 13.4 G/DL (ref 13.5–17.5)
KETONES, URINE: NEGATIVE MG/DL
LACTIC ACID, SEPSIS: 2.3 MMOL/L (ref 0.4–1.9)
LEUKOCYTE ESTERASE, URINE: NEGATIVE
LYMPHOCYTES ABSOLUTE: 1.2 K/UL (ref 1–5.1)
LYMPHOCYTES RELATIVE PERCENT: 9.4 %
MCH RBC QN AUTO: 30.4 PG (ref 26–34)
MCHC RBC AUTO-ENTMCNC: 33 G/DL (ref 31–36)
MCV RBC AUTO: 92 FL (ref 80–100)
METHEMOGLOBIN VENOUS: 0.5 %
MICROSCOPIC EXAMINATION: NORMAL
MONOCYTES ABSOLUTE: 0.8 K/UL (ref 0–1.3)
MONOCYTES RELATIVE PERCENT: 6.2 %
NEUTROPHILS ABSOLUTE: 10.8 K/UL (ref 1.7–7.7)
NEUTROPHILS RELATIVE PERCENT: 83.5 %
NITRITE, URINE: NEGATIVE
O2 SAT, VEN: 96 %
O2 THERAPY: ABNORMAL
PCO2, VEN: 51.9 MMHG (ref 40–50)
PDW BLD-RTO: 15.2 % (ref 12.4–15.4)
PH UA: 7 (ref 5–8)
PH VENOUS: 7.42 (ref 7.35–7.45)
PLATELET # BLD: 358 K/UL (ref 135–450)
PMV BLD AUTO: 8.8 FL (ref 5–10.5)
PO2, VEN: 81.4 MMHG (ref 25–40)
POTASSIUM REFLEX MAGNESIUM: 4.7 MMOL/L (ref 3.5–5.1)
PRO-BNP: 2106 PG/ML (ref 0–124)
PROCALCITONIN: 0.07 NG/ML (ref 0–0.15)
PROTEIN UA: NEGATIVE MG/DL
RBC # BLD: 4.41 M/UL (ref 4.2–5.9)
SARS-COV-2, NAAT: NOT DETECTED
SODIUM BLD-SCNC: 138 MMOL/L (ref 136–145)
SPECIFIC GRAVITY UA: 1.01 (ref 1–1.03)
TCO2 CALC VENOUS: 34 MMOL/L
TROPONIN: <0.01 NG/ML
URINE REFLEX TO CULTURE: NORMAL
URINE TYPE: NORMAL
UROBILINOGEN, URINE: 0.2 E.U./DL
WBC # BLD: 12.9 K/UL (ref 4–11)

## 2022-04-03 PROCEDURE — 87040 BLOOD CULTURE FOR BACTERIA: CPT

## 2022-04-03 PROCEDURE — 96375 TX/PRO/DX INJ NEW DRUG ADDON: CPT

## 2022-04-03 PROCEDURE — 6370000000 HC RX 637 (ALT 250 FOR IP): Performed by: INTERNAL MEDICINE

## 2022-04-03 PROCEDURE — 93005 ELECTROCARDIOGRAM TRACING: CPT | Performed by: EMERGENCY MEDICINE

## 2022-04-03 PROCEDURE — 85025 COMPLETE CBC W/AUTO DIFF WBC: CPT

## 2022-04-03 PROCEDURE — 96367 TX/PROPH/DG ADDL SEQ IV INF: CPT

## 2022-04-03 PROCEDURE — 96365 THER/PROPH/DIAG IV INF INIT: CPT

## 2022-04-03 PROCEDURE — 81003 URINALYSIS AUTO W/O SCOPE: CPT

## 2022-04-03 PROCEDURE — 83605 ASSAY OF LACTIC ACID: CPT

## 2022-04-03 PROCEDURE — 99285 EMERGENCY DEPT VISIT HI MDM: CPT

## 2022-04-03 PROCEDURE — 87635 SARS-COV-2 COVID-19 AMP PRB: CPT

## 2022-04-03 PROCEDURE — 94640 AIRWAY INHALATION TREATMENT: CPT

## 2022-04-03 PROCEDURE — 80048 BASIC METABOLIC PNL TOTAL CA: CPT

## 2022-04-03 PROCEDURE — 2580000003 HC RX 258: Performed by: EMERGENCY MEDICINE

## 2022-04-03 PROCEDURE — 2060000000 HC ICU INTERMEDIATE R&B

## 2022-04-03 PROCEDURE — 6370000000 HC RX 637 (ALT 250 FOR IP): Performed by: EMERGENCY MEDICINE

## 2022-04-03 PROCEDURE — 82803 BLOOD GASES ANY COMBINATION: CPT

## 2022-04-03 PROCEDURE — 94660 CPAP INITIATION&MGMT: CPT

## 2022-04-03 PROCEDURE — 93010 ELECTROCARDIOGRAM REPORT: CPT | Performed by: INTERNAL MEDICINE

## 2022-04-03 PROCEDURE — 2700000000 HC OXYGEN THERAPY PER DAY

## 2022-04-03 PROCEDURE — 6360000002 HC RX W HCPCS: Performed by: EMERGENCY MEDICINE

## 2022-04-03 PROCEDURE — 94644 CONT INHLJ TX 1ST HOUR: CPT

## 2022-04-03 PROCEDURE — 83880 ASSAY OF NATRIURETIC PEPTIDE: CPT

## 2022-04-03 PROCEDURE — 71045 X-RAY EXAM CHEST 1 VIEW: CPT

## 2022-04-03 PROCEDURE — 84484 ASSAY OF TROPONIN QUANT: CPT

## 2022-04-03 PROCEDURE — 2580000003 HC RX 258: Performed by: INTERNAL MEDICINE

## 2022-04-03 PROCEDURE — 6360000002 HC RX W HCPCS: Performed by: INTERNAL MEDICINE

## 2022-04-03 PROCEDURE — 84145 PROCALCITONIN (PCT): CPT

## 2022-04-03 RX ORDER — POLYETHYLENE GLYCOL 3350 17 G/17G
17 POWDER, FOR SOLUTION ORAL DAILY PRN
Status: DISCONTINUED | OUTPATIENT
Start: 2022-04-03 | End: 2022-04-07 | Stop reason: HOSPADM

## 2022-04-03 RX ORDER — POTASSIUM CHLORIDE 7.45 MG/ML
10 INJECTION INTRAVENOUS PRN
Status: DISCONTINUED | OUTPATIENT
Start: 2022-04-03 | End: 2022-04-07 | Stop reason: HOSPADM

## 2022-04-03 RX ORDER — SODIUM CHLORIDE 9 MG/ML
INJECTION, SOLUTION INTRAVENOUS PRN
Status: DISCONTINUED | OUTPATIENT
Start: 2022-04-03 | End: 2022-04-07 | Stop reason: HOSPADM

## 2022-04-03 RX ORDER — LISINOPRIL 5 MG/1
5 TABLET ORAL DAILY
Status: DISCONTINUED | OUTPATIENT
Start: 2022-04-04 | End: 2022-04-07 | Stop reason: HOSPADM

## 2022-04-03 RX ORDER — SODIUM CHLORIDE 0.9 % (FLUSH) 0.9 %
5-40 SYRINGE (ML) INJECTION EVERY 12 HOURS SCHEDULED
Status: DISCONTINUED | OUTPATIENT
Start: 2022-04-03 | End: 2022-04-07 | Stop reason: HOSPADM

## 2022-04-03 RX ORDER — GUAIFENESIN 600 MG/1
600 TABLET, EXTENDED RELEASE ORAL 2 TIMES DAILY PRN
Status: DISCONTINUED | OUTPATIENT
Start: 2022-04-03 | End: 2022-04-07 | Stop reason: HOSPADM

## 2022-04-03 RX ORDER — ONDANSETRON 2 MG/ML
4 INJECTION INTRAMUSCULAR; INTRAVENOUS EVERY 6 HOURS PRN
Status: DISCONTINUED | OUTPATIENT
Start: 2022-04-03 | End: 2022-04-07 | Stop reason: HOSPADM

## 2022-04-03 RX ORDER — IPRATROPIUM BROMIDE AND ALBUTEROL SULFATE 2.5; .5 MG/3ML; MG/3ML
1 SOLUTION RESPIRATORY (INHALATION)
Status: DISCONTINUED | OUTPATIENT
Start: 2022-04-03 | End: 2022-04-03

## 2022-04-03 RX ORDER — FUROSEMIDE 10 MG/ML
80 INJECTION INTRAMUSCULAR; INTRAVENOUS ONCE
Status: COMPLETED | OUTPATIENT
Start: 2022-04-03 | End: 2022-04-03

## 2022-04-03 RX ORDER — METHYLPREDNISOLONE SODIUM SUCCINATE 125 MG/2ML
125 INJECTION, POWDER, LYOPHILIZED, FOR SOLUTION INTRAMUSCULAR; INTRAVENOUS ONCE
Status: COMPLETED | OUTPATIENT
Start: 2022-04-03 | End: 2022-04-03

## 2022-04-03 RX ORDER — POTASSIUM CHLORIDE 20 MEQ/1
20 TABLET, EXTENDED RELEASE ORAL DAILY
Status: DISCONTINUED | OUTPATIENT
Start: 2022-04-03 | End: 2022-04-07 | Stop reason: HOSPADM

## 2022-04-03 RX ORDER — IPRATROPIUM BROMIDE AND ALBUTEROL SULFATE 2.5; .5 MG/3ML; MG/3ML
1 SOLUTION RESPIRATORY (INHALATION)
Status: DISCONTINUED | OUTPATIENT
Start: 2022-04-03 | End: 2022-04-07 | Stop reason: HOSPADM

## 2022-04-03 RX ORDER — MAGNESIUM SULFATE IN WATER 40 MG/ML
2000 INJECTION, SOLUTION INTRAVENOUS PRN
Status: DISCONTINUED | OUTPATIENT
Start: 2022-04-03 | End: 2022-04-07 | Stop reason: HOSPADM

## 2022-04-03 RX ORDER — ALBUTEROL SULFATE 2.5 MG/3ML
5 SOLUTION RESPIRATORY (INHALATION) EVERY 6 HOURS PRN
Status: DISCONTINUED | OUTPATIENT
Start: 2022-04-03 | End: 2022-04-03

## 2022-04-03 RX ORDER — DILTIAZEM HYDROCHLORIDE 180 MG/1
180 CAPSULE, COATED, EXTENDED RELEASE ORAL DAILY
Status: DISCONTINUED | OUTPATIENT
Start: 2022-04-04 | End: 2022-04-04

## 2022-04-03 RX ORDER — ONDANSETRON 4 MG/1
4 TABLET, ORALLY DISINTEGRATING ORAL EVERY 8 HOURS PRN
Status: DISCONTINUED | OUTPATIENT
Start: 2022-04-03 | End: 2022-04-07 | Stop reason: HOSPADM

## 2022-04-03 RX ORDER — FUROSEMIDE 10 MG/ML
40 INJECTION INTRAMUSCULAR; INTRAVENOUS 2 TIMES DAILY
Status: DISCONTINUED | OUTPATIENT
Start: 2022-04-03 | End: 2022-04-06

## 2022-04-03 RX ORDER — SODIUM CHLORIDE 0.9 % (FLUSH) 0.9 %
5-40 SYRINGE (ML) INJECTION PRN
Status: DISCONTINUED | OUTPATIENT
Start: 2022-04-03 | End: 2022-04-07 | Stop reason: HOSPADM

## 2022-04-03 RX ORDER — ACETAMINOPHEN 325 MG/1
650 TABLET ORAL EVERY 6 HOURS PRN
Status: DISCONTINUED | OUTPATIENT
Start: 2022-04-03 | End: 2022-04-07 | Stop reason: HOSPADM

## 2022-04-03 RX ORDER — FAMOTIDINE 20 MG/1
20 TABLET, FILM COATED ORAL 2 TIMES DAILY
Status: DISCONTINUED | OUTPATIENT
Start: 2022-04-03 | End: 2022-04-07 | Stop reason: HOSPADM

## 2022-04-03 RX ORDER — POTASSIUM CHLORIDE 20 MEQ/1
40 TABLET, EXTENDED RELEASE ORAL PRN
Status: DISCONTINUED | OUTPATIENT
Start: 2022-04-03 | End: 2022-04-07 | Stop reason: HOSPADM

## 2022-04-03 RX ORDER — ACETAMINOPHEN 650 MG/1
650 SUPPOSITORY RECTAL EVERY 6 HOURS PRN
Status: DISCONTINUED | OUTPATIENT
Start: 2022-04-03 | End: 2022-04-07 | Stop reason: HOSPADM

## 2022-04-03 RX ADMIN — VANCOMYCIN HYDROCHLORIDE 1000 MG: 1 INJECTION, POWDER, LYOPHILIZED, FOR SOLUTION INTRAVENOUS at 17:01

## 2022-04-03 RX ADMIN — IPRATROPIUM BROMIDE AND ALBUTEROL SULFATE 1 AMPULE: .5; 2.5 SOLUTION RESPIRATORY (INHALATION) at 19:44

## 2022-04-03 RX ADMIN — APIXABAN 5 MG: 5 TABLET, FILM COATED ORAL at 20:28

## 2022-04-03 RX ADMIN — ALBUTEROL SULFATE 5 MG: 2.5 SOLUTION RESPIRATORY (INHALATION) at 11:00

## 2022-04-03 RX ADMIN — CEFEPIME HYDROCHLORIDE 2000 MG: 2 INJECTION, POWDER, FOR SOLUTION INTRAVENOUS at 16:23

## 2022-04-03 RX ADMIN — METHYLPREDNISOLONE SODIUM SUCCINATE 125 MG: 125 INJECTION, POWDER, FOR SOLUTION INTRAMUSCULAR; INTRAVENOUS at 11:52

## 2022-04-03 RX ADMIN — FAMOTIDINE 20 MG: 20 TABLET, FILM COATED ORAL at 20:28

## 2022-04-03 RX ADMIN — ROFLUMILAST 500 MCG: 500 TABLET ORAL at 20:37

## 2022-04-03 RX ADMIN — Medication 10 ML: at 20:29

## 2022-04-03 RX ADMIN — FUROSEMIDE 80 MG: 10 INJECTION, SOLUTION INTRAMUSCULAR; INTRAVENOUS at 13:15

## 2022-04-03 RX ADMIN — FUROSEMIDE 40 MG: 10 INJECTION, SOLUTION INTRAMUSCULAR; INTRAVENOUS at 20:28

## 2022-04-03 RX ADMIN — IPRATROPIUM BROMIDE AND ALBUTEROL SULFATE 1 AMPULE: .5; 3 SOLUTION RESPIRATORY (INHALATION) at 11:13

## 2022-04-03 RX ADMIN — POTASSIUM CHLORIDE 20 MEQ: 20 TABLET, EXTENDED RELEASE ORAL at 20:28

## 2022-04-03 ASSESSMENT — PAIN DESCRIPTION - LOCATION
LOCATION: LEG
LOCATION: LEG
LOCATION: ANKLE
LOCATION: LEG

## 2022-04-03 ASSESSMENT — PAIN SCALES - GENERAL
PAINLEVEL_OUTOF10: 7
PAINLEVEL_OUTOF10: 0
PAINLEVEL_OUTOF10: 6
PAINLEVEL_OUTOF10: 0

## 2022-04-03 ASSESSMENT — ENCOUNTER SYMPTOMS
WHEEZING: 0
NAUSEA: 0
ABDOMINAL PAIN: 0
SHORTNESS OF BREATH: 1
VOMITING: 0
COUGH: 0
PHOTOPHOBIA: 0
CHEST TIGHTNESS: 1
DIARRHEA: 0
BACK PAIN: 0
RHINORRHEA: 0

## 2022-04-03 ASSESSMENT — PAIN DESCRIPTION - ORIENTATION
ORIENTATION: LEFT;RIGHT
ORIENTATION: LEFT;RIGHT
ORIENTATION: RIGHT;LEFT

## 2022-04-03 NOTE — CONSULTS
4/3/22 @ 19:09 Added Dr. Tabby Hermosillo to treatment team for [facility] consult.  Yonas Tai

## 2022-04-03 NOTE — ED NOTES
Telephone report given to [facility] on C-4. [facility] confirmed tele box [phone].       Alfie Jones, RN  04/03/22 Phyllis Magdaleno, FRANCISCA  04/03/22 [id number]

## 2022-04-03 NOTE — TELEPHONE ENCOUNTER
Writer contacted Dr. Anca Motley to inform of 30 day readmission risk. Dr. Anca Motley informed writer of potential readmission.

## 2022-04-03 NOTE — ED PROVIDER NOTES
Emergency Department Provider Note  Location: [address] [place] [address]  ED  4/3/2022     Patient Identification  Emmanuel Rendon is a 67 y.o. male    Chief Complaint  Shortness of Breath (discharged on 3/31 for respiratory failure/electrolyte imbalances. wears 4 liters at home. difficulty breathing worse today. brought in on CPAP.)          HPI  (History provided by patient)  Patient is a 66-year-old male with history of emphysema, chronic respiratory failure on 4 L nasal cannula baseline A. fib on oral anticoagulant, recently discharged from the hospital for electrolyte derangements a few days ago who presents with respiratory distress arrives by EMS on CPAP. Patient reports has been worsening throughout the day. Reports that he has had increased leg swelling since he left the hospital.  He was admitted for electrolyte derangements secondary to diuretics and had adjustment of his diuretic medications he was discharged on low-dose of torsemide. Patient may have missed 1 or 2 doses as well seems unsure. Denies cough sputum fever chills. No exacerbating alleviating factors. Denies leg pain leg swelling. Compliant with anticoagulation for A. fib. I have reviewed the following nursing documentation:  Allergies: No Known Allergies    Past medical history:  has a past medical history of Bronchitis chronic, Emphysema of lung ([facility] [address] Influenza A (01/10/2018), Lung disease, and Pneumonia. Past surgical history:  has a past surgical history that includes fracture surgery and hernia repair. Home medications:   Prior to Admission medications    Medication Sig Start Date End Date Taking?  Authorizing Provider   torsemide (DEMADEX) 20 MG tablet Take 1 tablet by mouth daily 3/31/22   Rosalba Chao MD   vitamin D (ERGOCALCIFEROL) 1.25 MG (31783 UT) CAPS capsule Take 1 capsule by mouth once a week 4/6/22   Rosalba Chao MD   potassium chloride (KLOR-CON M) 20 MEQ extended release tablet Take 1 tablet by mouth daily 3/31/22   Carlee Crawford MD   ipratropium-albuterol (DUONEB) 0.5-2.5 (3) MG/3ML SOLN nebulizer solution INHALE 3 MILLILIERS (1 VIAL) VIA NEBULIZATION ROUTE EVERY 6 HOURS AS NEEDED FOR SHORTNESS OF BREATH 2/28/22   Shaun Aviles MD   VENTOLIN  (90 Base) MCG/ACT inhaler INHALE 2 PUFFS EVERY 6 HOURS AS NEEDED FOR WHEEZING OR SHORTNESS OF BREATH 2/2/22   Shaun Aviles MD   SYMBICORT 160-4.5 MCG/ACT AERO INHALE 2 PUFFS INTO THE LUNGS 2 TIMES DAILY 12/2/21   Shaun Aviles MD   Roflumilast (DALIRESP) 500 MCG tablet TAKE (1) TABLET DAILY 12/2/21   Shaun Aviles MD   famotidine (PEPCID) 20 MG tablet TAKE (1) TABLET TWICE A DAY BEFORE MEALS. Patient not taking: Reported on 2/28/2022 11/8/21   Sobia Dai MD   lisinopril (PRINIVIL;ZESTRIL) 5 MG tablet TAKE 1 TABLET BY MOUTH DAILY 7/2/21   Sobia Dai MD   apixaban (ELIQUIS) 5 MG TABS tablet TAKE 1 TABLET TWICE DAILY 7/2/21   Sobia Dai MD   dilTIAZem (CARDIZEM CD) 180 MG extended release capsule TAKE 1 CAPSULE BY MOUTH DAILY 7/2/21   Sobia Dai MD   guaiFENesin (MUCINEX) 600 MG extended release tablet Take 1 tablet by mouth 2 times daily as needed for Congestion 8/6/20   Shaun Aviles MD   nitroGLYCERIN (NITROSTAT) 0.4 MG SL tablet Place 1 tablet under the tongue every 5 minutes as needed for Chest pain 7/1/20   Sobia Dai MD       Social history:  reports that he quit smoking about 5 years ago. His smoking use included cigarettes. He has a 30.00 pack-year smoking history. He has never used smokeless tobacco. He reports that he does not drink alcohol and does not use drugs. Family history:    Family History   Problem Relation Age of Onset    Diabetes Sister     Cancer Sister     Cancer Sister     Cancer Brother     Asthma Daughter     Asthma Son     Diabetes Mother     Cancer Father          ROS  Review of Systems   Constitutional: Negative for chills and fever. HENT: Negative for congestion and rhinorrhea.     Eyes: Negative for photophobia and visual disturbance. Respiratory: Positive for chest tightness and shortness of breath. Negative for cough and wheezing. Cardiovascular: Positive for leg swelling. Negative for chest pain and palpitations. Gastrointestinal: Negative for abdominal pain, diarrhea, nausea and vomiting. Genitourinary: Negative for dysuria and hematuria. Musculoskeletal: Negative for back pain and neck pain. Skin: Negative for rash and wound. Neurological: Negative for syncope and weakness. Psychiatric/Behavioral: Negative for agitation and confusion. Exam  ED Triage Vitals   BP Temp Temp Source Pulse Resp SpO2 Height Weight   04/03/22 1111 04/03/22 1111 04/03/22 1111 04/03/22 1111 04/03/22 1111 04/03/22 1111 -- 04/03/22 1109   (!) 146/76 98.5 °F (36.9 °C) Oral 103 17 100 %  180 lb (81.6 kg)       Physical Exam  Vitals and nursing note reviewed. Constitutional:       General: He is in acute distress. Appearance: He is well-developed. HENT:      Head: Normocephalic and atraumatic. Nose: Nose normal. No congestion. Eyes:      Extraocular Movements: Extraocular movements intact. Pupils: Pupils are equal, round, and reactive to light. Cardiovascular:      Rate and Rhythm: Normal rate and regular rhythm. Heart sounds: No murmur heard. Pulmonary:      Comments: Significant respiratory distress tachypneic and anxious. Prolonged expiratory phase with expiratory wheezes throughout. Decreased at the bases. Mild rales. Abdominal:      General: There is no distension. Palpations: Abdomen is soft. Tenderness: There is no abdominal tenderness. Musculoskeletal:         General: No deformity. Normal range of motion. Cervical back: Normal range of motion and neck supple. Right lower leg: Edema present. Left lower leg: Edema present. Skin:     General: Skin is warm. Findings: No rash.    Neurological:      Mental Status: He is alert and oriented to person, place, and time. Motor: No abnormal muscle tone. Coordination: Coordination normal.   Psychiatric:         Mood and Affect: Mood normal.         Behavior: Behavior normal.           ED Course    ED Medication Orders (From admission, onward)    Start Ordered     Status Ordering Provider    04/03/22 1515 04/03/22 1512  cefepime (MAXIPIME) 2000 mg IVPB minibag  EVERY 12 HOURS        Question:  Antimicrobial Indications  Answer:  Pneumonia (HAP)    Acknowledged ULYSSESMAXI CASEYN L    04/03/22 1515 04/03/22 1512  vancomycin 1000 mg IVPB in 250 mL D5W addavial  ONCE        Question:  Antimicrobial Indications  Answer:  Pneumonia (HAP)    Acknowledged MILAGROSMAXIN L    04/03/22 1300 04/03/22 1245  furosemide (LASIX) injection 80 mg  ONCE         Last MAR action: Given - by Kerrie Angeles on 04/03/22 at 1315 ULYSSESMAXI CASEYN L    04/03/22 1130 04/03/22 1119  methylPREDNISolone sodium (SOLU-MEDROL) injection 125 mg  ONCE         Last MAR action: Given - by Kerrie Angeles on 04/03/22 at [address] [place] [address], [address] [place] [address]          EKG  Wavering baseline due to patient's respiratory status with artifact appears A. fib rate approximately 100 borderline left axis deviation, no diagnostic ischemic changes noted       Radiology  XR CHEST PORTABLE    Result Date: 4/3/2022  EXAMINATION: ONE XRAY VIEW OF THE CHEST 4/3/2022 11:20 am COMPARISON: 03/31/2022 HISTORY: ORDERING SYSTEM PROVIDED HISTORY: SOB TECHNOLOGIST PROVIDED HISTORY: Reason for exam:->SOB Reason for Exam: worsening SOB; brought in on CPAP FINDINGS: The heart is mildly enlarged but similar to the prior exam.  There is ill definition of the pulmonary vascularity with diffuse bilateral prominence of the interlobular septi and interstitial markings. There are no focal areas of consolidation or significant pleural effusions. Findings are most consistent with mild congestive heart failure. Atypical/viral pneumonia can give similar findings. Labs  Results for orders placed or performed during the hospital encounter of 04/03/22   COVID-19, Rapid   Result Value Ref Range    SARS-CoV-2, NAAT Not Detected Not Detected   CBC with Auto Differential   Result Value Ref Range    WBC 12.9 (H) 4.0 - 11.0 K/uL    RBC 4.41 4.20 - 5.90 M/uL    Hemoglobin 13.4 (L) 13.5 - 17.5 g/dL    Hematocrit 40.6 40.5 - 52.5 %    MCV 92.0 80.0 - 100.0 fL    MCH 30.4 26.0 - 34.0 pg    MCHC 33.0 31.0 - 36.0 g/dL    RDW 15.2 12.4 - 15.4 %    Platelets 679 055 - 620 K/uL    MPV 8.8 5.0 - 10.5 fL    Neutrophils % 83.5 %    Lymphocytes % 9.4 %    Monocytes % 6.2 %    Eosinophils % 0.6 %    Basophils % 0.3 %    Neutrophils Absolute 10.8 (H) 1.7 - 7.7 K/uL    Lymphocytes Absolute 1.2 1.0 - 5.1 K/uL    Monocytes Absolute 0.8 0.0 - 1.3 K/uL    Eosinophils Absolute 0.1 0.0 - 0.6 K/uL    Basophils Absolute 0.0 0.0 - 0.2 K/uL   Basic Metabolic Panel w/ Reflex to MG   Result Value Ref Range    Sodium 138 136 - 145 mmol/L    Potassium reflex Magnesium 4.7 3.5 - 5.1 mmol/L    Chloride 97 (L) 99 - 110 mmol/L    CO2 31 21 - 32 mmol/L    Anion Gap 10 3 - 16    Glucose 150 (H) 70 - 99 mg/dL    BUN 14 7 - 20 mg/dL    CREATININE 0.7 (L) 0.8 - 1.3 mg/dL    GFR Non-African American >60 >60    GFR African American >60 >60    Calcium 8.9 8.3 - 10.6 mg/dL   Troponin   Result Value Ref Range    Troponin <0.01 <0.01 ng/mL   Urinalysis with Reflex to Culture    Specimen: Urine   Result Value Ref Range    Color, UA Yellow Straw/Yellow    Clarity, UA Clear Clear    Glucose, Ur Negative Negative mg/dL    Bilirubin Urine Negative Negative    Ketones, Urine Negative Negative mg/dL    Specific Gravity, UA 1.010 1.005 - 1.030    Blood, Urine Negative Negative    pH, UA 7.0 5.0 - 8.0    Protein, UA Negative Negative mg/dL    Urobilinogen, Urine 0.2 <2.0 E.U./dL    Nitrite, Urine Negative Negative    Leukocyte Esterase, Urine Negative Negative    Microscopic Examination Not Indicated     Urine Type NotGiven Urine Reflex to Culture Not Indicated    Brain Natriuretic Peptide   Result Value Ref Range    Pro-BNP 2,106 (H) 0 - 124 pg/mL   Lactate, Sepsis   Result Value Ref Range    Lactic Acid, Sepsis 2.3 (H) 0.4 - 1.9 mmol/L   Procalcitonin   Result Value Ref Range    Procalcitonin 0.07 0.00 - 0.15 ng/mL   Blood gas, venous   Result Value Ref Range    pH, Gregg 7.417 7.350 - 7.450    pCO2, Gregg 51.9 (H) 40.0 - 50.0 mmHg    pO2, Gregg 81.4 (H) 25.0 - 40.0 mmHg    HCO3, Venous 32.7 (H) 23.0 - 29.0 mmol/L    Base Excess, Gregg 6.6 (H) -3.0 - 3.0 mmol/L    O2 Sat, Gregg 96 Not Established %    Carboxyhemoglobin 1.2 0.0 - 1.5 %    MetHgb, Gregg 0.5 <1.5 %    TC02 (Calc), Gregg 34 Not Established mmol/L    O2 Therapy Unknown    EKG 12 Lead   Result Value Ref Range    Ventricular Rate 86 BPM    Atrial Rate 69 BPM    QRS Duration 90 ms    Q-T Interval 364 ms    QTc Calculation (Bazett) 435 ms    R Axis -6 degrees    T Axis -16 degrees    Diagnosis       Atrial fibrillation Baseline artifactLow voltage QRS RSR' or QR pattern in V1 suggests right ventricular conduction delayNonspecific ST abnormality , probably digitalis effectAbnormal ECGWhen compared with ECG of 29-MAR-2022 14:22,QT has shortenedConfirmed by KELLE GUZMAN, [address] ([id number]) on 4/3/2022 3:38:44 PM         [facility]  Patient seen and evaluated. Relevant records reviewed. 70-year-old male who returns emergency department after being discharged recently for respiratory distress. Arrives with positive pressure and patient was temporarily weaned down to supplemental O2 by nasal cannula but became anxious and more short of breath with movement while urinating at bedside and ultimately required BiPAP. Patient has evidence of both moderate volume overload as well as COPD exacerbation. Treated with Lasix as well as multiple breathing treatments and patient is tolerating BiPAP well. He has required multiple reassessments due to fluctuating status.   Consistent with multifactorial hypoxic hypercarbic respiratory failure secondary to CHF exacerbation from recent diuretic adjustment, COPD exacerbation. Negative Covid and patient denies any fevers or chills however chest x-ray shows possible signs of atypical pneumonia but I feel this more consistent with pulmonary edema. He does have an elevated lactate and mild leukocytosis however think bacterial pneumonia or sepsis is unlikely at this point and lab derangements can be attributed to multiple albuterol treatments and him being on steroids. Low concern for PE dissection or ACS at this time. Will admit to hospitalist service. Clinical Impression:  1. Acute on chronic respiratory failure with hypoxia and hypercapnia (HCC)    2. Acute on chronic congestive heart failure, unspecified heart failure type ([facility] [address]          Disposition:  Admit to PCU/ICU in guarded condition. Blood pressure 113/71, pulse 94, temperature 98.5 °F (36.9 °C), temperature source Oral, resp. rate 20, weight 180 lb (81.6 kg), SpO2 98 %. Patient was given scripts for the following medications. I counseled patient how to take these medications. New Prescriptions    No medications on file       Disposition referral (if applicable):  No follow-up provider specified. Total critical care time is 75 minutes, which excludes separately billable procedures and updating family. Time spent is specifically for management of the presenting complaint and symptoms initially, direct bedside care, reevaluation, review of records, and consultation. There was a high probability of clinically significant life-threatening deterioration in the patient's condition, which required my urgent intervention. This chart was generated in part by using Dragon Dictation system and may contain errors related to that system including errors in grammar, punctuation, and spelling, as well as words and phrases that may be inappropriate.  If there are any questions or concerns please feel free to contact the dictating provider for clarification.      Willie Bland MD  [address] MD  04/03/22 1856

## 2022-04-03 NOTE — H&P
Hospital Medicine  History and Physical    Patient:  Laurel Baker  MRN: [id number]    CHIEF COMPLAINT:    Chief Complaint   Patient presents with    Shortness of Breath     discharged on 3/31 for respiratory failure/electrolyte imbalances. wears 4 liters at home. difficulty breathing worse today. brought in on CPAP. History Obtained From:  patient, electronic medical record  Primary Care Physician: Ashkan Beatty    HISTORY OF PRESENT ILLNESS:   The patient is a 67 y.o. male who presents with hypoxic hypercarbic respiratory failure on CPAP. History of COPD and CHF requiring diuresis and discharged on low dose torsemide with rebound volume overload and pulmonary edema. Elevated lactate with leukocytosis possibly associated with steroid. Empirically treated with antibiotics - cefepime and vancomycin in the ED. Chronic hypoxic respiratory failure secondary to COPD. He is normally on 3-4 LPM oxygen at home. Past Medical History:      Diagnosis Date    Bronchitis chronic     Emphysema of lung ([facility] [address]     Influenza A 01/10/2018    Lung disease     copd    Pneumonia     12/2009       Past Surgical History:      Procedure Laterality Date    FRACTURE SURGERY      johnson in femur/hip on right    HERNIA REPAIR         Medications Prior to Admission:    Prior to Admission medications    Medication Sig Start Date End Date Taking?  Authorizing Provider   torsemide (DEMADEX) 20 MG tablet Take 1 tablet by mouth daily 3/31/22   Charisma Stewart MD   vitamin D (ERGOCALCIFEROL) 1.25 MG (79963 UT) CAPS capsule Take 1 capsule by mouth once a week 4/6/22   Charisma Stewart MD   potassium chloride (KLOR-CON M) 20 MEQ extended release tablet Take 1 tablet by mouth daily 3/31/22   Charisma Stewart MD   ipratropium-albuterol (DUONEB) 0.5-2.5 (3) MG/3ML SOLN nebulizer solution INHALE 3 MILLILIERS (1 VIAL) VIA NEBULIZATION ROUTE EVERY 6 HOURS AS NEEDED FOR SHORTNESS OF BREATH 2/28/22   Donell Riddle MD   VENTOLIN  (90 Base) MCG/ACT inhaler INHALE 2 PUFFS EVERY 6 HOURS AS NEEDED FOR WHEEZING OR SHORTNESS OF BREATH 2/2/22   Brice Nevarez MD   SYMBICORT 160-4.5 MCG/ACT AERO INHALE 2 PUFFS INTO THE LUNGS 2 TIMES DAILY 12/2/21   Brice Nevarez MD   Roflumilast (DALIRESP) 500 MCG tablet TAKE (1) TABLET DAILY 12/2/21   Brice Nevarez MD   famotidine (PEPCID) 20 MG tablet TAKE (1) TABLET TWICE A DAY BEFORE MEALS. Patient not taking: Reported on 2/28/2022 11/8/21   Hans Modi MD   lisinopril (PRINIVIL;ZESTRIL) 5 MG tablet TAKE 1 TABLET BY MOUTH DAILY 7/2/21   Hans Modi MD   apixaban (ELIQUIS) 5 MG TABS tablet TAKE 1 TABLET TWICE DAILY 7/2/21   Hans Modi MD   dilTIAZem (CARDIZEM CD) 180 MG extended release capsule TAKE 1 CAPSULE BY MOUTH DAILY 7/2/21   Hans Modi MD   guaiFENesin (MUCINEX) 600 MG extended release tablet Take 1 tablet by mouth 2 times daily as needed for Congestion 8/6/20   Brice Nevarez MD   nitroGLYCERIN (NITROSTAT) 0.4 MG SL tablet Place 1 tablet under the tongue every 5 minutes as needed for Chest pain 7/1/20   Hans Modi MD       Allergies:  Patient has no known allergies. Social History:   TOBACCO:   reports that he quit smoking about 5 years ago. His smoking use included cigarettes. He has a 30.00 pack-year smoking history. He has never used smokeless tobacco.  ETOH:   reports no history of alcohol use. Family History:       Problem Relation Age of Onset    Diabetes Sister     Cancer Sister     Cancer Sister     Cancer Brother     Asthma Daughter     Asthma Son     Diabetes Mother     Cancer Father        REVIEW OF SYSTEMS:  Ten systems reviewed and negative except for dyspnea as noted. Unable to get out of bed without dypsnea.     Physical Exam:    Vitals: /73   Pulse 100   Temp 98.5 °F (36.9 °C) (Oral)   Resp 20   Wt 180 lb (81.6 kg)   SpO2 98% on BiPAP 12/6 at 35% FiO2   BMI 24.41 kg/m²   General appearance: alert, appears stated age and cooperative  Skin: Skin color, texture, turgor normal. No rashes or lesions  HEENT: Head: Normocephalic, no lesions, without obvious abnormality. Neck: no adenopathy, no carotid bruit, no JVD, supple, symmetrical, trachea midline and thyroid not enlarged, symmetric, no tenderness/mass/nodules  Lungs: diffuse rhonchi, reasonable overall air movement  Heart: irregular rate and rhythm, no appreciable murmur  Abdomen: soft, non-tender; bowel sounds normal; no masses,  no organomegaly  Extremities: extremities normal, atraumatic, no cyanosis or edema  Neurologic: Mental status: Alert, oriented, thought content appropriate    Recent Labs     04/03/22  1118   WBC 12.9*   HGB 13.4*        Recent Labs     04/03/22  1118      K 4.7   CL 97*   CO2 31   BUN 14   CREATININE 0.7*   GLUCOSE 150*     Troponin T:  < 0.01 ng/mL    Blood culture x 2 pending  Lactate: 2.3 mmol/L  Procalcitonin 0.07 ng/mL  SARS-CoV-2 NAAT not detected  proBNP 2,106 pg/mL    URINALYSIS:  Recent Labs     04/03/22  1335   COLORU Yellow   PHUR 7.0   CLARITYU Clear   SPECGRAV 1.010   LEUKOCYTESUR Negative   UROBILINOGEN 0.2   BILIRUBINUR Negative   BLOODU Negative   GLUCOSEU Negative       Portable CXR (4/3)  Findings are most consistent with mild congestive heart failure. Atypical/viral pneumonia can give similar findings. ECG: Atrial fibrillation. Low voltage QRS RSR' or QR pattern in V1 suggests right ventricular conduction delay. Nonspecific ST abnormality. QTc has shortened (435 msec) compared with prior ECG of 29-MAR-2022 [facility]     TTE (3/14/22) Left ventricular systolic function is normal with ejection fraction estimated at 55%. No regional wall motion abnormalities. There is mild concentric left ventricular hypertrophy. Elevated left ventricular filling pressure. Mild bi-atrial enlargement. Mild mitral regurgitation. Mild tricuspid regurgitation.   Systolic pulmonary artery pressure (SPAP) is normal estimated at 38 mmHg  (Right atrial pressure of 3 mmHg). No significant change from exam done 1/11/2018. Assessment and Plan   1. Acute on chronic hypoxic and hypercarbic respiratory failure secondary to diastolic HFpEF:  Admit to inpatient status. BiPAP 12/5. Elevated proBNP to 2.106 pg/mL. Furosemide 40 mg IV every 12 hours. Continue lisinopril 5 mg daily Cardiology evaluation. Follow electrolytes and replace potassium and magnesium as needed. 2. Atrial fibrillation:  Rate controlled with diltiazem  mg daily. Anticoagulation with apixaban (Eliquis) 5 mg BID. 3. COPD:  Continue roflumilast (Daliresp) 500 mcg daily, Symbicort MDI 2-puff BID and nebulized ipratropium-albuterol every 4 hours while awake. Follows with pulmonary (Dr Beth Latham). 4. Gastric reflux and stress ulcer prophylaxis with famotidine 20 mg BID. 5. Prolonged QTc prior admission, resolved. Advance Directive: Full code. Confirmed by patient. DVT prophylaxis with apixaban (Eliquis) 5 mg BID (afib)  Discharge planning:  Admit to inpatient status for evaluation and management that will require at least two midnights of acute hospitalization.         Jonathon Haque MD  Admitting Hospitalist

## 2022-04-03 NOTE — ED NOTES
Dr. Adriano everett served for progressive orders because the patent is on bipap.        Spring Jenkins RN  04/03/22 7110

## 2022-04-03 NOTE — RT PROTOCOL NOTE
at home. 0-3 - enter or revise RT bronchodilator order(s) to equivalent RT Bronchodilator order with Frequency of every 4 hours PRN for wheezing or increased work of breathing using Per Protocol order mode. 4-6 - enter or revise RT Bronchodilator order(s) to two equivalent RT bronchodilator orders with one order with BID Frequency and one order with Frequency of every 4 hours PRN wheezing or increased work of breathing using Per Protocol order mode. 7-10 - enter or revise RT Bronchodilator order(s) to two equivalent RT bronchodilator orders with one order with TID Frequency and one order with Frequency of every 4 hours PRN wheezing or increased work of breathing using Per Protocol order mode. 11-13 - enter or revise RT Bronchodilator order(s) to one equivalent RT bronchodilator order with QID Frequency and an Albuterol order with Frequency of every 4 hours PRN wheezing or increased work of breathing using Per Protocol order mode. Greater than 13 - enter or revise RT Bronchodilator order(s) to one equivalent RT bronchodilator order with every 4 hours Frequency and an Albuterol order with Frequency of every 2 hours PRN wheezing or increased work of breathing using Per Protocol order mode. RT to enter RT Home Evaluation for COPD & MDI Assessment order using Per Protocol order mode.     Electronically signed by Best Lua RCP on 4/3/2022 at 7:53 PM

## 2022-04-03 NOTE — CONSULTS
4/3/22 @ 19:06 left msg for Cardiac consult for [address] [place] [address] Cardiology.  Hossein Lopez

## 2022-04-03 NOTE — ED NOTES
Pt attempted to stand and urinate. Pt voided 100 cc clear yellow urine. Pt became very SOB with labored respirations. Pt placed on 100% NRB mask  SPO2 100%. Dr Juan Craven called to room 4. Pt to be placed on Bipap.        Brent Anthony RN  04/03/22 8037

## 2022-04-04 PROBLEM — I50.33 ACUTE ON CHRONIC DIASTOLIC CONGESTIVE HEART FAILURE (HCC): Status: ACTIVE | Noted: 2022-02-02

## 2022-04-04 LAB
ANION GAP SERPL CALCULATED.3IONS-SCNC: 13 MMOL/L (ref 3–16)
BASOPHILS ABSOLUTE: 0.1 K/UL (ref 0–0.2)
BASOPHILS RELATIVE PERCENT: 0.5 %
BUN BLDV-MCNC: 14 MG/DL (ref 7–20)
CALCIUM SERPL-MCNC: 8.9 MG/DL (ref 8.3–10.6)
CHLORIDE BLD-SCNC: 98 MMOL/L (ref 99–110)
CO2: 29 MMOL/L (ref 21–32)
CREAT SERPL-MCNC: 0.7 MG/DL (ref 0.8–1.3)
EOSINOPHILS ABSOLUTE: 0.1 K/UL (ref 0–0.6)
EOSINOPHILS RELATIVE PERCENT: 0.9 %
GFR AFRICAN AMERICAN: >60
GFR NON-AFRICAN AMERICAN: >60
GLUCOSE BLD-MCNC: 134 MG/DL (ref 70–99)
HCT VFR BLD CALC: 40.6 % (ref 40.5–52.5)
HEMOGLOBIN: 13.6 G/DL (ref 13.5–17.5)
LYMPHOCYTES ABSOLUTE: 1.3 K/UL (ref 1–5.1)
LYMPHOCYTES RELATIVE PERCENT: 10.2 %
MAGNESIUM: 2.1 MG/DL (ref 1.8–2.4)
MCH RBC QN AUTO: 31 PG (ref 26–34)
MCHC RBC AUTO-ENTMCNC: 33.4 G/DL (ref 31–36)
MCV RBC AUTO: 92.8 FL (ref 80–100)
MONOCYTES ABSOLUTE: 1.1 K/UL (ref 0–1.3)
MONOCYTES RELATIVE PERCENT: 8.4 %
NEUTROPHILS ABSOLUTE: 10.4 K/UL (ref 1.7–7.7)
NEUTROPHILS RELATIVE PERCENT: 80 %
PDW BLD-RTO: 15.6 % (ref 12.4–15.4)
PLATELET # BLD: 344 K/UL (ref 135–450)
PMV BLD AUTO: 8.8 FL (ref 5–10.5)
POTASSIUM SERPL-SCNC: 5.1 MMOL/L (ref 3.5–5.1)
RBC # BLD: 4.38 M/UL (ref 4.2–5.9)
SODIUM BLD-SCNC: 140 MMOL/L (ref 136–145)
TROPONIN: 0.01 NG/ML
WBC # BLD: 13 K/UL (ref 4–11)

## 2022-04-04 PROCEDURE — 84484 ASSAY OF TROPONIN QUANT: CPT

## 2022-04-04 PROCEDURE — 2060000000 HC ICU INTERMEDIATE R&B

## 2022-04-04 PROCEDURE — 6370000000 HC RX 637 (ALT 250 FOR IP): Performed by: INTERNAL MEDICINE

## 2022-04-04 PROCEDURE — 80048 BASIC METABOLIC PNL TOTAL CA: CPT

## 2022-04-04 PROCEDURE — 99223 1ST HOSP IP/OBS HIGH 75: CPT | Performed by: INTERNAL MEDICINE

## 2022-04-04 PROCEDURE — 94761 N-INVAS EAR/PLS OXIMETRY MLT: CPT

## 2022-04-04 PROCEDURE — 85025 COMPLETE CBC W/AUTO DIFF WBC: CPT

## 2022-04-04 PROCEDURE — 6360000002 HC RX W HCPCS: Performed by: INTERNAL MEDICINE

## 2022-04-04 PROCEDURE — 2580000003 HC RX 258: Performed by: INTERNAL MEDICINE

## 2022-04-04 PROCEDURE — 94640 AIRWAY INHALATION TREATMENT: CPT

## 2022-04-04 PROCEDURE — 94660 CPAP INITIATION&MGMT: CPT

## 2022-04-04 PROCEDURE — 36415 COLL VENOUS BLD VENIPUNCTURE: CPT

## 2022-04-04 PROCEDURE — 2700000000 HC OXYGEN THERAPY PER DAY

## 2022-04-04 PROCEDURE — 83735 ASSAY OF MAGNESIUM: CPT

## 2022-04-04 RX ORDER — METHYLPREDNISOLONE SODIUM SUCCINATE 40 MG/ML
40 INJECTION, POWDER, LYOPHILIZED, FOR SOLUTION INTRAMUSCULAR; INTRAVENOUS ONCE
Status: COMPLETED | OUTPATIENT
Start: 2022-04-04 | End: 2022-04-04

## 2022-04-04 RX ORDER — METHYLPREDNISOLONE SODIUM SUCCINATE 40 MG/ML
40 INJECTION, POWDER, LYOPHILIZED, FOR SOLUTION INTRAMUSCULAR; INTRAVENOUS 2 TIMES DAILY
Status: DISCONTINUED | OUTPATIENT
Start: 2022-04-05 | End: 2022-04-07

## 2022-04-04 RX ORDER — DILTIAZEM HYDROCHLORIDE 120 MG/1
120 CAPSULE, COATED, EXTENDED RELEASE ORAL DAILY
Status: DISCONTINUED | OUTPATIENT
Start: 2022-04-05 | End: 2022-04-07 | Stop reason: HOSPADM

## 2022-04-04 RX ORDER — METOPROLOL SUCCINATE 25 MG/1
12.5 TABLET, EXTENDED RELEASE ORAL DAILY
Status: DISCONTINUED | OUTPATIENT
Start: 2022-04-04 | End: 2022-04-05

## 2022-04-04 RX ADMIN — IPRATROPIUM BROMIDE AND ALBUTEROL SULFATE 1 AMPULE: .5; 2.5 SOLUTION RESPIRATORY (INHALATION) at 19:53

## 2022-04-04 RX ADMIN — FAMOTIDINE 20 MG: 20 TABLET, FILM COATED ORAL at 21:07

## 2022-04-04 RX ADMIN — APIXABAN 5 MG: 5 TABLET, FILM COATED ORAL at 21:07

## 2022-04-04 RX ADMIN — LISINOPRIL 5 MG: 5 TABLET ORAL at 09:09

## 2022-04-04 RX ADMIN — Medication 2 PUFF: at 08:46

## 2022-04-04 RX ADMIN — IPRATROPIUM BROMIDE AND ALBUTEROL SULFATE 1 AMPULE: .5; 2.5 SOLUTION RESPIRATORY (INHALATION) at 11:53

## 2022-04-04 RX ADMIN — IPRATROPIUM BROMIDE AND ALBUTEROL SULFATE 1 AMPULE: .5; 2.5 SOLUTION RESPIRATORY (INHALATION) at 15:32

## 2022-04-04 RX ADMIN — FAMOTIDINE 20 MG: 20 TABLET, FILM COATED ORAL at 09:09

## 2022-04-04 RX ADMIN — FUROSEMIDE 40 MG: 10 INJECTION, SOLUTION INTRAMUSCULAR; INTRAVENOUS at 18:33

## 2022-04-04 RX ADMIN — APIXABAN 5 MG: 5 TABLET, FILM COATED ORAL at 09:09

## 2022-04-04 RX ADMIN — IPRATROPIUM BROMIDE AND ALBUTEROL SULFATE 1 AMPULE: .5; 2.5 SOLUTION RESPIRATORY (INHALATION) at 08:46

## 2022-04-04 RX ADMIN — POTASSIUM CHLORIDE 20 MEQ: 20 TABLET, EXTENDED RELEASE ORAL at 09:09

## 2022-04-04 RX ADMIN — ROFLUMILAST 500 MCG: 500 TABLET ORAL at 21:07

## 2022-04-04 RX ADMIN — Medication 10 ML: at 09:09

## 2022-04-04 RX ADMIN — FUROSEMIDE 40 MG: 10 INJECTION, SOLUTION INTRAMUSCULAR; INTRAVENOUS at 09:09

## 2022-04-04 RX ADMIN — DILTIAZEM HYDROCHLORIDE 180 MG: 180 CAPSULE, COATED, EXTENDED RELEASE ORAL at 09:09

## 2022-04-04 RX ADMIN — METOPROLOL SUCCINATE 12.5 MG: 25 TABLET, EXTENDED RELEASE ORAL at 16:20

## 2022-04-04 RX ADMIN — Medication 10 ML: at 21:07

## 2022-04-04 RX ADMIN — Medication 2 PUFF: at 19:53

## 2022-04-04 RX ADMIN — METHYLPREDNISOLONE SODIUM SUCCINATE 40 MG: 40 INJECTION, POWDER, FOR SOLUTION INTRAMUSCULAR; INTRAVENOUS at 16:26

## 2022-04-04 ASSESSMENT — ENCOUNTER SYMPTOMS
SHORTNESS OF BREATH: 1
EYES NEGATIVE: 1
GASTROINTESTINAL NEGATIVE: 1
COUGH: 1
ALLERGIC/IMMUNOLOGIC NEGATIVE: 1

## 2022-04-04 ASSESSMENT — PAIN SCALES - GENERAL
PAINLEVEL_OUTOF10: 0

## 2022-04-04 NOTE — ACP (ADVANCE CARE PLANNING)
Advance Care Planning     Advance Care Planning Inpatient Note  [facility]    Today's Date: 4/4/2022  Unit: [facility] C4 PCU    Received request from IDT Member. Upon review of chart and communication with care team, patient's decision making abilities are not in question. . Patient and Child/Children was/were present in the room during visit. Goals of ACP Conversation:  Discuss advance care planning documents    Health Care Decision Makers:       Primary Decision Maker: Grace Ozuna Child - [phone]    Secondary Decision Maker: Victoria Sousa Child - [phone]    Summary:  Completed New Documents    Advance Care Planning Documents (Patient Wishes):  Healthcare Power of /Advance Directive Appointment of Health Care Agent  Living Will/Advance Directive     Assessment:  Pt's children in room at the time. Pt lives with his son and daughter. Provided education for better understanding. Pt wants son to be his primary decision maker. Pt want CPR attempted \"only if there is chance that I will have quality of life. \"    Interventions:  Provided education on documents for clarity and greater understanding  Assisted in the completion of documents according to patient's wishes at this time    Care Preferences Communicated:     Hospitalization:  If the patient's health worsens and it becomes clear that the chance of recovery is unlikely,     the patient wants hospitalization. Ventilation:   If the patient, in their present state of health, suddenly became very ill and unable to breathe on their own,     the patient would desire the use of a ventilator (breathing machine). If their health worsens and it becomes clear that the change of recovery is unlikely,     the patient would NOT desire the use of a ventilator (breathing machine).     Resuscitation:  In the event the patient's heart stopped as a result of an underlying serious health condition, the patient communicates a preference for resuscitative attempts (CPR). \"Only if there is chance that I will have quality of life. \" says pt    Outcomes/Plan:  ACP Discussion: Completed  New advance directive completed. Returned original document(s) to patient, as well as copies for distribution to appointed agents  Copy of advance directive given to staff to scan into medical record.   Teach Back Method used to verify the patient's and/or Healthcare Decision Maker's understanding of key information in the advance directive documents    Electronically signed by Chaplain Cherri on 4/4/2022 at 1:10 PM

## 2022-04-04 NOTE — PROGRESS NOTES
04/03/22 2107   NIV Type   Skin Protection for O2 Device Yes   Location Nose   NIV Started/Stopped On   Equipment Type v60   Mode Bilevel   Mask Type Full face mask   Settings/Measurements   IPAP 12 cmH20   CPAP/EPAP 5 cmH2O   Rate Ordered 10   Resp 15   Insp Rise Time (%) 1 %   FiO2  35 %   I Time/ I Time % 1 s   Vt Exhaled 1122 mL   Minute Volume 16.7 Liters   Mask Leak (lpm) 29 lpm   Comfort Level Good   Using Accessory Muscles No   SpO2 95   Alarm Settings   Alarms On Y   Press Low Alarm 6 cmH2O   High Pressure Alarm 30 cmH2O   Delay Alarm 20 sec(s)   Resp Rate Low Alarm 6   High Respiratory Rate 40 br/min

## 2022-04-04 NOTE — CONSULTS
Nutrition Education    Pt seen per [facility] for CHF diet education. Pt sleeping soundly during visit, spoke with family at bedside. Provided with written and verbal instruction on HF nutrition therapy. Discussed low sodium diet, daily weights, and fluid restriction. Encouraged to have pt call with any questions. Time spent: 5 minutes    · Verbally reviewed information with Family  · Educated on HF nutrition therapy  · Written educational materials provided. · Contact name and number provided. · Refer to Patient Education activity for more details.     Electronically signed by Shahid Whitney RD on 4/4/22 at 3:17 PM EDT    Contact: [phone]

## 2022-04-04 NOTE — FLOWSHEET NOTE
04/04/22 0751   Assessment   Charting Type Shift assessment   Neurological   Neuro (WDL) WDL   Level of Consciousness Alert (0)   Orientation Level Oriented X4   [place] Coma Scale   Eye Opening 4   Best Verbal Response 5   Best Motor Response 6   [place] Coma Scale Score 15   NIHSS Stroke Scale   NIHSS Stroke Scale Assessed No   HEENT   HEENT (WDL) X   Right Eye Intact; Impaired vision   Left Eye Intact; Impaired vision   Voice Normal   Right Ear Impaired hearing  (Hard of hearing)   Left Ear Impaired hearing  (hard of hearing)   Respiratory   Respiratory (WDL) X   Respiratory Pattern Regular   Respiratory Depth Normal   Respiratory Quality/Effort Dyspnea with exertion   L Breath Sounds Diminished   R Breath Sounds Diminished   Breath Sounds   Right Upper Lobe Diminished   Right Middle Lobe Diminished   Right Lower Lobe Diminished   Left Upper Lobe Diminished   Left Lower Lobe Diminished   Cardiac   Cardiac (WDL) X   Cardiac Regularity Irregular   Heart Sounds S1, S2   Cardiac Rhythm Atrial fib   Rhythm Interpretation   Pulse 94   Cardiac Monitor   Telemetry Monitor On Yes   Telemetry Audible Yes   Telemetry Alarms Set Yes   Gastrointestinal   Abdominal (WDL) WDL   RUQ Bowel Sounds Active   LUQ Bowel Sounds Active   RLQ Bowel Sounds Active   LLQ Bowel Sounds Active   Abdomen Inspection Rotund   Peripheral Vascular   Peripheral Vascular (WDL) X   Edema Right lower extremity; Left lower extremity   RLE Edema +1   LLE Edema +1   Skin Color/Condition   Skin Color/Condition (WDL) WDL   Skin Integrity   Skin Integrity (WDL) WDL   Musculoskeletal   Musculoskeletal (WDL) X  (generalized weakness)   Genitourinary   Genitourinary (WDL) WDL   Flank Tenderness No   Suprapubic Tenderness No   Dysuria No   Urine Assessment   Incontinence No   Urine Color Yellow/straw   Urine Appearance Clear   Urine Odor No odor   Anus/Rectum   Anus/Rectum (WDL) WDL   Psychosocial   Psychosocial (WDL) WDL

## 2022-04-04 NOTE — PROGRESS NOTES
04/04/22 0041   NIV Type   $NIV $Daily Charge   Skin Protection for O2 Device Yes   Location Nose   NIV Started/Stopped On   Equipment Type v60   Mode Bilevel   Mask Type Full face mask   Settings/Measurements   IPAP 12 cmH20   CPAP/EPAP 5 cmH2O   Rate Ordered 10   Resp 15   Insp Rise Time (%) 1 %   FiO2  35 %  (decreased to 30% at this time )   I Time/ I Time % 1 s   Vt Exhaled 878 mL   Minute Volume 12.9 Liters   Mask Leak (lpm) 9 lpm   Comfort Level Good   Using Accessory Muscles No   SpO2 97   Alarm Settings   Alarms On Y   Press Low Alarm 6 cmH2O   High Pressure Alarm 30 cmH2O   Delay Alarm 20 sec(s)   Resp Rate Low Alarm 6   High Respiratory Rate 40 br/min

## 2022-04-04 NOTE — ACP (ADVANCE CARE PLANNING)
Advance Care Planning     Advance Care Planning Activator (Inpatient)  Conversation Note      Date of ACP Conversation: 4/4/2022     Conversation Conducted with: son and DIL     ACP Activator: Eli Ortega RN      Health Care Decision Maker:     Current Designated Health Care Decision Maker:     Primary Decision Maker: Diamantina Goldmann - Pranay - [phone]    Secondary Decision Maker: Alma Neil - [phone]    Supplemental (Other) Decision Maker: Isaura Saeed - [phone]      Care Preferences    Ventilation: \"If you were in your present state of health and suddenly became very ill and were unable to breathe on your own, what would your preference be about the use of a ventilator (breathing machine) if it were available to you? \"      Would the patient desire the use of ventilator (breathing machine)?: yes    \"If your health worsens and it becomes clear that your chance of recovery is unlikely, what would your preference be about the use of a ventilator (breathing machine) if it were available to you? \"     Would the patient desire the use of ventilator (breathing machine)?: No      Resuscitation  \"CPR works best to restart the heart when there is a sudden event, like a heart attack, in someone who is otherwise healthy. Unfortunately, CPR does not typically restart the heart for people who have serious health conditions or who are very sick. \"    \"In the event your heart stopped as a result of an underlying serious health condition, would you want attempts to be made to restart your heart (answer \"yes\" for attempt to resuscitate) or would you prefer a natural death (answer \"no\" for do not attempt to resuscitate)? \" yes       [x] Yes   [] No   Educated Patient / Megan Pichardo regarding differences between Advance Directives and portable DNR orders.     Length of ACP Conversation in minutes:  5    Conversation Outcomes:  [x] ACP discussion completed  [] Existing advance directive reviewed with patient; no changes to patient's previously recorded wishes  [] New Advance Directive completed  [] Portable Do Not Rescitate prepared for Provider review and signature  [] POLST/POST/MOLST/MOST prepared for Provider review and signature      Follow-up plan:    [] Schedule follow-up conversation to continue planning  [x] Referred individual to Provider for additional questions/concerns   [] Advised patient/agent/surrogate to review completed ACP document and update if needed with changes in condition, patient preferences or care setting    [] This note routed to one or more involved healthcare providers

## 2022-04-04 NOTE — PROGRESS NOTES
Patient's EF (Ejection Fraction) is greater than 40%    Heart Failure Medications:   Diuretics[de-identified] Furosemide     (One of the following REQUIRED for EF </= 40%/SYSTOLIC FAILURE but MAY be used in EF% >40%/DIASTOLIC FAILURE)        ACE[de-identified] Lisinopril        ARB[de-identified] None         ARNI[de-identified] None    (Beta Blockers)   NON- Evidenced Based Beta Blocker (for EF% >40%/DIASTOLIC FAILURE): None     Evidenced Based Beta Blocker::(REQUIRED for EF% <40%/SYSTOLIC FAILURE) None  . .................................................................................................................................................. Patient's weights and intake/output reviewed: Yes    Patient's Last Weight: 184 lbs obtained by bed scale. Difference of 5 lbs less than last documented weight. Intake/Output Summary (Last 24 hours) at 4/4/2022 1439  Last data filed at 4/4/2022 5458  Gross per 24 hour   Intake 480 ml   Output 2075 ml   Net -1595 ml       Comorbidities Reviewed Yes    Patient has a past medical history of Bronchitis chronic, Emphysema of lung ([address] Influenza A, Lung disease, and Pneumonia. >>For CHF and Comorbidity documentation on Education Time and Topics, please see Education Tab    Progressive Mobility Assessment:  What is this patient's Current Level of Mobility?: Requires Bed Rest  How was this patient Mobilized today?: Edge of Bed, ambulated 0 ft                 With Whom? Nurse, PCA, PT and OT                 Level of Difficulty/Assistance: 2x Assist     Pt resting in bed at this time on BiPAP. Pt with complaints of shortness of breath. Pt with nonpitting lower extremity edema.      Patient and/or Family's stated Goal of Care this Admission: reduce shortness of breath, increase activity tolerance, better understand heart failure and disease management, be more comfortable and reduce lower extremity edema prior to discharge        :

## 2022-04-04 NOTE — CONSULTS
[address] [facility]  [phone]      Attending Physician: Anni Mariscal MD  Reason for Consultation/Chief Complaint: SOb    Subjective   History of Present Illness:  Manda Salgado is a 67 y.o. patient who presented to the hospital with complaints of SOB, at baseline with emphysema but got worse. Started a few weeks ago. Wears 2L -> 4L. Went home on lower 20mg torsemide dose. Gaining wt in legs and abd, does not follow daily weights, does not follow fluid 4 cans of pop a day/sips, no salt. (family salt/pepr to taste)No CP, no palpitations  + PND per pt (for awhile)      Past Medical History:   has a past medical history of Bronchitis chronic, Emphysema of lung ([address] Influenza A, Lung disease, and Pneumonia. Surgical History:   has a past surgical history that includes fracture surgery and hernia repair. Social History:   reports that he quit smoking about 5 years ago. His smoking use included cigarettes. He has a 30.00 pack-year smoking history. He has never used smokeless tobacco. He reports that he does not drink alcohol and does not use drugs. Family History:  family history includes Asthma in his daughter and son; Cancer in his brother, father, sister, and sister; Diabetes in his mother and sister. Home Medications:  Were reviewed and are listed in nursing record and/or below  Prior to Admission medications    Medication Sig Start Date End Date Taking?  Authorizing Provider   torsemide (DEMADEX) 20 MG tablet Take 1 tablet by mouth daily 3/31/22   Julio Cesar Bateman MD   vitamin D (ERGOCALCIFEROL) 1.25 MG (76535 UT) CAPS capsule Take 1 capsule by mouth once a week 4/6/22   Julio Cesar Bateman MD   potassium chloride (KLOR-CON M) 20 MEQ extended release tablet Take 1 tablet by mouth daily 3/31/22   Julio Cesar Bateman MD   ipratropium-albuterol (DUONEB) 0.5-2.5 (3) MG/3ML SOLN nebulizer solution INHALE 3 MILLILIERS (1 VIAL) VIA NEBULIZATION ROUTE EVERY 6 HOURS AS NEEDED FOR SHORTNESS OF BREATH 2/28/22   Dayna Gilbert MD   VENTOLIN  (90 Base) MCG/ACT inhaler INHALE 2 PUFFS EVERY 6 HOURS AS NEEDED FOR WHEEZING OR SHORTNESS OF BREATH 2/2/22   Dayna Gilbert MD   SYMBICORT 160-4.5 MCG/ACT AERO INHALE 2 PUFFS INTO THE LUNGS 2 TIMES DAILY 12/2/21   Dayna Gilbert MD   Roflumilast (DALIRESP) 500 MCG tablet TAKE (1) TABLET DAILY 12/2/21   Dayna Gilbert MD   famotidine (PEPCID) 20 MG tablet TAKE (1) TABLET TWICE A DAY BEFORE MEALS.   Patient not taking: Reported on 2/28/2022 11/8/21   Bobo Sesay MD   lisinopril (PRINIVIL;ZESTRIL) 5 MG tablet TAKE 1 TABLET BY MOUTH DAILY 7/2/21   Bobo Sesay MD   apixaban (ELIQUIS) 5 MG TABS tablet TAKE 1 TABLET TWICE DAILY 7/2/21   Bobo Sesay MD   dilTIAZem (CARDIZEM CD) 180 MG extended release capsule TAKE 1 CAPSULE BY MOUTH DAILY 7/2/21   Bobo Sesay MD   guaiFENesin ([address]) 600 MG extended release tablet Take 1 tablet by mouth 2 times daily as needed for Congestion 8/6/20   Dayna Gilbert MD   nitroGLYCERIN (NITROSTAT) 0.4 MG SL tablet Place 1 tablet under the tongue every 5 minutes as needed for Chest pain 7/1/20   Bobo Sesay MD        CURRENT Medications:  Roflumilast (DALIRESP) tablet 500 mcg, Nightly  apixaban (ELIQUIS) tablet 5 mg, BID  dilTIAZem (CARDIZEM CD) extended release capsule 180 mg, Daily  famotidine (PEPCID) tablet 20 mg, BID  guaiFENesin (MUCINEX) extended release tablet 600 mg, BID PRN  ipratropium-albuterol (DUONEB) nebulizer solution 1 ampule, Q4H WA  lisinopril (PRINIVIL;ZESTRIL) tablet 5 mg, Daily  potassium chloride (KLOR-CON M) extended release tablet 20 mEq, Daily  mometasone-formoterol (DULERA) 100-5 MCG/ACT inhaler 2 puff, BID  sodium chloride flush 0.9 % injection 5-40 mL, 2 times per day  sodium chloride flush 0.9 % injection 5-40 mL, PRN  0.9 % sodium chloride infusion, PRN  ondansetron (ZOFRAN-ODT) disintegrating tablet 4 mg, Q8H PRN   Or  ondansetron (ZOFRAN) injection 4 mg, Q6H PRN  polyethylene glycol (GLYCOLAX) packet 17 g, Daily PRN  acetaminophen (TYLENOL) tablet 650 mg, Q6H PRN   Or  acetaminophen (TYLENOL) suppository 650 mg, Q6H PRN  potassium chloride (KLOR-CON M) extended release tablet 40 mEq, PRN   Or  potassium bicarb-citric acid (EFFER-K) effervescent tablet 40 mEq, PRN   Or  potassium chloride 10 mEq/100 mL IVPB (Peripheral Line), PRN  magnesium sulfate 2000 mg in 50 mL IVPB premix, PRN  furosemide (LASIX) injection 40 mg, BID        Allergies:  Patient has no known allergies. Review of Systems:   A 14 point review of symptoms completed. Pertinent positives identified in the HPI, all other review of symptoms negative as below.       Objective   PHYSICAL EXAM:    Vitals:    04/04/22 0850   BP:    Pulse:    Resp: 22   Temp:    SpO2:     Weight: 184 lb 1.4 oz (83.5 kg)         General Appearance:  Alert, cooperative, no distress, appears stated age   Head:  Normocephalic, without obvious abnormality, atraumatic   Eyes:  PERRL, conjunctiva/corneas clear   Nose: Nares normal, no drainage or sinus tenderness   Throat: Lips, mucosa, and tongue normal   Neck: Supple, symmetrical, trachea midline, no adenopathy, thyroid: not enlarged, symmetric, no tenderness/mass/nodules, no carotid bruit or JVD   Lungs:   Mild wheezing, respirations unlabored   Chest Wall:  No deformity or tenderness   Heart:  Regular rate and rhythm, S1, S2 normal, no murmur, rub or gallop   Abdomen:   Soft, non-tender, bowel sounds active all four quadrants,  no masses, no organomegaly   Extremities: Extremities normal, atraumatic, no cyanosis 1+ BLE edema   Pulses: 2+ and symmetric   Skin: Skin color, texture, turgor normal, no rashes or lesions   Pysch: Normal mood and affect, gross tremors   Neurologic: Normal gross motor and sensory exam.         Labs   CBC:   Lab Results   Component Value Date    WBC 13.0 04/04/2022    RBC 4.38 04/04/2022    HGB 13.6 04/04/2022    HCT 40.6 04/04/2022    MCV 92.8 04/04/2022    RDW 15.6 04/04/2022  2022     CMP:  Lab Results   Component Value Date     2022    K 5.1 2022    K 4.7 2022    CL 98 2022    CO2 29 2022    BUN 14 2022    CREATININE 0.7 2022    GFRAA >60 2022    AGRATIO 1.5 2022    LABGLOM >60 2022    GLUCOSE 134 2022    PROT 6.3 2022    CALCIUM 8.9 2022    BILITOT 0.8 2022    ALKPHOS 80 2022    AST 20 2022    ALT 17 2022     PT/INR:  No results found for: PTINR  HgBA1c:  Lab Results   Component Value Date    LABA1C 5.7 2018     Lab Results   Component Value Date    CKTOTAL 282 2022    TROPONINI 0.01 2022         Cardiac Data     Last EK/3/2022 afib with RVR' NSST changes, no gross ischemia but limited. Echo: 3/14/2022    Left ventricular systolic function is normal with ejection fraction   estimated at 55%. No regional wall motion abnormalities. There is mild concentric left ventricular hypertrophy. Elevated left ventricular filling pressure. Mild bi-atrial enlargement. Mild mitral regurgitation. Mild tricuspid regurgitation. Systolic pulmonary artery pressure (SPAP) is normal estimated at 38 mmHg   (Right atrial pressure of 3 mmHg). No significant change from exam done 2018. Stress Test:    Cath:    Studies:   CXR:  indings are most consistent with mild congestive heart failure. Atypical/viral pneumonia can give similar findings.           Assessment and Plan      1. Resp failure, hx of COPD  2. Acute/chronic Diastolic CHF   Wt  26.5BW and today 83.5  3. Afib: currently rate controlled   - On eliquis      PLAN  1. Cont IV diuresis as tolerated   - strict I/o, qday weights, daily renals   - suspect dietary indiscretion, CHF teaching  Follows with Dr. Amirah Barragan.        Patient Active Problem List   Diagnosis    Acute [facility]ation of chronic obstructive pulmonary disease (COPD) [facility]    Essential hypertension    Acute on chronic respiratory failure with hypoxemia ([address]    Former smoker    Atrial fibrillation ([address]    Acute hyponatremia    Hyperbilirubinemia    Influenzal pneumonia    Unstable angina pectoris (HCC)    Chronic diastolic congestive heart failure (HCC)    Acute hypokalemia    Hypophosphatemia    Long term current use of diuretic    Acute on chronic combined systolic (congestive) and diastolic (congestive) heart failure ([address]           Thank you for allowing us to participate in the care of Clara Hodge. Please call me with any questions [phone]. Pricila Livingston MD, [address] [place] [address] Cardiologist  [facility]  [phone] [facility]  [phone] [address] [place] [address]  4/4/2022 10:15 AM    I will address the patient's cardiac risk factors and adjusted pharmacologic treatment as needed. In addition, I have reinforced the need for patient directed risk factor modification. All questions and concerns were addressed to the patient/family. Alternatives to my treatment were discussed. The note was completed using EMR. Every effort was made to ensure accuracy; however, inadvertent computerized transcription errors may be present.

## 2022-04-04 NOTE — PROGRESS NOTES
04/04/22 0411   NIV Type   Skin Protection for O2 Device Yes   Location Nose   NIV Started/Stopped On   Equipment Type v60   Mode Bilevel   Mask Type Full face mask   Settings/Measurements   IPAP 12 cmH20   CPAP/EPAP 5 cmH2O   Rate Ordered 10   Resp 23   Insp Rise Time (%) 1 %   FiO2  30 %   I Time/ I Time % 1 s   Vt Exhaled 739 mL   Minute Volume 17.1 Liters   Mask Leak (lpm) 25 lpm   Comfort Level Good   Using Accessory Muscles No   SpO2 94   Alarm Settings   Alarms On Y   Press Low Alarm 6 cmH2O   High Pressure Alarm 30 cmH2O   Delay Alarm 20 sec(s)   Resp Rate Low Alarm 6   High Respiratory Rate 40 br/min

## 2022-04-04 NOTE — CARE COORDINATION
CASE MANAGEMENT INITIAL ASSESSMENT      Reviewed chart and completed assessment with patient  Family present:  Son and daughter in law   Explained Case Management role/services. Health Care Decision Maker :   Primary Decision Maker: Chelita Kapadia - [phone]    Primary Decision Maker: Woody Pires - [phone]    Secondary Decision Maker: Jameson Montes - [phone]          Can this person be reached and be able to respond quickly, such as within a few minutes or hours? Yes    Admit date/status: 4/3/22 Inpatient   Diagnosis:  Respiratory failure   Is this a Readmission?:  Yes       Insurance: [employer] required for SNF: Yes       3 night stay required: No    Living arrangements, Adls, care needs, prior to admission: lives in a house with his son and daughter in law     [facility] at home:  Walker_X_Cane__RTS__ BSC__Shower Chair_X_  02_X([employer]) _ HHN__ CPAP__  BiPap__  Hospital Bed__ W/C___ Other_____    Services in the home and/or outpatient, prior to admission: active with [facility]     Current PCP: Martín Herrera     Transportation needs:  Family drives      Dialysis Facility (if applicable)   · Name:  · Address:  · Dialysis Schedule:  · Phone:  · Fax:    PT/OT recs: none    Hospital Exemption Notification (HEN): not initiated     Barriers to discharge: none    Plan/comments: Patient is independent at home with ADL's. Son and DIL requesting Spiritual Care consult for Advanced Directives. Placed [facility] to Sandra Sterling with [facility] and notified of referral.  He states he will reach out to family. CM will continue to follow should any additional needs arise.        ECOC on chart for MD signature

## 2022-04-04 NOTE — CONSULTS
Consult Note            Date:4/4/2022        Patient Name:Bang Jean Baptiste     YOB: 1950     Age:72 y.o. Inpatient consult to Pulmonology  Consult performed by: Perez Salguero MD  Consult ordered by: Giorgio France MD  Reason for consult: Acute respiratory failure, history of COPD, acute CHF          Chief Complaint     Chief Complaint   Patient presents with    Shortness of Breath     discharged on 3/31 for respiratory failure/electrolyte imbalances. wears 4 liters at home. difficulty breathing worse today. brought in on CPAP. Acute respiratory failure, history of COPD, acute CHF    History Obtained From   patient, electronic medical record    History of Present Illness   This is a 79-year-old gentleman who came to the hospital because of increasing shortness of breath. Patient has been recently discharged from the hospital.  Patient states that he started having issues with increasing abdominal swelling lower extremity swelling and his oxygen requirements had increased. Patient has normally seen by [facility] pulmonary. And was recently seen by his pulmonologist.  Patient normally has been doing well with his emphysema but the shortness of breath had progressively gotten worse along with the swellings of the abdomen and the lower extremities. Patient denies any chest pains or palpitations    Past Medical History     Past Medical History:   Diagnosis Date    Bronchitis chronic     Emphysema of lung ([facility]     Influenza A 01/10/2018    Lung disease     copd    Pneumonia     12/2009        Past Surgical History     Past Surgical History:   Procedure Laterality Date    FRACTURE SURGERY      johnson in femur/hip on right    HERNIA REPAIR          Medications     Prior to Admission medications    Medication Sig Start Date End Date Taking?  Authorizing Provider   torsemide (DEMADEX) 20 MG tablet Take 1 tablet by mouth daily 3/31/22   Susy Kowalski MD   vitamin D (ERGOCALCIFEROL) 1.25 MG (94981 UT) CAPS capsule Take 1 capsule by mouth once a week 4/6/22   Kyle Morales MD   potassium chloride (KLOR-CON M) 20 MEQ extended release tablet Take 1 tablet by mouth daily 3/31/22   Kyle Morales MD   ipratropium-albuterol (DUONEB) 0.5-2.5 (3) MG/3ML SOLN nebulizer solution INHALE 3 MILLILIERS (1 VIAL) VIA NEBULIZATION ROUTE EVERY 6 HOURS AS NEEDED FOR SHORTNESS OF BREATH 2/28/22   Bashir Meeks MD   VENTOLIN  (90 Base) MCG/ACT inhaler INHALE 2 PUFFS EVERY 6 HOURS AS NEEDED FOR WHEEZING OR SHORTNESS OF BREATH 2/2/22   Bashir Meeks MD   SYMBICORT 160-4.5 MCG/ACT AERO INHALE 2 PUFFS INTO THE LUNGS 2 TIMES DAILY 12/2/21   Bashir Meeks MD   Roflumilast (DALIRESP) 500 MCG tablet TAKE (1) TABLET DAILY 12/2/21   Bashir Meeks MD   famotidine (PEPCID) 20 MG tablet TAKE (1) TABLET TWICE A DAY BEFORE MEALS.   Patient not taking: Reported on 2/28/2022 11/8/21   Nasima Montanez MD   lisinopril (PRINIVIL;ZESTRIL) 5 MG tablet TAKE 1 TABLET BY MOUTH DAILY 7/2/21   Nasima Montanez MD   apixaban (ELIQUIS) 5 MG TABS tablet TAKE 1 TABLET TWICE DAILY 7/2/21   Nasima Montanez MD   dilTIAZem (CARDIZEM CD) 180 MG extended release capsule TAKE 1 CAPSULE BY MOUTH DAILY 7/2/21   Nasima Montanez MD   guaiFENesin (MUCINEX) 600 MG extended release tablet Take 1 tablet by mouth 2 times daily as needed for Congestion 8/6/20   Bashir Meeks MD   nitroGLYCERIN (NITROSTAT) 0.4 MG SL tablet Place 1 tablet under the tongue every 5 minutes as needed for Chest pain 7/1/20   Nasima Montanez MD        Roflumilast (DALIRESP) tablet 500 mcg, Nightly  apixaban (ELIQUIS) tablet 5 mg, BID  dilTIAZem (CARDIZEM CD) extended release capsule 180 mg, Daily  famotidine (PEPCID) tablet 20 mg, BID  guaiFENesin (MUCINEX) extended release tablet 600 mg, BID PRN  ipratropium-albuterol (DUONEB) nebulizer solution 1 ampule, Q4H WA  lisinopril (PRINIVIL;ZESTRIL) tablet 5 mg, Daily  potassium chloride (KLOR-CON M) extended release tablet 20 mEq, Daily  mometasone-formoterol (DULERA) 100-5 MCG/ACT inhaler 2 puff, BID  sodium chloride flush 0.9 % injection 5-40 mL, 2 times per day  sodium chloride flush 0.9 % injection 5-40 mL, PRN  0.9 % sodium chloride infusion, PRN  ondansetron (ZOFRAN-ODT) disintegrating tablet 4 mg, Q8H PRN   Or  ondansetron (ZOFRAN) injection 4 mg, Q6H PRN  polyethylene glycol (GLYCOLAX) packet 17 g, Daily PRN  acetaminophen (TYLENOL) tablet 650 mg, Q6H PRN   Or  acetaminophen (TYLENOL) suppository 650 mg, Q6H PRN  potassium chloride (KLOR-CON M) extended release tablet 40 mEq, PRN   Or  potassium bicarb-citric acid (EFFER-K) effervescent tablet 40 mEq, PRN   Or  potassium chloride 10 mEq/100 mL IVPB (Peripheral Line), PRN  magnesium sulfate 2000 mg in 50 mL IVPB premix, PRN  furosemide (LASIX) injection 40 mg, BID        Allergies   Patient has no known allergies. Social History     Social History     Tobacco History     Smoking Status  Former Smoker Quit date  7/13/2016 Smoking Frequency  1 pack/day for 30 years (30 pk yrs) Smoking Tobacco Type  Cigarettes    Smokeless Tobacco Use  Never Used    Tobacco Comment  Just quit about 3 months ago 7/13/16          Alcohol History     Alcohol Use Status  No          Drug Use     Drug Use Status  No          Sexual Activity     Sexually Active  Not Currently                Family History     Family History   Problem Relation Age of Onset    Diabetes Sister     Cancer Sister     Cancer Sister     Cancer Brother     Asthma Daughter     Asthma Son     Diabetes Mother     Cancer Father        Review of Systems   Review of Systems   Constitutional: Negative. HENT: Negative. Eyes: Negative. Respiratory: Positive for cough and shortness of breath. Cardiovascular: Positive for leg swelling. Gastrointestinal: Negative. Endocrine: Negative. Genitourinary: Negative. Musculoskeletal: Negative. Skin: Negative. Allergic/Immunologic: Negative.     Neurological: Negative. Hematological: Negative. Psychiatric/Behavioral: Negative. Physical Exam   /82   Pulse 103   Temp 98.3 °F (36.8 °C) (Oral)   Resp 20   Ht 6' (1.829 m)   Wt 184 lb 1.4 oz (83.5 kg)   SpO2 95%   BMI 24.97 kg/m²      Physical Exam  Vitals and nursing note reviewed. Constitutional:       General: He is not in acute distress. Appearance: Normal appearance. He is obese. He is not ill-appearing. HENT:      Head: Normocephalic and atraumatic. Right Ear: External ear normal.      Left Ear: External ear normal.      Nose: Nose normal.      Mouth/Throat:      Mouth: Mucous membranes are moist.      Pharynx: Oropharynx is clear. Comments: Mallampati 3  Eyes:      General: No scleral icterus. Extraocular Movements: Extraocular movements intact. Conjunctiva/sclera: Conjunctivae normal.      Pupils: Pupils are equal, round, and reactive to light. Cardiovascular:      Rate and Rhythm: Normal rate and regular rhythm. Pulses: Normal pulses. Heart sounds: Normal heart sounds. No murmur heard. No friction rub. Pulmonary:      Effort: No respiratory distress. Breath sounds: Rales present. Comments: Bilateral at the bases with Rales  Abdominal:      General: Abdomen is flat. Bowel sounds are normal. There is no distension. Tenderness: There is no abdominal tenderness. There is no guarding. Musculoskeletal:         General: No swelling or tenderness. Normal range of motion. Cervical back: Normal range of motion and neck supple. No rigidity. Right lower leg: Edema present. Left lower leg: Edema present. Skin:     General: Skin is warm and dry. Coloration: Skin is not jaundiced. Neurological:      General: No focal deficit present. Mental Status: He is alert and oriented to person, place, and time. Mental status is at baseline. Cranial Nerves: No cranial nerve deficit. Sensory: No sensory deficit. Motor: No weakness. Gait: Gait normal.   Psychiatric:         Mood and Affect: Mood normal.         Thought Content: Thought content normal.         Judgment: Judgment normal.         Labs    CBC:  Recent Labs     04/03/22  1118 04/04/22 0452   WBC 12.9* 13.0*   RBC 4.41 4.38   HGB 13.4* 13.6   HCT 40.6 40.6   MCV 92.0 92.8   RDW 15.2 15.6*    344     CHEMISTRIES:  Recent Labs     04/03/22  1118 04/04/22 0452    140   K 4.7 5.1   CL 97* 98*   CO2 31 29   BUN 14 14   CREATININE 0.7* 0.7*   GLUCOSE 150* 134*   MG  --  2.10     PT/INR:No results for input(s): PROTIME, INR in the last 72 hours. APTT:No results for input(s): APTT in the last 72 hours. LIVER PROFILE:No results for input(s): AST, ALT, BILIDIR, BILITOT, ALKPHOS in the last 72 hours. Imaging/Diagnostics   CT ABDOMEN PELVIS W IV CONTRAST Additional Contrast? None    Result Date: 3/29/2022  No acute pathology in the abdomen and pelvis. RECOMMENDATIONS: Unavailable     XR CHEST PORTABLE    Result Date: 4/3/2022  Findings are most consistent with mild congestive heart failure. Atypical/viral pneumonia can give similar findings. XR CHEST PORTABLE    Result Date: 3/31/2022  Right basilar atelectasis or airspace disease. Lateral left chest is incompletely imaged. XR CHEST PORTABLE    Result Date: 3/29/2022  No acute process.        Assessment      Hospital Problems           Last Modified POA    * (Principal) Acute on chronic combined systolic (congestive) and diastolic (congestive) heart failure ([address] 4/3/2022 Yes    Acute on chronic respiratory failure with hypoxia and hypercapnia (HCC) 4/4/2022 Yes    Acute on chronic diastolic congestive heart failure ([address] 4/4/2022 Yes          Plan   COPD  Continue with  [place] Jordyn in place of Symbicort, increase the Dulera from the 100-200  Daliresp  Mucinex        Acute on chronic respiratory failure with hypercapnia  We will do nocturnal pulse ox  We will do ABG in the morning      CHF  Continue with diuresis      Thank you for the consult will follow      Electronically signed by Caryle Becker, MD on 4/4/22 at 2:33 PM EDT

## 2022-04-04 NOTE — PROGRESS NOTES
Hospitalist Progress Note      PCP: Dread Uriarte    Date of Admission: 4/3/2022    Chief Complaint: dyspnea       Subjective:  He is feeling better. Breathing more easily. Medications:  Reviewed    Infusion Medications    sodium chloride       Scheduled Medications    mometasone-formoterol  2 puff Inhalation BID    metoprolol succinate  12.5 mg Oral Daily    [START ON 4/5/2022] dilTIAZem  120 mg Oral Daily    Roflumilast  500 mcg Oral Nightly    apixaban  5 mg Oral BID    famotidine  20 mg Oral BID    ipratropium-albuterol  1 ampule Inhalation Q4H WA    lisinopril  5 mg Oral Daily    potassium chloride  20 mEq Oral Daily    sodium chloride flush  5-40 mL IntraVENous 2 times per day    furosemide  40 mg IntraVENous BID     PRN Meds: guaiFENesin, sodium chloride flush, sodium chloride, ondansetron **OR** ondansetron, polyethylene glycol, acetaminophen **OR** acetaminophen, potassium chloride **OR** potassium alternative oral replacement **OR** potassium chloride, magnesium sulfate      Intake/Output Summary (Last 24 hours) at 4/4/2022 1549  Last data filed at 4/4/2022 1400  Gross per 24 hour   Intake 480 ml   Output 2750 ml   Net -2270 ml       Physical Exam Performed:    /82   Pulse 103   Temp 98.3 °F (36.8 °C) (Oral)   Resp 23   Ht 6' (1.829 m)   Wt 184 lb 1.4 oz (83.5 kg)   SpO2 95%   BMI 24.97 kg/m²     General appearance: No apparent distress, appears stated age. HEENT: Pupils equal, round, and reactive to light. Conjunctivae/corneas clear. Neck: Supple, with full range of motion. No jugular venous distention. Trachea midline. Respiratory:  Mildly increased respiratory effort. No audible rales, faint wheezing is present, little audible airflow, prolonged expiratory phase. Cardiovascular: Regular rate and rhythm with normal S1/S2 without murmurs, rubs or gallops. Abdomen: Soft, non-tender, non-distended with normal bowel sounds.   Musculoskeletal: No clubbing, cyanosis. 1+ BLE pitting edema, which is a marked improvement per family. Full range of motion without deformity. Skin: Skin color, texture, turgor normal.  No rashes or lesions. Neurologic:  Neurovascularly intact without any focal sensory/motor deficits. Cranial nerves: II-XII intact, grossly non-focal.  Psychiatric: Alert and oriented, thought content appropriate, limited insight  Capillary Refill: Brisk,3 seconds, normal   Peripheral Pulses: +2 palpable, equal bilaterally       Labs:   Recent Labs     04/03/22  1118 04/04/22 0452   WBC 12.9* 13.0*   HGB 13.4* 13.6   HCT 40.6 40.6    344     Recent Labs     04/03/22  1118 04/04/22 0452    140   K 4.7 5.1   CL 97* 98*   CO2 31 29   BUN 14 14   CREATININE 0.7* 0.7*   CALCIUM 8.9 8.9     No results for input(s): AST, ALT, BILIDIR, BILITOT, ALKPHOS in the last 72 hours. No results for input(s): INR in the last 72 hours. Recent Labs     04/03/22  1118 04/04/22 0452   TROPONINI <0.01 0.01       Urinalysis:      Lab Results   Component Value Date    NITRU Negative 04/03/2022    WBCUA 0-2 03/29/2022    BACTERIA 1+ 01/11/2018    RBCUA 0-2 03/29/2022    BLOODU Negative 04/03/2022    SPECGRAV 1.010 04/03/2022    GLUCOSEU Negative 04/03/2022       Radiology:  XR CHEST PORTABLE   Final Result   Findings are most consistent with mild congestive heart failure. Atypical/viral pneumonia can give similar findings. Assessment/Plan:    Active Hospital Problems    Diagnosis     Acute on chronic combined systolic (congestive) and diastolic (congestive) heart failure (HCC) [I50.43]     Acute on chronic diastolic congestive heart failure (HCC) [I50.33]     Acute on chronic respiratory failure with hypoxia and hypercapnia (HCC) [W18.15, J96.22]        \"The patient is a 67 y.o. male who presents with hypoxic hypercarbic respiratory failure on CPAP.    History of COPD and CHF requiring diuresis and discharged on low dose torsemide with rebound volume overload and pulmonary edema. Chronic hypoxic respiratory failure secondary to COPD. He is normally on 3-4 LPM oxygen at home. \"      Acute on chronic diastolic CHF. EF 55% with DD a few days prior to admission. He normally takes torsemide PO, treating here with furosemide IV per cardiology. Lisinopril, metoprolol. AECOPD. Steroids, inhaled bronchodilators. Suspected ALEXANDREA. Overnight pulse oximetry per pulmonary, appreciate their input. Acute on chronic hypoxic and hypercapnic respiratory failure. Due to above issues, treated accordingly. PAF. Decreased dilt to make room for new metoprolol. Continue apixaban. Essential tremor. Primidone reduces the effectiveness of apixaban, so avoiding this med. Perhaps the new beta blocker will help. This might improve once he is more rested outside of the hospital, and requiring less frequent nebulizers. Reassess with PCP. Code status: I suggested to the patient and family that if he ever had a cardiac or pulmonary arrest his chances of meaningful survival would be slim. They still wanted him to be full code. DVT Prophylaxis: anticoagulation as above  Diet: ADULT DIET; Regular; Low Sodium (2 gm)  Code Status: Full Code    PT/OT Eval Status: eval ordered    Dispo - when his respiratory status is consistently stable. High risk for another quick readmission, though he had made it for almost two years without a hospitalization until recently. Perhaps ready 4/7. He lives at home.        Bianca Garcia MD

## 2022-04-04 NOTE — PROGRESS NOTES
Took pt off of bipap and placed on NC for break and to see if pt has improved, pt still continues to show some increased WOB at this time, RN in room for vitals. Will monitor.

## 2022-04-05 LAB
ANION GAP SERPL CALCULATED.3IONS-SCNC: 10 MMOL/L (ref 3–16)
BASE EXCESS ARTERIAL: 6.1 MMOL/L (ref -3–3)
BASOPHILS ABSOLUTE: 0 K/UL (ref 0–0.2)
BASOPHILS RELATIVE PERCENT: 0.2 %
BUN BLDV-MCNC: 25 MG/DL (ref 7–20)
CALCIUM SERPL-MCNC: 9.2 MG/DL (ref 8.3–10.6)
CARBOXYHEMOGLOBIN ARTERIAL: 0.9 % (ref 0–1.5)
CHLORIDE BLD-SCNC: 98 MMOL/L (ref 99–110)
CO2: 33 MMOL/L (ref 21–32)
CREAT SERPL-MCNC: 0.8 MG/DL (ref 0.8–1.3)
EOSINOPHILS ABSOLUTE: 0 K/UL (ref 0–0.6)
EOSINOPHILS RELATIVE PERCENT: 0 %
GFR AFRICAN AMERICAN: >60
GFR NON-AFRICAN AMERICAN: >60
GLUCOSE BLD-MCNC: 153 MG/DL (ref 70–99)
HCO3 ARTERIAL: 32.6 MMOL/L (ref 21–29)
HCT VFR BLD CALC: 41.9 % (ref 40.5–52.5)
HEMOGLOBIN, ART, EXTENDED: 14.3 G/DL (ref 13.5–17.5)
HEMOGLOBIN: 14 G/DL (ref 13.5–17.5)
LYMPHOCYTES ABSOLUTE: 0.8 K/UL (ref 1–5.1)
LYMPHOCYTES RELATIVE PERCENT: 7.1 %
MAGNESIUM: 2.5 MG/DL (ref 1.8–2.4)
MCH RBC QN AUTO: 30.8 PG (ref 26–34)
MCHC RBC AUTO-ENTMCNC: 33.3 G/DL (ref 31–36)
MCV RBC AUTO: 92.6 FL (ref 80–100)
METHEMOGLOBIN ARTERIAL: 0.7 %
MONOCYTES ABSOLUTE: 0.3 K/UL (ref 0–1.3)
MONOCYTES RELATIVE PERCENT: 2.5 %
NEUTROPHILS ABSOLUTE: 9.7 K/UL (ref 1.7–7.7)
NEUTROPHILS RELATIVE PERCENT: 90.2 %
O2 SAT, ARTERIAL: 95.7 %
O2 THERAPY: ABNORMAL
PCO2 ARTERIAL: 54.6 MMHG (ref 35–45)
PDW BLD-RTO: 15.2 % (ref 12.4–15.4)
PH ARTERIAL: 7.39 (ref 7.35–7.45)
PLATELET # BLD: 415 K/UL (ref 135–450)
PMV BLD AUTO: 8.9 FL (ref 5–10.5)
PO2 ARTERIAL: 79 MMHG (ref 75–108)
POTASSIUM SERPL-SCNC: 5.1 MMOL/L (ref 3.5–5.1)
RBC # BLD: 4.53 M/UL (ref 4.2–5.9)
SODIUM BLD-SCNC: 141 MMOL/L (ref 136–145)
TCO2 ARTERIAL: 34.3 MMOL/L
WBC # BLD: 10.7 K/UL (ref 4–11)

## 2022-04-05 PROCEDURE — 6370000000 HC RX 637 (ALT 250 FOR IP): Performed by: INTERNAL MEDICINE

## 2022-04-05 PROCEDURE — 36415 COLL VENOUS BLD VENIPUNCTURE: CPT

## 2022-04-05 PROCEDURE — 99233 SBSQ HOSP IP/OBS HIGH 50: CPT | Performed by: INTERNAL MEDICINE

## 2022-04-05 PROCEDURE — 94640 AIRWAY INHALATION TREATMENT: CPT

## 2022-04-05 PROCEDURE — 97530 THERAPEUTIC ACTIVITIES: CPT

## 2022-04-05 PROCEDURE — 6360000002 HC RX W HCPCS: Performed by: INTERNAL MEDICINE

## 2022-04-05 PROCEDURE — 36600 WITHDRAWAL OF ARTERIAL BLOOD: CPT

## 2022-04-05 PROCEDURE — 2060000000 HC ICU INTERMEDIATE R&B

## 2022-04-05 PROCEDURE — 97162 PT EVAL MOD COMPLEX 30 MIN: CPT

## 2022-04-05 PROCEDURE — 82803 BLOOD GASES ANY COMBINATION: CPT

## 2022-04-05 PROCEDURE — 97116 GAIT TRAINING THERAPY: CPT

## 2022-04-05 PROCEDURE — 80048 BASIC METABOLIC PNL TOTAL CA: CPT

## 2022-04-05 PROCEDURE — 97535 SELF CARE MNGMENT TRAINING: CPT

## 2022-04-05 PROCEDURE — 2700000000 HC OXYGEN THERAPY PER DAY

## 2022-04-05 PROCEDURE — 94669 MECHANICAL CHEST WALL OSCILL: CPT

## 2022-04-05 PROCEDURE — 83735 ASSAY OF MAGNESIUM: CPT

## 2022-04-05 PROCEDURE — 85025 COMPLETE CBC W/AUTO DIFF WBC: CPT

## 2022-04-05 PROCEDURE — 94761 N-INVAS EAR/PLS OXIMETRY MLT: CPT

## 2022-04-05 PROCEDURE — 97166 OT EVAL MOD COMPLEX 45 MIN: CPT

## 2022-04-05 PROCEDURE — 2580000003 HC RX 258: Performed by: INTERNAL MEDICINE

## 2022-04-05 RX ORDER — METOPROLOL SUCCINATE 25 MG/1
25 TABLET, EXTENDED RELEASE ORAL DAILY
Status: DISCONTINUED | OUTPATIENT
Start: 2022-04-06 | End: 2022-04-07 | Stop reason: HOSPADM

## 2022-04-05 RX ORDER — METOPROLOL SUCCINATE 25 MG/1
12.5 TABLET, EXTENDED RELEASE ORAL ONCE
Status: COMPLETED | OUTPATIENT
Start: 2022-04-05 | End: 2022-04-05

## 2022-04-05 RX ADMIN — APIXABAN 5 MG: 5 TABLET, FILM COATED ORAL at 20:51

## 2022-04-05 RX ADMIN — DILTIAZEM HYDROCHLORIDE 120 MG: 120 CAPSULE, COATED, EXTENDED RELEASE ORAL at 09:08

## 2022-04-05 RX ADMIN — FUROSEMIDE 40 MG: 10 INJECTION, SOLUTION INTRAMUSCULAR; INTRAVENOUS at 18:11

## 2022-04-05 RX ADMIN — ROFLUMILAST 500 MCG: 500 TABLET ORAL at 20:56

## 2022-04-05 RX ADMIN — IPRATROPIUM BROMIDE AND ALBUTEROL SULFATE 1 AMPULE: .5; 2.5 SOLUTION RESPIRATORY (INHALATION) at 12:16

## 2022-04-05 RX ADMIN — Medication 2 PUFF: at 20:18

## 2022-04-05 RX ADMIN — METOPROLOL SUCCINATE 12.5 MG: 25 TABLET, EXTENDED RELEASE ORAL at 09:07

## 2022-04-05 RX ADMIN — Medication 10 ML: at 20:52

## 2022-04-05 RX ADMIN — LISINOPRIL 5 MG: 5 TABLET ORAL at 09:07

## 2022-04-05 RX ADMIN — METOPROLOL SUCCINATE 12.5 MG: 25 TABLET, EXTENDED RELEASE ORAL at 11:03

## 2022-04-05 RX ADMIN — IPRATROPIUM BROMIDE AND ALBUTEROL SULFATE 1 AMPULE: .5; 2.5 SOLUTION RESPIRATORY (INHALATION) at 08:24

## 2022-04-05 RX ADMIN — Medication 10 ML: at 09:08

## 2022-04-05 RX ADMIN — METHYLPREDNISOLONE SODIUM SUCCINATE 40 MG: 40 INJECTION, POWDER, FOR SOLUTION INTRAMUSCULAR; INTRAVENOUS at 20:51

## 2022-04-05 RX ADMIN — POTASSIUM CHLORIDE 20 MEQ: 20 TABLET, EXTENDED RELEASE ORAL at 09:07

## 2022-04-05 RX ADMIN — FUROSEMIDE 40 MG: 10 INJECTION, SOLUTION INTRAMUSCULAR; INTRAVENOUS at 09:08

## 2022-04-05 RX ADMIN — APIXABAN 5 MG: 5 TABLET, FILM COATED ORAL at 09:07

## 2022-04-05 RX ADMIN — IPRATROPIUM BROMIDE AND ALBUTEROL SULFATE 1 AMPULE: .5; 2.5 SOLUTION RESPIRATORY (INHALATION) at 15:44

## 2022-04-05 RX ADMIN — FAMOTIDINE 20 MG: 20 TABLET, FILM COATED ORAL at 09:08

## 2022-04-05 RX ADMIN — Medication 2 PUFF: at 08:24

## 2022-04-05 RX ADMIN — FAMOTIDINE 20 MG: 20 TABLET, FILM COATED ORAL at 20:51

## 2022-04-05 RX ADMIN — METHYLPREDNISOLONE SODIUM SUCCINATE 40 MG: 40 INJECTION, POWDER, FOR SOLUTION INTRAMUSCULAR; INTRAVENOUS at 09:08

## 2022-04-05 RX ADMIN — IPRATROPIUM BROMIDE AND ALBUTEROL SULFATE 1 AMPULE: .5; 2.5 SOLUTION RESPIRATORY (INHALATION) at 20:13

## 2022-04-05 ASSESSMENT — PAIN SCALES - GENERAL: PAINLEVEL_OUTOF10: 0

## 2022-04-05 NOTE — PROGRESS NOTES
[facility] Daily Progress Note      Admit Date:  4/3/2022    Subjective:  Mr. Joycelyn Marina is feeling better today  [place]  Son at bed side. Patient went home last week  He was admitted one week ago with electrolyte abnormality  3 days after discharge his legs swelled and he could not get out of chair. He was very short of breath when he arrived in ED and had to be placed on BIPAP  He has followed in office for  Permanent afib  Chronic d- CHF  COPD chronic hypoxic resp failure on O2 3L/min  He says he is compliant with taking Demadex and metolazone. He follows his diet according to his son. Denies chest pain palpitations dizziness or syncope. He normally follows with Dr Elroy Sterling for his COPD  He is not a smoker at present. ROS:  12 point ROS negative in all areas as listed below except as in [place]  Constitutional, EENT,  GI, , Musculoskeletal, skin, neurological, hematological, endocrine, Psychiatric    Past Medical History:   Diagnosis Date    Bronchitis chronic     Emphysema of lung ([facility] [address]     Influenza A 01/10/2018    Lung disease     copd    Pneumonia     12/2009     Past Surgical History:   Procedure Laterality Date    FRACTURE SURGERY      johnson in femur/hip on right    HERNIA REPAIR         Objective:   BP (!) 92/47   Pulse 79   Temp 97.8 °F (36.6 °C)   Resp 20   Ht 6' (1.829 m)   Wt 179 lb 0.2 oz (81.2 kg)   SpO2 96%   BMI 24.28 kg/m²       Intake/Output Summary (Last 24 hours) at 4/5/2022 1604  Last data filed at 4/5/2022 0407  Gross per 24 hour   Intake 240 ml   Output 600 ml   Net -360 ml       TELEMETRY: afib rate controlled    Physical Exam:  General: No Respiratory distress, appears well developed and well nourished.    Eyes:  Sclera nonicteric  Nose/Sinuses:  negative findings: nose shows no deformity, asymmetry, or inflammation, nasal mucosa normal, septum midline with no perforation or bleeding  Back:  no pain to palpation  Joint:  no active joint inflammation  Musculoskeletal: negative  Skin:  Warm and dry  Neck:  Negative for JVD and Carotid Bruits. Chest:  Clear to auscultation, respiration easy  Cardiovascular:  irreg irreg S2 normal, no murmur, no rub or thrill. Extremities:   No edema, clubbing, cyanosis,  Neuro: intact    Medications:    [START ON 4/6/2022] metoprolol succinate  25 mg Oral Daily    mometasone-formoterol  2 puff Inhalation BID    dilTIAZem  120 mg Oral Daily    methylPREDNISolone  40 mg IntraVENous BID    Roflumilast  500 mcg Oral Nightly    apixaban  5 mg Oral BID    famotidine  20 mg Oral BID    ipratropium-albuterol  1 ampule Inhalation Q4H WA    lisinopril  5 mg Oral Daily    potassium chloride  20 mEq Oral Daily    sodium chloride flush  5-40 mL IntraVENous 2 times per day    furosemide  40 mg IntraVENous BID      sodium chloride       guaiFENesin, sodium chloride flush, sodium chloride, ondansetron **OR** ondansetron, polyethylene glycol, acetaminophen **OR** acetaminophen, potassium chloride **OR** potassium alternative oral replacement **OR** potassium chloride, magnesium sulfate    Lab Data:  CBC:   Recent Labs     04/03/22  1118 04/04/22  0452 04/05/22  0458   WBC 12.9* 13.0* 10.7   HGB 13.4* 13.6 14.0   HCT 40.6 40.6 41.9   MCV 92.0 92.8 92.6    344 415     BMP:   Recent Labs     04/03/22  1118 04/04/22  0452 04/05/22  0458    140 141   K 4.7 5.1 5.1   CL 97* 98* 98*   CO2 31 29 33*   BUN 14 14 25*   CREATININE 0.7* 0.7* 0.8     LIVER PROFILE: No results for input(s): AST, ALT, LIPASE, BILIDIR, BILITOT, ALKPHOS in the last 72 hours. Invalid input(s): AMYLASE,  ALB  PT/INR: No results for input(s): PROTIME, INR in the last 72 hours. APTT: No results for input(s): APTT in the last 72 hours. BNP:  No results for input(s): BNP in the last 72 hours. IMAGING:   Summary echo march 14 2022   Left ventricular systolic function is normal with ejection fraction   estimated at 55%. No regional wall motion abnormalities.    There is mild concentric left ventricular hypertrophy. Elevated left ventricular filling pressure. Mild bi-atrial enlargement. Mild mitral regurgitation. Mild tricuspid regurgitation. Systolic pulmonary artery pressure (SPAP) is normal estimated at 38 mmHg   (Right atrial pressure of 3 mmHg). No significant change from exam done 1/11/2018. Assessment:  Acute on chronic diastolic CHF  Acute on chronic resp failure  Permanent afib    Recommend continue IV diuresis today switch to demadex tomorrow  Weekly dose of metolazone 2.5 mg daily  anticaogulation with apixaban  Pulmonary care  Poor prognosis  Palliative consult.     Patient Active Problem List    Diagnosis Date Noted    Acute on chronic combined systolic (congestive) and diastolic (congestive) heart failure ([facility] [address] 04/03/2022    Acute hypokalemia 03/29/2022    Hypophosphatemia 03/29/2022    Long term current use of diuretic 03/29/2022    Acute on chronic diastolic congestive heart failure ([facility] [address] 02/02/2022    Unstable angina pectoris ([facility] [address] 04/17/2019    Atrial fibrillation ([facility] [address] 01/11/2018    Acute on chronic respiratory failure with hypoxia and hypercapnia (HCC) 01/11/2018    Acute hyponatremia 01/11/2018    Hyperbilirubinemia 01/11/2018    Influenzal pneumonia     Acute on chronic respiratory failure with hypoxemia ([address] 12/19/2011    Acute exacerbation of chronic obstructive pulmonary disease (COPD) ([facility] [address] 04/07/2011    Essential hypertension 04/07/2011    Former smoker 04/16/2010         Aura Rodriguez MD, MD 4/5/2022 4:04 PM

## 2022-04-05 NOTE — PROGRESS NOTES
Pt OOB with RN and with PT/OT today. Initially up with martín x 2. Tolerated fairly well. Got up with a walker with therapy. Pt stated that he felt like he did \"pretty well\". Pt currently up in chair.

## 2022-04-05 NOTE — PROGRESS NOTES
Physical Therapy    Facility/Department: [facility] C4 PCU  Initial Assessment/Treatment    NAME: Serafin Tomas  : 1950  MRN: [id number]    Date of Service: 2022    Discharge Recommendations:  24 hour supervision or assist,Home with Home health PT   PT Equipment Recommendations  Equipment Needed: No    Assessment   Body structures, Functions, Activity limitations: Decreased functional mobility ; Decreased balance;Decreased endurance  Assessment: Pt presents to [facility] with SOB. PTA, pt lives with son who is available  and has 2 CLAU. Pt typically IND with functional mobility with rollator prn. Currently, pt requires CGA for functional mobility with SW and demonstrate decreased endurance requiring 4 L O2 and dropping to SpO2 87% with short distance ambulation. Pt would benefit from continued skilled PT to address deficits listed above. Recommend home with 24/7 supervision/assist and HHPT  Treatment Diagnosis: impaired functional mobility  Prognosis: Good  Decision Making: Medium Complexity  PT Education: Goals;Gait Training;General Safety; Disease Specific Education;PT Role;Plan of Care; Functional Mobility Training;Transfer Training  Patient Education: Pt educated on energy conservation with ambulation and household tasks. Pt verbalizes understanding  REQUIRES PT FOLLOW UP: Yes  Activity Tolerance  Activity Tolerance: Patient Tolerated treatment well;Patient limited by fatigue;Patient limited by endurance  Activity Tolerance: Up in chair: SpO2 90-92% on 4L, HR 79, Bp 92/47 up in chair. Pt SpO2 decreasing to 87% on 4L of O2 requiring seated rest breaks with cues for PLB to increase SpO2 to 90% or greater. Patient Diagnosis(es): The primary encounter diagnosis was Acute on chronic respiratory failure with hypoxia and hypercapnia ([address] A diagnosis of Acute on chronic congestive heart failure, unspecified heart failure type [facility]) was also pertinent to this visit.      has a past medical history of Bronchitis chronic, Emphysema of lung ([facility] [address] Influenza A, Lung disease, and Pneumonia. has a past surgical history that includes fracture surgery and hernia repair. Restrictions  Restrictions/Precautions  Restrictions/Precautions: Fall Risk  Position Activity Restriction  Other position/activity restrictions: Up with assist, 4L of O2     Vision/Hearing  Vision: Impaired  Vision Exceptions: Wears glasses for reading  Hearing: Exceptions to [facility]  Hearing Exceptions: Hard of hearing/hearing concerns       Subjective  General  Chart Reviewed: Yes  Patient assessed for rehabilitation services?: Yes  Response To Previous Treatment: Not applicable  Family / Caregiver Present: Yes (son)  Referring Practitioner: Delma  Referral Date : 04/04/22  Diagnosis: Acute on chronic heart failure  Follows Commands: Within Functional Limits  General Comment  Comments: RN cleared pt for therapy eval  Subjective  Subjective: Pt sitting up in chair upon arrival, agreeable to PT eval. Son arrives midway through eval. States he can be home24/7 if needed  Pain Screening  Patient Currently in Pain: Denies  Vital Signs  Patient Currently in Pain: Denies     Orientation  Orientation  Overall Orientation Status: Within Normal Limits     Social/Functional History  Social/Functional History  Lives With: Son,Family (daughter in law)  Type of Home: House  Home Layout: One level  Home Access: Stairs to enter without rails  Entrance Stairs - Number of Steps: 1+1  Bathroom Shower/Tub: Walk-in shower  Bathroom Toilet: Standard  Bathroom Equipment: 3-in-1 commode (Pt uses 3 in 1 commode in shower.)  Bathroom Accessibility: Walker accessible  Home Equipment: Oxygen,4 wheeled walker (4L of O2)  ADL Assistance: Independent  Homemaking Responsibilities: Yes (Son performs IADLS.  Pt performs sometimes, but not very often.)  Ambulation Assistance: Independent (Use 4WW PRN.)  Transfer Assistance: Independent  Active : No  Additional Comments: Pt's son can provide 24hr A. Pt reports no falls in the past 6 months. Objective  Strength RLE  Strength RLE: WFL  Strength LLE  Strength LLE: WFL  Tone RLE  RLE Tone: Normotonic  Tone LLE  LLE Tone: Normotonic     Sensation  Overall Sensation Status: Impaired     Bed mobility  Supine to Sit: Unable to assess  Sit to Supine: Unable to assess  Scooting: Unable to assess     Transfers  Sit to Stand: Contact guard assistance  Stand to sit: Contact guard assistance  Comment: with SW from recliner; mild posterior LOB, but pt able to correct with tactile cuing     Ambulation  Ambulation?: Yes  Ambulation 1  Surface: level tile  Device: Standard Walker  Gait Deviations: Shuffles; Slow Mercedez; Increased ROBERT; Decreased step length;Decreased step height  Distance: 20 ft x2  Comments: Pt ambulates with overall steady gait, no over LOB. HANKINS, recovers with seated rest break and PLB. Pt would benefit from RW vs SW. RW placed in room at end of session  Stairs/Curb  Stairs?: No     Balance  Posture: Good  Sitting - Static: Good  Sitting - Dynamic: Good  Standing - Static: Good;-  Standing - Dynamic: Good;-        Plan   Plan  Times per week: 3-5x/week  Current Treatment Recommendations: Strengthening,Neuromuscular Re-education,Home Exercise Program,Safety Education & Training,Patient/Caregiver Education & Training,Endurance Training,Balance Training,Functional Mobility Training,Transfer Training,Gait Training,Stair training,Pain Management  Safety Devices  Type of devices:  All fall risk precautions in place,Left in chair,Call light within reach,Gait belt,Patient at risk for falls      AM-PAC Score     AM-PAC Inpatient Mobility without Stair Climbing Raw Score : 17 (04/05/22 1301)  AM-PAC Inpatient without Stair Climbing T-Scale Score : 48.47 (04/05/22 1301)  Mobility Inpatient CMS 0-100% Score: 32.72 (04/05/22 1301)  Mobility Inpatient without Stair CMS G-Code Modifier : CJ (04/05/22 1301)       Goals  Short term goals  Time Frame for Short term goals: 7 days (4/12/22)  Short term goal 1: Pt will perform bed mobility with supervision  Short term goal 2: Pt will perform transfer with RW and supervision  Short term goal 3: Pt will ambulate 50 ft with RW and supervision  Short term goal 4: Pt will perform 10 reps of BLE exercises to improve strength by 4/7/22  Patient Goals   Patient goals : \"to go home\"       Therapy Time   Individual Concurrent Group Co-treatment   Time In 1146         Time Out 1219         Minutes 33         Timed Code Treatment Minutes: 23 Minutes (10 min eval)     If pt is unable to be seen after this session, please let this note serve as discharge summary. Please see case management note for discharge disposition. Thank you.     Christy Caruso, PT

## 2022-04-05 NOTE — PLAN OF CARE
Problem: OXYGENATION/RESPIRATORY FUNCTION  Goal: Patient will maintain patent airway  Outcome: Ongoing  Goal: Patient will achieve/maintain normal respiratory rate/effort  Description: Respiratory rate and effort will be within normal limits for the patient  Outcome: Ongoing     Problem: FLUID AND ELECTROLYTE IMBALANCE  Goal: Fluid and electrolyte balance are achieved/maintained  Outcome: Ongoing     Patient's EF (Ejection Fraction) is greater than 40%    Heart Failure Medications:  Diuretics[de-identified] Furosemide    (One of the following REQUIRED for EF </= 40%/SYSTOLIC FAILURE but MAY be used in EF% >40%/DIASTOLIC FAILURE)        ACE[de-identified] Lisinopril        ARB[de-identified] None         ARNI[de-identified] None    (Beta Blockers)  NON- Evidenced Based Beta Blocker (for EF% >40%/DIASTOLIC FAILURE): Metoprolol TARTrate- Lopressor    Evidenced Based Beta Blocker::(REQUIRED for EF% <40%/SYSTOLIC FAILURE) Metoprolol SUCCinate- Toprol XL  . .................................................................................................................................................. Patient's weights and intake/output reviewed: Yes    Patient's Last Weight: 179 lbs obtained by bed scale. Difference of 5 lbs less than last documented weight. Intake/Output Summary (Last 24 hours) at 4/5/2022 1604  Last data filed at 4/5/2022 0407  Gross per 24 hour   Intake 240 ml   Output 600 ml   Net -360 ml       Comorbidities Reviewed Yes    Patient has a past medical history of Bronchitis chronic, Emphysema of lung ([address] Influenza A, Lung disease, and Pneumonia. >>For CHF and Comorbidity documentation on Education Time and Topics, please see Education Tab    Progressive Mobility Assessment:  What is this patient's Current Level of Mobility?: Ambulatory- with Assistance  How was this patient Mobilized today?: Edge of Bed, Up to Chair,  Up to Toilet/Shower, and Up in Room, ambulated 30 ft                 With Whom?  Nurse, PCA, PT, and OT Level of Difficulty/Assistance: 1x Assist     Pt up in chair at this time on  4 L O2. Pt with complaints of shortness of breath. Pt with nonpitting lower extremity edema.      Patient and/or Family's stated Goal of Care this Admission: reduce shortness of breath, increase activity tolerance, better understand heart failure and disease management, be more comfortable, and reduce lower extremity edema prior to discharge        :

## 2022-04-05 NOTE — CARE COORDINATION
Spoke with Dr. Sarah Lacy who states patient will need a bipap. Overnight pulse ox was done last night. He states bipap's are hard to find so try [facility]. Placed call to [facility] with [address] and she states they do not have any. Placed call to [employer], was transferred 4 times and then on hold for extended amount of time. Placed call to Aneudy George with [facility] and he states they are also on backorder but that [facility] does have bipap's. Placed call to [facility] with [facility] and she confirmed that they do have some. Faxed facesheet, H&P, overnight pulse ox and Dr. Rj Salguero note with order included to her at [phone].

## 2022-04-05 NOTE — PROGRESS NOTES
Progressive Care Progress Note    Patient: Marlene Gamez MRN: [id number]  Date of  Admission: 4/3/2022   YOB: 1950  Age: 67 y.o. Sex: male    [address]  Room/Bed: 6843/6628-64 Attending Physician: Matilde Goyal MD   Admitting Physician: Rodger Meckel        Chief Complaint   Patient presents with    Shortness of Breath       discharged on 3/31 for respiratory failure/electrolyte imbalances. wears 4 liters at home. difficulty breathing worse today. brought in on CPAP. Acute respiratory failure, history of COPD, acute CHF     History Obtained From   patient, electronic medical record     History of Present Illness   This is a 70-year-old gentleman who came to the hospital because of increasing shortness of breath. Patient has been recently discharged from the hospital.  Patient states that he started having issues with increasing abdominal swelling lower extremity swelling and his oxygen requirements had increased. Patient has normally seen by [facility] pulmonary. And was recently seen by his pulmonologist.  Patient normally has been doing well with his emphysema but the shortness of breath had progressively gotten worse along with the swellings of the abdomen and the lower extremities. Patient denies any chest pains or palpitations           Subjective:    Patient still having some shortness of breath  No chest pains or palpitations  No nausea or vomiting  No dysuria hematuria  Breathing little bit easier today      ROS:A comprehensive review of systems was negative except for: above    Objective:          Intake and Output:   Current Shift:   04/05 0701 - 04/05 1900  In: 240 [P.O.:240]  Out: 350 [Urine:350]  Last three shifts:   04/03 1901 - 04/05 0700  In: 720 [P.O.:720]  Out: 3050 [Urine:3050]        Hemodynamic parameters for last 24 hours:  [unfilled]    Physical Exam:   Patient Vitals for the past 24 hrs:   BP Temp Temp src Pulse Resp SpO2 Weight   04/05/22 1615 101/65 98.2 °F (36.8 °C) Oral 74 20 93 % --   04/05/22 1544 -- -- -- -- 20 96 % --   04/05/22 1216 -- -- -- -- -- 93 % --   04/05/22 1202 (!) 92/47 97.8 °F (36.6 °C) -- 79 -- 92 % --   04/05/22 0824 -- -- -- -- 22 93 % --   04/05/22 0800 108/69 98.2 °F (36.8 °C) Oral 110 20 95 % --   04/05/22 0437 106/66 97.7 °F (36.5 °C) Oral 76 22 93 % --   04/05/22 0400 -- -- -- -- -- -- 179 lb 0.2 oz (81.2 kg)   04/04/22 2327 111/66 98.4 °F (36.9 °C) Oral 94 18 96 % --   04/04/22 2005 -- -- -- -- -- 92 % --   04/04/22 1933 113/75 98.5 °F (36.9 °C) Oral 83 18 93 % --        Physical Exam  Vitals and nursing note reviewed. Constitutional:       General: He is not in acute distress. Appearance: Normal appearance. He is obese. HENT:      Head: Normocephalic and atraumatic. Right Ear: External ear normal.      Left Ear: External ear normal.      Nose: Nose normal.      Mouth/Throat:      Mouth: Mucous membranes are moist.      Pharynx: Oropharynx is clear. Eyes:      General:         Right eye: No discharge. Left eye: No discharge. Extraocular Movements: Extraocular movements intact. Conjunctiva/sclera: Conjunctivae normal.      Pupils: Pupils are equal, round, and reactive to light. Cardiovascular:      Rate and Rhythm: Normal rate and regular rhythm. Pulses: Normal pulses. Heart sounds: No murmur heard. Pulmonary:      Effort: No respiratory distress. Breath sounds: No stridor. No rhonchi or rales. Abdominal:      General: Bowel sounds are normal. There is no distension. Palpations: Abdomen is soft. There is no mass. Tenderness: There is no abdominal tenderness. Hernia: No hernia is present. Musculoskeletal:         General: No swelling. Normal range of motion. Cervical back: Normal range of motion. No rigidity. Skin:     General: Skin is warm and dry. Coloration: Skin is not jaundiced or pale.    Neurological:      General: No focal deficit present. Mental Status: He is alert and oriented to person, place, and time. Mental status is at baseline. Cranial Nerves: No cranial nerve deficit. Sensory: No sensory deficit. Psychiatric:         Mood and Affect: Mood normal.         Behavior: Behavior normal.         Thought Content: Thought content normal.             Labs:   Recent Labs     04/03/22  1118 04/04/22 0452 04/05/22 0458   WBC 12.9* 13.0* 10.7   HGB 13.4* 13.6 14.0   HCT 40.6 40.6 41.9    344 415      Recent Labs     04/03/22  1118 04/04/22 0452 04/05/22 0458    140 141   K 4.7 5.1 5.1   CL 97* 98* 98*   CO2 31 29 33*   BUN 14 14 25*   GLUCOSE 150* 134* 153*           Radiology, images personally reviewed. Not indicated  Other imaging: Not indicated      Micro: Negative growth to date    Assessment:     Principal Problem:    Acute on chronic combined systolic (congestive) and diastolic (congestive) heart failure (HCC)  Active Problems:    Acute on chronic respiratory failure with hypoxia and hypercapnia (HCC)    Acute on chronic diastolic congestive heart failure (HCC)  Resolved Problems:    * No resolved hospital problems. *      Discussion / Plan:     COPD  Continue with  Sarika Peers in place of Symbicort  Daliresp  Mucinex           Acute on chronic respiratory failure with hypercapnia  We will do nocturnal pulse ox, will try to do this again tonight on baseline 3 L of oxygen    ABG done on normal baseline of oxygen at stable         Patient may benefit from BiPAP therapy     CHF  Continue with diuresis          Radha Espinoza MD    [facility] Pulmonary, Critical Care and Sleep Medicine  [phone]    Please note that some or all of this record was generated using voice recognition software. If there are any questions about the content of this document, please contact the author as some errors in transcription may have occurred.

## 2022-04-05 NOTE — PROGRESS NOTES
Occupational Therapy   Occupational Therapy Initial Assessment/Treatment  Date: 2022   Patient Name: Rhianna Chadwick  MRN: [id number]     : 1950    Date of Service: 2022    Discharge Recommendations:  24 hour supervision or assist,Home with Home health OT  OT Equipment Recommendations  Equipment Needed: No    Assessment   Performance deficits / Impairments: Decreased functional mobility ; Decreased ADL status; Decreased endurance;Decreased balance;Decreased strength;Decreased coordination    Assessment: Pt is a 67yo male with deficits in the areas listed above with CAITLIN and acute on chronic respiratory failure. Pt is typically mod I with ADLS and functional mobility at baseline with 4WW PRN. Today, pt performing functional mobility, functional t/fs and standing self-cares with CGA and SW. Pt requiring increased time and seated rest with PLB d/t decreased endurance and decreased O2 stats. Pt would continue to benefit from skilled OT services to increase endurance, safety and independence in ADLs and functional mobility. Prognosis: Good  Decision Making: Medium Complexity  OT Education: OT Role;Plan of Care;ADL Adaptive Strategies; Energy Conservation  Patient Education: disease specific: PLB, taking rest breaks PRN, use of call light, general safety during hospitalization, having staff present for mobility. Pt verbalized understanding. REQUIRES OT FOLLOW UP: Yes  Activity Tolerance  Activity Tolerance: Patient Tolerated treatment well;Treatment limited secondary to medical complications (free text)  Activity Tolerance: O2 90-92% on 4L, HR 79, Bp 92/47 up in chair. Pt O2 decreasing to 87% on 4L of O2 requiring seated rest breaks with cues for PLB to increase O2 to 90% or greater. Safety Devices  Safety Devices in place: Yes  Type of devices: Nurse notified;Gait belt;Call light within reach; Left in chair (Pt verbalized that he would not get up without assist. No alarm at beginning of session.) Patient Diagnosis(es): The primary encounter diagnosis was Acute on chronic respiratory failure with hypoxia and hypercapnia ([facility] [address] A diagnosis of Acute on chronic congestive heart failure, unspecified heart failure type [facility]) was also pertinent to this visit. has a past medical history of Bronchitis chronic, Emphysema of lung ([facility] [address] Influenza A, Lung disease, and Pneumonia. has a past surgical history that includes fracture surgery and hernia repair. Restrictions  Position Activity Restriction  Other position/activity restrictions: Up with assist, 4L of O2    Subjective   General  Chart Reviewed: Yes  Patient assessed for rehabilitation services?: Yes  Additional Pertinent Hx: Per chart, \"The patient is a 67 y.o. male who presents with hypoxic hypercarbic respiratory failure on CPAP. History of COPD and CHF requiring diuresis and discharged on low dose torsemide with rebound volume overload and pulmonary edema. Elevated lactate with leukocytosis possibly associated with steroid. Empirically treated with antibiotics - cefepime and vancomycin in the ED. Chronic hypoxic respiratory failure secondary to COPD. He is normally on 3-4 LPM oxygen at home. \"  Response to previous treatment: Patient with no complaints from previous session  Family / Caregiver Present: Yes (Son)  Referring Practitioner: Jose Guadalupe Hernandez MD  Diagnosis: Acute on chronic hypoxic and hypercarbic respiratory failure secondary to diastolic HFpEF  Subjective  Subjective: Pt up in chair and agreeable to therapy. General Comment  Comments: RN approved therapy.   Patient Currently in Pain: Denies  Vital Signs  Temp: 97.8 °F (36.6 °C)  Pulse: 79  Heart Rate Source: Monitor  BP: (!) 92/47  BP Location: Right upper arm  Patient Position: Sitting  Patient Currently in Pain: Denies  Oxygen Therapy  SpO2: 93 %  Pulse Oximeter Device Mode: Intermittent  Pulse Oximeter Device Location: Finger  O2 Device: Nasal cannula  O2 Flow Rate (L/min): 4 L/min  Social/Functional History  Social/Functional History  Lives With: Son,Family (daughter in law)  Type of Home: House  Home Layout: One level  Home Access: Stairs to enter without rails  Entrance Stairs - Number of Steps: 1+1  Bathroom Shower/Tub: Walk-in shower  Bathroom Toilet: Standard  Bathroom Equipment: 3-in-1 commode (Pt uses 3 in 1 commode in shower.)  Bathroom Accessibility: Walker accessible  Home Equipment: Oxygen,4 wheeled walker (4L of O2)  ADL Assistance: Independent  Homemaking Responsibilities: Yes (Son performs IADLS. Pt performs sometimes, but not very often.)  Ambulation Assistance: Independent (Use 4WW PRN.)  Transfer Assistance: Independent  Active : No  Additional Comments: Pt's son can provide 24hr A. Pt reports no falls in the past 6 months. Objective   Vision: Impaired  Vision Exceptions: Wears glasses for reading  Hearing: Exceptions to [employer]  Hearing Exceptions: Hard of hearing/hearing concerns    Orientation  Overall Orientation Status: Within Functional Limits     Balance  Sitting Balance: Supervision  Standing Balance: Contact guard assistance  Standing Balance  Time: ~3minutes, ~90seconds  Activity: to/from restroom, standing self-cares  Comment: SW used. Pt O2 decreasing with functional mobility/standing ADLS. Functional Mobility  Functional - Mobility Device: Standard Walker  Activity: To/from bathroom  Assist Level: Contact guard assistance  ADL  Grooming: Contact guard assistance; Increased time to complete (Pt wash face, arms and periarea with wash cloth and to comb hair.)  Additional Comments: Increased time to rest after activity d/t SOb and decreased O2. Pt with UE tremors increasing time to complete activities. Tone RUE  RUE Tone: Normotonic  Tone LUE  LUE Tone: Normotonic  Coordination  Movements Are Fluid And Coordinated: No  Coordination and Movement description: Gross motor impairments; Fine motor impairments;Tremors     Bed mobility  Supine to Sit: Unable to assess  Sit to Supine: Unable to assess  Scooting: Unable to assess  Comment: Pt started and ended session seated in chair. Transfers  Sit to stand: Contact guard assistance  Stand to sit: Contact guard assistance  Transfer Comments: up to SW.     Cognition  Overall Cognitive Status: WFL        Sensation  Overall Sensation Status: Impaired (Pt reports N/T in fingers and toes and cramping in toes at night.)        LUE AROM (degrees)  LUE AROM : WFL  Left Hand AROM (degrees)  Left Hand AROM: WFL  RUE AROM (degrees)  RUE AROM : WFL  Right Hand AROM (degrees)  Right Hand AROM: WFL  LUE Strength  Gross LUE Strength: WFL  RUE Strength  Gross RUE Strength: WFL                   Plan   Plan  Times per week: 3-5x/week  Current Treatment Recommendations: Strengthening,Balance Training,Functional Mobility Training,Endurance Training,Safety Education & Training,Patient/Caregiver Education & Training,Self-Care / ADL    AM-PAC Score        AM-[facility] Inpatient Daily Activity Raw Score: 19 (04/05/22 1242)  AM-PAC Inpatient ADL T-Scale Score : 40.22 (04/05/22 1242)  ADL Inpatient CMS 0-100% Score: 42.8 (04/05/22 1242)  ADL Inpatient CMS G-Code Modifier : CK (04/05/22 1242)    Goals  Short term goals  Time Frame for Short term goals: 1 week (4/12) unless stated otherwise. Short term goal 1: Pt will toilet with supervision or less. Short term goal 2: Pt will LB dress with supervision or less  Short term goal 3: Pt will perform B/L UE ther ex x15 to increase endurance for functional activities (4/10)  Short term goal 4: Pt will perform 3minutes of standing functional activities with supervision or less with O2 remaining 90% or greater.   Patient Goals   Patient goals : \"to go home soon\" \"to comb my hair\"       Therapy Time   Individual Concurrent Group Co-treatment   Time In 1146         Time Out 1219         Minutes 33         Timed Code Treatment Minutes: 23 Minutes (10minute evaluation)       ERASMO Trujillo/JAIR  If pt discharges prior to next session, this note will serve as discharge summary. See case management note for discharge disposition.

## 2022-04-05 NOTE — PROGRESS NOTES
Hospitalist Progress Note      PCP: Juli Felix    Date of Admission: 4/3/2022    Chief Complaint: dyspnea       Subjective:  He is feeling better. Breathing more easily. In good spirits. Medications:  Reviewed    Infusion Medications    sodium chloride       Scheduled Medications    [START ON 4/6/2022] metoprolol succinate  25 mg Oral Daily    mometasone-formoterol  2 puff Inhalation BID    dilTIAZem  120 mg Oral Daily    methylPREDNISolone  40 mg IntraVENous BID    Roflumilast  500 mcg Oral Nightly    apixaban  5 mg Oral BID    famotidine  20 mg Oral BID    ipratropium-albuterol  1 ampule Inhalation Q4H WA    lisinopril  5 mg Oral Daily    potassium chloride  20 mEq Oral Daily    sodium chloride flush  5-40 mL IntraVENous 2 times per day    furosemide  40 mg IntraVENous BID     PRN Meds: guaiFENesin, sodium chloride flush, sodium chloride, ondansetron **OR** ondansetron, polyethylene glycol, acetaminophen **OR** acetaminophen, potassium chloride **OR** potassium alternative oral replacement **OR** potassium chloride, magnesium sulfate      Intake/Output Summary (Last 24 hours) at 4/5/2022 1343  Last data filed at 4/5/2022 0407  Gross per 24 hour   Intake 240 ml   Output 2000 ml   Net -1760 ml       Physical Exam Performed:    BP (!) 92/47   Pulse 79   Temp 97.8 °F (36.6 °C)   Resp 22   Ht 6' (1.829 m)   Wt 179 lb 0.2 oz (81.2 kg)   SpO2 93%   BMI 24.28 kg/m²     General appearance: No apparent distress, appears stated age. HEENT: Pupils equal, round, and reactive to light. Conjunctivae/corneas clear. Neck: Supple, with full range of motion. No jugular venous distention. Trachea midline. Respiratory:  Mildly increased respiratory effort. No audible rales, faint wheezing is present, little audible airflow, prolonged expiratory phase. Cardiovascular: Regular rate and rhythm with normal S1/S2 without murmurs, rubs or gallops.   Abdomen: Soft, non-tender, non-distended with normal bowel sounds. Musculoskeletal: No clubbing, cyanosis. Now only trace BLE pitting edema, which is a marked improvement per family. Full range of motion without deformity. Skin: Skin color, texture, turgor normal.  No rashes or lesions. Neurologic:  Neurovascularly intact without any focal sensory/motor deficits. Cranial nerves: II-XII intact, grossly non-focal.  Psychiatric: Alert and oriented, thought content appropriate, limited insight  Capillary Refill: Brisk,3 seconds, normal   Peripheral Pulses: +2 palpable, equal bilaterally       Labs:   Recent Labs     04/03/22  1118 04/04/22 0452 04/05/22 0458   WBC 12.9* 13.0* 10.7   HGB 13.4* 13.6 14.0   HCT 40.6 40.6 41.9    344 415     Recent Labs     04/03/22 1118 04/04/22 0452 04/05/22 0458    140 141   K 4.7 5.1 5.1   CL 97* 98* 98*   CO2 31 29 33*   BUN 14 14 25*   CREATININE 0.7* 0.7* 0.8   CALCIUM 8.9 8.9 9.2     No results for input(s): AST, ALT, BILIDIR, BILITOT, ALKPHOS in the last 72 hours. No results for input(s): INR in the last 72 hours. Recent Labs     04/03/22 1118 04/04/22 0452   TROPONINI <0.01 0.01       Urinalysis:      Lab Results   Component Value Date    NITRU Negative 04/03/2022    WBCUA 0-2 03/29/2022    BACTERIA 1+ 01/11/2018    RBCUA 0-2 03/29/2022    BLOODU Negative 04/03/2022    SPECGRAV 1.010 04/03/2022    GLUCOSEU Negative 04/03/2022       Radiology:  XR CHEST PORTABLE   Final Result   Findings are most consistent with mild congestive heart failure. Atypical/viral pneumonia can give similar findings.                  Assessment/Plan:    Active Hospital Problems    Diagnosis     Acute on chronic combined systolic (congestive) and diastolic (congestive) heart failure (HCC) [I50.43]     Acute on chronic diastolic congestive heart failure (HCC) [I50.33]     Acute on chronic respiratory failure with hypoxia and hypercapnia (HCC) [T36.94, J96.22]        \"The patient is a 67 y.o. male who presents with hypoxic hypercarbic respiratory failure on CPAP. History of COPD and CHF requiring diuresis and discharged on low dose torsemide with rebound volume overload and pulmonary edema. Chronic hypoxic respiratory failure secondary to COPD. He is normally on 3-4 LPM oxygen at home. \"      Acute on chronic diastolic CHF. EF 55% with DD a few days prior to admission. He normally takes torsemide PO, treating here with furosemide IV per cardiology. Lisinopril, metoprolol. AECOPD. Steroids, inhaled bronchodilators. Suspected ALEXANDREA. Overnight pulse oximetry per pulmonary, appreciate their input. Acute on chronic hypoxic and hypercapnic respiratory failure. Due to above issues, treated accordingly. PAF. Decreased dilt to make room for new metoprolol. Continue apixaban. Essential tremor. Primidone reduces the effectiveness of apixaban, so avoiding this med. Perhaps the new beta blocker will help. This might improve once he is more rested outside of the hospital, and requiring less frequent nebulizers. Reassess with PCP. Code status: I informed the patient and family that if he ever had a cardiac or pulmonary arrest his chances of meaningful survival would be slim. They still wanted him to be full code, despite our detailed discussion of the drawbacks. DVT Prophylaxis: anticoagulation as above  Diet: ADULT DIET; Regular; Low Sodium (2 gm)  Code Status: Full Code    PT/OT Eval Status: rec'd home PT/OT    Dispo - when his respiratory status is consistently stable. High risk for another quick readmission, though he had made it for almost two years without a hospitalization until recently. Perhaps ready 4/6. He lives at home.        Ceferino Piña MD

## 2022-04-06 LAB
ANION GAP SERPL CALCULATED.3IONS-SCNC: 7 MMOL/L (ref 3–16)
BUN BLDV-MCNC: 38 MG/DL (ref 7–20)
CALCIUM SERPL-MCNC: 9.1 MG/DL (ref 8.3–10.6)
CHLORIDE BLD-SCNC: 97 MMOL/L (ref 99–110)
CO2: 31 MMOL/L (ref 21–32)
CREAT SERPL-MCNC: 0.8 MG/DL (ref 0.8–1.3)
GFR AFRICAN AMERICAN: >60
GFR NON-AFRICAN AMERICAN: >60
GLUCOSE BLD-MCNC: 153 MG/DL (ref 70–99)
POTASSIUM REFLEX MAGNESIUM: 4.9 MMOL/L (ref 3.5–5.1)
PRO-BNP: 1569 PG/ML (ref 0–124)
SODIUM BLD-SCNC: 135 MMOL/L (ref 136–145)

## 2022-04-06 PROCEDURE — 97535 SELF CARE MNGMENT TRAINING: CPT

## 2022-04-06 PROCEDURE — 2060000000 HC ICU INTERMEDIATE R&B

## 2022-04-06 PROCEDURE — 99233 SBSQ HOSP IP/OBS HIGH 50: CPT | Performed by: INTERNAL MEDICINE

## 2022-04-06 PROCEDURE — 2700000000 HC OXYGEN THERAPY PER DAY

## 2022-04-06 PROCEDURE — 94640 AIRWAY INHALATION TREATMENT: CPT

## 2022-04-06 PROCEDURE — 2580000003 HC RX 258: Performed by: INTERNAL MEDICINE

## 2022-04-06 PROCEDURE — 6360000002 HC RX W HCPCS: Performed by: INTERNAL MEDICINE

## 2022-04-06 PROCEDURE — 94761 N-INVAS EAR/PLS OXIMETRY MLT: CPT

## 2022-04-06 PROCEDURE — 36415 COLL VENOUS BLD VENIPUNCTURE: CPT

## 2022-04-06 PROCEDURE — 97530 THERAPEUTIC ACTIVITIES: CPT

## 2022-04-06 PROCEDURE — 6370000000 HC RX 637 (ALT 250 FOR IP): Performed by: NURSE PRACTITIONER

## 2022-04-06 PROCEDURE — 6370000000 HC RX 637 (ALT 250 FOR IP): Performed by: INTERNAL MEDICINE

## 2022-04-06 PROCEDURE — 80048 BASIC METABOLIC PNL TOTAL CA: CPT

## 2022-04-06 PROCEDURE — 83880 ASSAY OF NATRIURETIC PEPTIDE: CPT

## 2022-04-06 PROCEDURE — 94669 MECHANICAL CHEST WALL OSCILL: CPT

## 2022-04-06 PROCEDURE — 94660 CPAP INITIATION&MGMT: CPT

## 2022-04-06 PROCEDURE — 99233 SBSQ HOSP IP/OBS HIGH 50: CPT | Performed by: NURSE PRACTITIONER

## 2022-04-06 RX ORDER — TORSEMIDE 20 MG/1
20 TABLET ORAL DAILY
Status: DISCONTINUED | OUTPATIENT
Start: 2022-04-06 | End: 2022-04-07 | Stop reason: HOSPADM

## 2022-04-06 RX ADMIN — METHYLPREDNISOLONE SODIUM SUCCINATE 40 MG: 40 INJECTION, POWDER, FOR SOLUTION INTRAMUSCULAR; INTRAVENOUS at 20:29

## 2022-04-06 RX ADMIN — Medication 10 ML: at 08:58

## 2022-04-06 RX ADMIN — POTASSIUM CHLORIDE 20 MEQ: 20 TABLET, EXTENDED RELEASE ORAL at 08:57

## 2022-04-06 RX ADMIN — IPRATROPIUM BROMIDE AND ALBUTEROL SULFATE 1 AMPULE: .5; 2.5 SOLUTION RESPIRATORY (INHALATION) at 11:48

## 2022-04-06 RX ADMIN — Medication 10 ML: at 20:29

## 2022-04-06 RX ADMIN — FAMOTIDINE 20 MG: 20 TABLET, FILM COATED ORAL at 20:29

## 2022-04-06 RX ADMIN — METHYLPREDNISOLONE SODIUM SUCCINATE 40 MG: 40 INJECTION, POWDER, FOR SOLUTION INTRAMUSCULAR; INTRAVENOUS at 08:57

## 2022-04-06 RX ADMIN — FAMOTIDINE 20 MG: 20 TABLET, FILM COATED ORAL at 08:57

## 2022-04-06 RX ADMIN — FUROSEMIDE 40 MG: 10 INJECTION, SOLUTION INTRAMUSCULAR; INTRAVENOUS at 08:57

## 2022-04-06 RX ADMIN — APIXABAN 5 MG: 5 TABLET, FILM COATED ORAL at 08:58

## 2022-04-06 RX ADMIN — TORSEMIDE 20 MG: 20 TABLET ORAL at 15:20

## 2022-04-06 RX ADMIN — Medication 2 PUFF: at 08:43

## 2022-04-06 RX ADMIN — IPRATROPIUM BROMIDE AND ALBUTEROL SULFATE 1 AMPULE: .5; 2.5 SOLUTION RESPIRATORY (INHALATION) at 20:07

## 2022-04-06 RX ADMIN — IPRATROPIUM BROMIDE AND ALBUTEROL SULFATE 1 AMPULE: .5; 2.5 SOLUTION RESPIRATORY (INHALATION) at 16:26

## 2022-04-06 RX ADMIN — APIXABAN 5 MG: 5 TABLET, FILM COATED ORAL at 20:29

## 2022-04-06 RX ADMIN — LISINOPRIL 5 MG: 5 TABLET ORAL at 08:57

## 2022-04-06 RX ADMIN — ROFLUMILAST 500 MCG: 500 TABLET ORAL at 20:29

## 2022-04-06 RX ADMIN — DILTIAZEM HYDROCHLORIDE 120 MG: 120 CAPSULE, COATED, EXTENDED RELEASE ORAL at 08:57

## 2022-04-06 RX ADMIN — Medication 2 PUFF: at 20:15

## 2022-04-06 RX ADMIN — METOPROLOL SUCCINATE 25 MG: 25 TABLET, EXTENDED RELEASE ORAL at 08:57

## 2022-04-06 RX ADMIN — IPRATROPIUM BROMIDE AND ALBUTEROL SULFATE 1 AMPULE: .5; 2.5 SOLUTION RESPIRATORY (INHALATION) at 08:43

## 2022-04-06 ASSESSMENT — PAIN SCALES - GENERAL: PAINLEVEL_OUTOF10: 0

## 2022-04-06 NOTE — FLOWSHEET NOTE
04/06/22 0845   Assessment   Charting Type Shift assessment   Neurological   Neuro (WDL) WDL   Level of Consciousness Alert (0)   Orientation Level Oriented X4   RUE Motor Response Tremors   LUE Motor Response Tremors   [place] Coma Scale   Eye Opening 4   Best Verbal Response 5   Best Motor Response 6   [place] Coma Scale Score 15   HEENT   HEENT (WDL) X   Right Eye Impaired vision   Left Eye Impaired vision   Voice Normal   Right Ear Impaired hearing   Left Ear Impaired hearing   Respiratory   Respiratory (WDL) X   Respiratory Pattern Regular   Respiratory Depth Normal   Respiratory Quality/Effort Dyspnea with exertion   L Breath Sounds Diminished   R Breath Sounds Diminished   Breath Sounds   Right Upper Lobe Diminished   Right Middle Lobe Diminished   Right Lower Lobe Diminished   Left Upper Lobe Diminished   Left Lower Lobe Diminished   Cardiac   Cardiac (WDL) X   Cardiac Regularity Regular   Heart Sounds S1, S2   Cardiac Rhythm Atrial fib   Rhythm Interpretation   Pulse 68   Cardiac Monitor   Telemetry Monitor On Yes   Telemetry Audible Yes   Telemetry Alarms Set Yes   Gastrointestinal   Abdominal (WDL) WDL   RUQ Bowel Sounds Active   LUQ Bowel Sounds Active   RLQ Bowel Sounds Active   LLQ Bowel Sounds Active   Abdomen Inspection Rotund   Peripheral Vascular   Peripheral Vascular (WDL) X   Edema Right lower extremity; Left lower extremity   RLE Edema +1   LLE Edema +1   Skin Color/Condition   Skin Color/Condition (WDL) WDL   Skin Color Appropriate for ethnicity   Skin Condition/Temp Dry; Warm   Skin Integrity   Skin Integrity (WDL) WDL   Skin Integrity Bruising   Musculoskeletal   Musculoskeletal (WDL) X   RUE Full movement   LUE Full movement   RL Extremity Full movement;Weakness   LL Extremity Full movement;Weakness   Genitourinary   Genitourinary (WDL) WDL   Flank Tenderness No   Suprapubic Tenderness No   Dysuria No   Urine Assessment   Incontinence No   Urine Color Yellow/straw   Urine Appearance Clear Urine Odor No odor   Anus/Rectum   Anus/Rectum (WDL) WDL   Psychosocial   Psychosocial (WDL) WDL

## 2022-04-06 NOTE — PROGRESS NOTES
[facility] ([place]     Cardiology                                     Progress Note    Admission date:  4/3/2022    Reason for follow up visit: CHF    HPI/CC: Ted Mahoney was admitted on 4/3/2022 with shortness of breath and swelling. EKG showed rapid AF. Has also been treated for COPD and acute on chronic respiratory failure. Rhythm has been AF. Subjective: He has no complaints. Denies chest pain, palpitations, shortness of breath, and dizziness. Vitals:  Blood pressure 100/60, pulse 68, temperature 98.6 °F (37 °C), temperature source Oral, resp. rate 20, height 6' (1.829 m), weight 181 lb 14.1 oz (82.5 kg), SpO2 98 %.   Temp  Av.2 °F (36.8 °C)  Min: 97.6 °F (36.4 °C)  Max: 98.9 °F (37.2 °C)  Pulse  Av.2  Min: 56  Max: 79  BP  Min: 81/49  Max: 101/65  SpO2  Av.9 %  Min: 92 %  Max: 99 %  FiO2   Av %  Min: 30 %  Max: 30 %    24 hour I/O    Intake/Output Summary (Last 24 hours) at 2022 0937  Last data filed at 2022 4265  Gross per 24 hour   Intake 240 ml   Output 1950 ml   Net -1710 ml     Current Facility-Administered Medications   Medication Dose Route Frequency Provider Last Rate Last Admin    metoprolol succinate (TOPROL XL) extended release tablet 25 mg  25 mg Oral Daily Anthony Del Castillo MD   25 mg at 22 0857    mometasone-formoterol (DULERA) 200-5 MCG/ACT inhaler 2 puff  2 puff Inhalation BID Stephanie Granados MD   2 puff at 22 0843    dilTIAZem (CARDIZEM CD) extended release capsule 120 mg  120 mg Oral Daily Anthony Del Castillo MD   120 mg at 22 0857    methylPREDNISolone sodium (SOLU-MEDROL) injection 40 mg  40 mg IntraVENous BID Anthony Del Castillo MD   40 mg at 22 08    Roflumilast (DALIRESP) tablet 500 mcg  500 mcg Oral Nightly Gayatri Willard MD   500 mcg at 22    apixaban (ELIQUIS) tablet 5 mg  5 mg Oral BID Gayatri Willard MD   5 mg at 22 0858    famotidine (PEPCID) tablet 20 mg  20 mg Oral BID Marlen Moreno MD Payam   20 mg at 04/06/22 0857    guaiFENesin (MUCINEX) extended release tablet 600 mg  600 mg Oral BID PRN Marce Sanchez MD        ipratropium-albuterol (DUONEB) nebulizer solution 1 ampule  1 ampule Inhalation Q4H WA Marce Sanchez MD   1 ampule at 04/06/22 0843    lisinopril (PRINIVIL;ZESTRIL) tablet 5 mg  5 mg Oral Daily Marce Sanchez MD   5 mg at 04/06/22 0857    potassium chloride (KLOR-CON M) extended release tablet 20 mEq  20 mEq Oral Daily Marce Sanchez MD   20 mEq at 04/06/22 0857    sodium chloride flush 0.9 % injection 5-40 mL  5-40 mL IntraVENous 2 times per day Marce Sanchez MD   10 mL at 04/06/22 0858    sodium chloride flush 0.9 % injection 5-40 mL  5-40 mL IntraVENous PRN Marce Sanchez MD        0.9 % sodium chloride infusion   IntraVENous PRN Marce Sanchez MD        ondansetron (ZOFRAN-ODT) disintegrating tablet 4 mg  4 mg Oral Q8H PRN Marce Sanchez MD        Or    ondansetron [facility]) injection 4 mg  4 mg IntraVENous Q6H PRN Marce Sanchez MD        polyethylene glycol [facility]) packet 17 g  17 g Oral Daily PRN Marce Sanchez MD        acetaminophen (TYLENOL) tablet 650 mg  650 mg Oral Q6H PRN Marce Sanchez MD        Or    acetaminophen (TYLENOL) suppository 650 mg  650 mg Rectal Q6H PRN Marce Sanchez MD        potassium chloride (KLOR-CON M) extended release tablet 40 mEq  40 mEq Oral PRN Marce Sanchez MD        Or    potassium bicarb-citric acid (EFFER-K) effervescent tablet 40 mEq  40 mEq Oral PRN Marce Sanchez MD        Or    potassium chloride 10 mEq/100 mL IVPB (Peripheral Line)  10 mEq IntraVENous PRN Marce Sanchez MD        magnesium sulfate 2000 mg in 50 mL IVPB premix  2,000 mg IntraVENous PRN Marce Sanchez MD        furosemide (LASIX) injection 40 mg  40 mg IntraVENous BID Marce Sanchez MD   40 mg at 04/06/22 0857       Objective:     Telemetry monitor: SR    Physical Exam:  Constitutional and general appearance: alert, cooperative, no distress and appears stated age  [de-identified]: PERRL, no cervical lymphadenopathy. No masses palpable. Normal oral mucosa  Respiratory:  · Normal excursion and expansion without use of accessory muscles  · Resp auscultation: diminished breath sounds without wheezing, rhonchi, and rales  Cardiovascular:  · The apical impulse is not displaced  · Heart tones are crisp and normal. irregular S1 and S2.  · Jugular venous pulsation Normal  · The carotid upstroke is normal in amplitude and contour without delay or bruit  · Peripheral pulses are symmetrical and full   Abdomen:  · No masses or tenderness  · Bowel sounds present  Extremities:  ·  No cyanosis or clubbing  ·  trace lower extremity edema, L > R   ·  Skin: warm and dry  Neurological:  · Alert and oriented  · Moves all extremities well  · No abnormalities of mood, affect, memory, mentation, or behavior are noted    Data    Echo 3/14/2022:  Summary   Left ventricular systolic function is normal with ejection fraction   estimated at 55%. No regional wall motion abnormalities. There is mild concentric left ventricular hypertrophy. Elevated left ventricular filling pressure. Mild bi-atrial enlargement. Mild mitral regurgitation. Mild tricuspid regurgitation. Systolic pulmonary artery pressure (SPAP) is normal estimated at 38 mmHg   (Right atrial pressure of 3 mmHg). No significant change from exam done 1/11/2018. All labs and testing reviewed.   Lab Review     Renal Profile:   Lab Results   Component Value Date    CREATININE 0.8 04/06/2022    BUN 38 04/06/2022     04/06/2022    K 4.9 04/06/2022    CL 97 04/06/2022    CO2 31 04/06/2022     CBC:    Lab Results   Component Value Date    WBC 10.7 04/05/2022    RBC 4.53 04/05/2022    HGB 14.0 04/05/2022    HCT 41.9 04/05/2022    MCV 92.6 04/05/2022    RDW 15.2 04/05/2022     04/05/2022     BNP:  No

## 2022-04-06 NOTE — PROGRESS NOTES
Progressive Care Progress Note    Patient: Stephania Perez MRN: [id number]  Date of  Admission: 4/3/2022   YOB: 1950  Age: 67 y.o. Sex: male    [facility]  Room/Bed: 0750/3785-04 Attending Physician: Jorge Wallace MD   Admitting Physician: Jovan Peña        Chief Complaint   Patient presents with    Shortness of Breath       discharged on 3/31 for respiratory failure/electrolyte imbalances. wears 4 liters at home. difficulty breathing worse today. brought in on CPAP. Acute respiratory failure, history of COPD, acute CHF     History Obtained From   patient, electronic medical record     History of Present Illness   This is a 43-year-old gentleman who came to the hospital because of increasing shortness of breath. Patient has been recently discharged from the hospital.  Patient states that he started having issues with increasing abdominal swelling lower extremity swelling and his oxygen requirements had increased. Patient has normally seen by [facility] pulmonary. And was recently seen by his pulmonologist.  Patient normally has been doing well with his emphysema but the shortness of breath had progressively gotten worse along with the swellings of the abdomen and the lower extremities. Patient denies any chest pains or palpitations           Subjective:    Patient still having some shortness of breath  No chest pains or palpitations  No nausea or vomiting  No dysuria hematuria  Breathing little bit easier today    Was able to use the BiPAP machine overnight but still having some issues      ROS:A comprehensive review of systems was negative except for: above    Objective:          Intake and Output:   Current Shift:   04/06 0701 - 04/06 1900  In: -   Out: 500 [Urine:500]  Last three shifts:   04/04 1901 - 04/06 0700  In: 720 [P.O.:720]  Out: 2750 [Urine:2750]        Hemodynamic parameters for last 24 hours:  [unfilled]    Physical Exam:   Patient Vitals for the past 24 hrs:   BP Temp Temp src Pulse Resp SpO2 Weight   04/06/22 1148 -- -- -- -- 18 95 % --   04/06/22 1138 104/62 98.4 °F (36.9 °C) Oral 72 18 97 % --   04/06/22 0845 100/60 98.6 °F (37 °C) Oral 68 20 98 % --   04/06/22 0844 -- -- -- -- 20 99 % --   04/06/22 0523 -- -- -- -- -- -- 181 lb 14.1 oz (82.5 kg)   04/06/22 0450 (!) 81/49 98.9 °F (37.2 °C) Oral 56 20 99 % --   04/06/22 0308 -- -- -- -- 20 -- --   04/05/22 2315 -- -- -- -- 19 93 % --   04/05/22 2312 97/64 97.6 °F (36.4 °C) Oral 76 18 96 % --   04/05/22 2013 -- -- -- -- -- 93 % --   04/05/22 1958 (!) 95/54 98.1 °F (36.7 °C) Oral 74 21 92 % --   04/05/22 1615 101/65 98.2 °F (36.8 °C) Oral 74 20 93 % --   04/05/22 1544 -- -- -- -- 20 96 % --        Physical Exam  Vitals and nursing note reviewed. Constitutional:       General: He is not in acute distress. Appearance: Normal appearance. He is obese. HENT:      Head: Normocephalic and atraumatic. Right Ear: External ear normal.      Left Ear: External ear normal.      Nose: Nose normal.      Mouth/Throat:      Mouth: Mucous membranes are moist.      Pharynx: Oropharynx is clear. Eyes:      General:         Right eye: No discharge. Left eye: No discharge. Extraocular Movements: Extraocular movements intact. Conjunctiva/sclera: Conjunctivae normal.      Pupils: Pupils are equal, round, and reactive to light. Cardiovascular:      Rate and Rhythm: Normal rate and regular rhythm. Pulses: Normal pulses. Heart sounds: No murmur heard. Pulmonary:      Effort: No respiratory distress. Breath sounds: No stridor. No rhonchi or rales. Abdominal:      General: Bowel sounds are normal. There is no distension. Palpations: Abdomen is soft. There is no mass. Tenderness: There is no abdominal tenderness. Hernia: No hernia is present. Musculoskeletal:         General: No swelling. Normal range of motion. Cervical back: Normal range of motion.  No rigidity. Skin:     General: Skin is warm and dry. Coloration: Skin is not jaundiced or pale. Neurological:      General: No focal deficit present. Mental Status: He is alert and oriented to person, place, and time. Mental status is at baseline. Cranial Nerves: No cranial nerve deficit. Sensory: No sensory deficit. Psychiatric:         Mood and Affect: Mood normal.         Behavior: Behavior normal.         Thought Content: Thought content normal.             Labs:   Recent Labs     04/04/22 0452 04/05/22 0458   WBC 13.0* 10.7   HGB 13.6 14.0   HCT 40.6 41.9    415      Recent Labs     04/04/22 0452 04/05/22 0458 04/06/22  0507    141 135*   K 5.1 5.1 4.9   CL 98* 98* 97*   CO2 29 33* 31   BUN 14 25* 38*   GLUCOSE 134* 153* 153*           Radiology, images personally reviewed. Not indicated  Other imaging: Not indicated      Micro: Negative growth to date    Assessment:     Principal Problem:    Acute on chronic combined systolic (congestive) and diastolic (congestive) heart failure (HCC)  Active Problems:    Acute on chronic respiratory failure with hypoxia and hypercapnia (HCC)    Acute on chronic diastolic congestive heart failure (HCC)  Resolved Problems:    * No resolved hospital problems.  *      Discussion / Plan:     COPD  Continue with  Onita Sharron in place of Symbicort  Daliresp  Mucinex           Acute on chronic respiratory failure with hypercapnia  Nocturnal pulse ox was done showing desaturation    ABG done on normal baseline of oxygen at stable         Patient may benefit from BiPAP therapy  I have lowered the BiPAP from 14 to IPAP of 11  Patient does not have any symptomatology of sleep apnea  Would probably benefit from BiPAP therapy  We will ask for BiPAP therapy for COPD         CHF  Continue with diuresis          Ava Kocher, MD    [facility], Critical Care and Sleep Medicine  [phone]    Please note that some or all of this record was generated using voice recognition software. If there are any questions about the content of this document, please contact the author as some errors in transcription may have occurred.

## 2022-04-06 NOTE — PLAN OF CARE
Problem: Falls - Risk of:  Goal: Will remain free from falls  Description: Will remain free from falls  4/5/2022 2014 by Courtney Michael RN  Outcome: Ongoing

## 2022-04-06 NOTE — PROGRESS NOTES
04/05/22 2315   NIV Type   Skin Protection for O2 Device Yes   Location Nose   NIV Started/Stopped On   Equipment Type v60   Mode Bilevel   Mask Type Full face mask   Settings/Measurements   IPAP 14 cmH20   CPAP/EPAP 5 cmH2O   Rate Ordered 10   Resp 19   Insp Rise Time (%) 1 %   FiO2  30 %   I Time/ I Time % 0.9 s   Vt Exhaled 914 mL   Minute Volume 17.8 Liters   Mask Leak (lpm) 16 lpm   Comfort Level Good   Using Accessory Muscles No   SpO2 93   Alarm Settings   Alarms On Y   Press Low Alarm 6 cmH2O   High Pressure Alarm 30 cmH2O   Delay Alarm 20 sec(s)   Resp Rate Low Alarm 6   High Respiratory Rate 40 br/min   Oxygen Therapy/Pulse Ox   SpO2 93 %

## 2022-04-06 NOTE — PROGRESS NOTES
Occupational Therapy  Facility/Department: [address] [place] [address] PCU  Daily Treatment Note  NAME: Lucius Urrutia  : 1950  MRN: [id number]    Date of Service: 2022    Discharge Recommendations:  24 hour supervision or assist,Home with Home health OT  OT Equipment Recommendations  Equipment Needed: No    Assessment   Performance deficits / Impairments: Decreased functional mobility ; Decreased ADL status; Decreased endurance;Decreased balance;Decreased strength;Decreased coordination  Assessment: Pt is progressing towards goals however continues to present with the above deficits. Pt groomed with SBA at EOB and dressed with SBA. Pt completed toilet t/fs with CGA and demos good personal hygiene  Prognosis: Good  OT Education: OT Role;Plan of Care;ADL Adaptive Strategies; Energy Conservation  Patient Education: disease specific: PLB, taking rest breaks PRN, use of call light, general safety during hospitalization, having staff present for mobility. Pt verbalized understanding. REQUIRES OT FOLLOW UP: Yes  Activity Tolerance  Activity Tolerance: Patient Tolerated treatment well;Treatment limited secondary to medical complications (free text)  Activity Tolerance: BP: 108/66, HR: 70, O2: 91%  Safety Devices  Safety Devices in place: Yes  Type of devices: Nurse notified;Gait belt;Call light within reach; Left in bed;Bed alarm in place         Patient Diagnosis(es): The primary encounter diagnosis was Acute on chronic respiratory failure with hypoxia and hypercapnia ([facility] [address] A diagnosis of Acute on chronic congestive heart failure, unspecified heart failure type [facility]) was also pertinent to this visit. has a past medical history of Bronchitis chronic, Emphysema of lung ([address] Influenza A, Lung disease, and Pneumonia. has a past surgical history that includes fracture surgery and hernia repair.     Restrictions  Restrictions/Precautions  Restrictions/Precautions: Fall Risk  Position Activity Restriction  Other position/activity restrictions: Up with assist, 4L of O2  Subjective   General  Chart Reviewed: Yes  Patient assessed for rehabilitation services?: Yes  Additional Pertinent Hx: Per chart, \"The patient is a 67 y.o. male who presents with hypoxic hypercarbic respiratory failure on CPAP. History of COPD and CHF requiring diuresis and discharged on low dose torsemide with rebound volume overload and pulmonary edema. Elevated lactate with leukocytosis possibly associated with steroid. Empirically treated with antibiotics - cefepime and vancomycin in the ED. Chronic hypoxic respiratory failure secondary to COPD. He is normally on 3-4 LPM oxygen at home. \"  Response to previous treatment: Patient with no complaints from previous session  Family / Caregiver Present: No  Referring Practitioner: Kaveh Lozano MD  Diagnosis: Acute on chronic hypoxic and hypercarbic respiratory failure secondary to diastolic HFpEF  Subjective  Subjective: pt seated EOB on arrival  General Comment  Comments: RN approved therapy. Vital Signs  Patient Currently in Pain: Denies   Orientation  Orientation  Overall Orientation Status: Within Functional Limits  Objective    ADL  Grooming: Increased time to complete;Stand by assistance;Setup  UE Dressing: Stand by assistance;Setup  LE Dressing: Stand by assistance;Setup  Toileting: Stand by assistance  Additional Comments: Pt completed grooming with shower cap, comb, oral care and washing face        Balance  Sitting Balance: Supervision  Standing Balance: Contact guard assistance  Standing Balance  Time: 1-2 minutes  Activity: to/from restroom, standing self-cares  Functional Mobility  Functional - Mobility Device: Standard Walker  Activity: To/from bathroom  Assist Level: Contact guard assistance  Bed mobility  Supine to Sit: Unable to assess  Sit to Supine: Unable to assess  Transfers  Sit to stand: Contact guard assistance  Stand to sit: Contact guard assistance  Transfer Comments: up to SW. Cognition  Overall Cognitive Status: [facility]                                         Plan   Plan  Times per week: 3-5x/week  Current Treatment Recommendations: Strengthening,Balance Training,Functional Mobility Training,Endurance Training,Safety Education & Training,Patient/Caregiver Education & Training,Self-Care / ADL    AM-PAC Score        AM-PAC Inpatient Daily Activity Raw Score: 19 (04/06/22 1535)  AM-PAC Inpatient ADL T-Scale Score : 40.22 (04/06/22 1535)  ADL Inpatient CMS 0-100% Score: 42.8 (04/06/22 1535)  ADL Inpatient CMS G-Code Modifier : CK (04/06/22 1535)    Goals  Short term goals  Time Frame for Short term goals: 1 week (4/12) unless stated otherwise. Short term goal 1: Pt will toilet with supervision or less. -- ongoing 4/06  Short term goal 2: Pt will LB dress with supervision or less. -- ongoing 4/06  Short term goal 3: Pt will perform B/L UE ther ex x15 to increase endurance for functional activities (4/10). -- ongoing 4/06  Short term goal 4: Pt will perform 3minutes of standing functional activities with supervision or less with O2 remaining 90% or greater. .-- ongoing 4/06  Patient Goals   Patient goals : \"to go home soon\" \"to comb my hair\"       Therapy Time   Individual Concurrent Group Co-treatment   Time In 1354         Time Out 1432         Minutes 38         Timed Code Treatment Minutes: [facility], OT   If pt is unable to be seen after this session, please let this note serve as discharge summary. Please see case management note for discharge disposition. Thank you.

## 2022-04-06 NOTE — PROGRESS NOTES
04/06/22 0308   NIV Type   $NIV $Daily Charge   NIV Started/Stopped On   Equipment Type v60   Mode Bilevel   Mask Type Full face mask   Mask Size Large   Settings/Measurements   IPAP 14 cmH20   CPAP/EPAP 5 cmH2O   Rate Ordered 10   Resp 20   FiO2  30 %   Vt Exhaled 799 mL   Mask Leak (lpm) 21 lpm   Comfort Level Good   Using Accessory Muscles No   SpO2 95   Breath Sounds   Right Upper Lobe Diminished   Right Middle Lobe Diminished   Right Lower Lobe Diminished   Left Upper Lobe Diminished   Left Lower Lobe Diminished   Alarm Settings   Alarms On Y   Press Low Alarm 6 cmH2O   High Pressure Alarm 30 cmH2O   Resp Rate Low Alarm 6   High Respiratory Rate 40 br/min

## 2022-04-06 NOTE — PROGRESS NOTES
Hospitalist Progress Note      PCP: Carole Frederick    Date of Admission: 4/3/2022    Chief Complaint: dyspnea       Subjective:  He is feeling better. Breathing more easily. In good spirits. Depending upon cardiology input, he may be able to change to PO diuretic and DC tomorrow. Medications:  Reviewed    Infusion Medications    sodium chloride       Scheduled Medications    metoprolol succinate  25 mg Oral Daily    mometasone-formoterol  2 puff Inhalation BID    dilTIAZem  120 mg Oral Daily    methylPREDNISolone  40 mg IntraVENous BID    Roflumilast  500 mcg Oral Nightly    apixaban  5 mg Oral BID    famotidine  20 mg Oral BID    ipratropium-albuterol  1 ampule Inhalation Q4H WA    lisinopril  5 mg Oral Daily    potassium chloride  20 mEq Oral Daily    sodium chloride flush  5-40 mL IntraVENous 2 times per day    furosemide  40 mg IntraVENous BID     PRN Meds: guaiFENesin, sodium chloride flush, sodium chloride, ondansetron **OR** ondansetron, polyethylene glycol, acetaminophen **OR** acetaminophen, potassium chloride **OR** potassium alternative oral replacement **OR** potassium chloride, magnesium sulfate      Intake/Output Summary (Last 24 hours) at 4/6/2022 1423  Last data filed at 4/6/2022 8939  Gross per 24 hour   Intake 240 ml   Output 2300 ml   Net -2060 ml       Physical Exam Performed:    /62   Pulse 72   Temp 98.4 °F (36.9 °C) (Oral)   Resp 18   Ht 6' (1.829 m)   Wt 181 lb 14.1 oz (82.5 kg)   SpO2 95%   BMI 24.67 kg/m²     General appearance: No apparent distress, appears stated age. HEENT: Pupils equal, round, and reactive to light. Conjunctivae/corneas clear. Neck: Supple, with full range of motion. No jugular venous distention. Trachea midline. Respiratory:  NO longer has increased respiratory effort. No audible rales, faint wheezing is present, little audible airflow, prolonged expiratory phase.    Cardiovascular: Regular rate and rhythm with normal S1/S2 without murmurs, rubs or gallops. Abdomen: Soft, non-tender, non-distended with normal bowel sounds. Musculoskeletal: No clubbing, cyanosis. Now only trace BLE pitting edema, which is a marked improvement per family. Full range of motion without deformity. Skin: Skin color, texture, turgor normal.  No rashes or lesions. Neurologic:  Neurovascularly intact without any focal sensory/motor deficits. Cranial nerves: II-XII intact, grossly non-focal.  Psychiatric: Alert and oriented, thought content appropriate, limited insight  Capillary Refill: Brisk,3 seconds, normal   Peripheral Pulses: +2 palpable, equal bilaterally       Labs:   Recent Labs     04/04/22 0452 04/05/22 0458   WBC 13.0* 10.7   HGB 13.6 14.0   HCT 40.6 41.9    415     Recent Labs     04/04/22 0452 04/05/22 0458 04/06/22  0507    141 135*   K 5.1 5.1 4.9   CL 98* 98* 97*   CO2 29 33* 31   BUN 14 25* 38*   CREATININE 0.7* 0.8 0.8   CALCIUM 8.9 9.2 9.1     No results for input(s): AST, ALT, BILIDIR, BILITOT, ALKPHOS in the last 72 hours. No results for input(s): INR in the last 72 hours. Recent Labs     04/04/22 0452   TROPONINI 0.01       Urinalysis:      Lab Results   Component Value Date    NITRU Negative 04/03/2022    WBCUA 0-2 03/29/2022    BACTERIA 1+ 01/11/2018    RBCUA 0-2 03/29/2022    BLOODU Negative 04/03/2022    SPECGRAV 1.010 04/03/2022    GLUCOSEU Negative 04/03/2022       Radiology:  XR CHEST PORTABLE   Final Result   Findings are most consistent with mild congestive heart failure. Atypical/viral pneumonia can give similar findings.                  Assessment/Plan:    Active Hospital Problems    Diagnosis     Acute on chronic combined systolic (congestive) and diastolic (congestive) heart failure (HCC) [I50.43]     Acute on chronic diastolic congestive heart failure (HCC) [I50.33]     Acute on chronic respiratory failure with hypoxia and hypercapnia (HCC) [M97.49, J96.22]        \"The patient is a 67 y.o. male who presents with hypoxic hypercarbic respiratory failure on CPAP. History of COPD and CHF requiring diuresis and discharged on low dose torsemide with rebound volume overload and pulmonary edema. Chronic hypoxic respiratory failure secondary to COPD. He is normally on 3-4 LPM oxygen at home. \"      Acute on chronic diastolic CHF. EF 55% with DD a few days prior to admission. He normally takes torsemide PO, treated here with furosemide IV per cardiology. Lisinopril, metoprolol. AECOPD. Steroids, inhaled bronchodilators. Suspected ALEXANDREA. S/p overnight pulse oximetry per pulmonary. Attempting to get him an outpatient BIPAP. Appreciate pulmonary input. Acute on chronic hypoxic and hypercapnic respiratory failure. Due to above issues, treated accordingly. PAF. Decreased dilt to make room for new metoprolol. Continue apixaban. Essential tremor. Primidone reduces the effectiveness of apixaban, so avoiding this med. Perhaps the new beta blocker will help. Tremor might improve once he is more rested outside of the hospital, and requiring less frequent nebulizers. Reassess with PCP. Code status: I informed the patient and family that if he ever had a cardiac or pulmonary arrest his chances of meaningful survival would be slim. They still wanted him to be full code, despite our detailed discussion of the drawbacks. DVT Prophylaxis: anticoagulation as above  Diet: ADULT DIET; Regular; Low Sodium (2 gm)  Code Status: Full Code    PT/OT Eval Status: rec'd home PT/OT    Dispo - Perhaps ready 4/7, pending cardiology input regarding his diuretics, and pulmonary/CM input regarding his BiPAP. He lives at home. High risk for another quick readmission, though he had made it for almost two years without a hospitalization until recently.         Armaan Fulton MD

## 2022-04-06 NOTE — FLOWSHEET NOTE
04/05/22 2011   Assessment   Charting Type Shift assessment   Neurological   Level of Consciousness Alert (0)   Orientation Level Oriented X4   RUE Motor Response Tremors   LUE Motor Response Tremors   [place] Coma Scale   Eye Opening 4   Best Verbal Response 5   Best Motor Response 6   [place] Coma Scale Score 15   HEENT   HEENT (WDL) X   Right Eye Impaired vision   Left Eye Impaired vision   Voice Normal   Right Ear Impaired hearing   Left Ear Impaired hearing   Respiratory   Respiratory (WDL) X   Respiratory Pattern Regular   Respiratory Depth Normal   Respiratory Quality/Effort Dyspnea with exertion   L Breath Sounds Diminished   R Breath Sounds Diminished   Breath Sounds   Right Upper Lobe Diminished   Right Middle Lobe Diminished   Right Lower Lobe Diminished   Left Upper Lobe Diminished   Left Lower Lobe Diminished   Cardiac   Cardiac (WDL) X   Cardiac Regularity Regular   Cardiac Rhythm Atrial fib   Gastrointestinal   Abdominal (WDL) WDL   RUQ Bowel Sounds Active   LUQ Bowel Sounds Active   RLQ Bowel Sounds Active   LLQ Bowel Sounds Active   Peripheral Vascular   Peripheral Vascular (WDL) X   Edema Right lower extremity; Left lower extremity   RLE Edema +1   LLE Edema +1   Skin Color/Condition   Skin Color/Condition (WDL) WDL   Skin Integrity   Skin Integrity (WDL) WDL   Musculoskeletal   Musculoskeletal (WDL) X  (generalized weakness)   Genitourinary   Genitourinary (WDL) WDL   Flank Tenderness No   Suprapubic Tenderness No   Dysuria No   Anus/Rectum   Anus/Rectum (WDL) WDL   Psychosocial   Psychosocial (WDL) WDL

## 2022-04-07 VITALS
SYSTOLIC BLOOD PRESSURE: 120 MMHG | HEIGHT: 72 IN | DIASTOLIC BLOOD PRESSURE: 71 MMHG | RESPIRATION RATE: 18 BRPM | BODY MASS INDEX: 24.63 KG/M2 | OXYGEN SATURATION: 95 % | TEMPERATURE: 98.1 F | HEART RATE: 61 BPM | WEIGHT: 181.88 LBS

## 2022-04-07 LAB
ANION GAP SERPL CALCULATED.3IONS-SCNC: 10 MMOL/L (ref 3–16)
BLOOD CULTURE, ROUTINE: NORMAL
BUN BLDV-MCNC: 42 MG/DL (ref 7–20)
CALCIUM SERPL-MCNC: 8.8 MG/DL (ref 8.3–10.6)
CHLORIDE BLD-SCNC: 99 MMOL/L (ref 99–110)
CO2: 30 MMOL/L (ref 21–32)
CREAT SERPL-MCNC: 0.9 MG/DL (ref 0.8–1.3)
CULTURE, BLOOD 2: NORMAL
GFR AFRICAN AMERICAN: >60
GFR NON-AFRICAN AMERICAN: >60
GLUCOSE BLD-MCNC: 168 MG/DL (ref 70–99)
POTASSIUM REFLEX MAGNESIUM: 4.7 MMOL/L (ref 3.5–5.1)
SODIUM BLD-SCNC: 139 MMOL/L (ref 136–145)

## 2022-04-07 PROCEDURE — 99233 SBSQ HOSP IP/OBS HIGH 50: CPT | Performed by: INTERNAL MEDICINE

## 2022-04-07 PROCEDURE — 6370000000 HC RX 637 (ALT 250 FOR IP): Performed by: INTERNAL MEDICINE

## 2022-04-07 PROCEDURE — 2700000000 HC OXYGEN THERAPY PER DAY

## 2022-04-07 PROCEDURE — 6370000000 HC RX 637 (ALT 250 FOR IP): Performed by: NURSE PRACTITIONER

## 2022-04-07 PROCEDURE — 36415 COLL VENOUS BLD VENIPUNCTURE: CPT

## 2022-04-07 PROCEDURE — 94761 N-INVAS EAR/PLS OXIMETRY MLT: CPT

## 2022-04-07 PROCEDURE — 94640 AIRWAY INHALATION TREATMENT: CPT

## 2022-04-07 PROCEDURE — 80048 BASIC METABOLIC PNL TOTAL CA: CPT

## 2022-04-07 PROCEDURE — 99232 SBSQ HOSP IP/OBS MODERATE 35: CPT | Performed by: NURSE PRACTITIONER

## 2022-04-07 PROCEDURE — 94669 MECHANICAL CHEST WALL OSCILL: CPT

## 2022-04-07 PROCEDURE — 2580000003 HC RX 258: Performed by: INTERNAL MEDICINE

## 2022-04-07 PROCEDURE — 6360000002 HC RX W HCPCS: Performed by: INTERNAL MEDICINE

## 2022-04-07 RX ORDER — METOPROLOL SUCCINATE 25 MG/1
25 TABLET, EXTENDED RELEASE ORAL DAILY
Qty: 30 TABLET | Refills: 3 | Status: SHIPPED | OUTPATIENT
Start: 2022-04-08 | End: 2022-04-14 | Stop reason: ALTCHOICE

## 2022-04-07 RX ORDER — PREDNISONE 20 MG/1
40 TABLET ORAL DAILY
Status: DISCONTINUED | OUTPATIENT
Start: 2022-04-07 | End: 2022-04-07 | Stop reason: HOSPADM

## 2022-04-07 RX ORDER — PREDNISONE 20 MG/1
20 TABLET ORAL DAILY
Qty: 3 TABLET | Refills: 0 | Status: SHIPPED | OUTPATIENT
Start: 2022-04-07 | End: 2022-04-10

## 2022-04-07 RX ADMIN — Medication 10 ML: at 08:06

## 2022-04-07 RX ADMIN — DILTIAZEM HYDROCHLORIDE 120 MG: 120 CAPSULE, COATED, EXTENDED RELEASE ORAL at 08:05

## 2022-04-07 RX ADMIN — LISINOPRIL 5 MG: 5 TABLET ORAL at 08:05

## 2022-04-07 RX ADMIN — TORSEMIDE 20 MG: 20 TABLET ORAL at 08:05

## 2022-04-07 RX ADMIN — POTASSIUM CHLORIDE 20 MEQ: 20 TABLET, EXTENDED RELEASE ORAL at 08:05

## 2022-04-07 RX ADMIN — METOPROLOL SUCCINATE 25 MG: 25 TABLET, EXTENDED RELEASE ORAL at 08:05

## 2022-04-07 RX ADMIN — IPRATROPIUM BROMIDE AND ALBUTEROL SULFATE 1 AMPULE: .5; 2.5 SOLUTION RESPIRATORY (INHALATION) at 08:09

## 2022-04-07 RX ADMIN — METHYLPREDNISOLONE SODIUM SUCCINATE 40 MG: 40 INJECTION, POWDER, FOR SOLUTION INTRAMUSCULAR; INTRAVENOUS at 08:05

## 2022-04-07 RX ADMIN — PREDNISONE 40 MG: 20 TABLET ORAL at 12:17

## 2022-04-07 RX ADMIN — IPRATROPIUM BROMIDE AND ALBUTEROL SULFATE 1 AMPULE: .5; 2.5 SOLUTION RESPIRATORY (INHALATION) at 11:45

## 2022-04-07 RX ADMIN — APIXABAN 5 MG: 5 TABLET, FILM COATED ORAL at 08:05

## 2022-04-07 RX ADMIN — FAMOTIDINE 20 MG: 20 TABLET, FILM COATED ORAL at 08:05

## 2022-04-07 RX ADMIN — Medication 2 PUFF: at 08:12

## 2022-04-07 NOTE — CARE COORDINATION
CASE MANAGEMENT DISCHARGE SUMMARY      Discharge to: home with [facility]     IMM given: (date) 4/7/22    New Durable Medical Equipment ordered/agency: bipap through [facility]     Transportation: daughter in law     Confirmed discharge plan with: patient/son and DIL/RN/Shanelle with [facility]      Patient: yes     Family:  yes    Name: Contact number: Lukas Napoles - son  [phone]     Facility/Agency, name:  [facility] faxed   Phone number for report to facility: [phone]     RN, name: Leslee     Note: Discharging nurse to complete KT, reconcile [facility], and place final copy with patient's discharge packet.

## 2022-04-07 NOTE — DISCHARGE INSTR - COC
Continuity of Care Form    Patient Name: Vipin Gonsalves   :  1950  MRN:  [id number]    Admit date:  4/3/2022  Discharge date:  ***    Code Status Order: Full Code   Advance Directives:      Admitting Physician:  Zaida Orozco MD  PCP: Jessica Jauregui    Discharging Nurse: [facility] Unit/Room#: 3660/6039-13  Discharging Unit Phone Number: ***    Emergency Contact:   Extended Emergency Contact Information  Primary Emergency Contact: Diana Jean Baptiste  Address: [address] [place] [address] Phone: [phone]  Relation: Child  Secondary Emergency Contact: [address] Phone: [phone]  Relation: Grandchild    Past Surgical History:  Past Surgical History:   Procedure Laterality Date    FRACTURE SURGERY      johnson in femur/hip on right    HERNIA REPAIR         Immunization History:   Immunization History   Administered Date(s) Administered    COVID-19, Ely Hilton, Primary or Immunocompromised, PF, 100mcg/0.5mL 2021, 04/10/2021    COVID-19, Pfizer Purple top, DILUTE for use, 12+ yrs, 30mcg/0.3mL dose 2021    Influenza A (N1F8-09) Vaccine IM 2010    Influenza A (K4P8-78) Vaccine PF IM 2010    Influenza Virus Vaccine 2010, 2012, 10/06/2014    Influenza, High Dose (Fluzone 65 yrs and older) 10/13/2015, 2019, 2021    Influenza, High-dose, Quadv, 65 yrs +, IM (Fluzone) 10/06/2020    Influenza, Quadv, IM, PF (6 mo and older Fluzone, Flulaval, Fluarix, and 3 yrs and older Afluria) 2018, 10/22/2018    Pneumococcal Conjugate 13-valent (Aayxpys81) 2016    Pneumococcal Polysaccharide ([id number]) 2009, 2012, 2018    Td vaccine (adult) 2007    Tdap (Boostrix, Adacel) 2012       Active Problems:  Patient Active Problem List   Diagnosis Code    Acute exacerbation of chronic obstructive pulmonary disease (COPD) ([facility] J44.1    Essential hypertension I10    Acute on chronic respiratory failure with hypoxemia ([facility] J96.21    Former smoker Z87.891    Atrial fibrillation ([facility] I48.91    Acute on chronic respiratory failure with hypoxia and hypercapnia (HCC) J96.21, J96.22    Acute hyponatremia E87.1    Hyperbilirubinemia E80.6    Influenzal pneumonia J11.00    Unstable angina pectoris (HCC) I20.0    Acute on chronic diastolic congestive heart failure (HCC) I50.33    Acute hypokalemia E87.6    Hypophosphatemia E83.39    Long term current use of diuretic Z79.899    Acute on chronic combined systolic (congestive) and diastolic (congestive) heart failure (HCC) I50.43       Isolation/Infection:   Isolation            No Isolation          Patient Infection Status       Infection Onset Added Last Indicated Last Indicated By Review Planned Expiration Resolved Resolved By    None active    Resolved    COVID-19 (Rule Out) 04/03/22 04/03/22 04/03/22 COVID-19, Rapid (Ordered)   04/03/22 Rule-Out Test Resulted            Nurse Assessment:  Last Vital Signs: /74   Pulse 63   Temp 98.3 °F (36.8 °C) (Axillary)   Resp 16   Ht 6' (1.829 m)   Wt 181 lb 14.1 oz (82.5 kg)   SpO2 96%   BMI 24.67 kg/m²     Last documented pain score (0-10 scale): Pain Level: 0  Last Weight:   Wt Readings from Last 1 Encounters:   04/06/22 181 lb 14.1 oz (82.5 kg)     Mental Status:  oriented, alert, coherent, logical, thought processes intact, and able to concentrate and follow conversation    IV Access:  - None    Nursing Mobility/ADLs:  Walking   Assisted  Transfer  Assisted  Bathing  Independent  Dressing  Independent  Toileting  Assisted  Feeding  Independent  Med Admin  Independent  Med Delivery   whole    Wound Care Documentation and Therapy:        Elimination:  Continence:    Bowel: Yes  Bladder: Yes  Urinary Catheter: None   Colostomy/Ileostomy/Ileal Conduit: No       Date of Last BM: 4/7/22    Intake/Output Summary (Last 24 hours) at 4/7/2022 0857  Last data filed at 4/7/2022 0505  Gross per 24 hour   Intake 240 ml   Output 900 ml   Net -660 ml     I/O last 3 completed shifts: In: 480 [P.O.:480]  Out: 2700 [Urine:2700]    Safety Concerns: At Risk for Falls    Impairments/Disabilities:      None    Nutrition Therapy:  Current Nutrition Therapy:   - Oral Diet:  General    Routes of Feeding: Oral  Liquids: No Restrictions  Daily Fluid Restriction: no  Last Modified Barium Swallow with Video (Video Swallowing Test): not done    Treatments at the Time of Hospital Discharge:   Respiratory Treatments: ***  Oxygen Therapy:  is on oxygen at 4 L/min per nasal cannula. Ventilator:    - No ventilator support    Rehab Therapies: Physical Therapy and Occupational Therapy  Weight Bearing Status/Restrictions: No weight bearing restrictions  Other Medical Equipment (for information only, NOT a DME order):  walker  Other Treatments: ***    Patient's personal belongings (please select all that are sent with patient):  ***    RN SIGNATURE:  Electronically signed by Micky Preciado RN on 4/7/22 at 3:55 PM EDT    CASE MANAGEMENT/SOCIAL WORK SECTION    Inpatient Status Date: ***    Readmission Risk Assessment Score:  Readmission Risk              Risk of Unplanned Readmission:  18           Discharging to Facility/ Agency   Name: [facility] [place]  Address:  Phone: [phone]  Fax:    Dialysis Facility (if applicable)   Name:  Address:  Dialysis Schedule:  Phone:  Fax:    / signature: Electronically signed by Oziel Martinez RN on 4/7/22 at 9:49 AM EDT    PHYSICIAN SECTION    Prognosis: Good    Condition at Discharge: Stable    Rehab Potential (if transferring to Rehab): Good    Recommended Labs or Other Treatments After Discharge: Follow up with PCP within 1-2 weeks. Physician Certification: I certify the above information and transfer of Pita Barney  is necessary for the continuing treatment of the diagnosis listed and that he requires [address] for less 30 days.      Update Admission H&P: No change in H&P    PHYSICIAN SIGNATURE:  Electronically signed by Amy Carter MD on 4/7/22 at 8:58 AM EDT

## 2022-04-07 NOTE — PROGRESS NOTES
Progressive Care Progress Note    Patient: Keagan Hooks MRN: [id number]  Date of  Admission: 4/3/2022   YOB: 1950  Age: 67 y.o. Sex: male    [address]  Room/Bed: 9954/8229-15 Attending Physician: Katherine Jaquez MD   Admitting Physician: Zan Mills        Chief Complaint   Patient presents with    Shortness of Breath       discharged on 3/31 for respiratory failure/electrolyte imbalances. wears 4 liters at home. difficulty breathing worse today. brought in on CPAP. Acute respiratory failure, history of COPD, acute CHF     History Obtained From   patient, electronic medical record     History of Present Illness   This is a 70-year-old gentleman who came to the hospital because of increasing shortness of breath. Patient has been recently discharged from the hospital.  Patient states that he started having issues with increasing abdominal swelling lower extremity swelling and his oxygen requirements had increased. Patient has normally seen by [facility] pulmonary. And was recently seen by his pulmonologist.  Patient normally has been doing well with his emphysema but the shortness of breath had progressively gotten worse along with the swellings of the abdomen and the lower extremities. Patient denies any chest pains or palpitations           Subjective:      Patient received BiPAP machine  No chest pains or palpitations  No nausea vomiting  Patient used overnight  Patient chose the full facemask  Having oxygen bled in      ROS:A comprehensive review of systems was negative except for: above    Objective: Intake and Output:   Current Shift:   No intake/output data recorded.   Last three shifts:   04/05 1901 - 04/07 0700  In: 480 [P.O.:480]  Out: 2700 [Urine:2700]        Hemodynamic parameters for last 24 hours:  [unfilled]    Physical Exam:   Patient Vitals for the past 24 hrs:   BP Temp Temp src Pulse Resp SpO2   04/07/22 0814 -- -- -- -- 16 96 % 04/07/22 0800 114/74 98.3 °F (36.8 °C) Axillary 63 16 98 %   04/07/22 0445 94/60 98.6 °F (37 °C) Axillary 67 18 98 %   04/06/22 2321 (!) 89/55 98 °F (36.7 °C) Oral 56 18 93 %   04/06/22 2034 (!) 91/59 97.8 °F (36.6 °C) Oral 80 18 93 %   04/06/22 2007 -- -- -- -- 18 97 %   04/06/22 1630 110/67 98.1 °F (36.7 °C) Oral 84 18 99 %   04/06/22 1627 -- -- -- -- -- 97 %        Physical Exam  Vitals and nursing note reviewed. Constitutional:       General: He is not in acute distress. Appearance: Normal appearance. He is obese. HENT:      Head: Normocephalic and atraumatic. Right Ear: External ear normal.      Left Ear: External ear normal.      Nose: Nose normal.      Mouth/Throat:      Mouth: Mucous membranes are moist.      Pharynx: Oropharynx is clear. Eyes:      General:         Right eye: No discharge. Left eye: No discharge. Extraocular Movements: Extraocular movements intact. Conjunctiva/sclera: Conjunctivae normal.      Pupils: Pupils are equal, round, and reactive to light. Cardiovascular:      Rate and Rhythm: Normal rate and regular rhythm. Pulses: Normal pulses. Heart sounds: No murmur heard. Pulmonary:      Effort: No respiratory distress. Breath sounds: No stridor. No rhonchi or rales. Abdominal:      General: Bowel sounds are normal. There is no distension. Palpations: Abdomen is soft. There is no mass. Tenderness: There is no abdominal tenderness. Hernia: No hernia is present. Musculoskeletal:         General: No swelling. Normal range of motion. Cervical back: Normal range of motion. No rigidity. Skin:     General: Skin is warm and dry. Coloration: Skin is not jaundiced or pale. Neurological:      General: No focal deficit present. Mental Status: He is alert and oriented to person, place, and time. Mental status is at baseline. Cranial Nerves: No cranial nerve deficit. Sensory: No sensory deficit. Psychiatric:         Mood and Affect: Mood normal.         Behavior: Behavior normal.         Thought Content: Thought content normal.             Labs:   Recent Labs     04/05/22 0458   WBC 10.7   HGB 14.0   HCT 41.9         Recent Labs     04/05/22  0458 04/06/22  0507 04/07/22  0448    135* 139   K 5.1 4.9 4.7   CL 98* 97* 99   CO2 33* 31 30   BUN 25* 38* 42*   GLUCOSE 153* 153* 168*           Radiology, images personally reviewed. Not indicated  Other imaging: Not indicated      Micro: Negative growth to date    Assessment:     Principal Problem:    Acute on chronic combined systolic (congestive) and diastolic (congestive) heart failure (HCC)  Active Problems:    Acute on chronic respiratory failure with hypoxia and hypercapnia (HCC)    Acute on chronic diastolic congestive heart failure (HCC)  Resolved Problems:    * No resolved hospital problems. *      Discussion / Plan:     COPD  Continue with  Bolivar Moles in place of Symbicort  Daliresp  Mucinex  Can return to his home medications for discharge    Change to prednisone from the Solu-Medrol and taper as an outpatient        Acute on chronic respiratory failure with hypercapnia  Nocturnal pulse ox was done showing desaturation    ABG done on normal baseline of oxygen at stable         Patient may benefit from BiPAP therapy  I have lowered the BiPAP from 14 to IPAP of 11  Patient does not have any symptomatology of sleep apnea  Would probably benefit from BiPAP therapy  We will ask for BiPAP therapy for COPD    BiPAP was set at 12/6  I have changed it to IPAP max of 15 and an EPAP min of 5 and a pressure support of 7  Patient seems to be tolerating well  We will need to follow-up with Dr. Triplett Backbone         CHF  Continue with diuresis          Wendy Childs MD    [facility], [address] and Sleep Medicine  [phone]    Please note that some or all of this record was generated using voice recognition software.  If there are any questions about the content of this document, please contact the author as some errors in transcription may have occurred.

## 2022-04-07 NOTE — DISCHARGE SUMMARY
Hospital Medicine Discharge Summary    Patient ID: Emmanuel Rendon      Patient's PCP: Tiana Holden    Admit Date: 4/3/2022     Discharge Date:   04/07/22     Admitting Provider: Yonatan Guerin MD     Discharge Provider: Ghulam Gore MD     Discharge Diagnoses: Active Hospital Problems    Diagnosis     Acute on chronic combined systolic (congestive) and diastolic (congestive) heart failure (HCC) [I50.43]     Acute on chronic diastolic congestive heart failure (HCC) [I50.33]     Acute on chronic respiratory failure with hypoxia and hypercapnia (HCC) [F96.17, J96.22]        The patient was seen and examined on day of discharge and this discharge summary is in conjunction with any daily progress note from day of discharge. Hospital Course: \"The patient is a 66 y. o. male who presents with hypoxic hypercarbic respiratory failure on CPAP.   History of COPD and CHF requiring diuresis and discharged on low dose torsemide with rebound volume overload and pulmonary edema. Chronic hypoxic respiratory failure secondary to COPD.   He is normally on 3-4 LPM oxygen at home. \"        Acute on chronic diastolic CHF. EF 55% with DD a few days prior to admission. He normally takes torsemide PO, treated here with furosemide IV per cardiology, then changed to higher dose of torsemide 80 po qd prior to discharge per cardiology. Lisinopril, metoprolol.     AECOPD. Steroids, inhaled bronchodilators.       Suspected ALEXANDREA. S/p overnight pulse oximetry per pulmonary. Pulmonary gave him an outpatient BIPAP. Appreciate pulmonary input.     Acute on chronic hypoxic and hypercapnic respiratory failure. Due to above issues, treated accordingly.     PAF. Decreased dilt to make room for new metoprolol. Continue apixaban.     Essential tremor. Primidone reduces the effectiveness of apixaban, so avoiding this med. Perhaps the new beta blocker will help.   Tremor might improve once he is more rested outside of the hospital, and requiring less frequent nebulizers. Reassess with PCP.       Code status: I informed the patient and family that if he ever had a cardiac or pulmonary arrest his chances of meaningful survival would be slim. They still wanted him to be full code, despite our detailed discussion of the drawbacks. Physical Exam Performed:     /71   Pulse 61   Temp 98.1 °F (36.7 °C) (Axillary)   Resp 18   Ht 6' (1.829 m)   Wt 181 lb 14.1 oz (82.5 kg)   SpO2 95%   BMI 24.67 kg/m²       General appearance: No apparent distress, appears stated age. HEENT: Pupils equal, round, and reactive to light. Conjunctivae/corneas clear. Neck: Supple, with full range of motion. No jugular venous distention. Trachea midline. Respiratory:  NO longer has increased respiratory effort. No audible rales, faint wheezing is still present, little audible airflow, prolonged expiratory phase. Cardiovascular: Regular rate and rhythm with normal S1/S2 without murmurs, rubs or gallops. Abdomen: Soft, non-tender, non-distended with normal bowel sounds. Musculoskeletal: No clubbing, cyanosis. Now only trace BLE pitting edema, which is a marked improvement. Full range of motion without deformity. Skin: Skin color, texture, turgor normal.  No rashes or lesions. Neurologic:  Neurovascularly intact without any focal sensory/motor deficits. Cranial nerves: II-XII intact, grossly non-focal.  Psychiatric: Alert and oriented, thought content appropriate, limited insight  Capillary Refill: Brisk,3 seconds, normal   Peripheral Pulses: +2 palpable, equal bilaterally       Labs:  For convenience and continuity at follow-up the following most recent labs are provided:      CBC:    Lab Results   Component Value Date    WBC 10.7 04/05/2022    HGB 14.0 04/05/2022    HCT 41.9 04/05/2022     04/05/2022       Renal:    Lab Results   Component Value Date     04/07/2022    K 4.7 04/07/2022    CL 99 04/07/2022    CO2 30 04/07/2022    BUN 42 04/07/2022    CREATININE 0.9 04/07/2022    CALCIUM 8.8 04/07/2022    PHOS 2.5 03/31/2022         Significant Diagnostic Studies    Radiology:   XR CHEST PORTABLE   Final Result   Findings are most consistent with mild congestive heart failure. Atypical/viral pneumonia can give similar findings. Consults:     IP CONSULT TO HOSPITALIST  IP CONSULT TO HEART FAILURE NURSE/COORDINATOR  IP CONSULT TO DIETITIAN  IP CONSULT TO CARDIOLOGY  IP CONSULT TO PULMONOLOGY  IP CONSULT TO HEART FAILURE NURSE/COORDINATOR  IP CONSULT TO PALLIATIVE CARE    Disposition:  Home with PT/OT     Condition at Discharge: Stable    Discharge Instructions/Follow-up:  Follow up with PCP within 1-2 weeks.        Code Status:  Full Code     Activity: activity as tolerated    Diet: cardiac diet      Discharge Medications:     Current Discharge Medication List           Details   metoprolol succinate (TOPROL XL) 25 MG extended release tablet Take 1 tablet by mouth daily  Qty: 30 tablet, Refills: 3      predniSONE (DELTASONE) 20 MG tablet Take 1 tablet by mouth daily for 3 days  Qty: 3 tablet, Refills: 0      torsemide 40 MG TABS Take 80 mg by mouth daily  Qty: 60 tablet, Refills: 3              Details   vitamin D (ERGOCALCIFEROL) 1.25 MG (73144 UT) CAPS capsule Take 1 capsule by mouth once a week  Qty: 5 capsule, Refills: 0      potassium chloride (KLOR-CON M) 20 MEQ extended release tablet Take 1 tablet by mouth daily  Qty: 90 tablet, Refills: 1      ipratropium-albuterol (DUONEB) 0.5-2.5 (3) MG/3ML SOLN nebulizer solution INHALE 3 MILLILIERS (1 VIAL) VIA NEBULIZATION ROUTE EVERY 6 HOURS AS NEEDED FOR SHORTNESS OF BREATH  Qty: 360 mL, Refills: 6      VENTOLIN  (90 Base) MCG/ACT inhaler INHALE 2 PUFFS EVERY 6 HOURS AS NEEDED FOR WHEEZING OR SHORTNESS OF BREATH  Qty: 18 g, Refills: 5      SYMBICORT 160-4.5 MCG/ACT AERO INHALE 2 PUFFS INTO THE LUNGS 2 TIMES DAILY  Qty: 10.2 g, Refills: 5      Roflumilast (DALIRESP) 500 MCG tablet TAKE (1) TABLET DAILY  Qty: 30 tablet, Refills: 5      famotidine (PEPCID) 20 MG tablet TAKE (1) TABLET TWICE A DAY BEFORE MEALS. Qty: 180 tablet, Refills: 3      lisinopril (PRINIVIL;ZESTRIL) 5 MG tablet TAKE 1 TABLET BY MOUTH DAILY  Qty: 90 tablet, Refills: 3      apixaban (ELIQUIS) 5 MG TABS tablet TAKE 1 TABLET TWICE DAILY  Qty: 60 tablet, Refills: 5    Associated Diagnoses: Permanent atrial fibrillation (HCC)      dilTIAZem (CARDIZEM CD) 180 MG extended release capsule TAKE 1 CAPSULE BY MOUTH DAILY  Qty: 90 capsule, Refills: 3      guaiFENesin (MUCINEX) 600 MG extended release tablet Take 1 tablet by mouth 2 times daily as needed for Congestion  Qty: 60 tablet, Refills: 6      nitroGLYCERIN (NITROSTAT) 0.4 MG SL tablet Place 1 tablet under the tongue every 5 minutes as needed for Chest pain  Qty: 25 tablet, Refills: 3               Time Spent on discharge is more than 30 minutes in the examination, evaluation, counseling and review of medications and discharge plan. Signed:    Danie Hamman, MD   4/7/2022      Thank you Franky Hope for the opportunity to be involved in this patient's care. If you have any questions or concerns please feel free to contact me at [phone].

## 2022-04-07 NOTE — PROGRESS NOTES
[facility]     Cardiology                                     Progress Note    Admission date:  4/3/2022    Reason for follow up visit: CHF    HPI/CC: Manda Salgado was admitted on 4/3/2022 with shortness of breath and swelling. EKG showed rapid AF. Has also been treated for COPD and acute on chronic respiratory failure. Rhythm has been AF. Subjective: He has no complains of chronic shortness of breath. Denies chest pain, palpitations, and dizziness. Vitals:  Blood pressure 114/74, pulse 63, temperature 98.3 °F (36.8 °C), temperature source Axillary, resp. rate 16, height 6' (1.829 m), weight 181 lb 14.1 oz (82.5 kg), SpO2 96 %.   Temp  Av.2 °F (36.8 °C)  Min: 97.8 °F (36.6 °C)  Max: 98.6 °F (37 °C)  Pulse  Av.3  Min: 56  Max: 84  BP  Min: 89/55  Max: 114/74  SpO2  Av.3 %  Min: 93 %  Max: 99 %    24 hour I/O    Intake/Output Summary (Last 24 hours) at 2022 1028  Last data filed at 2022 0505  Gross per 24 hour   Intake 240 ml   Output 400 ml   Net -160 ml     Current Facility-Administered Medications   Medication Dose Route Frequency Provider Last Rate Last Admin    torsemide (DEMADEX) tablet 20 mg  20 mg Oral Daily ERIN Crum CNP   20 mg at 22 0805    metoprolol succinate (TOPROL XL) extended release tablet 25 mg  25 mg Oral Daily Anni Mariscal MD   25 mg at 22 0805    mometasone-formoterol (DULERA) 200-5 MCG/ACT inhaler 2 puff  2 puff Inhalation BID Bernard River MD   2 puff at 22 08    dilTIAZem (CARDIZEM CD) extended release capsule 120 mg  120 mg Oral Daily Anni Mariscal MD   120 mg at 22 08    methylPREDNISolone sodium (SOLU-MEDROL) injection 40 mg  40 mg IntraVENous BID Anni Mariscal MD   40 mg at 22 08    Roflumilast (DALIRESP) tablet 500 mcg  500 mcg Oral Nightly Yee James MD   500 mcg at 22    apixaban (ELIQUIS) tablet 5 mg  5 mg Oral BID Yee James MD   5 mg at 04/07/22 0805    famotidine (PEPCID) tablet 20 mg  20 mg Oral BID Leilani Topete MD   20 mg at 04/07/22 0805    guaiFENesin [facility]) extended release tablet 600 mg  600 mg Oral BID PRN Leilani Topete MD        ipratropium-albuterol (DUONEB) nebulizer solution 1 ampule  1 ampule Inhalation Q4H WA Leilani Topete MD   1 ampule at 04/07/22 0809    lisinopril (PRINIVIL;ZESTRIL) tablet 5 mg  5 mg Oral Daily Leilani Topete MD   5 mg at 04/07/22 0805    potassium chloride (KLOR-CON M) extended release tablet 20 mEq  20 mEq Oral Daily Leilani Topete MD   20 mEq at 04/07/22 0805    sodium chloride flush 0.9 % injection 5-40 mL  5-40 mL IntraVENous 2 times per day Leilani Topete MD   10 mL at 04/07/22 0806    sodium chloride flush 0.9 % injection 5-40 mL  5-40 mL IntraVENous PRN Leilani Topete MD        0.9 % sodium chloride infusion   IntraVENous PRN Leilani Topete MD        ondansetron (ZOFRAN-ODT) disintegrating tablet 4 mg  4 mg Oral Q8H PRN Leilani Topete MD        Or    ondansetron [facility]) injection 4 mg  4 mg IntraVENous Q6H PRN Leilani Topete MD        polyethylene glycol [facility]) packet 17 g  17 g Oral Daily PRALBAN Topete MD        acetaminophen (TYLENOL) tablet 650 mg  650 mg Oral Q6H PRALBAN Topete MD        Or    acetaminophen (TYLENOL) suppository 650 mg  650 mg Rectal Q6H PRALBAN Topete MD        potassium chloride (KLOR-CON M) extended release tablet 40 mEq  40 mEq Oral PRALBAN Topete MD        Or    potassium bicarb-citric acid (EFFER-K) effervescent tablet 40 mEq  40 mEq Oral PRALBAN Topete MD        Or    potassium chloride 10 mEq/100 mL IVPB (Peripheral Line)  10 mEq IntraVENous PRALBAN Topete MD        magnesium sulfate 2000 mg in 50 mL IVPB premix  2,000 mg IntraVENous PRN Leilani Topete MD           Objective:     Telemetry monitor: AF    Physical Exam:  Constitutional and general appearance: alert, cooperative, no distress and appears stated age  HEENT: PERRL, no cervical lymphadenopathy. No masses palpable. Normal oral mucosa  Respiratory:  · Normal excursion and expansion without use of accessory muscles  · Resp auscultation: diminished breath sounds without wheezing, rhonchi, and rales  Cardiovascular:  · The apical impulse is not displaced  · Heart tones are crisp and normal. irregular S1 and S2.  · Jugular venous pulsation Normal  · The carotid upstroke is normal in amplitude and contour without delay or bruit  · Peripheral pulses are symmetrical and full   Abdomen:  · No masses or tenderness  · Bowel sounds present  Extremities:  ·  No cyanosis or clubbing  ·  trace lower extremity edema, L > R   ·  Skin: warm and dry  Neurological:  · Alert and oriented  · Moves all extremities well  · No abnormalities of mood, affect, memory, mentation, or behavior are noted    Data    Echo 3/14/2022:  Summary   Left ventricular systolic function is normal with ejection fraction   estimated at 55%. No regional wall motion abnormalities. There is mild concentric left ventricular hypertrophy. Elevated left ventricular filling pressure. Mild bi-atrial enlargement. Mild mitral regurgitation. Mild tricuspid regurgitation. Systolic pulmonary artery pressure (SPAP) is normal estimated at 38 mmHg   (Right atrial pressure of 3 mmHg). No significant change from exam done 1/11/2018. All labs and testing reviewed.   Lab Review     Renal Profile:   Lab Results   Component Value Date    CREATININE 0.9 04/07/2022    BUN 42 04/07/2022     04/07/2022    K 4.7 04/07/2022    CL 99 04/07/2022    CO2 30 04/07/2022     CBC:    Lab Results   Component Value Date    WBC 10.7 04/05/2022    RBC 4.53 04/05/2022    HGB 14.0 04/05/2022    HCT 41.9 04/05/2022    MCV 92.6 04/05/2022    RDW 15.2 04/05/2022     04/05/2022     BNP:  No results found for: BNP  Fasting Lipid Panel:    Lab Results   Component Value Date    CHOL 206 01/16/2019    HDL 39 01/16/2019    TRIG 171 01/16/2019     Cardiac Enzymes:  CK/MbTroponin  Lab Results   Component Value Date    CKTOTAL 282 03/29/2022    TROPONINI 0.01 04/04/2022     PT/ INR   Lab Results   Component Value Date    INR 1.19 01/11/2018    PROTIME 13.4 01/11/2018     PTT No results found for: PTT   Lab Results   Component Value Date    MG 2.50 04/05/2022    No results found for: TSH    Assessment:  Acute on chronic diastolic CHF: improved    -weights appear inaccurate   -has diuresed > 6 L this admission  Permanent atrial fibrillation: stable, rates have been controlled    -[id number] score > 2  COPD  Acute on chronic respiratory failure: pulmonology following   HTN: controlled       Plan:   1. Continue Eliquis, Cardizem, Toprol and lisinopril   2. Increase home torsemide to 80 mg PO daily   3. Follow up with Dr. March Ek 4/14/2022  4. Thoroughly reviewed CHF education with patient (daily weights, fluid and sodium restriction). Will likely need reinforcement.    4. Okay for discharge       ERIN Maier-GWEN  [facility] [address]  [phone]

## 2022-04-07 NOTE — PROGRESS NOTES
RT in patient room to help him get on home BIPAP unit for the first night. Pt states he is not ready yet.

## 2022-04-07 NOTE — PLAN OF CARE
Problem: Falls - Risk of:  Goal: Will remain free from falls  Description: Will remain free from falls  4/6/2022 2013 by Amber Diaz RN  Outcome: Ongoing

## 2022-04-07 NOTE — FLOWSHEET NOTE
04/06/22 2000   Assessment   Charting Type Shift assessment   Neurological   Neuro (WDL) WDL   Level of Consciousness Alert (0)   Orientation Level Oriented X4   RUE Motor Response Tremors   LUE Motor Response Tremors   Laverne Coma Scale   Eye Opening 4   Best Verbal Response 5   Best Motor Response 6   Laverne Coma Scale Score 15   HEENT   HEENT (WDL) X   Right Eye Impaired vision   Left Eye Impaired vision   Voice Normal   Right Ear Impaired hearing   Left Ear Impaired hearing   Respiratory   Respiratory (WDL) X   Respiratory Pattern Regular   Respiratory Depth Normal   Respiratory Quality/Effort Dyspnea with exertion   L Breath Sounds Diminished   R Breath Sounds Diminished   Breath Sounds   Right Upper Lobe Diminished   Right Middle Lobe Diminished   Right Lower Lobe Diminished   Left Upper Lobe Diminished   Left Lower Lobe Diminished   Cardiac   Cardiac (WDL) X   Cardiac Regularity Regular   Heart Sounds S1, S2   Cardiac Rhythm Atrial fib   Cardiac Monitor   Telemetry Monitor On Yes   Telemetry Audible Yes   Telemetry Alarms Set Yes   Gastrointestinal   Abdominal (WDL) WDL   RUQ Bowel Sounds Active   LUQ Bowel Sounds Active   RLQ Bowel Sounds Active   LLQ Bowel Sounds Active   Abdomen Inspection Rotund   Peripheral Vascular   Peripheral Vascular (WDL) X   Edema Right lower extremity; Left lower extremity   RLE Edema +1   LLE Edema +1   Skin Color/Condition   Skin Color/Condition (WDL) WDL   Skin Integrity   Skin Integrity (WDL) WDL   Musculoskeletal   Musculoskeletal (WDL) X   RUE Full movement   LUE Full movement   RL Extremity Full movement;Weakness   LL Extremity Full movement;Weakness   Genitourinary   Genitourinary (WDL) WDL   Flank Tenderness No   Suprapubic Tenderness No   Dysuria No   Urine Assessment   Incontinence No   Urine Color Yellow/straw   Urine Appearance Clear   Urine Odor No odor   Anus/Rectum   Anus/Rectum (WDL) WDL   Psychosocial   Psychosocial (WDL) WDL

## 2022-04-07 NOTE — FLOWSHEET NOTE
Yellow/straw   Urine Appearance Clear   Urine Odor No odor   Anus/Rectum   Anus/Rectum (WDL) WDL   Psychosocial   Psychosocial (WDL) WDL

## 2022-04-07 NOTE — PROGRESS NOTES
Patient discharged home with daughter in law. Writer reviewed AVS with patient including medication changes and upcoming appointments. Patient sent with personal belongings and states no further needs or questions at this time.

## 2022-04-08 ENCOUNTER — FOLLOWUP TELEPHONE ENCOUNTER (OUTPATIENT)
Dept: TELEMETRY | Age: 72
End: 2022-04-08

## 2022-04-08 NOTE — TELEPHONE ENCOUNTER
[facility] Initial Follow Up Call    Call within 2 business days of discharge: Yes     Patient: Gretel Garcia Patient : 1950 MRN: [id number]    [unfilled]    RARS: Readmission Risk Score: 14.8 ( )       Spoke with: daughter    Discharge department/facility: home with family    Non-face-to-face services provided:  Scheduled appointment with PCP-22     Spoke to daughter for hospital followup. Pt doing well since returning home. States he slept well with CPAP overnight. Commented on education materials provided and how helpful they were. Denies any current needs.      Follow Up  Future Appointments   Date Time Provider Mir Gold   2022  2:30 PM Sobia Dai MD [facility]   2022  8:30 AM MD German Steve [facility]   2022  1:00 PM Sobia Dai MD [facility] [address] [place] [address]   [facility]       Veronica Kruse RN

## 2022-04-12 LAB
ALBUMIN SERPL-MCNC: 4 G/DL
ALP BLD-CCNC: 83 U/L
ALT SERPL-CCNC: 26 U/L
ANION GAP SERPL CALCULATED.3IONS-SCNC: 1.4 MMOL/L
AST SERPL-CCNC: 17 U/L
BASOPHILS ABSOLUTE: 0.1 /ΜL
BASOPHILS RELATIVE PERCENT: 0 %
BILIRUB SERPL-MCNC: 0.8 MG/DL (ref 0.1–1.4)
BUN BLDV-MCNC: 31 MG/DL
CALCIUM SERPL-MCNC: 9.3 MG/DL
CHLORIDE BLD-SCNC: 86 MMOL/L
CO2: 28 MMOL/L
CREAT SERPL-MCNC: 1 MG/DL
EOSINOPHILS ABSOLUTE: 0.3 /ΜL
EOSINOPHILS RELATIVE PERCENT: 2 %
GFR CALCULATED: 80
GLUCOSE BLD-MCNC: 102 MG/DL
HCT VFR BLD CALC: 49.6 % (ref 41–53)
HEMOGLOBIN: 16.5 G/DL (ref 13.5–17.5)
LYMPHOCYTES ABSOLUTE: 3.1 /ΜL
LYMPHOCYTES RELATIVE PERCENT: 15 %
MCH RBC QN AUTO: 30.7 PG
MCHC RBC AUTO-ENTMCNC: 33.3 G/DL
MCV RBC AUTO: 92 FL
MONOCYTES ABSOLUTE: 1.8 /ΜL
MONOCYTES RELATIVE PERCENT: 9 %
NEUTROPHILS ABSOLUTE: 14.7 /ΜL
NEUTROPHILS RELATIVE PERCENT: 70 %
PLATELET # BLD: 444 K/ΜL
PMV BLD AUTO: NORMAL FL
POTASSIUM SERPL-SCNC: 4.8 MMOL/L
RBC # BLD: 5.38 10^6/ΜL
SODIUM BLD-SCNC: 137 MMOL/L
TOTAL PROTEIN: 6.9
WBC # BLD: 20.9 10^3/ML

## 2022-04-14 ENCOUNTER — OFFICE VISIT (OUTPATIENT)
Dept: CARDIOLOGY CLINIC | Age: 72
End: 2022-04-14
Payer: MEDICARE

## 2022-04-14 VITALS
HEIGHT: 72 IN | DIASTOLIC BLOOD PRESSURE: 58 MMHG | SYSTOLIC BLOOD PRESSURE: 80 MMHG | OXYGEN SATURATION: 89 % | WEIGHT: 172 LBS | HEART RATE: 58 BPM | BODY MASS INDEX: 23.3 KG/M2

## 2022-04-14 DIAGNOSIS — I48.21 PERMANENT ATRIAL FIBRILLATION (HCC): ICD-10-CM

## 2022-04-14 DIAGNOSIS — I50.33 ACUTE ON CHRONIC DIASTOLIC CONGESTIVE HEART FAILURE (HCC): Primary | ICD-10-CM

## 2022-04-14 DIAGNOSIS — I95.89 OTHER SPECIFIED HYPOTENSION: ICD-10-CM

## 2022-04-14 DIAGNOSIS — I10 ESSENTIAL HYPERTENSION: Chronic | ICD-10-CM

## 2022-04-14 DIAGNOSIS — J96.22 ACUTE ON CHRONIC RESPIRATORY FAILURE WITH HYPOXIA AND HYPERCAPNIA (HCC): ICD-10-CM

## 2022-04-14 DIAGNOSIS — J96.21 ACUTE ON CHRONIC RESPIRATORY FAILURE WITH HYPOXIA AND HYPERCAPNIA (HCC): ICD-10-CM

## 2022-04-14 PROBLEM — I95.9 HYPOTENSION: Status: ACTIVE | Noted: 2022-04-14

## 2022-04-14 PROCEDURE — G8427 DOCREV CUR MEDS BY ELIG CLIN: HCPCS | Performed by: INTERNAL MEDICINE

## 2022-04-14 PROCEDURE — 99214 OFFICE O/P EST MOD 30 MIN: CPT | Performed by: INTERNAL MEDICINE

## 2022-04-14 PROCEDURE — 1111F DSCHRG MED/CURRENT MED MERGE: CPT | Performed by: INTERNAL MEDICINE

## 2022-04-14 PROCEDURE — 1036F TOBACCO NON-USER: CPT | Performed by: INTERNAL MEDICINE

## 2022-04-14 PROCEDURE — G8420 CALC BMI NORM PARAMETERS: HCPCS | Performed by: INTERNAL MEDICINE

## 2022-04-14 PROCEDURE — 1123F ACP DISCUSS/DSCN MKR DOCD: CPT | Performed by: INTERNAL MEDICINE

## 2022-04-14 PROCEDURE — 4040F PNEUMOC VAC/ADMIN/RCVD: CPT | Performed by: INTERNAL MEDICINE

## 2022-04-14 PROCEDURE — 3017F COLORECTAL CA SCREEN DOC REV: CPT | Performed by: INTERNAL MEDICINE

## 2022-04-14 RX ORDER — TORSEMIDE 20 MG/1
40 TABLET ORAL DAILY
Qty: 30 TABLET | Refills: 0
Start: 2022-04-14 | End: 2022-07-27 | Stop reason: ALTCHOICE

## 2022-04-14 RX ORDER — FAMOTIDINE 20 MG/1
20 TABLET, FILM COATED ORAL 2 TIMES DAILY
Qty: 180 TABLET | Refills: 3 | Status: SHIPPED | OUTPATIENT
Start: 2022-04-14

## 2022-04-14 NOTE — PROGRESS NOTES
[facility] Office Note  4/14/2022     Subjective:  Mr. Jolanta Castro is here for hospital follow up for CHF and Perm afib. Follow up for HTN  D-CHF chronic hypoxic resp failure    [place]:  Recently admitted 4/3-4/7/22 for aCHF, aCOPD, and permanent afib. He was discharged with these changes: Toprol XL 25 mg daily and torsemide 80 mg daily, but [id number] daily . Echo 3/14/2022 noted EF 55%. Pro BNP 2,103. This went down to 1,569. Today, he presents in wheelchair with 4L NC oxygen. He reports feeling a little lightheaded at times. Patient stopped Lisinopril on his own. Patient is vaccinated against Covid. PMH:   COPD, former smoker,  AFIB. He failed cardioversion 4.13.18    Review of Systems:         12 point ROS negative in all areas as listed below except as in [place]  Constitutional, EENT,  GI, , Musculoskeletal, skin, neurological, hematological, endocrine, Psychiatric    Reviewed past medical history, social, and family history. Non smoker  No alcohol   Family history is negative for significant premature cardiac disease  Past Medical History:   Diagnosis Date    Bronchitis chronic     Emphysema of lung ([address]     Influenza A 01/10/2018    Lung disease     copd    Pneumonia     12/2009     Past Surgical History:   Procedure Laterality Date    FRACTURE SURGERY      johnson in femur/hip on right    HERNIA REPAIR         Objective:   BP (!) 80/58   Pulse 58   Ht 6' (1.829 m)   Wt 172 lb (78 kg)   SpO2 (!) 89%   BMI 23.33 kg/m²     Wt Readings from Last 3 Encounters:   04/14/22 172 lb (78 kg)   04/06/22 181 lb 14.1 oz (82.5 kg)   03/31/22 180 lb 7 oz (81.8 kg)     BP recheck 106/70    Physical Exam:  General: No Respiratory distress, appears well developed and well nourished. On continuous supplemental O2 on demand.   Eyes:  Sclera nonicteric  Nose/Sinuses:  negative findings: nose shows no deformity, asymmetry, or inflammation, nasal mucosa normal, septum midline with no perforation or bleeding  Back:  no pain to palpation  Joint:  no active joint inflammation  Musculoskeletal:  negative  Skin:  Warm and dry  Neck:  Negative for JVD and Carotid Bruits. Chest:clear  to auscultation, respiration easy  Cardiovascular:  irreg irreg 64 bpmS2 normal, no murmur, no rub or thrill.   Extremities: No edema,   noclubbing, cyanosis,  Neuro: intact    Medications:   Outpatient Encounter Medications as of 4/14/2022   Medication Sig Dispense Refill    vitamin D (ERGOCALCIFEROL) 1.25 MG (09443 UT) CAPS capsule Take 1 capsule by mouth once a week 5 capsule 0    potassium chloride (KLOR-CON M) 20 MEQ extended release tablet Take 1 tablet by mouth daily 90 tablet 1    ipratropium-albuterol (DUONEB) 0.5-2.5 (3) MG/3ML SOLN nebulizer solution INHALE 3 MILLILIERS (1 VIAL) VIA NEBULIZATION ROUTE EVERY 6 HOURS AS NEEDED FOR SHORTNESS OF BREATH 360 mL 6    VENTOLIN  (90 Base) MCG/ACT inhaler INHALE 2 PUFFS EVERY 6 HOURS AS NEEDED FOR WHEEZING OR SHORTNESS OF BREATH 18 g 5    SYMBICORT 160-4.5 MCG/ACT AERO INHALE 2 PUFFS INTO THE LUNGS 2 TIMES DAILY 10.2 g 5    Roflumilast (DALIRESP) 500 MCG tablet TAKE (1) TABLET DAILY 30 tablet 5    famotidine (PEPCID) 20 MG tablet TAKE (1) TABLET TWICE A DAY BEFORE MEALS. 180 tablet 3    apixaban (ELIQUIS) 5 MG TABS tablet TAKE 1 TABLET TWICE DAILY 60 tablet 5    dilTIAZem (CARDIZEM CD) 180 MG extended release capsule TAKE 1 CAPSULE BY MOUTH DAILY 90 capsule 3    guaiFENesin (MUCINEX) 600 MG extended release tablet Take 1 tablet by mouth 2 times daily as needed for Congestion 60 tablet 6    nitroGLYCERIN (NITROSTAT) 0.4 MG SL tablet Place 1 tablet under the tongue every 5 minutes as needed for Chest pain 25 tablet 3    [DISCONTINUED] metoprolol succinate (TOPROL XL) 25 MG extended release tablet Take 1 tablet by mouth daily 30 tablet 3    [DISCONTINUED] torsemide 40 MG TABS Take 80 mg by mouth daily (Patient taking differently: Take 80 mg by mouth daily NPAM  Changed it to 20mg 4 tx a day) 60 tablet 3    [DISCONTINUED] lisinopril (PRINIVIL;ZESTRIL) 5 MG tablet TAKE 1 TABLET BY MOUTH DAILY 90 tablet 3     No facility-administered encounter medications on file as of 4/14/2022. Lab Data:  CBC: No results for input(s): WBC, HGB, HCT, MCV, PLT in the last 72 hours. BMP: No results for input(s): NA, K, CL, CO2, PHOS, BUN, CREATININE, CA in the last 72 hours. LIVER PROFILE: No results for input(s): AST, ALT, LIPASE, BILIDIR, BILITOT, ALKPHOS in the last 72 hours. Invalid input(s): AMYLASE,  ALB  LIPID:   Lab Results   Component Value Date    CHOL 206 (H) 01/16/2019    CHOL 147 01/11/2018     Lab Results   Component Value Date    TRIG 171 (H) 01/16/2019    TRIG 76 01/11/2018     Lab Results   Component Value Date    HDL 39 (L) 01/16/2019    HDL 31 (L) 01/11/2018     Lab Results   Component Value Date    LDLCALC 133 (H) 01/16/2019    LDLCALC 101 (H) 01/11/2018     Lab Results   Component Value Date    LABVLDL 34 01/16/2019    LABVLDL 15 01/11/2018     No results found for: CHOLHDLRATIO  PT/INR: No results for input(s): PROTIME, INR in the last 72 hours. A1C:   Lab Results   Component Value Date    LABA1C 5.7 01/11/2018     BNP:  No results for input(s): BNP in the last 72 hours. IMAGING:   I have reviewed the following tests and documented in this encounter as follows:   Discussed with the patient. EKG 4/3/2022  Atrial fibrillation Baseline artifact Low voltage QRS RSR' or QR pattern in V1 suggests right ventricular conduction delay Nonspecific ST abnormality, probably digitalis effect     Echo 3/14/2022  Summary   Left ventricular systolic function is normal with ejection fraction   estimated at 55%. No regional wall motion abnormalities. There is mild concentric left ventricular hypertrophy. Elevated left ventricular filling pressure. Mild bi-atrial enlargement. Mild mitral regurgitation. Mild tricuspid regurgitation. Systolic pulmonary artery pressure (SPAP) is normal estimated at 38 mmHg   (Right atrial pressure of 3 mmHg). No significant change from exam done 2018. EKG 7.  Atrial fibrillations controlled ventricular response. CT scan abdomen 19  FINDINGS: Lower Chest: Mild bronchiectasis. Organs: Liver, gallbladder, pancreas, and spleen are unremarkable. GI/Bowel: No bowel obstruction. Appendix not visualized however no findings to suggest acute appendicitis. Pelvis: No bladder stone. No pelvic fluid collection. Vascular calcifications. Peritoneum/Retroperitoneum: No abdominal aortic aneurysm. Adrenal glands unremarkable. Kidneys are unremarkable. Bones/Soft Tissues: Compression deformities of T12 and T11. loss of approximately 40% vertebral body height centrally. Lexiscan 19  Summary  Normal LV function. There is normal isotope uptake at stress and rest. There is no evidence of  myocardial ischemia or scar. EKG 19  afib controlled ventricular response no ischemia or infarct. EKG 4/3/18  Atrial fibrillation Controlled ventricular rate     EKG 3.11.18   afib RVR     CXR 3/5/18  FINDINGS:   The lungs are hyperinflated.  There has been resolution of bibasilar   infiltrates. Kajal Simmons is scarring in the right lower lobe.  Heart size and   pulmonary vessels are stable. Echo doppler of heart 18   Normal left ventricular systolic function with an estimated ejection   fraction of 55%.   Normal left ventricular diastolic filling pressure.   The right ventricle is mildly enlarged.   The right atrium is mildly dilated.   Mild mitral annular calcification. Mild mitral regurgitation.   Aortic valve sclerosis without stenosis.   Mild tricuspid regurgitation.   Systolic pulmonary artery pressure (SPAP) is normal and estimated at 37 mmHg   (RA pressure 8 mmHg).   There is a left pleural effusion.     EK/10/18  Atrial fibrillation with rapid ventricular response Incomplete right bundle branch block   Abnormal ECG No previous ECGs available Confirmed by Kay Osman MD, [address] ([id number]) on 1/11/2018 5:38:40 AM     CXR 1/10/18  Increased interstitial opacity.  Correlate with any clinical evidence developing interstitial pulmonary edema.   No focal infiltrate is identified.     Assessment:  1. Acute on chronic diastolic congestive heart failure ([address]    2. Essential hypertension    3. Permanent atrial fibrillation (HCC)    4. Other specified hypotension    5. Acute on chronic respiratory failure with hypoxia and hypercapnia (HCC)     [id number] Score for Atrial Fibrillation Stroke Risk   Risk   Factors  Component Value   C CHF Yes 1   H HTN Yes 1   A2 Age >= 76 No,  (73 y.o.) 0   D DM No 0   S2 Prior Stroke/TIA No 0   V Vascular Disease No 0   A Age 74-69 Yes,  (73 y.o.) 1   Sc Sex male 0    [id number]  Score  3   Score last updated 4/11/18 2:12 PM    Plan:  1. Current medications reviewed. Refill sent in for Pepcid at patient request for dyspepsia  2. STOP Toprol as BP is low and heart rate low normal   3. We will obtain Labs from Aleatha Or NP office that was done Monday  4. Continue Torsemide 40 mg daily. May change depending on labs from Monday  5. Plan to follow up in 3 months  6  He is anticoagulated        QUALITY MEASURES  1. Tobacco Cessation Counseling: YES  2. Retake of BP if >140/90:   NA  3. Documentation to PCP/referring for new patient:  Sent to PCP at close of office visit  4. CAD patient on anti-platelet:  anticaogulated  5. CAD patient on STATIN therapy:  NA  6. Patient with CHF and aFib on anticoagulation:  Yes  eliquis       This note was scribed in the presence of Tracy Perez MD by Paty Burgos RN. I, Dr. Asha Gutierrez, personally performed the services described in this documentation, as scribed by the above signed scribe in my presence. It is both accurate and complete to my knowledge.  I agree with the details independently gathered by the clinical support staff, while the remaining scribed note accurately describes my personal service to the patient. Lacie De Jesus MD  [place] [id number]  [phone] [facility] office  [phone] Main central       Post-Discharge Transitional Care  Follow Up      Chan Recio   YOB: 1950    Date of Office Visit:  4/14/2022  Date of Hospital Admission: 4/3/22  Date of Hospital Discharge: 4/7/22  Risk of hospital readmission (high >=14%. Medium >=10%) :Readmission Risk Score: 14.8 ( )      Care management risk score Rising risk (score 2-5) and Complex Care (Scores >=6): 2     Non face to face  following discharge, date last encounter closed (first attempt may have been earlier): 4/8/2022  3:27 PM    Call initiated 2 business days of discharge: Yes    ASSESSMENT/PLAN:   Acute on chronic diastolic congestive heart failure (HCC)  -     GA DISCHARGE MEDS RECONCILED W/ CURRENT OUTPATIENT MED LIST  Essential hypertension  -     GA DISCHARGE MEDS RECONCILED W/ CURRENT OUTPATIENT MED LIST  Permanent atrial fibrillation (HCC)  -     GA DISCHARGE MEDS RECONCILED W/ CURRENT OUTPATIENT MED LIST  Other specified hypotension  -     GA DISCHARGE MEDS RECONCILED W/ CURRENT OUTPATIENT MED LIST  Acute on chronic respiratory failure with hypoxia and hypercapnia (HCC)  -     GA DISCHARGE MEDS RECONCILED W/ CURRENT OUTPATIENT MED LIST      Medical Decision Making: moderate complexity  Return in about 3 months (around 7/14/2022). Subjective:   HPI:  Follow up of Hospital problems/diagnosis(es):   afib  Diastolic CHF  Hypotension    Inpatient course: Discharge summary reviewed- see chart.     Interval history/Current status:see my note    Patient Active Problem List   Diagnosis    Acute exacerbation of chronic obstructive pulmonary disease (COPD) ([address]    Essential hypertension    Acute on chronic respiratory failure with hypoxemia ([address]    Former smoker    Atrial fibrillation ([address]    Acute on chronic respiratory failure with hypoxia and hypercapnia (HCC)    Acute hyponatremia    Hyperbilirubinemia    Influenzal pneumonia    Unstable angina pectoris (HCC)    Acute on chronic diastolic congestive heart failure (HCC)    Acute hypokalemia    Hypophosphatemia    Long term current use of diuretic    Acute on chronic combined systolic (congestive) and diastolic (congestive) heart failure (HCC)    Hypotension       Medications listed as ordered at the time of discharge from hospital     Medication List          Accurate as of April 14, 2022  5:18 PM. If you have any questions, ask your nurse or doctor. START taking these medications    torsemide 20 MG tablet  Commonly known as: DEMADEX  Take 2 tablets by mouth daily  Started by: Prince Laureano MD        CHANGE how you take these medications    famotidine 20 MG tablet  Commonly known as: PEPCID  Take 1 tablet by mouth 2 times daily  What changed: See the new instructions.   Changed by: Prince Laureano MD        CONTINUE taking these medications    apixaban 5 MG Tabs tablet  Commonly known as: Eliquis  TAKE 1 TABLET TWICE DAILY     dilTIAZem 180 MG extended release capsule  Commonly known as: CARDIZEM CD  TAKE 1 CAPSULE BY MOUTH DAILY     guaiFENesin 600 MG extended release tablet  Commonly known as: Mucinex  Take 1 tablet by mouth 2 times daily as needed for Congestion     ipratropium-albuterol 0.5-2.5 (3) MG/3ML Soln nebulizer solution  Commonly known as: DUONEB  INHALE 3 MILLILIERS (1 VIAL) VIA NEBULIZATION ROUTE EVERY 6 HOURS AS NEEDED FOR SHORTNESS OF BREATH     nitroGLYCERIN 0.4 MG SL tablet  Commonly known as: Nitrostat  Place 1 tablet under the tongue every 5 minutes as needed for Chest pain     potassium chloride 20 MEQ extended release tablet  Commonly known as: KLOR-CON M  Take 1 tablet by mouth daily     Roflumilast 500 MCG tablet  Commonly known as: Daliresp  TAKE (1) TABLET DAILY     Symbicort 160-4.5 MCG/ACT Aero  Generic drug: budesonide-formoterol  INHALE 2 PUFFS INTO THE LUNGS 2 TIMES DAILY     Ventolin  (90 Base) MCG/ACT inhaler  Generic drug: albuterol sulfate HFA  INHALE 2 PUFFS EVERY 6 HOURS AS NEEDED FOR WHEEZING OR SHORTNESS OF BREATH     vitamin D 1.25 MG (80951 UT) Caps capsule  Commonly known as: ERGOCALCIFEROL  Take 1 capsule by mouth once a week        STOP taking these medications    metoprolol succinate 25 MG extended release tablet  Commonly known as: TOPROL XL  Stopped by: Corinne Durán MD           Where to Get Your Medications      [facility] [address] [facility] [phone] - F [phone]  [address] [place] [address]    Phone: [phone]   · famotidine 20 MG tablet     Information about where to get these medications is not yet available    Ask your nurse or doctor about these medications  · torsemide 20 MG tablet           Medications marked \"taking\" at this time  Outpatient Medications Marked as Taking for the 4/14/22 encounter (Office Visit) with Ambrose Mina MD   Medication Sig Dispense Refill    torsemide (DEMADEX) 20 MG tablet Take 2 tablets by mouth daily 30 tablet 0    famotidine (PEPCID) 20 MG tablet Take 1 tablet by mouth 2 times daily 180 tablet 3    vitamin D (ERGOCALCIFEROL) 1.25 MG (08021 UT) CAPS capsule Take 1 capsule by mouth once a week 5 capsule 0    potassium chloride (KLOR-CON M) 20 MEQ extended release tablet Take 1 tablet by mouth daily 90 tablet 1    ipratropium-albuterol (DUONEB) 0.5-2.5 (3) MG/3ML SOLN nebulizer solution INHALE 3 MILLILIERS (1 VIAL) VIA NEBULIZATION ROUTE EVERY 6 HOURS AS NEEDED FOR SHORTNESS OF BREATH 360 mL 6    VENTOLIN  (90 Base) MCG/ACT inhaler INHALE 2 PUFFS EVERY 6 HOURS AS NEEDED FOR WHEEZING OR SHORTNESS OF BREATH 18 g 5    SYMBICORT 160-4.5 MCG/ACT AERO INHALE 2 PUFFS INTO THE LUNGS 2 TIMES DAILY 10.2 g 5    Roflumilast (DALIRESP) 500 MCG tablet TAKE (1) TABLET DAILY 30 tablet 5    apixaban (ELIQUIS) 5 MG TABS tablet TAKE 1 TABLET TWICE DAILY 60 tablet 5    dilTIAZem (CARDIZEM CD) 180 MG extended release capsule TAKE 1 CAPSULE BY MOUTH DAILY 90 capsule 3    guaiFENesin (MUCINEX) 600 MG extended release tablet Take 1 tablet by mouth 2 times daily as needed for Congestion 60 tablet 6    nitroGLYCERIN (NITROSTAT) 0.4 MG SL tablet Place 1 tablet under the tongue every 5 minutes as needed for Chest pain 25 tablet 3        Medications patient taking as of now reconciled against medications ordered at time of hospital discharge: yes        Objective:    BP (!) 80/58   Pulse 58   Ht 6' (1.829 m)   Wt 172 lb (78 kg)   SpO2 (!) 89%   BMI 23.33 kg/m²         An electronic signature was used to authenticate this note.   --Johnnie Purdy MD

## 2022-04-14 NOTE — PATIENT INSTRUCTIONS
1. Current medications reviewed. Refill sent in for Pepcid   2. STOP Toprol   3. We will obtain Labs from Sade Bartlett NP office that was done Monday  4. Continue Torsemide 40 mg daily. May change depending on labs from Monday  5.  Plan to follow up in 3 months

## 2022-05-04 DIAGNOSIS — I48.21 PERMANENT ATRIAL FIBRILLATION (HCC): ICD-10-CM

## 2022-05-04 RX ORDER — NITROGLYCERIN 0.4 MG/1
0.4 TABLET SUBLINGUAL EVERY 5 MIN PRN
Qty: 25 TABLET | Refills: 3 | Status: SHIPPED | OUTPATIENT
Start: 2022-05-04

## 2022-05-26 ENCOUNTER — TELEPHONE (OUTPATIENT)
Dept: PULMONOLOGY | Age: 72
End: 2022-05-26

## 2022-05-26 NOTE — TELEPHONE ENCOUNTER
Patient cancelled appointment on 5/26/22 with Dr. Jeffry Fitzpatrick for hospital fu on Bipap machine. Reason: daughter in law cancelled. Unknown reason    Patient did not reschedule appointment. Appointment rescheduled for na. Last OV    Assessment: 02/28/22      · Severe COPD with AE   · Nocturnal hypoxia on 2.5 LPM   · Hypoxia on exertion   · 30 pack year smoking - quit smoking 2016  · Covid 19 1 test positive and 2 tests negative without symptoms 6/2020          Plan:       · Prednisone 20 mg po daily for 5 days   · Continue Symbicort BID and DuoNeb QID- refills today   · Continue Daliresp 500 mcg PO daily  · Continue O2 2-4 LPM on exertion. Advised to titrate O2 using her pulse oximeter- target O2 sat 90-92%. · O2 2. 5LPM at night   · Advised to schedule his LDCT screening for lung cancer- he is considering   · Patient is up to date with Covid, Pneumococcal vaccine and influenza vaccine   · Acapella and Mucinex   · Advised to continue with smoking cessation.    · Follow up in 6 months

## 2022-06-02 RX ORDER — BUDESONIDE AND FORMOTEROL FUMARATE DIHYDRATE 160; 4.5 UG/1; UG/1
AEROSOL RESPIRATORY (INHALATION)
Qty: 10.2 G | Refills: 5 | Status: SHIPPED | OUTPATIENT
Start: 2022-06-02

## 2022-07-01 RX ORDER — DILTIAZEM HYDROCHLORIDE 180 MG/1
180 CAPSULE, COATED, EXTENDED RELEASE ORAL DAILY
Qty: 90 CAPSULE | Refills: 3 | Status: SHIPPED | OUTPATIENT
Start: 2022-07-01

## 2022-07-17 PROBLEM — I50.33 ACUTE ON CHRONIC DIASTOLIC CONGESTIVE HEART FAILURE (HCC): Status: RESOLVED | Noted: 2022-02-02 | Resolved: 2022-07-17

## 2022-07-21 ENCOUNTER — TELEPHONE (OUTPATIENT)
Dept: PULMONOLOGY | Age: 72
End: 2022-07-21

## 2022-07-21 DIAGNOSIS — J44.9 CHRONIC OBSTRUCTIVE PULMONARY DISEASE, UNSPECIFIED COPD TYPE (HCC): Primary | ICD-10-CM

## 2022-07-21 NOTE — TELEPHONE ENCOUNTER
Pt called stating that he needs an order for neb kit to be sent to [address] [place] [address]. Order pending. OV 2/28/22:    Assessment:       Severe COPD with AE   Nocturnal hypoxia on 2.5 LPM  Hypoxia on exertion  30 pack year smoking - quit smoking 2016  Covid 19 1 test positive and 2 tests negative without symptoms 6/2020         Plan:       Prednisone 20 mg po daily for 5 days   Continue Symbicort BID and DuoNeb QID- refills today   Continue Daliresp 500 mcg PO daily  Continue O2 2-4 LPM on exertion. Advised to titrate O2 using her pulse oximeter- target O2 sat 90-92%. O2 2. 5LPM at night   Advised to schedule his LDCT screening for lung cancer- he is considering   Patient is up to date with Covid, Pneumococcal vaccine and influenza vaccine  Acapella and Mucinex  Advised to continue with smoking cessation.   Follow up in 6 months

## 2022-07-27 ENCOUNTER — HOSPITAL ENCOUNTER (OUTPATIENT)
Age: 72
Discharge: HOME | End: 2022-07-27
Payer: MEDICARE

## 2022-07-27 ENCOUNTER — OFFICE VISIT (OUTPATIENT)
Dept: PULMONOLOGY | Age: 72
End: 2022-07-27
Payer: MEDICARE

## 2022-07-27 ENCOUNTER — HOSPITAL ENCOUNTER (OUTPATIENT)
Dept: GENERAL RADIOLOGY | Age: 72
Discharge: HOME | End: 2022-07-27
Payer: MEDICARE

## 2022-07-27 VITALS
HEIGHT: 72 IN | DIASTOLIC BLOOD PRESSURE: 74 MMHG | OXYGEN SATURATION: 95 % | HEART RATE: 75 BPM | BODY MASS INDEX: 23.43 KG/M2 | SYSTOLIC BLOOD PRESSURE: 122 MMHG | WEIGHT: 173 LBS

## 2022-07-27 DIAGNOSIS — R93.89 ABNORMAL CXR: ICD-10-CM

## 2022-07-27 DIAGNOSIS — J44.9 STAGE 3 SEVERE COPD BY GOLD CLASSIFICATION (HCC): Primary | ICD-10-CM

## 2022-07-27 DIAGNOSIS — J96.11 CHRONIC RESPIRATORY FAILURE WITH HYPOXIA AND HYPERCAPNIA (HCC): ICD-10-CM

## 2022-07-27 DIAGNOSIS — J96.12 CHRONIC RESPIRATORY FAILURE WITH HYPOXIA AND HYPERCAPNIA (HCC): ICD-10-CM

## 2022-07-27 DIAGNOSIS — G47.34 NOCTURNAL HYPOXIA: ICD-10-CM

## 2022-07-27 DIAGNOSIS — J44.9 STAGE 3 SEVERE COPD BY GOLD CLASSIFICATION (HCC): ICD-10-CM

## 2022-07-27 PROCEDURE — 71046 X-RAY EXAM CHEST 2 VIEWS: CPT

## 2022-07-27 PROCEDURE — 1123F ACP DISCUSS/DSCN MKR DOCD: CPT | Performed by: INTERNAL MEDICINE

## 2022-07-27 PROCEDURE — 99214 OFFICE O/P EST MOD 30 MIN: CPT | Performed by: INTERNAL MEDICINE

## 2022-07-27 ASSESSMENT — SLEEP AND FATIGUE QUESTIONNAIRES
NECK CIRCUMFERENCE (INCHES): 14
HOW LIKELY ARE YOU TO NOD OFF OR FALL ASLEEP IN A CAR, WHILE STOPPED FOR A FEW MINUTES IN TRAFFIC: 0
HOW LIKELY ARE YOU TO NOD OFF OR FALL ASLEEP WHILE LYING DOWN TO REST IN THE AFTERNOON WHEN CIRCUMSTANCES PERMIT: 0
ESS TOTAL SCORE: 1
HOW LIKELY ARE YOU TO NOD OFF OR FALL ASLEEP WHILE SITTING AND TALKING TO SOMEONE: 0
HOW LIKELY ARE YOU TO NOD OFF OR FALL ASLEEP WHEN YOU ARE A PASSENGER IN A CAR FOR AN HOUR WITHOUT A BREAK: 0
HOW LIKELY ARE YOU TO NOD OFF OR FALL ASLEEP WHILE SITTING AND READING: 0
HOW LIKELY ARE YOU TO NOD OFF OR FALL ASLEEP WHILE WATCHING TV: 1
HOW LIKELY ARE YOU TO NOD OFF OR FALL ASLEEP WHILE SITTING QUIETLY AFTER LUNCH WITHOUT ALCOHOL: 0
HOW LIKELY ARE YOU TO NOD OFF OR FALL ASLEEP WHILE SITTING INACTIVE IN A PUBLIC PLACE: 0

## 2022-07-27 NOTE — PROGRESS NOTES
[facility]                                                            Outpatient Follow Up Note    CHIEF COMPLAINT: Follow up COPD/BiPAP         HPI:   Admitted April 2022 to [facility] with HF and COPD AE  Doing pretty good now   No cough or sputum  No hemoptysis   Uses his Neb 4 times/day   On 4L - benefiting from it and feels better   No smoking   Patient was discharged on BiPAP. Helps his breathing. Feels pressure is high. Past Medical History:   Diagnosis Date    Bronchitis chronic     Emphysema of lung ([address]     Influenza A 01/10/2018    Lung disease     copd    Pneumonia     12/2009       Past Surgical History:        Procedure Laterality Date    FRACTURE SURGERY      johnson in femur/hip on right    HERNIA REPAIR         Allergies:  has No Known Allergies. Social History:    TOBACCO:   reports that he quit smoking about 6 years ago. His smoking use included cigarettes. He has a 30.00 pack-year smoking history. He has never used smokeless tobacco.  ETOH:   reports no history of alcohol use. Family History:       Problem Relation Age of Onset    Diabetes Sister     Cancer Sister     Cancer Sister     Cancer Brother     Asthma Daughter     Asthma Son     Diabetes Mother     Cancer Father        Current Medications:    Current Outpatient Medications:      Torsemide 40 MG TABS, Take 40 mg by mouth 2 times daily, Disp: , Rfl:     dilTIAZem (CARDIZEM CD) 180 MG extended release capsule, TAKE 1 CAPSULE BY MOUTH DAILY, Disp: 90 capsule, Rfl: 3    Roflumilast (DALIRESP) 500 MCG tablet, TAKE (1) TABLET DAILY, Disp: 30 tablet, Rfl: 0    SYMBICORT 160-4.5 MCG/ACT AERO, INHALE 2 PUFFS INTO THE LUNGS 2 TIMES DAILY, Disp: 10.2 g, Rfl: 5    apixaban (ELIQUIS) 5 MG TABS tablet, TAKE 1 TABLET TWICE DAILY, Disp: 60 tablet, Rfl: 11    nitroGLYCERIN (NITROSTAT) 0.4 MG SL tablet, PLACE 1 TABLET UNDER THE TONGUE EVERY 5 MINUTES AS NEEDED FOR CHEST PAIN, Disp: 25 tablet, Rfl: 3    famotidine (PEPCID) 20 MG tablet, Take 1 tablet by mouth 2 times daily, Disp: 180 tablet, Rfl: 3    potassium chloride (KLOR-CON M) 20 MEQ extended release tablet, Take 1 tablet by mouth daily, Disp: 90 tablet, Rfl: 1    ipratropium-albuterol (DUONEB) 0.5-2.5 (3) MG/3ML SOLN nebulizer solution, INHALE 3 MILLILIERS (1 VIAL) VIA NEBULIZATION ROUTE EVERY 6 HOURS AS NEEDED FOR SHORTNESS OF BREATH, Disp: 360 mL, Rfl: 6    VENTOLIN  (90 Base) MCG/ACT inhaler, INHALE 2 PUFFS EVERY 6 HOURS AS NEEDED FOR WHEEZING OR SHORTNESS OF BREATH, Disp: 18 g, Rfl: 5    guaiFENesin (MUCINEX) 600 MG extended release tablet, Take 1 tablet by mouth 2 times daily as needed for Congestion, Disp: 60 tablet, Rfl: 6    vitamin D (ERGOCALCIFEROL) 1.25 MG (17765 UT) CAPS capsule, Take 1 capsule by mouth once a week (Patient not taking: Reported on 7/27/2022), Disp: 5 capsule, Rfl: 0      Objective:   PHYSICAL EXAM:    /74 (Site: Left Upper Arm, Position: Sitting, Cuff Size: Medium Adult)   Pulse 75   Ht 6' (1.829 m)   Wt 173 lb (78.5 kg)   SpO2 95% Comment: 4 LPM  BMI 23.46 kg/m²   Gen: No distress. Ill appearing   Eyes: PERRL. No sclera icterus. No conjunctival injection. ENT: No discharge. Pharynx clear. Neck: Trachea midline. No obvious mass. Resp: No accessory muscle use. No crackles. No wheezes. No rhonchi. No dullness on percussion. Good air entry. CV: Regular rate. Regular rhythm. No murmur or rub. No edema. GI: Non-tender. Non-distended. No hernia. Skin: Warm and dry. No nodule on exposed extremities. Lymph: No cervical LAD. No supraclavicular LAD. M/S: No cyanosis. No joint deformity. No clubbing. Neuro: Awake. Alert. Moves all four extremities. Psych: Oriented x 3. No anxiety.              DATA reviewed by me:   PFTs 10/26/2017 FVC 2.39(49%) FEV1 1.05(29%) FEV1/FVC 44% TLC 7.39(98%) DLCO 11.81 (37%) 6MW 640 F 2L exertion               LDCT chest 10/26/2017    Moderate emphysema. No suspicious pulmonary nodules or masses   Remote appearing compression deformities. Lobular contour of the liver raising the question of underlying cirrhosis                            CXR 8/31/2019   The cardiomediastinal silhouette is borderline in size. Bibasilar atelectasis and scarring noted, superimposed on COPD. No acute infiltrate, pleural effusion or pneumothorax. CXR 4/3/22    Mild CHF       BiPAP data 06/25-07/24 2022 reviewed by me. Uses 3-4 hrs/night with 53% compliance and AHI of  7.9. 95% 13.4/6.4     Assessment:       Severe COPD   Chronic hypoxemic hypercapnic respiratory failure on BiPAP IPAP max 15, EPAP 5 and PS 7   Nocturnal hypoxia on 2.5 LPM   Abnormal CXR April 2022- admitted treated for CHF/COPD AE   Hypoxia on exertion   30 pack year smoking - quit smoking 2016  Covid 19 1 test positive and 2 tests negative without symptoms 6/2020         Plan:      Change BiPAP 14/6 cmH2O  ONPO on BiPAP with 2.5 LPM   Repeat CXR PA and lateral   Continue Symbicort BID and DuoNeb QID  Continue Daliresp 500 mcg PO daily  Continue O2 2-4 LPM on exertion. Advised to titrate O2 using her pulse oximeter- target O2 sat 90-92%. O2 2.5 LPM at night   Advised to schedule his LDCT screening for lung cancer- he is considering. Risks, benefits and alternatives including doing nothing were discussed with patient. Patient is up to date with Covid, Pneumococcal vaccine and influenza vaccine   Acapella and Mucinex   Advised to continue with smoking cessation.    Follow up in 3 months

## 2022-08-17 NOTE — PROGRESS NOTES
nonicteric  Nose/Sinuses:  negative findings: nose shows no deformity, asymmetry, or inflammation, nasal mucosa normal, septum midline with no perforation or bleeding  Back:  no pain to palpation  Joint:  no active joint inflammation  Musculoskeletal:  negative  Skin:  Warm and dry  Neck:  Negative for JVD and Carotid Bruits. Chest: expiratory wheezing   Cardiovascular:  irreg irreg 72 bpmS2 normal, no murmur, no rub or thrill.   Extremities: 1+ BLE edema, no clubbing, cyanosis,  Neuro: intact    Medications:   Outpatient Encounter Medications as of 8/18/2022   Medication Sig Dispense Refill    Roflumilast (DALIRESP) 500 MCG tablet TAKE (1) TABLET DAILY 30 tablet 2    VENTOLIN  (90 Base) MCG/ACT inhaler INHALE 2 PUFFS EVERY 6 HOURS AS NEEDED FOR WHEEZING OR SHORTNESS OF BREATH 18 g 5    Torsemide 40 MG TABS Take 40 mg by mouth 2 times daily      dilTIAZem (CARDIZEM CD) 180 MG extended release capsule TAKE 1 CAPSULE BY MOUTH DAILY 90 capsule 3    SYMBICORT 160-4.5 MCG/ACT AERO INHALE 2 PUFFS INTO THE LUNGS 2 TIMES DAILY 10.2 g 5    apixaban (ELIQUIS) 5 MG TABS tablet TAKE 1 TABLET TWICE DAILY 60 tablet 11    nitroGLYCERIN (NITROSTAT) 0.4 MG SL tablet PLACE 1 TABLET UNDER THE TONGUE EVERY 5 MINUTES AS NEEDED FOR CHEST PAIN 25 tablet 3    famotidine (PEPCID) 20 MG tablet Take 1 tablet by mouth 2 times daily 180 tablet 3    potassium chloride (KLOR-CON M) 20 MEQ extended release tablet Take 1 tablet by mouth daily 90 tablet 1    ipratropium-albuterol (DUONEB) 0.5-2.5 (3) MG/3ML SOLN nebulizer solution INHALE 3 MILLILIERS (1 VIAL) VIA NEBULIZATION ROUTE EVERY 6 HOURS AS NEEDED FOR SHORTNESS OF BREATH 360 mL 6    guaiFENesin (MUCINEX) 600 MG extended release tablet Take 1 tablet by mouth 2 times daily as needed for Congestion 60 tablet 6    vitamin D (ERGOCALCIFEROL) 1.25 MG (71357 UT) CAPS capsule Take 1 capsule by mouth once a week (Patient not taking: Reported on 8/18/2022) 5 capsule 0     No facility-administered encounter medications on file as of 8/18/2022. Lab Data:  Lab Results   Component Value Date     04/12/2022    K 4.8 04/12/2022    CL 86 04/12/2022    CO2 28 04/12/2022    BUN 31 04/12/2022    CREATININE 1.00 04/12/2022    GLUCOSE 102 04/12/2022    CALCIUM 9.3 04/12/2022    PROT 6.3 (L) 03/30/2022    LABALBU 4.0 04/12/2022    BILITOT 0.8 04/12/2022    ALKPHOS 83 04/12/2022    AST 17 04/12/2022    ALT 26 04/12/2022    LABGLOM 80 04/12/2022    GFRAA >60 04/07/2022    AGRATIO 1.5 03/30/2022    GLOB 3.7 01/16/2019       Lab Results   Component Value Date    WBC 20.9 04/12/2022    HGB 16.5 04/12/2022    HCT 49.6 04/12/2022    MCV 92 04/12/2022     04/12/2022     No results found for: TSH, E2COMBY, Y7MXBKV, THYROIDAB, FT3, T4FREE  Lab Results   Component Value Date    LABA1C 5.7 01/11/2018     Lab Results   Component Value Date    .9 01/11/2018     Lab Results   Component Value Date    CHOL 206 (H) 01/16/2019    CHOL 147 01/11/2018     Lab Results   Component Value Date    TRIG 171 (H) 01/16/2019    TRIG 76 01/11/2018     Lab Results   Component Value Date    HDL 39 (L) 01/16/2019    HDL 31 (L) 01/11/2018     Lab Results   Component Value Date    LDLCALC 133 (H) 01/16/2019    LDLCALC 101 (H) 01/11/2018     Lab Results   Component Value Date    LABVLDL 34 01/16/2019    LABVLDL 15 01/11/2018     BNP:  No results for input(s): BNP in the last 72 hours. IMAGING:   I have reviewed the following tests and documented in this encounter as follows:   Discussed with the patient. EKG 4/3/2022  Atrial fibrillation Baseline artifact Low voltage QRS RSR' or QR pattern in V1 suggests right ventricular conduction delay Nonspecific ST abnormality, probably digitalis effect     Echo 3/14/2022  Summary   Left ventricular systolic function is normal with ejection fraction   estimated at 55%. No regional wall motion abnormalities.    There is mild concentric left ventricular hypertrophy. Elevated left ventricular filling pressure. Mild bi-atrial enlargement. Mild mitral regurgitation. Mild tricuspid regurgitation. Systolic pulmonary artery pressure (SPAP) is normal estimated at 38 mmHg   (Right atrial pressure of 3 mmHg). No significant change from exam done 1/11/2018. EKG 7.1.20  Atrial fibrillations controlled ventricular response. CT scan abdomen 9/6/19  FINDINGS: Lower Chest: Mild bronchiectasis. Organs: Liver, gallbladder, pancreas, and spleen are unremarkable. GI/Bowel: No bowel obstruction. Appendix not visualized however no findings to suggest acute appendicitis. Pelvis: No bladder stone. No pelvic fluid collection. Vascular calcifications. Peritoneum/Retroperitoneum: No abdominal aortic aneurysm. Adrenal glands unremarkable. Kidneys are unremarkable. Bones/Soft Tissues: Compression deformities of T12 and T11. loss of approximately 40% vertebral body height centrally. Lexiscan 5/2/19  Summary  Normal LV function. There is normal isotope uptake at stress and rest. There is no evidence of  myocardial ischemia or scar. EKG 4.17.19  afib controlled ventricular response no ischemia or infarct. EKG 4/3/18  Atrial fibrillation Controlled ventricular rate     EKG 3.11.18   afib RVR     CXR 3/5/18  FINDINGS:   The lungs are hyperinflated. There has been resolution of bibasilar   infiltrates. There is scarring in the right lower lobe. Heart size and   pulmonary vessels are stable. Echo doppler of heart 1.11.18   Normal left ventricular systolic function with an estimated ejection   fraction of 55%. Normal left ventricular diastolic filling pressure. The right ventricle is mildly enlarged. The right atrium is mildly dilated. Mild mitral annular calcification. Mild mitral regurgitation. Aortic valve sclerosis without stenosis. Mild tricuspid regurgitation.    Systolic pulmonary artery pressure (SPAP) is normal and estimated at 37 mmHg   (RA pressure 8 mmHg). There is a left pleural effusion. EK/10/18  Atrial fibrillation with rapid ventricular response Incomplete right bundle branch block   Abnormal ECG No previous ECGs available      CXR 1/10/18  Increased interstitial opacity. Correlate with any clinical evidence developing interstitial pulmonary edema. No focal infiltrate is identified. Assessment:  1. Essential hypertension    2. Acute on chronic combined systolic (congestive) and diastolic (congestive) heart failure (HCC)    3. Permanent atrial fibrillation (HCC)       [id number] Score for Atrial Fibrillation Stroke Risk   Risk   Factors  Component Value   C CHF Yes 1   H HTN Yes 1   A2 Age >= 75 No,  (73 y.o.) 0   D DM No 0   S2 Prior Stroke/TIA No 0   V Vascular Disease No 0   A Age 74-69 Yes,  (73 y.o.) 1   Sc Sex male 0    [id number]  Score  3   Score last updated 18 2:12 PM    Plan:  1. Medications reviewed. BP is normal  He is on diltiazem  Refills warranted  2. Edema legs but no worsening shortness of breath actually improved remains on home O2   3.continue demadex refill provided  4. Remains in afib rate controlled with diltiazem he  is anticogulated with apixaban     5. Labs in epic reviewed  with patient and family    Plan to follow up in 6 months     QUALITY MEASURES  1. Tobacco Cessation Counseling: YES  2. Retake of BP if >140/90:   NA  3. Documentation to PCP/referring for new patient:  Sent to PCP at close of office visit  4. CAD patient on anti-platelet:  anticaogulated  5. CAD patient on STATIN therapy:  NA  6. Patient with CHF and aFib on anticoagulation:  Yes  eliquis     This note was scribed in the presence of Aby Shook MD by Lino Gan RN. I, Dr. Neha Abbott, personally performed the services described in this documentation, as scribed by the above signed scribe in my presence. It is both accurate and complete to my knowledge.  I agree with the details independently gathered by the clinical support staff, while the remaining scribed note accurately describes my personal service to the patient.       Ivy Mello MD  [facility] [place], operated [facility]  [phone] [facility] office  [phone] [facility]

## 2022-08-18 ENCOUNTER — OFFICE VISIT (OUTPATIENT)
Dept: CARDIOLOGY CLINIC | Age: 72
End: 2022-08-18
Payer: MEDICARE

## 2022-08-18 VITALS
WEIGHT: 174 LBS | DIASTOLIC BLOOD PRESSURE: 60 MMHG | HEIGHT: 72 IN | BODY MASS INDEX: 23.57 KG/M2 | OXYGEN SATURATION: 97 % | SYSTOLIC BLOOD PRESSURE: 110 MMHG | HEART RATE: 75 BPM

## 2022-08-18 DIAGNOSIS — I10 ESSENTIAL HYPERTENSION: Chronic | ICD-10-CM

## 2022-08-18 DIAGNOSIS — I48.21 PERMANENT ATRIAL FIBRILLATION (HCC): ICD-10-CM

## 2022-08-18 DIAGNOSIS — I50.32 CHRONIC DIASTOLIC CONGESTIVE HEART FAILURE (HCC): Primary | ICD-10-CM

## 2022-08-18 PROCEDURE — G8427 DOCREV CUR MEDS BY ELIG CLIN: HCPCS | Performed by: INTERNAL MEDICINE

## 2022-08-18 PROCEDURE — 99214 OFFICE O/P EST MOD 30 MIN: CPT | Performed by: INTERNAL MEDICINE

## 2022-08-18 PROCEDURE — 3017F COLORECTAL CA SCREEN DOC REV: CPT | Performed by: INTERNAL MEDICINE

## 2022-08-18 PROCEDURE — G8420 CALC BMI NORM PARAMETERS: HCPCS | Performed by: INTERNAL MEDICINE

## 2022-08-18 PROCEDURE — 1036F TOBACCO NON-USER: CPT | Performed by: INTERNAL MEDICINE

## 2022-08-18 PROCEDURE — 1123F ACP DISCUSS/DSCN MKR DOCD: CPT | Performed by: INTERNAL MEDICINE

## 2022-08-18 RX ORDER — POTASSIUM CHLORIDE 20 MEQ/1
20 TABLET, EXTENDED RELEASE ORAL DAILY
Qty: 90 TABLET | Refills: 3 | Status: SHIPPED | OUTPATIENT
Start: 2022-08-18

## 2022-08-18 NOTE — LETTER
[address] [address]  Phone: [phone]  Fax: [phone]    Anibal Yadav MD    August 19, 2022     [facility]  [address] [place] [address]    Patient: Giana Licea   MR Number: [id number]   YOB: 1950   Date of Visit: 8/18/2022       Dear Maverick Morning:    Thank you for referring Emilio Fletcher to me for evaluation/treatment. Below are the relevant portions of my assessment and plan of care. If you have questions, please do not hesitate to call me. I look forward to following Tyler Rogers along with you.     Sincerely,      Anibal Yadav MD

## 2022-08-18 NOTE — PATIENT INSTRUCTIONS
Plan:  1. Medications reviewed. Refills warranted  2. Continue current course  3. No need for cardiac testing at this time  4.  Plan to follow up in 6 months

## 2022-09-12 ENCOUNTER — TELEPHONE (OUTPATIENT)
Dept: PULMONOLOGY | Age: 72
End: 2022-09-12

## 2022-09-12 DIAGNOSIS — J96.12 CHRONIC RESPIRATORY FAILURE WITH HYPOXIA AND HYPERCAPNIA (HCC): ICD-10-CM

## 2022-09-12 DIAGNOSIS — J96.11 CHRONIC RESPIRATORY FAILURE WITH HYPOXIA AND HYPERCAPNIA (HCC): ICD-10-CM

## 2022-09-12 DIAGNOSIS — G47.34 NOCTURNAL HYPOXIA: ICD-10-CM

## 2022-09-12 DIAGNOSIS — J44.9 STAGE 3 SEVERE COPD BY GOLD CLASSIFICATION (HCC): Primary | ICD-10-CM

## 2022-09-12 NOTE — TELEPHONE ENCOUNTER
Spke with patient states he is non-compliant with BiPAP and has to repeat Bipap titration to re qualify. Ok to [facility] for titration. Did verify with Ting GT.    7/27/22    Assessment:       Severe COPD   Chronic hypoxemic hypercapnic respiratory failure on BiPAP IPAP max 15, EPAP 5 and PS 7   Nocturnal hypoxia on 2.5 LPM   Abnormal CXR April 2022- admitted treated for CHF/COPD AE   Hypoxia on exertion   30 pack year smoking - quit smoking 2016  Covid 19 1 test positive and 2 tests negative without symptoms 6/2020          Plan:       Change BiPAP 14/6 cmH2O  ONPO on BiPAP with 2.5 LPM   Repeat CXR PA and lateral   Continue Symbicort BID and DuoNeb QID  Continue Daliresp 500 mcg PO daily  Continue O2 2-4 LPM on exertion. Advised to titrate O2 using her pulse oximeter- target O2 sat 90-92%. O2 2.5 LPM at night   Advised to schedule his LDCT screening for lung cancer- he is considering. Risks, benefits and alternatives including doing nothing were discussed with patient. Patient is up to date with Covid, Pneumococcal vaccine and influenza vaccine   Acapella and Mucinex   Advised to continue with smoking cessation.    Follow up in 3 months

## 2022-10-24 RX ORDER — ROFLUMILAST 500 UG/1
TABLET ORAL
Qty: 30 TABLET | Refills: 5 | Status: SHIPPED | OUTPATIENT
Start: 2022-10-24

## 2022-11-07 ENCOUNTER — OFFICE VISIT (OUTPATIENT)
Dept: PULMONOLOGY | Age: 72
End: 2022-11-07
Payer: MEDICARE

## 2022-11-07 VITALS
HEIGHT: 72 IN | DIASTOLIC BLOOD PRESSURE: 70 MMHG | SYSTOLIC BLOOD PRESSURE: 112 MMHG | HEART RATE: 88 BPM | OXYGEN SATURATION: 96 % | RESPIRATION RATE: 18 BRPM | BODY MASS INDEX: 24.62 KG/M2 | WEIGHT: 181.8 LBS

## 2022-11-07 DIAGNOSIS — J44.9 STAGE 3 SEVERE COPD BY GOLD CLASSIFICATION (HCC): Primary | ICD-10-CM

## 2022-11-07 DIAGNOSIS — G47.34 NOCTURNAL HYPOXIA: ICD-10-CM

## 2022-11-07 DIAGNOSIS — J96.12 CHRONIC RESPIRATORY FAILURE WITH HYPOXIA AND HYPERCAPNIA (HCC): ICD-10-CM

## 2022-11-07 DIAGNOSIS — J96.11 CHRONIC RESPIRATORY FAILURE WITH HYPOXIA AND HYPERCAPNIA (HCC): ICD-10-CM

## 2022-11-07 PROCEDURE — 3023F SPIROM DOC REV: CPT | Performed by: INTERNAL MEDICINE

## 2022-11-07 PROCEDURE — 3017F COLORECTAL CA SCREEN DOC REV: CPT | Performed by: INTERNAL MEDICINE

## 2022-11-07 PROCEDURE — G8484 FLU IMMUNIZE NO ADMIN: HCPCS | Performed by: INTERNAL MEDICINE

## 2022-11-07 PROCEDURE — 1123F ACP DISCUSS/DSCN MKR DOCD: CPT | Performed by: INTERNAL MEDICINE

## 2022-11-07 PROCEDURE — G8420 CALC BMI NORM PARAMETERS: HCPCS | Performed by: INTERNAL MEDICINE

## 2022-11-07 PROCEDURE — 3074F SYST BP LT 130 MM HG: CPT | Performed by: INTERNAL MEDICINE

## 2022-11-07 PROCEDURE — G8427 DOCREV CUR MEDS BY ELIG CLIN: HCPCS | Performed by: INTERNAL MEDICINE

## 2022-11-07 PROCEDURE — 99214 OFFICE O/P EST MOD 30 MIN: CPT | Performed by: INTERNAL MEDICINE

## 2022-11-07 PROCEDURE — 3078F DIAST BP <80 MM HG: CPT | Performed by: INTERNAL MEDICINE

## 2022-11-07 PROCEDURE — 1036F TOBACCO NON-USER: CPT | Performed by: INTERNAL MEDICINE

## 2022-11-07 RX ORDER — BUDESONIDE AND FORMOTEROL FUMARATE DIHYDRATE 160; 4.5 UG/1; UG/1
AEROSOL RESPIRATORY (INHALATION)
Qty: 1 EACH | Refills: 5 | Status: SHIPPED | OUTPATIENT
Start: 2022-11-07

## 2022-11-07 RX ORDER — PREDNISONE 10 MG/1
TABLET ORAL
Qty: 30 TABLET | Refills: 0 | Status: SHIPPED | OUTPATIENT
Start: 2022-11-07 | End: 2022-11-17

## 2022-11-07 RX ORDER — DOXYCYCLINE HYCLATE 100 MG/1
100 CAPSULE ORAL 2 TIMES DAILY
Qty: 10 CAPSULE | Refills: 0 | Status: SHIPPED | OUTPATIENT
Start: 2022-11-07 | End: 2022-11-12

## 2022-11-07 ASSESSMENT — SLEEP AND FATIGUE QUESTIONNAIRES
HOW LIKELY ARE YOU TO NOD OFF OR FALL ASLEEP WHILE WATCHING TV: 2
HOW LIKELY ARE YOU TO NOD OFF OR FALL ASLEEP WHILE SITTING INACTIVE IN A PUBLIC PLACE: 0
NECK CIRCUMFERENCE (INCHES): 17
HOW LIKELY ARE YOU TO NOD OFF OR FALL ASLEEP WHILE SITTING AND TALKING TO SOMEONE: 0
HOW LIKELY ARE YOU TO NOD OFF OR FALL ASLEEP WHEN YOU ARE A PASSENGER IN A CAR FOR AN HOUR WITHOUT A BREAK: 0
ESS TOTAL SCORE: 2
HOW LIKELY ARE YOU TO NOD OFF OR FALL ASLEEP WHILE SITTING AND READING: 0
HOW LIKELY ARE YOU TO NOD OFF OR FALL ASLEEP WHILE SITTING QUIETLY AFTER LUNCH WITHOUT ALCOHOL: 0
HOW LIKELY ARE YOU TO NOD OFF OR FALL ASLEEP IN A CAR, WHILE STOPPED FOR A FEW MINUTES IN TRAFFIC: 0
HOW LIKELY ARE YOU TO NOD OFF OR FALL ASLEEP WHILE LYING DOWN TO REST IN THE AFTERNOON WHEN CIRCUMSTANCES PERMIT: 0

## 2022-11-07 NOTE — PROGRESS NOTES
[facility]                                                            Outpatient Follow Up Note    CHIEF COMPLAINT: Follow up COPD/BiPAP         HPI:   Feels about the same   No cough or sputum  No hemoptysis   Uses his Neb 4 times/day   On 4L - benefiting from it and feels better   No smoking   Uses BiPAP every night and PRN during the day. Feels pressure is somewhat weak. Past Medical History:   Diagnosis Date    Bronchitis chronic     Emphysema of lung ([address]     Influenza A 01/10/2018    Lung disease     copd    Pneumonia     12/2009       Past Surgical History:        Procedure Laterality Date    FRACTURE SURGERY      johnson in femur/hip on right    HERNIA REPAIR         Allergies:  has No Known Allergies. Social History:    TOBACCO:   reports that he quit smoking about 6 years ago. His smoking use included cigarettes. He has a 30.00 pack-year smoking history. He has never used smokeless tobacco.  ETOH:   reports no history of alcohol use.       Family History:       Problem Relation Age of Onset    Diabetes Sister     Cancer Sister     Cancer Sister     Cancer Brother     Asthma Daughter     Asthma Son     Diabetes Mother     Cancer Father        Current Medications:    Current Outpatient Medications:     Roflumilast (DALIRESP) 500 MCG tablet, TAKE (1) TABLET DAILY, Disp: 30 tablet, Rfl: 5    potassium chloride (KLOR-CON M) 20 MEQ extended release tablet, Take 1 tablet by mouth daily, Disp: 90 tablet, Rfl: 3    Torsemide 40 MG TABS, Take 40 mg by mouth 2 times daily, Disp: 180 tablet, Rfl: 3    VENTOLIN  (90 Base) MCG/ACT inhaler, INHALE 2 PUFFS EVERY 6 HOURS AS NEEDED FOR WHEEZING OR SHORTNESS OF BREATH, Disp: 18 g, Rfl: 5    dilTIAZem (CARDIZEM CD) 180 MG extended release capsule, TAKE 1 CAPSULE BY MOUTH DAILY, Disp: 90 capsule, Rfl: 3    SYMBICORT 160-4.5 MCG/ACT AERO, INHALE 2 PUFFS INTO THE LUNGS 2 TIMES DAILY, Disp: 10.2 g, Rfl: 5    apixaban (ELIQUIS) 5 MG TABS tablet, TAKE 1 TABLET TWICE DAILY, Disp: 60 tablet, Rfl: 11    nitroGLYCERIN (NITROSTAT) 0.4 MG SL tablet, PLACE 1 TABLET UNDER THE TONGUE EVERY 5 MINUTES AS NEEDED FOR CHEST PAIN, Disp: 25 tablet, Rfl: 3    famotidine (PEPCID) 20 MG tablet, Take 1 tablet by mouth 2 times daily, Disp: 180 tablet, Rfl: 3    ipratropium-albuterol (DUONEB) 0.5-2.5 (3) MG/3ML SOLN nebulizer solution, INHALE 3 MILLILIERS (1 VIAL) VIA NEBULIZATION ROUTE EVERY 6 HOURS AS NEEDED FOR SHORTNESS OF BREATH, Disp: 360 mL, Rfl: 6    guaiFENesin (MUCINEX) 600 MG extended release tablet, Take 1 tablet by mouth 2 times daily as needed for Congestion, Disp: 60 tablet, Rfl: 6    vitamin D (ERGOCALCIFEROL) 1.25 MG (84133 UT) CAPS capsule, Take 1 capsule by mouth once a week (Patient not taking: No sig reported), Disp: 5 capsule, Rfl: 0      Objective:   PHYSICAL EXAM:    /70   Pulse 88   Resp 18   Ht 6' (1.829 m)   Wt 181 lb 12.8 oz (82.5 kg)   SpO2 96% Comment: 4LPM  BMI 24.66 kg/m²   Gen: No distress. Ill appearing   Eyes: PERRL. No sclera icterus. No conjunctival injection. ENT: No discharge. Pharynx clear. Neck: Trachea midline. No obvious mass. Resp: No accessory muscle use. No crackles. Few wheezes. No rhonchi. No dullness on percussion. Good air entry. CV: Regular rate. Regular rhythm. No murmur or rub. No edema. GI: Non-tender. Non-distended. No hernia. Skin: Warm and dry. No nodule on exposed extremities. Lymph: No cervical LAD. No supraclavicular LAD. M/S: No cyanosis. No joint deformity. No clubbing. Neuro: Awake. Alert. Moves all four extremities. Psych: Oriented x 3. No anxiety. DATA reviewed by me:   PFTs 10/26/2017 FVC 2.39(49%) FEV1 1.05(29%) FEV1/FVC 44% TLC 7.39(98%) DLCO 11.81 (37%) 6MW 640 F 2L exertion               LDCT chest 10/26/2017    Moderate emphysema. No suspicious pulmonary nodules or masses   Remote appearing compression deformities. Lobular contour of the liver raising the question of underlying cirrhosis                            CXR 8/31/2019   The cardiomediastinal silhouette is borderline in size. Bibasilar atelectasis and scarring noted, superimposed on COPD. No acute infiltrate, pleural effusion or pneumothorax. CXR 4/3/22    Mild CHF     Chest x-ray 7/27/2022 imaging reviewed by me and showed  Chronic changes with no acute process      Overnight pulse oximeter 7/30/2022 BiPAP with 2.5 L no significant desaturation    BiPAP data 06/25-07/24 2022 reviewed by me. Uses 3-4 hrs/night with 53% compliance and AHI of  7.9. 95% 13.4/6. 4   BiPAP data 10/04-11/02 2022 reviewed by me. Uses 8-9 hrs/night with 100% compliance and AHI of  0.9. BiPAP 14/6 cmH2O     Assessment:       Severe COPD   Chronic hypoxemic hypercapnic respiratory failure BiPAP 14/6 cm H2O with 2.5 L O2   Nocturnal hypoxia on 2.5 LPM   Abnormal CXR April 2022- admitted treated for CHF/COPD AE   Hypoxia on exertion   30 pack year smoking - quit smoking 2016      Plan:      Increase BiPAP 16/7 with 2.5 L O2 nightly   Continue Symbicort BID and DuoNeb QID  Continue Daliresp 500 mcg PO daily  Prednisone taper and Doxycycline 100 mg BID x 5 days for COPD AE self management if needed. Continue O2 2-4 LPM on exertion. Advised to titrate O2 using her pulse oximeter- target O2 sat 90-92%. O2 2.5 LPM at night   Advised to schedule his LDCT screening for lung cancer- he is considering. Risks, benefits and alternatives including doing nothing were discussed with patient. Patient is up to date with Covid, Pneumococcal vaccine and influenza vaccine   Acapella and Mucinex   Advised to continue with smoking cessation.    Follow up in 3 months

## 2022-11-28 ENCOUNTER — HOSPITAL ENCOUNTER (OUTPATIENT)
Dept: SLEEP CENTER | Age: 72
Discharge: HOME | End: 2022-11-28
Payer: MEDICARE

## 2022-11-28 DIAGNOSIS — G47.34 NOCTURNAL HYPOXIA: ICD-10-CM

## 2022-11-28 DIAGNOSIS — J96.11 CHRONIC RESPIRATORY FAILURE WITH HYPOXIA AND HYPERCAPNIA (HCC): ICD-10-CM

## 2022-11-28 DIAGNOSIS — J96.12 CHRONIC RESPIRATORY FAILURE WITH HYPOXIA AND HYPERCAPNIA (HCC): ICD-10-CM

## 2022-11-28 DIAGNOSIS — J44.9 STAGE 3 SEVERE COPD BY GOLD CLASSIFICATION (HCC): ICD-10-CM

## 2022-11-28 PROCEDURE — 95811 POLYSOM 6/>YRS CPAP 4/> PARM: CPT

## 2022-11-29 PROBLEM — J96.11 CHRONIC RESPIRATORY FAILURE WITH HYPOXIA AND HYPERCAPNIA (HCC): Status: ACTIVE | Noted: 2022-11-29

## 2022-11-29 PROBLEM — J44.9 STAGE 3 SEVERE COPD BY GOLD CLASSIFICATION (HCC): Status: ACTIVE | Noted: 2022-11-29

## 2022-11-29 PROBLEM — J96.12 CHRONIC RESPIRATORY FAILURE WITH HYPOXIA AND HYPERCAPNIA (HCC): Status: ACTIVE | Noted: 2022-11-29

## 2022-11-29 PROBLEM — G47.34 NOCTURNAL HYPOXIA: Status: ACTIVE | Noted: 2022-11-29

## 2022-11-30 DIAGNOSIS — J96.11 CHRONIC RESPIRATORY FAILURE WITH HYPOXIA AND HYPERCAPNIA (HCC): ICD-10-CM

## 2022-11-30 DIAGNOSIS — J44.9 STAGE 3 SEVERE COPD BY GOLD CLASSIFICATION (HCC): Primary | ICD-10-CM

## 2022-11-30 DIAGNOSIS — J96.12 CHRONIC RESPIRATORY FAILURE WITH HYPOXIA AND HYPERCAPNIA (HCC): ICD-10-CM

## 2022-12-04 NOTE — PATIENT INSTRUCTIONS
What is lung cancer screening? Lung cancer screening is a way in which doctors check the lungs for early signs of cancer in people who have no symptoms of lung cancer. A low-dose CT scan uses much less radiation than a normal CT scan and shows a more detailed image of the lungs than a standard X-ray. The goal of lung cancer screening is to find cancer early, before it has a chance to grow, spread, or cause problems. One large study found that smokers who were screened with low-dose CT scans were less likely to die of lung cancer than those who were screened with standard X-ray. Below is a summary of the things you need to know regarding screening for lung cancer with low-dose computed tomography (LDCT). This is a screening program that involves routine annual screening with LDCT studies of the lung. The LDCTs are done using low-dose radiation that is not thought to increase your cancer risk. If you have other serious medical conditions (other cancers, congestive heart failure) that limit your life expectancy to less than 10 years, you should not undergo lung cancer screening with LDCT. The chance is 20%-60% that the LDCT result will show abnormalities. This would require additional testing which could include repeat imaging or even invasive procedures. Most (about 95%) of \"abnormal\" LDCT results are false in the sense that no lung cancer is ultimately found. Additionally, some (about 10%) of the cancers found would not affect your life expectancy, even if undetected and untreated. If you are still smoking, the single most important thing that you can do to reduce your risk of dying of lung cancer is to quit. For this screening to be covered by Medicare and most other insurers, strict criteria must be met. If you do not meet these criteria, but still wish to undergo LDCT testing, you will be required to sign a waiver indicating your willingness to pay for the scan.
Female

## 2022-12-29 DIAGNOSIS — J44.9 CHRONIC OBSTRUCTIVE PULMONARY DISEASE, UNSPECIFIED COPD TYPE (HCC): Primary | ICD-10-CM

## 2022-12-29 RX ORDER — IPRATROPIUM BROMIDE AND ALBUTEROL SULFATE 2.5; .5 MG/3ML; MG/3ML
SOLUTION RESPIRATORY (INHALATION)
Qty: 360 ML | Refills: 5 | Status: SHIPPED | OUTPATIENT
Start: 2022-12-29

## 2023-01-31 DIAGNOSIS — J44.9 STAGE 3 SEVERE COPD BY GOLD CLASSIFICATION (HCC): Primary | ICD-10-CM

## 2023-02-23 ENCOUNTER — OFFICE VISIT (OUTPATIENT)
Dept: PULMONOLOGY | Age: 73
End: 2023-02-23
Payer: MEDICARE

## 2023-02-23 VITALS
HEART RATE: 85 BPM | BODY MASS INDEX: 24.66 KG/M2 | SYSTOLIC BLOOD PRESSURE: 140 MMHG | DIASTOLIC BLOOD PRESSURE: 70 MMHG | OXYGEN SATURATION: 92 % | RESPIRATION RATE: 18 BRPM | HEIGHT: 72 IN

## 2023-02-23 DIAGNOSIS — J44.9 STAGE 3 SEVERE COPD BY GOLD CLASSIFICATION (HCC): ICD-10-CM

## 2023-02-23 DIAGNOSIS — Z87.891 PERSONAL HISTORY OF TOBACCO USE, PRESENTING HAZARDS TO HEALTH: Primary | ICD-10-CM

## 2023-02-23 DIAGNOSIS — J96.11 CHRONIC RESPIRATORY FAILURE WITH HYPOXIA AND HYPERCAPNIA (HCC): ICD-10-CM

## 2023-02-23 DIAGNOSIS — J96.12 CHRONIC RESPIRATORY FAILURE WITH HYPOXIA AND HYPERCAPNIA (HCC): ICD-10-CM

## 2023-02-23 DIAGNOSIS — Z87.891 PERSONAL HISTORY OF TOBACCO USE: ICD-10-CM

## 2023-02-23 PROCEDURE — 99214 OFFICE O/P EST MOD 30 MIN: CPT | Performed by: INTERNAL MEDICINE

## 2023-02-23 PROCEDURE — 3017F COLORECTAL CA SCREEN DOC REV: CPT | Performed by: INTERNAL MEDICINE

## 2023-02-23 PROCEDURE — 3077F SYST BP >= 140 MM HG: CPT | Performed by: INTERNAL MEDICINE

## 2023-02-23 PROCEDURE — 3078F DIAST BP <80 MM HG: CPT | Performed by: INTERNAL MEDICINE

## 2023-02-23 PROCEDURE — 3023F SPIROM DOC REV: CPT | Performed by: INTERNAL MEDICINE

## 2023-02-23 PROCEDURE — G0296 VISIT TO DETERM LDCT ELIG: HCPCS | Performed by: INTERNAL MEDICINE

## 2023-02-23 PROCEDURE — G8484 FLU IMMUNIZE NO ADMIN: HCPCS | Performed by: INTERNAL MEDICINE

## 2023-02-23 PROCEDURE — 1123F ACP DISCUSS/DSCN MKR DOCD: CPT | Performed by: INTERNAL MEDICINE

## 2023-02-23 PROCEDURE — G8427 DOCREV CUR MEDS BY ELIG CLIN: HCPCS | Performed by: INTERNAL MEDICINE

## 2023-02-23 PROCEDURE — G8420 CALC BMI NORM PARAMETERS: HCPCS | Performed by: INTERNAL MEDICINE

## 2023-02-23 PROCEDURE — 1036F TOBACCO NON-USER: CPT | Performed by: INTERNAL MEDICINE

## 2023-02-23 NOTE — PATIENT INSTRUCTIONS
Ct lung screen scheduling # [phone]       Learning About Lung Cancer Screening  What is screening for lung cancer? Lung cancer screening is a way to find some lung cancers early, before a person has any symptoms of the cancer. Lung cancer screening may help those who have the highest risk for lung cancer--people age 48 and older who are or were heavy smokers. For most people, who aren't at increased risk, screening for lung cancer probably isn't helpful. Screening won't prevent cancer. And it may not find all lung cancers. Lung cancer screening may lower the risk of dying from lung cancer in a small number of people. How is it done? Lung cancer screening is done with a low-dose CT (computed tomography) scan. A CT scan uses X-rays, or radiation, to make detailed pictures of your body. Experts recommend that screening be done in medical centers that focus on finding and treating lung cancer. Who is screening recommended for? Lung cancer screening is recommended for people age 48 and older who are or were heavy smokers. That means people with a smoking history of at least 20 pack years. A pack year is a way to measure how heavy a smoker you are or were. To figure out your pack years, multiply how many packs a day on average (assuming 20 cigarettes per pack) you have smoked by how many years you have smoked. For example: If you smoked 1 pack a day for 20 years, that's 1 times 20. So you have a smoking history of 20 pack years. If you smoked 2 packs a day for 10 years, that's 2 times 10. So you have a smoking history of 20 pack years. Experts agree that screening is for people who have a high risk of lung cancer. But experts don't agree on what high risk means. Some say people age 48 or older with at least a 20-pack-year smoking history are high risk. Others say it's people age 54 or older with a 30-pack-year history.   To see if you could benefit from screening, first find out if you are at high risk for lung cancer. Your doctor can help you decide your lung cancer risk. What are the risks of screening? CT screening for lung cancer isn't perfect. It can show an abnormal result when it turns out there wasn't any cancer. This is called a false-positive result. This means you may need more tests to make sure you don't have cancer. These tests can be harmful and cause a lot of worry. These tests may include more CT scans and invasive testing like a lung biopsy. In a biopsy, the doctor takes a sample of tissue from inside your lung so it can be looked at under a microscope. A biopsy is the only way to tell if you have lung cancer. If the biopsy finds cancer, you and your doctor will have to decide how or whether to treat it. Some lung cancers found on CT scans are harmless and would not have caused a problem if they had not been found through screening. But because doctors can't tell which ones will turn out to be harmless, most will be treated. This means that you may get treatment--including surgery, radiation, or chemotherapy--that you don't need. There is a risk of damage to cells or tissue from being exposed to radiation, including the small amounts used in CTs, X-rays, and other medical tests. Over time, exposure to radiation may cause cancer and other health problems. But in most cases, the risk of getting cancer from being exposed to small amounts of radiation is low. It's not a reason to avoid these tests for most people. What are the benefits of screening? Your scan may be normal (negative). For some people who are at higher risk, screening lowers the chance of dying of lung cancer. How much and how long you smoked helps to determine your risk level. Screening can find some cancers early, when treatment may be more likely to work. What happens after screening? The results of your CT scan will be sent to your doctor.  Someone from your care team will explain the results of your scan and answer any questions you may have. If you need any follow-up, he or she will help you understand what to do next. After a lung cancer screening, you can go back to your usual activities right away. A lung cancer screening test can't tell if you have lung cancer. If your results are positive, your doctor can't tell whether an abnormal finding is a harmless nodule, cancer, or something else without doing more tests. What can you do to help prevent lung cancer? Some lung cancers can't be prevented. But if you smoke, quitting smoking is the best step you can take to prevent lung cancer. If you want to quit, your doctor can recommend medicines or other ways to help. Follow-up care is a key part of your treatment and safety. Be sure to make and go to all appointments, and call your doctor if you are having problems. It's also a good idea to know your test results and keep a list of the medicines you take. Where can you learn more? Go to http://www.cain.com/ and enter Q940 to learn more about \"Learning About Lung Cancer Screening. \"  Current as of: May 4, 2022               Content Version: 13.5  © 6658-2361 [employer]. Care instructions adapted under license by [facility] ([facility]). If you have questions about a medical condition or this instruction, always ask your healthcare professional. [address] any warranty or liability for your use of this information.

## 2023-02-23 NOTE — PROGRESS NOTES
[facility]                                                            Outpatient Follow Up Note    CHIEF COMPLAINT: Follow up COPD        HPI:   Doing okay   Some cough with yellow sputum  No hemoptysis   Uses his Neb 4 times/day   On 2L - benefiting from it and feels better   No smoking. Uses BiPAP every night. Feels better with it. Feels pressure is high. Uses full face mask. Sleeps 12:30 am and gets up 8-10 am.       Past Medical History:   Diagnosis Date    Bronchitis chronic     Emphysema of lung ([address]     Influenza A 01/10/2018    Lung disease     copd    Pneumonia     12/2009       Past Surgical History:        Procedure Laterality Date    FRACTURE SURGERY      johnson in femur/hip on right    HERNIA REPAIR         Allergies:  has No Known Allergies. Social History:    TOBACCO:   reports that he quit smoking about 6 years ago. His smoking use included cigarettes. He has a 30.00 pack-year smoking history. He has never used smokeless tobacco.  ETOH:   reports no history of alcohol use.       Family History:       Problem Relation Age of Onset    Diabetes Sister     Cancer Sister     Cancer Sister     Cancer Brother     Asthma Daughter     Asthma Son     Diabetes Mother     Cancer Father        Current Medications:    Current Outpatient Medications:     VENTOLIN  (90 Base) MCG/ACT inhaler, INHALE TWO (2) PUFFS EVERY 6 HOURS AS NEEDED FOR WHEEZING OR SHORTNESS OF BREATH, Disp: 18 g, Rfl: 0    ipratropium-albuterol (DUONEB) 0.5-2.5 (3) MG/3ML SOLN nebulizer solution, INHALE THREE (3) MILLILIERS (1 VIAL) VIA NEBULIZATION ROUTE EVERY 6 HOURS AS NEEDED FOR SHORTNESS OF BREATH, Disp: 360 mL, Rfl: 5    budesonide-formoterol (SYMBICORT) 160-4.5 MCG/ACT AERO, INHALE 2 PUFFS INTO THE LUNGS 2 TIMES DAILY, Disp: 1 each, Rfl: 5    Roflumilast (DALIRESP) 500 MCG tablet, TAKE (1) TABLET DAILY, Disp: 30 tablet, Rfl: 5    potassium chloride (KLOR-CON M) 20 MEQ extended release tablet, Take 1 tablet by mouth daily, Disp: 90 tablet, Rfl: 3    Torsemide 40 MG TABS, Take 40 mg by mouth 2 times daily, Disp: 180 tablet, Rfl: 3    dilTIAZem (CARDIZEM CD) 180 MG extended release capsule, TAKE 1 CAPSULE BY MOUTH DAILY, Disp: 90 capsule, Rfl: 3    apixaban (ELIQUIS) 5 MG TABS tablet, TAKE 1 TABLET TWICE DAILY, Disp: 60 tablet, Rfl: 11    nitroGLYCERIN (NITROSTAT) 0.4 MG SL tablet, PLACE 1 TABLET UNDER THE TONGUE EVERY 5 MINUTES AS NEEDED FOR CHEST PAIN, Disp: 25 tablet, Rfl: 3    famotidine (PEPCID) 20 MG tablet, Take 1 tablet by mouth 2 times daily, Disp: 180 tablet, Rfl: 3    guaiFENesin (MUCINEX) 600 MG extended release tablet, Take 1 tablet by mouth 2 times daily as needed for Congestion, Disp: 60 tablet, Rfl: 6    vitamin D (ERGOCALCIFEROL) 1.25 MG (31988 UT) CAPS capsule, Take 1 capsule by mouth once a week (Patient not taking: No sig reported), Disp: 5 capsule, Rfl: 0      Objective:   PHYSICAL EXAM:    BP (!) 140/70 (Site: Left Upper Arm, Position: Sitting, Cuff Size: Medium Adult)   Pulse 85   Resp 18   Ht 6' (1.829 m)   SpO2 92% Comment: 2 liters  BMI 24.66 kg/m²   Gen: No distress. Ill appearing   Eyes: PERRL. No sclera icterus. No conjunctival injection. ENT: No discharge. Pharynx clear. Neck: Trachea midline. No obvious mass. Resp: No accessory muscle use. No crackles. Few wheezes. No rhonchi. No dullness on percussion. Good air entry. CV: Regular rate. Regular rhythm. No murmur or rub. No edema. GI: Non-tender. Non-distended. No hernia. Skin: Warm and dry. No nodule on exposed extremities. Lymph: No cervical LAD. No supraclavicular LAD. M/S: No cyanosis. No joint deformity. No clubbing. Neuro: Awake. Alert. Moves all four extremities. Psych: Oriented x 3. No anxiety.              DATA reviewed by me:   PFTs 10/26/2017 FVC 2.39(49%) FEV1 1.05(29%) FEV1/FVC 44% TLC 7.39(98%) DLCO 11.81 (37%) 6MW 640 F 2L exertion LDCT chest 10/26/2017    Moderate emphysema. No suspicious pulmonary nodules or masses   Remote appearing compression deformities. Lobular contour of the liver raising the question of underlying cirrhosis                            CXR 8/31/2019   The cardiomediastinal silhouette is borderline in size. Bibasilar atelectasis and scarring noted, superimposed on COPD. No acute infiltrate, pleural effusion or pneumothorax. CXR 4/3/22    Mild CHF     Chest x-ray 7/27/2022  Chronic changes with no acute process      Overnight pulse oximeter 7/30/2022 BiPAP with 2.5 L no significant desaturation  BiPAP titration 11/28/22 BiPAP 18/10 cmH2O       BiPAP data 06/25-07/24 2022 reviewed by me. Uses 3-4 hrs/night with 53% compliance and AHI of  7.9. 95% 13.4/6. 4   BiPAP data 10/04-11/02 2022 reviewed by me. Uses 8-9 hrs/night with 100% compliance and AHI of  0.9. BiPAP 14/6 cmH2O   BiPAP data 01/24-02/22 2023 reviewed by me. Uses 8-9 hrs/night with 97% compliance and AHI of  1.5. BiPAP 14/6 cmH2O       Assessment:       Severe COPD   Chronic hypoxemic hypercapnic respiratory failure BiPAP 18/10 cm H2O. Optimal compliance and efficacy upon review today. Feels pressure is high. Hypoxia on exertion   30 pack year smoking - quit smoking 2016      Plan:      Change BiPAP 16/8 cmH2O   Continue Symbicort BID and DuoNeb QID  Continue Daliresp 500 mcg PO daily  Continue O2 2-4 LPM on exertion. Advised to titrate O2 using her pulse oximeter- target O2 sat 90-92%. Advised to schedule his LDCT screening for lung cancer- he is interested now. Risks, benefits and alternatives including doing nothing were discussed with patient. Patient is up to date with Covid, Pneumococcal vaccine and influenza vaccine   Acapella and Mucinex   Advised to continue with smoking cessation.    Follow up in 3-6 months

## 2023-02-23 NOTE — PROGRESS NOTES
Discussed with the patient the current USPSTF guidelines released March 9, 2021 for screening for lung cancer. For adults aged 48 to [de-identified] years who have a 20 pack-year smoking history and currently smoke or have quit within the past 15 years the grade B recommendation is to:  Screen for lung cancer with low-dose computed tomography (LDCT) every year. Stop screening once a person has not smoked for 15 years or has a health problem that limits life expectancy or the ability to have lung surgery. The patient  reports that he quit smoking about 6 years ago. His smoking use included cigarettes. He has a 30.00 pack-year smoking history. He has never used smokeless tobacco.. Discussed with patient the risks and benefits of screening, including over-diagnosis, false positive rate, and total radiation exposure. The patient currently exhibits no signs or symptoms suggestive of lung cancer. Discussed with patient the importance of compliance with yearly annual lung cancer screenings and willingness to undergo diagnosis and treatment if screening scan is positive. In addition, the patient was counseled regarding the importance of remaining smoke free and/or total smoking cessation.     Also reviewed the following if the patient has Medicare that as of February 10, 2022, Medicare only covers LDCT screening in patients aged 51-72 with at least a 20 pack-year smoking history who currently smoke or have quit in the last 15 years

## 2023-02-25 ENCOUNTER — HOSPITAL ENCOUNTER (INPATIENT)
Age: 73
LOS: 9 days | Discharge: NURSING, REHAB OR LONG-TERM CARE | DRG: 035 | End: 2023-03-06
Attending: INTERNAL MEDICINE | Admitting: INTERNAL MEDICINE
Payer: MEDICARE

## 2023-02-25 ENCOUNTER — APPOINTMENT (OUTPATIENT)
Dept: CT IMAGING | Age: 73
End: 2023-02-25
Payer: MEDICARE

## 2023-02-25 ENCOUNTER — APPOINTMENT (OUTPATIENT)
Dept: GENERAL RADIOLOGY | Age: 73
End: 2023-02-25
Payer: MEDICARE

## 2023-02-25 ENCOUNTER — HOSPITAL ENCOUNTER (EMERGENCY)
Age: 73
Discharge: ANOTHER HOSPITAL | End: 2023-02-25
Attending: EMERGENCY MEDICINE
Payer: MEDICARE

## 2023-02-25 ENCOUNTER — APPOINTMENT (OUTPATIENT)
Dept: MRI IMAGING | Age: 73
DRG: 035 | End: 2023-02-25
Attending: INTERNAL MEDICINE
Payer: MEDICARE

## 2023-02-25 VITALS
TEMPERATURE: 98 F | HEART RATE: 74 BPM | BODY MASS INDEX: 24.14 KG/M2 | RESPIRATION RATE: 15 BRPM | DIASTOLIC BLOOD PRESSURE: 91 MMHG | OXYGEN SATURATION: 97 % | WEIGHT: 178 LBS | SYSTOLIC BLOOD PRESSURE: 126 MMHG

## 2023-02-25 DIAGNOSIS — I63.9 CEREBROVASCULAR ACCIDENT (CVA), UNSPECIFIED MECHANISM (HCC): Primary | ICD-10-CM

## 2023-02-25 DIAGNOSIS — I48.21 PERMANENT ATRIAL FIBRILLATION (HCC): ICD-10-CM

## 2023-02-25 PROBLEM — R47.01 APHASIA DUE TO ACUTE CEREBROVASCULAR ACCIDENT (CVA) (HCC): Status: ACTIVE | Noted: 2023-02-25

## 2023-02-25 LAB
A/G RATIO: 1.4 (ref 1.1–2.2)
ALBUMIN SERPL-MCNC: 4.2 G/DL (ref 3.4–5)
ALP BLD-CCNC: 97 U/L (ref 40–129)
ALT SERPL-CCNC: 17 U/L (ref 10–40)
ANION GAP SERPL CALCULATED.3IONS-SCNC: 11 MMOL/L (ref 3–16)
AST SERPL-CCNC: 21 U/L (ref 15–37)
BASE EXCESS VENOUS: 7.2 MMOL/L (ref -3–3)
BASOPHILS ABSOLUTE: 0.1 K/UL (ref 0–0.2)
BASOPHILS RELATIVE PERCENT: 0.6 %
BILIRUB SERPL-MCNC: 0.8 MG/DL (ref 0–1)
BUN BLDV-MCNC: 13 MG/DL (ref 7–20)
CALCIUM SERPL-MCNC: 9.3 MG/DL (ref 8.3–10.6)
CARBOXYHEMOGLOBIN: 1.2 % (ref 0–1.5)
CHLORIDE BLD-SCNC: 98 MMOL/L (ref 99–110)
CO2: 32 MMOL/L (ref 21–32)
CREAT SERPL-MCNC: 0.8 MG/DL (ref 0.8–1.3)
EKG ATRIAL RATE: 375 BPM
EKG DIAGNOSIS: NORMAL
EKG Q-T INTERVAL: 406 MS
EKG QRS DURATION: 102 MS
EKG QTC CALCULATION (BAZETT): 425 MS
EKG R AXIS: -20 DEGREES
EKG T AXIS: 12 DEGREES
EKG VENTRICULAR RATE: 66 BPM
EOSINOPHILS ABSOLUTE: 0.3 K/UL (ref 0–0.6)
EOSINOPHILS RELATIVE PERCENT: 2.7 %
GFR SERPL CREATININE-BSD FRML MDRD: >60 ML/MIN/{1.73_M2}
GLUCOSE BLD-MCNC: 143 MG/DL (ref 70–99)
GLUCOSE BLD-MCNC: 143 MG/DL (ref 70–99)
HCO3 VENOUS: 33.9 MMOL/L (ref 23–29)
HCT VFR BLD CALC: 48 % (ref 40.5–52.5)
HEMOGLOBIN: 16.1 G/DL (ref 13.5–17.5)
INR BLD: 1.13 (ref 0.87–1.14)
LACTIC ACID: 1.8 MMOL/L (ref 0.4–2)
LYMPHOCYTES ABSOLUTE: 1.5 K/UL (ref 1–5.1)
LYMPHOCYTES RELATIVE PERCENT: 14.6 %
MCH RBC QN AUTO: 31 PG (ref 26–34)
MCHC RBC AUTO-ENTMCNC: 33.5 G/DL (ref 31–36)
MCV RBC AUTO: 92.4 FL (ref 80–100)
METHEMOGLOBIN VENOUS: 0.5 %
MONOCYTES ABSOLUTE: 0.7 K/UL (ref 0–1.3)
MONOCYTES RELATIVE PERCENT: 6.4 %
NEUTROPHILS ABSOLUTE: 7.9 K/UL (ref 1.7–7.7)
NEUTROPHILS RELATIVE PERCENT: 75.7 %
O2 SAT, VEN: 92 %
O2 THERAPY: ABNORMAL
PCO2, VEN: 54.5 MMHG (ref 40–50)
PDW BLD-RTO: 14.3 % (ref 12.4–15.4)
PERFORMED ON: ABNORMAL
PH VENOUS: 7.41 (ref 7.35–7.45)
PLATELET # BLD: 342 K/UL (ref 135–450)
PMV BLD AUTO: 8.7 FL (ref 5–10.5)
PO2, VEN: 58.7 MMHG (ref 25–40)
POTASSIUM SERPL-SCNC: 3.6 MMOL/L (ref 3.5–5.1)
PROTHROMBIN TIME: 14.4 SEC (ref 11.7–14.5)
RBC # BLD: 5.19 M/UL (ref 4.2–5.9)
SODIUM BLD-SCNC: 141 MMOL/L (ref 136–145)
SPECIMEN STATUS: NORMAL
TCO2 CALC VENOUS: 36 MMOL/L
TOTAL PROTEIN: 7.2 G/DL (ref 6.4–8.2)
TROPONIN: <0.01 NG/ML
WBC # BLD: 10.4 K/UL (ref 4–11)

## 2023-02-25 PROCEDURE — 85025 COMPLETE CBC W/AUTO DIFF WBC: CPT

## 2023-02-25 PROCEDURE — 2580000003 HC RX 258: Performed by: INTERNAL MEDICINE

## 2023-02-25 PROCEDURE — 80053 COMPREHEN METABOLIC PANEL: CPT

## 2023-02-25 PROCEDURE — 93005 ELECTROCARDIOGRAM TRACING: CPT | Performed by: EMERGENCY MEDICINE

## 2023-02-25 PROCEDURE — 84484 ASSAY OF TROPONIN QUANT: CPT

## 2023-02-25 PROCEDURE — 36415 COLL VENOUS BLD VENIPUNCTURE: CPT

## 2023-02-25 PROCEDURE — 93010 ELECTROCARDIOGRAM REPORT: CPT | Performed by: INTERNAL MEDICINE

## 2023-02-25 PROCEDURE — 2000000000 HC ICU R&B

## 2023-02-25 PROCEDURE — 83605 ASSAY OF LACTIC ACID: CPT

## 2023-02-25 PROCEDURE — 6360000004 HC RX CONTRAST MEDICATION: Performed by: EMERGENCY MEDICINE

## 2023-02-25 PROCEDURE — 85610 PROTHROMBIN TIME: CPT

## 2023-02-25 PROCEDURE — 99285 EMERGENCY DEPT VISIT HI MDM: CPT

## 2023-02-25 PROCEDURE — 70450 CT HEAD/BRAIN W/O DYE: CPT

## 2023-02-25 PROCEDURE — 82803 BLOOD GASES ANY COMBINATION: CPT

## 2023-02-25 PROCEDURE — 71045 X-RAY EXAM CHEST 1 VIEW: CPT

## 2023-02-25 PROCEDURE — 70496 CT ANGIOGRAPHY HEAD: CPT

## 2023-02-25 PROCEDURE — 2580000003 HC RX 258: Performed by: EMERGENCY MEDICINE

## 2023-02-25 PROCEDURE — 2580000003 HC RX 258

## 2023-02-25 PROCEDURE — 99291 CRITICAL CARE FIRST HOUR: CPT

## 2023-02-25 PROCEDURE — 6370000000 HC RX 637 (ALT 250 FOR IP): Performed by: EMERGENCY MEDICINE

## 2023-02-25 PROCEDURE — 70551 MRI BRAIN STEM W/O DYE: CPT

## 2023-02-25 PROCEDURE — 6370000000 HC RX 637 (ALT 250 FOR IP): Performed by: INTERNAL MEDICINE

## 2023-02-25 RX ORDER — HEPARIN SODIUM 1000 [USP'U]/ML
80 INJECTION, SOLUTION INTRAVENOUS; SUBCUTANEOUS PRN
Status: DISCONTINUED | OUTPATIENT
Start: 2023-02-25 | End: 2023-02-26

## 2023-02-25 RX ORDER — ASPIRIN 81 MG/1
81 TABLET ORAL DAILY
Status: DISCONTINUED | OUTPATIENT
Start: 2023-02-25 | End: 2023-02-25

## 2023-02-25 RX ORDER — SODIUM CHLORIDE 9 MG/ML
INJECTION, SOLUTION INTRAVENOUS CONTINUOUS
Status: ACTIVE | OUTPATIENT
Start: 2023-02-25 | End: 2023-02-25

## 2023-02-25 RX ORDER — POLYETHYLENE GLYCOL 3350 17 G/17G
17 POWDER, FOR SOLUTION ORAL DAILY PRN
Status: DISCONTINUED | OUTPATIENT
Start: 2023-02-25 | End: 2023-03-06 | Stop reason: HOSPADM

## 2023-02-25 RX ORDER — HEPARIN SODIUM 1000 [USP'U]/ML
80 INJECTION, SOLUTION INTRAVENOUS; SUBCUTANEOUS ONCE
Status: DISCONTINUED | OUTPATIENT
Start: 2023-02-25 | End: 2023-02-26

## 2023-02-25 RX ORDER — ENOXAPARIN SODIUM 100 MG/ML
40 INJECTION SUBCUTANEOUS DAILY
Status: DISCONTINUED | OUTPATIENT
Start: 2023-02-25 | End: 2023-02-25

## 2023-02-25 RX ORDER — ONDANSETRON 2 MG/ML
4 INJECTION INTRAMUSCULAR; INTRAVENOUS EVERY 6 HOURS PRN
Status: CANCELLED | OUTPATIENT
Start: 2023-02-25

## 2023-02-25 RX ORDER — HEPARIN SODIUM 1000 [USP'U]/ML
40 INJECTION, SOLUTION INTRAVENOUS; SUBCUTANEOUS PRN
Status: DISCONTINUED | OUTPATIENT
Start: 2023-02-25 | End: 2023-02-26

## 2023-02-25 RX ORDER — POLYETHYLENE GLYCOL 3350 17 G/17G
17 POWDER, FOR SOLUTION ORAL DAILY PRN
Status: CANCELLED | OUTPATIENT
Start: 2023-02-25

## 2023-02-25 RX ORDER — ONDANSETRON 4 MG/1
4 TABLET, ORALLY DISINTEGRATING ORAL EVERY 8 HOURS PRN
Status: CANCELLED | OUTPATIENT
Start: 2023-02-25

## 2023-02-25 RX ORDER — ONDANSETRON 4 MG/1
4 TABLET, ORALLY DISINTEGRATING ORAL EVERY 8 HOURS PRN
Status: DISCONTINUED | OUTPATIENT
Start: 2023-02-25 | End: 2023-03-06 | Stop reason: HOSPADM

## 2023-02-25 RX ORDER — ATORVASTATIN CALCIUM 80 MG/1
80 TABLET, FILM COATED ORAL NIGHTLY
Status: DISCONTINUED | OUTPATIENT
Start: 2023-02-25 | End: 2023-03-06 | Stop reason: HOSPADM

## 2023-02-25 RX ORDER — ARFORMOTEROL TARTRATE 15 UG/2ML
15 SOLUTION RESPIRATORY (INHALATION) 2 TIMES DAILY
Status: DISCONTINUED | OUTPATIENT
Start: 2023-02-25 | End: 2023-03-06 | Stop reason: HOSPADM

## 2023-02-25 RX ORDER — TORSEMIDE 20 MG/1
40 TABLET ORAL 2 TIMES DAILY
Status: DISCONTINUED | OUTPATIENT
Start: 2023-02-25 | End: 2023-03-05

## 2023-02-25 RX ORDER — ASPIRIN 300 MG/1
300 SUPPOSITORY RECTAL ONCE
Status: COMPLETED | OUTPATIENT
Start: 2023-02-25 | End: 2023-02-25

## 2023-02-25 RX ORDER — LORAZEPAM 2 MG/ML
1 INJECTION INTRAMUSCULAR EVERY 5 MIN PRN
Status: CANCELLED | OUTPATIENT
Start: 2023-02-25

## 2023-02-25 RX ORDER — ONDANSETRON 2 MG/ML
4 INJECTION INTRAMUSCULAR; INTRAVENOUS EVERY 6 HOURS PRN
Status: DISCONTINUED | OUTPATIENT
Start: 2023-02-25 | End: 2023-02-28

## 2023-02-25 RX ORDER — ENOXAPARIN SODIUM 100 MG/ML
40 INJECTION SUBCUTANEOUS DAILY
Status: CANCELLED | OUTPATIENT
Start: 2023-02-25

## 2023-02-25 RX ORDER — BUDESONIDE 0.5 MG/2ML
0.5 INHALANT ORAL 2 TIMES DAILY
Status: DISCONTINUED | OUTPATIENT
Start: 2023-02-25 | End: 2023-03-06 | Stop reason: HOSPADM

## 2023-02-25 RX ORDER — ASPIRIN 300 MG/1
300 SUPPOSITORY RECTAL DAILY
Status: DISCONTINUED | OUTPATIENT
Start: 2023-02-25 | End: 2023-03-06 | Stop reason: HOSPADM

## 2023-02-25 RX ORDER — 0.9 % SODIUM CHLORIDE 0.9 %
1000 INTRAVENOUS SOLUTION INTRAVENOUS ONCE
Status: COMPLETED | OUTPATIENT
Start: 2023-02-25 | End: 2023-02-25

## 2023-02-25 RX ORDER — SODIUM CHLORIDE 0.9 % (FLUSH) 0.9 %
5-40 SYRINGE (ML) INJECTION PRN
Status: DISCONTINUED | OUTPATIENT
Start: 2023-02-25 | End: 2023-03-06 | Stop reason: HOSPADM

## 2023-02-25 RX ORDER — IPRATROPIUM BROMIDE AND ALBUTEROL SULFATE 2.5; .5 MG/3ML; MG/3ML
1 SOLUTION RESPIRATORY (INHALATION) EVERY 4 HOURS PRN
Status: DISCONTINUED | OUTPATIENT
Start: 2023-02-25 | End: 2023-02-26

## 2023-02-25 RX ORDER — SODIUM CHLORIDE 9 MG/ML
INJECTION, SOLUTION INTRAVENOUS PRN
Status: DISCONTINUED | OUTPATIENT
Start: 2023-02-25 | End: 2023-03-06 | Stop reason: HOSPADM

## 2023-02-25 RX ORDER — ASPIRIN 300 MG/1
300 SUPPOSITORY RECTAL DAILY
Status: DISCONTINUED | OUTPATIENT
Start: 2023-02-25 | End: 2023-02-25

## 2023-02-25 RX ORDER — ACETAMINOPHEN 325 MG/1
650 TABLET ORAL EVERY 6 HOURS PRN
Status: DISCONTINUED | OUTPATIENT
Start: 2023-02-25 | End: 2023-02-28

## 2023-02-25 RX ORDER — GUAIFENESIN 600 MG/1
600 TABLET, EXTENDED RELEASE ORAL 2 TIMES DAILY PRN
Status: CANCELLED | OUTPATIENT
Start: 2023-02-25

## 2023-02-25 RX ORDER — ACETAMINOPHEN 650 MG/1
650 SUPPOSITORY RECTAL EVERY 6 HOURS PRN
Status: DISCONTINUED | OUTPATIENT
Start: 2023-02-25 | End: 2023-02-28

## 2023-02-25 RX ORDER — DILTIAZEM HYDROCHLORIDE 180 MG/1
180 CAPSULE, COATED, EXTENDED RELEASE ORAL DAILY
Status: DISCONTINUED | OUTPATIENT
Start: 2023-02-26 | End: 2023-03-03

## 2023-02-25 RX ORDER — SODIUM CHLORIDE 0.9 % (FLUSH) 0.9 %
5-40 SYRINGE (ML) INJECTION EVERY 12 HOURS SCHEDULED
Status: DISCONTINUED | OUTPATIENT
Start: 2023-02-25 | End: 2023-03-06 | Stop reason: HOSPADM

## 2023-02-25 RX ADMIN — SODIUM CHLORIDE 1000 ML: 9 INJECTION, SOLUTION INTRAVENOUS at 15:45

## 2023-02-25 RX ADMIN — SODIUM CHLORIDE, PRESERVATIVE FREE 10 ML: 5 INJECTION INTRAVENOUS at 20:19

## 2023-02-25 RX ADMIN — ASPIRIN 300 MG: 300 SUPPOSITORY RECTAL at 17:02

## 2023-02-25 RX ADMIN — IOPAMIDOL 75 ML: 755 INJECTION, SOLUTION INTRAVENOUS at 15:09

## 2023-02-25 RX ADMIN — SODIUM CHLORIDE: 9 INJECTION, SOLUTION INTRAVENOUS at 20:18

## 2023-02-25 RX ADMIN — ATORVASTATIN CALCIUM 80 MG: 80 TABLET, FILM COATED ORAL at 20:56

## 2023-02-25 ASSESSMENT — PAIN - FUNCTIONAL ASSESSMENT
PAIN_FUNCTIONAL_ASSESSMENT: NONE - DENIES PAIN

## 2023-02-25 ASSESSMENT — ENCOUNTER SYMPTOMS
NAUSEA: 0
VOMITING: 0
SHORTNESS OF BREATH: 0

## 2023-02-25 NOTE — ED NOTES
Patient and family updated on inpatient room assignment at [address] [facility] [address] [place]  02/25/23 [id number]

## 2023-02-25 NOTE — ED NOTES
[address] [facility] intensivist Dr Veronica Bolaños called via [employer] access to speak with Dr Simran Main  02/25/23 5766 No

## 2023-02-25 NOTE — H&P
ICU HISTORY AND PHYSICAL       Hospital Day:   ICU Day:                                                          Code:Prior  Admit Date: (Not on file)  PCP: Lady Blandon                                  CC: Aphasia    HISTORY OF PRESENT ILLNESS:   68 old male with past medical history HTN (Eliquis), HFpEF, COPD (4 L), chronic bronchitis, hernia repair who initially presented to OSH with acute onset aphasia. Son reported at OSH patient had waxing/waning speech changes over the past week worse than his typical stuttering. He noticed symptoms onset around 1100 on 2/24, but symptoms gradually resolved and were completely absent that night prior to bed. Patient slept in this morning until 1100 at which point family noted symptoms have recurred and he called 911. At [facility] ED, pt hemodynamically stable with mild aphasia and mild R arm drift; NIHSS 3. EKF showing AF but otherwise no acute changes. CT head w/o showed age-indeterminate small, possibly acute L frontal lobe infacrt as well as proximal left ICA near-occlusion. Pt was transferred to [facility] for neurosurgical evaluation and management. Pt in NAD on arrival. He continues with difficulty finding words; has good insight into sx. Denies any headache, fever, chills, chest pain, dyspnea, abdominal pain, nausea, vomiting, numbness, visual changes.      PAST HISTORY:     Past Medical History:   Diagnosis Date    Bronchitis chronic     Emphysema of lung ([address]     Influenza A 01/10/2018    Lung disease     copd    Pneumonia     12/2009       Past Surgical History:   Procedure Laterality Date    FRACTURE SURGERY      johnson in femur/hip on right    HERNIA REPAIR         SocialHistory:   The patient lives at     Alcohol:  Illicit drugs: no use  Tobacco:      Family History:  Family History   Problem Relation Age of Onset    Diabetes Sister     Cancer Sister     Cancer Sister     Cancer Brother     Asthma Daughter     Asthma Son     Diabetes Mother     Cancer Father MEDICATIONS:     No current facility-administered medications on file prior to encounter. Current Outpatient Medications on File Prior to Encounter   Medication Sig Dispense Refill    VENTOLIN  (90 Base) MCG/ACT inhaler INHALE TWO (2) PUFFS EVERY 6 HOURS AS NEEDED FOR WHEEZING OR SHORTNESS OF BREATH 18 g 0    ipratropium-albuterol (DUONEB) 0.5-2.5 (3) MG/3ML SOLN nebulizer solution INHALE THREE (3) MILLILIERS (1 VIAL) VIA NEBULIZATION ROUTE EVERY 6 HOURS AS NEEDED FOR SHORTNESS OF BREATH 360 mL 5    budesonide-formoterol (SYMBICORT) 160-4.5 MCG/ACT AERO INHALE 2 PUFFS INTO THE LUNGS 2 TIMES DAILY 1 each 5    Roflumilast (DALIRESP) 500 MCG tablet TAKE (1) TABLET DAILY 30 tablet 5    potassium chloride (KLOR-CON M) 20 MEQ extended release tablet Take 1 tablet by mouth daily 90 tablet 3    Torsemide 40 MG TABS Take 40 mg by mouth 2 times daily 180 tablet 3    dilTIAZem (CARDIZEM CD) 180 MG extended release capsule TAKE 1 CAPSULE BY MOUTH DAILY 90 capsule 3    apixaban (ELIQUIS) 5 MG TABS tablet TAKE 1 TABLET TWICE DAILY 60 tablet 11    nitroGLYCERIN (NITROSTAT) 0.4 MG SL tablet PLACE 1 TABLET UNDER THE TONGUE EVERY 5 MINUTES AS NEEDED FOR CHEST PAIN 25 tablet 3    famotidine (PEPCID) 20 MG tablet Take 1 tablet by mouth 2 times daily 180 tablet 3    vitamin D (ERGOCALCIFEROL) 1.25 MG (19914 UT) CAPS capsule Take 1 capsule by mouth once a week (Patient not taking: No sig reported) 5 capsule 0    guaiFENesin (MUCINEX) 600 MG extended release tablet Take 1 tablet by mouth 2 times daily as needed for Congestion 60 tablet 6         Scheduled Meds:   Continuous Infusions:  PRN Meds:    Allergies: No Known Allergies    REVIEW OF SYSTEMS:       History obtained from chart review and the patient    Review of Systems   Constitutional:  Negative for chills and fever. Eyes:  Negative for visual disturbance. Respiratory:  Negative for shortness of breath. Cardiovascular:  Negative for chest pain. Gastrointestinal:  Negative for nausea and vomiting. Neurological:  Positive for speech difficulty. Negative for dizziness, weakness, light-headedness, numbness and headaches. All other systems reviewed and are negative. PHYSICAL EXAM:       Vitals: There were no vitals taken for this visit. I/O:  No intake or output data in the 24 hours ending 02/25/23 1708  No intake/output data recorded. No intake/output data recorded. Physical Examination:     Physical Exam  Vitals and nursing note reviewed. Constitutional:       General: He is not in acute distress. Appearance: Normal appearance. He is normal weight. He is not ill-appearing or diaphoretic. HENT:      Head: Normocephalic and atraumatic. Mouth/Throat:      Mouth: Mucous membranes are moist.      Pharynx: Oropharynx is clear. Cardiovascular:      Rate and Rhythm: Normal rate and regular rhythm. Pulses: Normal pulses. Heart sounds: Normal heart sounds. No murmur heard. No friction rub. No gallop. Pulmonary:      Effort: Pulmonary effort is normal. No respiratory distress. Breath sounds: Normal breath sounds. No wheezing, rhonchi or rales. Abdominal:      General: There is distension. Palpations: Abdomen is soft. Tenderness: There is no abdominal tenderness. There is no guarding. Hernia: No hernia is present. Musculoskeletal:      Right lower leg: No edema. Left lower leg: No edema. Skin:     General: Skin is warm and dry. Coloration: Skin is not pale. Neurological:      Mental Status: He is alert and oriented to person, place, and time. Cranial Nerves: No cranial nerve deficit. Sensory: No sensory deficit. Coordination: Coordination abnormal.      Comments: Expressive aphasia: intermittent difficulty finding words but answering appropriately. Sensation, motor strength intact in BUE/BLE. Mild impairment of RLE heel-to-shin. L heel-to-shin intact.             DATA: Labs:  CBC:   Recent Labs     02/25/23  1455   WBC 10.4   HGB 16.1   HCT 48.0          BMP:   Recent Labs     02/25/23  1455      K 3.6   CL 98*   CO2 32   BUN 13   CREATININE 0.8   GLUCOSE 143*     LFT's:   Recent Labs     02/25/23  1455   AST 21   ALT 17   BILITOT 0.8   ALKPHOS 97     Troponin:   Recent Labs     02/25/23  1455   TROPONINI <0.01     BNP:No results for input(s): BNP in the last 72 hours. ABGs: No results for input(s): PHART, BBN5XQS, PO2ART in the last 72 hours. INR:   Recent Labs     02/25/23  1455   INR 1.13       U/A:No results for input(s): NITRITE, COLORU, PHUR, LABCAST, WBCUA, RBCUA, MUCUS, TRICHOMONAS, YEAST, BACTERIA, CLARITYU, SPECGRAV, LEUKOCYTESUR, UROBILINOGEN, BILIRUBINUR, BLOODU, GLUCOSEU, AMORPHOUS in the last 72 hours. Invalid input(s): KETONESU    XR CHEST PORTABLE   Final Result   Increased interstitial opacity. While much or all of this may be chronic,   please correlate with any clinical evidence of acute interstitial edema. CTA HEAD NECK W CONTRAST   Preliminary Result   1. 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 Reduced blood flow to the left MCA territory. 2. Bilateral proximal vertebral artery occlusions with distal reconstitution. 3. Age-indeterminate, possibly small acute left frontal lobe infarct. 4. Chronic thoracic compression deformities. Critical results were called [facility] on   2/25/2023 at 15:29. CT HEAD WO CONTRAST   Preliminary Result   1. 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 Reduced blood flow to the left MCA territory. 2. Bilateral proximal vertebral artery occlusions with distal reconstitution. 3. Age-indeterminate, possibly small acute left frontal lobe infarct. 4. Chronic thoracic compression deformities.    Critical results were called by Dr. Juliette Juarez MD to [facility] [address] on 2/25/2023 at 15:29. EKG:         Echo 3/14/22:   Summary   Left ventricular systolic function is normal with ejection fraction   estimated at 55%. No regional wall motion abnormalities. There is mild concentric left ventricular hypertrophy. Elevated left ventricular filling pressure. Mild bi-atrial enlargement. Mild mitral regurgitation. Mild tricuspid regurgitation. Systolic pulmonary artery pressure (SPAP) is normal estimated at 38 mmHg   (Right atrial pressure of 3 mmHg). No significant change from exam done 1/11/2018. ASSESSMENT AND PLAN:   68 old male with past medical history HTN (Eliquis), HFpEF, COPD (4 L), chronic bronchitis, hernia repair who initially presented to OSH with 1 week waxing/waning aphasia. CT w/ near occlusion of L ICA and small L frontal infarct. Subacute ischemic CV  P/w intermittent aphasia for several days. CT w/ near occlusion of proximal L ICA and reduced blood flow to MCA territory as well as age-indeterminate, possibly small acute left frontal lobe infarct.   - Neurocritical care consulted, appreciate recs  - Goal SBP <160, labetalol PRN   - Elevate head of bed  - Neurochecks q1 hr  - Target BP >633 systolic   - Start IVF; can dc if BP remaining above goal  - Diet pending swallow screen  - Statin  - Start heparin gtt  - No TNA, TPK indicated    Chronic Issues  - Pharm to med rec  -COPD   - Cont home meds  -HFpEF, not in exacerbation   - Cont home meds  -HTN   - Cont home meds        Code Status:Prior  FEN: NPO  PPX:  Heparin gtt  DISPO: ICU    This patient has been staffed and discussed with Jevon Will MD.   -----------------------------  Benjamin De Luna MD, PGY-1  2/25/2023  5:08 PM

## 2023-02-25 NOTE — ED NOTES
Dr. April Braswell at bedside to update patient and family on results and plan of care.       Angélica Walsh RN  02/25/23 8926

## 2023-02-25 NOTE — CONSULTS
Pharmacy Consult: [address] [place] [address] has been asked by Dr. Frankie Rucker to review this patient's medication profile to evaluate IV medications and change all base solutions to 0.9% sodium chloride if possible based on compatibility and product availability. This profile review will include an assessment of total IV fluids being administered to the patient. If a current order exists for continuous infusion of non-hypertonic plain IV fluid, the pharmacist will contact the prescriber to recommend the discontinuation of the IV fluid order. The following medications must remain in D5W due to incompatibility with 0.9% sodium chloride:        Amphotericin    Mycophenolate    Nitroprusside                Penicillin G Potassium    Please be aware that the patient may have Dextrose 10% ordered as part of hypoglycemia orderset. Pharmacy will follow daily to ensure all new IVPBs + infusions are in 0.9% sodium chloride. Please call with questions.   Thank you,    Mayra Benítez, PharmD  2/25/2023 at 3:45 PM

## 2023-02-25 NOTE — ED PROVIDER NOTES
[address] [facility]  ED     EMERGENCY DEPARTMENT ENCOUNTER            Pt Name: Michael Lees   MRN: [id number]   [place] 1950   Date of evaluation: 2/25/2023   Provider: Jamin Flaherty DO   PCP: Nargis Fraga   Note Started: 2:57 PM EST 2/25/23          CHIEF COMPLAINT     Chief Complaint   Patient presents with    Aphasia     Expressive aphasia. Per family began 2/24 morning approx. 11:00. States s/s improved last night. This morning around 11:00 s/s returned 'but worse. \" Dr. Donovan Terrell at bedside. Accu check 143. Code stroke initiated. CT notified              HISTORY OF PRESENT ILLNESS:   History from : Patient and Family and son    Limitations to history : Language        Michael Lees is a 68 y.o. male who presents to the emergency department with reported slurred speech. Upon initial evaluation, patient states that he has had symptoms over the last week. There was difficulty in obtaining further details as patient is a poor historian. Patient mostly complains of speech changes. Upon son's arrival, we were able to obtain further clarification. By report, patient has had waxing and waning speech changes over the last week that are worse than his typical stuttering which is chronic in nature. Son reports that symptoms were moderate yesterday and then completely resolved last night prior to patient going to bed. Patient slept in until approximately 11 this morning at which point it was noted that symptoms had very reoccurred. Code stroke initiated at that time. Nursing Notes were all reviewed and agreed with, or any disagreements were addressed in the HPI. REVIEW OF SYSTEMS :    Positives and Pertinent negatives as per HPI. MEDICAL HISTORY   has a past medical history of Bronchitis chronic, Emphysema of lung ([address] Influenza A (01/10/2018), Lung disease, and Pneumonia.     Past Surgical History:   Procedure Laterality Date    FRACTURE SURGERY      johnson in femur/hip on right HERNIA REPAIR        CURRENTMEDICATIONS       Previous Medications    APIXABAN (ELIQUIS) 5 MG TABS TABLET    TAKE 1 TABLET TWICE DAILY    BUDESONIDE-FORMOTEROL (SYMBICORT) 160-4.5 MCG/ACT AERO    INHALE 2 PUFFS INTO THE LUNGS 2 TIMES DAILY    DILTIAZEM (CARDIZEM CD) 180 MG EXTENDED RELEASE CAPSULE    TAKE 1 CAPSULE BY MOUTH DAILY    FAMOTIDINE (PEPCID) 20 MG TABLET    Take 1 tablet by mouth 2 times daily    GUAIFENESIN (MUCINEX) 600 MG EXTENDED RELEASE TABLET    Take 1 tablet by mouth 2 times daily as needed for Congestion    IPRATROPIUM-ALBUTEROL (DUONEB) 0.5-2.5 (3) MG/3ML SOLN NEBULIZER SOLUTION    INHALE THREE (3) MILLILIERS (1 VIAL) VIA NEBULIZATION ROUTE EVERY 6 HOURS AS NEEDED FOR SHORTNESS OF BREATH    NITROGLYCERIN (NITROSTAT) 0.4 MG SL TABLET    PLACE 1 TABLET UNDER THE TONGUE EVERY 5 MINUTES AS NEEDED FOR CHEST PAIN    POTASSIUM CHLORIDE (KLOR-CON M) 20 MEQ EXTENDED RELEASE TABLET    Take 1 tablet by mouth daily    ROFLUMILAST (DALIRESP) 500 MCG TABLET    TAKE (1) TABLET DAILY    TORSEMIDE 40 MG TABS    Take 40 mg by mouth 2 times daily    VENTOLIN  (90 BASE) MCG/ACT INHALER    INHALE TWO (2) PUFFS EVERY 6 HOURS AS NEEDED FOR WHEEZING OR SHORTNESS OF BREATH    VITAMIN D (ERGOCALCIFEROL) 1.25 MG (87192 UT) CAPS CAPSULE    Take 1 capsule by mouth once a week      SCREENINGS          [place] Coma Scale  Eye Opening: Spontaneous  Best Verbal Response: Oriented  Best Motor Response: Obeys commands  [place] Coma Scale Score: 15                CIWA Assessment  BP: 125/88  Heart Rate: 71                  PHYSICAL EXAM :  ED Triage Vitals [02/25/23 1453]   BP Temp Temp Source Heart Rate Resp SpO2 Height Weight   125/88 98 °F (36.7 °C) Oral 71 16 100 % -- --      GENERAL APPEARANCE: Awake and alert. Cooperative. No acute distress. HEAD: Normocephalic. Atraumatic. EYES: PERRL. EOM's grossly intact. ENT: Mucous membranes are moist.   NECK: Supple, trachea midline. HEART: RRR. Normal S1, S2.  No murmurs, rubs or gallops. LUNGS: Respirations unlabored. CTAB. Good air exchange. No wheezes, rales, or rhonchi. Speaking comfortably in full sentences. ABDOMEN: Soft. Non-distended. Non-tender. No guarding or rebound. Normal Bowel sounds. EXTREMITIES: No peripheral edema. MAEE. No acute deformities. SKIN: Warm and dry. No acute rashes. NEUROLOGICAL: Alert and oriented X 3. CN II-XII intact. No gross facial drooping. Strength 5/5 in all extremities. Sensation intact. No pronator drift. Normal coordination. Gait not tested. PSYCHIATRIC: Normal mood and affect. NIH Stroke Scale     Interval: Baseline  Time: On arrival.  Person Administering Scale: Cassandra Arriaga,    Administer stroke scale items in the order listed. Record performance in each category after each subscale exam. Do not go back and change scores. Follow directions provided for each exam technique. Scores should reflect what the patient does, not what the clinician thinks the patient can do. The clinician should record answers while administering the exam and work quickly. Except where indicated, the patient should not be coached (i.e., repeated requests to patient to make a special effort). 1a  Level of consciousness: 0=alert; keenly responsive   1b. LOC questions:  0=Performs both tasks correctly   1c. LOC commands: 0=Performs both tasks correctly   2. Best Gaze: 0=normal   3. Visual: 0=No visual loss   4. Facial Palsy: 0=Normal symmetric movement   5a. Motor left arm: 0=No drift, limb holds 90 (or 45) degrees for full 10 seconds   5b. Motor right arm: 1=Drift, limb holds 90 (or 45) degrees but drifts down before full 10 seconds: does not hit bed   6a. motor left le=No drift, limb holds 90 (or 45) degrees for full 10 seconds   6b  Motor right le=No drift, limb holds 90 (or 45) degrees for full 10 seconds   7. Limb Ataxia: 1=Present in one limb   8. Sensory: 0=Normal; no sensory loss   9.  Best Language:  1=Mild to moderate aphasia; some obvious loss of fluency or facility of comprehension without significant limitation on ideas expressed or form of expression. 10. Dysarthria: 0=Normal   11. Extinction and Inattention: 0=No abnormality   12. Distal motor function: 0=Normal    Total:  3     LABS  I have reviewed all labs for this visit.    Results for orders placed or performed during the hospital encounter of 02/25/23   Protime-INR   Result Value Ref Range    Protime 14.4 11.7 - 14.5 sec    INR 1.13 0.87 - 1.14   Comprehensive Metabolic Panel   Result Value Ref Range    Sodium 141 136 - 145 mmol/L    Potassium 3.6 3.5 - 5.1 mmol/L    Chloride 98 (L) 99 - 110 mmol/L    CO2 32 21 - 32 mmol/L    Anion Gap 11 3 - 16    Glucose 143 (H) 70 - 99 mg/dL    BUN 13 7 - 20 mg/dL    Creatinine 0.8 0.8 - 1.3 mg/dL    Est, Glom Filt Rate >60 >60    Calcium 9.3 8.3 - 10.6 mg/dL    Total Protein 7.2 6.4 - 8.2 g/dL    Albumin 4.2 3.4 - 5.0 g/dL    Albumin/Globulin Ratio 1.4 1.1 - 2.2    Total Bilirubin 0.8 0.0 - 1.0 mg/dL    Alkaline Phosphatase 97 40 - 129 U/L    ALT 17 10 - 40 U/L    AST 21 15 - 37 U/L   Troponin   Result Value Ref Range    Troponin <0.01 <0.01 ng/mL   Sample possible blood bank testing   Result Value Ref Range    Specimen Status ADRIÁN    CBC with Auto Differential   Result Value Ref Range    WBC 10.4 4.0 - 11.0 K/uL    RBC 5.19 4.20 - 5.90 M/uL    Hemoglobin 16.1 13.5 - 17.5 g/dL    Hematocrit 48.0 40.5 - 52.5 %    MCV 92.4 80.0 - 100.0 fL    MCH 31.0 26.0 - 34.0 pg    MCHC 33.5 31.0 - 36.0 g/dL    RDW 14.3 12.4 - 15.4 %    Platelets 596 381 - 862 K/uL    MPV 8.7 5.0 - 10.5 fL    Neutrophils % 75.7 %    Lymphocytes % 14.6 %    Monocytes % 6.4 %    Eosinophils % 2.7 %    Basophils % 0.6 %    Neutrophils Absolute 7.9 (H) 1.7 - 7.7 K/uL    Lymphocytes Absolute 1.5 1.0 - 5.1 K/uL    Monocytes Absolute 0.7 0.0 - 1.3 K/uL    Eosinophils Absolute 0.3 0.0 - 0.6 K/uL    Basophils Absolute 0.1 0.0 - 0.2 K/uL   Lactic Acid   Result Value Ref Range    Lactic Acid 1.8 0.4 - 2.0 mmol/L   Blood Gas, Venous   Result Value Ref Range    pH, Gregg 7.412 7.350 - 7.450    pCO2, Gregg 54.5 (H) 40.0 - 50.0 mmHg    pO2, Gregg 58.7 (H) 25.0 - 40.0 mmHg    HCO3, Venous 33.9 (H) 23.0 - 29.0 mmol/L    Base Excess, Gregg 7.2 (H) -3.0 - 3.0 mmol/L    O2 Sat, Gregg 92 Not Established %    Carboxyhemoglobin 1.2 0.0 - 1.5 %    MetHgb, Gregg 0.5 <1.5 %    TC02 (Calc), Gregg 36 Not Established mmol/L    O2 Therapy Unknown    POCT Glucose   Result Value Ref Range    POC Glucose 143 (H) 70 - 99 mg/dl    Performed on [facility]    EKG 12 Lead   Result Value Ref Range    Ventricular Rate 66 BPM    Atrial Rate 375 BPM    QRS Duration 102 ms    Q-T Interval 406 ms    QTc Calculation (Bazett) 425 ms    R Axis -20 degrees    T Axis 12 degrees    Diagnosis       Atrial fibrillationLow voltage QRS in limb leadsIncomplete right bundle branch blockAbnormal ECGWhen compared with ECG of 03-APR-2022 11:21,Incomplete right bundle branch block is now PresentConfirmed by Hayden Miner ([id number]) on 2/25/2023 3:55:45 PM       EKG Interpretation  Atrial fibrillation with a rate of 66. Normal axis. Normal intervals and durations. No ST or T wave changes appreciated. Incomplete right bundle branch block noted. EKG similar to previous EKG on 4/3/2022. RADIOLOGY    XR CHEST PORTABLE   Final Result   Increased interstitial opacity. While much or all of this may be chronic,   please correlate with any clinical evidence of acute interstitial edema. CTA HEAD NECK W CONTRAST   Preliminary Result   1. 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 Reduced blood flow to the left MCA territory. 2. Bilateral proximal vertebral artery occlusions with distal reconstitution. 3. Age-indeterminate, possibly small acute left frontal lobe infarct. 4. Chronic thoracic compression deformities.    Critical results were called by Dr. Judie Collier MD to [facility] on 2/25/2023 at 15:29. CT HEAD WO CONTRAST   Preliminary Result   1. 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 Reduced blood flow to the left MCA territory. 2. Bilateral proximal vertebral artery occlusions with distal reconstitution. 3. Age-indeterminate, possibly small acute left frontal lobe infarct. 4. Chronic thoracic compression deformities. Critical results were called by Dr. Jose Young MD to [place] [id number] on   2/25/2023 at 15:29. I discussed the CT imaging results with the radiologist as noted above. TIMES:  Last Known Well: \"Late last night\"  Arrival to ED: 1453  TNK/tPA Administration Time: Not indicated. Door to Needle: Not indicated. Time to ICU: Not indicated.   Stroke Team: Case discussed with  Stroke Team, Dr. Alok Valerio \at approximately 1500    Reason for Delays:  [] Social/Mandaen  [] Initial refusal of testing or treatment  [] Care team unable to identify eligibility  [] Hypertension requiring aggressive control with IV medications  [] Further diagnostic evaluation to confirm stroke for patients with hypoglycemia (blood glucose <50), seizures, or major metabolic disorders  [] Management of concomitant emergent/acute conditions such as cardiopulmonary arrest, respiratory failure (requiring intubation)  [] Investigational or experimental protocol for thrombolysis    TNK/ t-PA NOT given due to the following EXCLUSION CRITERIA (only those checked):  [] Pregnancy  [] Symptoms > 3 hours of onset  [] Minor or isolated neurological signs  [] Non-disabling stroke  [] Seizure at the same time of stroke symptoms  [] Active bleeding or acute trauma (fracture)  [] Presentation consistent with acute MI or post-MI pericarditis  [] Known intracranial neoplasm, AV malformation or aneurysm  [] CT evidence of intracranial hemorrhage  [] Any prior history of intracranial hemorrhage  [] Symptoms suggestive of subarachnoid hemorrhage (even if head CT normal)  [] Persistent hypertension (SBP>185 or DBP>110)  [] Glucose < 50 or > 400. [x] Bleeding diathesis, including but not limited to:   -Platelets < 282,092   -Heparin within 48 hours with PTT > normal range   -Current or recent use of anticoagulants (dabagtran, rivaroxaban, or warfarin with     INR > 1.7)  [] Lumbar puncture in past 7 days  [] Arterial puncture at a noncompressible site in past 7 days  [] Major surgery in past 14 days  [] Gastrointestinal or urinary tract hemorrhage in past 21 days  [] Myocardial infarction in past 3 months  [] Stroke, intracranial surgery or serious head trauma in past 3 months    DIAGNOSTIC RESULTS     LABS:   Labs Reviewed   COMPREHENSIVE METABOLIC PANEL - Abnormal; Notable for the following components:       Result Value    Chloride 98 (*)     Glucose 143 (*)     All other components within normal limits   CBC WITH AUTO DIFFERENTIAL - Abnormal; Notable for the following components:    Neutrophils Absolute 7.9 (*)     All other components within normal limits   BLOOD GAS, VENOUS - Abnormal; Notable for the following components:    pCO2, Gregg 54.5 (*)     pO2, Gregg 58.7 (*)     HCO3, Venous 33.9 (*)     Base Excess, Gregg 7.2 (*)     All other components within normal limits   POCT GLUCOSE - Abnormal; Notable for the following components:    POC Glucose 143 (*)     All other components within normal limits   PROTIME-INR   TROPONIN   SAMPLE POSSIBLE BLOOD BANK TESTING   LACTIC ACID   CBC WITH AUTO DIFFERENTIAL   COMPREHENSIVE METABOLIC PANEL W/ REFLEX TO MG FOR LOW K   TROPONIN   PROTIME-INR   POCT GLUCOSE      When ordered only abnormal lab results are displayed. All other labs were within normal range or not returned as of this dictation.      RADIOLOGY:      Non-plain film images such as CT, Ultrasound and MRI are read by the radiologist. Plain radiographic images are visualized and preliminarily interpreted by the ED Provider with the below findings: Interpretation per the Radiologist below, if available at the time of this note:     XR CHEST PORTABLE   Final Result   Increased interstitial opacity. While much or all of this may be chronic,   please correlate with any clinical evidence of acute interstitial edema. CTA HEAD NECK W CONTRAST   Preliminary Result   1. 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 Reduced blood flow to the left MCA territory. 2. Bilateral proximal vertebral artery occlusions with distal reconstitution. 3. Age-indeterminate, possibly small acute left frontal lobe infarct. 4. Chronic thoracic compression deformities. Critical results were called by Dr. Katheryn Spatz, MD to [address] on   2/25/2023 at 15:29. CT HEAD WO CONTRAST   Preliminary Result   1. 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 Reduced blood flow to the left MCA territory. 2. Bilateral proximal vertebral artery occlusions with distal reconstitution. 3. Age-indeterminate, possibly small acute left frontal lobe infarct. 4. Chronic thoracic compression deformities. Critical results were called by Dr. Katheryn Spatz, MD to [address] on   2/25/2023 at 15:29. No results found. Vitals:    Vitals:    02/25/23 1453   BP: 125/88   Pulse: 71   Resp: 16   Temp: 98 °F (36.7 °C)   TempSrc: Oral   SpO2: 100%                   CRITICAL CARE TIME:  Total critical care time today provided was 55 minutes. This excludes seperately billable procedures and family discussion time. Provided for 0 requiring intervention with concern for potential decompensation. CC/HPI Summary, DDx, ED Course, and Reassessment: Patient presents to the emergency department with speech changes and right upper extremity drift/ataxia.   Per patient/family this has been intermittent over the last week and potentially completely resolved as recently as late as last night with reoccurrence noted at 11 AM this morning when patient woke up. History is complex as patient has history of stuttering and is in general a poor historian. Patient's labs are stable. CT is concerning for left internal carotid occlusion with decreased flow to the left MCA as well as bilateral proximal vertebral artery occlusions and left frontal lobe infarct that is age-indeterminate. Multiple text conversations with neurosurgery, stroke team, and intensivist.  No TNK recommended at this time. Aspirin provided rectally. Ultimately, patient will be transferred to [facility] for further evaluation and potential intervention including possible CEA. Patient was given the following medications:   Medications   iopamidol (ISOVUE-370) 76 % injection 75 mL (has no administration in time range)        CONSULTS:   IP CONSULT TO PHARMACY  PHARMACY TO CHANGE BASE FLUIDS   Discussion with Other Professionals: Interventional neurosurgery, stroke team, intensivist, and hospitalist.  As noted above. Chronic Conditions: Poor historian. Atrial fibrillation, Eliquis use  Chart review: History of COPD/CHF with several admissions for respiratory failure. Disposition Considerations:    I am the Primary Clinician of Record. FINAL IMPRESSION    1. Cerebrovascular accident (CVA), unspecified mechanism ([address]           DISPOSITION/PLAN     PATIENT REFERRED TO:   No follow-up provider specified.      DISCHARGE MEDICATIONS:   Discharge Medication List as of 2/25/2023  5:53 PM           DISCONTINUED MEDICATIONS:   Discontinued Medications    No medications on file              (Please note that portions of this note were completed with a voice recognition program.  Efforts were made to edit the dictations but occasionally words are mis-transcribed.)       Eriberto Song DO (electronically signed)             Eriberto Song DO  02/25/23 2122

## 2023-02-25 NOTE — ED NOTES
[phone]- Paged Ct.  Paged  stroke team for consult per Dr. Patricio Gerber  - notified labs   [phone]- 2nd page to [facility] team  [phone]- Dr. Pedro Castanon of  stroke team called back and spoke with Dr. Nubia Dougherty  02/25/23 [phone]

## 2023-02-26 ENCOUNTER — APPOINTMENT (OUTPATIENT)
Dept: GENERAL RADIOLOGY | Age: 73
DRG: 035 | End: 2023-02-26
Attending: INTERNAL MEDICINE
Payer: MEDICARE

## 2023-02-26 PROBLEM — I63.89 ACUTE UNILATERAL CEREBRAL INFARCTION IN A WATERSHED DISTRIBUTION (HCC): Status: ACTIVE | Noted: 2023-02-25

## 2023-02-26 PROBLEM — I63.232 ACUTE CEREBROVASCULAR ACCIDENT (CVA) DUE TO STENOSIS OF LEFT CAROTID ARTERY (HCC): Status: ACTIVE | Noted: 2023-02-26

## 2023-02-26 LAB — APTT: 35.4 SEC (ref 23–34.3)

## 2023-02-26 PROCEDURE — 71045 X-RAY EXAM CHEST 1 VIEW: CPT

## 2023-02-26 PROCEDURE — 2580000003 HC RX 258

## 2023-02-26 PROCEDURE — 6370000000 HC RX 637 (ALT 250 FOR IP)

## 2023-02-26 PROCEDURE — 97116 GAIT TRAINING THERAPY: CPT

## 2023-02-26 PROCEDURE — 97530 THERAPEUTIC ACTIVITIES: CPT

## 2023-02-26 PROCEDURE — 6360000002 HC RX W HCPCS

## 2023-02-26 PROCEDURE — 2700000000 HC OXYGEN THERAPY PER DAY

## 2023-02-26 PROCEDURE — 92526 ORAL FUNCTION THERAPY: CPT

## 2023-02-26 PROCEDURE — 36415 COLL VENOUS BLD VENIPUNCTURE: CPT

## 2023-02-26 PROCEDURE — 92523 SPEECH SOUND LANG COMPREHEN: CPT

## 2023-02-26 PROCEDURE — 6370000000 HC RX 637 (ALT 250 FOR IP): Performed by: INTERNAL MEDICINE

## 2023-02-26 PROCEDURE — 97162 PT EVAL MOD COMPLEX 30 MIN: CPT

## 2023-02-26 PROCEDURE — 99233 SBSQ HOSP IP/OBS HIGH 50: CPT | Performed by: PSYCHIATRY & NEUROLOGY

## 2023-02-26 PROCEDURE — 85730 THROMBOPLASTIN TIME PARTIAL: CPT

## 2023-02-26 PROCEDURE — 94664 DEMO&/EVAL PT USE INHALER: CPT

## 2023-02-26 PROCEDURE — 94640 AIRWAY INHALATION TREATMENT: CPT

## 2023-02-26 PROCEDURE — 94761 N-INVAS EAR/PLS OXIMETRY MLT: CPT

## 2023-02-26 PROCEDURE — 2000000000 HC ICU R&B

## 2023-02-26 PROCEDURE — 99223 1ST HOSP IP/OBS HIGH 75: CPT | Performed by: INTERNAL MEDICINE

## 2023-02-26 PROCEDURE — 92610 EVALUATE SWALLOWING FUNCTION: CPT

## 2023-02-26 RX ORDER — IPRATROPIUM BROMIDE AND ALBUTEROL SULFATE 2.5; .5 MG/3ML; MG/3ML
1 SOLUTION RESPIRATORY (INHALATION)
Status: DISCONTINUED | OUTPATIENT
Start: 2023-02-26 | End: 2023-03-02

## 2023-02-26 RX ORDER — SODIUM CHLORIDE 9 MG/ML
INJECTION, SOLUTION INTRAVENOUS CONTINUOUS
Status: DISCONTINUED | OUTPATIENT
Start: 2023-02-26 | End: 2023-02-26

## 2023-02-26 RX ORDER — PRAMIPEXOLE DIHYDROCHLORIDE 0.5 MG/1
0.5 TABLET ORAL DAILY
Status: ON HOLD | COMMUNITY

## 2023-02-26 RX ORDER — 0.9 % SODIUM CHLORIDE 0.9 %
500 INTRAVENOUS SOLUTION INTRAVENOUS ONCE
Status: COMPLETED | OUTPATIENT
Start: 2023-02-26 | End: 2023-02-26

## 2023-02-26 RX ORDER — FUROSEMIDE 10 MG/ML
20 INJECTION INTRAMUSCULAR; INTRAVENOUS ONCE
Status: COMPLETED | OUTPATIENT
Start: 2023-02-26 | End: 2023-02-26

## 2023-02-26 RX ORDER — ALBUTEROL SULFATE 2.5 MG/3ML
2.5 SOLUTION RESPIRATORY (INHALATION) EVERY 4 HOURS PRN
Status: DISCONTINUED | OUTPATIENT
Start: 2023-02-26 | End: 2023-03-06 | Stop reason: HOSPADM

## 2023-02-26 RX ADMIN — ASPIRIN 325 MG: 325 TABLET, COATED ORAL at 10:06

## 2023-02-26 RX ADMIN — IPRATROPIUM BROMIDE AND ALBUTEROL SULFATE 1 AMPULE: 2.5; .5 SOLUTION RESPIRATORY (INHALATION) at 12:49

## 2023-02-26 RX ADMIN — ATORVASTATIN CALCIUM 80 MG: 80 TABLET, FILM COATED ORAL at 21:17

## 2023-02-26 RX ADMIN — BUDESONIDE 500 MCG: 0.5 SUSPENSION RESPIRATORY (INHALATION) at 09:36

## 2023-02-26 RX ADMIN — SODIUM CHLORIDE 500 ML: 9 INJECTION, SOLUTION INTRAVENOUS at 01:36

## 2023-02-26 RX ADMIN — ARFORMOTEROL TARTRATE 15 MCG: 15 SOLUTION RESPIRATORY (INHALATION) at 09:36

## 2023-02-26 RX ADMIN — TORSEMIDE 40 MG: 20 TABLET ORAL at 11:38

## 2023-02-26 RX ADMIN — IPRATROPIUM BROMIDE AND ALBUTEROL SULFATE 1 AMPULE: 2.5; .5 SOLUTION RESPIRATORY (INHALATION) at 17:04

## 2023-02-26 RX ADMIN — SODIUM CHLORIDE, PRESERVATIVE FREE 10 ML: 5 INJECTION INTRAVENOUS at 21:16

## 2023-02-26 RX ADMIN — IPRATROPIUM BROMIDE AND ALBUTEROL SULFATE 1 AMPULE: 2.5; .5 SOLUTION RESPIRATORY (INHALATION) at 20:49

## 2023-02-26 RX ADMIN — ARFORMOTEROL TARTRATE 15 MCG: 15 SOLUTION RESPIRATORY (INHALATION) at 20:49

## 2023-02-26 RX ADMIN — DILTIAZEM HYDROCHLORIDE 180 MG: 180 CAPSULE, EXTENDED RELEASE ORAL at 11:37

## 2023-02-26 RX ADMIN — SODIUM CHLORIDE: 9 INJECTION, SOLUTION INTRAVENOUS at 03:06

## 2023-02-26 RX ADMIN — IPRATROPIUM BROMIDE AND ALBUTEROL SULFATE 1 AMPULE: 2.5; .5 SOLUTION RESPIRATORY (INHALATION) at 06:12

## 2023-02-26 RX ADMIN — TORSEMIDE 40 MG: 20 TABLET ORAL at 21:17

## 2023-02-26 RX ADMIN — IPRATROPIUM BROMIDE AND ALBUTEROL SULFATE 1 AMPULE: 2.5; .5 SOLUTION RESPIRATORY (INHALATION) at 09:35

## 2023-02-26 RX ADMIN — FUROSEMIDE 20 MG: 10 INJECTION, SOLUTION INTRAMUSCULAR; INTRAVENOUS at 07:05

## 2023-02-26 RX ADMIN — BUDESONIDE 500 MCG: 0.5 SUSPENSION RESPIRATORY (INHALATION) at 20:49

## 2023-02-26 RX ADMIN — SODIUM CHLORIDE, PRESERVATIVE FREE 10 ML: 5 INJECTION INTRAVENOUS at 10:07

## 2023-02-26 ASSESSMENT — ENCOUNTER SYMPTOMS
GASTROINTESTINAL NEGATIVE: 1
EYES NEGATIVE: 1
RESPIRATORY NEGATIVE: 1

## 2023-02-26 NOTE — PROGRESS NOTES
Physical Therapy  Facility/Department: [facility] ICU  Physical Therapy Initial Assessment    Name: Lacie Mckeon  : 1950  MRN: [id number]  Date of Service: 2023    Discharge Recommendations:  Lacie Mckeon scored a 14/24 on the AM-PAC short mobility form. Current research shows that an AM-PAC score of 17 or less is typically not associated with a discharge to the patient's home setting. Based on the patient's AM-PAC score and their current functional mobility deficits, it is recommended that the patient have 5-7 sessions per week of Physical Therapy at d/c to increase the patient's independence. At this time, this patient demonstrates complex nursing, medical, and rehabilitative needs, and would benefit from intensive rehabilitation services upon discharge from the Inpatient setting. This patient demonstrates the ability to participate in and benefit from an intensive therapy program with a coordinated interdisciplinary team approach to foster frequent, structured, and documented communication among disciplines, who will work together to establish, prioritize, and achieve treatment goals. Please see assessment section for further patient specific details. If patient discharges prior to next session this note will serve as a discharge summary. Please see below for the latest assessment towards goals. PT Equipment Recommendations  Other: plan to continue to assess pending progress and likely defer recommendations to the next level of care - recommend RW; owns rollator      Patient Diagnosis(es): There were no encounter diagnoses. Past Medical History:  has a past medical history of Bronchitis chronic, Emphysema of lung ([facility] [address] Influenza A, Lung disease, and Pneumonia. Past Surgical History:  has a past surgical history that includes fracture surgery and hernia repair.     Assessment   Body Structures, Functions, Activity Limitations Requiring Skilled Therapeutic Intervention: Decreased functional mobility ; Decreased ADL status; Decreased strength;Decreased endurance;Decreased balance;Decreased fine motor control;Decreased coordination  Assessment: Patient demonstrates impaired functional mobility, now requiring (A) for all mobility including with and without RW. Patient is typically (I) in his home for mobility needs without an assistive device - wears O2 4L at baseline. Patient limited by ataxia on right during mobility and low functional endurance. Patient will continue to benefit from additional skilled PT intervention to facilitate safe mobility and to optimize (I) to promote return to prior level of function. Treatment Diagnosis: impaired functional mobility  Therapy Prognosis: Good  Decision Making: Medium Complexity  Barriers to Learning: none  Requires PT Follow-Up: Yes  Activity Tolerance  Activity Tolerance: Patient tolerated evaluation without incident  Activity Tolerance Comments: limited by O2 saturation during gait; RN notified; fatigues easily     Plan   Physcial Therapy Plan  General Plan: 5-7 times per week  Current Treatment Recommendations: Strengthening, ROM, Balance training, Functional mobility training, Transfer training, Endurance training, Gait training, Stair training, Neuromuscular re-education, Home exercise program, Safety education & training, Patient/Caregiver education & training, Equipment evaluation, education, & procurement, Therapeutic activities  Safety Devices  Type of Devices: Call light within reach, Chair alarm in place, Gait belt, Left in chair, Nurse notified     Restrictions  Restrictions/Precautions  Restrictions/Precautions: Fall Risk  Position Activity Restriction  Other position/activity restrictions: up as tolerated, up with assistance     Subjective   General  Chart Reviewed: Yes  Additional Pertinent Hx: [facility] ED 2/25 69 yo male who presents with expressive aphasia and RUE ataxia.   CT head shows an age-indeterminate, possibly acute L frontal lobe infarct. CTA head/neck shows proximal L ICA near occlusion with distal lumen collapse, and bilateral proximal vertebral artery occlusion with distal reconstitution. PMH: HTN, HFpEF, COPD, emphysema, PNA, chronic bronchitis, hernia repair, AFib  Family / Caregiver Present: Yes (granddaughter)  Referring Practitioner: MD Дмитрий  Referral Date : 02/25/23  Diagnosis: acute CVA  Follows Commands: Within Functional Limits  Other (Comment): with increased time  General Comment  Comments: Patient supine in bed upon arrival - agreeable to PT.  RN approval obtained prior to PT entry.  Patient on 5L O2 via nasal cannula upon arrival - maintained throughout session. O2 saturation at rest 90-93%, desaturated with gait to 83% on 5L - recovered to 90% with 2 minutes seated rest and cues for pursed lip breathing - RN notified.  Subjective  Subjective: Patient denies pain, \"speech still off\" - coordination deficits noted in left hand, ataxia noted on right UE/LE.  Per granddaughter, previous injury \"years ago\" with left hand coordination deficits.         Social/Functional History  Social/Functional History  Lives With: Family (son and daughter-in-law)  Type of Home: House  Home Layout: One level  Home Access: Stairs to enter without rails  Entrance Stairs - Number of Steps: 2 stairs to enter - uses rollator  Bathroom Shower/Tub: Walk-in shower  Bathroom Toilet: Standard  Bathroom Equipment: Shower chair  Home Equipment: Walker, 4 wheeled, Oxygen  Has the patient had two or more falls in the past year or any fall with injury in the past year?: No (\"near falls\" - attributes to dizziness when he first stands)  Receives Help From: Family (available for near 24 hour (A))  ADL Assistance: Independent  Homemaking Assistance: Needs assistance (family performs)  Ambulation Assistance: Independent (no assistive device for household mobility, limited community mobility with (S) and rollator)  Transfer Assistance: Independent  Active  : No  Occupation: Retired  Vision/Hearing  Vision  Vision: Impaired (decreased right peripheral vision noted)  Vision Exceptions: Wears glasses for reading  Hearing  Hearing: Within functional limits    Cognition   Orientation  Overall Orientation Status: Within Functional Limits  Orientation Level: Oriented X4  Cognition  Overall Cognitive Status: WFL  Cognition Comment: increased time for processing, \"speech still off\" - family present and agrees - per patient \"can't find words\"     Objective   BP start of session seated 169/104 - per RN cleared for mobility, MDs want BP elevated      Gross Assessment  Coordination: Grossly decreased, non-functional (ataxia noted right LE > right UE; (B) hand coordination deficits noted with finger to thumb deficits)  Sensation: Intact (denies numbness or tingling)     AROM RLE (degrees)  RLE AROM: WFL  AROM LLE (degrees)  LLE AROM : WFL  Strength RLE  Comment: grossly 5/5 but ataxic with increased time for motor planning noted  Strength LLE  Comment: grossly 5/5     Transfers  Sit to Stand: Minimal Assistance (without an assistive device; CGA to RW)  Stand to Sit: Minimal Assistance (without an assistive device; CGA with RW)  Stand Pivot Transfers: Moderate Assistance (without an assistive device; min A with RW)  Ambulation  Surface: Level tile  Device: No Device  Other Apparatus: O2 (5L)  Assistance:  Moderate assistance  Quality of Gait: ataxic, unsteady, step to gait, decreased (B) step length, wide base of support  Distance: 3ft forward and backward  More Ambulation?: Yes  Ambulation 2  Surface - 2: level tile  Device 2: Rolling Walker  Other Apparatus 2: O2 (5L)  Assistance 2: Minimal assistance  Quality of Gait 2: forward flexed posture, slow sasha, step to with intermittent reciprocal gait, wide base of support, decreased (B) step length, foot clearance, and heel strike;  Distance: 20ft  Comments: limited by SOB - desaturating to 83% on 5L during gait trial Balance  Posture: Fair (forward flexed, rounded shoulders)  Sitting - Static: Good  Sitting - Dynamic: Good;-  Standing - Static: Fair;-  Standing - Dynamic: Poor;+  Comments: min to mod A with for standing balance during mobility attempts       AM-PAC Score  AM-PAC Inpatient Mobility Raw Score : 14 (02/26/23 0938)  AM-PAC Inpatient T-Scale Score : 38.1 (02/26/23 0938)  Mobility Inpatient CMS 0-100% Score: 61.29 (02/26/23 8345)  Mobility Inpatient CMS G-Code Modifier : CL (02/26/23 8025)       Goals  Short Term Goals  Time Frame for Short Term Goals: discharge  Short Term Goal 1: Pt. will demonstrate (I) bed mobility. Short Term Goal 2: Pt. will demonstrate sit <-> stand SBA and no assistive device  Short Term Goal 3: Pt. will demonstrate stand pivot with CGA and no assistive device  Short Term Goal 4: Pt. will ambulate >/= 150ft with CGA and LRAD  Short Term Goal 5: Pt. will negotiate >/= 2 stairs with no rail and LRAD with min A  Patient Goals   Patient Goals : \"get better, go home\"       Education  Patient Education  Education Given To: Patient  Education Provided: Role of Therapy;Plan of Care  Education Provided Comments: use of call light, PT recommendations  Education Method: Demonstration;Verbal  Barriers to Learning: None  Education Outcome: Verbalized understanding;Continued education needed      Therapy Time   Individual Concurrent Group Co-treatment   Time In 0850         Time Out 0929         Minutes 39                 Timed Code Treatment Minutes: 24 minutes    Total Treatment Minutes: 39 Minutes    If patient discharges prior to next treatment, this note will serve as discharge summary.     Jovani Clarke PT, DPT #[id number]

## 2023-02-26 NOTE — PROGRESS NOTES
Occupational Therapy  No Treatment Note    Order received, Chart reviewed- No evaluation initiated. Pt per RN just returned to bed and needs to rest as he is very fatigued from earlier activity with Chantale Lozoya with PT. Will follow up 2/27 . RN in agreement with plan.      Alejandro Hare  MS, OTR/L #[id number]

## 2023-02-26 NOTE — CONSULTS
List of Home Medications the patient is currently taking is complete. Home Medication list in EPIC updated to reflect changes noted below. Source of medications in list is LOFTY prescription fill history. Medications added:  Pramipexole    Medications removed:  Ergocalciferol    Medications adjusted:  None    Please call with any questions.   Tata Villa, PharmD, BCPS  Main pharmacy: [id number]  2/26/2023 8:44 AM      Current Outpatient Medications   Medication Instructions    apixaban (ELIQUIS) 5 MG TABS tablet TAKE 1 TABLET TWICE DAILY    budesonide-formoterol (SYMBICORT) 160-4.5 MCG/ACT AERO INHALE 2 PUFFS INTO THE LUNGS 2 TIMES DAILY    dilTIAZem (CARDIZEM CD) 180 mg, Oral, DAILY    famotidine (PEPCID) 20 mg, Oral, 2 TIMES DAILY    guaiFENesin (MUCINEX) 600 mg, Oral, 2 TIMES DAILY PRN    ipratropium-albuterol (DUONEB) 0.5-2.5 (3) MG/3ML SOLN nebulizer solution INHALE THREE (3) MILLILIERS (1 VIAL) VIA NEBULIZATION ROUTE EVERY 6 HOURS AS NEEDED FOR SHORTNESS OF BREATH    nitroGLYCERIN (NITROSTAT) 0.4 mg, SubLINGual, EVERY 5 MIN PRN    potassium chloride (KLOR-CON M) 20 MEQ extended release tablet 20 mEq, Oral, DAILY    Roflumilast (DALIRESP) 500 MCG tablet TAKE (1) TABLET DAILY    Torsemide 40 mg, Oral, 2 TIMES DAILY    VENTOLIN  (90 Base) MCG/ACT inhaler INHALE TWO (2) PUFFS EVERY 6 HOURS AS NEEDED FOR WHEEZING OR SHORTNESS OF BREATH

## 2023-02-26 NOTE — RT PROTOCOL NOTE
RT Nebulizer Bronchodilator Protocol Note    There is a bronchodilator order in the chart from a provider indicating to follow the RT Bronchodilator Protocol and there is an Initiate RT Bronchodilator Protocol order as well (see protocol at bottom of note). CXR Findings:  XR CHEST PORTABLE    Result Date: 2/26/2023  1. Findings of pulmonary edema which are increased compared to chest radiograph from one day prior. XR CHEST PORTABLE    Result Date: 2/25/2023  Increased interstitial opacity. While much or all of this may be chronic, please correlate with any clinical evidence of acute interstitial edema. The findings from the last RT Protocol Assessment were as follows:  Smoking: Chronic pulmonary disease  Respiratory Pattern: Regular pattern and RR 12-20 bpm  Breath Sounds: Slightly diminished and/or crackles  Cough: Strong, productive  Indication for Bronchodilator Therapy: Decreased or absent breath sounds, On home bronchodilators  Bronchodilator Assessment Score: 5    Continue duoneb treatments Q4w/a & albuterol Q4prn per pt's home use    Aerosolized bronchodilator medication orders have been revised according to the RT Nebulizer Bronchodilator Protocol below. Respiratory Therapist to perform RT Therapy Protocol Assessment initially then follow the protocol. Repeat RT Therapy Protocol Assessment PRN for score 0-3 or on second treatment, BID, and PRN for scores above 3. No Indications - adjust the frequency to every 6 hours PRN wheezing or bronchospasm, if no treatments needed after 48 hours then discontinue using Per Protocol order mode. If indication present, adjust the RT bronchodilator orders based on the Bronchodilator Assessment Score as indicated below. If a patient is on this medication at home then do not decrease Frequency below that used at home.     0-3 - enter or revise RT bronchodilator order(s) to equivalent RT Bronchodilator order with Frequency of every 4 hours PRN for wheezing or increased work of breathing using Per Protocol order mode.       4-6 - enter or revise RT Bronchodilator order(s) to two equivalent RT bronchodilator orders with one order with BID Frequency and one order with Frequency of every 4 hours PRN wheezing or increased work of breathing using Per Protocol order mode.         7-10 - enter or revise RT Bronchodilator order(s) to two equivalent RT bronchodilator orders with one order with TID Frequency and one order with Frequency of every 4 hours PRN wheezing or increased work of breathing using Per Protocol order mode.       11-13 - enter or revise RT Bronchodilator order(s) to one equivalent RT bronchodilator order with QID Frequency and an Albuterol order with Frequency of every 4 hours PRN wheezing or increased work of breathing using Per Protocol order mode.      Greater than 13 - enter or revise RT Bronchodilator order(s) to one equivalent RT bronchodilator order with every 4 hours Frequency and an Albuterol order with Frequency of every 2 hours PRN wheezing or increased work of breathing using Per Protocol order mode.     RT to enter RT Home Evaluation for COPD & MDI Assessment order using Per Protocol order mode.    Electronically signed by Delisa Estevez RCP on 2/26/2023 at 12:53 PM

## 2023-02-26 NOTE — PROGRESS NOTES
ICU Consult Note    Admit Date: 2/25/2023  Day: 1  Vent Day: 0  IV Access:Peripheral  IV Fluids:none  Vasopressors:none                Antibiotics: none  Diet: No diet orders on file    CC: Aphasia    Interval history: Patient seen and examined this morning at bedside. He was sitting up comfortably in his bed. Overnight patient became short of breath. A chest x-ray was done and showed increased pulmonary edema. He was given a dose of 40 mg IV Lasix as well as DuoNeb treatments. He stated this improved his breathing. His IV fluids were stopped. He states he feels much better this morning and is having good urine output. He has no other complaints at this time. He remains afebrile and hemodynamically stable. HPI: 68 old male with past medical history HTN (Eliquis), HFpEF, COPD (4 L), chronic bronchitis, hernia repair who initially presented to OSH with acute onset aphasia. Son reported at OSH patient had waxing/waning speech changes over the past week worse than his typical stuttering. He noticed symptoms onset around 1100 on 2/24, but symptoms gradually resolved and were completely absent that night prior to bed. Patient slept in this morning until 1100 at which point family noted symptoms have recurred and he called 911. At [facility] ED, pt hemodynamically stable with mild aphasia and mild R arm drift; NIHSS 3. EKF showing AF but otherwise no acute changes. CT head w/o showed age-indeterminate small, possibly acute L frontal lobe infacrt as well as proximal left ICA near-occlusion. Pt was transferred to [facility] for neurosurgical evaluation and management. Pt in NAD on arrival. He continues with difficulty finding words; has good insight into sx. Denies any headache, fever, chills, chest pain, dyspnea, abdominal pain, nausea, vomiting, numbness, visual changes.       Past Medical History        Past Medical History:   Diagnosis Date    Bronchitis chronic      Emphysema of lung ([facility] [address]      Influenza A 01/10/2018    Lung disease       copd    Pneumonia       12/2009            Past Surgical History[]Expand by Default         Past Surgical History:   Procedure Laterality Date    FRACTURE SURGERY         johnson in femur/hip on right    HERNIA REPAIR                SocialHistory:   The patient lives at      Alcohol:  Illicit drugs: no use  Tobacco:       Family History:  Family History[]Expand by Default         Family History   Problem Relation Age of Onset    Diabetes Sister      Cancer Sister      Cancer Sister      Cancer Brother      Asthma Daughter      Asthma Son      Diabetes Mother      Cancer Father         Review of Systems   Constitutional:  Negative for chills and fever. Eyes:  Negative for visual disturbance. Respiratory:  Negative for shortness of breath. Cardiovascular:  Negative for chest pain. Gastrointestinal:  Negative for nausea and vomiting. Neurological:  Positive for speech difficulty. Negative for dizziness, weakness, light-headedness, numbness and headaches. All other systems reviewed and are negative.     Medications:     Scheduled Meds:   ipratropium-albuterol  1 ampule Inhalation Q4H WA    atorvastatin  80 mg Oral Nightly    sodium chloride flush  5-40 mL IntraVENous 2 times per day    dilTIAZem  180 mg Oral Daily    [Held by provider] torsemide  40 mg Oral BID    aspirin  325 mg Oral Daily    Or    aspirin  300 mg Rectal Daily    arformoterol tartrate  15 mcg Nebulization BID    And    budesonide  0.5 mg Nebulization BID     Continuous Infusions:   sodium chloride       PRN Meds:ondansetron **OR** ondansetron, polyethylene glycol, sodium chloride flush, sodium chloride, acetaminophen **OR** acetaminophen, heparin (porcine), heparin (porcine), HYDROmorphone    Objective:   Vitals:   T-max:  Patient Vitals for the past 8 hrs:   BP Temp Temp src Pulse Resp SpO2   02/26/23 0705 (!) 131/93 -- -- 71 17 97 %   02/26/23 0700 (!) 131/93 -- -- 74 16 98 %   02/26/23 0613 -- -- -- (!) 104 21 (!) 89 %   02/26/23 0600 -- -- -- 88 16 --   02/26/23 0500 117/88 -- -- 56 13 --   02/26/23 0400 139/84 98 °F (36.7 °C) Oral -- -- --   02/26/23 0300 118/74 -- -- 52 22 100 %   02/26/23 0200 (!) 128/92 -- -- 53 14 96 %   02/26/23 0100 129/78 -- -- 53 26 98 %       Intake/Output Summary (Last 24 hours) at 2/26/2023 0851  Last data filed at 2/26/2023 0600  Gross per 24 hour   Intake 1437 ml   Output 625 ml   Net 812 ml       Review of Systems   Constitutional: Negative. HENT: Negative. Eyes: Negative. Respiratory: Negative. Cardiovascular: Negative. Gastrointestinal: Negative. Genitourinary: Negative. Musculoskeletal: Negative. Skin: Negative. Neurological:  Positive for speech difficulty. Psychiatric/Behavioral: Negative. Physical Exam  Vitals reviewed. Constitutional:       Appearance: Normal appearance. He is normal weight. HENT:      Head: Normocephalic and atraumatic. Eyes:      Extraocular Movements: Extraocular movements intact. Conjunctiva/sclera: Conjunctivae normal.      Pupils: Pupils are equal, round, and reactive to light. Cardiovascular:      Rate and Rhythm: Normal rate and regular rhythm. Heart sounds: Normal heart sounds. Pulmonary:      Effort: Pulmonary effort is normal.      Breath sounds: Normal breath sounds. Abdominal:      General: Abdomen is flat. Bowel sounds are normal.      Palpations: Abdomen is soft. Musculoskeletal:         General: Normal range of motion. Skin:     General: Skin is warm and dry. Neurological:      General: No focal deficit present. Mental Status: He is alert and oriented to person, place, and time. Mental status is at baseline. Comments: Expressive aphasia. Intermittent difficulty finding words but answering appropriately. His motor strength and sensation is intact in the bilateral upper and lower extremities. Mild impairment of R LE heel-to-shin.   Left heel-to-shin intact   Psychiatric: Mood and Affect: Mood normal.         Behavior: Behavior normal.         LABS:    CBC:   Recent Labs     02/25/23  1455   WBC 10.4   HGB 16.1   HCT 48.0      MCV 92.4     Renal:    Recent Labs     02/25/23  1455      K 3.6   CL 98*   CO2 32   BUN 13   CREATININE 0.8   GLUCOSE 143*   CALCIUM 9.3   ANIONGAP 11     Hepatic:   Recent Labs     02/25/23  1455   AST 21   ALT 17   BILITOT 0.8   PROT 7.2   LABALBU 4.2   ALKPHOS 97     Troponin:   Recent Labs     02/25/23  1455   TROPONINI <0.01     BNP: No results for input(s): BNP in the last 72 hours. Lipids: No results for input(s): CHOL, HDL in the last 72 hours. Invalid input(s): LDLCALCU, TRIGLYCERIDE  ABGs:  No results for input(s): PHART, HKL5MZE, PO2ART, XFX0XXH, BEART, THGBART, L5ANXIBK, GBD6YLO in the last 72 hours. INR:   Recent Labs     02/25/23  1455   INR 1.13     Lactate: No results for input(s): LACTATE in the last 72 hours. Cultures:  -----------------------------------------------------------------  RAD:   XR CHEST PORTABLE   Final Result      1. Findings of pulmonary edema which are increased compared to chest radiograph from one day prior. MRI brain without contrast   Final Result      Left MCA border zone infarcts without hemorrhage or mass effect. Assessment/Plan:   68 old male with past medical history HTN (Eliquis), HFpEF, COPD (4 L), chronic bronchitis, hernia repair who initially presented to OSH with 1 week waxing/waning aphasia. CT w/ near occlusion of L ICA and small L frontal infarct. Subacute ischemic CV  P/w intermittent aphasia for several days. CT w/ near occlusion of proximal L ICA and reduced blood flow to MCA territory as well as age-indeterminate, possibly small acute left frontal lobe infarct.   - Neurocritical care following  -Neurovascular consult pending  - Goal SBP between 140-160, labetalol PRN   - Elevate head of bed  - Neurochecks q1 hr  - Target BP >874 systolic              - Start IVF; can dc if BP remaining above goal  - Diet pending swallow screen  - Statin and aspirin 325 mg  - Start heparin gtt  - No TNA, TPK indicated  -MRI of the brain showed left MCA border zone infarct without hemorrhage or mass effect  -PT/OT  -If no obvious source of stroke you will need a 30-day event monitor upon discharge       -COPD              - Cont Pulmicort/Brovana   -DuoNeb treatments as needed    -HFpEF, mild exac              - IVF stopped, lasix given. Improving    -HTN              - Cont home meds          Code Status: Full code  FEN: Regular diet  PPX: Heparin drip  DISPO:ICU    Jacques Orozco DO PGY-1  02/26/23  8:51 AM    This patient has been staffed and discussed with Dr. Sadi Mckeon MD.    [address] [facility]    Patient seen and examined.  I agree with Dr. Garfield Rodriguez history, physical, lab findings, assessment and plan and edited as needed    Sadi Mckeon MD

## 2023-02-26 NOTE — PROGRESS NOTES
This RN called to beside by lab. Pt in respiratory distress, O2 sat 86% on 3L. Respiratory called, PRN breathing treatment given. Residents at bedside, stat CXR ordered. Increased Pulmonary edema compared to yx. 20 mg IV lasix given at 0705. NS fluids stopped.      Electronically signed by Agustin Carrasquillo RN on 2/26/2023 at 7:40 AM

## 2023-02-26 NOTE — PROGRESS NOTES
4 Eyes Admission Assessment     I agree as the admission nurse that 2 RN's have performed a thorough Head to Toe Skin Assessment on the patient. ALL assessment sites listed below have been assessed on admission. Areas assessed by both nurses:   [x]   Head, Face, and Ears   [x]   Shoulders, Back, and Chest  [x]   Arms, Elbows, and Hands   [x]   Coccyx, Sacrum, and Ischium  [x]   Legs, Feet, and Heels        Does the Patient have Skin Breakdown?   No         Nolan Prevention initiated:  No   Wound Care Orders initiated:  No      [facility] nurse consulted for Pressure Injury (Stage 3,4, Unstageable, DTI, NWPT, and Complex wounds) or Nolan score 18 or lower:  No      Nurse 1 eSignature: Electronically signed by Thanh Macedo RN on 2/25/23 at 7:26 PM EST    **SHARE this note so that the co-signing nurse is able to place an eSignature**    Nurse 2 eSignature: Electronically signed by Reji Mitchell RN on 2/25/23 at 7:28 PM EST

## 2023-02-26 NOTE — CONSULTS
Neurology / Belen Heredia Consult Note      Magdalena Beckwith MD is requesting this consult. Reason for Consult: CVA  Admission Chief Complaint: aphasia    History of Present Illness     Luis Lewis is a 68 y.o. y/o male with history significant for emphysema (O2 dependent at home), PNA, bronchitis, eye surgery, HTN, and a-fib (on Eliquis)    Per my interview with the patient he was brought in to [facility] ED for difficulty speaking. Per my interview with the patient's son, he has a stutter at baseline, but ~1 week ago he noticed an increase in his speech difficulty that would come and go. This morning, the patient's son noticed he was having trouble feeding himself with his RUE and a severe decline in his speech. A code stroke was called at [facility] ED and a CT head and CTA head/neck were obtained. CT head showed an age-indeterminate L frontal infarct. CTA head/neck showed a near occlusion in the proximal L ICA with distal lumen collapse and reduced blood flow to the L MCA territory, bilateral proximal vertebral artery occlusions with distal reconstitution.  stroke team was contacted, and patient was deemed to not be a TNK candidate. Patient was discussed over [facility] 82 with Dr. Alexy Layne who recommended a transfer to [facility], ASA 325mg, and no heparin gtt. Patient transferred to [facility] for further evaluation and treatment. History provided by:  Patient   Patient's son      REVIEW OF SYSTEMS:   Constitutional- No weight loss or fevers   Neurologic- No headache, C/o baseline visual difficulty r/t previous eye surgery, baseline numbness in BLE.      Past Medical, Surgical, Family, and Social History   PAST MEDICAL HISTORY:  Past Medical History:   Diagnosis Date    Bronchitis chronic     Emphysema of lung ([facility] [place] [address]     Influenza A 01/10/2018    Lung disease     copd    Pneumonia     12/2009     SURGICAL HISTORY:  Past Surgical History:   Procedure Laterality Date    FRACTURE SURGERY      johnson in femur/hip on right    HERNIA REPAIR       FAMILY HISTORY & SOCIAL HISTORY:  Family history non-contributory  Family History   Problem Relation Age of Onset    Diabetes Sister     Cancer Sister     Cancer Sister     Cancer Brother     Asthma Daughter     Asthma Son     Diabetes Mother     Cancer Father      Social History     Tobacco Use    Smoking status: Former     Packs/day: 1.00     Years: 30.00     Pack years: 30.00     Types: Cigarettes     Quit date: 2016     Years since quittin.6    Smokeless tobacco: Never    Tobacco comments:     Just quit about 3 months ago 16   Vaping Use    Vaping Use: Never used   Substance Use Topics    Alcohol use: No    Drug use: No         Allergies & Outpatient Medications   ALLERGIES:  No Known Allergies  HOME MEDICATIONS:  Current Discharge Medication List        CONTINUE these medications which have NOT CHANGED    Details   VENTOLIN  (90 Base) MCG/ACT inhaler INHALE TWO (2) PUFFS EVERY 6 HOURS AS NEEDED FOR WHEEZING OR SHORTNESS OF BREATH  Qty: 18 g, Refills: 0    Associated Diagnoses: Stage 3 severe COPD by GOLD classification ([address]      ipratropium-albuterol (DUONEB) 0.5-2.5 (3) MG/3ML SOLN nebulizer solution INHALE THREE (3) MILLILIERS (1 VIAL) VIA NEBULIZATION ROUTE EVERY 6 HOURS AS NEEDED FOR SHORTNESS OF BREATH  Qty: 360 mL, Refills: 5    Associated Diagnoses: Chronic obstructive pulmonary disease, unspecified COPD type (HCC)      budesonide-formoterol (SYMBICORT) 160-4.5 MCG/ACT AERO INHALE 2 PUFFS INTO THE LUNGS 2 TIMES DAILY  Qty: 1 each, Refills: 5      Roflumilast (DALIRESP) 500 MCG tablet TAKE (1) TABLET DAILY  Qty: 30 tablet, Refills: 5      potassium chloride (KLOR-CON M) 20 MEQ extended release tablet Take 1 tablet by mouth daily  Qty: 90 tablet, Refills: 3      Torsemide 40 MG TABS Take 40 mg by mouth 2 times daily  Qty: 180 tablet, Refills: 3      dilTIAZem (CARDIZEM CD) 180 MG extended release capsule TAKE 1 CAPSULE BY MOUTH DAILY  Qty: 90 capsule, Refills: 3      apixaban (ELIQUIS) 5 MG TABS tablet TAKE 1 TABLET TWICE DAILY  Qty: 60 tablet, Refills: 11    Associated Diagnoses: Permanent atrial fibrillation (HCC)      nitroGLYCERIN (NITROSTAT) 0.4 MG SL tablet PLACE 1 TABLET UNDER THE TONGUE EVERY 5 MINUTES AS NEEDED FOR CHEST PAIN  Qty: 25 tablet, Refills: 3      famotidine (PEPCID) 20 MG tablet Take 1 tablet by mouth 2 times daily  Qty: 180 tablet, Refills: 3      vitamin D (ERGOCALCIFEROL) 1.25 MG (42953 UT) CAPS capsule Take 1 capsule by mouth once a week  Qty: 5 capsule, Refills: 0      guaiFENesin (MUCINEX) 600 MG extended release tablet Take 1 tablet by mouth 2 times daily as needed for Congestion  Qty: 60 tablet, Refills: 6               Physical Exam   PHYSICAL EXAM:  Vitals:    02/25/23 1900 02/25/23 1920   BP:  123/85   Pulse:  57   Resp:  15   Temp: 97.9 °F (36.6 °C)    TempSrc: Oral          General: Alert, no distress, well-nourished  Neurologic  Mental status:   Orientation: to person, place, time, situation   Attention: intact as able to attend well to the exam     Language: expressive aphasia   Comprehension intact; follows simple commands    Cranial nerves:   CN2: Visual fields full w/o extinction on confrontational testing   CN 3,4,6: R pupil ellipsoid and fixed (r/t previous surgery), L pupil round and reactive to light, extraocular muscles intact  CN5: Facial sensation symmetric   CN7: Face symmetric  CN8: Hearing symmetric to spoken voice  CN9: Palate elevated symmetrically  CN11: Traps full strength on shoulder shrug  CN12: Tongue midline with protrusion    Motor Exam:   R  L    Deltoid 5  5   Biceps 4 4   Triceps 4 5   Wrist extension  5 5   Interossei 4 5      R  L    Hip flexion  5  5   Hip extension  5 5   Knee flexion  5 5   Knee extension  5 5   Ankle dorsiflexion  5 5   Ankle plantar flexion  5 5     Deep tendon reflexes:    R  L    Biceps  2  2   Brachioradialis  2 2   Patellar  2 2     Sensory: light touch intact and symmetric in all 4 extremities. No sensory extinction on bilateral simultaneous stimulation  Cerebellar/coordination: finger nose finger normal on L, ataxia noted on R  Tone: normal in all 4 extremities  Gait: held 2/2 patient safety    NIHSS: 3 for expressive aphasia, and ataxia in RUE    Diagnostic Testing Results   IMAGES:  Images personally reviewed and agree w/ radiology interpretation. Head CT w/o Contrast:  FINDINGS:   BRAIN/VENTRICLES: There is a 19 mm area of left frontal white matter   hypodensity possibly representing an acute infarct within the deep watershed   territory of the left internal carotid artery. There are additional areas of   hypodensity in the white matter bilaterally which may be due to chronic   microvascular disease. There is no acute hemorrhage, herniation, or   hydrocephalus. ORBITS: The visualized portion of the orbits demonstrate no acute abnormality. SINUSES:  The visualized paranasal sinuses and mastoid air cells demonstrate   no acute abnormality. SOFT TISSUES/SKULL: No acute abnormality of the visualized skull or soft   tissues       CTA of Head / Neck w/ Contrast:  FINDINGS:   AORTIC ARCH/ARCH VESSELS: No dissection or arterial injury. No significant   stenosis of the brachiocephalic or subclavian arteries. CAROTID ARTERIES: Evaluation of the carotid bifurcations is motion degraded. There is a near occlusion of the proximal left internal carotid artery with   partial distal lumen collapse. The stenosis measures 14 mm in length. There is mild stenosis of the left common carotid artery. There appears to be mild stenosis of the proximal right internal carotid   artery. Measurement of stenosis percentage is precluded due to the motion   artifact. The right common carotid artery is patent. VERTEBRAL ARTERIES: The right vertebral artery V1 and proximal V2 segments   are occluded.   There is reconstitution of the mid V2 segment and the artery is patent more distally. The left vertebral artery origin is occluded for a   short segment. There is immediate reconstitution of the V1 segment and the   artery is patent more distally. SOFT TISSUES: Moderate emphysema in the upper lungs. No neck mass is   identified. BONES: There are chronic appearing compression deformities of T3, T4, T5, T6,   and T7. CTA HEAD:       ANTERIOR CIRCULATION: There is attenuation of the left middle cerebral artery   and its branches due to reduction in blood flow. The right middle cerebral   artery is patent. The anterior cerebral arteries are patent. POSTERIOR CIRCULATION: The basilar and posterior cerebral arteries are   patent. The cerebellar arteries are patent. OTHER: No dural venous sinus thrombosis on this non-dedicated study. Echocardiogram: 3/14/22   Summary   Left ventricular systolic function is normal with ejection fraction   estimated at 55%. No regional wall motion abnormalities. There is mild concentric left ventricular hypertrophy. Elevated left ventricular filling pressure. Mild bi-atrial enlargement. Mild mitral regurgitation. Mild tricuspid regurgitation. Systolic pulmonary artery pressure (SPAP) is normal estimated at 38 mmHg   (Right atrial pressure of 3 mmHg). No significant change from exam done 1/11/2018. LABS:  All results below personally reviewed. Pertinent positives & negatives are addressed in Impression & Recommendations below. LABS   Metabolic Panel Recent Labs     02/25/23  1455      K 3.6   CL 98*   CO2 32   BUN 13   CREATININE 0.8   GLUCOSE 143*   CALCIUM 9.3   LABALBU 4.2   ALKPHOS 97   ALT 17   AST 21      CBC / Coags Recent Labs     02/25/23  1455   WBC 10.4   RBC 5.19   HGB 16.1   HCT 48.0      INR 1.13      Other No results for input(s): LABA1C, LDLCALC, TRIG, TSH, PUXUXQUS17, FOLATE, LABSALI, COVID19 in the last 72 hours.   Recent Labs     02/25/23  1455   LACTA 1.8          CURRENT SCHEDULED MEDICATIONS   Inpatient Medications     atorvastatin, 80 mg, Oral, Nightly    sodium chloride flush, 5-40 mL, IntraVENous, 2 times per day    [START ON 2/26/2023] dilTIAZem, 180 mg, Oral, Daily    torsemide, 40 mg, Oral, BID    heparin (porcine), 80 Units/kg, IntraVENous, Once    aspirin, 325 mg, Oral, Daily **OR** aspirin, 300 mg, Rectal, Daily    arformoterol tartrate, 15 mcg, Nebulization, BID **AND** budesonide, 0.5 mg, Nebulization, BID   Infusions    sodium chloride 125 mL/hr at 02/25/23 2018    sodium chloride      heparin (PORCINE) Infusion        Antibiotics   Recent Abx Admin        No antibiotic orders with administrations found.                       IMPRESSION & RECOMMENDATIONS     IMPRESSION:  Bang Jean Baptiste is a 72 yo male who presents with expressive aphasia and RUE ataxia.  CT head shows an age-indeterminate, possibly acute L frontal lobe infarct. CTA head/neck shows proximal L ICA near occlusion with distal lumen collapse, and bilateral proximal vertebral artery occlusion with distal reconstitution.  On exam, patient is A&Ox4 with expressive aphasia, ataxia in his RUE.          RECOMMENDATIONS:  Stroke Plan    Imaging / Labs:  -Obtain MRI of the brain w/o contrast to eval for stroke  -Check lipid panel and hemoglobin A1C if not already completed     Medications:  -High-intensity statin   -Start ASA 325mg   -SCDs for DVT prophylaxis  -Ok to start DVT chemoprophylaxis   -Hold home Eliquis    Consults / Nursing Care  -HOB elevated to help prevent aspiration  -Q1H Neurologic Exams & Vitals  -NIHSS per guidelines   -Telemetry   -PT/OT: eval and treat  -PMR consult if rehab recommended   -ST: eval and treat and dysphagia screen  -Nursing bedside swallow prior to any PO intake   -Blood Pressure Management   -Keep -160  -0.9% NS at 125mL/hr  -hold home BP meds    Follow up / Discharge Recommendations:  -If no obvious source of stroke identified will need 30 day event monitor  arranged at discharge  -Stroke Education at Discharge  -Follow up w/ Neurology in 3 months       Management and plan discussed with:   Bedside nurse  Dr. Rachel Lynch, APRN - [id number]   Neurology & Neurocritical Care   2/25/2023 8:30 PM    ICU Patients:   [address] [phone]  [employer]: [facility]      Critical Care:  Due to the immediate potential for life-threatening deterioration due to neurological failure, I spent 45 minutes providing critical care. This time excludes time spent performing procedures but includes time spent on direct patient care, history retrieval, review of the chart, and discussions with patient, family, and consultant(s).

## 2023-02-26 NOTE — PROGRESS NOTES
Shift handoff completed from [facility]. Q1 Neuro Checks -     Mild to moderate expressive aphasia, R UE drift. Otherwise motor function and sensory intact. A&O x4. NIHSS 2-3. Currently on 3L NC, No complaints of pain at this time. Plan to do MRI overnight. Son at bedside to help w/ MRI screening form. Standard safety measures in place.      Electronically signed by Ema Primrose, RN on 2/25/2023 at 9:50 PM

## 2023-02-26 NOTE — PLAN OF CARE
Patient will tolerate  least restrictive diet without s/s of aspiration. 2. Pt will increase functional communication/cognitive abilities for daily living success.       Yamil Womack MA CCC/SLP [id number]

## 2023-02-26 NOTE — PROGRESS NOTES
Speech Language Pathology  Facility/Department:[facility] ICU  Swallowing evaluation/treat and Speech Evaluation                                                         Patient Diagnosis(es):   Patient Active Problem List    Diagnosis Date Noted    Aphasia due to acute cerebrovascular accident (CVA) ([address] 02/25/2023    Acute ischemic stroke ([address] 02/25/2023    Stage 3 severe COPD by GOLD classification ([address] 11/29/2022    Chronic respiratory failure with hypoxia and hypercapnia ([address] 11/29/2022    Nocturnal hypoxia 11/29/2022    Hypotension 04/14/2022    Acute on chronic combined systolic (congestive) and diastolic (congestive) heart failure ([address] 04/03/2022    Acute hypokalemia 03/29/2022    Hypophosphatemia 03/29/2022    Long term current use of diuretic 03/29/2022    Unstable angina pectoris ([address] 04/17/2019    Atrial fibrillation ([address] 01/11/2018    Acute on chronic respiratory failure with hypoxia and hypercapnia ([address] 01/11/2018    Acute hyponatremia 01/11/2018    Hyperbilirubinemia 01/11/2018    Influenzal pneumonia     Acute exacerbation of chronic obstructive pulmonary disease (COPD) ([address] 04/07/2011    Essential hypertension 04/07/2011    Former smoker 04/16/2010       Past Medical History:   Diagnosis Date    Bronchitis chronic     Emphysema of lung ([address]     Influenza A 01/10/2018    Lung disease     copd    Pneumonia     12/2009     Past Surgical History:   Procedure Laterality Date    FRACTURE SURGERY      johnson in femur/hip on right    HERNIA REPAIR         Reason for Referral:  Ministerio Amaral  was referred for a Speech Therapy evaluation to assess swallow function and/or communication. History of Present Illness  Interval history: Patient seen and examined this morning at bedside. He was sitting up comfortably in his bed. Overnight patient became short of breath. A chest x-ray was done and showed increased pulmonary edema.   He was given a dose of 40 mg IV Lasix as well as DuoNeb treatments. He stated this improved his breathing. His IV fluids were stopped. He states he feels much better this morning and is having good urine output. He has no other complaints at this time. He remains afebrile and hemodynamically stable. HPI: 68 old male with past medical history HTN (Eliquis), HFpEF, COPD (4 L), chronic bronchitis, hernia repair who initially presented to OSH with acute onset aphasia. Son reported at OSH patient had waxing/waning speech changes over the past week worse than his typical stuttering. He noticed symptoms onset around 1100 on 2/24, but symptoms gradually resolved and were completely absent that night prior to bed. Patient slept in this morning until 1100 at which point family noted symptoms have recurred and he called 911. At [facility] ED, pt hemodynamically stable with mild aphasia and mild R arm drift; NIHSS 3. EKF showing AF but otherwise no acute changes. CT head w/o showed age-indeterminate small, possibly acute L frontal lobe infacrt as well as proximal left ICA near-occlusion. Pt was transferred to [facility] for neurosurgical evaluation and management. Pt in NAD on arrival. He continues with difficulty finding words; has good insight into sx. Denies any headache, fever, chills, chest pain, dyspnea, abdominal pain, nausea, vomiting, numbness, visual changes. CXR: 2/26/23  Impression       1. Findings of pulmonary edema which are increased compared to chest radiograph from one day prior.      MRI: 2/25/23  Impression       Left MCA border zone infarcts without hemorrhage or mass effect       Date of onset: 2/26/23    Current Diet:  NPO    Treatment Diagnosis:  Dysphagia    Pain:  None    General:  Chart reviewed: Yes  Behavior/Cognition: Cooperative, alert,   Communication Observation:  aphasic with baseline stuttering  Follows Directions: Simple  Dentition/Oral Mucosa: Edentulous with clean moist tongue  Oral motor exam: Right labial weakness especially upon retraction, reduced lateral lingual movement to the right. No tongue deviation  Vocal Quality: WFL  Respiratory Status/oxygen requirements: O2 cannula  Vision/Hearing: No glasses; [place] concerns  Patient Complaint: Trouble talking; pt and family deny problems with swallowing at breakfast yesterday just when they noticed problems with using right hand etc.   Patient Positioning: upright in chair    Prior Dysphagia History:   none    Bedside Swallowing Evaluation Impression 2/26/23:   Pt alert, oriented to self, general place following review, to year with cues, month/day of week without cues. Pt edentulous (able to eat all types of foods at home with difficulty per family, even just prior to going to hospital). Right labial weakness noted with no tongue deviation but reduced lateral lingual movement to the right. Oral acceptance of all foods presented and able to feed self. No cough/wet vocal quality/throat clearing with any po trials including thins via cup/straw for 3 oz water test and 10 single sip trials, 4 oz  puree and cracker trials (except a delayed cough 15 seconds post po trials x1). No oral residue with any po trials. Pt denies globus sensation when questioned. D/W nursing re: recent chest xray and nursing reported pt given some extra fluids last night which may be impacting current lung status (in regards to heart issues). Lungs listened to by nursing today have only been diminished. Swallow initiation fairly timely. Recommend SOFT and BITE sized with thins - If any s/s of aspiration or if lung status decline emerges, then  d/c po until we recheck. Speech, Language, Cognitive Evaluation 2/26/23:   Pt with mild receptive and moderate expressive aphasia with baseline dysfluency. Pt alert, oriented to self, general place following review, to year with cues, month/day of week without cues. Right labial weakness noted with no tongue deviation but educed lateral lingual movement to the right. Pt with baseline stuttering but increased difficulty with word recall in past month but significantly worse past couple days. Pt with high school education and no major hobbies reported by pt /family. Hesitations with speaking noted with reduced word recall in conversation. Confrontational naming of objects, simple pictures 8/8 correct. Difficulty with recall of address, but knew birthdate. Breakdown especially following 3 step commands. Able to name 3 words in one minute given a category (up to 3 more given a cue). [place] tasks at 100%. Able to read single words without difficulty. Education  See under goal 2 below for dysphagia for details. Pt/family initiated on strategies for speech in regards to increasing word production/recall (gesturing, description, etc). Pt verbalized/indicated fair understanding; family verbalized understadning. Prognosis:  Prognosis for improvement: Good  Barriers to reach goals: age  [de-identified] and agree with results and recommendations: yes    Treatment:  Dysphagia Goals: The pt will be seen  2-4 x per week to address the following goals: The pt will tolerate least restrictive diet without s/s of aspiration  Pt/family will verbalize and/or demonstrate understanding of swallowing recommendations. 2/26:  Pt and family educated to role of dysphagia, what aspiration is, what s/s of aspiration are and diet/strategies recommended for pt. Pt able to demonstrate understanding by taking small amounts of food and liquid as practice at the end of the session following initial review with min to no cues. Both pt and family verbalized understanding. Speech Goals: The pt will be seen  2-4 x per week to address the following goals:   Pt will follow 2- 3 step directions with 50% accuracy, mod cues. Pt will answer yes/no questions of varying complexity with 90% accuracy, min to no cues.   Pt will name 4-5 words in one minute given a category  Pt/family will verbalize and/or demonstrate 2-3 speech strategies to maximize verbal communication and/or fluency. Pt will participate in further reading/writing/and cognitive assessment. Plan:  Continue per POC  Recommended diet: Soft and Bite sized, thins  If any s/s of aspiration or if lung status decline emerges, then  d/c po until we recheck.    Recommended form of medication: Pills with water  Swallowing Strategies:   Pt goal: \"all of it\" better  Pt discharge goal: Home  Total treatment time: 25 dysphagia (15 eval/10 treat); 20 speech  Discharge Recommendation: Dysphagia TBD closer to discharge; f/u after discharge for speech  Discussed with RNGold  Needs met prior to leaving room, call light within reach      [address] CCC/SLP GAIL [address] Pathologist  Pager [phone]         This document will serve as a dc summary if pt dc prior to next visit

## 2023-02-26 NOTE — PROGRESS NOTES
Neurology Progress Note    Patient: Giana Licea MRN: [id number]    YOB: 1950  Age: 68 y.o.   Sex: male   Unit: [facility] ICU TOWER Room/Bed: 4522/4522-01 Location: [address] [place] [address] [place]    Today's Date: 2/26/2023  Date of Admission: 2/25/2023  6:32 PM  Admitting Physician: Coral Black    Primary Care Physician: Rekha Fernandes          LOS: 1 day      ASSESSMENT & RECOMMENDATIONS     Assessment  77yo man with afib on apixaban and heart failure with preserved ejection fraction (HFpEF) presents with stuttering episodes of aphasia over the last week which culminated in abrupt moderate expressive aphasia and right hemiparesis, found to have 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 as well as nearly occluded LICA and completely occluded bilateral verts  Given the appearance of stroke on MRI and the stuttering symptoms over the last week, feel that his poor flow through the left ICA is the etiology of this stroke  He was on apixaban prior to admission for his afib, but not on ASA or any antiplatelet agent  Initiation of ASA 325mg daily now will be beneficial but still great concern for further watershed stroke in that region  SBP has ranged from 110s to as high as 160, with most in the mid 130s  Unfortunately, BP augmentation with IVF cannot be continued due to his HFpEF and the development of pulmonary edema  Fortunately, his BP is maintaining itself at a reasonably high level and he has remained without worsening of his symptoms  If this were to occur, will likely need to start pressor  Anticoagulation is on hold given the acute strokes, and will continue to hold this for now    Recommendations  Q1hr neuro checks and monitor closely for worsening of left MCA symptoms  If neuro change, drop head of bed give IVF bolus and, will need to consider pressor  Bedrest for now to avoid potential drops in BP  Continue ASA 325mg po / 300mg CA  No anticoagulation for now  OK to resume Cardizem for afib as this is less likely to effect BP  OK to resume home diuretic, if needed  Agree with high-intensity statin; goal LDL < 70  A1c < 7.0  Speech therapy  PT/OT OK as long as remains in bed for now  Angiogram and intervention for LICA stenosis per Neurovascular surgery      SUBJECTIVE     Meeting patient for the first time. Initial consultation note was completed by Neurology CNP yesterday (2/25/23) evening, which I have reviewed. 77yo man with emphysema on home O2; HTN; afib on apixaban; HFpEF. Presented to OSH ER with abrupt onset of left arm weakness and difficulty speaking. Per my interview with pt and pt's son at the bedside:  Pt has had intermittent episodes of difficulty speaking over the last week. They seemed to come and go and were random in occurrence and resolution. However, yesterday morning son noted that pt was having great difficulty feeding himself with his right hand and his language was markedly impaired. He was brought to the ER. In the ER, BP was 125/88. Head CT demonstrated a hypodensity in the left frontal region. CTA head and neck showed bilateral occluded vertebral arteries, nearly occluded left carotid, and poor flow in left MCA territory. There was no LVO. After discussion on Viz. ai between ER physician, Stroke Team Physician, Neurovascular surgery, and myself, it was concluded that pt required ICU admission for frequent neuro checks and to ensure adequate perfusion of left MCA territory through stenotic left ICA with BP augmentation with IVF, if he tolerated, or pressor, if necessary. Ultimate plan will be for conventional angiogram with neurovascular surgery next week and CEA vs stent after that. No acute events overnight. Afebrile. Currently, pt feels that he is improved overall. He still is having difficulty with language and describes it as difficulty in saying what he wants to say. His arm is much better than it was yesterday.      His SBP has ranged from 110s to as high as 160, with most in the mid 130s. He has not had any worsening of symptoms with these blood pressures. He was on IVF through most of the night but he has developed some mild pulmonary edema and so fluids have been discontinued. he is unsure what would have precipitated these symptoms, other than the above. he feels that nothing makes them better and nothing makes them worse. he rates the symptoms as severe. he denies any other associated symptoms including no HA, no other speech/language difficulties, no swallowing difficulties, no visual changes, no diplopia, no hearing loss, no tinnitus, no vertigo, no imbalance, no light-headedness, no other focal weakness, no sensory changes, no nausea or vomiting. he has never had this problem before. There is no history of this problem or anything similar in his family members. Review of Systems  No changes since pt last seen other than those noted above. PFSH  No change since the original history and note.      OBJECTIVE     Patient Vitals for the past 24 hrs:   BP Temp Temp src Pulse Resp SpO2   02/26/23 1300 -- -- -- 78 -- --   02/26/23 1249 -- -- -- 76 28 97 %   02/26/23 1200 (!) 133/92 97.8 °F (36.6 °C) Oral 87 20 93 %   02/26/23 1138 130/75 -- -- -- -- --   02/26/23 1137 130/75 -- -- 88 -- --   02/26/23 1100 130/75 -- -- 87 18 92 %   02/26/23 1000 (!) 149/77 -- -- 79 17 96 %   02/26/23 0935 -- -- -- 83 18 95 %   02/26/23 0900 (!) 168/82 -- -- 80 22 94 %   02/26/23 0800 (!) 160/139 97.5 °F (36.4 °C) Oral 76 15 --   02/26/23 0705 (!) 131/93 -- -- 71 17 97 %   02/26/23 0700 (!) 131/93 -- -- 74 16 98 %   02/26/23 0613 -- -- -- (!) 104 21 (!) 89 %   02/26/23 0600 -- -- -- 88 16 --   02/26/23 0500 117/88 -- -- 56 13 --   02/26/23 0400 139/84 98 °F (36.7 °C) Oral -- -- --   02/26/23 0300 118/74 -- -- 52 22 100 %   02/26/23 0200 (!) 128/92 -- -- 53 14 96 %   02/26/23 0100 129/78 -- -- 53 26 98 %   02/26/23 0000 119/80 98.2 °F (36.8 °C) Oral 58 15 98 %   02/25/23 2300 123/88 -- -- 64 16 93 %   02/25/23 2200 -- -- -- 67 17 97 %   02/25/23 2100 (!) 140/83 -- -- 62 15 --   02/25/23 2000 126/72 -- -- 69 21 --   02/25/23 1920 123/85 -- -- 57 15 --   02/25/23 1900 -- 97.9 °F (36.6 °C) Oral -- -- --       Neurological Exam (performed on 2/26/2023 at 1320):  -Mental status: A&O x3  -Memory: Recent & remote memory intact  -Attention: Normal  -Fund of Knowledge: Good  -Speech & Language: mild aphasia (able to name most items on picture card with a few paraphasias (\"door\" instead of \"key,\" \"hand\" instead of \"glove\") able to read sentences fairly well); no dysarthria  -Cranial nerves: pupils 4mm->3mm bilaterally; fields grossly intact; unable to visualize fundi; EOMI, no nystagmus; sensation intact V1, V2, V3; no sig facial asymmetry; hearing grossly intact bilaterally; palate elevates symmetrically; SCMs & trapezii intact bilaterally; tongue midline  -Sensory: intact to lt touch throughout  -Motor:   RUE: 5/5 with best effort, slt pronator drift  RLE: 5/5  LUE: 5/5, no pronator drift  LLE: 5/5  -Tone: Normal throughout  -Reflexes: 1+ & symmetric throughout  -Coordination: FNF intact with right, some ataxia with left (chronic)  -Gait & Station: deferred for pt safety  -Other: no adventitious movements noted  Other Systems  -General Appearance: well-developed, well-nourished, no apparent distress  -Neck: supple  -Lungs: breathing unlabored, regular, no audible wheezes  -CV: pulses strong x4 extremities  -Abd: flat  -Skin: warm to touch, no wounds  -Psych: pleasant and appropriate  -Extrem: no c/c/e     Imaging: All reports below, not included in previous notes, personally reviewed & actual images reviewed where indicated. Pertinent positives & negatives are addressed in Assessment & Plan section of note  MRI brain without contrast   My Read: Acute left MCA watershed infarcts  Final Read:    Left MCA border zone infarcts without hemorrhage or mass effect.         CT Head & CTA Head + Neck (2/25/23, 15:00)   My Read: Left frontal lobe hypodensity; bilateral prox vert artery occlusions; nearly occluded LICA with reduced flow in left MCA territory; patent JENNIFER  Final Read:    1. 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 Reduced blood flow to the left MCA territory. 2. Bilateral proximal vertebral artery occlusions with distal reconstitution. 3. Age-indeterminate, possibly small acute left frontal lobe infarct. 4. Chronic thoracic compression deformities. Laboratory Review: All results below, not included in previous notes, personally reviewed.  Pertinent positives & negatives are addressed in Assessment & Plan section of note  Recent Results (from the past 36 hour(s))   POCT Glucose    Collection Time: 02/25/23  2:50 PM   Result Value Ref Range    POC Glucose 143 (H) 70 - 99 mg/dl    Performed on ACCU-CHEK    Protime-INR    Collection Time: 02/25/23  2:55 PM   Result Value Ref Range    Protime 14.4 11.7 - 14.5 sec    INR 1.13 0.87 - 1.14   Comprehensive Metabolic Panel    Collection Time: 02/25/23  2:55 PM   Result Value Ref Range    Sodium 141 136 - 145 mmol/L    Potassium 3.6 3.5 - 5.1 mmol/L    Chloride 98 (L) 99 - 110 mmol/L    CO2 32 21 - 32 mmol/L    Anion Gap 11 3 - 16    Glucose 143 (H) 70 - 99 mg/dL    BUN 13 7 - 20 mg/dL    Creatinine 0.8 0.8 - 1.3 mg/dL    Est, Glom Filt Rate >60 >60    Calcium 9.3 8.3 - 10.6 mg/dL    Total Protein 7.2 6.4 - 8.2 g/dL    Albumin 4.2 3.4 - 5.0 g/dL    Albumin/Globulin Ratio 1.4 1.1 - 2.2    Total Bilirubin 0.8 0.0 - 1.0 mg/dL    Alkaline Phosphatase 97 40 - 129 U/L    ALT 17 10 - 40 U/L    AST 21 15 - 37 U/L   Troponin    Collection Time: 02/25/23  2:55 PM   Result Value Ref Range    Troponin <0.01 <0.01 ng/mL   Sample possible blood bank testing    Collection Time: 02/25/23  2:55 PM   Result Value Ref Range    Specimen Status ADRIÁN    CBC with Auto Differential    Collection Time: 02/25/23  2:55 PM   Result Value Ref Range    WBC 10.4 4.0 - 11.0 K/uL    RBC 5.19 4.20 - 5.90 M/uL    Hemoglobin 16.1 13.5 - 17.5 g/dL    Hematocrit 48.0 40.5 - 52.5 %    MCV 92.4 80.0 - 100.0 fL    MCH 31.0 26.0 - 34.0 pg    MCHC 33.5 31.0 - 36.0 g/dL    RDW 14.3 12.4 - 15.4 %    Platelets 295 521 - 618 K/uL    MPV 8.7 5.0 - 10.5 fL    Neutrophils % 75.7 %    Lymphocytes % 14.6 %    Monocytes % 6.4 %    Eosinophils % 2.7 %    Basophils % 0.6 %    Neutrophils Absolute 7.9 (H) 1.7 - 7.7 K/uL    Lymphocytes Absolute 1.5 1.0 - 5.1 K/uL    Monocytes Absolute 0.7 0.0 - 1.3 K/uL    Eosinophils Absolute 0.3 0.0 - 0.6 K/uL    Basophils Absolute 0.1 0.0 - 0.2 K/uL   Lactic Acid    Collection Time: 02/25/23  2:55 PM   Result Value Ref Range    Lactic Acid 1.8 0.4 - 2.0 mmol/L   Blood Gas, Venous    Collection Time: 02/25/23  2:55 PM   Result Value Ref Range    pH, Gregg 7.412 7.350 - 7.450    pCO2, Gregg 54.5 (H) 40.0 - 50.0 mmHg    pO2, Gregg 58.7 (H) 25.0 - 40.0 mmHg    HCO3, Venous 33.9 (H) 23.0 - 29.0 mmol/L    Base Excess, Gregg 7.2 (H) -3.0 - 3.0 mmol/L    O2 Sat, Gregg 92 Not Established %    Carboxyhemoglobin 1.2 0.0 - 1.5 %    MetHgb, Gregg 0.5 <1.5 %    TC02 (Calc), Gregg 36 Not Established mmol/L    O2 Therapy Unknown    EKG 12 Lead    Collection Time: 02/25/23  3:32 PM   Result Value Ref Range    Ventricular Rate 66 BPM    Atrial Rate 375 BPM    QRS Duration 102 ms    Q-T Interval 406 ms    QTc Calculation (Bazett) 425 ms    R Axis -20 degrees    T Axis 12 degrees    Diagnosis       Atrial fibrillationLow voltage QRS in limb leadsIncomplete right bundle branch blockAbnormal ECGWhen compared with ECG of 03-APR-2022 11:21,Incomplete right bundle branch block is now PresentConfirmed by Treva Ahn ([id number]) on 2/25/2023 3:55:45 PM   APTT    Collection Time: 02/26/23 11:34 AM   Result Value Ref Range    aPTT 35.4 (H) 23.0 - 34.3 sec       Scheduled Meds:   ipratropium-albuterol  1 ampule Inhalation Q4H WA    atorvastatin  80 mg Oral Nightly    sodium chloride flush  5-40 mL IntraVENous 2 times per day    dilTIAZem  180 mg Oral Daily    torsemide  40 mg Oral BID    aspirin  325 mg Oral Daily    Or    aspirin  300 mg Rectal Daily    arformoterol tartrate  15 mcg Nebulization BID    And    budesonide  0.5 mg Nebulization BID       Continuous Infusions:  sodium chloride        PRN Meds:  perflutren lipid microspheres, 1.5 mL, ONCE PRN  albuterol, 2.5 mg, Q4H PRN  ondansetron, 4 mg, Q8H PRN   Or  ondansetron, 4 mg, Q6H PRN  polyethylene glycol, 17 g, Daily PRN  sodium chloride flush, 5-40 mL, PRN  sodium chloride, , PRN  acetaminophen, 650 mg, Q6H PRN   Or  acetaminophen, 650 mg, Q6H PRN  HYDROmorphone, 0.25 mg, Once PRN                Discussed at length with patient; pt's son; pt's daughter-in-law; nursing; and GWEN Luciano MD  Neurology  [phone]  February 26, 2023

## 2023-02-26 NOTE — PROGRESS NOTES
Pharmacy Progress Note    Heparin high-dose weight based infusion is ordered for patient. Per chart review, patient has received an oral Factor Xa inhibitor (apixaban) within the last 72 hours. As oral Factor Xa inhibitors can impact the anti-Xa test calibrated to unfractionated heparin, Heparin infusion will be monitored and adjusted using aPTT's per [facility] [address]. Previous anticoagulation: apixaban [ aPTT monitoring for will be used for 72 hours from last known DOAC administration. Pharmacy will adjust administration instructions to AntiXa monitoring after 72 hours. ]   Last dose of previous anticoagulation (date/time): 2/24/23 (evening)     APTT algorithm:  **Use original weight used to start heparin for all adjustments**  Maximum initial infusion rate = 2100 units/hr    aPTT < 59         Heparin 80 units/kg bolus     Increase infusion by 4 units/kg/hr                            (maximum 10,000 units)  aPTT 59-72.9    Heparin 40 units/kg bolus     Increase infusion by 2 units/kg/hr                            (maximum 5,000 units)  aPTT      No bolus                                No change  aPTT 102.1-109      No bolus                          Decrease infusion by 1 units/kg/hr  aPTT  109.1-122.9  No bolus    Decrease infusion by 2 units/kg/hr  aPTT  > 123      Hold heparin for 1 hour         Decrease infusion by 3 units/kg/hr    Obtain aPTT 6 hours after initial bolus and 6 hours after any dose change until two consecutive therapeutic aPTTs are achieved - then daily     Pharmacy will monitor daily to assist with adjustments & ordering of labs as needed. If patient remains on heparin infusion 72 hours from last dose of oral factor Xa inhibitor, pharmacy will change infusion back to usual monitoring via the Anti-Xa algorithm per [address] [facility] [place] [address], as the interaction will no longer occur at that time.     Please call pharmacy with any questions -  Maggy Stoner, Brandi BCOP 2/25/2023 7:55 PM

## 2023-02-26 NOTE — PROGRESS NOTES
Hospitalist Progress Note      PCP: Lady Blandon    Date of Admission: 2/25/2023    Subjective:  seen and examined   Still with speech issues  On q 1 neuro checks       Medications:  Reviewed    Infusion Medications    sodium chloride       Scheduled Medications    ipratropium-albuterol  1 ampule Inhalation Q4H WA    atorvastatin  80 mg Oral Nightly    sodium chloride flush  5-40 mL IntraVENous 2 times per day    dilTIAZem  180 mg Oral Daily    torsemide  40 mg Oral BID    aspirin  325 mg Oral Daily    Or    aspirin  300 mg Rectal Daily    arformoterol tartrate  15 mcg Nebulization BID    And    budesonide  0.5 mg Nebulization BID     PRN Meds: perflutren lipid microspheres, albuterol, ondansetron **OR** ondansetron, polyethylene glycol, sodium chloride flush, sodium chloride, acetaminophen **OR** acetaminophen, HYDROmorphone      Intake/Output Summary (Last 24 hours) at 2/26/2023 1458  Last data filed at 2/26/2023 0800  Gross per 24 hour   Intake 1437 ml   Output 1025 ml   Net 412 ml       Physical Exam Performed:    BP (!) 133/92   Pulse 78   Temp 97.8 °F (36.6 °C) (Oral)   Resp 28   SpO2 97%     General appearance: No apparent distress, appears stated age and cooperative. HEENT: Pupils equal, round, and reactive to light. Conjunctivae/corneas clear. Neck: Supple, with full range of motion. No jugular venous distention. Trachea midline. Respiratory:  Normal respiratory effort. Clear to auscultation, bilaterally without Rales/Wheezes/Rhonchi. Cardiovascular: Regular rate and rhythm with normal S1/S2 without murmurs, rubs or gallops. Abdomen: Soft, non-tender, non-distended with normal bowel sounds. Musculoskeletal: No clubbing, cyanosis or edema bilaterally. Full range of motion without deformity. Skin: Skin color, texture, turgor normal.  No rashes or lesions. Neurologic:  expressive aphasia.  Mild right leg weakness  Psychiatric: Alert and oriented, thought content appropriate, normal insight  Capillary Refill: Brisk, 3 seconds, normal   Peripheral Pulses: +2 palpable, equal bilaterally       Labs:   Recent Labs     02/25/23  1455   WBC 10.4   HGB 16.1   HCT 48.0        Recent Labs     02/25/23  1455      K 3.6   CL 98*   CO2 32   BUN 13   CREATININE 0.8   CALCIUM 9.3     Recent Labs     02/25/23  1455   AST 21   ALT 17   BILITOT 0.8   ALKPHOS 97     Recent Labs     02/25/23  1455   INR 1.13     Recent Labs     02/25/23  1455   TROPONINI <0.01       Urinalysis:      Lab Results   Component Value Date/Time    NITRU Negative 04/03/2022 01:35 PM    WBCUA 0-2 03/29/2022 03:55 PM    BACTERIA 1+ 01/11/2018 08:50 AM    RBCUA 0-2 03/29/2022 03:55 PM    BLOODU Negative 04/03/2022 01:35 PM    SPECGRAV 1.010 04/03/2022 01:35 PM    GLUCOSEU Negative 04/03/2022 01:35 PM       Radiology:  XR CHEST PORTABLE   Final Result      1. Findings of pulmonary edema which are increased compared to chest radiograph from one day prior. MRI brain without contrast   Final Result      Left MCA border zone infarcts without hemorrhage or mass effect. IP CONSULT TO NEUROCRITICAL CARE  IP CONSULT TO PHARMACY    Assessment/Plan:    Active Hospital Problems    Diagnosis     Aphasia due to acute cerebrovascular accident (CVA) ([facility]) [I63.9, R47.01]      Priority: Medium     Acute ischemic stroke, moderate right MCA, nearly occluded LICA, completely occluded verts. Currently in ICU and given neurochecks  Maintain BP, avoid hypotension  Asa, statin   PTOT  Neurosurgery consulted    DVT Prophylaxis: scds  Diet: ADULT DIET;  Dysphagia - Soft and Bite Sized  Code Status: Full Code  PT/OT Eval Status: order    Mihir Palma MD

## 2023-02-27 ENCOUNTER — TELEPHONE (OUTPATIENT)
Dept: CARDIOLOGY CLINIC | Age: 73
End: 2023-02-27

## 2023-02-27 PROBLEM — G47.33 OSA TREATED WITH BIPAP: Status: ACTIVE | Noted: 2023-02-27

## 2023-02-27 PROBLEM — J44.89 COPD WITH CHRONIC BRONCHITIS: Status: ACTIVE | Noted: 2022-11-29

## 2023-02-27 LAB
ABO/RH: NORMAL
ANION GAP SERPL CALCULATED.3IONS-SCNC: 10 MMOL/L (ref 3–16)
ANTIBODY SCREEN: NORMAL
BUN BLDV-MCNC: 12 MG/DL (ref 7–20)
CALCIUM SERPL-MCNC: 8.7 MG/DL (ref 8.3–10.6)
CHLORIDE BLD-SCNC: 94 MMOL/L (ref 99–110)
CHOLESTEROL, TOTAL: 187 MG/DL (ref 0–199)
CO2: 38 MMOL/L (ref 21–32)
CREAT SERPL-MCNC: 1 MG/DL (ref 0.8–1.3)
GFR SERPL CREATININE-BSD FRML MDRD: >60 ML/MIN/{1.73_M2}
GLUCOSE BLD-MCNC: 132 MG/DL (ref 70–99)
HCT VFR BLD CALC: 47.1 % (ref 40.5–52.5)
HDLC SERPL-MCNC: 38 MG/DL (ref 40–60)
HEMOGLOBIN: 15.6 G/DL (ref 13.5–17.5)
LDL CHOLESTEROL CALCULATED: 128 MG/DL
MAGNESIUM: 1.9 MG/DL (ref 1.8–2.4)
MCH RBC QN AUTO: 30.7 PG (ref 26–34)
MCHC RBC AUTO-ENTMCNC: 33.2 G/DL (ref 31–36)
MCV RBC AUTO: 92.3 FL (ref 80–100)
PDW BLD-RTO: 14.2 % (ref 12.4–15.4)
PLATELET # BLD: 324 K/UL (ref 135–450)
PMV BLD AUTO: 9.6 FL (ref 5–10.5)
POTASSIUM REFLEX MAGNESIUM: 2.8 MMOL/L (ref 3.5–5.1)
POTASSIUM SERPL-SCNC: 3.8 MMOL/L (ref 3.5–5.1)
RBC # BLD: 5.1 M/UL (ref 4.2–5.9)
SODIUM BLD-SCNC: 142 MMOL/L (ref 136–145)
TRIGL SERPL-MCNC: 105 MG/DL (ref 0–150)
VLDLC SERPL CALC-MCNC: 21 MG/DL
WBC # BLD: 12.7 K/UL (ref 4–11)

## 2023-02-27 PROCEDURE — 6370000000 HC RX 637 (ALT 250 FOR IP): Performed by: INTERNAL MEDICINE

## 2023-02-27 PROCEDURE — 2700000000 HC OXYGEN THERAPY PER DAY

## 2023-02-27 PROCEDURE — 99291 CRITICAL CARE FIRST HOUR: CPT | Performed by: NURSE PRACTITIONER

## 2023-02-27 PROCEDURE — 80048 BASIC METABOLIC PNL TOTAL CA: CPT

## 2023-02-27 PROCEDURE — 6360000002 HC RX W HCPCS

## 2023-02-27 PROCEDURE — 85027 COMPLETE CBC AUTOMATED: CPT

## 2023-02-27 PROCEDURE — 83735 ASSAY OF MAGNESIUM: CPT

## 2023-02-27 PROCEDURE — 97166 OT EVAL MOD COMPLEX 45 MIN: CPT

## 2023-02-27 PROCEDURE — 84132 ASSAY OF SERUM POTASSIUM: CPT

## 2023-02-27 PROCEDURE — 92526 ORAL FUNCTION THERAPY: CPT

## 2023-02-27 PROCEDURE — 97116 GAIT TRAINING THERAPY: CPT

## 2023-02-27 PROCEDURE — 94640 AIRWAY INHALATION TREATMENT: CPT

## 2023-02-27 PROCEDURE — 86850 RBC ANTIBODY SCREEN: CPT

## 2023-02-27 PROCEDURE — 2000000000 HC ICU R&B

## 2023-02-27 PROCEDURE — 94761 N-INVAS EAR/PLS OXIMETRY MLT: CPT

## 2023-02-27 PROCEDURE — 6370000000 HC RX 637 (ALT 250 FOR IP): Performed by: STUDENT IN AN ORGANIZED HEALTH CARE EDUCATION/TRAINING PROGRAM

## 2023-02-27 PROCEDURE — 2580000003 HC RX 258

## 2023-02-27 PROCEDURE — 97535 SELF CARE MNGMENT TRAINING: CPT

## 2023-02-27 PROCEDURE — 80061 LIPID PANEL: CPT

## 2023-02-27 PROCEDURE — 6370000000 HC RX 637 (ALT 250 FOR IP)

## 2023-02-27 PROCEDURE — 83036 HEMOGLOBIN GLYCOSYLATED A1C: CPT

## 2023-02-27 PROCEDURE — 86900 BLOOD TYPING SEROLOGIC ABO: CPT

## 2023-02-27 PROCEDURE — 92507 TX SP LANG VOICE COMM INDIV: CPT

## 2023-02-27 PROCEDURE — 97530 THERAPEUTIC ACTIVITIES: CPT

## 2023-02-27 PROCEDURE — 99233 SBSQ HOSP IP/OBS HIGH 50: CPT | Performed by: INTERNAL MEDICINE

## 2023-02-27 PROCEDURE — 94664 DEMO&/EVAL PT USE INHALER: CPT

## 2023-02-27 PROCEDURE — 36415 COLL VENOUS BLD VENIPUNCTURE: CPT

## 2023-02-27 PROCEDURE — 86901 BLOOD TYPING SEROLOGIC RH(D): CPT

## 2023-02-27 RX ORDER — POTASSIUM CHLORIDE 20 MEQ/1
40 TABLET, EXTENDED RELEASE ORAL EVERY 4 HOURS
Status: COMPLETED | OUTPATIENT
Start: 2023-02-27 | End: 2023-02-27

## 2023-02-27 RX ADMIN — BUDESONIDE 500 MCG: 0.5 SUSPENSION RESPIRATORY (INHALATION) at 08:07

## 2023-02-27 RX ADMIN — ASPIRIN 325 MG: 325 TABLET, COATED ORAL at 09:30

## 2023-02-27 RX ADMIN — ARFORMOTEROL TARTRATE 15 MCG: 15 SOLUTION RESPIRATORY (INHALATION) at 20:08

## 2023-02-27 RX ADMIN — BUDESONIDE 500 MCG: 0.5 SUSPENSION RESPIRATORY (INHALATION) at 20:09

## 2023-02-27 RX ADMIN — IPRATROPIUM BROMIDE AND ALBUTEROL SULFATE 1 AMPULE: 2.5; .5 SOLUTION RESPIRATORY (INHALATION) at 20:09

## 2023-02-27 RX ADMIN — ATORVASTATIN CALCIUM 80 MG: 80 TABLET, FILM COATED ORAL at 20:41

## 2023-02-27 RX ADMIN — DILTIAZEM HYDROCHLORIDE 180 MG: 180 CAPSULE, EXTENDED RELEASE ORAL at 09:30

## 2023-02-27 RX ADMIN — IPRATROPIUM BROMIDE AND ALBUTEROL SULFATE 1 AMPULE: 2.5; .5 SOLUTION RESPIRATORY (INHALATION) at 12:10

## 2023-02-27 RX ADMIN — IPRATROPIUM BROMIDE AND ALBUTEROL SULFATE 1 AMPULE: 2.5; .5 SOLUTION RESPIRATORY (INHALATION) at 08:06

## 2023-02-27 RX ADMIN — ARFORMOTEROL TARTRATE 15 MCG: 15 SOLUTION RESPIRATORY (INHALATION) at 08:07

## 2023-02-27 RX ADMIN — POTASSIUM CHLORIDE 40 MEQ: 1500 TABLET, EXTENDED RELEASE ORAL at 13:30

## 2023-02-27 RX ADMIN — POTASSIUM CHLORIDE 40 MEQ: 1500 TABLET, EXTENDED RELEASE ORAL at 09:30

## 2023-02-27 RX ADMIN — IPRATROPIUM BROMIDE AND ALBUTEROL SULFATE 1 AMPULE: 2.5; .5 SOLUTION RESPIRATORY (INHALATION) at 17:07

## 2023-02-27 RX ADMIN — SODIUM CHLORIDE, PRESERVATIVE FREE 10 ML: 5 INJECTION INTRAVENOUS at 20:41

## 2023-02-27 RX ADMIN — SODIUM CHLORIDE, PRESERVATIVE FREE 10 ML: 5 INJECTION INTRAVENOUS at 09:30

## 2023-02-27 ASSESSMENT — PAIN SCALES - GENERAL
PAINLEVEL_OUTOF10: 0

## 2023-02-27 ASSESSMENT — PATIENT HEALTH QUESTIONNAIRE - PHQ9: SUM OF ALL RESPONSES TO PHQ QUESTIONS 1-9: 9

## 2023-02-27 NOTE — DISCHARGE INSTRUCTIONS
Extra Heart Failure sites:   [email or web address]   --- this is American Heart Association interactive Healthier Living with Heart Failure guidebook. Please copy and paste link into search bar. Use your mouse to scroll through the pages. Lots and lots of info / tips    HF Solomon sana  --- free smart phone sana available for [employer] and [employer]. Use your phone to track sodium / fluid intake,  symptoms, weight, etc.    [employer] - website-- [employer] is a dialysis company. All dialysis patients follow a renal diet which IS low sodium! This website offers free seasonal cookbooks.   Each quarter, they will release 25-30 new recipes with a breakdown of calories, sodium, glucose, etc    [email or web address]/recipes -- more free recipes

## 2023-02-27 NOTE — PROGRESS NOTES
Hospitalist Progress Note      PCP: Ron Longoria    Date of Admission: 2/25/2023    Subjective:  seen and examined   Still with speech issues  On q 1 neuro checks       Medications:  Reviewed    Infusion Medications    sodium chloride       Scheduled Medications    ipratropium-albuterol  1 ampule Inhalation Q4H WA    atorvastatin  80 mg Oral Nightly    sodium chloride flush  5-40 mL IntraVENous 2 times per day    [Held by provider] dilTIAZem  180 mg Oral Daily    [Held by provider] torsemide  40 mg Oral BID    aspirin  325 mg Oral Daily    Or    aspirin  300 mg Rectal Daily    arformoterol tartrate  15 mcg Nebulization BID    And    budesonide  0.5 mg Nebulization BID     PRN Meds: perflutren lipid microspheres, albuterol, ondansetron **OR** ondansetron, polyethylene glycol, sodium chloride flush, sodium chloride, acetaminophen **OR** acetaminophen      Intake/Output Summary (Last 24 hours) at 2/27/2023 1607  Last data filed at 2/27/2023 1200  Gross per 24 hour   Intake 590 ml   Output 1000 ml   Net -410 ml       Physical Exam Performed:    /80   Pulse 92   Temp 97.6 °F (36.4 °C) (Oral)   Resp 18   SpO2 92%     General appearance: No apparent distress, appears stated age and cooperative. HEENT: Pupils equal, round, and reactive to light. Conjunctivae/corneas clear. Neck: Supple, with full range of motion. No jugular venous distention. Trachea midline. Respiratory:  Normal respiratory effort. Clear to auscultation, bilaterally without Rales/Wheezes/Rhonchi. Cardiovascular: Regular rate and rhythm with normal S1/S2 without murmurs, rubs or gallops. Abdomen: Soft, non-tender, non-distended with normal bowel sounds. Musculoskeletal: No clubbing, cyanosis or edema bilaterally. Full range of motion without deformity. Skin: Skin color, texture, turgor normal.  No rashes or lesions. Neurologic:  expressive aphasia.  Mild right leg weakness  Psychiatric: Alert and oriented, thought content appropriate, normal insight  Capillary Refill: Brisk, 3 seconds, normal   Peripheral Pulses: +2 palpable, equal bilaterally       Labs:   Recent Labs     02/25/23  1455 02/27/23  0459   WBC 10.4 12.7*   HGB 16.1 15.6   HCT 48.0 47.1    324     Recent Labs     02/25/23  1455 02/27/23  0459    142   K 3.6 2.8*   CL 98* 94*   CO2 32 38*   BUN 13 12   CREATININE 0.8 1.0   CALCIUM 9.3 8.7     Recent Labs     02/25/23  1455   AST 21   ALT 17   BILITOT 0.8   ALKPHOS 97     Recent Labs     02/25/23  1455   INR 1.13     Recent Labs     02/25/23  1455   TROPONINI <0.01       Urinalysis:      Lab Results   Component Value Date/Time    NITRU Negative 04/03/2022 01:35 PM    WBCUA 0-2 03/29/2022 03:55 PM    BACTERIA 1+ 01/11/2018 08:50 AM    RBCUA 0-2 03/29/2022 03:55 PM    BLOODU Negative 04/03/2022 01:35 PM    SPECGRAV 1.010 04/03/2022 01:35 PM    GLUCOSEU Negative 04/03/2022 01:35 PM       Radiology:  XR CHEST PORTABLE   Final Result      1. Findings of pulmonary edema which are increased compared to chest radiograph from one day prior. MRI brain without contrast   Final Result      Left MCA border zone infarcts without hemorrhage or mass effect. IP CONSULT TO NEUROCRITICAL CARE  IP CONSULT TO PHARMACY  IP CONSULT TO NEUROSURGERY    Assessment/Plan:    Active Hospital Problems    Diagnosis     Acute cerebrovascular accident (CVA) due to stenosis of left carotid artery (HCC) [I63.232]      Priority: Medium    Acute unilateral cerebral infarction in a watershed distribution [facility]) [I63.89]      Priority: Medium     Acute ischemic stroke, moderate right MCA, nearly occluded LICA, completely occluded verts. Currently in ICU and every 1 hour neurochecks  Neurosurgery neurology on board, per neurology note for any neurological change, drop head of the bed and give IV fluid bolus and consider pressors if no improvement.   Catheter angiogram for LICA stenosis is planned for tomorrow afternoon with neurosurgery, n.p.o. after midnight  Maintain BP, avoid hypotension  Asa, statin   PTOT okay as long as remain in bed     DVT Prophylaxis: scds  Diet: ADULT DIET; Dysphagia - Soft and Bite Sized;  Low Fat/Low Chol/High Fiber/2 gm Na  Code Status: Full Code  PT/OT Eval Status: nella Ardon MD

## 2023-02-27 NOTE — PROGRESS NOTES
Neurovascular Neurosurgery Attending Note:    This patient had a non-disabling stroke to the left cerebral hemisphere, left internal carotid artery (ICA) territory with CTA evidence for high grade approximately 85-90% stenosis of the left ICA near its origin in the cervical region. He has a history of Atrial fibrillation for which he has previously been on Eliquis. We have recommended that he have Eliquis / Heparin gtt held due to his acute stroke. We have recommended antiplatelet therapy (Aspirin 325 mg PO QD); monotherapy at this time until after his catheter angiogram.  Then we will decide on whether or not to add Plavix. Plan is for traditional catheter-based diagnostic cervical / cerebral angiogram to completely assess the cervico-cerebral arterial anatomy including collateral flow and patency of the [place] for surgical planning. Patient is likely going to be a candidate for either open left carotid thrombendarterectomy (CEA) or for left carotid stenting (ELIANA). Will perform catheter angiogram to help make that decision. Catheter angiogram is planned for tomorrow afternoon (Tuesday 02/28/2023). Continue frequent neurological checks. NPO after midnight tonight.     Concetta Gutiérrez MD  Attending Neurosurgeon  Neurovascular and Stroke  [facility]

## 2023-02-27 NOTE — PROGRESS NOTES
ICU Consult Note    Admit Date: 2/25/2023  Day: 1  Vent Day: 0  IV Access:Peripheral  IV Fluids:none  Vasopressors:none                Antibiotics: none  Diet: ADULT DIET; Dysphagia - Soft and Bite Sized    CC: Aphasia    Interval history:  Patient seen at the bedside this morning. No acute complaints overnight. Patient still aphasic and dysarthric. HPI:  68 old male with past medical history HTN, a fib on Eliquis, HFpEF, COPD (4 L), chronic bronchitis, hernia repair who initially presented to OSH with acute onset aphasia. Son reported at OSH patient had waxing/waning speech changes over the past week worse than his typical stuttering. He noticed symptoms onset around 1100 on 2/24, but symptoms gradually resolved and were completely absent that night prior to bed. Patient slept in this morning until 1100 at which point family noted symptoms have recurred and he called 911. At [facility] ED, pt hemodynamically stable with mild aphasia and mild R arm drift; NIHSS 3. EKG showing AF but otherwise no acute changes. CT head w/o showed age-indeterminate small, possibly acute L frontal lobe infacrt as well as proximal left ICA near-occlusion. Pt was transferred to [facility] for neurosurgical evaluation and management. Pt in NAD on arrival. He continues with difficulty finding words; has good insight into sx. Denies any headache, fever, chills, chest pain, dyspnea, abdominal pain, nausea, vomiting, numbness, visual changes.       Past Medical History        Past Medical History:   Diagnosis Date    Bronchitis chronic      Emphysema of lung ([facility] [address]      Influenza A 01/10/2018    Lung disease       copd    Pneumonia       12/2009            Past Surgical History[]Expand by Default         Past Surgical History:   Procedure Laterality Date    FRACTURE SURGERY         johnson in femur/hip on right    HERNIA REPAIR                SocialHistory:   The patient lives at      Alcohol:  Illicit drugs: no use  Tobacco:       Family History:  Family History[]Expand by Default         Family History   Problem Relation Age of Onset    Diabetes Sister      Cancer Sister      Cancer Sister      Cancer Brother      Asthma Daughter      Asthma Son      Diabetes Mother      Cancer Father         Review of Systems   Constitutional:  Negative for chills and fever. Eyes:  Negative for visual disturbance. Respiratory:  Negative for shortness of breath. Cardiovascular:  Negative for chest pain. Gastrointestinal:  Negative for nausea and vomiting. Neurological:  Positive for speech difficulty. Negative for dizziness, weakness, light-headedness, numbness and headaches. All other systems reviewed and are negative.     Medications:     Scheduled Meds:   potassium chloride  40 mEq Oral Q4H    ipratropium-albuterol  1 ampule Inhalation Q4H WA    atorvastatin  80 mg Oral Nightly    sodium chloride flush  5-40 mL IntraVENous 2 times per day    dilTIAZem  180 mg Oral Daily    torsemide  40 mg Oral BID    aspirin  325 mg Oral Daily    Or    aspirin  300 mg Rectal Daily    arformoterol tartrate  15 mcg Nebulization BID    And    budesonide  0.5 mg Nebulization BID     Continuous Infusions:   sodium chloride       PRN Meds:perflutren lipid microspheres, albuterol, ondansetron **OR** ondansetron, polyethylene glycol, sodium chloride flush, sodium chloride, acetaminophen **OR** acetaminophen    Objective:   Vitals:   T-max:  Patient Vitals for the past 8 hrs:   BP Temp Temp src Pulse Resp SpO2   02/27/23 0600 (!) 113/90 -- -- 64 17 93 %   02/27/23 0500 (!) 128/97 -- -- 68 17 94 %   02/27/23 0400 122/71 97.7 °F (36.5 °C) Oral 59 15 91 %   02/27/23 0300 115/62 -- -- (!) 47 18 90 %   02/27/23 0200 (!) 83/57 -- -- (!) 47 16 92 %   02/27/23 0100 97/64 -- -- 59 18 91 %         Intake/Output Summary (Last 24 hours) at 2/27/2023 0802  Last data filed at 2/27/2023 0400  Gross per 24 hour   Intake 400 ml   Output 1700 ml   Net -1300 ml         Physical Exam  Vitals reviewed. Constitutional:       Appearance: Normal appearance. He is normal weight. HENT:      Head: Normocephalic and atraumatic. Eyes:      Extraocular Movements: Extraocular movements intact. Conjunctiva/sclera: Conjunctivae normal.      Pupils: Pupils are equal, round, and reactive to light. Cardiovascular:      Rate and Rhythm: Normal rate and regular rhythm. Heart sounds: Normal heart sounds. Pulmonary:      Effort: Pulmonary effort is normal.      Breath sounds: Normal breath sounds. Abdominal:      General: Abdomen is flat. Bowel sounds are normal.      Palpations: Abdomen is soft. Musculoskeletal:         General: Normal range of motion. Skin:     General: Skin is warm and dry. Neurological:      General: No focal deficit present. Mental Status: He is alert and oriented to person, place, and time. Mental status is at baseline. Cranial Nerves: Dysarthria present. Sensory: Sensation is intact. Motor: Motor function is intact. Comments: Expressive aphasia. Intermittent difficulty finding words but answering appropriately. His motor strength and sensation is intact in the bilateral upper and lower extremities. Psychiatric:         Mood and Affect: Mood normal.         Behavior: Behavior normal.     LABS:    CBC:   Recent Labs     02/25/23  1455 02/27/23  0459   WBC 10.4 12.7*   HGB 16.1 15.6   HCT 48.0 47.1    324   MCV 92.4 92.3       Renal:    Recent Labs     02/25/23  1455 02/27/23  0459    142   K 3.6 2.8*   CL 98* 94*   CO2 32 38*   BUN 13 12   CREATININE 0.8 1.0   GLUCOSE 143* 132*   CALCIUM 9.3 8.7   MG  --  1.90   ANIONGAP 11 10       Hepatic:   Recent Labs     02/25/23  1455   AST 21   ALT 17   BILITOT 0.8   PROT 7.2   LABALBU 4.2   ALKPHOS 97       Troponin:   Recent Labs     02/25/23  1455   TROPONINI <0.01       BNP: No results for input(s): BNP in the last 72 hours.   Lipids: No results for input(s): CHOL, HDL in the last 72 hours.    Invalid input(s): LDLCALCU, TRIGLYCERIDE  ABGs:  No results for input(s): PHART, UFV9FGI, PO2ART, VRZ5JJP, BEART, THGBART, O4VJITYS, UWS5PQN in the last 72 hours. INR:   Recent Labs     02/25/23  1455   INR 1.13       Lactate: No results for input(s): LACTATE in the last 72 hours. Cultures:  -----------------------------------------------------------------  RAD:   XR CHEST PORTABLE   Final Result      1. Findings of pulmonary edema which are increased compared to chest radiograph from one day prior. MRI brain without contrast   Final Result      Left MCA border zone infarcts without hemorrhage or mass effect. Assessment/Plan:   68 old male with past medical history HTN, a fib on Eliquis, HFpEF, COPD (4 L), chronic bronchitis, hernia repair who initially presented to OSH with 1 week waxing/waning aphasia. CT w/ near occlusion of L ICA and small L frontal infarct. Subacute ischemic CVA-patient has MRI brain which showed left MCA border zone infarcts without hemorrhage or mass effect. Patient also had a CTA of the head and neck which showed left proximal internal carotid artery occlusion with distal lobe collapse with reduced blood flow into the left MCA territory. This near occlusion is likely to blame for patient's stroke symptoms. There is also bilateral proximal vertebral artery occlusions with distal reconstitution. Neurocritial care following  Likely Neurovascular consult  Goal -160, will utilize PRN Labetalol 10 IV  If insufficient, will consider Nicardipine gtt  Q1hr neurochecks  ASA and Statin, not on antiplatelet agent  Did not receive tpa or tnk in the ED. S/P MRI Brain- L border zone infarct without hemorrhage or mass effect  PT/OT  Complete Echo- pending  Ultimate plan is angiogram per neurovascular surgery and CEA vs stent after that.   Chronic Medical Conditions  COPD- Continue Brovana and Pulmicort treatments  DuoNeb PRN  Heart failure with preserved ejection fraction  Fluids held for the patient. Lasix spot dose given with improvement  HTN- Continue home antihypertensive regimen    Code Status: Full code  FEN: Regular diet  PPX: Heparin drip  Gavino Patterson MD  PGY1, Internal Medicine  02/27/23  8:05 AM    This patient has been staffed and discussed with Valeri Marsh MD.    Patient seen, examined and discussed with the resident and I agree with the assessment and plan as edited above. LMCA stroke with severe LICA stenosis. Awaiting input from neurovascular team as to approach to her LICA. Patient on bipap at home which we should restart. Wrote for his home bipap settings if his home bipap isn't available. He's on his home bronchodilator therapy.     Disposition per neurosurgical team.      Eri Crane MD

## 2023-02-27 NOTE — PROGRESS NOTES
Speech Language Pathology  Facility/Department:[facility] ICU  Speech/Language/Dysphagia Treatment                                                         Patient Diagnosis(es):   Patient Active Problem List    Diagnosis Date Noted    Acute cerebrovascular accident (CVA) due to stenosis of left carotid artery ([address] 02/26/2023    Acute unilateral cerebral infarction in a watershed distribution [facility]) 02/25/2023    Acute ischemic stroke ([address] 02/25/2023    Stage 3 severe COPD by GOLD classification ([address] 11/29/2022    Chronic respiratory failure with hypoxia and hypercapnia ([address] 11/29/2022    Nocturnal hypoxia 11/29/2022    Hypotension 04/14/2022    Acute on chronic combined systolic (congestive) and diastolic (congestive) heart failure ([address] 04/03/2022    Acute hypokalemia 03/29/2022    Hypophosphatemia 03/29/2022    Long term current use of diuretic 03/29/2022    Unstable angina pectoris ([address] 04/17/2019    Atrial fibrillation ([address] 01/11/2018    Acute on chronic respiratory failure with hypoxia and hypercapnia ([address] 01/11/2018    Acute hyponatremia 01/11/2018    Hyperbilirubinemia 01/11/2018    Influenzal pneumonia     Acute exacerbation of chronic obstructive pulmonary disease (COPD) ([address] 04/07/2011    Essential hypertension 04/07/2011    Former smoker 04/16/2010       Past Medical History:   Diagnosis Date    Atrial fibrillation ([address]     Bronchitis chronic     Emphysema of lung ([address]     HTN (hypertension)     Influenza A 01/10/2018    Lung disease     copd    Pneumonia     12/2009    Stroke     L [facility] watershed     Past Surgical History:   Procedure Laterality Date    FRACTURE SURGERY      johnson in femur/hip on right    HERNIA REPAIR         Reason for Referral:  Soni Barrera  was referred for a Speech Therapy evaluation to assess swallow function and/or communication. History of Present Illness  Interval history: Patient seen and examined this morning at bedside.   He was sitting up comfortably in his bed. Overnight patient became short of breath. A chest x-ray was done and showed increased pulmonary edema. He was given a dose of 40 mg IV Lasix as well as DuoNeb treatments. He stated this improved his breathing. His IV fluids were stopped. He states he feels much better this morning and is having good urine output. He has no other complaints at this time. He remains afebrile and hemodynamically stable. HPI: 68 old male with past medical history HTN (Eliquis), HFpEF, COPD (4 L), chronic bronchitis, hernia repair who initially presented to OSH with acute onset aphasia. Son reported at OSH patient had waxing/waning speech changes over the past week worse than his typical stuttering. He noticed symptoms onset around 1100 on 2/24, but symptoms gradually resolved and were completely absent that night prior to bed. Patient slept in this morning until 1100 at which point family noted symptoms have recurred and he called 911. At [facility] ED, pt hemodynamically stable with mild aphasia and mild R arm drift; NIHSS 3. EKF showing AF but otherwise no acute changes. CT head w/o showed age-indeterminate small, possibly acute L frontal lobe infacrt as well as proximal left ICA near-occlusion. Pt was transferred to [facility] for neurosurgical evaluation and management. Pt in NAD on arrival. He continues with difficulty finding words; has good insight into sx. Denies any headache, fever, chills, chest pain, dyspnea, abdominal pain, nausea, vomiting, numbness, visual changes. CXR: 2/26/23  Impression       1. Findings of pulmonary edema which are increased compared to chest radiograph from one day prior.      MRI: 2/25/23  Impression       Left MCA border zone infarcts without hemorrhage or mass effect       Date of onset: 2/26/23    Current Diet:  NPO    Treatment Diagnosis:  Dysphagia    Pain:  None    General:  Chart reviewed: Yes  Behavior/Cognition: Cooperative, alert,   Communication Observation: aphasic with baseline stuttering  Follows Directions: Simple  Dentition/Oral Mucosa: Edentulous with clean moist tongue  Oral motor exam: Right labial weakness especially upon retraction, reduced lateral lingual movement to the right. No tongue deviation  Vocal Quality: WFL  Respiratory Status/oxygen requirements: O2 cannula  Vision/Hearing: No glasses; [place] concerns  Patient Complaint: Trouble talking; pt and family deny problems with swallowing at breakfast yesterday just when they noticed problems with using right hand etc.   Patient Positioning: upright in chair    Prior Dysphagia History:   none    Bedside Swallowing Evaluation Impression 2/26/23:   Pt alert, oriented to self, general place following review, to year with cues, month/day of week without cues. Pt edentulous (able to eat all types of foods at home with difficulty per family, even just prior to going to hospital). Right labial weakness noted with no tongue deviation but reduced lateral lingual movement to the right. Oral acceptance of all foods presented and able to feed self. No cough/wet vocal quality/throat clearing with any po trials including thins via cup/straw for 3 oz water test and 10 single sip trials, 4 oz  puree and cracker trials (except a delayed cough 15 seconds post po trials x1). No oral residue with any po trials. Pt denies globus sensation when questioned. D/W nursing re: recent chest xray and nursing reported pt given some extra fluids last night which may be impacting current lung status (in regards to heart issues). Lungs listened to by nursing today have only been diminished. Swallow initiation fairly timely. Recommend SOFT and BITE sized with thins - If any s/s of aspiration or if lung status decline emerges, then  d/c po until we recheck. Speech, Language, Cognitive Evaluation 2/26/23:   Pt with mild receptive and moderate expressive aphasia with baseline dysfluency.    Pt alert, oriented to self, general place following review, to year with cues, month/day of week without cues. Right labial weakness noted with no tongue deviation but educed lateral lingual movement to the right. Pt with baseline stuttering but increased difficulty with word recall in past month but significantly worse past couple days. Pt with high school education and no major hobbies reported by pt /family. Hesitations with speaking noted with reduced word recall in conversation. Confrontational naming of objects, simple pictures 8/8 correct. Difficulty with recall of address, but knew birthdate. Breakdown especially following 3 step commands. Able to name 3 words in one minute given a category (up to 3 more given a cue). [employer] tasks at 100%. Able to read single words without difficulty. Education  See under goal 2 below for dysphagia for details. Pt/family initiated on strategies for speech in regards to increasing word production/recall (gesturing, description, etc). Pt verbalized/indicated fair understanding; family verbalized understadning. Prognosis:  Prognosis for improvement: Good  Barriers to reach goals: age  [de-identified] and agree with results and recommendations: yes    Treatment:  Dysphagia Goals: The pt will be seen  2-4 x per week to address the following goals:  Goal 1: The pt will tolerate least restrictive diet without s/s of aspiration  2/27: Pt seated upright in bed and seen during afternoon meal of soft and bite sized solids and thin liquids, as well as trial of regular solids. Pt demonstrated adequate, yet mildly prolonged mastication of regular solids. Pt edentulous, however expressed no difficulty. Pt appeared to swallow all trials with no overt s/s penetration/aspiration. No significant oral residue and appropriately taking liquid wash as needed. Pt indicated wanting to continue SBS prior to advancing. SLP in agreement. Cont.     Goal 2: Pt/family will verbalize and/or demonstrate understanding of swallowing recommendations. 2/26:  Pt and family educated to role of dysphagia, what aspiration is, what s/s of aspiration are and diet/strategies recommended for pt. Pt able to demonstrate understanding by taking small amounts of food and liquid as practice at the end of the session following initial review with min to no cues. Both pt and family verbalized understanding. 2/27: SLP provided education regarding diet level rec's, swallow strategies, oral hygiene and further dysphagia treatment for safety of swallow. Pt demonstrated understanding and followed all commands to implement strategies during meal. No other family present at this time. Cont. Speech Goals: The pt will be seen  2-4 x per week to address the following goals:   Pt will follow 2- 3 step directions with 50% accuracy, mod cues. 2/27: followed 1-step with 90% accuracy. Followed 2-step with object use with ~30%. Multiple repetitions required for comprehension, as well as single step breakdown. Cont. Pt will answer yes/no questions of varying complexity with 90% accuracy, min to no cues. 2/27: answered basic yes/no with 100% accuracy. Answered semi-complex with added prepositions with ~75%. Pt did self correct x1 for accuracy. Cont. Pt will name 4-5 words in one minute given a category  2/27: not targeted this session. Pt/family will verbalize and/or demonstrate 2-3 speech strategies to maximize verbal communication and/or fluency. 2/27: SLP discussed use of SFA for verbal descriptions when events of anomia occur. Pt utilized x1 during session to describe and communicate difficulty with self feeding with use of R hand. Pt also independently pausing and re-starting own statement prior to continuing to assist with word finding/fluency. Cont. Pt will participate in further reading/writing/and cognitive assessment. 2/27: noted frustrations during session. SLP provided education regarding aphasia vs fluency.  Pt with continued accuracy of confrontation naming of high frequency objects and following basic commands. Responsive naming task completed with 90% accuracy. Min phonemic/semantic cues required intermittently. Cont. Plan:  Continue per POC  Recommended diet: Soft and Bite Sized Solids, Thin Liquids-  If any s/s of aspiration or if lung status decline emerges, then  d/c po until we recheck.    Recommended form of medication: Pills with water  Swallowing Strategies:   -small bites/sips  -alternate solids/liquids  -check for pocketing  -oral hygiene 2-3x/day    Pt goal: \"all of it\" better  Pt discharge goal: Home    Total treatment time: 20 dysphagia; 20 speech/language  Discharge Recommendation: TBD  Discussed with RN: Rita  Needs met prior to leaving room, call light within reach    Electronically signed by:  Karne Yoder M.A., [address] [place] [address]  Speech-Language Pathologist  Pg #: [phone]    This document will serve as a dc summary if pt dc prior to next visit

## 2023-02-27 NOTE — PROGRESS NOTES
Occupational Therapy  Facility/Department: [facility] ICU  Occupational Therapy Initial Assessment and Treatment Note     Name: Ariela Vance  : 1950  MRN: [id number]  Date of Service: 2023    Discharge Recommendations:Bang Chadwick scored a 16/24 on the AM-PAC ADL Inpatient form. Current research shows that an AM-PAC score of 17 or less is typically not associated with a discharge to the patient's home setting. Based on the patient's AM-PAC score and their current ADL deficits, it is recommended that the patient have 5-7 sessions per week of Occupational Therapy at d/c to increase the patient's independence. At this time, this patient demonstrates complex nursing, medical, and rehabilitative needs, and would benefit from intensive rehabilitation services upon discharge from the Inpatient setting. This patient demonstrates the ability to participate in and benefit from an intensive therapy program with a coordinated interdisciplinary team approach to foster frequent, structured, and documented communication among disciplines, who will work together to establish, prioritize, and achieve treatment goals. Please see assessment section for further patient specific details. If patient discharges prior to next session this note will serve as a discharge summary. Please see below for the latest assessment towards goals. OT Equipment Recommendations  Equipment Needed: No  Other: defer to next care facility       Patient Diagnosis(es): There were no encounter diagnoses. Past Medical History:  has a past medical history of Bronchitis chronic, Emphysema of lung ([address] Influenza A, Lung disease, and Pneumonia. Past Surgical History:  has a past surgical history that includes fracture surgery and hernia repair. Treatment Diagnosis: impaired ADLs /functional transfers / decreased RUE functional use 2/2 CVA      Assessment   Performance deficits / Impairments: Decreased functional mobility ; Decreased endurance;Decreased ADL status; Decreased coordination;Decreased high-level IADLs;Decreased safe awareness;Decreased fine motor control  Assessment: Pt from home alone. Pt demo with significant decline in functional independence with mobility / transfers and ADLs/ IADLs. Pt limited by global aphasia and poor insight into deficits. Pt requires 3L O2/ poor activity tolerance at baseline. Pt would benefit from inpt OT at d/c to maximize functional level. Will follow as inpt. If home recommend 24hr assist / Susy Dole. Treatment Diagnosis: impaired ADLs /functional transfers / decreased RUE functional use 2/2 CVA  Prognosis: Good;Fair  Decision Making: Medium Complexity  REQUIRES OT FOLLOW-UP: Yes  Activity Tolerance  Activity Tolerance: Patient limited by fatigue;Treatment limited secondary to medical complications (free text)  Activity Tolerance Comments: Pt demo increased HANKINS with minimal activity -- Pt wears 3L o2 at baseline. Pt demo O2 levels at 81-88*% on 4-5L with minimal activity. Plan   Occupational Therapy Plan  Times Per Week: 5-7x  Times Per Day: Once a day  Current Treatment Recommendations: Self-Care / ADL, Functional mobility training, Endurance training, Safety education & training, Patient/Caregiver education & training, Neuromuscular re-education     Restrictions  Restrictions/Precautions  Restrictions/Precautions: Fall Risk  Position Activity Restriction  Other position/activity restrictions: up as tolerated, up with assistance    Subjective   General  Chart Reviewed: Yes  Additional Pertinent Hx: Admit 2/25 from [facility]  with expressive aphasia and RUE ataxia. CT head shows an age-indeterminate, possibly acute L frontal lobe infarct.  CTA head/neck shows proximal L ICA near occlusion with distal lumen collapse, and bilateral proximal vertebral artery occlusion with distal reconstitution   Neuro consult    PMHX: HTN, HFpEF, COPD, emphysema, PNA, chronic bronchitis, hernia repair, AFib  Diagnosis: Acute CVA     Social/Functional History  Social/Functional History  Lives With: Family (son and daughter-in-law)  Type of Home: House  Home Layout: One level  Home Access: Stairs to enter without rails  Entrance Stairs - Number of Steps: 2 stairs to enter - uses rollator  Bathroom Shower/Tub: Walk-in shower  Bathroom Toilet: Standard  Bathroom Equipment: Shower chair  Home Equipment: [address], 4 wheeled, Oxygen  Has the patient had two or more falls in the past year or any fall with injury in the past year?: No (\"near falls\" - attributes to dizziness when he first stands)  Receives Help From: Family (available for near 24 hour (A))  ADL Assistance: Independent  Homemaking Assistance: Needs assistance (family performs)  Ambulation Assistance: Independent (no assistive device for household mobility, limited community mobility with (S) and rollator)  Transfer Assistance: Independent  Active : No  Occupation: Retired       Objective Treatment included functional transfer training, ADL's and pt. education. Vision : pt wears reading glasses but demo some R sided peripheral vision deficits. -- not formally assessed. Hearing - intact      Safety Devices  Type of Devices: Call light within reach; Chair alarm in place;Gait belt;Left in chair;Nurse notified     Toilet Transfers  Toilet - Technique: Ambulating  Equipment Used: Standard toilet  Toilet Transfer: Minimal assistance  Toilet Transfers Comments: v.cues -- HANKINS with all functional transfers  AROM: Generally decreased, functional  Strength: Generally decreased, functional (Pt demo decreased grasp / release with RUE and 3+ to 4 vs 4+ in LUE.)  Coordination: Grossly decreased, non-functional (Pt demo poor coordination in RUE / wrist.  LUE intact.)  Tone: Normal  Sensation: Intact  ADL  Feeding: Stand by assistance;Setup  Grooming: Increased time to complete;Minimal assistance;Setup;Verbal cueing  Grooming Skilled Clinical Factors: Pt using counter B forearm support for simple ADLs. Limited by fatigue and RUE dysfunction  LE Dressing: Maximum assistance; Moderate assistance  Toileting: Maximum assistance;Dependent/Total  Toileting Skilled Clinical Factors: unable to perform pericare this date. Functional mobility in room - MIn-Mod A with walker steering / management - 3 mins x 2      Activity Tolerance  Activity Tolerance: Patient tolerated evaluation without incident  Activity Tolerance Comments: limited by O2 saturation during gait; RN notified; fatigues easily  Bed mobility  Bed Mobility Comments: Pt found up in bathroom with RN staff  Transfers  Sit to stand: Minimal assistance  Stand to sit: Minimal assistance  Transfer Comments: v.cues for hand placement     Cognition  Overall Cognitive Status: Exceptions  Arousal/Alertness: Delayed responses to stimuli  Following Commands: Follows one step commands with increased time; Follows one step commands with repetition  Attention Span: Attends with cues to redirect  Memory: Decreased short term memory  Safety Judgement: Decreased awareness of need for assistance  Insights: Decreased awareness of deficits  Initiation: Requires cues for some  Sequencing: Requires cues for some  Cognition Comment: increased time for processing- pt with global aphasia  Orientation  Overall Orientation Status: Within Functional Limits  Orientation Level: Oriented X4      Education Given To: Patient  Education Provided: Role of Therapy;Plan of Care;Transfer Training;ADL Adaptive Strategies;IADL Safety  Education Method: Demonstration;Verbal  Barriers to Learning: Other (Comment) (Global aphasia)  Education Outcome: Continued education needed      AM-PAC Score   AM-[facility] Inpatient Daily Activity Raw Score: 16 (02/27/23 1056)  AM-PAC Inpatient ADL T-Scale Score : 35.96 (02/27/23 1056)  ADL Inpatient CMS 0-100% Score: 53.32 (02/27/23 1056)  ADL Inpatient CMS G-Code Modifier : CK (02/27/23 1056)      Goals  Short Term Goals  Time Frame for Short Term Goals: at d/c  Short Term Goal 1: Stance x 5 mins with supervision for ADLs/ IADLs  Short Term Goal 2: Functional transfers with SBA  Short Term Goal 3: LE Dressing with CGA and AE prn  Short Term Goal 4: Increase functional use of RUE for all ADls to 82% of tasks. Short Term Goal 5: Pt demo independence with HEP for RUE coordination.      Therapy Time   Individual Concurrent Group Co-treatment   Time In 0952         Time Out 1032         Minutes 40             Timed Code Treatment Minutes:  25 mins     Total Treatment Minutes:  40 mins       Isabella March, OT

## 2023-02-27 NOTE — PLAN OF CARE
Problem: Discharge Planning  Goal: Discharge to home or other facility with appropriate resources  Outcome: Progressing  Flowsheets (Taken 2/27/2023 0512)  Discharge to home or other facility with appropriate resources: Identify barriers to discharge with patient and caregiver     Problem: Safety - Adult  Goal: Free from fall injury  Outcome: Progressing  Flowsheets (Taken 2/27/2023 0512)  Free From Fall Injury: Instruct family/caregiver on patient safety

## 2023-02-27 NOTE — PROGRESS NOTES
Written in retrospect. GCS 15/15 E4 V5 M6 - delayed responses with pauses  Pupils anisocoric; OD 4mm, OS 3mm, brisk  RE 5/5  LE 4/5    On O2 therapy @ 3lpm via NC, saturating ~ 88%   Bilateral diminished air entry in the upper and lower lobes  Respiration regular, deep, unlabored  Cough: strong  Symmetrical chest expansion    Known a-fib - rate controlled  Normotensive  Generally warm to touch  Peripheral and central CRT <3s  Bilateral radial pulses strond and irregular; pedal pulses weak, irregular  Electrolytes WNL    Abdomen soft, round, nontender  Active bowel sounds in all quadrants  Diet: soft, bite-sized diet with thin fluids    Pressure areas clean, dry, and intact. Peripheral IV @ L cephalic vein. Clean, dry, intact. Patent and flushed with saline.   On condom catheter d/t incontinence  Scattered ecchymosis    Afebrile  Not on any antibiotic regimen    CBR without bathroom privileges

## 2023-02-27 NOTE — PROGRESS NOTES
Physical Therapy  Facility/Department: [facility] ICU  Physical Therapy Treatment  Name: Jessee Bosch  : 1950  MRN: [id number]  Date of Service: 2023    Discharge Recommendations:   Jessee Bosch scored a 14/24 on the AM-PAC short mobility form. Current research shows that an AM-PAC score of 17 or less is typically not associated with a discharge to the patient's home setting. Based on the patient's AM-PAC score and their current functional mobility deficits, it is recommended that the patient have 5-7 sessions per week of Physical Therapy at d/c to increase the patient's independence. At this time, this patient demonstrates complex nursing, medical, and rehabilitative needs, and would benefit from intensive rehabilitation services upon discharge from the Inpatient setting. This patient demonstrates the ability to participate in and benefit from an intensive therapy program with a coordinated interdisciplinary team approach to foster frequent, structured, and documented communication among disciplines, who will work together to establish, prioritize, and achieve treatment goals. Please see assessment section for further patient specific details. If patient discharges prior to next session this note will serve as a discharge summary. Please see below for the latest assessment towards goals. PT Equipment Recommendations  Equipment Needed: No (defer)      Patient Diagnosis(es): There were no encounter diagnoses. Past Medical History:  has a past medical history of Bronchitis chronic, Emphysema of lung ([address] Influenza A, Lung disease, and Pneumonia. Past Surgical History:  has a past surgical history that includes fracture surgery and hernia repair. Assessment   Body Structures, Functions, Activity Limitations Requiring Skilled Therapeutic Intervention: Decreased functional mobility ; Decreased ADL status; Decreased strength;Decreased endurance;Decreased balance;Decreased fine motor control;Decreased coordination  Assessment: Pt requiring min A for sit<>stand transfers and min-mod Ax1 for amb with RW. Pt with undsteadiness and ataxia with gait which increases risk of falls. Pt noted to have continued aphasia/ataxia. Pt limited by decreased, strength, balance and endurance. Pt is well below his baseline and would benefit from further skilled PT to maximize safety and independence with functional mobiility. Treatment Diagnosis: impaired functional mobility  Barriers to Learning: none  Activity Tolerance  Activity Tolerance: Patient tolerated evaluation without incident  Activity Tolerance Comments: limited by O2 saturation during gait; RN notified; fatigues easily     Plan   Physcial Therapy Plan  General Plan: 5-7 times per week  Current Treatment Recommendations: Strengthening, ROM, Balance training, Functional mobility training, Transfer training, Endurance training, Gait training, Stair training, Neuromuscular re-education, Home exercise program, Safety education & training, Patient/Caregiver education & training, Equipment evaluation, education, & procurement, Therapeutic activities  Safety Devices  Type of Devices: Call light within reach, Chair alarm in place, Gait belt, Left in chair, Nurse notified     Restrictions  Restrictions/Precautions  Restrictions/Precautions: Fall Risk  Position Activity Restriction  Other position/activity restrictions: up as tolerated, up with assistance     Subjective   General  Chart Reviewed: Yes  Additional Pertinent Hx: [facility] ED 2/25 67 yo male who presents with expressive aphasia and RUE ataxia. CT head shows an age-indeterminate, possibly acute L frontal lobe infarct. CTA head/neck shows proximal L ICA near occlusion with distal lumen collapse, and bilateral proximal vertebral artery occlusion with distal reconstitution. PMH: HTN, HFpEF, COPD, emphysema, PNA, chronic bronchitis, hernia repair, AFib  Family / Caregiver Present:  Yes (Granddaughters)  Referring Practitioner: MD Дмитрий  Diagnosis: acute CVA  Follows Commands: Within Functional Limits  General Comment  Comments: Pt found sitting on toilet with RN present upon arrival and agreeable to therapy - RN ok withtherapy taking over.         Social/Functional History  Social/Functional History  Lives With: Family (son and daughter-in-law)  Type of Home: House  Home Layout: One level  Home Access: Stairs to enter without rails  Entrance Stairs - Number of Steps: 2 stairs to enter - uses rollator  Bathroom Shower/Tub: Walk-in shower  Bathroom Toilet: Standard  Bathroom Equipment: Shower chair  Home Equipment: Walker, 4 wheeled, Oxygen  Has the patient had two or more falls in the past year or any fall with injury in the past year?: No (\"near falls\" - attributes to dizziness when he first stands)  Receives Help From: Family (available for near 24 hour (A))  ADL Assistance: Independent  Homemaking Assistance: Needs assistance (family performs)  Ambulation Assistance: Independent (no assistive device for household mobility, limited community mobility with (S) and rollator)  Transfer Assistance: Independent  Active : No  Occupation: Retired    Cognition   Orientation  Overall Orientation Status: Within Functional Limits  Orientation Level: Oriented X4  Cognition  Overall Cognitive Status: WFL  Cognition Comment: increased time for processing     Objective   Heart Rate: 60  Heart Rate Source: Monitor  BP: (!) 113/90  BP Location: Right upper arm  BP Method: Automatic  Patient Position: Semi fowlers  MAP (Calculated): 98  Resp: 22  SpO2: (!) 88 %  O2 Device: Nasal cannula        Transfers  Sit to Stand: Minimal Assistance (from toilet)  Stand to Sit: Minimal Assistance (to recliner)  Comment: VC for hand placement, sequencing and safety  Ambulation  Surface: Level tile  Device: Rolling Walker  Other Apparatus: O2 (4L)  Assistance: Moderate assistance  Quality of Gait: ataxic, unsteady,  step to gait, decreased (B) step length, wide base of support  Distance: 10'+30'  Comments: Pt desatting to 81% with amb - improving to 88% with seated rest and focused breathing -- RN aware          Goals  Short Term Goals  Time Frame for Short Term Goals: discharge  Short Term Goal 1: Pt. will demonstrate (I) bed mobility. Short Term Goal 2: Pt. will demonstrate sit <-> stand SBA and no assistive device  Short Term Goal 3: Pt. will demonstrate stand pivot with CGA and no assistive device  Short Term Goal 4: Pt. will ambulate >/= 150ft with CGA and LRAD  Short Term Goal 5: Pt. will negotiate >/= 2 stairs with no rail and LRAD with min A  Patient Goals   Patient Goals : \"get better, go home\"       Education  Patient Education  Education Given To: Patient  Education Provided: Role of Therapy;Plan of Care  Education Provided Comments: use of call light, PT recommendations  Education Method: Demonstration;Verbal  Barriers to Learning: None  Education Outcome: Verbalized understanding;Continued education needed      Therapy Time   Individual Concurrent Group Co-treatment   Time In 0952         Time Out 1032         Minutes 40           Timed Code Treatment Minutes:  40    Total Treatment Minutes:  40    If the patient is discharged before the next treatment session, this note will serve as the discharge summary.      Stacey Nixon, PT, DPT

## 2023-02-27 NOTE — PROGRESS NOTES
Neurology Progress Note    Patient: Panfilo Palma MRN: [id number]    YOB: 1950  Age: 68 y.o. Sex: male   Unit: [address] ICU TOWER Room/Bed: 4522/4522-01 Location: [facility] Date: 2/27/2023  Date of Admission: 2/25/2023  6:32 PM  Admitting Physician: Adele Borges    Primary Care Physician: Ernestina Zelaya          LOS: 2 days      ASSESSMENT & RECOMMENDATIONS     Assessment  Mr. 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 Unfortunately, BP augmentation with IVF cannot be continued due to his HFpEF and the development of pulmonary edema, though his exam has remained stable with recent BPs. If his exam were to worsen will need to continue pressure modification w/ vasopressors. Recommendations  Q1hr neuro checks and monitor closely for worsening of left MCA symptoms  If neuro change, drop head of bed give IVF bolus and, will need to consider pressor  Bedrest for now to avoid potential drops in BP  Continue ASA 325mg po / 300mg MN  No anticoagulation for now  Agree with high-intensity statin; goal LDL < 70  A1c < 7.0  Speech therapy  PT/OT OK as long as remains in bed for now  Angiogram and intervention for LICA stenosis per Neurovascular surgery  PHQ-9: 9; discussed significance of score and need for f/u. Will place resources for mental health services in patient's discharge papers. SUBJECTIVE     Meeting patient for the first time. Initial consultation note was completed by Neurology CNP yesterday (2/25/23) evening, which I have reviewed.     77yo man with emphysema on home O2; HTN; afib on apixaban; HFpEF. Presented to OSH ER with abrupt onset of left arm weakness and difficulty speaking. Per my interview with pt and pt's son at the bedside:  Pt has had intermittent episodes of difficulty speaking over the last week. They seemed to come and go and were random in occurrence and resolution. However, yesterday morning son noted that pt was having great difficulty feeding himself with his right hand and his language was markedly impaired. He was brought to the ER. In the ER, BP was 125/88. Head CT demonstrated a hypodensity in the left frontal region. CTA head and neck showed bilateral occluded vertebral arteries, nearly occluded left carotid, and poor flow in left MCA territory. There was no LVO. After discussion on Viz. ai between ER physician, Stroke Team Physician, Neurovascular surgery, and myself, it was concluded that pt required ICU admission for frequent neuro checks and to ensure adequate perfusion of left MCA territory through stenotic left ICA with BP augmentation with IVF, if he tolerated, or pressor, if necessary. Ultimate plan will be for conventional angiogram with neurovascular surgery next week and CEA vs stent after that. No acute events overnight. Afebrile. Currently, pt feels that he is improved overall. He still is having difficulty with language and describes it as difficulty in saying what he wants to say. His arm is much better than it was yesterday. His SBP has ranged from 110s to as high as 160, with most in the mid 130s. He has not had any worsening of symptoms with these blood pressures. He was on IVF through most of the night but he has developed some mild pulmonary edema and so fluids have been discontinued. he is unsure what would have precipitated these symptoms, other than the above. he feels that nothing makes them better and nothing makes them worse. he rates the symptoms as severe.  he denies any other associated symptoms including no HA, no other speech/language difficulties, no swallowing difficulties, no visual changes, no diplopia, no hearing loss, no tinnitus, no vertigo, no imbalance, no light-headedness, no other focal weakness, no sensory changes, no nausea or vomiting. he has never had this problem before. There is no history of this problem or anything similar in his family members. Review of Systems  No changes since pt last seen other than those noted above. PFSH  No change since the original history and note. OBJECTIVE     Vitals:    02/27/23 0600 02/27/23 0758 02/27/23 0805 02/27/23 0810   BP: (!) 113/90      Pulse: 64 65 64 60   Resp: 17 14 23 22   Temp:       TempSrc:       SpO2: 93% 91% (!) 88% (!) 88%     Neurological Exam (performed on 2/27/2023 at 1320):  -Mental status: A&O x3; pleasant & appropriate  -Speech & Language: mild aphasia; no dysarthria  -Cranial nerves: pupils symmetric; no notable dysconjugate gaze; eyes midline; no facial asymmetry  -Motor: moving all extremities symmetrically and fully. Feeding himself with his right hand  -Other: no adventitious movements noted  Other Systems  -General Appearance: well-developed, well-nourished, no apparent distress  -Neck: supple  -Lungs: breathing unlabored, regular, no audible wheezes  -CV: pulses strong x4 extremities  -Abd: flat    Imaging: All reports below, not included in previous notes, personally reviewed & actual images reviewed where indicated. Pertinent positives & negatives are addressed in Assessment & Plan section of note    CTA head and neck  1. 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 Reduced blood flow to the left MCA territory. 2. Bilateral proximal vertebral artery occlusions with distal reconstitution. 3. Age-indeterminate, possibly acute small left frontal lobe infarct. 4. Chronic thoracic compression deformities. MRI brain w/o alessandro  Left MCA border zone infarcts without hemorrhage or mass effect.     Laboratory Review: Scheduled Meds:   potassium chloride  40 mEq Oral Q4H    ipratropium-albuterol  1 ampule Inhalation Q4H WA    atorvastatin  80 mg Oral Nightly    sodium chloride flush  5-40 mL IntraVENous 2 times per day    dilTIAZem  180 mg Oral Daily    [Held by provider] torsemide  40 mg Oral BID    aspirin  325 mg Oral Daily    Or    aspirin  300 mg Rectal Daily    arformoterol tartrate  15 mcg Nebulization BID    And    budesonide  0.5 mg Nebulization BID     Continuous Infusions:  sodium chloride         Mckayla Medrano NP  Neurology  [phone]  February 27, 2023

## 2023-02-27 NOTE — CARE COORDINATION
Case Management Assessment  Initial Evaluation    Date/Time of Evaluation: 2/27/2023 11:23 AM  Assessment Completed by: Claire Villagran RN    If patient is discharged prior to next notation, then this note serves as note for discharge by case management. Patient Name: Kala Damico                   YOB: 1950  Diagnosis: Acute CVA (cerebrovascular accident) [facility]) [I63.9]  Aphasia due to acute cerebrovascular accident (CVA) ([facility] [address] [I63.9, R47.01]                   Date / Time: 2/25/2023  6:32 PM    Patient Admission Status: Inpatient   Readmission Risk (Low < 19, Mod (19-27), High > 27): Readmission Risk Score: 13.7    Current PCP: Meagan Recinos  PCP verified by CM? Yes    Chart Reviewed: Yes      History Provided by: Patient, Medical Record  Patient Orientation: Alert and Oriented    Patient Cognition: Alert    Hospitalization in the last 30 days (Readmission):  No    If yes, Readmission Assessment in CM Navigator will be completed.     Advance Directives:      Code Status: Full Code   Patient's Primary Decision Maker is: Named in [address] [place] [address]    Primary Decision MakerHreymundo Mary  Child - [phone]    Secondary Decision Maker: Micki Hood  Child - [phone]    Discharge Planning:    Patient lives with: Children, Family Members Type of Home: House  Primary Care Giver: Self  Patient Support Systems include: Family Members   Current Financial resources: Medicare  Current community resources: None  Current services prior to admission: Durable Medical Equipment, Oxygen Therapy ([address])            Current DME: Walker            Type of Home Care services:  OT, PT, Nursing Services    ADLS  Prior functional level: Assistance with the following:, Cooking, Housework, Shopping, Mobility  Current functional level: Assistance with the following:, Bathing, Dressing, Toileting, Cooking, Housework, Shopping, Mobility    PT AM-PAC: 14 /24  OT AM-PAC: 16 /24    Family can provide assistance at DC: Yes  Would you like Case Management to discuss the discharge plan with any other family members/significant others, and if so, who?  Yes (son, Jc Loya, daughter, Napoleon Drought)  Plans to Return to Present Housing: Yes  Potential Assistance needed at discharge: Carlos Yadav, Other (Comment), Home Care ([facility])            Potential DME:  deferred  Patient expects to discharge to: Acute rehab  Plan for transportation at discharge:  tbd    Financial    Payor: [employer] MEDICARE / Plan: Angie Lick / Product Type: *No Product type* /     Does insurance require precert for SNF: Yes    Potential assistance Purchasing Medications: No  Meds-to-Beds request: Yes      [address] [place] [address] [address] [phone] Manolo Gaines [phone]  [address] [place] [id number]  Phone: [phone] Fax: [phone]    [address] [phone] - F [phone]  [address] [place] [address]  Phone: [phone] Fax: [phone]    [address], [address]  [address] [facility] [address]  Phone: [phone] Fax: [phone]      Notes:    Factors facilitating achievement of predicted outcomes: Family support, Caregiver support, Pleasant, Has needed Durable Medical Equipment at home, and Knowledge about rehab    Barriers to discharge: Decreased endurance, Upper extremity weakness, Lower extremity weakness, and Long standing deficits      The Patient and/or Patient Representative Agree with the Discharge Plan?  adair Segura RN  Case Management Department  [phone]

## 2023-02-27 NOTE — CONSULTS
NEUROSURGERY CONSULT NOTE    Gulshan Childers  [id number]   1950 2/27/2023    Requesting physician: Chelsie Oneal MD    Reason for consultation: LICA Occlusion    History of present illness: Patient is a 68 y.o. male that  has a past medical history of Atrial fibrillation ([facility] [address] Bronchitis chronic, Emphysema of lung ([address] HTN (hypertension), Influenza A (01/10/2018), Lung disease, Pneumonia, and Stroke. Patient presented on 2/25/2023 to [facility] ED c/o word finding difficulties. The patient's aphasia makes him a poor historian so information provided by previous physician and nursing notes. The patient has a stutter at baseline, but ~1 week ago he noticed an increase in his speech difficulty that would come and go. The patient's son noticed he was having trouble feeding himself with his RUE and a severe decline in his speech on Saturday. A code stroke was called at [facility] ED and a CT head and CTA head/neck were obtained. CT head showed an age-indeterminate L frontal infarct. CTA head/neck showed a near occlusion in the proximal L ICA with distal lumen collapse and reduced blood flow to the L MCA territory, bilateral proximal vertebral artery occlusions with distal reconstitution.  stroke team was contacted, and patient was deemed to not be a TNK candidate. Patient was discussed over [facility] with Dr. Dayna Morataya who recommended a transfer to [facility], ASA 325mg, and no heparin gtt. Patient transferred to [facility] for further evaluation and treatment.     ROS:   RICHELLE 2/2 Aphasia    No Known Allergies    Past Medical History:   Diagnosis Date    Atrial fibrillation ([facility] [address]     Bronchitis chronic     Emphysema of lung (HCC)     HTN (hypertension)     Influenza A 01/10/2018    Lung disease     copd    Pneumonia     12/2009    Stroke     L MCA watershed        Past Surgical History:   Procedure Laterality Date    FRACTURE SURGERY      johnson in femur/hip on right    HERNIA REPAIR         Social History     Occupational History    Not on file   Tobacco Use    Smoking status: Former     Packs/day: 1.00     Years: 30.00     Pack years: 30.00     Types: Cigarettes     Quit date: 2016     Years since quittin.6    Smokeless tobacco: Never    Tobacco comments:     Just quit about 3 months ago 16   Vaping Use    Vaping Use: Never used   Substance and Sexual Activity    Alcohol use: No    Drug use: No    Sexual activity: Not Currently        Family History   Problem Relation Age of Onset    Diabetes Sister     Cancer Sister     Cancer Sister     Cancer Brother     Asthma Daughter     Asthma Son     Diabetes Mother     Cancer Father         Outpatient Medications Marked as Taking for the 23 encounter (Hospital Encounter)   Medication Sig Dispense Refill    pramipexole (MIRAPEX) 0.5 MG tablet Take 0.5 mg by mouth daily          Current Facility-Administered Medications   Medication Dose Route Frequency Provider Last Rate Last Admin    ipratropium-albuterol (DUONEB) nebulizer solution 1 ampule  1 ampule Inhalation Q4H LEEANN Soto MD   1 ampule at 23 1210    perflutren lipid microspheres (DEFINITY) injection 1.5 mL  1.5 mL IntraVENous ONCE PRN Jhonathan Moreira DO        albuterol (PROVENTIL) nebulizer solution 2.5 mg  2.5 mg Nebulization Q4H PRN Bebo Salazar MD        ondansetron (ZOFRAN-ODT) disintegrating tablet 4 mg  4 mg Oral Q8H PRN Kimberly Choe MD        Or    ondansetron (ZOFRAN) injection 4 mg  4 mg IntraVENous Q6H PRN Kimberly Choe MD        polyethylene glycol (GLYCOLAX) packet 17 g  17 g Oral Daily PRN Kimberly Choe MD        atorvastatin (LIPITOR) tablet 80 mg  80 mg Oral Nightly Kimberly Choe MD   80 mg at 23 2117    sodium chloride flush 0.9 % injection 5-40 mL  5-40 mL IntraVENous 2 times per day Mo Jolley MD   10 mL at 23 0930    sodium chloride flush 0.9 % injection 5-40 mL  5-40 mL IntraVENous PRN Mo Jolley MD        0.9 %  sodium chloride infusion   IntraVENous PRN Glenn Lockhart MD        acetaminophen (TYLENOL) tablet 650 mg  650 mg Oral Q6H PRN Glenn Lockhart MD        Or    acetaminophen (TYLENOL) suppository 650 mg  650 mg Rectal Q6H PRN Glenn Lockhart MD        [facility] by provider] dilTIAZem (CARDIZEM CD) extended release capsule 180 mg  180 mg Oral Daily Glenn Lockhart MD   180 mg at 02/27/23 0930    [Held by provider] torsemide (DEMADEX) tablet 40 mg  40 mg Oral BID Glenn Lockhart MD   40 mg at 02/26/23 2117    aspirin EC tablet 325 mg  325 mg Oral Daily Patrick Dry, APRN - CNP   325 mg at 02/27/23 0930    Or    aspirin suppository 300 mg  300 mg Rectal Daily Patrick Dry, APRN - CNP        arformoterol tartrate [facility]) nebulizer solution 15 mcg  15 mcg Nebulization BID Glenn Lockhart MD   15 mcg at 02/27/23 3774    And    budesonide (PULMICORT) nebulizer suspension 500 mcg  0.5 mg Nebulization BID Glenn Lockhart MD   500 mcg at 02/27/23 1061        Objective:  /80   Pulse 92   Temp 97.6 °F (36.4 °C) (Oral)   Resp 18   SpO2 92%     Physical Exam:   Patient seen and examined  GCS:  4 - Opens eyes on own  5 - Alert and oriented  6 - Follows simple motor commands  General: Well developed. Alert and cooperative in no acute distress. HENT: atraumatic, neck supple  Eyes: Optic discs: Not tested  Pulmonary: unlabored respiratory effort  Cardiovascular:  Warm well perfused.  No peripheral edema  Gastrointestinal: abdomen soft, NT, ND    Neurological:  Mental Status: Awake, alert, oriented x 4, speech clear and appropriate  Attention: Intact  Language: Expressive Aphasia  Sensation: Intact to all extremities to light touch  Coordination: Intact    Cranial Nerves:  II: Visual acuity not tested, denies new visual changes / diplopia  III, IV, VI: PERRL, 3 mm bilaterally, EOMI, no nystagmus noted  V: Facial sensation intact bilaterally to touch  VII: Face symmetric  VIII: Hearing intact bilaterally to spoken voice  IX: Palate movement equal bilaterally  XI: Shoulder shrug equal bilaterally  XII: Tongue midline    Musculoskeletal:   Gait: Not tested   Assist devices: None   Tone: Normal  Motor strength:    Right  Left    Right  Left    Deltoid  5 5  Hip Flex  5 5   Biceps  5 5  Knee Extensors  5 5   Triceps  5 5  Knee Flexors  5 5   Wrist Ext  5 5  Ankle Dorsiflex. 5 5   Wrist Flex  5 5  Ankle Plantarflex. 5 5   Handgrip  5 5  Ext Gabriel Longus  5 5   Thumb Ext  5 5         Radiological Findings:  CT HEAD WO CONTRAST & CTA HEAD NECK W CONTRAST  Result Date: 2/26/2023  1. Proximal left internal carotid artery near occlusion with distal lumen collapse. Reduced blood flow to the left MCA territory. 2. Bilateral proximal vertebral artery occlusions with distal reconstitution. 3. Age-indeterminate, possibly acute small left frontal lobe infarct. 4. Chronic thoracic compression deformities. MRI brain without contrast  Result Date: 2/25/2023  Left MCA border zone infarcts without hemorrhage or mass effect.        Labs:  Recent Labs     02/27/23  0459   WBC 12.7*   HGB 15.6   HCT 47.1          Recent Labs     02/27/23  0459      K 2.8*   CL 94*   CO2 38*   BUN 12   CREATININE 1.0   GLUCOSE 132*   CALCIUM 8.7   MG 1.90       Recent Labs     02/25/23  1455 02/26/23  1134   PROTIME 14.4  --    INR 1.13  --    APTT  --  35.4*       Patient Active Problem List    Diagnosis Date Noted    Acute cerebrovascular accident (CVA) due to stenosis of left carotid artery ([address] 02/26/2023    Acute unilateral cerebral infarction in a watershed distribution [facility]) 02/25/2023    Acute ischemic stroke ([address] 02/25/2023    Stage 3 severe COPD by GOLD classification ([address] 11/29/2022    Chronic respiratory failure with hypoxia and hypercapnia ([address] 11/29/2022    Nocturnal hypoxia 11/29/2022    Hypotension 04/14/2022    Acute on chronic combined systolic (congestive) and diastolic (congestive) heart failure ([facility] 04/03/2022    Acute hypokalemia 03/29/2022    Hypophosphatemia 03/29/2022    Long term current use of diuretic 03/29/2022    Unstable angina pectoris ([facility] 04/17/2019    Atrial fibrillation ([facility] 01/11/2018    Acute on chronic respiratory failure with hypoxia and hypercapnia (HCC) 01/11/2018    Acute hyponatremia 01/11/2018    Hyperbilirubinemia 01/11/2018    Influenzal pneumonia     Acute exacerbation of chronic obstructive pulmonary disease (COPD) ([facility] 04/07/2011    Essential hypertension 04/07/2011    Former smoker 04/16/2010       Assessment:  Patient is a 68 y.o. male w/LICA nearly occluded and 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. Patient has a history of Atrial fibrillation for which he has previously been on Eliquis. Plan:  No emergent neurosurgical intervention indicated  Neurologic exams frequency: Defer to Neurology  For change in exam MUST contact neurosurgery team along with critical care or primary team  LICA stenosis:  - Hold Eliquis / Heparin gtt held due to his acute stroke. - Aspirin 325 mg PO Daily  - Diagnostic cerebral angiogram Tuesday around 3:30 PM to determine if patient would benefit from carotid thrombendarterectomy (CEA) or carotid stenting (ELIANA). - NPO at [address] / activity per primary team  Thank you for consult. Will follow inpatient. Please call with any questions or decline in neurological status    DISPO: Remain inpatient from neurosurgery standpoint. Dispo timing to be determined by primary team once patient is medically stable for discharge. Patient was discussed and images reviewed with Dr. Alayna Alonso who agrees with above assessment and plan. Electronically signed by: ERIN Donato CNP, APRN-CNP, 2/27/2023 2:39 PM  [phone]    I spent 75 minutes in the care of this patient.   Over 50% of that time was in face-to-face counseling regarding disease process, diagnostic testing, preventative measures, and answering patient and family questions    I agree with the APRN's evaluation, assessment, and plan above.     Plan for catheter angiogram.

## 2023-02-28 ENCOUNTER — APPOINTMENT (OUTPATIENT)
Dept: INTERVENTIONAL RADIOLOGY/VASCULAR | Age: 73
DRG: 035 | End: 2023-02-28
Attending: INTERNAL MEDICINE
Payer: MEDICARE

## 2023-02-28 LAB
ANION GAP SERPL CALCULATED.3IONS-SCNC: 7 MMOL/L (ref 3–16)
BASOPHILS ABSOLUTE: 0.1 K/UL (ref 0–0.2)
BASOPHILS RELATIVE PERCENT: 0.6 %
BUN BLDV-MCNC: 17 MG/DL (ref 7–20)
CALCIUM SERPL-MCNC: 9.1 MG/DL (ref 8.3–10.6)
CHLORIDE BLD-SCNC: 99 MMOL/L (ref 99–110)
CO2: 34 MMOL/L (ref 21–32)
CREAT SERPL-MCNC: 0.9 MG/DL (ref 0.8–1.3)
EOSINOPHILS ABSOLUTE: 0.5 K/UL (ref 0–0.6)
EOSINOPHILS RELATIVE PERCENT: 4 %
ESTIMATED AVERAGE GLUCOSE: 131.2 MG/DL
GFR SERPL CREATININE-BSD FRML MDRD: >60 ML/MIN/{1.73_M2}
GLUCOSE BLD-MCNC: 135 MG/DL (ref 70–99)
HBA1C MFR BLD: 6.2 %
HCT VFR BLD CALC: 49.3 % (ref 40.5–52.5)
HEMOGLOBIN: 15.7 G/DL (ref 13.5–17.5)
LV EF: 60 %
LVEF MODALITY: NORMAL
LYMPHOCYTES ABSOLUTE: 2.2 K/UL (ref 1–5.1)
LYMPHOCYTES RELATIVE PERCENT: 19.1 %
MAGNESIUM: 2.1 MG/DL (ref 1.8–2.4)
MCH RBC QN AUTO: 30.5 PG (ref 26–34)
MCHC RBC AUTO-ENTMCNC: 31.8 G/DL (ref 31–36)
MCV RBC AUTO: 95.8 FL (ref 80–100)
MONOCYTES ABSOLUTE: 1 K/UL (ref 0–1.3)
MONOCYTES RELATIVE PERCENT: 8.9 %
NEUTROPHILS ABSOLUTE: 7.8 K/UL (ref 1.7–7.7)
NEUTROPHILS RELATIVE PERCENT: 67.4 %
PDW BLD-RTO: 15 % (ref 12.4–15.4)
PLATELET # BLD: 298 K/UL (ref 135–450)
PMV BLD AUTO: 9.6 FL (ref 5–10.5)
POTASSIUM REFLEX MAGNESIUM: 3.7 MMOL/L (ref 3.5–5.1)
RBC # BLD: 5.14 M/UL (ref 4.2–5.9)
SODIUM BLD-SCNC: 140 MMOL/L (ref 136–145)
WBC # BLD: 11.6 K/UL (ref 4–11)

## 2023-02-28 PROCEDURE — 6360000004 HC RX CONTRAST MEDICATION: Performed by: NEUROLOGICAL SURGERY

## 2023-02-28 PROCEDURE — 94640 AIRWAY INHALATION TREATMENT: CPT

## 2023-02-28 PROCEDURE — 99233 SBSQ HOSP IP/OBS HIGH 50: CPT | Performed by: INTERNAL MEDICINE

## 2023-02-28 PROCEDURE — 99291 CRITICAL CARE FIRST HOUR: CPT | Performed by: NURSE PRACTITIONER

## 2023-02-28 PROCEDURE — 6360000002 HC RX W HCPCS

## 2023-02-28 PROCEDURE — 2000000000 HC ICU R&B

## 2023-02-28 PROCEDURE — 6370000000 HC RX 637 (ALT 250 FOR IP)

## 2023-02-28 PROCEDURE — 2580000003 HC RX 258

## 2023-02-28 PROCEDURE — 36226 PLACE CATH VERTEBRAL ART: CPT

## 2023-02-28 PROCEDURE — 6360000002 HC RX W HCPCS: Performed by: INTERNAL MEDICINE

## 2023-02-28 PROCEDURE — 36224 PLACE CATH CAROTD ART: CPT

## 2023-02-28 PROCEDURE — 6370000000 HC RX 637 (ALT 250 FOR IP): Performed by: INTERNAL MEDICINE

## 2023-02-28 PROCEDURE — 80048 BASIC METABOLIC PNL TOTAL CA: CPT

## 2023-02-28 PROCEDURE — C1887 CATHETER, GUIDING: HCPCS

## 2023-02-28 PROCEDURE — 99153 MOD SED SAME PHYS/QHP EA: CPT

## 2023-02-28 PROCEDURE — 99152 MOD SED SAME PHYS/QHP 5/>YRS: CPT

## 2023-02-28 PROCEDURE — 99222 1ST HOSP IP/OBS MODERATE 55: CPT | Performed by: PHYSICAL MEDICINE & REHABILITATION

## 2023-02-28 PROCEDURE — 85025 COMPLETE CBC W/AUTO DIFF WBC: CPT

## 2023-02-28 PROCEDURE — 36415 COLL VENOUS BLD VENIPUNCTURE: CPT

## 2023-02-28 PROCEDURE — 2700000000 HC OXYGEN THERAPY PER DAY

## 2023-02-28 PROCEDURE — 2709999900 HC NON-CHARGEABLE SUPPLY

## 2023-02-28 PROCEDURE — 83735 ASSAY OF MAGNESIUM: CPT

## 2023-02-28 PROCEDURE — C1894 INTRO/SHEATH, NON-LASER: HCPCS

## 2023-02-28 PROCEDURE — C1760 CLOSURE DEV, VASC: HCPCS

## 2023-02-28 PROCEDURE — 36225 PLACE CATH SUBCLAVIAN ART: CPT

## 2023-02-28 PROCEDURE — 94761 N-INVAS EAR/PLS OXIMETRY MLT: CPT

## 2023-02-28 PROCEDURE — 36223 PLACE CATH CAROTID/INOM ART: CPT

## 2023-02-28 PROCEDURE — 2580000003 HC RX 258: Performed by: NEUROLOGICAL SURGERY

## 2023-02-28 PROCEDURE — C8929 TTE W OR WO FOL WCON,DOPPLER: HCPCS

## 2023-02-28 PROCEDURE — C1769 GUIDE WIRE: HCPCS

## 2023-02-28 PROCEDURE — 92507 TX SP LANG VOICE COMM INDIV: CPT

## 2023-02-28 RX ORDER — SODIUM CHLORIDE 9 MG/ML
INJECTION, SOLUTION INTRAVENOUS CONTINUOUS
Status: DISCONTINUED | OUTPATIENT
Start: 2023-02-28 | End: 2023-03-01

## 2023-02-28 RX ORDER — HYDROCODONE BITARTRATE AND ACETAMINOPHEN 5; 325 MG/1; MG/1
1 TABLET ORAL EVERY 4 HOURS PRN
Status: DISCONTINUED | OUTPATIENT
Start: 2023-02-28 | End: 2023-03-06 | Stop reason: HOSPADM

## 2023-02-28 RX ORDER — ONDANSETRON 2 MG/ML
4 INJECTION INTRAMUSCULAR; INTRAVENOUS EVERY 8 HOURS PRN
Status: DISCONTINUED | OUTPATIENT
Start: 2023-02-28 | End: 2023-03-06 | Stop reason: HOSPADM

## 2023-02-28 RX ORDER — HYDROCODONE BITARTRATE AND ACETAMINOPHEN 5; 325 MG/1; MG/1
2 TABLET ORAL EVERY 4 HOURS PRN
Status: DISCONTINUED | OUTPATIENT
Start: 2023-02-28 | End: 2023-03-06 | Stop reason: HOSPADM

## 2023-02-28 RX ORDER — IODIXANOL 270 MG/ML
200 INJECTION, SOLUTION INTRAVASCULAR
Status: COMPLETED | OUTPATIENT
Start: 2023-02-28 | End: 2023-02-28

## 2023-02-28 RX ORDER — ACETAMINOPHEN 325 MG/1
650 TABLET ORAL EVERY 4 HOURS PRN
Status: DISCONTINUED | OUTPATIENT
Start: 2023-02-28 | End: 2023-03-06 | Stop reason: HOSPADM

## 2023-02-28 RX ADMIN — ARFORMOTEROL TARTRATE 15 MCG: 15 SOLUTION RESPIRATORY (INHALATION) at 09:13

## 2023-02-28 RX ADMIN — ARFORMOTEROL TARTRATE 15 MCG: 15 SOLUTION RESPIRATORY (INHALATION) at 20:42

## 2023-02-28 RX ADMIN — IPRATROPIUM BROMIDE AND ALBUTEROL SULFATE 1 AMPULE: 2.5; .5 SOLUTION RESPIRATORY (INHALATION) at 09:08

## 2023-02-28 RX ADMIN — BUDESONIDE 500 MCG: 0.5 SUSPENSION RESPIRATORY (INHALATION) at 09:13

## 2023-02-28 RX ADMIN — BUDESONIDE 500 MCG: 0.5 SUSPENSION RESPIRATORY (INHALATION) at 20:42

## 2023-02-28 RX ADMIN — ALBUTEROL SULFATE 2.5 MG: 2.5 SOLUTION RESPIRATORY (INHALATION) at 20:42

## 2023-02-28 RX ADMIN — ATORVASTATIN CALCIUM 80 MG: 80 TABLET, FILM COATED ORAL at 21:47

## 2023-02-28 RX ADMIN — SODIUM CHLORIDE, PRESERVATIVE FREE 10 ML: 5 INJECTION INTRAVENOUS at 07:51

## 2023-02-28 RX ADMIN — IODIXANOL 200 ML: 270 INJECTION, SOLUTION INTRAVASCULAR at 17:09

## 2023-02-28 RX ADMIN — ASPIRIN 325 MG: 325 TABLET, COATED ORAL at 07:43

## 2023-02-28 RX ADMIN — IPRATROPIUM BROMIDE AND ALBUTEROL SULFATE 1 AMPULE: 2.5; .5 SOLUTION RESPIRATORY (INHALATION) at 13:02

## 2023-02-28 RX ADMIN — SODIUM CHLORIDE: 9 INJECTION, SOLUTION INTRAVENOUS at 20:30

## 2023-02-28 ASSESSMENT — PAIN SCALES - GENERAL
PAINLEVEL_OUTOF10: 0

## 2023-02-28 NOTE — PROGRESS NOTES
NEUROLOGY / NEUROCRITICAL CARE PROGRESS NOTE       Patient Name: Giana Licea YOB: 1950   Sex: Male Age: 68 yrs     CC / Reason for Consult: stroke    Interval Hx / Changes over last 24 hours:   14/24 on AM-PAC PT  16/24 on AM-PAC OT  Soft and bite size/thins recommended by SLP but NPO for angio today at 3:30  No adverse events noted overnight    ROS: aphasia is stable and unchanged; no new focal deficits    HISTORY   Admission HPI:   Meeting patient for the first time. Initial consultation note was completed by Neurology CNP yesterday (2/25/23) evening, which I have reviewed. 79yo man with emphysema on home O2; HTN; afib on apixaban; HFpEF. Presented to OSH ER with abrupt onset of left arm weakness and difficulty speaking. Per my interview with pt and pt's son at the bedside:  Pt has had intermittent episodes of difficulty speaking over the last week. They seemed to come and go and were random in occurrence and resolution. However, yesterday morning son noted that pt was having great difficulty feeding himself with his right hand and his language was markedly impaired. He was brought to the ER. In the ER, BP was 125/88. Head CT demonstrated a hypodensity in the left frontal region. CTA head and neck showed bilateral occluded vertebral arteries, nearly occluded left carotid, and poor flow in left MCA territory. There was no LVO. After discussion on Viz. ai between ER physician, Stroke Team Physician, Neurovascular surgery, and myself, it was concluded that pt required ICU admission for frequent neuro checks and to ensure adequate perfusion of left MCA territory through stenotic left ICA with BP augmentation with IVF, if he tolerated, or pressor, if necessary. Ultimate plan will be for conventional angiogram with neurovascular surgery next week and CEA vs stent after that. No acute events overnight. Afebrile. Currently, pt feels that he is improved overall.  He still is having difficulty with language and describes it as difficulty in saying what he wants to say. His arm is much better than it was yesterday. His SBP has ranged from 110s to as high as 160, with most in the mid 130s. He has not had any worsening of symptoms with these blood pressures. He was on IVF through most of the night but he has developed some mild pulmonary edema and so fluids have been discontinued. he is unsure what would have precipitated these symptoms, other than the above. he feels that nothing makes them better and nothing makes them worse. he rates the symptoms as severe. he denies any other associated symptoms including no HA, no other speech/language difficulties, no swallowing difficulties, no visual changes, no diplopia, no hearing loss, no tinnitus, no vertigo, no imbalance, no light-headedness, no other focal weakness, no sensory changes, no nausea or vomiting. he has never had this problem before. There is no history of this problem or anything similar in his family members.      PMH Past Medical History:   Diagnosis Date    Atrial fibrillation ([facility] [address]     Bronchitis chronic     Emphysema of lung ([facility] [address]     HTN (hypertension)     Influenza A 01/10/2018    Lung disease     copd    Pneumonia     12/2009    Stroke     L MCA watershed      Allergies No Known Allergies   Diet Diet NPO Exceptions are: Sips of Water with Meds   Isolation No active isolations     CURRENT SCHEDULED MEDICATIONS   Inpatient Medications     ipratropium-albuterol, 1 ampule, Inhalation, Q4H WA    atorvastatin, 80 mg, Oral, Nightly    sodium chloride flush, 5-40 mL, IntraVENous, 2 times per day    [Held by provider] dilTIAZem, 180 mg, Oral, Daily    [Held by provider] torsemide, 40 mg, Oral, BID    aspirin, 325 mg, Oral, Daily **OR** aspirin, 300 mg, Rectal, Daily    arformoterol tartrate, 15 mcg, Nebulization, BID **AND** budesonide, 0.5 mg, Nebulization, BID   Infusions    sodium chloride                LABS   Metabolic Panel Recent Labs     02/25/23  1455 02/27/23  0459 02/27/23  1830 02/28/23  0643    142  --  140   K 3.6 2.8* 3.8 3.7   CL 98* 94*  --  99   CO2 32 38*  --  34*   BUN 13 12  --  17   CREATININE 0.8 1.0  --  0.9   GLUCOSE 143* 132*  --  135*   CALCIUM 9.3 8.7  --  9.1   LABALBU 4.2  --   --   --    MG  --  1.90  --  2.10   ALKPHOS 97  --   --   --    ALT 17  --   --   --    AST 21  --   --   --       CBC / Coags Recent Labs     02/25/23  1455 02/27/23  0459 02/28/23  0643   WBC 10.4 12.7* 11.6*   RBC 5.19 5.10 5.14   HGB 16.1 15.6 15.7   HCT 48.0 47.1 49.3    324 298   INR 1.13  --   --       Other Recent Labs     02/27/23  0459   LABA1C 6.2   LDLCALC 128*   TRIG 105     Recent Labs     02/25/23  1455   LACTA 1.8        DIAGNOSTICS   IMAGES:    No new imaging to review today    PHYSICAL EXAMINATION     PHYSICAL EXAM:  Vitals:    02/28/23 0800 02/28/23 0910 02/28/23 0912 02/28/23 0917   BP: 117/72      Pulse: 68  93 76   Resp: 15  21 (!) 32   Temp:       TempSrc:       SpO2: 93% 90% (!) 83% 99%   Weight:           Exam    -Mental status: A&O x4; pleasant & appropriate  -Speech & Language: moderate aphasia; no dysarthria. Follows simple commands.  Does not cross midline with 2 step commands  -Cranial nerves: pupils symmetric; no notable dysconjugate gaze; eyes midline; no facial asymmetry  -Motor: moving all extremities symmetrically and fully 5/5 throughout  -Other: no adventitious movements noted  Other Systems  -General Appearance: well-developed, well-nourished, no apparent distress  -Neck: supple  -Lungs: breathing unlabored, regular, no audible wheezes  -CV: pulses strong x4 extremities  -Abd: flat    ASSESSMENT & RECOMMENDATIONS   Assessment:  Mr. Jesus Saul is a 77yo man with afib on apixaban and heart failure who presented with stuttering episodes of aphasia over the last week which culminated in abrupt moderate expressive aphasia and right hemiparesis, found to have 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 as well as nearly occluded LICA and completely occluded bilateral verts     SBP has ranged from 110s to as high as 160, with most in the mid 130s. Unfortunately, BP augmentation with IVF cannot be continued due to his HFpEF and the development of pulmonary edema, though his exam has remained stable with recent BPs. If his exam were to worsen will need to continue pressure modification w/ vasopressors.      Recommendations  Q1hr neuro checks and monitor closely for worsening of left MCA symptoms  If neuro change, drop head of bed give IVF bolus and, will need to consider pressor  Bedrest for now to avoid potential drops in BP  Continue ASA 325mg po / 300mg AZ  No anticoagulation for now  Agree with high-intensity statin; goal LDL < 70  A1c < 7.0  Speech therapy  PT/OT OK as long as remains in bed for now  Angiogram planned for 3:30 PM today to determine ELIANA vs CEA  PHQ-9: 9    ERIN Tolliver - CNP   Neurology & Neurocritical Care   2/28/2023 9:21 AM    ICU Patients:   Neurocritical Care Line: [phone]  [employer]: [facility] Neurocritical Care    Critical Care:  Due to the immediate potential for life-threatening deterioration due to neurological failure, I spent 35 minutes providing critical care.  This time excludes time spent performing procedures but includes time spent on direct patient care, history retrieval, review of the chart, and discussions with patient, family, and consultant(s).

## 2023-02-28 NOTE — PROGRESS NOTES
NEUROSURGERY PROGRESS NOTE    2/28/2023 10:30 AM                               Bang Jean Baptiste                      LOS: 3 days       Subjective: Patient sitting up in bed upon entering the room. No acute events overnight. Patient still having trouble with expressive aphasia. Physical Exam:  Patient seen and examined    Vitals:    02/28/23 0917   BP:    Pulse: 76   Resp: (!) 32   Temp:    SpO2: 99%     GCS:  4 - Opens eyes on own  5 - Alert and oriented  6 - Follows simple motor commands  General: Well developed. Alert and cooperative in no acute distress. HENT: atraumatic, neck supple  Eyes: Optic discs: Not tested  Pulmonary: unlabored respiratory effort  Cardiovascular:  Warm well perfused. No peripheral edema  Gastrointestinal: abdomen soft, NT, ND     Neurological:  Mental Status: Awake, alert, oriented x 4, speech clear and appropriate  Attention: Intact  Language: Expressive Aphasia  Sensation: Intact to all extremities to light touch  Coordination: Intact     Cranial Nerves:  II: Visual acuity not tested, denies new visual changes / diplopia  III, IV, VI: PERRL, 3 mm bilaterally, EOMI, no nystagmus noted  V: Facial sensation intact bilaterally to touch  VII: Face symmetric  VIII: Hearing intact bilaterally to spoken voice  IX: Palate movement equal bilaterally  XI: Shoulder shrug equal bilaterally  XII: Tongue midline     Musculoskeletal:   Gait: Not tested   Assist devices: None   Tone: Normal  Motor strength:     Right  Left      Right  Left    Deltoid  5 5   Hip Flex  5 5   Biceps  5 5   Knee Extensors  5 5   Triceps  5 5   Knee Flexors  5 5   Wrist Ext  5 5   Ankle Dorsiflex. 5 5   Wrist Flex  5 5   Ankle Plantarflex. 5 5   Handgrip  5 5   Ext Gabriel Longus  5 5   Thumb Ext  5 5              Radiological Findings:  CT HEAD WO CONTRAST & CTA HEAD NECK W CONTRAST  Result Date: 2/26/2023  1. Proximal left internal carotid artery near occlusion with distal lumen collapse.  Reduced blood flow to the left MCA territory. 2. Bilateral proximal vertebral artery occlusions with distal reconstitution. 3. Age-indeterminate, possibly acute small left frontal lobe infarct. 4. Chronic thoracic compression deformities. MRI brain without contrast  Result Date: 2/25/2023  Left MCA border zone infarcts without hemorrhage or mass effect. Labs:  Recent Labs     02/28/23  0643   WBC 11.6*   HGB 15.7   HCT 49.3          Recent Labs     02/28/23  0643      K 3.7   CL 99   CO2 34*   BUN 17   CREATININE 0.9   GLUCOSE 135*   CALCIUM 9.1   MG 2.10       Recent Labs     02/25/23  1455 02/26/23  1134   PROTIME 14.4  --    INR 1.13  --    APTT  --  35.4*       Patient Active Problem List    Diagnosis Date Noted    ALEXANDREA treated with BiPAP 02/27/2023    Acute cerebrovascular accident (CVA) due to stenosis of left carotid artery ([facility] [address] 02/26/2023    Acute unilateral cerebral infarction in a watershed distribution [facility]) 02/25/2023    Acute ischemic stroke ([address] 02/25/2023    COPD with chronic bronchitis ([address] 11/29/2022    Chronic respiratory failure with hypoxia and hypercapnia ([address] 11/29/2022    Nocturnal hypoxia 11/29/2022    Hypotension 04/14/2022    Acute on chronic combined systolic (congestive) and diastolic (congestive) heart failure ([address] 04/03/2022    Acute hypokalemia 03/29/2022    Hypophosphatemia 03/29/2022    Long term current use of diuretic 03/29/2022    Unstable angina pectoris ([address] 04/17/2019    Atrial fibrillation ([address] 01/11/2018    Acute on chronic respiratory failure with hypoxia and hypercapnia (HCC) 01/11/2018    Acute hyponatremia 01/11/2018    Hyperbilirubinemia 01/11/2018    Influenzal pneumonia     Acute exacerbation of chronic obstructive pulmonary disease (COPD) ([address] 04/07/2011    Essential hypertension 04/07/2011    Former smoker 04/16/2010     Assessment:  Patient is a 68 y.o. male w/LICA nearly occluded and 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.  Patient has a history of Atrial fibrillation for which he has previously been on Eliquis. Plan:  Neurologic exams frequency: Defer to Neurology  For change in exam MUST contact neurosurgery team along with critical care or primary team  LICA stenosis:  - Hold Eliquis / Heparin gtt held due to his acute stroke. - Aspirin 325 mg PO Daily  - Diagnostic cerebral angiogram today around 3:30 PM to determine if patient would benefit from carotid thrombendarterectomy (CEA) or carotid stenting (ELIANA). - NPO at [address] / activity per primary team  Will follow inpatient. Please call with any questions or decline in neurological status     DISPO: Remain inpatient from neurosurgery standpoint. Dispo timing to be determined by primary team once patient is medically stable for discharge. Patient was discussed and images reviewed with Dr. Roxanna Coleman who agrees with above assessment and plan.      Electronically signed by: ERIN Valdez CNP, APRN-CNP, 2/28/2023 10:30 AM  [phone]

## 2023-02-28 NOTE — PROGRESS NOTES
[facility]   After review, this patient is felt to be:       [x]  Appropriate for Acute Inpatient Rehab    []  Appropriate for Acute Inpatient Rehab Pending Insurance Authorization    []  Not appropriate for Acute Inpatient Rehab    []  Referral received and ARU reviewing patient      Precert initiated 9/6/6263 for ARU admission. Pt in agreement per  and CL's conversation with pt this AM. Ref#: [id number]. Will notify CM/SW with further updates.  Thank you for the referral.    Odalis GARCIA OTR/L  Clinical Liaison- [facility]   (P): [phone]  (F): [phone]

## 2023-02-28 NOTE — PLAN OF CARE
Problem: Safety - Adult  Goal: Free from fall injury  2/28/2023 1552 by Pelon Healy RN  Outcome: Progressing  Flowsheets (Taken 2/28/2023 1037)  Free From Fall Injury: Juanjoie Sandoval family/caregiver on patient safety    Problem: Safety - Adult  Goal: Free from fall injury  2/28/2023 1552 by Pelon Healy RN  Outcome: Progressing  Flowsheets (Taken 2/28/2023 1037)  Free From Fall Injury: Nicole Sandoval family/caregiver on patient safety    Problem: Chronic Conditions and Co-morbidities  Goal: Patient's chronic conditions and co-morbidity symptoms are monitored and maintained or improved  2/28/2023 1552 by Pelon Healy RN  Outcome: Progressing  Care Plan - Patient's Chronic Conditions and Co-Morbidity Symptoms are Monitored and Maintained or Improved:   Monitor and assess patient's chronic conditions and comorbid symptoms for stability, deterioration, or improvement   Collaborate with multidisciplinary team to address chronic and comorbid conditions and prevent exacerbation or deterioration   Update acute care plan with appropriate goals if chronic or comorbid symptoms are exacerbated and prevent overall improvement and discharge

## 2023-02-28 NOTE — PROGRESS NOTES
Written in retrospect.    GCS 15/15 E4 V5 M6 - delayed responses with pauses  Pupils anisocoric; OD 4mm, OS 3mm, brisk  RE 4/5  LE 5/5     On O2 therapy @ 3lpm via NC, saturating ~ 88%   Bilateral diminished air entry in the upper and lower lobes  Respiration regular, deep, unlabored  Cough: strong  Symmetrical chest expansion     Known a-fib - rate controlled  Normotensive  Generally warm to touch  Peripheral and central CRT <3s  Bilateral radial pulses strond and irregular; pedal pulses weak, irregular  Electrolytes WNL     Abdomen soft, round, nontender  Active bowel sounds in all quadrants  Diet: soft, bite-sized diet with thin fluids     Pressure areas clean, dry, and intact.  Peripheral IV @ L cephalic vein. Clean, dry, intact. Patent and flushed with saline.  On condom catheter d/t incontinence  Scattered ecchymosis     Afebrile  Not on any antibiotic regimen

## 2023-02-28 NOTE — PROGRESS NOTES
ICU Consult Note    Admit Date: 2/25/2023  Day: 3  Vent Day: 0  IV Access:Peripheral  IV Fluids:none  Vasopressors:none                Antibiotics: none  Diet: Diet NPO Exceptions are: Sips of Water with Meds    CC: Aphasia    Interval history:  NAEO. Patient will have diagnostic cerebral angiogram this afternoon with endovascular neurosurgery. Patient has been NPO since midnight. No new complaints, still having aphasia. Vitals and labs have been appreciated and have been largely stable since yesterday. Will follow-up on post cerebral angiogram recommendations. HPI:  68 old male with past medical history HTN, a fib on Eliquis, HFpEF, COPD (4 L), chronic bronchitis, hernia repair who initially presented to OSH with acute onset aphasia. Son reported at OSH patient had waxing/waning speech changes over the past week worse than his typical stuttering. He noticed symptoms onset around 1100 on 2/24, but symptoms gradually resolved and were completely absent that night prior to bed. Patient slept in this morning until 1100 at which point family noted symptoms have recurred and he called 911. At [facility] ED, pt hemodynamically stable with mild aphasia and mild R arm drift; NIHSS 3. EKG showing AF but otherwise no acute changes. CT head w/o showed age-indeterminate small, possibly acute L frontal lobe infacrt as well as proximal left ICA near-occlusion. Pt was transferred to [facility] for neurosurgical evaluation and management. Pt in NAD on arrival. He continues with difficulty finding words; has good insight into sx. Denies any headache, fever, chills, chest pain, dyspnea, abdominal pain, nausea, vomiting, numbness, visual changes.       Past Medical History        Past Medical History:   Diagnosis Date    Bronchitis chronic      Emphysema of lung ([facility] [address]      Influenza A 01/10/2018    Lung disease       copd    Pneumonia       12/2009            Past Surgical History[]Expand by Default         Past Surgical History: Procedure Laterality Date    FRACTURE SURGERY         johnson in femur/hip on right    HERNIA REPAIR                SocialHistory:   The patient lives at      Alcohol:  Illicit drugs: no use  Tobacco:       Family History:  Family History[]Expand by Default         Family History   Problem Relation Age of Onset    Diabetes Sister      Cancer Sister      Cancer Sister      Cancer Brother      Asthma Daughter      Asthma Son      Diabetes Mother      Cancer Father         Review of Systems   Constitutional:  Negative for chills and fever. Eyes:  Negative for visual disturbance. Respiratory:  Negative for shortness of breath. Cardiovascular:  Negative for chest pain. Gastrointestinal:  Negative for nausea and vomiting. Neurological:  Positive for speech difficulty. Negative for dizziness, weakness, light-headedness, numbness and headaches. All other systems reviewed and are negative.     Medications:     Scheduled Meds:   ipratropium-albuterol  1 ampule Inhalation Q4H WA    atorvastatin  80 mg Oral Nightly    sodium chloride flush  5-40 mL IntraVENous 2 times per day    [Held by provider] dilTIAZem  180 mg Oral Daily    [Held by provider] torsemide  40 mg Oral BID    aspirin  325 mg Oral Daily    Or    aspirin  300 mg Rectal Daily    arformoterol tartrate  15 mcg Nebulization BID    And    budesonide  0.5 mg Nebulization BID     Continuous Infusions:   sodium chloride       PRN Meds:perflutren lipid microspheres, albuterol, ondansetron **OR** ondansetron, polyethylene glycol, sodium chloride flush, sodium chloride, acetaminophen **OR** acetaminophen    Objective:   Vitals:   T-max:  Patient Vitals for the past 8 hrs:   BP Temp Temp src Pulse Resp SpO2 Weight   02/28/23 0700 115/75 -- -- 66 18 92 % --   02/28/23 0600 103/69 -- -- 66 18 93 % 177 lb 7.5 oz (80.5 kg)   02/28/23 0500 (!) 88/55 -- -- 53 14 94 % --   02/28/23 0400 92/61 -- -- 63 13 94 % --   02/28/23 0300 110/63 -- -- 52 15 92 % --   02/28/23 0200 (!) 90/59 -- -- 60 15 95 % --   02/28/23 0100 (!) 90/55 -- -- 61 13 95 % --   02/28/23 0000 (!) 102/58 98.1 °F (36.7 °C) Oral 71 18 92 % --         Intake/Output Summary (Last 24 hours) at 2/28/2023 0747  Last data filed at 2/28/2023 0000  Gross per 24 hour   Intake 1070 ml   Output --   Net 1070 ml         Physical Exam  Vitals reviewed. Constitutional:       Appearance: Normal appearance. He is normal weight. HENT:      Head: Normocephalic and atraumatic. Eyes:      Extraocular Movements: Extraocular movements intact. Conjunctiva/sclera: Conjunctivae normal.      Pupils: Pupils are equal, round, and reactive to light. Cardiovascular:      Rate and Rhythm: Normal rate and regular rhythm. Heart sounds: Normal heart sounds. Pulmonary:      Effort: Pulmonary effort is normal.      Breath sounds: Normal breath sounds. Abdominal:      General: Abdomen is flat. Bowel sounds are normal.      Palpations: Abdomen is soft. Musculoskeletal:         General: Normal range of motion. Skin:     General: Skin is warm and dry. Neurological:      General: No focal deficit present. Mental Status: He is alert and oriented to person, place, and time. Mental status is at baseline. Cranial Nerves: Dysarthria present. Sensory: Sensation is intact. Motor: Motor function is intact. Comments: Expressive aphasia. Intermittent difficulty finding words but answering appropriately. His motor strength and sensation is intact in the bilateral upper and lower extremities.    Psychiatric:         Mood and Affect: Mood normal.         Behavior: Behavior normal.     LABS:    CBC:   Recent Labs     02/25/23  1455 02/27/23  0459 02/28/23  0643   WBC 10.4 12.7* 11.6*   HGB 16.1 15.6 15.7   HCT 48.0 47.1 49.3    324 298   MCV 92.4 92.3 95.8       Renal:    Recent Labs     02/25/23  1455 02/27/23  0459 02/27/23  1830 02/28/23  0643    142  --  140   K 3.6 2.8* 3.8 3.7   CL 98* 94*  --  99 CO2 32 38*  --  34*   BUN 13 12  --  17   CREATININE 0.8 1.0  --  0.9   GLUCOSE 143* 132*  --  135*   CALCIUM 9.3 8.7  --  9.1   MG  --  1.90  --  2.10   ANIONGAP 11 10  --  7       Hepatic:   Recent Labs     02/25/23  1455   AST 21   ALT 17   BILITOT 0.8   PROT 7.2   LABALBU 4.2   ALKPHOS 97       Troponin:   Recent Labs     02/25/23  1455   TROPONINI <0.01       BNP: No results for input(s): BNP in the last 72 hours. Lipids:   Recent Labs     02/27/23  0459   CHOL 187   HDL 38*     ABGs:  No results for input(s): PHART, BRC4ZGH, PO2ART, KMS4NMI, BEART, THGBART, J0EVOKHU, JXI2TXZ in the last 72 hours. INR:   Recent Labs     02/25/23  1455   INR 1.13       Lactate: No results for input(s): LACTATE in the last 72 hours. Cultures:  -----------------------------------------------------------------  RAD:   XR CHEST PORTABLE   Final Result      1. Findings of pulmonary edema which are increased compared to chest radiograph from one day prior. MRI brain without contrast   Final Result      Left MCA border zone infarcts without hemorrhage or mass effect. Assessment/Plan:   68 old male with past medical history HTN, a fib on Eliquis, HFpEF, COPD (4 L), chronic bronchitis, hernia repair who initially presented to OSH with 1 week waxing/waning aphasia. CT w/ near occlusion of L ICA and small L frontal infarct. Subacute ischemic CVA-patient has MRI brain which showed left MCA border zone infarcts without hemorrhage or mass effect. Patient also had a CTA of the head and neck which showed left proximal internal carotid artery occlusion with distal lobe collapse with reduced blood flow into the left MCA territory. This near occlusion is likely to blame for patient's stroke symptoms. There is also bilateral proximal vertebral artery occlusions with distal reconstitution.   Neurocritial care following  Likely Neurovascular consult  Goal -160, will utilize PRN Labetalol 10 IV  If insufficient, will consider Nicardipine gtt  Q1hr neurochecks  ASA and Statin, not on antiplatelet agent  Did not receive tpa or tnk in the ED. S/P MRI Brain- L border zone infarct without hemorrhage or mass effect  PT/OT  Complete Echo- pending  Cerebral angiogram today 2/28/23 with endovascular neurosurgery  Will help to guide decision for CEA vs ELIANA  Chronic Medical Conditions  COPD- Continue Brovana and Pulmicort treatments  DuoNeb PRN  Heart failure with preserved ejection fraction  Fluids held for the patient. Lasix spot dose given with improvement  HTN- Continue home antihypertensive regimen    Code Status: Full code  FEN: Regular diet  PPX: Heparin drip  Flavio Andrea MD  PGY1, Internal Medicine  02/28/23  7:47 AM    This patient has been staffed and discussed with Darrion Bowser MD.    Patient seen, examined and discussed with the resident and I agree with the assessment and plan as edited above. Plan for cerebral angiogram today. Breathing at baseline. BIPAP 18/10 at home. His home device is broken, so I've ordered that he gets to use one of ours.   Dispo per neurosurgery      Jozef Garcia MD

## 2023-02-28 NOTE — PROGRESS NOTES
Hospitalist Progress Note      PCP: Mary Jane Alcala    Date of Admission: 2/25/2023    Subjective:  seen and examined with family at bedside  Still having word finding difficulties but overall improved compared to presentation  Denies chest pain or shortness of breath  Awaiting angiogram      Medications:  Reviewed    Infusion Medications    sodium chloride       Scheduled Medications    ipratropium-albuterol  1 ampule Inhalation Q4H WA    atorvastatin  80 mg Oral Nightly    sodium chloride flush  5-40 mL IntraVENous 2 times per day    [Held by provider] dilTIAZem  180 mg Oral Daily    [Held by provider] torsemide  40 mg Oral BID    aspirin  325 mg Oral Daily    Or    aspirin  300 mg Rectal Daily    arformoterol tartrate  15 mcg Nebulization BID    And    budesonide  0.5 mg Nebulization BID     PRN Meds: perflutren lipid microspheres, albuterol, ondansetron **OR** ondansetron, polyethylene glycol, sodium chloride flush, sodium chloride, acetaminophen **OR** acetaminophen      Intake/Output Summary (Last 24 hours) at 2/28/2023 1721  Last data filed at 2/28/2023 0000  Gross per 24 hour   Intake 340 ml   Output --   Net 340 ml         Physical Exam Performed:    BP (!) 136/92   Pulse 78   Temp 98.1 °F (36.7 °C) (Oral)   Resp 25   Wt 177 lb 7.5 oz (80.5 kg)   SpO2 94%   BMI 24.07 kg/m²     General appearance: No apparent distress, appears stated age and cooperative. Respiratory:  Normal respiratory effort. Clear to auscultation, bilaterally without Rales/Wheezes/Rhonchi. Cardiovascular: Regular rate and rhythm with normal S1/S2 without murmurs, rubs or gallops. Abdomen: Soft, non-tender, non-distended with normal bowel sounds. Musculoskeletal: No clubbing, cyanosis or edema bilaterally. Full range of motion without deformity. Skin: Skin color, texture, turgor normal.  No rashes or lesions.   Neurologic:  expressive aphasia present  Psychiatric: Alert and oriented, thought content appropriate, normal insight  Capillary Refill: Brisk, 3 seconds, normal   Peripheral Pulses: +2 palpable, equal bilaterally       Labs:   Recent Labs     02/27/23  0459 02/28/23  0643   WBC 12.7* 11.6*   HGB 15.6 15.7   HCT 47.1 49.3    298       Recent Labs     02/27/23  0459 02/27/23  1830 02/28/23  0643     --  140   K 2.8* 3.8 3.7   CL 94*  --  99   CO2 38*  --  34*   BUN 12  --  17   CREATININE 1.0  --  0.9   CALCIUM 8.7  --  9.1       No results for input(s): AST, ALT, BILIDIR, BILITOT, ALKPHOS in the last 72 hours. No results for input(s): INR in the last 72 hours. No results for input(s): Emperatriz Risk in the last 72 hours. Urinalysis:      Lab Results   Component Value Date/Time    NITRU Negative 04/03/2022 01:35 PM    WBCUA 0-2 03/29/2022 03:55 PM    BACTERIA 1+ 01/11/2018 08:50 AM    RBCUA 0-2 03/29/2022 03:55 PM    BLOODU Negative 04/03/2022 01:35 PM    SPECGRAV 1.010 04/03/2022 01:35 PM    GLUCOSEU Negative 04/03/2022 01:35 PM       Radiology:  XR CHEST PORTABLE   Final Result      1. Findings of pulmonary edema which are increased compared to chest radiograph from one day prior. MRI brain without contrast   Final Result      Left MCA border zone infarcts without hemorrhage or mass effect. IR ANGIOGRAM CAROTID CEREBRAL RIGHT    (Results Pending)       IP CONSULT TO NEUROCRITICAL CARE  IP CONSULT TO PHARMACY  IP CONSULT TO NEUROSURGERY  IP CONSULT TO PHYSICAL MEDICINE REHAB    Assessment/Plan:    Active Hospital Problems    Diagnosis     ALEXANDREA treated with BiPAP [G47.33]      Priority: Medium    Acute cerebrovascular accident (CVA) due to stenosis of left carotid artery (HCC) [I63.232]      Priority: Medium    Acute unilateral cerebral infarction in a watershed distribution [facility]) [I63.89]      Priority: Medium    COPD with chronic bronchitis ([facility] [address] [J44.9]      Priority: Medium     Acute ischemic stroke, due to moderate right MCA, nearly occluded LICA, completely occluded verts. [facility]  Neurosurgery neurology on board, per neurology note for any neurological change, drop head of the bed and give IV fluid bolus and consider pressors if no improvement.   Catheter angiogram for LICA stenosis is planned for today  Maintain BP, avoid hypotension  Asa, statin   PTOT okay as long as remain in bed     DVT Prophylaxis: scds  Diet: Diet NPO Exceptions are: Sips of Water with Meds  Code Status: Full Code  PT/OT Eval Status: order    Carola Lynne MD

## 2023-02-28 NOTE — PROGRESS NOTES
Patient is alert/oriented x4 with expressive aphasia, difficulty with word finding. Neuro assessment is consistent and unchanged. Patient is hemodynamically stable. NPO since midnight for angiogram.  Patient to cath lab at this time for angiogram.  Will continue to monitor closely.

## 2023-02-28 NOTE — PROGRESS NOTES
Occupational Therapy/ Physical Therapy   Hold Note     Pt currently on bedrest 2/2 neuro team / BP issues . In discussion with Neuro sx NP - will hold PT /OT /OOB activity this date. Pt awaiting diagnostic cerebral angiogram this afternoon with endovascular neurosurgery. Will follow up 3/1. RN aware.       Eben Fuentes  MS, OTR/L #[id number]   [facility]

## 2023-02-28 NOTE — PLAN OF CARE
Problem: Discharge Planning  Goal: Discharge to home or other facility with appropriate resources  2/28/2023 0411 by Mallika Eng RN  Outcome: Progressing  Flowsheets (Taken 2/27/2023 0800 by Albert Izquierdo RN)  Discharge to home or other facility with appropriate resources:   Identify barriers to discharge with patient and caregiver   Arrange for needed discharge resources and transportation as appropriate   Identify discharge learning needs (meds, wound care, etc)   Refer to discharge planning if patient needs post-hospital services based on physician order or complex needs related to functional status, cognitive ability or social support system  2/27/2023 2043 by Albert Izquierdo RN  Outcome: Progressing  Flowsheets (Taken 2/27/2023 0800)  Discharge to home or other facility with appropriate resources:   Identify barriers to discharge with patient and caregiver   Arrange for needed discharge resources and transportation as appropriate   Identify discharge learning needs (meds, wound care, etc)   Refer to discharge planning if patient needs post-hospital services based on physician order or complex needs related to functional status, cognitive ability or social support system     Problem: Safety - Adult  Goal: Free from fall injury  2/28/2023 0411 by Mallika Eng RN  Outcome: Progressing  Flowsheets (Taken 2/27/2023 0512)  Free From Fall Injury: Instruct family/caregiver on patient safety  2/27/2023 2043 by Albert Izquierdo RN  Outcome: Progressing     Problem: Chronic Conditions and Co-morbidities  Goal: Patient's chronic conditions and co-morbidity symptoms are monitored and maintained or improved  2/28/2023 0411 by Mallika Eng RN  Outcome: Progressing  Flowsheets (Taken 2/27/2023 0800 by Albert Izquierdo RN)  Care Plan - Patient's Chronic Conditions and Co-Morbidity Symptoms are Monitored and Maintained or Improved:   Monitor and assess patient's chronic conditions and comorbid symptoms for stability, deterioration, or improvement   Collaborate with multidisciplinary team to address chronic and comorbid conditions and prevent exacerbation or deterioration   Update acute care plan with appropriate goals if chronic or comorbid symptoms are exacerbated and prevent overall improvement and discharge  2/27/2023 2043 by Ethan Verma RN  Outcome: Progressing  Flowsheets (Taken 2/27/2023 0800)  Care Plan - Patient's Chronic Conditions and Co-Morbidity Symptoms are Monitored and Maintained or Improved:   Monitor and assess patient's chronic conditions and comorbid symptoms for stability, deterioration, or improvement   Collaborate with multidisciplinary team to address chronic and comorbid conditions and prevent exacerbation or deterioration   Update acute care plan with appropriate goals if chronic or comorbid symptoms are exacerbated and prevent overall improvement and discharge     Problem: Pain  Goal: Verbalizes/displays adequate comfort level or baseline comfort level  2/28/2023 0411 by Maria C Quarles RN  Outcome: Progressing  Flowsheets (Taken 2/28/2023 0411)  Verbalizes/displays adequate comfort level or baseline comfort level: Encourage patient to monitor pain and request assistance  2/27/2023 2043 by Ethan Verma RN  Outcome: Progressing

## 2023-02-28 NOTE — PROCEDURES
DATE OF THE PROCEDURE: 02/28/2023    HISTORY OF PRESENT ILLNESS: 69 y/o male with left subcortical white matter stroke and CTA with high grade stenosis of left ICA in the neck. Catheter angiography to confirm stenosis severity and document intracranial collateral flow / [place] patency. OPERATORS:   Primary: Amilcar Christensen M.D., attending. Assistant: None. SUPERVISION AND INTERPRETATION: Dr. Milly Stoll personally performed the technical portions of the procedure. Following completion of the technical portions of the procedure, the angiographic images were reviewed at the neurography PACS work station by Dr. Dick Wright. PROCEDURE:  1. Three-Vessel Diagnostic Cerebral Angiogram.  2. Ultrasound-guided arterial assessment and access of the right common femoral artery. 3. Moderate IV Sedation supervised by Dr. Milly Stoll with the assistance of an independent trained observer (IR Nurse) who performed patient assessments and monitored patient vital signs, adult, > 15 minutes. VESSELS CATHETERIZED:   1. Right internal carotid artery. 2. Left common carotid artery. 3. Left subclavian artery for angiography of the left vertebral artery. ANESTHESIA: Prior to the procedure, the patient's personal and family history were reviewed for any adverse reactions to anesthesia and no pertinent concerns were raised. ASA rdgrdrrdarddrderd:rd rd3rd Malampati: 2    Conscious sedation and intravenous anesthesia was given by the radiology nurse under direct supervision of the attending physician for 40 minutes. Monitored nursing care and medication reports are available on the chart. Local anesthesia was provided at the puncture site with 1% lidocaine.      MODERATE SEDATION START TIME: 1625  MODERATE SEDATION END TIME: 1705  TOTAL DURATION MODERATE IV SEDATION: 40 minutes    MEDICATIONS GIVEN:     1 mcg Fentanyl IV  50 mg Versed IV  1% lidocaine, subcutaneous, 7 mL  2000 unit IV bolus unfractionated heparin    RADIOCONTRAST: 100 mL 270 mgI/ml Visipaque contrast IA. EBL: 50 mL    DEVICES USED:   1. 5-Fr Stiff Micropuncture kit. 2. 5-Austrian 10 cm sheath. 3. 0.035\" Glidewire. 4. 5-Austrian angled glide catheter. 5. 5-Austrian King 2 glide catheter. 6. 5-Austrian Mynx  closure device. AIR KERMA: 334 mGy   FLUOROSCOPY TIME: 17.1 minutes. CONSENT: Prior to the procedure, the technical aspect of the procedure as well as the potential risks and benefits were explained to the patient. Specifically, the risk of cerebral infarction, puncture site hemorrhage, femoral nerve injury, retroperitoneal hemorrhage, hemorrhagic shock, infection, device failure, anaphylaxis, renal failure, limb loss, death, radiation effects (hair loss, cataracts, radiation burns, tumors, dementia), arterial dissection, arterial pseudoaneurysms and A-V fistula were discussed. The advantages and disadvantages of alternative procedures were presented. An opportunity to state any questions or concerns was given and these were then addressed. Following this process, it was requested that we proceed with the proposed intervention and this was expressed in the form of a written consent. DETAILS OF THE PROCEDURE: The patient was brought to the neuroangiography suite where the groin was shaved, prepped and draped in usual sterile fashion. A whole room time out was performed to confirm the identify of the patient, the procedure to be performed, and the location of the procedure. Ultrasound was used to insonate the potential vascular access sites including the right common femoral artery to insure the proper size and patency prior to access. 1% lidocaine was administered subcutaneously for local anesthetic. Then with live ultrasound guidance arterial puncture of the right common femoral artery was accessed percutaneously using a single wall puncture technique.  A 5-Austrian arterial introducer sheath was placed at the puncture site prior to arterial catheterization and was connected to a pressurized heparin saline flush line. A 5-Dutch angled diagnostic catheter was advanced over an 0.035\" Glidewire into the aortic arch under the fluoroscopic guidance and used to selectively catheterize the vessels listed above. In each case, the vessel origin was visualized fluoroscopically by injection of contrast prior to selective catheterization. Biplane diagnostic cerebral angiograms were acquired at each of these catheterizations. After reviewing the images, the catheter was removed. The groin sheath was exchanged over a 5-Dutch Mynx  closure device and the arteriotomy was closed without difficulty with excellent hemostasis. The patient tolerated the procedure well and there were no complications. FINDINGS:   LEFT CAROTID, CERVICAL AND INTRACRANIAL:  Adequate contrast enhancement of the carotid artery is seen. There is a high grade stenosis at the origin of the left ICA within the neck at the carotid bifurcation. This is 99% stenosis with a string sign and distal opacification due to a focal atherosclerotic plaque at the left carotid bifurcation. The ophthalmic artery is adequately patent. The carotid terminus is normal with adequate caliber of M1 segments of the middle cerebral artery. The left anterior cerebral artery does not fill, likely from competitive flow from the right side through the anterior communicating artery. There is contrast arrival delay in the left MCA territory secondary to this high grade stenosis, therefore it is flow limiting. The capillary and venous phases are within normal limits. There are no signs of fistulas, dissections or de juan luis aneurysms. RIGHT CAROTID, INTRACRANIAL: The right internal carotid artery has normal caliber over its entire course. The middle and anterior cerebral arteries fill normally. The left anterior cerebral artery fills distal to the anterior communicating artery.  The middle and anterior cerebral arteries have normal distribution to the cortical branches and the capillary and venous phases are normal. The right posterior communicating artery strongly opacifies the right posterior cerebral artery. LEFT VERTEBRAL, INTRACRANIAL: Normal contrast enhancement of the vertebral artery is noted. It demonstrates a normal contour and caliber. The left posterior inferior cerebellar artery is adequately seen. The basilar artery appears normal course and caliber. There is normal filling of bilateral superior cerebellar arteries and bilateral posterior cerebral arteries. The left posterior cerebral artery does supply some of the left MCA territory via pial to pial collateral flow. RIGHT COMMON FEMORAL ARTERY: The common, iliac, external iliac and common femoral arteries are normal in course and caliber. The common femoral artery bifurcation appears normal. The groin sheath was inserted within the common femoral artery above the bifurcation without evidence of any injury or thrombosis. IMPRESSION:  High grade stenosis (approximately 99%) string-sign type of left carotid stenosis in the neck at the origin of the left internal carotid artery. This is a symptomatic carotid stenosis with flow limitation. Left cerebral hemisphere flow is augmented through pial-pial collateral flow from the left posterior cerebral artery and minimal flow from the right ICA through the anterior communicating artery. RECOMMENDATIONS:  I am recommending a left carotid stent with distal embolic protection. Will attempt to schedule for Thursday at about 10:00 AM.  My other availability is Thursday at 5 PM, otherwise this would have to wait until next week.     Sanford Aguilar MD  Neurovascular and Stroke  [facility]

## 2023-02-28 NOTE — PROGRESS NOTES
Speech Language Pathology  Facility/Department:[facility]  Speech/Language/Dysphagia Treatment                                                         Patient Diagnosis(es):   Patient Active Problem List    Diagnosis Date Noted    ALEXANDREA treated with BiPAP 02/27/2023    Acute cerebrovascular accident (CVA) due to stenosis of left carotid artery ([address] 02/26/2023    Acute unilateral cerebral infarction in a watershed distribution [facility]) 02/25/2023    Acute ischemic stroke ([address] 02/25/2023    COPD with chronic bronchitis ([address] 11/29/2022    Chronic respiratory failure with hypoxia and hypercapnia ([address] 11/29/2022    Nocturnal hypoxia 11/29/2022    Hypotension 04/14/2022    Acute on chronic combined systolic (congestive) and diastolic (congestive) heart failure ([address] 04/03/2022    Acute hypokalemia 03/29/2022    Hypophosphatemia 03/29/2022    Long term current use of diuretic 03/29/2022    Unstable angina pectoris ([address] 04/17/2019    Atrial fibrillation ([address] 01/11/2018    Acute on chronic respiratory failure with hypoxia and hypercapnia (HCC) 01/11/2018    Acute hyponatremia 01/11/2018    Hyperbilirubinemia 01/11/2018    Influenzal pneumonia     Acute exacerbation of chronic obstructive pulmonary disease (COPD) ([address] 04/07/2011    Essential hypertension 04/07/2011    Former smoker 04/16/2010       Past Medical History:   Diagnosis Date    Atrial fibrillation ([address]     Bronchitis chronic     Emphysema of lung ([address]     HTN (hypertension)     Influenza A 01/10/2018    Lung disease     copd    Pneumonia     12/2009    Stroke     L [facility] watershed     Past Surgical History:   Procedure Laterality Date    FRACTURE SURGERY      johnson in femur/hip on right    HERNIA REPAIR         Reason for Referral:  Panfilo Palma  was referred for a Speech Therapy evaluation to assess swallow function and/or communication. History of Present Illness  Interval history: Patient seen and examined this morning at bedside.   He was sitting up comfortably in his bed. Overnight patient became short of breath. A chest x-ray was done and showed increased pulmonary edema. He was given a dose of 40 mg IV Lasix as well as DuoNeb treatments. He stated this improved his breathing. His IV fluids were stopped. He states he feels much better this morning and is having good urine output. He has no other complaints at this time. He remains afebrile and hemodynamically stable. HPI: 68 old male with past medical history HTN (Eliquis), HFpEF, COPD (4 L), chronic bronchitis, hernia repair who initially presented to OSH with acute onset aphasia. Son reported at OSH patient had waxing/waning speech changes over the past week worse than his typical stuttering. He noticed symptoms onset around 1100 on 2/24, but symptoms gradually resolved and were completely absent that night prior to bed. Patient slept in this morning until 1100 at which point family noted symptoms have recurred and he called 911. At [facility] [place] ED, pt hemodynamically stable with mild aphasia and mild R arm drift; NIHSS 3. EKF showing AF but otherwise no acute changes. CT head w/o showed age-indeterminate small, possibly acute L frontal lobe infacrt as well as proximal left ICA near-occlusion. Pt was transferred to [facility] for neurosurgical evaluation and management. Pt in NAD on arrival. He continues with difficulty finding words; has good insight into sx. Denies any headache, fever, chills, chest pain, dyspnea, abdominal pain, nausea, vomiting, numbness, visual changes. CXR: 2/26/23  Impression       1. Findings of pulmonary edema which are increased compared to chest radiograph from one day prior.      MRI: 2/25/23  Impression       Left MCA border zone infarcts without hemorrhage or mass effect       Date of onset: 2/26/23    Current Diet:  NPO    Treatment Diagnosis:  Dysphagia    Pain:  None    General:  Chart reviewed: Yes  Behavior/Cognition: Cooperative, alert,   Communication Observation:  aphasic with baseline stuttering  Follows Directions: Simple  Dentition/Oral Mucosa: Edentulous with clean moist tongue  Oral motor exam: Right labial weakness especially upon retraction, reduced lateral lingual movement to the right. No tongue deviation  Vocal Quality: WFL  Respiratory Status/oxygen requirements: O2 cannula  Vision/Hearing: No glasses; [place] concerns  Patient Complaint: Trouble talking; pt and family deny problems with swallowing at breakfast yesterday just when they noticed problems with using right hand etc.   Patient Positioning: upright in chair    Prior Dysphagia History:   none    Bedside Swallowing Evaluation Impression 2/26/23:   Pt alert, oriented to self, general place following review, to year with cues, month/day of week without cues. Pt edentulous (able to eat all types of foods at home with difficulty per family, even just prior to going to hospital).   Right labial weakness noted with no tongue deviation but reduced lateral lingual movement to the right.  Oral acceptance of all foods presented and able to feed self.  No cough/wet vocal quality/throat clearing with any po trials including thins via cup/straw for 3 oz water test and 10 single sip trials, 4 oz  puree and cracker trials (except a delayed cough 15 seconds post po trials x1).  No oral residue with any po trials. Pt denies globus sensation when questioned.  D/W nursing re: recent chest xray and nursing reported pt given some extra fluids last night which may be impacting current lung status (in regards to heart issues).  Lungs listened to by nursing today have only been diminished.  Swallow initiation fairly timely.  Recommend SOFT and BITE sized with thins - If any s/s of aspiration or if lung status decline emerges, then  d/c po until we recheck.     Speech, Language, Cognitive Evaluation 2/26/23:   Pt with mild receptive and moderate expressive aphasia with baseline dysfluency.   Pt alert,  oriented to self, general place following review, to year with cues, month/day of week without cues. Right labial weakness noted with no tongue deviation but educed lateral lingual movement to the right. Pt with baseline stuttering but increased difficulty with word recall in past month but significantly worse past couple days. Pt with high school education and no major hobbies reported by pt /family. Hesitations with speaking noted with reduced word recall in conversation. Confrontational naming of objects, simple pictures 8/8 correct. Difficulty with recall of address, but knew birthdate. Breakdown especially following 3 step commands. Able to name 3 words in one minute given a category (up to 3 more given a cue). [place] tasks at 100%. Able to read single words without difficulty. Education  Pt educated to rationale for tx session and specific skills targeted. Prognosis:  Prognosis for improvement: Good  Barriers to reach goals: age  [de-identified] and agree with results and recommendations: yes    Treatment:  Dysphagia Goals: The pt will be seen  2-4 x per week to address the following goals:  Goal 1: The pt will tolerate least restrictive diet without s/s of aspiration  2/27: Pt seated upright in bed and seen during afternoon meal of soft and bite sized solids and thin liquids, as well as trial of regular solids. Pt demonstrated adequate, yet mildly prolonged mastication of regular solids. Pt edentulous, however expressed no difficulty. Pt appeared to swallow all trials with no overt s/s penetration/aspiration. No significant oral residue and appropriately taking liquid wash as needed. Pt indicated wanting to continue SBS prior to advancing. SLP in agreement. Cont. 2/28: Goal not targeted- pt NPO for PPM. Cont goal    Goal 2: Pt/family will verbalize and/or demonstrate understanding of swallowing recommendations.   2/26:  Pt and family educated to role of dysphagia, what aspiration is, what s/s of aspiration are and diet/strategies recommended for pt. Pt able to demonstrate understanding by taking small amounts of food and liquid as practice at the end of the session following initial review with min to no cues. Both pt and family verbalized understanding. 2/27: SLP provided education regarding diet level rec's, swallow strategies, oral hygiene and further dysphagia treatment for safety of swallow. Pt demonstrated understanding and followed all commands to implement strategies during meal. No other family present at this time. Cont. 2/28: Goal not targeted- pt NPO for PPM. Cont goal     Speech Goals: The pt will be seen  2-4 x per week to address the following goals:   Pt will follow 2- 3 step directions with 50% accuracy, mod cues. 2/27: followed 1-step with 90% accuracy. Followed 2-step with object use with ~30%. Multiple repetitions required for comprehension, as well as single step breakdown. Cont. Pt will answer yes/no questions of varying complexity with 90% accuracy, min to no cues. 2/27: answered basic yes/no with 100% accuracy. Answered semi-complex with added prepositions with ~75%. Pt did self correct x1 for accuracy. Cont. 2/28: comparative yes/no questions: pt with perseverative response during this task resulting in 50% accuracy independently; no awareness of perseverative responses. When questions were rephrased, pt able to correct response increasing accuracy to 75% accuracy. Cont goal.     Pt will name 4-5 words in one minute given a category  2/27: not targeted this session. 2/28: goal not targeted this session. Pt/family will verbalize and/or demonstrate 2-3 speech strategies to maximize verbal communication and/or fluency. 2/27: SLP discussed use of SFA for verbal descriptions when events of anomia occur. Pt utilized x1 during session to describe and communicate difficulty with self feeding with use of R hand.  Pt also independently pausing and re-starting own statement prior to continuing to assist with word finding/fluency. Cont. 2/28: demo'd SFA evidenced by some description containing only content words being stated by pt; able to name high frequency objects. Completed verbal description of routine familiar task: pt appeared to have increased disfluency related to baseline fluency disorder; descriptions consisted of 2-3 content words; required mod to MAX phonemic cues during this task. Cont goal     Pt will participate in further reading/writing/and cognitive assessment. 2/27: noted frustrations during session. SLP provided education regarding aphasia vs fluency. Pt with continued accuracy of confrontation naming of high frequency objects and following basic commands. Responsive naming task completed with 90% accuracy. Min phonemic/semantic cues required intermittently. Cont. 2/28: completed matching verbal word to written word in visual field of 6:  pt completed with 100% accuracy independently. Cont goal     Plan:  Continue per POC  Recommended diet: Soft and Bite Sized Solids, Thin Liquids-  If any s/s of aspiration or if lung status decline emerges, then  d/c po until we recheck.    Recommended form of medication: Pills with water  Swallowing Strategies:   -small bites/sips  -alternate solids/liquids  -check for pocketing  -oral hygiene 2-3x/day    Pt goal: \"all of it\" better  Pt discharge goal: Home    Total treatment time: 13 language   Discharge Recommendation: TBD  Discussed with RN: Carrington Castelan   Needs met prior to leaving room, call light within reach    Electronically signed by:  Suha Marr, [address]  Speech-Language Pathologist  Pg #: [phone]    This document will serve as a dc summary if pt dc prior to next visit

## 2023-02-28 NOTE — CONSULTS
Consult Note  Physical Medicine and Rehabilitation    Patient: Kala Damico  [id number]  Date: 2023      Chief Complaint: CVA    History of Present Illness/Hospital Course:  68 old male with past medical history HTN, a fib on Eliquis, HFpEF, COPD (4 L), chronic bronchitis, hernia repair who initially presented to OSH with acute onset aphasia. Currently having some speech issues but able to move his bilateral extremities. Recovering well. Plan for a procedure today but will be able to come to the ARU post ICU admission. Prior Level of Function:  Independent for mobility, ADLs, and IADLs    Current Level of Function:  Mod assist     Pertinent Social History:  Support: family in [facility]  Home set-up: Lives in [facility]     Past Medical History:   Diagnosis Date    Atrial fibrillation ([address]     Bronchitis chronic     Emphysema of lung ([address]     HTN (hypertension)     Influenza A 01/10/2018    Lung disease     copd    Pneumonia     2009    Stroke     L MCA watershed       Past Surgical History:   Procedure Laterality Date    FRACTURE SURGERY      johnson in femur/hip on right    HERNIA REPAIR         Family History   Problem Relation Age of Onset    Diabetes Sister     Cancer Sister     Cancer Sister     Cancer Brother     Asthma Daughter     Asthma Son     Diabetes Mother     Cancer Father        Social History     Socioeconomic History    Marital status:     Tobacco Use    Smoking status: Former     Packs/day: 1.00     Years: 30.00     Pack years: 30.00     Types: Cigarettes     Quit date: 2016     Years since quittin.6    Smokeless tobacco: Never    Tobacco comments:     Just quit about 3 months ago 16   Vaping Use    Vaping Use: Never used   Substance and Sexual Activity    Alcohol use: No    Drug use: No    Sexual activity: Not Currently           REVIEW OF SYSTEMS:   CONSTITUTIONAL: negative for fevers, chills, diaphoresis, appetite change, night sweats, unexpected weight change, fatigue  EYES: negative for blurred vision, eye discharge, visual disturbance and icterus  HEENT: negative for hearing loss, tinnitus, ear drainage, sinus pressure, nasal congestion, epistaxis and snoring  RESPIRATORY: Negative for hemoptysis, cough, sputum production  CARDIOVASCULAR: negative for chest pain, palpitations, exertional chest pressure/discomfort, syncope, edema  GASTROINTESTINAL: negative for nausea, vomiting, diarrhea, blood in stool, abdominal pain, constipation  GENITOURINARY: negative for frequency, dysuria, urinary incontinence, decreased urine volume, and hematuria  HEMATOLOGIC/LYMPHATIC: negative for easy bruising, bleeding and lymphadenopathy  ALLERGIC/IMMUNOLOGIC: negative for recurrent infections, angioedema, anaphylaxis and drug reactions  ENDOCRINE: negative for weight changes and diabetic symptoms including polyuria, polydipsia and polyphagia  MUSCULOSKELETAL: negative for pain, joint swelling, decreased range of motion  NEUROLOGICAL: negative for headaches, Positive slurred speech, unilateral weakness  PSYCHIATRIC/BEHAVIORAL: negative for hallucinations, behavioral problems, confusion and agitation.      Physical Examination:  Vitals: Patient Vitals for the past 24 hrs:   BP Temp Temp src Pulse Resp SpO2 Weight   02/28/23 1000 114/72 -- -- 75 20 93 % --   02/28/23 0917 -- -- -- 76 (!) 32 99 % --   02/28/23 0912 -- -- -- 93 21 (!) 83 % --   02/28/23 0910 -- -- -- -- -- 90 % --   02/28/23 0900 132/82 -- -- 71 14 96 % --   02/28/23 0800 117/72 -- -- 68 15 93 % --   02/28/23 0745 -- 97.9 °F (36.6 °C) Oral 69 18 96 % --   02/28/23 0700 115/75 -- -- 66 18 92 % --   02/28/23 0600 103/69 -- -- 66 18 93 % 177 lb 7.5 oz (80.5 kg)   02/28/23 0500 (!) 88/55 -- -- 53 14 94 % --   02/28/23 0400 92/61 -- -- 63 13 94 % --   02/28/23 0300 110/63 -- -- 52 15 92 % --   02/28/23 0200 (!) 90/59 -- -- 60 15 95 % --   02/28/23 0100 (!) 90/55 -- -- 61 13 95 % --   02/28/23 0000 (!) 102/58 98.1 °F (36.7 °C) Oral 71 18 92 % --   02/27/23 2300 110/67 -- -- 72 17 90 % --   02/27/23 2200 -- -- -- 83 20 (!) 89 % --   02/27/23 2100 96/62 -- -- 71 18 96 % --   02/27/23 2009 -- -- -- 85 17 92 % --   02/27/23 2007 -- -- -- 81 14 90 % --   02/27/23 2000 126/77 98.2 °F (36.8 °C) Oral 75 17 91 % --   02/27/23 1900 102/67 -- -- 74 19 92 % --   02/27/23 1800 (!) 122/90 -- -- 75 25 91 % --   02/27/23 1708 -- -- -- -- -- 94 % --   02/27/23 1700 89/72 -- -- 71 18 93 % --   02/27/23 1600 126/68 98 °F (36.7 °C) Oral 70 20 92 % --   02/27/23 1500 92/72 -- -- 68 22 90 % --   02/27/23 1400 109/61 -- -- 76 18 92 % --   02/27/23 1300 116/81 -- -- 82 20 90 % --     Const: Alert. WDWN. No distress  Eyes: Conjunctiva noninjected, no icterus noted; pupils equal, round, and reactive to light. HENT: Atraumatic, normocephalic; Oral mucosa moist  Neck: Trachea midline, neck supple. No thyromegaly noted. CV: Regular rate and rhythm, no murmur rub or gallop noted  Resp: Lungs clear to auscultation bilaterally, no rales wheezes or ronchi, no retractions. Respirations unlabored. GI: Soft, nontender, nondistended. Normal bowel sounds. No palpable masses. Skin: Normal temperature and turgor. No rashes or breakdown noted. Ext: No significant edema appreciated. No varicosities. MSK: No joint tenderness, erythema, warmth noted. AROM intact. Neuro: Alert, oriented, appropriate. No cranial nerve deficits appreciated. Sensation intact to light touch. Motor examination reveals 4/5 strength in all four limbs diffusely.  Speech deficit   Psych: Stable mood, normal judgement, normal affect     Lab Results   Component Value Date    WBC 11.6 (H) 02/28/2023    HGB 15.7 02/28/2023    HCT 49.3 02/28/2023    MCV 95.8 02/28/2023     02/28/2023     Lab Results   Component Value Date    INR 1.13 02/25/2023    INR 1.19 (H) 01/11/2018    PROTIME 14.4 02/25/2023    PROTIME 13.4 (H) 01/11/2018     Lab Results   Component Value Date    CREATININE 0.9 02/28/2023 BUN 17 02/28/2023     02/28/2023    K 3.7 02/28/2023    CL 99 02/28/2023    CO2 34 (H) 02/28/2023     Lab Results   Component Value Date    ALT 17 02/25/2023    AST 21 02/25/2023    ALKPHOS 97 02/25/2023    BILITOT 0.8 02/25/2023           XR CHEST PORTABLE   Final Result      1. Findings of pulmonary edema which are increased compared to chest radiograph from one day prior. MRI brain without contrast   Final Result      Left MCA border zone infarcts without hemorrhage or mass effect. Assessment:  1. Subacute ischemic CVA- MRI brain which showed left MCA border zone infarcts without hemorrhage or mass effect  - PT/OT/SLP required  - -160  2. COPD- Continue Brovana and Pulmicort treatments  - DuoNeb PRN  3. HTN- Continue home antihypertensive regimen      Impairments- Decreased functional mobility, Decreased ADLs    Recommendations:  Admit to ARU with humana precert     Patient with new functional deficits and ongoing medical complexity. Demonstrates ability to tolerate 3 hours therapy/day. He is a good candidate for acute inpatient rehab when medically appropriate. Thank you for this consult. Please contact me with any questions or concerns.      Vijay Tamayo D.O. M.P.H  PM&R  2/28/2023  12:26 PM

## 2023-03-01 ENCOUNTER — ANESTHESIA EVENT (OUTPATIENT)
Dept: INTERVENTIONAL RADIOLOGY/VASCULAR | Age: 73
End: 2023-03-01
Payer: MEDICARE

## 2023-03-01 PROBLEM — R47.01 APHASIA DUE TO ACUTE CEREBROVASCULAR ACCIDENT (CVA) (HCC): Status: ACTIVE | Noted: 2023-02-25

## 2023-03-01 LAB
BASOPHILS ABSOLUTE: 0.1 K/UL (ref 0–0.2)
BASOPHILS RELATIVE PERCENT: 0.5 %
EOSINOPHILS ABSOLUTE: 0.3 K/UL (ref 0–0.6)
EOSINOPHILS RELATIVE PERCENT: 2.3 %
HCT VFR BLD CALC: 46.1 % (ref 40.5–52.5)
HEMOGLOBIN: 15.6 G/DL (ref 13.5–17.5)
LYMPHOCYTES ABSOLUTE: 1.6 K/UL (ref 1–5.1)
LYMPHOCYTES RELATIVE PERCENT: 11.8 %
MCH RBC QN AUTO: 30.5 PG (ref 26–34)
MCHC RBC AUTO-ENTMCNC: 33.8 G/DL (ref 31–36)
MCV RBC AUTO: 90.1 FL (ref 80–100)
MONOCYTES ABSOLUTE: 0.7 K/UL (ref 0–1.3)
MONOCYTES RELATIVE PERCENT: 5.1 %
NEUTROPHILS ABSOLUTE: 10.6 K/UL (ref 1.7–7.7)
NEUTROPHILS RELATIVE PERCENT: 80.3 %
PDW BLD-RTO: 15.2 % (ref 12.4–15.4)
PLATELET # BLD: 311 K/UL (ref 135–450)
PMV BLD AUTO: 10.1 FL (ref 5–10.5)
RBC # BLD: 5.12 M/UL (ref 4.2–5.9)
REASON FOR REJECTION: NORMAL
REJECTED TEST: NORMAL
WBC # BLD: 13.2 K/UL (ref 4–11)

## 2023-03-01 PROCEDURE — 94761 N-INVAS EAR/PLS OXIMETRY MLT: CPT

## 2023-03-01 PROCEDURE — 85025 COMPLETE CBC W/AUTO DIFF WBC: CPT

## 2023-03-01 PROCEDURE — 1200000000 HC SEMI PRIVATE

## 2023-03-01 PROCEDURE — 2700000000 HC OXYGEN THERAPY PER DAY

## 2023-03-01 PROCEDURE — 97129 THER IVNTJ 1ST 15 MIN: CPT

## 2023-03-01 PROCEDURE — 6370000000 HC RX 637 (ALT 250 FOR IP): Performed by: INTERNAL MEDICINE

## 2023-03-01 PROCEDURE — 94660 CPAP INITIATION&MGMT: CPT

## 2023-03-01 PROCEDURE — 92507 TX SP LANG VOICE COMM INDIV: CPT

## 2023-03-01 PROCEDURE — 99291 CRITICAL CARE FIRST HOUR: CPT

## 2023-03-01 PROCEDURE — 2580000003 HC RX 258: Performed by: NEUROLOGICAL SURGERY

## 2023-03-01 PROCEDURE — 6370000000 HC RX 637 (ALT 250 FOR IP)

## 2023-03-01 PROCEDURE — 94640 AIRWAY INHALATION TREATMENT: CPT

## 2023-03-01 PROCEDURE — 99231 SBSQ HOSP IP/OBS SF/LOW 25: CPT | Performed by: NURSE PRACTITIONER

## 2023-03-01 PROCEDURE — 99232 SBSQ HOSP IP/OBS MODERATE 35: CPT | Performed by: INTERNAL MEDICINE

## 2023-03-01 PROCEDURE — 36415 COLL VENOUS BLD VENIPUNCTURE: CPT

## 2023-03-01 PROCEDURE — 99232 SBSQ HOSP IP/OBS MODERATE 35: CPT | Performed by: PHYSICAL MEDICINE & REHABILITATION

## 2023-03-01 PROCEDURE — 2580000003 HC RX 258

## 2023-03-01 PROCEDURE — 6360000002 HC RX W HCPCS

## 2023-03-01 PROCEDURE — 6370000000 HC RX 637 (ALT 250 FOR IP): Performed by: NURSE PRACTITIONER

## 2023-03-01 RX ORDER — CLOPIDOGREL BISULFATE 75 MG/1
300 TABLET ORAL 2 TIMES DAILY
Status: COMPLETED | OUTPATIENT
Start: 2023-03-01 | End: 2023-03-01

## 2023-03-01 RX ORDER — CLOPIDOGREL BISULFATE 75 MG/1
75 TABLET ORAL DAILY
Status: DISCONTINUED | OUTPATIENT
Start: 2023-03-02 | End: 2023-03-06 | Stop reason: HOSPADM

## 2023-03-01 RX ADMIN — SODIUM CHLORIDE, PRESERVATIVE FREE 10 ML: 5 INJECTION INTRAVENOUS at 09:40

## 2023-03-01 RX ADMIN — CLOPIDOGREL BISULFATE 300 MG: 75 TABLET ORAL at 20:35

## 2023-03-01 RX ADMIN — SODIUM CHLORIDE, PRESERVATIVE FREE 10 ML: 5 INJECTION INTRAVENOUS at 23:18

## 2023-03-01 RX ADMIN — IPRATROPIUM BROMIDE AND ALBUTEROL SULFATE 1 AMPULE: 2.5; .5 SOLUTION RESPIRATORY (INHALATION) at 19:55

## 2023-03-01 RX ADMIN — ARFORMOTEROL TARTRATE 15 MCG: 15 SOLUTION RESPIRATORY (INHALATION) at 19:55

## 2023-03-01 RX ADMIN — CLOPIDOGREL BISULFATE 300 MG: 75 TABLET ORAL at 09:39

## 2023-03-01 RX ADMIN — BUDESONIDE 500 MCG: 0.5 SUSPENSION RESPIRATORY (INHALATION) at 08:59

## 2023-03-01 RX ADMIN — ATORVASTATIN CALCIUM 80 MG: 80 TABLET, FILM COATED ORAL at 20:37

## 2023-03-01 RX ADMIN — SODIUM CHLORIDE: 9 INJECTION, SOLUTION INTRAVENOUS at 07:31

## 2023-03-01 RX ADMIN — IPRATROPIUM BROMIDE AND ALBUTEROL SULFATE 1 AMPULE: 2.5; .5 SOLUTION RESPIRATORY (INHALATION) at 08:59

## 2023-03-01 RX ADMIN — BUDESONIDE 500 MCG: 0.5 SUSPENSION RESPIRATORY (INHALATION) at 19:55

## 2023-03-01 RX ADMIN — ARFORMOTEROL TARTRATE 15 MCG: 15 SOLUTION RESPIRATORY (INHALATION) at 08:59

## 2023-03-01 RX ADMIN — ASPIRIN 325 MG: 325 TABLET, COATED ORAL at 09:39

## 2023-03-01 RX ADMIN — IPRATROPIUM BROMIDE AND ALBUTEROL SULFATE 1 AMPULE: 2.5; .5 SOLUTION RESPIRATORY (INHALATION) at 15:48

## 2023-03-01 ASSESSMENT — PAIN SCALES - GENERAL
PAINLEVEL_OUTOF10: 0

## 2023-03-01 NOTE — PLAN OF CARE
Problem: Discharge Planning  Goal: Discharge to home or other facility with appropriate resources  Outcome: Progressing  Flowsheets (Taken 2/27/2023 0800 by Ethan Verma RN)  Discharge to home or other facility with appropriate resources:   Identify barriers to discharge with patient and caregiver   Arrange for needed discharge resources and transportation as appropriate   Identify discharge learning needs (meds, wound care, etc)   Refer to discharge planning if patient needs post-hospital services based on physician order or complex needs related to functional status, cognitive ability or social support system     Problem: Safety - Adult  Goal: Free from fall injury  3/1/2023 0121 by Maria C Quarles RN  Outcome: Progressing  Flowsheets (Taken 2/28/2023 1037 by Monika Oconnor RN)  Free From Fall Injury: Instruct family/caregiver on patient safety  2/28/2023 1552 by Monika Oconnor RN  Outcome: Progressing  Flowsheets (Taken 2/28/2023 1037)  Free From Fall Injury: Instruct family/caregiver on patient safety     Problem: Chronic Conditions and Co-morbidities  Goal: Patient's chronic conditions and co-morbidity symptoms are monitored and maintained or improved  3/1/2023 0121 by Maria C Quarles RN  Outcome: Progressing  Flowsheets (Taken 2/27/2023 0800 by Ethan Verma RN)  Care Plan - Patient's Chronic Conditions and Co-Morbidity Symptoms are Monitored and Maintained or Improved:   Monitor and assess patient's chronic conditions and comorbid symptoms for stability, deterioration, or improvement   Collaborate with multidisciplinary team to address chronic and comorbid conditions and prevent exacerbation or deterioration   Update acute care plan with appropriate goals if chronic or comorbid symptoms are exacerbated and prevent overall improvement and discharge  2/28/2023 1552 by Monika Oconnor RN  Outcome: Progressing     Problem: Pain  Goal: Verbalizes/displays adequate comfort level or baseline comfort level  3/1/2023 0121 by Anastasia Castaneda RN  Outcome: Progressing  Flowsheets (Taken 2/28/2023 0411)  Verbalizes/displays adequate comfort level or baseline comfort level: Encourage patient to monitor pain and request assistance  2/28/2023 1552 by Aga Uribe RN  Outcome: Progressing     Problem: ABCDS Injury Assessment  Goal: Absence of physical injury  3/1/2023 0121 by Anastasia Castaneda RN  Outcome: Progressing  Flowsheets (Taken 3/1/2023 0121)  Absence of Physical Injury: Implement safety measures based on patient assessment  2/28/2023 1552 by Aga Uribe RN  Outcome: Progressing

## 2023-03-01 NOTE — PROGRESS NOTES
Speech Language Pathology  Facility/Department:[facility] ICU  Speech/Language/Dysphagia Treatment                                                         Patient Diagnosis(es):   Patient Active Problem List    Diagnosis Date Noted    ALEXANDREA treated with BiPAP 02/27/2023    Acute cerebrovascular accident (CVA) due to stenosis of left carotid artery ([address] 02/26/2023    Acute unilateral cerebral infarction in a watershed distribution [facility]) 02/25/2023    Acute ischemic stroke ([address] 02/25/2023    COPD with chronic bronchitis ([address] 11/29/2022    Chronic respiratory failure with hypoxia and hypercapnia ([address] 11/29/2022    Nocturnal hypoxia 11/29/2022    Hypotension 04/14/2022    Acute on chronic combined systolic (congestive) and diastolic (congestive) heart failure ([address] 04/03/2022    Acute hypokalemia 03/29/2022    Hypophosphatemia 03/29/2022    Long term current use of diuretic 03/29/2022    Unstable angina pectoris ([address] 04/17/2019    Atrial fibrillation ([address] 01/11/2018    Acute on chronic respiratory failure with hypoxia and hypercapnia (HCC) 01/11/2018    Acute hyponatremia 01/11/2018    Hyperbilirubinemia 01/11/2018    Influenzal pneumonia     Acute exacerbation of chronic obstructive pulmonary disease (COPD) ([address] 04/07/2011    Essential hypertension 04/07/2011    Former smoker 04/16/2010       Past Medical History:   Diagnosis Date    Atrial fibrillation ([address]     Bronchitis chronic     Emphysema of lung ([address]     HTN (hypertension)     Influenza A 01/10/2018    Lung disease     copd    Pneumonia     12/2009    Stroke     L [facility] watershed     Past Surgical History:   Procedure Laterality Date    FRACTURE SURGERY      johnson in femur/hip on right    HERNIA REPAIR         Reason for Referral:  Nena Wells  was referred for a Speech Therapy evaluation to assess swallow function and/or communication. History of Present Illness  Interval history: Patient seen and examined this morning at bedside.   He was sitting up comfortably in his bed. Overnight patient became short of breath. A chest x-ray was done and showed increased pulmonary edema. He was given a dose of 40 mg IV Lasix as well as DuoNeb treatments. He stated this improved his breathing. His IV fluids were stopped. He states he feels much better this morning and is having good urine output. He has no other complaints at this time. He remains afebrile and hemodynamically stable. HPI: 68 old male with past medical history HTN (Eliquis), HFpEF, COPD (4 L), chronic bronchitis, hernia repair who initially presented to OSH with acute onset aphasia. Son reported at OSH patient had waxing/waning speech changes over the past week worse than his typical stuttering. He noticed symptoms onset around 1100 on 2/24, but symptoms gradually resolved and were completely absent that night prior to bed. Patient slept in this morning until 1100 at which point family noted symptoms have recurred and he called 911. At [facility] ED, pt hemodynamically stable with mild aphasia and mild R arm drift; NIHSS 3. EKF showing AF but otherwise no acute changes. CT head w/o showed age-indeterminate small, possibly acute L frontal lobe infacrt as well as proximal left ICA near-occlusion. Pt was transferred to [facility] for neurosurgical evaluation and management. Pt in NAD on arrival. He continues with difficulty finding words; has good insight into sx. Denies any headache, fever, chills, chest pain, dyspnea, abdominal pain, nausea, vomiting, numbness, visual changes. CXR: 2/26/23  Impression       1. Findings of pulmonary edema which are increased compared to chest radiograph from one day prior.      MRI: 2/25/23  Impression       Left MCA border zone infarcts without hemorrhage or mass effect       Date of onset: 2/26/23    Current Diet:  NPO    Treatment Diagnosis:  Dysphagia    Pain:  None    General:  Chart reviewed: Yes  Behavior/Cognition: Cooperative, alert, Communication Observation:  aphasic with baseline stuttering  Follows Directions: Simple  Dentition/Oral Mucosa: Edentulous with clean moist tongue  Oral motor exam: Right labial weakness especially upon retraction, reduced lateral lingual movement to the right. No tongue deviation  Vocal Quality: WFL  Respiratory Status/oxygen requirements: O2 cannula  Vision/Hearing: No glasses; Knik concerns  Patient Complaint: Trouble talking; pt and family deny problems with swallowing at breakfast yesterday just when they noticed problems with using right hand etc.   Patient Positioning: upright in chair    Prior Dysphagia History:   none    Bedside Swallowing Evaluation Impression 2/26/23:   Pt alert, oriented to self, general place following review, to year with cues, month/day of week without cues. Pt edentulous (able to eat all types of foods at home with difficulty per family, even just prior to going to hospital). Right labial weakness noted with no tongue deviation but reduced lateral lingual movement to the right. Oral acceptance of all foods presented and able to feed self. No cough/wet vocal quality/throat clearing with any po trials including thins via cup/straw for 3 oz water test and 10 single sip trials, 4 oz  puree and cracker trials (except a delayed cough 15 seconds post po trials x1). No oral residue with any po trials. Pt denies globus sensation when questioned. D/W nursing re: recent chest xray and nursing reported pt given some extra fluids last night which may be impacting current lung status (in regards to heart issues). Lungs listened to by nursing today have only been diminished. Swallow initiation fairly timely. Recommend SOFT and BITE sized with thins - If any s/s of aspiration or if lung status decline emerges, then  d/c po until we recheck. Speech, Language, Cognitive Evaluation 2/26/23:   Pt with mild receptive and moderate expressive aphasia with baseline dysfluency.    Pt alert, oriented to self, general place following review, to year with cues, month/day of week without cues. Right labial weakness noted with no tongue deviation but educed lateral lingual movement to the right. Pt with baseline stuttering but increased difficulty with word recall in past month but significantly worse past couple days. Pt with high school education and no major hobbies reported by pt /family. Hesitations with speaking noted with reduced word recall in conversation. Confrontational naming of objects, simple pictures 8/8 correct. Difficulty with recall of address, but knew birthdate. Breakdown especially following 3 step commands. Able to name 3 words in one minute given a category (up to 3 more given a cue). Willey tasks at 100%. Able to read single words without difficulty. Education  Pt educated to rationale for tx session and specific skills targeted. Prognosis:  Prognosis for improvement: Good  Barriers to reach goals: age  [de-identified] and agree with results and recommendations: yes    Treatment:  Dysphagia Goals: The pt will be seen  2-4 x per week to address the following goals:  Goal 1: The pt will tolerate least restrictive diet without s/s of aspiration  2/27: Pt seated upright in bed and seen during afternoon meal of soft and bite sized solids and thin liquids, as well as trial of regular solids. Pt demonstrated adequate, yet mildly prolonged mastication of regular solids. Pt edentulous, however expressed no difficulty. Pt appeared to swallow all trials with no overt s/s penetration/aspiration. No significant oral residue and appropriately taking liquid wash as needed. Pt indicated wanting to continue SBS prior to advancing. SLP in agreement. Cont. 2/28: Goal not targeted- pt NPO for PPM. Cont goal    Goal 2: Pt/family will verbalize and/or demonstrate understanding of swallowing recommendations.   2/26:  Pt and family educated to role of dysphagia, what aspiration is, what s/s of aspiration are and diet/strategies recommended for pt. Pt able to demonstrate understanding by taking small amounts of food and liquid as practice at the end of the session following initial review with min to no cues. Both pt and family verbalized understanding. 2/27: SLP provided education regarding diet level rec's, swallow strategies, oral hygiene and further dysphagia treatment for safety of swallow. Pt demonstrated understanding and followed all commands to implement strategies during meal. No other family present at this time. Cont. 2/28: Goal not targeted- pt NPO for PPM. Cont goal     Speech Goals: The pt will be seen  2-4 x per week to address the following goals:   Pt will follow 2- 3 step directions with 50% accuracy, mod cues. 2/27: followed 1-step with 90% accuracy. Followed 2-step with object use with ~30%. Multiple repetitions required for comprehension, as well as single step breakdown. Cont. Pt will answer yes/no questions of varying complexity with 90% accuracy, min to no cues. 2/27: answered basic yes/no with 100% accuracy. Answered semi-complex with added prepositions with ~75%. Pt did self correct x1 for accuracy. Cont. 2/28: comparative yes/no questions: pt with perseverative response during this task resulting in 50% accuracy independently; no awareness of perseverative responses. When questions were rephrased, pt able to correct response increasing accuracy to 75% accuracy. Cont goal.   3/1: basic yes/gu=097% accuracy; semi-complex yes/no=80% accuracy. Cont. Pt will name 4-5 words in one minute given a category  2/27: not targeted this session. 2/28: goal not targeted this session. 3/1: listed x5 objects related to category of furniture with 100% accuracy. X1 paraphasia noted. Cont. Pt/family will verbalize and/or demonstrate 2-3 speech strategies to maximize verbal communication and/or fluency.   2/27: SLP discussed use of SFA for verbal descriptions when events of anomia occur. Pt utilized x1 during session to describe and communicate difficulty with self feeding with use of R hand. Pt also independently pausing and re-starting own statement prior to continuing to assist with word finding/fluency. Cont. 2/28: demo'd SFA evidenced by some description containing only content words being stated by pt; able to name high frequency objects. Completed verbal description of routine familiar task: pt appeared to have increased disfluency related to baseline fluency disorder; descriptions consisted of 2-3 content words; required mod to MAX phonemic cues during this task. Cont goal   3/1: pt attempting to utilize object description to assist with word finding/anomia at times. Benefited from min cues and increased wait time to facilitate. Cont. Pt will participate in further reading/writing/and cognitive assessment. 2/27: noted frustrations during session. SLP provided education regarding aphasia vs fluency. Pt with continued accuracy of confrontation naming of high frequency objects and following basic commands. Responsive naming task completed with 90% accuracy. Min phonemic/semantic cues required intermittently. Cont. 2/28: completed matching verbal word to written word in visual field of 6:  pt completed with 100% accuracy independently. Cont goal   3/1: pt completed responsive naming task with 90% accuracy, increased to 100% with mod phonemic/semantic cues. Completed medication label reading+answer wh-questions with 25% accuracy. Increased difficulty noted with complex reading and wh-questions. Pt benefited from min-mod cues to assist with scanning page to locate information, as well as ? Comprehension of questions. Cont. Plan:  Continue per POC  Recommended diet: Soft and Bite Sized Solids, Thin Liquids-  If any s/s of aspiration or if lung status decline emerges, then  d/c po until we recheck.    Recommended form of medication: Pills with water  Swallowing Strategies: -small bites/sips  -alternate solids/liquids  -check for pocketing  -oral hygiene 2-3x/day    Pt goal: \"all of it\" better  Pt discharge goal: Home    Total treatment time: 10 language; 15 minutes cognitive   Discharge Recommendation: TBD  Discussed with RN: Marianna  Needs met prior to leaving room, call light within reach    Electronically signed by:  Steffen Randall M.A., [address] [place] [address]  Speech-Language Pathologist  Pg #: [phone]    This document will serve as a dc summary if pt dc prior to next visit

## 2023-03-01 NOTE — PLAN OF CARE
Problem: Discharge Planning  Goal: Discharge to home or other facility with appropriate resources  3/1/2023 1333 by Anabela Garzon RN  Outcome: Progressing  3/1/2023 0121 by Ginna Valdez RN  Outcome: Progressing  Flowsheets (Taken 2/27/2023 0800 by Kamar Link, RN)  Discharge to home or other facility with appropriate resources:   Identify barriers to discharge with patient and caregiver   Arrange for needed discharge resources and transportation as appropriate   Identify discharge learning needs (meds, wound care, etc)   Refer to discharge planning if patient needs post-hospital services based on physician order or complex needs related to functional status, cognitive ability or social support system     Problem: Safety - Adult  Goal: Free from fall injury  3/1/2023 1333 by Anabela Garzon RN  Outcome: Progressing  3/1/2023 0121 by Ginna Valdez RN  Outcome: Progressing  Flowsheets (Taken 2/28/2023 1037 by Sobia Mcadams RN)  Free From Fall Injury: Instruct family/caregiver on patient safety     Problem: Chronic Conditions and Co-morbidities  Goal: Patient's chronic conditions and co-morbidity symptoms are monitored and maintained or improved  3/1/2023 1333 by Anabela Garzon RN  Outcome: Progressing  3/1/2023 0121 by Ginna Valdez RN  Outcome: Progressing  Flowsheets (Taken 2/27/2023 0800 by Kamar Link, RN)  Care Plan - Patient's Chronic Conditions and Co-Morbidity Symptoms are Monitored and Maintained or Improved:   Monitor and assess patient's chronic conditions and comorbid symptoms for stability, deterioration, or improvement   Collaborate with multidisciplinary team to address chronic and comorbid conditions and prevent exacerbation or deterioration   Update acute care plan with appropriate goals if chronic or comorbid symptoms are exacerbated and prevent overall improvement and discharge     Problem: Pain  Goal: Verbalizes/displays adequate comfort level or baseline comfort level  3/1/2023 1333 by Emma Julian RN  Outcome: Progressing  3/1/2023 0121 by Ruht Carmichael RN  Outcome: Progressing  Flowsheets (Taken 2/28/2023 0411)  Verbalizes/displays adequate comfort level or baseline comfort level: Encourage patient to monitor pain and request assistance     Problem: ABCDS Injury Assessment  Goal: Absence of physical injury  3/1/2023 1333 by Emma Julian RN  Outcome: Progressing  3/1/2023 0121 by Ruth Carmichael RN  Outcome: Progressing  Flowsheets (Taken 3/1/2023 0121)  Absence of Physical Injury: Implement safety measures based on patient assessment

## 2023-03-01 NOTE — PROGRESS NOTES
Occupational Therapy  Daily Treatment Note  Patient Name: Yonathan Schultz  MRN: [id number]    Chart Reviewed: Yes     Restrictions/Precautions: Fall Risk Other position/activity restrictions: up as tolerated, up with assistance     Discharge Recommendations: Yonathan Schultz scored a 15/24 on the AM-PAC ADL Inpatient form. Current research shows that an AM-PAC score of 17 or less is typically not associated with a discharge to the patient's home setting. Based on the patient's AM-PAC score and their current ADL deficits, it is recommended that the patient have 5-7 sessions per week of Occupational Therapy at d/c to increase the patient's independence. At this time, this patient demonstrates complex nursing, medical, and rehabilitative needs, and would benefit from intensive rehabilitation services upon discharge from the Inpatient setting. This patient demonstrates the ability to participate in and benefit from an intensive therapy program with a coordinated interdisciplinary team approach to foster frequent, structured, and documented communication among disciplines, who will work together to establish, prioritize, and achieve treatment goals. Please see assessment section for further patient specific details. If patient discharges prior to next session this note will serve as a discharge summary. Please see below for the latest assessment towards goals. Additional Pertinent Hx: [facility] ED 2/25 67 yo male who presents with expressive aphasia and RUE ataxia. CT head shows an age-indeterminate, possibly acute L frontal lobe infarct. CTA head/neck shows proximal L ICA near occlusion with distal lumen collapse, and bilateral proximal vertebral artery occlusion with distal reconstitution.  PMH: HTN, HFpEF, COPD, emphysema, PNA, chronic bronchitis, hernia repair, AFib      Diagnosis: Acute CVA  Treatment Diagnosis: impaired ADLs /functional transfers / decreased RUE functional use 2/2 CVA    Subjective: Pt semi supine in bed upon arrival, agreeable to OT session for mobility and ADLs. Pain: denied pain      Objective:    Cognition/Orientation:  Pt required increased time to respond to questions. Min cueing for initiation and sequencing during functional tasks. Bed mobility   Supine to sit: CGA, + cueing to reach for bed rail    Functional Mobility   Sit to Stand: min A to stand to walker, cueing for upright posture as pt noted with forward flex. Stand to Sit: min A- cueing for pt to reach back to chair  Bed to Chair Transfer: mobility into bathroom with walker with min A, cueing to stand inside of walker  Commode Transfer: min A + use of grab bar on L side    ADLs   Grooming: standing at sink for hand hygiene and washing face, heavily leaning onto countertop. Min cueing for pt to locate soap on L side. LB dressing: total A to don socks  Toileting: pt not wearing pants, did not complete vasyl care. Activity Tolerance:  O2 sats prior to mobility on 4lpm at 95%. Walking into bathroom pt seated on toilet and noted with increased HANKINS, recovering with increased time and rest break. Pt completed mobility to sink and then back to chair, O2 sats at 83%, increased time to recover and pt required O2 to be turned up to 6 before recovering to 90s. Pt turned back to 4lpm, O2 sats in mid 90s at end of session. Patient Education:   Educated pt on benefits of activity promotion and participation in ADLs/ functional mobility. Safety Devices in Place:  CA activated, call light in reach and all needs met    Assessment:   Pt demonstrated improved sit to stand transfers, progressing with functional mobility. Pt completed toilet transfer and standing grooming tasks, however noted posterior lean while standing at sink. Pt did desat after walking into bathroom on 4lpm O2, requiring increased O2 to recover to 90s.  As pt was independent prior to admitting to hospital, feel pt would benefit from intensive IP OT prior to returning home. Continue OT POC while in acute care. Equipment Needs:  defer to dc location    Timed Code Treatment Minutes: Individual Concurrent Group Co-treatment   Time In 0909         Time Out 0936         Minutes 27           Timed Code Treatment Minutes:  27 mins total    Goals:  Short Term Goals  Time Frame for Short Term Goals: at d/c  Short Term Goal 1: Stance x 5 mins with supervision for ADLs/ IADLs  Short Term Goal 2: Functional transfers with SBA  Short Term Goal 3: LE Dressing with CGA and AE prn  Short Term Goal 4: Increase functional use of RUE for all ADls to 90% of tasks. Short Term Goal 5: Pt demo independence with HEP for RUE coordination. Plan:      Times Per Week: 5-7x   Times Per Day: Once a day    If patient is discharged prior to next treatment, this note will serve as the discharge summary. Orgger.  [address] [facility], OTR/L [id number]

## 2023-03-01 NOTE — PROGRESS NOTES
ICU Consult Note    Admit Date: 2/25/2023  Day: 3  Vent Day: 0  IV Access:Peripheral  IV Fluids:none  Vasopressors:none                Antibiotics: none  Diet: ADULT DIET; Dysphagia - Soft and Bite Sized; Low Fat/Low Chol/High Fiber/2 gm Na    CC: Aphasia    Interval history:  Patient had no acute events overnight. Patient had cerebral angiogram yesterday with endovascular neurosurgery patient tolerated the procedure well. It sounds like patient will have carotid stenting hopefully on Thursday at some point either in the morning or in the evening. Otherwise, patient continues to have some word finding difficulty but otherwise has no new complaints. Labs have been very stable. We had vitals been very stable. HPI:  68 old male with past medical history HTN, a fib on Eliquis, HFpEF, COPD (4 L), chronic bronchitis, hernia repair who initially presented to OSH with acute onset aphasia. Son reported at OSH patient had waxing/waning speech changes over the past week worse than his typical stuttering. He noticed symptoms onset around 1100 on 2/24, but symptoms gradually resolved and were completely absent that night prior to bed. Patient slept in this morning until 1100 at which point family noted symptoms have recurred and he called 911. At [facility] ED, pt hemodynamically stable with mild aphasia and mild R arm drift; NIHSS 3. EKG showing AF but otherwise no acute changes. CT head w/o showed age-indeterminate small, possibly acute L frontal lobe infacrt as well as proximal left ICA near-occlusion. Pt was transferred to [facility] for neurosurgical evaluation and management. Pt in NAD on arrival. He continues with difficulty finding words; has good insight into sx. Denies any headache, fever, chills, chest pain, dyspnea, abdominal pain, nausea, vomiting, numbness, visual changes.       Past Medical History        Past Medical History:   Diagnosis Date    Bronchitis chronic      Emphysema of lung ([facility] [address] Influenza A 01/10/2018    Lung disease       copd    Pneumonia       12/2009            Past Surgical History[]Expand by Default         Past Surgical History:   Procedure Laterality Date    FRACTURE SURGERY         johnson in femur/hip on right    HERNIA REPAIR                SocialHistory:   The patient lives at      Alcohol:  Illicit drugs: no use  Tobacco:       Family History:  Family History[]Expand by Default         Family History   Problem Relation Age of Onset    Diabetes Sister      Cancer Sister      Cancer Sister      Cancer Brother      Asthma Daughter      Asthma Son      Diabetes Mother      Cancer Father         Review of Systems   Constitutional:  Negative for chills and fever. Eyes:  Negative for visual disturbance. Respiratory:  Negative for shortness of breath. Cardiovascular:  Negative for chest pain. Gastrointestinal:  Negative for nausea and vomiting. Neurological:  Positive for speech difficulty. Negative for dizziness, weakness, light-headedness, numbness and headaches. All other systems reviewed and are negative.     Medications:     Scheduled Meds:   ipratropium-albuterol  1 ampule Inhalation Q4H WA    atorvastatin  80 mg Oral Nightly    sodium chloride flush  5-40 mL IntraVENous 2 times per day    [Held by provider] dilTIAZem  180 mg Oral Daily    [Held by provider] torsemide  40 mg Oral BID    aspirin  325 mg Oral Daily    Or    aspirin  300 mg Rectal Daily    arformoterol tartrate  15 mcg Nebulization BID    And    budesonide  0.5 mg Nebulization BID     Continuous Infusions:   sodium chloride 125 mL/hr at 03/01/23 0731    sodium chloride       PRN Meds:acetaminophen, HYDROcodone 5 mg - acetaminophen **OR** HYDROcodone 5 mg - acetaminophen, ondansetron, perflutren lipid microspheres, albuterol, ondansetron **OR** [DISCONTINUED] ondansetron, polyethylene glycol, sodium chloride flush, sodium chloride    Objective:   Vitals:   T-max:  Patient Vitals for the past 8 hrs:   BP Temp Temp src Pulse Resp SpO2 Weight   03/01/23 0600 (!) 100/49 -- -- 70 20 95 % --   03/01/23 0530 115/74 -- -- 76 16 99 % --   03/01/23 0500 132/72 -- -- 84 -- 98 % --   03/01/23 0430 128/88 -- -- 96 18 95 % --   03/01/23 0400 113/70 99.7 °F (37.6 °C) Oral 77 13 96 % 177 lb 11.1 oz (80.6 kg)   03/01/23 0330 122/71 -- -- 71 16 97 % --   03/01/23 0300 (!) 85/48 -- -- 68 15 94 % --   03/01/23 0230 (!) 97/59 -- -- 74 16 100 % --   03/01/23 0100 (!) 131/93 -- -- 81 17 92 % --   03/01/23 0030 (!) 129/90 -- -- 78 -- (!) 82 % --   03/01/23 0012 -- -- -- 81 16 90 % --   03/01/23 0000 103/80 98.2 °F (36.8 °C) Oral 78 -- 92 % --         Intake/Output Summary (Last 24 hours) at 3/1/2023 0746  Last data filed at 3/1/2023 0400  Gross per 24 hour   Intake 1469 ml   Output --   Net 1469 ml         Physical Exam  Vitals reviewed. Constitutional:       Appearance: Normal appearance. He is normal weight. HENT:      Head: Normocephalic and atraumatic. Eyes:      Extraocular Movements: Extraocular movements intact. Conjunctiva/sclera: Conjunctivae normal.      Pupils: Pupils are equal, round, and reactive to light. Cardiovascular:      Rate and Rhythm: Normal rate and regular rhythm. Heart sounds: Normal heart sounds. Pulmonary:      Effort: Pulmonary effort is normal.      Breath sounds: Normal breath sounds. Abdominal:      General: Abdomen is flat. Bowel sounds are normal.      Palpations: Abdomen is soft. Musculoskeletal:         General: Normal range of motion. Skin:     General: Skin is warm and dry. Neurological:      General: No focal deficit present. Mental Status: He is alert and oriented to person, place, and time. Mental status is at baseline. Cranial Nerves: Dysarthria present. Sensory: Sensation is intact. Motor: Motor function is intact. Comments: Expressive aphasia. Intermittent difficulty finding words but answering appropriately.   His motor strength and sensation is intact in the bilateral upper and lower extremities. Psychiatric:         Mood and Affect: Mood normal.         Behavior: Behavior normal.     LABS:    CBC:   Recent Labs     02/27/23 0459 02/28/23  0643 03/01/23  0525   WBC 12.7* 11.6* 13.2*   HGB 15.6 15.7 15.6   HCT 47.1 49.3 46.1    298 311   MCV 92.3 95.8 90.1       Renal:    Recent Labs     02/27/23 0459 02/27/23  1830 02/28/23  0643     --  140   K 2.8* 3.8 3.7   CL 94*  --  99   CO2 38*  --  34*   BUN 12  --  17   CREATININE 1.0  --  0.9   GLUCOSE 132*  --  135*   CALCIUM 8.7  --  9.1   MG 1.90  --  2.10   ANIONGAP 10  --  7       Hepatic:   No results for input(s): AST, ALT, BILITOT, BILIDIR, PROT, LABALBU, ALKPHOS in the last 72 hours. Troponin:   No results for input(s): TROPONINI in the last 72 hours. BNP: No results for input(s): BNP in the last 72 hours. Lipids:   Recent Labs     02/27/23 0459   CHOL 187   HDL 38*       ABGs:  No results for input(s): PHART, TWS3BLK, PO2ART, NCW6IHO, BEART, THGBART, G7HAUCZM, ZKH5GSE in the last 72 hours. INR:   No results for input(s): INR in the last 72 hours. Lactate: No results for input(s): LACTATE in the last 72 hours. Cultures:  -----------------------------------------------------------------  RAD:   IR ANGIOGRAM CAROTID CEREBRAL RIGHT   Final Result      XR CHEST PORTABLE   Final Result      1. Findings of pulmonary edema which are increased compared to chest radiograph from one day prior. MRI brain without contrast   Final Result      Left MCA border zone infarcts without hemorrhage or mass effect. Assessment/Plan:   68 old male with past medical history HTN, a fib on Eliquis, HFpEF, COPD (4 L), chronic bronchitis, hernia repair who initially presented to OSH with 1 week waxing/waning aphasia. CT w/ near occlusion of L ICA and small L frontal infarct.     Subacute ischemic CVA-patient has MRI brain which showed left MCA border zone infarcts without hemorrhage or mass effect. Patient also had a CTA of the head and neck which showed left proximal internal carotid artery occlusion with distal lobe collapse with reduced blood flow into the left MCA territory. This near occlusion is likely to blame for patient's stroke symptoms. There is also bilateral proximal vertebral artery occlusions with distal reconstitution. Neurocritial care following  Likely Neurovascular consult  Goal -160, will utilize PRN Labetalol 10 IV  If insufficient, will consider Nicardipine gtt  Q1hr neurochecks  ASA and Statin, not on antiplatelet agent  Did not receive tpa or tnk in the ED. S/P MRI Brain- L border zone infarct without hemorrhage or mass effect  PT/OT  Complete Echo- pending  Cerebral angiogram Yesterday 2/28/23 with endovascular neurosurgery  Planning for [facility] on Thursday, hopefully. Chronic Medical Conditions  COPD- Continue Brovana and Pulmicort treatments  DuoNeb PRN  Will start patient on at home BiPAP settings. Heart failure with preserved ejection fraction  Fluids held for the patient. Lasix spot dose given with improvement  HTN- Continue home antihypertensive regimen    Code Status: Full code  FEN: Regular diet  PPX: Heparin drip  Alivia Solis MD  PGY1, Internal Medicine  03/01/23  7:46 AM    This patient has been staffed and discussed with Mao Lei MD.    Patient seen, examined and discussed with the resident and I agree with the assessment and plan as edited above. Plan for carotid stent tomorrow by neurosurgery. COPD stable. Can transfer out of ICU from my perspective, if ok with neurology.     Alia Hurst MD

## 2023-03-01 NOTE — PROGRESS NOTES
Written in retrospect.     GCS 15/15 E4 V5 M6 - delayed responses with pauses  Pupils anisocoric; OD 4mm, OS 3mm, brisk  RE 5/5 - at best  LE 5/5     On O2 therapy @ 4lpm via NC, saturating ~ 88%   Bilateral diminished air entry in the upper and lower lobes  Respiration regular, deep, unlabored  Cough: strong  Symmetrical chest expansion     Known a-fib - rate controlled  Normotensive  Generally warm to touch  Peripheral and central CRT <3s  Bilateral radial pulses strond and irregular; pedal pulses weak, irregular  Electrolytes WNL     Abdomen soft, round, nontender  Active bowel sounds in all quadrants  Diet: soft, bite-sized diet with thin fluids     Pressure areas clean, dry, and intact.  Peripheral IV @ L cephalic vein. Clean, dry, intact. Patent and flushed with saline.  Scattered ecchymosis  Femoral puncture site s/p cerebral angiogram - clean, dry, intact. No hematoma     Afebrile  Not on any antibiotic regimen

## 2023-03-01 NOTE — PROGRESS NOTES
Progress Note  Physical Medicine and Rehabilitation    Patient: Ariela Vance  [id number]  Date: 3/1/2023      Chief Complaint: CVA    History of Present Illness/Hospital Course:  68 old male with past medical history HTN, a fib on Eliquis, HFpEF, COPD (4 L), chronic bronchitis, hernia repair who initially presented to OSH with acute onset aphasia. Currently having some speech issues but able to move his bilateral extremities. Recovering well. Plan for a procedure today but will be able to come to the ARU post ICU admission. Interval history: Requires a stent placement tomorrow. Will start [employer] today with plans for admit Friday. Feeling well today. Continues to have dysarthria     Prior Level of Function:  Independent for mobility, ADLs, and IADLs    Current Level of Function:  Mod assist     Pertinent Social History:  Support: family in town  Home set-up: Lives in [facility]     Past Medical History:   Diagnosis Date    Atrial fibrillation ([address]     Bronchitis chronic     Emphysema of lung ([address]     HTN (hypertension)     Influenza A 01/10/2018    Lung disease     copd    Pneumonia     2009    Stroke     L MCA watershed       Past Surgical History:   Procedure Laterality Date    FRACTURE SURGERY      johnson in femur/hip on right    HERNIA REPAIR         Family History   Problem Relation Age of Onset    Diabetes Sister     Cancer Sister     Cancer Sister     Cancer Brother     Asthma Daughter     Asthma Son     Diabetes Mother     Cancer Father        Social History     Socioeconomic History    Marital status:    Tobacco Use    Smoking status: Former     Packs/day: 1.00     Years: 30.00     Pack years: 30.00     Types: Cigarettes     Quit date: 2016     Years since quittin.6    Smokeless tobacco: Never    Tobacco comments:     Just quit about 3 months ago 16   Vaping Use    Vaping Use: Never used   Substance and Sexual Activity    Alcohol use: No    Drug use:  No Sexual activity: Not Currently           REVIEW OF SYSTEMS:   CONSTITUTIONAL: negative for fevers, chills, diaphoresis, appetite change, night sweats, unexpected weight change, fatigue  EYES: negative for blurred vision, eye discharge, visual disturbance and icterus  HEENT: negative for hearing loss, tinnitus, ear drainage, sinus pressure, nasal congestion, epistaxis and snoring  RESPIRATORY: Negative for hemoptysis, cough, sputum production  CARDIOVASCULAR: negative for chest pain, palpitations, exertional chest pressure/discomfort, syncope, edema  GASTROINTESTINAL: negative for nausea, vomiting, diarrhea, blood in stool, abdominal pain, constipation  GENITOURINARY: negative for frequency, dysuria, urinary incontinence, decreased urine volume, and hematuria  HEMATOLOGIC/LYMPHATIC: negative for easy bruising, bleeding and lymphadenopathy  ALLERGIC/IMMUNOLOGIC: negative for recurrent infections, angioedema, anaphylaxis and drug reactions  ENDOCRINE: negative for weight changes and diabetic symptoms including polyuria, polydipsia and polyphagia  MUSCULOSKELETAL: negative for pain, joint swelling, decreased range of motion  NEUROLOGICAL: negative for headaches, Positive slurred speech, unilateral weakness  PSYCHIATRIC/BEHAVIORAL: negative for hallucinations, behavioral problems, confusion and agitation.      Physical Examination:  Vitals: Patient Vitals for the past 24 hrs:   BP Temp Temp src Pulse Resp SpO2 Weight   03/01/23 1100 (!) 124/99 -- -- 93 18 -- --   03/01/23 1000 (!) 141/81 -- -- 99 23 -- --   03/01/23 0904 -- -- -- 72 18 93 % --   03/01/23 0900 139/79 -- -- 76 18 93 % --   03/01/23 0730 133/74 -- -- 76 14 93 % --   03/01/23 0715 116/73 -- -- 69 16 -- --   03/01/23 0700 (!) 88/54 -- -- 73 14 -- --   03/01/23 0600 (!) 100/49 -- -- 70 20 95 % --   03/01/23 0530 115/74 -- -- 76 16 99 % --   03/01/23 0500 132/72 -- -- 84 -- 98 % --   03/01/23 0430 128/88 -- -- 96 18 95 % --   03/01/23 0400 113/70 99.7 °F (37.6 °C) Oral 77 13 96 % 177 lb 11.1 oz (80.6 kg)   03/01/23 0330 122/71 -- -- 71 16 97 % --   03/01/23 0300 (!) 85/48 -- -- 68 15 94 % --   03/01/23 0230 (!) 97/59 -- -- 74 16 100 % --   03/01/23 0100 (!) 131/93 -- -- 81 17 92 % --   03/01/23 0030 (!) 129/90 -- -- 78 -- (!) 82 % --   03/01/23 0012 -- -- -- 81 16 90 % --   03/01/23 0000 103/80 98.2 °F (36.8 °C) Oral 78 -- 92 % --   02/28/23 2330 (!) 138/59 -- -- 67 -- 93 % --   02/28/23 2300 (!) 96/49 -- -- 73 -- 96 % --   02/28/23 2230 -- -- -- -- -- 96 % --   02/28/23 2200 95/68 -- -- 76 28 94 % --   02/28/23 2130 -- -- -- -- -- 93 % --   02/28/23 2100 113/76 -- -- 84 13 95 % --   02/28/23 2045 -- -- -- 79 24 91 % --   02/28/23 2043 -- -- -- 85 22 92 % --   02/28/23 2040 -- -- -- 74 23 96 % --   02/28/23 2030 (!) 145/83 -- -- 91 25 90 % --   02/28/23 2000 128/86 98.4 °F (36.9 °C) Oral 75 -- 93 % --   02/28/23 1930 -- -- -- 78 -- 93 % --   02/28/23 1900 124/75 -- -- 78 -- 94 % --   02/28/23 1845 125/72 -- -- 70 -- 95 % --   02/28/23 1800 110/75 -- -- 71 -- 90 % --   02/28/23 1745 116/84 -- -- 70 -- 95 % --   02/28/23 1730 130/77 -- -- 81 -- -- --   02/28/23 1500 (!) 136/92 98.1 °F (36.7 °C) Oral 78 25 94 % --   02/28/23 1400 125/76 -- -- 73 16 93 % --   02/28/23 1300 135/85 -- -- 74 15 94 % --   02/28/23 1255 -- -- -- 74 16 93 % --   02/28/23 1200 130/83 98.5 °F (36.9 °C) Oral 72 10 94 % --       Const: Alert. WDWN. No distress  Eyes: Conjunctiva noninjected, no icterus noted; pupils equal, round, and reactive to light. HENT: Atraumatic, normocephalic; Oral mucosa moist  Neck: Trachea midline, neck supple. No thyromegaly noted. CV: Regular rate and rhythm, no murmur rub or gallop noted  Resp: Lungs clear to auscultation bilaterally, no rales wheezes or ronchi, no retractions. Respirations unlabored. GI: Soft, nontender, nondistended. Normal bowel sounds. No palpable masses. Skin: Normal temperature and turgor. No rashes or breakdown noted.    Ext: No significant edema appreciated. No varicosities. MSK: No joint tenderness, erythema, warmth noted. AROM intact. Neuro: Alert, oriented, appropriate. No cranial nerve deficits appreciated. Sensation intact to light touch. Motor examination reveals 4/5 strength in all four limbs diffusely. Speech deficit   Psych: Stable mood, normal judgement, normal affect     Lab Results   Component Value Date    WBC 13.2 (H) 03/01/2023    HGB 15.6 03/01/2023    HCT 46.1 03/01/2023    MCV 90.1 03/01/2023     03/01/2023     Lab Results   Component Value Date    INR 1.13 02/25/2023    INR 1.19 (H) 01/11/2018    PROTIME 14.4 02/25/2023    PROTIME 13.4 (H) 01/11/2018     Lab Results   Component Value Date    CREATININE 0.9 02/28/2023    BUN 17 02/28/2023     02/28/2023    K 3.7 02/28/2023    CL 99 02/28/2023    CO2 34 (H) 02/28/2023     Lab Results   Component Value Date    ALT 17 02/25/2023    AST 21 02/25/2023    ALKPHOS 97 02/25/2023    BILITOT 0.8 02/25/2023           IR ANGIOGRAM CAROTID CEREBRAL RIGHT   Final Result      XR CHEST PORTABLE   Final Result      1. Findings of pulmonary edema which are increased compared to chest radiograph from one day prior. MRI brain without contrast   Final Result      Left MCA border zone infarcts without hemorrhage or mass effect. Assessment:  1. Subacute ischemic CVA- MRI brain which showed left MCA border zone infarcts without hemorrhage or mass effect  - PT/OT/SLP required  - -160  2. COPD- Continue Brovana and Pulmicort treatments  - DuoNeb PRN  3. HTN- Continue home antihypertensive regimen      Impairments- Decreased functional mobility, Decreased ADLs    Recommendations:  Admit to ARU with humana precert     Likely Friday post procedure    Patient with new functional deficits and ongoing medical complexity. Demonstrates ability to tolerate 3 hours therapy/day. He is a good candidate for acute inpatient rehab when medically appropriate.     Thank you for this consult. Please contact me with any questions or concerns.      BETO AmbrocioPJusticeH  PM&R  3/1/2023  11:15 AM

## 2023-03-01 NOTE — RT PROTOCOL NOTE
RT Nebulizer Bronchodilator Protocol Note    There is a bronchodilator order in the chart from a provider indicating to follow the RT Bronchodilator Protocol and there is an “Initiate RT Bronchodilator Protocol” order as well (see protocol at bottom of note).    CXR Findings:  No results found.    The findings from the last RT Protocol Assessment were as follows:  Smoking: Chronic pulmonary disease  Respiratory Pattern: Dyspnea on exertion or RR 21-25 bpm  Breath Sounds: Slightly diminished and/or crackles  Cough: Strong, spontaneous, non-productive  Indication for Bronchodilator Therapy: Decreased or absent breath sounds, On home bronchodilators  Bronchodilator Assessment Score: 6    Aerosolized bronchodilator medication orders have been revised according to the RT Nebulizer Bronchodilator Protocol below.    Respiratory Therapist to perform RT Therapy Protocol Assessment initially then follow the protocol.  Repeat RT Therapy Protocol Assessment PRN for score 0-3 or on second treatment, BID, and PRN for scores above 3.    No Indications - adjust the frequency to every 6 hours PRN wheezing or bronchospasm, if no treatments needed after 48 hours then discontinue using Per Protocol order mode.     If indication present, adjust the RT bronchodilator orders based on the Bronchodilator Assessment Score as indicated below.  If a patient is on this medication at home then do not decrease Frequency below that used at home.    0-3 - enter or revise RT bronchodilator order(s) to equivalent RT Bronchodilator order with Frequency of every 4 hours PRN for wheezing or increased work of breathing using Per Protocol order mode.       4-6 - enter or revise RT Bronchodilator order(s) to two equivalent RT bronchodilator orders with one order with BID Frequency and one order with Frequency of every 4 hours PRN wheezing or increased work of breathing using Per Protocol order mode.         7-10 - enter or revise RT Bronchodilator order(s)  to two equivalent RT bronchodilator orders with one order with TID Frequency and one order with Frequency of every 4 hours PRN wheezing or increased work of breathing using Per Protocol order mode. 11-13 - enter or revise RT Bronchodilator order(s) to one equivalent RT bronchodilator order with QID Frequency and an Albuterol order with Frequency of every 4 hours PRN wheezing or increased work of breathing using Per Protocol order mode. Greater than 13 - enter or revise RT Bronchodilator order(s) to one equivalent RT bronchodilator order with every 4 hours Frequency and an Albuterol order with Frequency of every 2 hours PRN wheezing or increased work of breathing using Per Protocol order mode. RT to enter RT Home Evaluation for COPD & MDI Assessment order using Per Protocol order mode.     Electronically signed by Meagan Snow RCP on 3/1/2023 at 9:06 AM

## 2023-03-01 NOTE — PROGRESS NOTES
Hospitalist Progress Note      PCP: Bonny Somers    Date of Admission: 2/25/2023    Subjective:  seen and examined with family at bedside  Denies any chest pain or dyspnea  Tolerating PO  Seen by neurosurgery, plan for ELIANA tomorrow      Medications:  Reviewed    Infusion Medications    sodium chloride       Scheduled Medications    clopidogrel  300 mg Oral BID    Followed by    Rachid Dennis ON 3/2/2023] clopidogrel  75 mg Oral Daily    ipratropium-albuterol  1 ampule Inhalation Q4H WA    atorvastatin  80 mg Oral Nightly    sodium chloride flush  5-40 mL IntraVENous 2 times per day    [Held by provider] dilTIAZem  180 mg Oral Daily    [Held by provider] torsemide  40 mg Oral BID    aspirin  325 mg Oral Daily    Or    aspirin  300 mg Rectal Daily    arformoterol tartrate  15 mcg Nebulization BID    And    budesonide  0.5 mg Nebulization BID     PRN Meds: acetaminophen, HYDROcodone 5 mg - acetaminophen **OR** HYDROcodone 5 mg - acetaminophen, ondansetron, perflutren lipid microspheres, albuterol, ondansetron **OR** [DISCONTINUED] ondansetron, polyethylene glycol, sodium chloride flush, sodium chloride      Intake/Output Summary (Last 24 hours) at 3/1/2023 1251  Last data filed at 3/1/2023 1110  Gross per 24 hour   Intake 1819 ml   Output --   Net 1819 ml         Physical Exam Performed:    BP (!) 124/99   Pulse 93   Temp 99.7 °F (37.6 °C) (Oral)   Resp 18   Wt 177 lb 11.1 oz (80.6 kg)   SpO2 93%   BMI 24.10 kg/m²     General appearance: No apparent distress, appears stated age and cooperative. Respiratory:  Normal respiratory effort. Diminished without obvious crackles or wheezing  Cardiovascular: Regular rate and rhythm with normal S1/S2 without murmurs, rubs or gallops. Abdomen: Soft, non-tender, non-distended with normal bowel sounds. Musculoskeletal: No clubbing, cyanosis or edema bilaterally. Full range of motion without deformity.   Skin: Skin color, texture, turgor normal.  No rashes or lesions. Neurologic:  expressive aphasia present  Psychiatric: Alert and oriented, thought content appropriate, normal insight  Capillary Refill: Brisk, 3 seconds, normal   Peripheral Pulses: +2 palpable, equal bilaterally       Labs:   Recent Labs     02/27/23  0459 02/28/23  0643 03/01/23  0525   WBC 12.7* 11.6* 13.2*   HGB 15.6 15.7 15.6   HCT 47.1 49.3 46.1    298 311       Recent Labs     02/27/23  0459 02/27/23  1830 02/28/23  0643     --  140   K 2.8* 3.8 3.7   CL 94*  --  99   CO2 38*  --  34*   BUN 12  --  17   CREATININE 1.0  --  0.9   CALCIUM 8.7  --  9.1       No results for input(s): AST, ALT, BILIDIR, BILITOT, ALKPHOS in the last 72 hours. No results for input(s): INR in the last 72 hours. No results for input(s): Cristina Fuchs in the last 72 hours. Urinalysis:      Lab Results   Component Value Date/Time    NITRU Negative 04/03/2022 01:35 PM    WBCUA 0-2 03/29/2022 03:55 PM    BACTERIA 1+ 01/11/2018 08:50 AM    RBCUA 0-2 03/29/2022 03:55 PM    BLOODU Negative 04/03/2022 01:35 PM    SPECGRAV 1.010 04/03/2022 01:35 PM    GLUCOSEU Negative 04/03/2022 01:35 PM       Radiology:  IR ANGIOGRAM CAROTID CEREBRAL RIGHT   Final Result      XR CHEST PORTABLE   Final Result      1. Findings of pulmonary edema which are increased compared to chest radiograph from one day prior. MRI brain without contrast   Final Result      Left MCA border zone infarcts without hemorrhage or mass effect.           IP CONSULT TO NEUROCRITICAL CARE  IP CONSULT TO PHARMACY  IP CONSULT TO NEUROSURGERY  IP CONSULT TO PHYSICAL MEDICINE REHAB    Assessment/Plan:    Active Hospital Problems    Diagnosis     ALEXANDREA treated with BiPAP [G47.33]      Priority: Medium    Acute cerebrovascular accident (CVA) due to stenosis of left carotid artery (HCC) [I63.232]      Priority: Medium    Acute unilateral cerebral infarction in a watershed distribution [facility]) [I63.89]      Priority: Medium    COPD with chronic bronchitis ([facility] [J44.9]      Priority: Medium     Acute ischemic stroke, due to moderate right MCA, nearly occluded LICA, completely occluded verts. Currently in ICU but cleared for step down  Neurosurgery neurology on board, plan for left carotid stent placement tomorrow  Maintain BP, avoid hypotension  Asa, statin, plavix load today  Monitor volume status closely off diuretics, on torsemide at home for diast CHF. Will stop IVF. Cont home O2 at 3 liters, bronchodilators  Resume diltiazem and cont to hold eliquis until cleared by neurology  PTOT     DVT Prophylaxis: scds  Diet: ADULT DIET; Dysphagia - Soft and Bite Sized;  Low Fat/Low Chol/High Fiber/2 gm Na  Diet NPO Exceptions are: Sips of Water with Meds  Code Status: Full Code  PT/OT Eval Status: order    Dispo - inpatient for carotid artery stent placement 3/2        Teressa Phelps MD

## 2023-03-01 NOTE — PROGRESS NOTES
NEUROSURGERY PROGRESS NOTE    3/1/2023 12:17 PM                               Bang ADAM Ita                      LOS: 4 days       Subjective: Patient sitting up in chair upon entering the room. No acute events overnight. Patient continues to have expressive aphasia. Physical Exam:  Patient seen and examined    Vitals:    03/01/23 1100   BP: (!) 124/99   Pulse: 93   Resp: 18   Temp:    SpO2:      GCS:  4 - Opens eyes on own  5 - Alert and oriented  6 - Follows simple motor commands  General: Well developed. Alert and cooperative in no acute distress. HENT: atraumatic, neck supple  Eyes: Optic discs: Not tested  Pulmonary: unlabored respiratory effort  Cardiovascular:  Warm well perfused. No peripheral edema  Gastrointestinal: abdomen soft, NT, ND     Neurological:  Mental Status: Awake, alert, oriented x 4, speech clear and appropriate  Attention: Intact  Language: Expressive Aphasia  Sensation: Intact to all extremities to light touch  Coordination: Intact     Cranial Nerves:  II: Visual acuity not tested, denies new visual changes / diplopia  III, IV, VI: PERRL, 3 mm bilaterally, EOMI, no nystagmus noted  V: Facial sensation intact bilaterally to touch  VII: Face symmetric  VIII: Hearing intact bilaterally to spoken voice  IX: Palate movement equal bilaterally  XI: Shoulder shrug equal bilaterally  XII: Tongue midline     Musculoskeletal:   Gait: Not tested   Assist devices: None   Tone: Normal  Motor strength:     Right  Left      Right  Left    Deltoid  5 5   Hip Flex  5 5   Biceps  5 5   Knee Extensors  5 5   Triceps  5 5   Knee Flexors  5 5   Wrist Ext  5 5   Ankle Dorsiflex. 5 5   Wrist Flex  5 5   Ankle Plantarflex. 5 5   Handgrip  5 5   Ext Gabriel Longus  5 5   Thumb Ext  5 5              Radiological Findings:  CT HEAD WO CONTRAST & CTA HEAD NECK W CONTRAST  Result Date: 2/26/2023  1. Proximal left internal carotid artery near occlusion with distal lumen collapse.  Reduced blood flow to the left MCA territory. 2. Bilateral proximal vertebral artery occlusions with distal reconstitution. 3. Age-indeterminate, possibly acute small left frontal lobe infarct. 4. Chronic thoracic compression deformities. MRI brain without contrast  Result Date: 2/25/2023  Left MCA border zone infarcts without hemorrhage or mass effect. Labs:  Recent Labs     03/01/23  0525   WBC 13.2*   HGB 15.6   HCT 46.1          Recent Labs     02/28/23  0643      K 3.7   CL 99   CO2 34*   BUN 17   CREATININE 0.9   GLUCOSE 135*   CALCIUM 9.1   MG 2.10       No results for input(s): PROTIME, INR, APTT in the last 72 hours. Patient Active Problem List    Diagnosis Date Noted    ALEXANDREA treated with BiPAP 02/27/2023    Acute cerebrovascular accident (CVA) due to stenosis of left carotid artery ([address] 02/26/2023    Acute unilateral cerebral infarction in a watershed distribution [facility]) 02/25/2023    Acute ischemic stroke ([address] 02/25/2023    COPD with chronic bronchitis ([address] 11/29/2022    Chronic respiratory failure with hypoxia and hypercapnia ([address] 11/29/2022    Nocturnal hypoxia 11/29/2022    Hypotension 04/14/2022    Acute on chronic combined systolic (congestive) and diastolic (congestive) heart failure ([address] 04/03/2022    Acute hypokalemia 03/29/2022    Hypophosphatemia 03/29/2022    Long term current use of diuretic 03/29/2022    Unstable angina pectoris ([address] 04/17/2019    Atrial fibrillation ([address] 01/11/2018    Acute on chronic respiratory failure with hypoxia and hypercapnia (HCC) 01/11/2018    Acute hyponatremia 01/11/2018    Hyperbilirubinemia 01/11/2018    Influenzal pneumonia     Acute exacerbation of chronic obstructive pulmonary disease (COPD) ([address] 04/07/2011    Essential hypertension 04/07/2011    Former smoker 04/16/2010     Assessment:  Patient is a 68 y.o. male w/LICA nearly occluded and 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.  Patient has a history of Atrial fibrillation for which he has previously been on Eliquis.     Plan:  Neurologic exams frequency: Defer to Neurology  For change in exam MUST contact neurosurgery team along with critical care or primary team  LICA stenosis:  - Hold Eliquis / Heparin gtt held due to his acute stroke.  - Aspirin 325 mg PO Daily  - Diagnostic cerebral angiogram yesterday confirms need for carotid stenting (ELIANA)  - Add Plavix loading doses (300 mg this morning and this evening) then transition to 75 mg Plavix Daily  - Plan for ELIANA tomorrow morning with GA.  Advance diet / activity per primary team  Will follow inpatient.  Please call with any questions or decline in neurological status     DISPO: Remain inpatient from neurosurgery standpoint. Dispo timing to be determined by primary team once patient is medically stable for discharge.     Patient was discussed and images reviewed with Dr. Jones who agrees with above assessment and plan.     Electronically signed by: ERIN Cooley CNP, APRN-CNP, 3/1/2023 12:17 PM  [phone]

## 2023-03-02 ENCOUNTER — ANESTHESIA (OUTPATIENT)
Dept: INTERVENTIONAL RADIOLOGY/VASCULAR | Age: 73
End: 2023-03-02
Payer: MEDICARE

## 2023-03-02 ENCOUNTER — APPOINTMENT (OUTPATIENT)
Dept: INTERVENTIONAL RADIOLOGY/VASCULAR | Age: 73
DRG: 035 | End: 2023-03-02
Attending: INTERNAL MEDICINE
Payer: MEDICARE

## 2023-03-02 LAB
ANION GAP SERPL CALCULATED.3IONS-SCNC: 8 MMOL/L (ref 3–16)
BASOPHILS ABSOLUTE: 0 K/UL (ref 0–0.2)
BASOPHILS RELATIVE PERCENT: 0.4 %
BUN BLDV-MCNC: 16 MG/DL (ref 7–20)
CALCIUM SERPL-MCNC: 9.1 MG/DL (ref 8.3–10.6)
CHLORIDE BLD-SCNC: 100 MMOL/L (ref 99–110)
CO2: 31 MMOL/L (ref 21–32)
CREAT SERPL-MCNC: 0.8 MG/DL (ref 0.8–1.3)
EOSINOPHILS ABSOLUTE: 0.5 K/UL (ref 0–0.6)
EOSINOPHILS RELATIVE PERCENT: 4 %
GFR SERPL CREATININE-BSD FRML MDRD: >60 ML/MIN/{1.73_M2}
GLUCOSE BLD-MCNC: 110 MG/DL (ref 70–99)
HCT VFR BLD CALC: 44.8 % (ref 40.5–52.5)
HEMOGLOBIN: 15.1 G/DL (ref 13.5–17.5)
LYMPHOCYTES ABSOLUTE: 1.6 K/UL (ref 1–5.1)
LYMPHOCYTES RELATIVE PERCENT: 14.4 %
MAGNESIUM: 2 MG/DL (ref 1.8–2.4)
MCH RBC QN AUTO: 31 PG (ref 26–34)
MCHC RBC AUTO-ENTMCNC: 33.7 G/DL (ref 31–36)
MCV RBC AUTO: 91.9 FL (ref 80–100)
MONOCYTES ABSOLUTE: 0.8 K/UL (ref 0–1.3)
MONOCYTES RELATIVE PERCENT: 7.3 %
NEUTROPHILS ABSOLUTE: 8.3 K/UL (ref 1.7–7.7)
NEUTROPHILS RELATIVE PERCENT: 73.9 %
PDW BLD-RTO: 14.6 % (ref 12.4–15.4)
PLATELET # BLD: 280 K/UL (ref 135–450)
PMV BLD AUTO: 9.5 FL (ref 5–10.5)
POTASSIUM REFLEX MAGNESIUM: 3.9 MMOL/L (ref 3.5–5.1)
RBC # BLD: 4.87 M/UL (ref 4.2–5.9)
SODIUM BLD-SCNC: 139 MMOL/L (ref 136–145)
WBC # BLD: 11.2 K/UL (ref 4–11)

## 2023-03-02 PROCEDURE — C1713 ANCHOR/SCREW BN/BN,TIS/BN: HCPCS

## 2023-03-02 PROCEDURE — 2580000003 HC RX 258: Performed by: NEUROLOGICAL SURGERY

## 2023-03-02 PROCEDURE — 1200000000 HC SEMI PRIVATE

## 2023-03-02 PROCEDURE — 6360000002 HC RX W HCPCS

## 2023-03-02 PROCEDURE — 51701 INSERT BLADDER CATHETER: CPT

## 2023-03-02 PROCEDURE — 80048 BASIC METABOLIC PNL TOTAL CA: CPT

## 2023-03-02 PROCEDURE — C1894 INTRO/SHEATH, NON-LASER: HCPCS

## 2023-03-02 PROCEDURE — 85025 COMPLETE CBC W/AUTO DIFF WBC: CPT

## 2023-03-02 PROCEDURE — 2700000000 HC OXYGEN THERAPY PER DAY

## 2023-03-02 PROCEDURE — 83735 ASSAY OF MAGNESIUM: CPT

## 2023-03-02 PROCEDURE — C1725 CATH, TRANSLUMIN NON-LASER: HCPCS

## 2023-03-02 PROCEDURE — 6370000000 HC RX 637 (ALT 250 FOR IP)

## 2023-03-02 PROCEDURE — 2709999900 HC NON-CHARGEABLE SUPPLY

## 2023-03-02 PROCEDURE — 2580000003 HC RX 258: Performed by: NURSE ANESTHETIST, CERTIFIED REGISTERED

## 2023-03-02 PROCEDURE — 3700000000 HC ANESTHESIA ATTENDED CARE

## 2023-03-02 PROCEDURE — C1876 STENT, NON-COA/NON-COV W/DEL: HCPCS

## 2023-03-02 PROCEDURE — B31F1ZZ FLUOROSCOPY OF LEFT VERTEBRAL ARTERY USING LOW OSMOLAR CONTRAST: ICD-10-PCS | Performed by: NEUROLOGICAL SURGERY

## 2023-03-02 PROCEDURE — 85347 COAGULATION TIME ACTIVATED: CPT

## 2023-03-02 PROCEDURE — 6360000002 HC RX W HCPCS: Performed by: NURSE ANESTHETIST, CERTIFIED REGISTERED

## 2023-03-02 PROCEDURE — 99291 CRITICAL CARE FIRST HOUR: CPT

## 2023-03-02 PROCEDURE — 99231 SBSQ HOSP IP/OBS SF/LOW 25: CPT | Performed by: PHYSICAL MEDICINE & REHABILITATION

## 2023-03-02 PROCEDURE — 94640 AIRWAY INHALATION TREATMENT: CPT

## 2023-03-02 PROCEDURE — 6360000004 HC RX CONTRAST MEDICATION: Performed by: NEUROLOGICAL SURGERY

## 2023-03-02 PROCEDURE — 37215 TRANSCATH STENT CCA W/EPS: CPT

## 2023-03-02 PROCEDURE — 92507 TX SP LANG VOICE COMM INDIV: CPT

## 2023-03-02 PROCEDURE — 94660 CPAP INITIATION&MGMT: CPT

## 2023-03-02 PROCEDURE — 3700000001 HC ADD 15 MINUTES (ANESTHESIA)

## 2023-03-02 PROCEDURE — 2580000003 HC RX 258

## 2023-03-02 PROCEDURE — 36415 COLL VENOUS BLD VENIPUNCTURE: CPT

## 2023-03-02 PROCEDURE — C1887 CATHETER, GUIDING: HCPCS

## 2023-03-02 PROCEDURE — 94761 N-INVAS EAR/PLS OXIMETRY MLT: CPT

## 2023-03-02 PROCEDURE — 037L3DZ DILATION OF LEFT INTERNAL CAROTID ARTERY WITH INTRALUMINAL DEVICE, PERCUTANEOUS APPROACH: ICD-10-PCS | Performed by: NEUROLOGICAL SURGERY

## 2023-03-02 PROCEDURE — C1769 GUIDE WIRE: HCPCS

## 2023-03-02 PROCEDURE — 51798 US URINE CAPACITY MEASURE: CPT

## 2023-03-02 PROCEDURE — 2500000003 HC RX 250 WO HCPCS: Performed by: NURSE ANESTHETIST, CERTIFIED REGISTERED

## 2023-03-02 PROCEDURE — 6370000000 HC RX 637 (ALT 250 FOR IP): Performed by: INTERNAL MEDICINE

## 2023-03-02 PROCEDURE — C1884 EMBOLIZATION PROTECT SYST: HCPCS

## 2023-03-02 RX ORDER — IPRATROPIUM BROMIDE AND ALBUTEROL SULFATE 2.5; .5 MG/3ML; MG/3ML
1 SOLUTION RESPIRATORY (INHALATION) 2 TIMES DAILY
Status: DISCONTINUED | OUTPATIENT
Start: 2023-03-02 | End: 2023-03-05

## 2023-03-02 RX ORDER — LIDOCAINE HYDROCHLORIDE 20 MG/ML
INJECTION, SOLUTION EPIDURAL; INFILTRATION; INTRACAUDAL; PERINEURAL PRN
Status: DISCONTINUED | OUTPATIENT
Start: 2023-03-02 | End: 2023-03-02 | Stop reason: SDUPTHER

## 2023-03-02 RX ORDER — ROCURONIUM BROMIDE 10 MG/ML
INJECTION, SOLUTION INTRAVENOUS PRN
Status: DISCONTINUED | OUTPATIENT
Start: 2023-03-02 | End: 2023-03-02 | Stop reason: SDUPTHER

## 2023-03-02 RX ORDER — ONDANSETRON 2 MG/ML
INJECTION INTRAMUSCULAR; INTRAVENOUS PRN
Status: DISCONTINUED | OUTPATIENT
Start: 2023-03-02 | End: 2023-03-02 | Stop reason: SDUPTHER

## 2023-03-02 RX ORDER — SODIUM CHLORIDE 9 MG/ML
INJECTION, SOLUTION INTRAVENOUS CONTINUOUS
Status: DISCONTINUED | OUTPATIENT
Start: 2023-03-02 | End: 2023-03-03

## 2023-03-02 RX ORDER — SODIUM CHLORIDE 9 MG/ML
INJECTION, SOLUTION INTRAVENOUS PRN
Status: DISCONTINUED | OUTPATIENT
Start: 2023-03-02 | End: 2023-03-06 | Stop reason: HOSPADM

## 2023-03-02 RX ORDER — SODIUM CHLORIDE 0.9 % (FLUSH) 0.9 %
5-40 SYRINGE (ML) INJECTION PRN
Status: DISCONTINUED | OUTPATIENT
Start: 2023-03-02 | End: 2023-03-06 | Stop reason: HOSPADM

## 2023-03-02 RX ORDER — HEPARIN SODIUM 1000 [USP'U]/ML
INJECTION, SOLUTION INTRAVENOUS; SUBCUTANEOUS PRN
Status: DISCONTINUED | OUTPATIENT
Start: 2023-03-02 | End: 2023-03-02 | Stop reason: SDUPTHER

## 2023-03-02 RX ORDER — IODIXANOL 320 MG/ML
200 INJECTION, SOLUTION INTRAVASCULAR
Status: COMPLETED | OUTPATIENT
Start: 2023-03-02 | End: 2023-03-02

## 2023-03-02 RX ORDER — PROPOFOL 10 MG/ML
INJECTION, EMULSION INTRAVENOUS PRN
Status: DISCONTINUED | OUTPATIENT
Start: 2023-03-02 | End: 2023-03-02 | Stop reason: SDUPTHER

## 2023-03-02 RX ORDER — SODIUM CHLORIDE 0.9 % (FLUSH) 0.9 %
5-40 SYRINGE (ML) INJECTION EVERY 12 HOURS SCHEDULED
Status: DISCONTINUED | OUTPATIENT
Start: 2023-03-02 | End: 2023-03-06 | Stop reason: HOSPADM

## 2023-03-02 RX ORDER — LABETALOL HYDROCHLORIDE 5 MG/ML
INJECTION, SOLUTION INTRAVENOUS PRN
Status: DISCONTINUED | OUTPATIENT
Start: 2023-03-02 | End: 2023-03-02 | Stop reason: SDUPTHER

## 2023-03-02 RX ORDER — SODIUM CHLORIDE, SODIUM LACTATE, POTASSIUM CHLORIDE, CALCIUM CHLORIDE 600; 310; 30; 20 MG/100ML; MG/100ML; MG/100ML; MG/100ML
INJECTION, SOLUTION INTRAVENOUS CONTINUOUS PRN
Status: DISCONTINUED | OUTPATIENT
Start: 2023-03-02 | End: 2023-03-02 | Stop reason: SDUPTHER

## 2023-03-02 RX ORDER — GLYCOPYRROLATE 0.2 MG/ML
INJECTION INTRAMUSCULAR; INTRAVENOUS PRN
Status: DISCONTINUED | OUTPATIENT
Start: 2023-03-02 | End: 2023-03-02 | Stop reason: SDUPTHER

## 2023-03-02 RX ORDER — DEXAMETHASONE SODIUM PHOSPHATE 4 MG/ML
INJECTION, SOLUTION INTRA-ARTICULAR; INTRALESIONAL; INTRAMUSCULAR; INTRAVENOUS; SOFT TISSUE PRN
Status: DISCONTINUED | OUTPATIENT
Start: 2023-03-02 | End: 2023-03-02 | Stop reason: SDUPTHER

## 2023-03-02 RX ADMIN — IODIXANOL 200 ML: 320 INJECTION, SOLUTION INTRAVASCULAR at 12:32

## 2023-03-02 RX ADMIN — DEXAMETHASONE SODIUM PHOSPHATE 4 MG: 4 INJECTION, SOLUTION INTRAMUSCULAR; INTRAVENOUS at 11:15

## 2023-03-02 RX ADMIN — GLYCOPYRROLATE 0.1 MG: 0.4 INJECTION INTRAMUSCULAR; INTRAVENOUS at 11:15

## 2023-03-02 RX ADMIN — PHENYLEPHRINE HYDROCHLORIDE 50 MCG: 10 INJECTION, SOLUTION INTRAMUSCULAR; INTRAVENOUS; SUBCUTANEOUS at 11:46

## 2023-03-02 RX ADMIN — BUDESONIDE 500 MCG: 0.5 SUSPENSION RESPIRATORY (INHALATION) at 22:05

## 2023-03-02 RX ADMIN — PHENYLEPHRINE HYDROCHLORIDE 100 MCG: 10 INJECTION, SOLUTION INTRAMUSCULAR; INTRAVENOUS; SUBCUTANEOUS at 12:06

## 2023-03-02 RX ADMIN — LABETALOL HYDROCHLORIDE 5 MG: 5 INJECTION, SOLUTION INTRAVENOUS at 12:29

## 2023-03-02 RX ADMIN — ROCURONIUM BROMIDE 20 MG: 10 INJECTION, SOLUTION INTRAVENOUS at 11:48

## 2023-03-02 RX ADMIN — ONDANSETRON 4 MG: 2 INJECTION INTRAMUSCULAR; INTRAVENOUS at 11:15

## 2023-03-02 RX ADMIN — ARFORMOTEROL TARTRATE 15 MCG: 15 SOLUTION RESPIRATORY (INHALATION) at 08:29

## 2023-03-02 RX ADMIN — ARFORMOTEROL TARTRATE 15 MCG: 15 SOLUTION RESPIRATORY (INHALATION) at 22:05

## 2023-03-02 RX ADMIN — LIDOCAINE HYDROCHLORIDE 100 MG: 20 INJECTION, SOLUTION EPIDURAL; INFILTRATION; INTRACAUDAL; PERINEURAL at 11:11

## 2023-03-02 RX ADMIN — SODIUM CHLORIDE: 9 INJECTION, SOLUTION INTRAVENOUS at 20:20

## 2023-03-02 RX ADMIN — ATORVASTATIN CALCIUM 80 MG: 80 TABLET, FILM COATED ORAL at 20:18

## 2023-03-02 RX ADMIN — ROCURONIUM BROMIDE 50 MG: 10 INJECTION, SOLUTION INTRAVENOUS at 11:11

## 2023-03-02 RX ADMIN — SODIUM CHLORIDE, PRESERVATIVE FREE 10 ML: 5 INJECTION INTRAVENOUS at 09:29

## 2023-03-02 RX ADMIN — ASPIRIN 325 MG: 325 TABLET, COATED ORAL at 09:29

## 2023-03-02 RX ADMIN — GLYCOPYRROLATE 0.1 MG: 0.4 INJECTION INTRAMUSCULAR; INTRAVENOUS at 12:06

## 2023-03-02 RX ADMIN — PHENYLEPHRINE HYDROCHLORIDE 50 MCG: 10 INJECTION, SOLUTION INTRAMUSCULAR; INTRAVENOUS; SUBCUTANEOUS at 11:21

## 2023-03-02 RX ADMIN — IPRATROPIUM BROMIDE AND ALBUTEROL SULFATE 1 AMPULE: 2.5; .5 SOLUTION RESPIRATORY (INHALATION) at 08:29

## 2023-03-02 RX ADMIN — IPRATROPIUM BROMIDE AND ALBUTEROL SULFATE 1 AMPULE: 2.5; .5 SOLUTION RESPIRATORY (INHALATION) at 22:05

## 2023-03-02 RX ADMIN — SODIUM CHLORIDE: 9 INJECTION, SOLUTION INTRAVENOUS at 13:09

## 2023-03-02 RX ADMIN — PROPOFOL 50 MG: 10 INJECTION, EMULSION INTRAVENOUS at 11:10

## 2023-03-02 RX ADMIN — LABETALOL HYDROCHLORIDE 10 MG: 5 INJECTION, SOLUTION INTRAVENOUS at 12:24

## 2023-03-02 RX ADMIN — PROPOFOL 100 MG: 10 INJECTION, EMULSION INTRAVENOUS at 11:11

## 2023-03-02 RX ADMIN — HEPARIN SODIUM 5000 UNITS: 1000 INJECTION INTRAVENOUS; SUBCUTANEOUS at 11:46

## 2023-03-02 RX ADMIN — BUDESONIDE 500 MCG: 0.5 SUSPENSION RESPIRATORY (INHALATION) at 08:29

## 2023-03-02 RX ADMIN — PHENYLEPHRINE HYDROCHLORIDE 50 MCG: 10 INJECTION, SOLUTION INTRAMUSCULAR; INTRAVENOUS; SUBCUTANEOUS at 11:18

## 2023-03-02 RX ADMIN — PHENYLEPHRINE HYDROCHLORIDE 100 MCG: 10 INJECTION, SOLUTION INTRAMUSCULAR; INTRAVENOUS; SUBCUTANEOUS at 11:32

## 2023-03-02 RX ADMIN — DILTIAZEM HYDROCHLORIDE 180 MG: 180 CAPSULE, EXTENDED RELEASE ORAL at 09:29

## 2023-03-02 RX ADMIN — SUGAMMADEX 200 MG: 100 INJECTION, SOLUTION INTRAVENOUS at 12:28

## 2023-03-02 RX ADMIN — CLOPIDOGREL BISULFATE 75 MG: 75 TABLET ORAL at 09:29

## 2023-03-02 RX ADMIN — SODIUM CHLORIDE, SODIUM LACTATE, POTASSIUM CHLORIDE, AND CALCIUM CHLORIDE: .6; .31; .03; .02 INJECTION, SOLUTION INTRAVENOUS at 11:00

## 2023-03-02 ASSESSMENT — PAIN SCALES - GENERAL
PAINLEVEL_OUTOF10: 0

## 2023-03-02 NOTE — CARE COORDINATION
CM met with Mr. Rebecca Epstein and his family at the bedside. The family is not certain they want to go to acute rehab here. There were given a lists of facilities for post-acute care to review. Explained the difference between acute rehab and SNF. If they are not interested in [facility] ARU, they may be interested in [facility] for SNF since it is so close to home. They will confer and call CM with a decision.     Roya Staples, RN  Case Management  [phone]

## 2023-03-02 NOTE — PROGRESS NOTES
Pt is pleasant, A&Ox4   Follows commands.   Up to bathroom w/ 4 wheel walker and assist.  V/S:  T=98.6  HR=72 XT=926/76 (Map 89)  SPO2=95%

## 2023-03-02 NOTE — RT PROTOCOL NOTE
RT Nebulizer Bronchodilator Protocol Note    There is a bronchodilator order in the chart from a provider indicating to follow the RT Bronchodilator Protocol and there is an Initiate RT Bronchodilator Protocol order as well (see protocol at bottom of note). CXR Findings:  No results found. The findings from the last RT Protocol Assessment were as follows:  Smoking: Chronic pulmonary disease  Respiratory Pattern: Dyspnea on exertion or RR 21-25 bpm  Breath Sounds: Slightly diminished and/or crackles  Cough: Strong, spontaneous, non-productive  Indication for Bronchodilator Therapy: Decreased or absent breath sounds, On home bronchodilators  Bronchodilator Assessment Score: 6    Pt will be switched to BID to reflect Protocol. Aerosolized bronchodilator medication orders have been revised according to the RT Nebulizer Bronchodilator Protocol below. Respiratory Therapist to perform RT Therapy Protocol Assessment initially then follow the protocol. Repeat RT Therapy Protocol Assessment PRN for score 0-3 or on second treatment, BID, and PRN for scores above 3. No Indications - adjust the frequency to every 6 hours PRN wheezing or bronchospasm, if no treatments needed after 48 hours then discontinue using Per Protocol order mode. If indication present, adjust the RT bronchodilator orders based on the Bronchodilator Assessment Score as indicated below. If a patient is on this medication at home then do not decrease Frequency below that used at home. 0-3 - enter or revise RT bronchodilator order(s) to equivalent RT Bronchodilator order with Frequency of every 4 hours PRN for wheezing or increased work of breathing using Per Protocol order mode. 4-6 - enter or revise RT Bronchodilator order(s) to two equivalent RT bronchodilator orders with one order with BID Frequency and one order with Frequency of every 4 hours PRN wheezing or increased work of breathing using Per Protocol order mode. 7-10 - enter or revise RT Bronchodilator order(s) to two equivalent RT bronchodilator orders with one order with TID Frequency and one order with Frequency of every 4 hours PRN wheezing or increased work of breathing using Per Protocol order mode. 11-13 - enter or revise RT Bronchodilator order(s) to one equivalent RT bronchodilator order with QID Frequency and an Albuterol order with Frequency of every 4 hours PRN wheezing or increased work of breathing using Per Protocol order mode. Greater than 13 - enter or revise RT Bronchodilator order(s) to one equivalent RT bronchodilator order with every 4 hours Frequency and an Albuterol order with Frequency of every 2 hours PRN wheezing or increased work of breathing using Per Protocol order mode. RT to enter RT Home Evaluation for COPD & MDI Assessment order using Per Protocol order mode.     Electronically signed by Minesh Graf RCP on 3/2/2023 at 8:36 AM

## 2023-03-02 NOTE — PROGRESS NOTES
NEUROSURGERY PROGRESS NOTE    3/2/2023 10:48 AM                               Joe Marrian Paget                      LOS: 5 days       Subjective: Patient sitting up in bed upon entering the room. No acute events overnight. Patient continues to have expressive aphasia. Physical Exam:  Patient seen and examined    Vitals:    03/02/23 1000   BP: (!) 143/79   Pulse: 69   Resp: 19   Temp:    SpO2: 91%     GCS:  4 - Opens eyes on own  5 - Alert and oriented  6 - Follows simple motor commands  General: Well developed. Alert and cooperative in no acute distress. HENT: atraumatic, neck supple  Eyes: Optic discs: Not tested  Pulmonary: unlabored respiratory effort  Cardiovascular:  Warm well perfused. No peripheral edema  Gastrointestinal: abdomen soft, NT, ND     Neurological:  Mental Status: Awake, alert, oriented x 4, speech clear and appropriate  Attention: Intact  Language: Expressive Aphasia  Sensation: Intact to all extremities to light touch  Coordination: Intact     Cranial Nerves:  II: Visual acuity not tested, denies new visual changes / diplopia  III, IV, VI: PERRL, 3 mm bilaterally, EOMI, no nystagmus noted  V: Facial sensation intact bilaterally to touch  VII: Face symmetric  VIII: Hearing intact bilaterally to spoken voice  IX: Palate movement equal bilaterally  XI: Shoulder shrug equal bilaterally  XII: Tongue midline     Musculoskeletal:   Gait: Not tested   Assist devices: None   Tone: Normal  Motor strength:     Right  Left      Right  Left    Deltoid  5 5   Hip Flex  5 5   Biceps  5 5   Knee Extensors  5 5   Triceps  5 5   Knee Flexors  5 5   Wrist Ext  5 5   Ankle Dorsiflex. 5 5   Wrist Flex  5 5   Ankle Plantarflex. 5 5   Handgrip  5 5   Ext Gabriel Longus  5 5   Thumb Ext  5 5              Radiological Findings:  CT HEAD WO CONTRAST & CTA HEAD NECK W CONTRAST  Result Date: 2/26/2023  1. Proximal left internal carotid artery near occlusion with distal lumen collapse.  Reduced blood flow to the left MCA territory.  2. Bilateral proximal vertebral artery occlusions with distal reconstitution.  3. Age-indeterminate, possibly acute small left frontal lobe infarct.  4. Chronic thoracic compression deformities.      MRI brain without contrast  Result Date: 2/25/2023  Left MCA border zone infarcts without hemorrhage or mass effect.     Labs:  Recent Labs     03/02/23  0540   WBC 11.2*   HGB 15.1   HCT 44.8          Recent Labs     03/02/23  0540      K 3.9      CO2 31   BUN 16   CREATININE 0.8   GLUCOSE 110*   CALCIUM 9.1   MG 2.00       No results for input(s): PROTIME, INR, APTT in the last 72 hours.    Patient Active Problem List    Diagnosis Date Noted    ALEXANDREA treated with BiPAP 02/27/2023    Acute cerebrovascular accident (CVA) due to stenosis of left carotid artery ([facility]) 02/26/2023    Acute unilateral cerebral infarction in a watershed distribution ([facility]) 02/25/2023    Aphasia due to acute cerebrovascular accident (CVA) ([facility]) 02/25/2023    COPD with chronic bronchitis ([facility]) 11/29/2022    Chronic respiratory failure with hypoxia and hypercapnia ([facility]) 11/29/2022    Nocturnal hypoxia 11/29/2022    Hypotension 04/14/2022    Acute on chronic combined systolic (congestive) and diastolic (congestive) heart failure ([facility]) 04/03/2022    Acute hypokalemia 03/29/2022    Hypophosphatemia 03/29/2022    Long term current use of diuretic 03/29/2022    Unstable angina pectoris ([facility]) 04/17/2019    Atrial fibrillation ([facility]) 01/11/2018    Acute on chronic respiratory failure with hypoxia and hypercapnia ([facility]) 01/11/2018    Acute hyponatremia 01/11/2018    Hyperbilirubinemia 01/11/2018    Influenzal pneumonia     Acute exacerbation of chronic obstructive pulmonary disease (COPD) ([facility]) 04/07/2011    Essential hypertension 04/07/2011    Former smoker 04/16/2010     Assessment:  Patient is a 73 y.o. male w/LICA nearly occluded and 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. Patient has a history of Atrial fibrillation for  which he has previously been on Eliquis. Plan:  Neurologic exams frequency: Defer to Neurology  For change in exam MUST contact neurosurgery team along with critical care or primary team  LICA stenosis:  - Hold Eliquis / Heparin gtt held due to his acute stroke. - Aspirin 325 mg PO Daily  - Diagnostic cerebral angiogram yesterday confirms need for carotid stenting (ELIANA)  - Plavix 75 mg Plavix Daily  - Plan for ELIANA today with GA. Advance diet / activity per primary team  Will follow inpatient. Please call with any questions or decline in neurological status     DISPO: Remain inpatient from neurosurgery standpoint. Dispo timing to be determined by primary team once patient is medically stable for discharge. Patient was discussed and images reviewed with Dr. Zen Doshi who agrees with above assessment and plan.      Electronically signed by: ERIN Lane CNP, APRN-CNP, 3/2/2023 10:48 AM  [phone]

## 2023-03-02 NOTE — PROGRESS NOTES
Speech Language Pathology  Facility/Department:[facility] ICU  Speech/Language/Dysphagia Treatment                                                         Patient Diagnosis(es):   Patient Active Problem List    Diagnosis Date Noted    ALEXANDREA treated with BiPAP 02/27/2023    Acute cerebrovascular accident (CVA) due to stenosis of left carotid artery ([address] 02/26/2023    Acute unilateral cerebral infarction in a watershed distribution [facility]) 02/25/2023    Aphasia due to acute cerebrovascular accident (CVA) ([address] 02/25/2023    COPD with chronic bronchitis ([address] 11/29/2022    Chronic respiratory failure with hypoxia and hypercapnia ([address] 11/29/2022    Nocturnal hypoxia 11/29/2022    Hypotension 04/14/2022    Acute on chronic combined systolic (congestive) and diastolic (congestive) heart failure ([address] 04/03/2022    Acute hypokalemia 03/29/2022    Hypophosphatemia 03/29/2022    Long term current use of diuretic 03/29/2022    Unstable angina pectoris ([address] 04/17/2019    Atrial fibrillation ([address] 01/11/2018    Acute on chronic respiratory failure with hypoxia and hypercapnia ([facility]) 01/11/2018    Acute hyponatremia 01/11/2018    Hyperbilirubinemia 01/11/2018    Influenzal pneumonia     Acute exacerbation of chronic obstructive pulmonary disease (COPD) ([address] 04/07/2011    Essential hypertension 04/07/2011    Former smoker 04/16/2010       Past Medical History:   Diagnosis Date    Atrial fibrillation ([address]     Bronchitis chronic     Emphysema of lung ([address]     HTN (hypertension)     Influenza A 01/10/2018    Lung disease     copd    Pneumonia     12/2009    Stroke 02/25/2023    L MCA watershed     Past Surgical History:   Procedure Laterality Date    FRACTURE SURGERY      johnson in femur/hip on right    HERNIA REPAIR         Reason for Referral:  Kala Damico  was referred for a Speech Therapy evaluation to assess swallow function and/or communication.     History of Present Illness  Interval history: Patient seen and examined this morning at bedside. He was sitting up comfortably in his bed. Overnight patient became short of breath. A chest x-ray was done and showed increased pulmonary edema. He was given a dose of 40 mg IV Lasix as well as DuoNeb treatments. He stated this improved his breathing. His IV fluids were stopped. He states he feels much better this morning and is having good urine output. He has no other complaints at this time. He remains afebrile and hemodynamically stable. HPI: 68 old male with past medical history HTN (Eliquis), HFpEF, COPD (4 L), chronic bronchitis, hernia repair who initially presented to OSH with acute onset aphasia. Son reported at OSH patient had waxing/waning speech changes over the past week worse than his typical stuttering. He noticed symptoms onset around 1100 on 2/24, but symptoms gradually resolved and were completely absent that night prior to bed. Patient slept in this morning until 1100 at which point family noted symptoms have recurred and he called 911. At [id number] [facility] ED, pt hemodynamically stable with mild aphasia and mild R arm drift; NIHSS 3. EKF showing AF but otherwise no acute changes. CT head w/o showed age-indeterminate small, possibly acute L frontal lobe infacrt as well as proximal left ICA near-occlusion. Pt was transferred to [facility] for neurosurgical evaluation and management. Pt in NAD on arrival. He continues with difficulty finding words; has good insight into sx. Denies any headache, fever, chills, chest pain, dyspnea, abdominal pain, nausea, vomiting, numbness, visual changes. CXR: 2/26/23  Impression       1. Findings of pulmonary edema which are increased compared to chest radiograph from one day prior.      MRI: 2/25/23  Impression       Left MCA border zone infarcts without hemorrhage or mass effect       Date of onset: 2/26/23    Current Diet:  NPO    Treatment Diagnosis:  Dysphagia    Pain:  None    General:  Chart reviewed: Yes  Behavior/Cognition: Cooperative, alert,   Communication Observation:  aphasic with baseline stuttering  Follows Directions: Simple  Dentition/Oral Mucosa: Edentulous with clean moist tongue  Oral motor exam: Right labial weakness especially upon retraction, reduced lateral lingual movement to the right. No tongue deviation  Vocal Quality: WFL  Respiratory Status/oxygen requirements: O2 cannula  Vision/Hearing: No glasses; [place] concerns  Patient Complaint: Trouble talking; pt and family deny problems with swallowing at breakfast yesterday just when they noticed problems with using right hand etc.   Patient Positioning: upright in chair    Prior Dysphagia History:   none    Bedside Swallowing Evaluation Impression 2/26/23:   Pt alert, oriented to self, general place following review, to year with cues, month/day of week without cues. Pt edentulous (able to eat all types of foods at home with difficulty per family, even just prior to going to hospital). Right labial weakness noted with no tongue deviation but reduced lateral lingual movement to the right. Oral acceptance of all foods presented and able to feed self. No cough/wet vocal quality/throat clearing with any po trials including thins via cup/straw for 3 oz water test and 10 single sip trials, 4 oz  puree and cracker trials (except a delayed cough 15 seconds post po trials x1). No oral residue with any po trials. Pt denies globus sensation when questioned. D/W nursing re: recent chest xray and nursing reported pt given some extra fluids last night which may be impacting current lung status (in regards to heart issues). Lungs listened to by nursing today have only been diminished. Swallow initiation fairly timely. Recommend SOFT and BITE sized with thins - If any s/s of aspiration or if lung status decline emerges, then  d/c po until we recheck.      Speech, Language, Cognitive Evaluation 2/26/23:   Pt with mild receptive and moderate expressive aphasia with baseline dysfluency. Pt alert, oriented to self, general place following review, to year with cues, month/day of week without cues. Right labial weakness noted with no tongue deviation but educed lateral lingual movement to the right. Pt with baseline stuttering but increased difficulty with word recall in past month but significantly worse past couple days. Pt with high school education and no major hobbies reported by pt /family. Hesitations with speaking noted with reduced word recall in conversation. Confrontational naming of objects, simple pictures 8/8 correct. Difficulty with recall of address, but knew birthdate. Breakdown especially following 3 step commands. Able to name 3 words in one minute given a category (up to 3 more given a cue). Brandt tasks at 100%. Able to read single words without difficulty. Education  Pt educated to rationale for tx session and specific skills targeted. Prognosis:  Prognosis for improvement: Good  Barriers to reach goals: age  [de-identified] and agree with results and recommendations: yes    Treatment:  Dysphagia Goals: The pt will be seen  2-4 x per week to address the following goals:  Goal 1: The pt will tolerate least restrictive diet without s/s of aspiration  2/27: Pt seated upright in bed and seen during afternoon meal of soft and bite sized solids and thin liquids, as well as trial of regular solids. Pt demonstrated adequate, yet mildly prolonged mastication of regular solids. Pt edentulous, however expressed no difficulty. Pt appeared to swallow all trials with no overt s/s penetration/aspiration. No significant oral residue and appropriately taking liquid wash as needed. Pt indicated wanting to continue SBS prior to advancing. SLP in agreement. Cont. 2/28: Goal not targeted- pt NPO for PPM. Cont goal    Goal 2: Pt/family will verbalize and/or demonstrate understanding of swallowing recommendations.   2/26:  Pt and family educated to role of dysphagia, what aspiration is, what s/s of aspiration are and diet/strategies recommended for pt. Pt able to demonstrate understanding by taking small amounts of food and liquid as practice at the end of the session following initial review with min to no cues. Both pt and family verbalized understanding. 2/27: SLP provided education regarding diet level rec's, swallow strategies, oral hygiene and further dysphagia treatment for safety of swallow. Pt demonstrated understanding and followed all commands to implement strategies during meal. No other family present at this time. Cont. 2/28: Goal not targeted- pt NPO for PPM. Cont goal     Speech Goals: The pt will be seen  2-4 x per week to address the following goals:   Pt will follow 2- 3 step directions with 50% accuracy, mod cues. 2/27: followed 1-step with 90% accuracy. Followed 2-step with object use with ~30%. Multiple repetitions required for comprehension, as well as single step breakdown. Cont. Pt will answer yes/no questions of varying complexity with 90% accuracy, min to no cues. 2/27: answered basic yes/no with 100% accuracy. Answered semi-complex with added prepositions with ~75%. Pt did self correct x1 for accuracy. Cont. 2/28: comparative yes/no questions: pt with perseverative response during this task resulting in 50% accuracy independently; no awareness of perseverative responses. When questions were rephrased, pt able to correct response increasing accuracy to 75% accuracy. Cont goal.   3/1: basic yes/ua=164% accuracy; semi-complex yes/no=80% accuracy. Cont. 3/2: Pt answered moderately complex yes/no questions with 93% accuracy. Cont goal     Pt will name 4-5 words in one minute given a category  2/27: not targeted this session. 2/28: goal not targeted this session. 3/1: listed x5 objects related to category of furniture with 100% accuracy. X1 paraphasia noted. Cont.      Pt/family will verbalize and/or demonstrate 2-3 speech strategies to maximize verbal communication and/or fluency. 2/27: SLP discussed use of SFA for verbal descriptions when events of anomia occur. Pt utilized x1 during session to describe and communicate difficulty with self feeding with use of R hand. Pt also independently pausing and re-starting own statement prior to continuing to assist with word finding/fluency. Cont. 2/28: demo'd SFA evidenced by some description containing only content words being stated by pt; able to name high frequency objects. Completed verbal description of routine familiar task: pt appeared to have increased disfluency related to baseline fluency disorder; descriptions consisted of 2-3 content words; required mod to MAX phonemic cues during this task. Cont goal   3/1: pt attempting to utilize object description to assist with word finding/anomia at times. Benefited from min cues and increased wait time to facilitate. Cont. 3/2: connected speech/description task: pt completed with identifying objects prominent in description task. Able to provide 3-4 word utterances with content words to describe task. Automatic speech task with opposites: pt completed with 92% accuracy independently. Cont goal     Pt will participate in further reading/writing/and cognitive assessment. 2/27: noted frustrations during session. SLP provided education regarding aphasia vs fluency. Pt with continued accuracy of confrontation naming of high frequency objects and following basic commands. Responsive naming task completed with 90% accuracy. Min phonemic/semantic cues required intermittently. Cont. 2/28: completed matching verbal word to written word in visual field of 6:  pt completed with 100% accuracy independently. Cont goal   3/1: pt completed responsive naming task with 90% accuracy, increased to 100% with mod phonemic/semantic cues. Completed medication label reading+answer wh-questions with 25% accuracy.  Increased difficulty noted with complex reading and wh-questions. Pt benefited from min-mod cues to assist with scanning page to locate information, as well as ? Comprehension of questions. Cont. 3/2: Targeted reading comprehension via matching picture to sentence in visual field of 2: pt completed with mod cues including some sentences read aloud. Cont goal     Plan:  Continue per POC  Recommended diet: Soft and Bite Sized Solids, Thin Liquids-  If any s/s of aspiration or if lung status decline emerges, then  d/c po until we recheck.    Recommended form of medication: Pills with water  Swallowing Strategies:   -small bites/sips  -alternate solids/liquids  -check for pocketing  -oral hygiene 2-3x/day    Pt goal: \"all of it\" better  Pt discharge goal: Home    Total treatment time: 20 language  Discharge Recommendation: TBD  Discussed with RN: Hattie Schaffer   Needs met prior to leaving room, call light within reach    Electronically signed by:  Gisselle Peña, [address] [facility]  Speech-Language Pathologist  Pg #: [phone]    This document will serve as a dc summary if pt dc prior to next visit

## 2023-03-02 NOTE — PROGRESS NOTES
NEUROLOGY / NEUROCRITICAL CARE PROGRESS NOTE       Patient Name: Ariela Vance YOB: 1950   Sex: Male Age: 68 yrs     CC / Reason for Consult: stroke    Interval Hx / Changes over last 24 hours:    Plan for angio & stent today  Reports he feels well, feels like his speech is improving, continues to have some expressive aphasia  VSS overnight, no fever  Plavix initiated yesterday by Neurosurgery    ROS: aphasia is stable; no new focal deficits    HISTORY   Admission HPI:   Meeting patient for the first time. Initial consultation note was completed by Neurology CNP yesterday (2/25/23) evening, which I have reviewed. 77yo man with emphysema on home O2; HTN; afib on apixaban; HFpEF. Presented to OSH ER with abrupt onset of left arm weakness and difficulty speaking. Per my interview with pt and pt's son at the bedside:  Pt has had intermittent episodes of difficulty speaking over the last week. They seemed to come and go and were random in occurrence and resolution. However, yesterday morning son noted that pt was having great difficulty feeding himself with his right hand and his language was markedly impaired. He was brought to the ER. In the ER, BP was 125/88. Head CT demonstrated a hypodensity in the left frontal region. CTA head and neck showed bilateral occluded vertebral arteries, nearly occluded left carotid, and poor flow in left MCA territory. There was no LVO. After discussion on Viz. ai between ER physician, Stroke Team Physician, Neurovascular surgery, and myself, it was concluded that pt required ICU admission for frequent neuro checks and to ensure adequate perfusion of left MCA territory through stenotic left ICA with BP augmentation with IVF, if he tolerated, or pressor, if necessary. Ultimate plan will be for conventional angiogram with neurovascular surgery next week and CEA vs stent after that. No acute events overnight. Afebrile.  Currently, pt feels that he is improved overall. He still is having difficulty with language and describes it as difficulty in saying what he wants to say. His arm is much better than it was yesterday. His SBP has ranged from 110s to as high as 160, with most in the mid 130s. He has not had any worsening of symptoms with these blood pressures. He was on IVF through most of the night but he has developed some mild pulmonary edema and so fluids have been discontinued. he is unsure what would have precipitated these symptoms, other than the above. he feels that nothing makes them better and nothing makes them worse. he rates the symptoms as severe. he denies any other associated symptoms including no HA, no other speech/language difficulties, no swallowing difficulties, no visual changes, no diplopia, no hearing loss, no tinnitus, no vertigo, no imbalance, no light-headedness, no other focal weakness, no sensory changes, no nausea or vomiting. he has never had this problem before. There is no history of this problem or anything similar in his family members.      [facility] Past Medical History:   Diagnosis Date    Atrial fibrillation ([address]     Bronchitis chronic     Emphysema of lung ([facility] [address]     HTN (hypertension)     Influenza A 01/10/2018    Lung disease     copd    Pneumonia     12/2009    Stroke     L MCA watershed      Allergies No Known Allergies   Diet Diet NPO Exceptions are: Sips of Water with Meds   Isolation No active isolations     CURRENT SCHEDULED MEDICATIONS   Inpatient Medications     ipratropium-albuterol, 1 ampule, Inhalation, BID    [COMPLETED] clopidogrel, 300 mg, Oral, BID **FOLLOWED BY** clopidogrel, 75 mg, Oral, Daily    atorvastatin, 80 mg, Oral, Nightly    sodium chloride flush, 5-40 mL, IntraVENous, 2 times per day    dilTIAZem, 180 mg, Oral, Daily    [Held by provider] torsemide, 40 mg, Oral, BID    aspirin, 325 mg, Oral, Daily **OR** aspirin, 300 mg, Rectal, Daily    arformoterol tartrate, 15 mcg, Nebulization, BID **AND** budesonide, 0.5 mg, Nebulization, BID   Infusions    sodium chloride                LABS   Metabolic Panel Recent Labs     02/27/23  1830 02/28/23  0643 03/02/23  0540   NA  --  140 139   K 3.8 3.7 3.9   CL  --  99 100   CO2  --  34* 31   BUN  --  17 16   CREATININE  --  0.9 0.8   GLUCOSE  --  135* 110*   CALCIUM  --  9.1 9.1   MG  --  2.10 2.00        CBC / Coags Recent Labs     02/28/23  0643 03/01/23  0525 03/02/23  0540   WBC 11.6* 13.2* 11.2*   RBC 5.14 5.12 4.87   HGB 15.7 15.6 15.1   HCT 49.3 46.1 44.8    311 280        Other No results for input(s): LABA1C, LDLCALC, TRIG, TSH, [id number], FOLATE, LABSALI, COVID19 in the last 72 hours.    No results for input(s): PHENYTOIN, KEPPRA, LACOSA, LAMO, VALPROATE, LACTSEPSIS, LACTA in the last 72 hours.       DIAGNOSTICS   IMAGES:    No new imaging to review today    PHYSICAL EXAMINATION     PHYSICAL EXAM:  Vitals:    03/02/23 0600 03/02/23 0700 03/02/23 0830 03/02/23 0833   BP:       Pulse: 84 68 78 75   Resp: 16 12 18 20   Temp:       TempSrc:       SpO2: 96% 99% 98% 98%   Weight:           Neurologic  Mental status:   orientation to person, place, time, and situation   Attention intact as able to attend well to the exam     Language fluent in conversation, expressive aphasia remains, but appears improved since admission   Comprehension intact; follows simple commands  Cranial nerves:   CN2: Visual Fields full w/o extinction on confrontational testing,   CN 3,4,6: pupils equal and reactive to light, extraocular muscles intact,  CN5: facial sensation symmetric   CN7:face symmetric without dysarthria,   CN8: hearing symmetric to finger rub   CN9: palate elevated symmetrically  CN11: trap full strength on shoulder shrug  CN12: tongue midline with protrusion  Motor Exam:   R  L    Deltoid 5  5   Biceps 5 5   Triceps 5 5   Wrist extension  5 5   Interossei 5 5      R  L    Hip flexion  5  5   Hip extension  5 5   Knee flexion  5 5   Knee extension  5 5  Ankle dorsiflexion  5 5   Ankle plantar flexion  5 5       Sensory: light touch intact and symmetric in all 4 extremities. No sensory extinction on double simultaneous stimulation  Cerebellar/coordination: finger nose finger a bit slow on R but otherwise normal without ataxia  Tone: normal in all 4 extremities  Gait: normal gait, normal tandem    ASSESSMENT & RECOMMENDATIONS   Assessment:  Mr. Leanna Cruz is a 77yo man with afib on apixaban and heart failure who presented with stuttering episodes of aphasia over the last week which culminated in abrupt moderate expressive aphasia and right hemiparesis, found to have 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 as well as nearly occluded LICA and completely occluded bilateral verts     SBP has ranged from 110s to as high as 160, with most in the mid 130s. Unfortunately, BP augmentation with IVF cannot be continued due to his HFpEF and the development of pulmonary edema, though his exam has remained stable with recent BPs. If his exam were to worsen will need to continue pressure modification w/ vasopressors. Recommendations  Q1hr neuro checks and monitor closely for worsening of left MCA symptoms  If neuro change, drop head of bed give IVF bolus and, will need to consider pressor  Bedrest for now to avoid potential drops in BP  Continue ASA 325mg po / 300mg OH, plavix per Neurosurgery for stent  No anticoagulation for now given acute stroke  Agree with high-intensity statin; goal LDL < 70  A1c < 7.0  Angiogram planned for today for L ICA stent placement   PT/OT/SLP once off bedrest after angio  SCDs for CVT prophylaxis, will discuss timing of DVT chemoprophylaxis with Neurosurgery post stent placement    Case discussed with Dr. Carmen Tena, Neurosurgery, and bedside RN.      Gopi Man, APRN - CNP   Neurology & Neurocritical Care   3/2/2023 9:08 AM    ICU Patients:   Neurocritical Care Line: [phone]  PerfectServe: [facility] Neurocritical Care    Critical Care:  Due to the immediate potential for life-threatening deterioration due to neurological failure, I spent 30 minutes providing critical care. This time excludes time spent performing procedures but includes time spent on direct patient care, history retrieval, review of the chart, and discussions with patient, family, and consultant(s).

## 2023-03-02 NOTE — PROGRESS NOTES
Physical Therapy/Occupational Therapy  HOLD NOTE  Per RN, pt has bedrest orders To have stent placed today. Will follow up per plan of care.     Corby Thompson, [address] [place] [address],[address] HOGAN/L

## 2023-03-02 NOTE — PROGRESS NOTES
Hospitalist Progress Note      PCP: Grace Rose    Date of Admission: 2/25/2023    Subjective:  seen and examined with family at bedside  Seen after carotid stenting, still under some sedation but arousable, following commands and denies any pain. Family would like rehab placement to a certain facility, discussed with sw      Medications:  Reviewed    Infusion Medications    sodium chloride 125 mL/hr at 03/02/23 1309    sodium chloride       Scheduled Medications    ipratropium-albuterol  1 ampule Inhalation BID    clopidogrel  75 mg Oral Daily    atorvastatin  80 mg Oral Nightly    sodium chloride flush  5-40 mL IntraVENous 2 times per day    dilTIAZem  180 mg Oral Daily    [Held by provider] torsemide  40 mg Oral BID    aspirin  325 mg Oral Daily    Or    aspirin  300 mg Rectal Daily    arformoterol tartrate  15 mcg Nebulization BID    And    budesonide  0.5 mg Nebulization BID     PRN Meds: acetaminophen, HYDROcodone 5 mg - acetaminophen **OR** HYDROcodone 5 mg - acetaminophen, ondansetron, perflutren lipid microspheres, albuterol, ondansetron **OR** [DISCONTINUED] ondansetron, polyethylene glycol, sodium chloride flush, sodium chloride      Intake/Output Summary (Last 24 hours) at 3/2/2023 1331  Last data filed at 3/2/2023 1243  Gross per 24 hour   Intake 60 ml   Output 2 ml   Net 58 ml         Physical Exam Performed:    BP (!) 143/79   Pulse 69   Temp 97.8 °F (36.6 °C) (Oral)   Resp 19   Ht 6' (1.829 m)   Wt 174 lb 6.1 oz (79.1 kg)   SpO2 91%   BMI 23.65 kg/m²     General appearance: No apparent distress, appears stated age and cooperative.  Respiratory:  diminished without wheezing or crackles  Cardiovascular: Regular rate and rhythm with normal S1/S2 without murmurs, rubs or gallops.  Abdomen: Soft, non-tender, non-distended with normal bowel sounds.  Musculoskeletal: No clubbing, cyanosis or edema bilaterally.  Full range of motion without deformity.  Skin: Skin color, texture, turgor  normal.  No rashes or lesions. Neurologic:  expressive aphasia present  Psychiatric: Alert and oriented, thought content appropriate, normal insight  Capillary Refill: Brisk, 3 seconds, normal   Peripheral Pulses: +2 palpable, equal bilaterally       Labs:   Recent Labs     02/28/23  0643 03/01/23  0525 03/02/23  0540   WBC 11.6* 13.2* 11.2*   HGB 15.7 15.6 15.1   HCT 49.3 46.1 44.8    311 280       Recent Labs     02/27/23  1830 02/28/23  0643 03/02/23  0540   NA  --  140 139   K 3.8 3.7 3.9   CL  --  99 100   CO2  --  34* 31   BUN  --  17 16   CREATININE  --  0.9 0.8   CALCIUM  --  9.1 9.1       No results for input(s): AST, ALT, BILIDIR, BILITOT, ALKPHOS in the last 72 hours. No results for input(s): INR in the last 72 hours. No results for input(s): Wayna Khat in the last 72 hours. Urinalysis:      Lab Results   Component Value Date/Time    NITRU Negative 04/03/2022 01:35 PM    WBCUA 0-2 03/29/2022 03:55 PM    BACTERIA 1+ 01/11/2018 08:50 AM    RBCUA 0-2 03/29/2022 03:55 PM    BLOODU Negative 04/03/2022 01:35 PM    SPECGRAV 1.010 04/03/2022 01:35 PM    GLUCOSEU Negative 04/03/2022 01:35 PM       Radiology:  IR ANGIOGRAM CAROTID CEREBRAL RIGHT   Final Result      XR CHEST PORTABLE   Final Result      1. Findings of pulmonary edema which are increased compared to chest radiograph from one day prior. MRI brain without contrast   Final Result      Left MCA border zone infarcts without hemorrhage or mass effect.       IR ANGIOGRAM CAROTID CEREBRAL LEFT    (Results Pending)       IP CONSULT TO NEUROCRITICAL CARE  IP CONSULT TO PHARMACY  IP CONSULT TO NEUROSURGERY  IP CONSULT TO PHYSICAL MEDICINE REHAB    Assessment/Plan:    Active Hospital Problems    Diagnosis     ALEXANDREA treated with BiPAP [G47.33]      Priority: Medium    Acute cerebrovascular accident (CVA) due to stenosis of left carotid artery ([facility] [address] [I63.232]      Priority: Medium    Acute unilateral cerebral infarction in a watershed distribution [facility]) [I63.89]      Priority: Medium    Aphasia due to acute cerebrovascular accident (CVA) ([facility] [address] [I63.9, R47.01]      Priority: Medium    COPD with chronic bronchitis ([facility] [address] [J44.9]      Priority: Medium     Acute ischemic stroke, due to moderate right MCA, nearly occluded LICA, completely occluded verts. S/p left carotid stent placement today  Maintain BP, avoid hypotension  Asa, statin, plavix   Monitor volume status closely off diuretics, on torsemide at home for diast CHF.    Cont home O2 at 3 liters, bronchodilators  Resume diltiazem and cont to hold eliquis until cleared by neurology  PTOT     DVT Prophylaxis: scds  Diet: Diet NPO Exceptions are: Sips of Water with Meds  Code Status: Full Code  PT/OT Eval Status: order    Dispo - inpatient for recovery after carotid artery stent placement 3/2        Chris Salguero MD

## 2023-03-02 NOTE — PROGRESS NOTES
NEUROLOGY / NEUROCRITICAL CARE PROGRESS NOTE       Patient Name: Londa Buerger YOB: 1950   Sex: Male Age: 68 yrs     CC / Reason for Consult: stroke    Interval Hx / Changes over last 24 hours:   No adverse events noted today. Speech improved since admission. Plan for angio & stent on 3/2/23    ROS: aphasia is stable and unchanged; no new focal deficits    HISTORY   Admission HPI:   Meeting patient for the first time. Initial consultation note was completed by Neurology CNP yesterday (2/25/23) evening, which I have reviewed. 79yo man with emphysema on home O2; HTN; afib on apixaban; HFpEF. Presented to OSH ER with abrupt onset of left arm weakness and difficulty speaking. Per my interview with pt and pt's son at the bedside:  Pt has had intermittent episodes of difficulty speaking over the last week. They seemed to come and go and were random in occurrence and resolution. However, yesterday morning son noted that pt was having great difficulty feeding himself with his right hand and his language was markedly impaired. He was brought to the ER. In the ER, BP was 125/88. Head CT demonstrated a hypodensity in the left frontal region. CTA head and neck showed bilateral occluded vertebral arteries, nearly occluded left carotid, and poor flow in left MCA territory. There was no LVO. After discussion on Viz. ai between ER physician, Stroke Team Physician, Neurovascular surgery, and myself, it was concluded that pt required ICU admission for frequent neuro checks and to ensure adequate perfusion of left MCA territory through stenotic left ICA with BP augmentation with IVF, if he tolerated, or pressor, if necessary. Ultimate plan will be for conventional angiogram with neurovascular surgery next week and CEA vs stent after that. No acute events overnight. Afebrile. Currently, pt feels that he is improved overall.  He still is having difficulty with language and describes it as difficulty in saying what he wants to say. His arm is much better than it was yesterday. His SBP has ranged from 110s to as high as 160, with most in the mid 130s. He has not had any worsening of symptoms with these blood pressures. He was on IVF through most of the night but he has developed some mild pulmonary edema and so fluids have been discontinued. he is unsure what would have precipitated these symptoms, other than the above. he feels that nothing makes them better and nothing makes them worse. he rates the symptoms as severe. he denies any other associated symptoms including no HA, no other speech/language difficulties, no swallowing difficulties, no visual changes, no diplopia, no hearing loss, no tinnitus, no vertigo, no imbalance, no light-headedness, no other focal weakness, no sensory changes, no nausea or vomiting. he has never had this problem before. There is no history of this problem or anything similar in his family members. [facility] Past Medical History:   Diagnosis Date    Atrial fibrillation ([facility] [address]     Bronchitis chronic     Emphysema of lung ([facility] [address]     HTN (hypertension)     Influenza A 01/10/2018    Lung disease     copd    Pneumonia     12/2009    Stroke     L MCA watershed      Allergies No Known Allergies   Diet ADULT DIET; Dysphagia - Soft and Bite Sized;  Low Fat/Low Chol/High Fiber/2 gm Na  Diet NPO Exceptions are: Sips of Water with Meds   Isolation No active isolations     CURRENT SCHEDULED MEDICATIONS   Inpatient Medications     [COMPLETED] clopidogrel, 300 mg, Oral, BID **FOLLOWED BY** [START ON 3/2/2023] clopidogrel, 75 mg, Oral, Daily    ipratropium-albuterol, 1 ampule, Inhalation, Q4H WA    atorvastatin, 80 mg, Oral, Nightly    sodium chloride flush, 5-40 mL, IntraVENous, 2 times per day    dilTIAZem, 180 mg, Oral, Daily    [Held by provider] torsemide, 40 mg, Oral, BID    aspirin, 325 mg, Oral, Daily **OR** aspirin, 300 mg, Rectal, Daily    arformoterol tartrate, 15 mcg, Nebulization, BID **AND** budesonide, 0.5 mg, Nebulization, BID   Infusions    sodium chloride                LABS   Metabolic Panel Recent Labs     02/27/23 0459 02/27/23  1830 02/28/23  0643     --  140   K 2.8* 3.8 3.7   CL 94*  --  99   CO2 38*  --  34*   BUN 12  --  17   CREATININE 1.0  --  0.9   GLUCOSE 132*  --  135*   CALCIUM 8.7  --  9.1   MG 1.90  --  2.10        CBC / Coags Recent Labs     02/27/23 0459 02/28/23  0643 03/01/23  0525   WBC 12.7* 11.6* 13.2*   RBC 5.10 5.14 5.12   HGB 15.6 15.7 15.6   HCT 47.1 49.3 46.1    298 311        Other Recent Labs     02/27/23 0459   LABA1C 6.2   LDLCALC 128*   TRIG 105       No results for input(s): PHENYTOIN, KEPPRA, LACOSA, LAMO, VALPROATE, LACTSEPSIS, LACTA in the last 72 hours.        DIAGNOSTICS   IMAGES:    No new imaging to review today    PHYSICAL EXAMINATION     PHYSICAL EXAM:  Vitals:    03/01/23 1600 03/01/23 1955 03/01/23 2000 03/01/23 2005   BP:       Pulse: 79 77 77 70   Resp: 19 20 20 24   Temp:       TempSrc:       SpO2: 95% 97% 98% 98%   Weight:           Neurologic  Mental status:   orientation to person, place, time, and situation   Attention intact as able to attend well to the exam     Language fluent in conversation, expressive aphasia remains, but appears improved since admission   Comprehension intact; follows simple commands  Cranial nerves:   CN2: Visual Fields full w/o extinction on confrontational testing,   CN 3,4,6: pupils equal and reactive to light, extraocular muscles intact,  CN5: facial sensation symmetric   CN7:face symmetric without dysarthria,   CN8: hearing symmetric to finger rub   CN9: palate elevated symmetrically  CN11: trap full strength on shoulder shrug  CN12: tongue midline with protrusion  Motor Exam:   R  L    Deltoid 5  5   Biceps 5 5   Triceps 5 5   Wrist extension  5 5   Interossei 5 5      R  L    Hip flexion  5  5   Hip extension  5 5   Knee flexion  5 5   Knee extension  5 5   Ankle dorsiflexion 5 5   Ankle plantar flexion  5 5       Sensory: light touch intact and symmetric in all 4 extremities. No sensory extinction on double simultaneous stimulation  Cerebellar/coordination: finger nose finger normal without ataxia  Tone: normal in all 4 extremities  Gait: normal gait, normal tandem    ASSESSMENT & RECOMMENDATIONS   Assessment:  Mr. Rubin Walters is a 77yo man with afib on apixaban and heart failure who presented with stuttering episodes of aphasia over the last week which culminated in abrupt moderate expressive aphasia and right hemiparesis, found to have 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 as well as nearly occluded LICA and completely occluded bilateral verts     SBP has ranged from 110s to as high as 160, with most in the mid 130s. Unfortunately, BP augmentation with IVF cannot be continued due to his HFpEF and the development of pulmonary edema, though his exam has remained stable with recent BPs. If his exam were to worsen will need to continue pressure modification w/ vasopressors. Recommendations  Q1hr neuro checks and monitor closely for worsening of left MCA symptoms  If neuro change, drop head of bed give IVF bolus and, will need to consider pressor  Bedrest for now to avoid potential drops in BP  Continue ASA 325mg po / 300mg PA  No anticoagulation for now  Agree with high-intensity statin; goal LDL < 70  A1c < 7.0  Speech therapy  PT/OT OK as long as remains in bed for now  Angiogram planned for 3/2/23 for L ICA stent placement     ERIN Arceo - CNP   Neurology & Neurocritical Care   3/1/2023 9:24 PM    ICU Patients:   Neurocritical Care Line: [phone]  PerfectServe: [facility] Neurocritical Care    Critical Care:  Due to the immediate potential for life-threatening deterioration due to neurological failure, I spent 35 minutes providing critical care.   This time excludes time spent performing procedures but includes time spent on direct patient care, history retrieval, review of the chart, and discussions with patient, family, and consultant(s).

## 2023-03-02 NOTE — PLAN OF CARE
Problem: Discharge Planning  Goal: Discharge to home or other facility with appropriate resources  3/1/2023 2356 by Mack Washington RN  Outcome: Progressing  Flowsheets (Taken 3/1/2023 2000)  Discharge to home or other facility with appropriate resources: Identify barriers to discharge with patient and caregiver  3/1/2023 1333 by Shabbir Mckeon RN  Outcome: Progressing     Problem: Safety - Adult  Goal: Free from fall injury  3/1/2023 2356 by Mack Washington RN  Outcome: Progressing  Flowsheets (Taken 3/1/2023 2354)  Free From Fall Injury: Instruct family/caregiver on patient safety  3/1/2023 1333 by Shabbir Mckeon RN  Outcome: Progressing     Problem: Chronic Conditions and Co-morbidities  Goal: Patient's chronic conditions and co-morbidity symptoms are monitored and maintained or improved  3/1/2023 2356 by Mack Washington RN  Outcome: Progressing  Flowsheets (Taken 3/1/2023 2000)  Care Plan - Patient's Chronic Conditions and Co-Morbidity Symptoms are Monitored and Maintained or Improved: Monitor and assess patient's chronic conditions and comorbid symptoms for stability, deterioration, or improvement  3/1/2023 1333 by Shabbir Mckeon RN  Outcome: Progressing     Problem: Pain  Goal: Verbalizes/displays adequate comfort level or baseline comfort level  3/1/2023 2356 by Mack Washington RN  Outcome: Progressing  Flowsheets  Taken 3/1/2023 2200  Verbalizes/displays adequate comfort level or baseline comfort level: Encourage patient to monitor pain and request assistance  Taken 3/1/2023 2000  Verbalizes/displays adequate comfort level or baseline comfort level: Encourage patient to monitor pain and request assistance  3/1/2023 1333 by Shabbir Mckeon RN  Outcome: Progressing     Problem: ABCDS Injury Assessment  Goal: Absence of physical injury  3/1/2023 2356 by Mack Washington RN  Outcome: Progressing  Flowsheets (Taken 3/1/2023 2354)  Absence of Physical Injury: Implement safety measures based on patient assessment  3/1/2023 1333 by Omid Sims, RN  Outcome: Progressing

## 2023-03-02 NOTE — PROGRESS NOTES
Patient has returned to ICU post stent placement. Patient is alert and extubated, on home 3 liters nasal cannula. Right groin access site looks good, no drainage, hematoma or oozing. Distal pulses heard easily with doppler. No complaints of numbness or tingling.  Patient will remain on bedrest until 3pm.

## 2023-03-02 NOTE — PROCEDURES
DATE OF THE PROCEDURE: 03/02/2023    HISTORY OF PRESENT ILLNESS: 69 y/o male with left subcortical white matter stroke and CTA with high grade stenosis of left ICA in the neck. Catheter angiography performed two days ago, 02/28/2023 demonstrated high grade >95% stenosis of the origin of the left internal carotid artery with a string sign. Patient qualifies for a left carotid stent with distal embolic protection to reduce long-term stroke risk. Procedure planned today is left carotid stent with distal embolic protection with anesthesia team for anesthesia assistance. OPERATORS:   Primary: Annabelle Steele M.D., attending. Assistant: None    SUPERVISION AND INTERPRETATION: Dr. Usman Bragg personally performed the technical portions of the procedure. Following completion of the technical portions of the procedure, the angiographic images were reviewed at the neurography PACS work station by Dr. Carrington Friedman. PROCEDURE:  1. Left carotid stent placement with distal embolic protection device. 2. Ultrasound-guided arterial assessment and access of the right common femoral artery    VESSELS CATHETERIZED:   1. Left common carotid artery. ANESTHESIA: Prior to the procedure, the patient's personal and family history were reviewed for any adverse reactions to anesthesia and no pertinent concerns were raised. Due to the patient's COPD and need for 02, the anesthesia team felt he wouldn't tolerate IV sedation with MAC and they elected for general endotracheal anesthesia. 1% lidocaine, subcutaneous  5000 unit IV bolus unfractionated heparin    ACT: 274 seconds performed at 1156    RADIOCONTRAST: 90 mL 270 mgI/ml Visipaque contrast IA. EBL: 60 mL    DEVICES USED:   1. 5-Fr Stiff Micropuncture kit. 2. 8-Hong Konger 10 cm sheath. 3. 8-Hong Konger Cerebase 0.090\" 90 cm Guide Catheter  4. 5-Hong Konger Penumbra King Select Catheter 130 cm  5. 0.035\" Glidewire 180 cm. 6. 8-Hong Konger Angioseal Closure Device  7.  Abbott XACT 8-6 mm x 30 mm tapered carotid stent  9. Abbott Emboshield Nav6 5.0 mm distal embolic protection device  10. Viatrac 5 mm x 20 mm balloon catheter  11. Insulfulator  . AIR KERMA: 448 mGy   FLUOROSCOPY TIME: 18.9 minutes. CONSENT: Prior to the procedure, the technical aspect of the procedure as well as the potential risks and benefits were explained to the patient. Specifically, the risk of cerebral infarction, puncture site hemorrhage, femoral nerve injury, retroperitoneal hemorrhage, hemorrhagic shock, infection, device failure, anaphylaxis, renal failure, limb loss, death, radiation effects (hair loss, cataracts, radiation burns, tumors, dementia), arterial dissection, arterial pseudoaneurysms and A-V fistula were discussed. The advantages and disadvantages of alternative procedures were presented. An opportunity to state any questions or concerns was given and these were then addressed. Following this process, it was requested that we proceed with the proposed intervention and this was expressed in the form of a written consent. DETAILS OF THE PROCEDURE: The patient was brought to the neuroangiography suite where the groin was shaved, prepped and draped in usual sterile fashion. A whole room time out was performed to confirm the identify of the patient, the procedure to be performed, and the location of the procedure. Ultrasound was used to insonate the potential vascular access sites including the right common femoral artery to insure the proper size and patency prior to access. 1% lidocaine was administered subcutaneously for local anesthetic. Then with live ultrasound guidance arterial puncture of the right common femoral artery was accessed percutaneously using a single wall puncture technique. A 8-Uzbek arterial introducer sheath was placed at the puncture site prior to arterial catheterization and was connected to a pressurized heparin saline flush line.      A 5-Uzbek angled diagnostic catheter was advanced over an 0.035\" Glidewire into the aortic arch under the fluoroscopic guidance and used to selectively catheterize the vessels listed above. In each case, the vessel origin was visualized fluoroscopically by injection of contrast prior to selective catheterization. Biplane diagnostic cerebral angiograms were acquired at each of these catheterizations. After reviewing the images, the catheter was removed. Next we advanced the 8-Bolivian 0.090\" Cerebase 90 cm guide catheter coaxially over the 5-Bolivian [facility] Select 130 cm catheter over the 0.035\" 180 cm Glidewire retrograde through the abdominal aorta over the aortic arch and the left common carotid artery. The select catheter and wire were removed and the guide catheter was positioned proximal to the cervical bifurcation and the stenosis. Next we advanced the Abbott 5.0 mm Hernan 6 Emboshield through the stenosis into the cervical ICA distal to the stenosis and the device was unsheathed and allowed to open and the delivery catheter removed. Next we brought an Viatrac 5 mm x 20 mm balloon over the Emboshield wire spanning the stenotic region. It was inflated to 8 DHARMESH which was its nominal rated pressure and then we also increased it to 10 DHARMESH. Burst pressure was rated as 14 DHARMESH. The balloon expanded fully without a waist.  The balloon was deflated and removed. Next we brought the Abbott Xact 6-8 mm x 30 mm carotid stent delivery system over the Emboshield wire positioning it to span the stenotic region. It was carefully opened and the delivery system removed. Next we obtained a follow-up angiogram to check results within the head with the Emboshield still deployed and only the left MCA filled without any branch occlusions. The left ELIDA had competitive flow from the anterior communicating artery cross flow.       Next we took the recapture catheter over the emboshield wire and recaptured the Emboshield device and removed it and the wire from the patient. We took a final follow-up angiogram to check results of the left carotid artery within the neck and head. The left MCA and ELIDA now both fill again without any vessel occlusions seen. The groin sheath was exchanged over a 8-Mohawk Angioseal closure device and the arteriotomy was closed without difficulty with excellent hemostasis. The patient tolerated the procedure well and there were no complications. RIGHT COMMON FEMORAL ARTERY: The common, iliac, external iliac and common femoral arteries are normal in course and caliber. The common femoral artery bifurcation appears normal. The groin sheath was inserted within the common femoral artery above the bifurcation without evidence of any injury or thrombosis. IMPRESSION:  Greater than 95% stenosis of the cervical left internal carotid artery near its origin with string sign which is flow limiting. Based on patient's recent history, this is a symptomatic carotid stenosis. Excellent post-stent result after placing a 6-8 mm tapered 30 mm length carotid stent with distal embolic protection within the left internal carotid and left common carotid arteries spanning the stenotic region and the cervical bifurcation. RECOMMENDATIONS:  Continue 325 mg PO Aspirin QD and 75 mg PO Plavix QD x 3 months. Admit to SICU overnight for Q1 hour neurological checks  CTA of the neck in 4-6 weeks immediately prior to follow-up as outpatient with Dr. Leigh Reyez in 4-6 weeks.     Peace Allen MD  Neurovascular and Stroke  [facility]

## 2023-03-02 NOTE — ANESTHESIA POSTPROCEDURE EVALUATION
Department of Anesthesiology  Postprocedure Note    Patient: Eleonora Rivera  MRN: [id number]  YOB: 1950  Date of evaluation: 3/2/2023      Procedure Summary     Date: 03/02/23 Room / Location: [facility], [facility] Special Procedures    Anesthesia Start: 1108 Anesthesia Stop: 3608    Procedure: IR ANGIOGRAM CAROTID CEREBRAL LEFT Diagnosis: (carotid stent)    Scheduled Providers: Ryan Lopez MD Responsible Provider: Ryan Lopez MD    Anesthesia Type: general ASA Status: 4          Anesthesia Type: No value filed.     Maxim Phase I: Maxim Score: 9    Maxim Phase II:        Anesthesia Post Evaluation    Patient location during evaluation: ICU  Patient participation: complete - patient participated  Level of consciousness: awake  Pain score: 1  Airway patency: patent  Nausea & Vomiting: no nausea  Complications: no  Cardiovascular status: hemodynamically stable  Respiratory status: acceptable  Hydration status: stable

## 2023-03-02 NOTE — PROGRESS NOTES
Progress Note  Physical Medicine and Rehabilitation    Patient: Annita [facility]  [id number]  Date: 3/2/2023      Chief Complaint: CVA    History of Present Illness/Hospital Course:  68 old male with past medical history HTN, a fib on Eliquis, HFpEF, COPD (4 L), chronic bronchitis, hernia repair who initially presented to OSH with acute onset aphasia. Currently having some speech issues but able to move his bilateral extremities. Recovering well. Plan for a procedure today but will be able to come to the ARU post ICU admission. Interval history: Stent placement today. Will re- evaluate for therapy tomorrow. Prior Level of Function:  Independent for mobility, ADLs, and IADLs    Current Level of Function:  Mod assist     Pertinent Social History:  Support: family in town  Home set-up: Lives in [facility]     Past Medical History:   Diagnosis Date    Atrial fibrillation ([address]     Bronchitis chronic     Emphysema of lung ([facility] [address]     HTN (hypertension)     Influenza A 01/10/2018    Lung disease     copd    Pneumonia     2009    Stroke 2023    L MCA watershed       Past Surgical History:   Procedure Laterality Date    FRACTURE SURGERY      johnson in femur/hip on right    HERNIA REPAIR         Family History   Problem Relation Age of Onset    Diabetes Sister     Cancer Sister     Cancer Sister     Cancer Brother     Asthma Daughter     Asthma Son     Diabetes Mother     Cancer Father        Social History     Socioeconomic History    Marital status:     Tobacco Use    Smoking status: Former     Packs/day: 1.00     Years: 30.00     Pack years: 30.00     Types: Cigarettes     Quit date: 2016     Years since quittin.6    Smokeless tobacco: Never    Tobacco comments:     Just quit about 3 months ago 16   Vaping Use    Vaping Use: Never used   Substance and Sexual Activity    Alcohol use: No    Drug use: No    Sexual activity: Not Currently           REVIEW OF SYSTEMS:   CONSTITUTIONAL: negative for fevers, chills, diaphoresis, appetite change, night sweats, unexpected weight change, fatigue  EYES: negative for blurred vision, eye discharge, visual disturbance and icterus  HEENT: negative for hearing loss, tinnitus, ear drainage, sinus pressure, nasal congestion, epistaxis and snoring  RESPIRATORY: Negative for hemoptysis, cough, sputum production  CARDIOVASCULAR: negative for chest pain, palpitations, exertional chest pressure/discomfort, syncope, edema  GASTROINTESTINAL: negative for nausea, vomiting, diarrhea, blood in stool, abdominal pain, constipation  GENITOURINARY: negative for frequency, dysuria, urinary incontinence, decreased urine volume, and hematuria  HEMATOLOGIC/LYMPHATIC: negative for easy bruising, bleeding and lymphadenopathy  ALLERGIC/IMMUNOLOGIC: negative for recurrent infections, angioedema, anaphylaxis and drug reactions  ENDOCRINE: negative for weight changes and diabetic symptoms including polyuria, polydipsia and polyphagia  MUSCULOSKELETAL: negative for pain, joint swelling, decreased range of motion  NEUROLOGICAL: negative for headaches, Positive slurred speech, unilateral weakness  PSYCHIATRIC/BEHAVIORAL: negative for hallucinations, behavioral problems, confusion and agitation.     Physical Examination:  Vitals: Patient Vitals for the past 24 hrs:   BP Temp Temp src Pulse Resp SpO2 Height Weight   03/02/23 1000 (!) 143/79 -- -- 69 19 91 % 6' (1.829 m) 174 lb 6.1 oz (79.1 kg)   03/02/23 0900 133/83 -- -- 77 16 94 % -- --   03/02/23 0833 -- -- -- 75 20 98 % -- --   03/02/23 0830 -- -- -- 78 18 98 % -- --   03/02/23 0800 (!) 144/69 97.8 °F (36.6 °C) Oral 64 17 99 % -- --   03/02/23 0700 -- -- -- 68 12 99 % -- --   03/02/23 0600 -- -- -- 84 16 96 % -- --   03/02/23 0500 -- -- -- 74 14 93 % -- --   03/02/23 0405 -- -- -- 85 21 94 % -- --   03/02/23 0400 115/76 98.6 °F (37 °C) Oral 72 19 -- -- --   03/02/23 0300 98/77 -- -- 73 18 98 % -- --   03/02/23 0200 98/69 -- -- 66 14 91 % -- --  03/02/23 0130 106/65 -- -- 69 14 96 % -- --   03/02/23 0100 93/62 -- -- 60 15 92 % -- --   03/02/23 0055 -- -- -- 67 15 93 % -- --   03/02/23 0030 110/72 -- -- 65 14 91 % -- --   03/02/23 0000 125/75 98.8 °F (37.1 °C) -- 73 14 94 % -- --   03/01/23 2330 111/84 -- -- 67 13 96 % -- --   03/01/23 2315 -- -- -- 72 16 97 % -- --   03/01/23 2300 (!) 104/59 -- -- 61 15 100 % -- --   03/01/23 2230 131/72 -- -- 71 14 100 % -- --   03/01/23 2200 110/74 -- -- 75 17 96 % -- --   03/01/23 2130 122/77 -- -- 73 17 96 % -- --   03/01/23 2100 -- -- -- 75 18 94 % -- --   03/01/23 2030 -- -- -- 87 14 94 % -- --   03/01/23 2005 -- -- -- 70 24 98 % -- --   03/01/23 2000 119/80 98.8 °F (37.1 °C) Oral 77 20 98 % -- --   03/01/23 1955 -- -- -- 77 20 97 % -- --   03/01/23 1930 -- -- -- 77 20 -- -- --   03/01/23 1900 -- -- -- 89 18 (!) 86 % -- --   03/01/23 1600 -- -- -- 79 19 95 % -- --   03/01/23 1548 -- -- -- 79 19 96 % -- --   03/01/23 1500 -- -- -- 84 18 (!) 85 % -- --   03/01/23 1400 -- -- -- 92 26 (!) 84 % -- --       Const: Alert. WDWN. No distress  Eyes: Conjunctiva noninjected, no icterus noted; pupils equal, round, and reactive to light. HENT: Atraumatic, normocephalic; Oral mucosa moist  Neck: Trachea midline, neck supple. No thyromegaly noted. CV: Regular rate and rhythm, no murmur rub or gallop noted  Resp: Lungs clear to auscultation bilaterally, no rales wheezes or ronchi, no retractions. Respirations unlabored. GI: Soft, nontender, nondistended. Normal bowel sounds. No palpable masses. Skin: Normal temperature and turgor. No rashes or breakdown noted. Ext: No significant edema appreciated. No varicosities. MSK: No joint tenderness, erythema, warmth noted. AROM intact. Neuro: Alert, oriented, appropriate. No cranial nerve deficits appreciated. Sensation intact to light touch. Motor examination reveals 4/5 strength in all four limbs diffusely.  Speech deficit   Psych: Stable mood, normal judgement, normal affect Lab Results   Component Value Date    WBC 11.2 (H) 03/02/2023    HGB 15.1 03/02/2023    HCT 44.8 03/02/2023    MCV 91.9 03/02/2023     03/02/2023     Lab Results   Component Value Date    INR 1.13 02/25/2023    INR 1.19 (H) 01/11/2018    PROTIME 14.4 02/25/2023    PROTIME 13.4 (H) 01/11/2018     Lab Results   Component Value Date    CREATININE 0.8 03/02/2023    BUN 16 03/02/2023     03/02/2023    K 3.9 03/02/2023     03/02/2023    CO2 31 03/02/2023     Lab Results   Component Value Date    ALT 17 02/25/2023    AST 21 02/25/2023    ALKPHOS 97 02/25/2023    BILITOT 0.8 02/25/2023           IR ANGIOGRAM CAROTID CEREBRAL RIGHT   Final Result      XR CHEST PORTABLE   Final Result      1. Findings of pulmonary edema which are increased compared to chest radiograph from one day prior. MRI brain without contrast   Final Result      Left MCA border zone infarcts without hemorrhage or mass effect. IR ANGIOGRAM CAROTID CEREBRAL LEFT    (Results Pending)         Assessment:  1. Subacute ischemic CVA- MRI brain which showed left MCA border zone infarcts without hemorrhage or mass effect  - PT/OT/SLP required  - -160  2. COPD- Continue Brovana and Pulmicort treatments  - DuoNeb PRN  3. HTN- Continue home antihypertensive regimen      Impairments- Decreased functional mobility, Decreased ADLs    Recommendations:  Re- evaluate with therapy tomorrow. Possible [facility] due to bed availability. Patient with new functional deficits and ongoing medical complexity. Demonstrates ability to tolerate 3 hours therapy/day. He is a good candidate for acute inpatient rehab when medically appropriate. Thank you for this consult. Please contact me with any questions or concerns.      Carney Eisenmenger, D.O. M.P.H  PM&R  3/2/2023  1:11 PM

## 2023-03-02 NOTE — PLAN OF CARE
Problem: Discharge Planning  Goal: Discharge to home or other facility with appropriate resources  Outcome: Progressing  Flowsheets (Taken 3/2/2023 0800)  Discharge to home or other facility with appropriate resources: Identify barriers to discharge with patient and caregiver     Problem: Safety - Adult  Goal: Free from fall injury  Outcome: Progressing  Flowsheets (Taken 3/2/2023 1043)  Free From Fall Injury: Instruct family/caregiver on patient safety     Problem: Chronic Conditions and Co-morbidities  Goal: Patient's chronic conditions and co-morbidity symptoms are monitored and maintained or improved  Outcome: Progressing  Flowsheets (Taken 3/2/2023 0800)  Care Plan - Patient's Chronic Conditions and Co-Morbidity Symptoms are Monitored and Maintained or Improved:   Monitor and assess patient's chronic conditions and comorbid symptoms for stability, deterioration, or improvement   Collaborate with multidisciplinary team to address chronic and comorbid conditions and prevent exacerbation or deterioration     Problem: Pain  Goal: Verbalizes/displays adequate comfort level or baseline comfort level  Outcome: Adequate for Discharge  Flowsheets  Taken 3/2/2023 0800 by Verner Can, RN  Verbalizes/displays adequate comfort level or baseline comfort level:   Encourage patient to monitor pain and request assistance   Assess pain using appropriate pain scale   Administer analgesics based on type and severity of pain and evaluate response  Taken 3/2/2023 0600 by Tish Gonzalez RN  Verbalizes/displays adequate comfort level or baseline comfort level: Encourage patient to monitor pain and request assistance     Problem: ABCDS Injury Assessment  Goal: Absence of physical injury  Outcome: Adequate for Discharge  Flowsheets (Taken 3/2/2023 1043)  Absence of Physical Injury: Implement safety measures based on patient assessment     Problem: Neurosensory - Adult  Goal: Achieves stable or improved neurological status  Outcome: Progressing     Problem: Neurosensory - Adult  Goal: Achieves maximal functionality and self care  Outcome: Progressing

## 2023-03-02 NOTE — PROGRESS NOTES
Patient sent to cath lab for stent placement. Patient's son at bedside. #20 peripheral IV flushing smoothly.

## 2023-03-02 NOTE — ANESTHESIA PRE PROCEDURE
Department of Anesthesiology  Preprocedure Note       Name:  Emily Taylor   Age:  68 y.o.  :  1950                                          MRN:  [id number]         Date:  3/2/2023      Surgeon: * No surgeons listed *    Procedure: * No procedures listed *    Medications prior to admission:   Prior to Admission medications    Medication Sig Start Date End Date Taking?  Authorizing Provider   pramipexole (MIRAPEX) 0.5 MG tablet Take 0.5 mg by mouth daily   Yes Historical Provider, MD   VENTOLIN  (90 Base) MCG/ACT inhaler INHALE TWO (2) PUFFS EVERY 6 HOURS AS NEEDED FOR WHEEZING OR SHORTNESS OF BREATH 23   Balbir Vuong MD   ipratropium-albuterol (DUONEB) 0.5-2.5 (3) MG/3ML SOLN nebulizer solution INHALE THREE (3) MILLILIERS (1 VIAL) VIA NEBULIZATION ROUTE EVERY 6 HOURS AS NEEDED FOR SHORTNESS OF BREATH 22   Tiarra Hunter MD   budesonide-formoterol (SYMBICORT) 160-4.5 MCG/ACT AERO INHALE 2 PUFFS INTO THE LUNGS 2 TIMES DAILY 22   Tiarra Hunter MD   Roflumilast (DALIRESP) 500 MCG tablet TAKE (1) TABLET DAILY 10/24/22   Tiarra Hunter MD   potassium chloride (KLOR-CON M) 20 MEQ extended release tablet Take 1 tablet by mouth daily 22   Adithya Knutson MD   Torsemide 40 MG TABS Take 40 mg by mouth 2 times daily 22   Adithya Knutson MD   dilTIAZem (CARDIZEM CD) 180 MG extended release capsule TAKE 1 CAPSULE BY MOUTH DAILY 22   Adithya Knutson MD   apixaban (ELIQUIS) 5 MG TABS tablet TAKE 1 TABLET TWICE DAILY 22   Adithya Knutson MD   nitroGLYCERIN (NITROSTAT) 0.4 MG SL tablet PLACE 1 TABLET UNDER THE TONGUE EVERY 5 MINUTES AS NEEDED FOR CHEST PAIN 22   Adithya Knutson MD   famotidine (PEPCID) 20 MG tablet Take 1 tablet by mouth 2 times daily 22   Adithya Knutson MD   guaiFENesin (MUCINEX) 600 MG extended release tablet Take 1 tablet by mouth 2 times daily as needed for Congestion 20   Tiarra Hunter MD       Current medications:    Current Facility-Administered Medications   Medication Dose Route Frequency Provider Last Rate Last Admin    ipratropium-albuterol (DUONEB) nebulizer solution 1 ampule  1 ampule Inhalation BID Jonah Johnson MD        clopidogrel (PLAVIX) tablet 75 mg  75 mg Oral Daily Hyacinth Tamayo MD   75 mg at 03/02/23 0675    acetaminophen (TYLENOL) tablet 650 mg  650 mg Oral Q4H PRN Hyacinth Tamayo MD        HYDROcodone-acetaminophen (NORCO) 5-325 MG per tablet 1 tablet  1 tablet Oral Q4H PRN Hyacinth Tamayo MD        Or    HYDROcodone-acetaminophen (NORCO) 5-325 MG per tablet 2 tablet  2 tablet Oral Q4H PRN Hyacinth Tamayo MD        ondansetron [facility]) injection 4 mg  4 mg IntraVENous Q8H PRN Hyacinth Tamayo MD        perflutren lipid microspheres (DEFINITY) injection 1.5 mL  1.5 mL IntraVENous ONCE PRN Hyacinth Tamayo MD        albuterol (PROVENTIL) nebulizer solution 2.5 mg  2.5 mg Nebulization Q4H PRN Hyacinth Tamayo MD   2.5 mg at 02/28/23 2042    ondansetron (ZOFRAN-ODT) disintegrating tablet 4 mg  4 mg Oral Q8H PRN Hyacinth Tamayo MD        polyethylene glycol (GLYCOLAX) packet 17 g  17 g Oral Daily PRN Hyacinth Tamayo MD        atorvastatin (LIPITOR) tablet 80 mg  80 mg Oral Nightly Hyacinth Tamayo MD   80 mg at 03/01/23 2037    sodium chloride flush 0.9 % injection 5-40 mL  5-40 mL IntraVENous 2 times per day Hyacinth Tamayo MD   10 mL at 03/02/23 0929    sodium chloride flush 0.9 % injection 5-40 mL  5-40 mL IntraVENous PRN Hyacinth Tamayo MD        0.9 % sodium chloride infusion   IntraVENous PRN Hyacinth Tamayo MD        dilTIAZem (CARDIZEM CD) extended release capsule 180 mg  180 mg Oral Daily Hyacinth Tamayo MD   180 mg at 03/02/23 0929    [Held by provider] torsemide (DEMADEX) tablet 40 mg  40 mg Oral BID Hyacinth Tamayo MD   40 mg at 02/26/23 2117    aspirin EC tablet 325 mg  325 mg Oral Daily Hyacinth Tamayo MD   325 mg at 03/02/23 2287    Or    aspirin suppository 300 mg  300 mg Rectal Daily Roula Rolon Eli Mata MD        arformoterol tartrate [facility]) nebulizer solution 15 mcg  15 mcg Nebulization BID Hyacinth Tamayo MD   15 mcg at 03/02/23 9417    And    budesonide (PULMICORT) nebulizer suspension 500 mcg  0.5 mg Nebulization BID Hyacinth Tamayo MD   500 mcg at 03/02/23 2510       Allergies:  No Known Allergies    Problem List:    Patient Active Problem List   Diagnosis Code    Acute exacerbation of chronic obstructive pulmonary disease (COPD) ([address] J44.1    Essential hypertension I10    Former smoker Z87.891    Atrial fibrillation ([address] I48.91    Acute on chronic respiratory failure with hypoxia and hypercapnia (HCC) J96.21, J96.22    Acute hyponatremia E87.1    Hyperbilirubinemia E80.6    Influenzal pneumonia J11.00    Unstable angina pectoris (HCC) I20.0    Acute hypokalemia E87.6    Hypophosphatemia E83.39    Long term current use of diuretic Z79.899    Acute on chronic combined systolic (congestive) and diastolic (congestive) heart failure (HCC) I50.43    Hypotension I95.9    COPD with chronic bronchitis (HCC) J44.9    Chronic respiratory failure with hypoxia and hypercapnia (HCC) J96.11, J96.12    Nocturnal hypoxia G47.34    Acute unilateral cerebral infarction in a watershed distribution [facility]) I63.89    Aphasia due to acute cerebrovascular accident (CVA) ([address] I63.9, R47.01    Acute cerebrovascular accident (CVA) due to stenosis of left carotid artery ([address] E23.889    ALEXANDREA treated with BiPAP G47.33       Past Medical History:        Diagnosis Date    Atrial fibrillation ([address]     Bronchitis chronic     Emphysema of lung ([address]     HTN (hypertension)     Influenza A 01/10/2018    Lung disease     copd    Pneumonia     12/2009    Stroke     L MCA watershed       Past Surgical History:        Procedure Laterality Date    FRACTURE SURGERY      johnson in femur/hip on right    HERNIA REPAIR         Social History:    Social History     Tobacco Use    Smoking status: Former Packs/day: 1.00     Years: 30.00     Pack years: 30.00     Types: Cigarettes     Quit date: 2016     Years since quittin.6    Smokeless tobacco: Never    Tobacco comments:     Just quit about 3 months ago 16   Substance Use Topics    Alcohol use: No                                Counseling given: Not Answered  Tobacco comments: Just quit about 3 months ago 16      Vital Signs (Current):   Vitals:    23 0830 23 0833 23 0900 23 1000   BP:   133/83 (!) 143/79   Pulse: 78 75 77 69   Resp: 18 20 16 19   Temp:       TempSrc:       SpO2: 98% 98% 94% 91%   Weight:    174 lb 6.1 oz (79.1 kg)   Height:    6' (1.829 m)                                              BP Readings from Last 3 Encounters:   23 (!) 143/79   23 (!) 126/91   23 (!) 140/70       NPO Status:                                                                                 BMI:   Wt Readings from Last 3 Encounters:   23 174 lb 6.1 oz (79.1 kg)   23 177 lb 14.6 oz (80.7 kg)   23 178 lb (80.7 kg)     Body mass index is 23.65 kg/m².     CBC:   Lab Results   Component Value Date/Time    WBC 11.2 2023 05:40 AM    RBC 4.87 2023 05:40 AM    HGB 15.1 2023 05:40 AM    HCT 44.8 2023 05:40 AM    MCV 91.9 2023 05:40 AM    RDW 14.6 2023 05:40 AM     2023 05:40 AM       CMP:   Lab Results   Component Value Date/Time     2023 05:40 AM    K 3.9 2023 05:40 AM     2023 05:40 AM    CO2 31 2023 05:40 AM    BUN 16 2023 05:40 AM    CREATININE 0.8 2023 05:40 AM    GFRAA >60 2022 04:48 AM    AGRATIO 1.4 2023 02:55 PM    LABGLOM >60 2023 05:40 AM    GLUCOSE 110 2023 05:40 AM    PROT 7.2 2023 02:55 PM    CALCIUM 9.1 2023 05:40 AM    BILITOT 0.8 2023 02:55 PM    ALKPHOS 97 2023 02:55 PM    AST 21 2023 02:55 PM    ALT 17 2023 02:55 PM       POC Tests: No results for input(s): POCGLU, POCNA, POCK, POCCL, POCBUN, POCHEMO, POCHCT in the last 72 hours. Coags:   Lab Results   Component Value Date/Time    PROTIME 14.4 02/25/2023 02:55 PM    INR 1.13 02/25/2023 02:55 PM    APTT 35.4 02/26/2023 11:34 AM       HCG (If Applicable): No results found for: PREGTESTUR, PREGSERUM, HCG, HCGQUANT     ABGs:   Lab Results   Component Value Date/Time    PHART 7.394 04/05/2022 03:55 AM    PO2ART 79.0 04/05/2022 03:55 AM    CAD8MFK 54.6 04/05/2022 03:55 AM    VDQ8PRN 32.6 04/05/2022 03:55 AM    BEART 6.1 04/05/2022 03:55 AM    T4BICAAP 95.7 04/05/2022 03:55 AM        Type & Screen (If Applicable):  No results found for: LABABO, LABRH    Drug/Infectious Status (If Applicable):  No results found for: HIV, HEPCAB    COVID-19 Screening (If Applicable):   Lab Results   Component Value Date/Time    COVID19 Not Detected 04/03/2022 02:30 PM           Anesthesia Evaluation  Patient summary reviewed  Airway: Mallampati: II  TM distance: >3 FB   Neck ROM: full  Mouth opening: > = 3 FB   Dental:    (+) upper dentures and lower dentures      Pulmonary:   (+) pneumonia:  COPD:  sleep apnea:  decreased breath sounds                            Cardiovascular:  Exercise tolerance: poor (<4 METS),   (+) hypertension:, angina:, dysrhythmias: atrial fibrillation, CHF:,       ECG reviewed  Rhythm: irregular  Rate: abnormal  Echocardiogram reviewed               ROS comment: ECHO 60%     Neuro/Psych:   (+) CVA: residual symptoms,              ROS comment: Acute CVA GI/Hepatic/Renal:             Endo/Other:                     Abdominal:             Vascular: Other Findings:           Anesthesia Plan      general     ASA 4       Induction: intravenous. Anesthetic plan and risks discussed with patient. Plan discussed with CRNA.     Attending anesthesiologist reviewed and agrees with Xochitl Alonso MD   3/2/2023 Excision Method: Elliptical

## 2023-03-03 LAB
ANION GAP SERPL CALCULATED.3IONS-SCNC: 11 MMOL/L (ref 3–16)
BUN BLDV-MCNC: 16 MG/DL (ref 7–20)
CALCIUM SERPL-MCNC: 8.7 MG/DL (ref 8.3–10.6)
CHLORIDE BLD-SCNC: 103 MMOL/L (ref 99–110)
CO2: 27 MMOL/L (ref 21–32)
CREAT SERPL-MCNC: 0.7 MG/DL (ref 0.8–1.3)
GFR SERPL CREATININE-BSD FRML MDRD: >60 ML/MIN/{1.73_M2}
GLUCOSE BLD-MCNC: 137 MG/DL (ref 70–99)
POC ACT LR: 274 SEC
POTASSIUM REFLEX MAGNESIUM: 5 MMOL/L (ref 3.5–5.1)
SARS-COV-2, NAAT: NOT DETECTED
SODIUM BLD-SCNC: 141 MMOL/L (ref 136–145)

## 2023-03-03 PROCEDURE — 6370000000 HC RX 637 (ALT 250 FOR IP)

## 2023-03-03 PROCEDURE — 92507 TX SP LANG VOICE COMM INDIV: CPT

## 2023-03-03 PROCEDURE — 97110 THERAPEUTIC EXERCISES: CPT

## 2023-03-03 PROCEDURE — 99231 SBSQ HOSP IP/OBS SF/LOW 25: CPT | Performed by: NURSE PRACTITIONER

## 2023-03-03 PROCEDURE — 36415 COLL VENOUS BLD VENIPUNCTURE: CPT

## 2023-03-03 PROCEDURE — 80048 BASIC METABOLIC PNL TOTAL CA: CPT

## 2023-03-03 PROCEDURE — 2700000000 HC OXYGEN THERAPY PER DAY

## 2023-03-03 PROCEDURE — 6360000002 HC RX W HCPCS

## 2023-03-03 PROCEDURE — 99232 SBSQ HOSP IP/OBS MODERATE 35: CPT | Performed by: PHYSICAL MEDICINE & REHABILITATION

## 2023-03-03 PROCEDURE — 51798 US URINE CAPACITY MEASURE: CPT

## 2023-03-03 PROCEDURE — 36592 COLLECT BLOOD FROM PICC: CPT

## 2023-03-03 PROCEDURE — 97530 THERAPEUTIC ACTIVITIES: CPT

## 2023-03-03 PROCEDURE — 99232 SBSQ HOSP IP/OBS MODERATE 35: CPT

## 2023-03-03 PROCEDURE — 92526 ORAL FUNCTION THERAPY: CPT

## 2023-03-03 PROCEDURE — 94640 AIRWAY INHALATION TREATMENT: CPT

## 2023-03-03 PROCEDURE — 97116 GAIT TRAINING THERAPY: CPT

## 2023-03-03 PROCEDURE — 97535 SELF CARE MNGMENT TRAINING: CPT

## 2023-03-03 PROCEDURE — 87635 SARS-COV-2 COVID-19 AMP PRB: CPT

## 2023-03-03 PROCEDURE — 6370000000 HC RX 637 (ALT 250 FOR IP): Performed by: INTERNAL MEDICINE

## 2023-03-03 PROCEDURE — 2580000003 HC RX 258

## 2023-03-03 PROCEDURE — 2000000000 HC ICU R&B

## 2023-03-03 PROCEDURE — 2580000003 HC RX 258: Performed by: ANESTHESIOLOGY

## 2023-03-03 PROCEDURE — 94660 CPAP INITIATION&MGMT: CPT

## 2023-03-03 PROCEDURE — 94761 N-INVAS EAR/PLS OXIMETRY MLT: CPT

## 2023-03-03 RX ORDER — DILTIAZEM HYDROCHLORIDE 120 MG/1
120 CAPSULE, COATED, EXTENDED RELEASE ORAL DAILY
Status: DISCONTINUED | OUTPATIENT
Start: 2023-03-04 | End: 2023-03-04

## 2023-03-03 RX ADMIN — ARFORMOTEROL TARTRATE 15 MCG: 15 SOLUTION RESPIRATORY (INHALATION) at 20:37

## 2023-03-03 RX ADMIN — CLOPIDOGREL BISULFATE 75 MG: 75 TABLET ORAL at 08:46

## 2023-03-03 RX ADMIN — BUDESONIDE 500 MCG: 0.5 SUSPENSION RESPIRATORY (INHALATION) at 10:59

## 2023-03-03 RX ADMIN — DILTIAZEM HYDROCHLORIDE 180 MG: 180 CAPSULE, EXTENDED RELEASE ORAL at 08:46

## 2023-03-03 RX ADMIN — BUDESONIDE 500 MCG: 0.5 SUSPENSION RESPIRATORY (INHALATION) at 20:37

## 2023-03-03 RX ADMIN — IPRATROPIUM BROMIDE AND ALBUTEROL SULFATE 1 AMPULE: 2.5; .5 SOLUTION RESPIRATORY (INHALATION) at 10:59

## 2023-03-03 RX ADMIN — IPRATROPIUM BROMIDE AND ALBUTEROL SULFATE 1 AMPULE: 2.5; .5 SOLUTION RESPIRATORY (INHALATION) at 20:37

## 2023-03-03 RX ADMIN — ASPIRIN 325 MG: 325 TABLET, COATED ORAL at 08:46

## 2023-03-03 RX ADMIN — ARFORMOTEROL TARTRATE 15 MCG: 15 SOLUTION RESPIRATORY (INHALATION) at 10:59

## 2023-03-03 RX ADMIN — SODIUM CHLORIDE, PRESERVATIVE FREE 10 ML: 5 INJECTION INTRAVENOUS at 20:52

## 2023-03-03 RX ADMIN — ATORVASTATIN CALCIUM 80 MG: 80 TABLET, FILM COATED ORAL at 20:51

## 2023-03-03 ASSESSMENT — PAIN SCALES - GENERAL
PAINLEVEL_OUTOF10: 0

## 2023-03-03 NOTE — DISCHARGE INSTR - COC
Continuity of Care Form    Patient Name: Nanda Ewing   :  1950  MRN:  [id number]    Admit date:  2023  Discharge date:  3/3/2023    Code Status Order: Full Code   Advance Directives:     Admitting Physician:  Jose Colindres MD  PCP: Gracie Solis    Discharging Nurse: Brijesh Unit/Room#: 7987/9256-96  Discharging Unit Phone Number: [phone]    Emergency Contact:   Extended Emergency Contact Information  Primary Emergency Contact: Diana Jean Baptiste  Address: DAUGHTER IN LAW   [address] Phone: [phone]  Relation: Child  Secondary Emergency Contact: [address] Phone: [phone]  Relation: Grandchild    Past Surgical History:  Past Surgical History:   Procedure Laterality Date    FRACTURE SURGERY      johnson in femur/hip on right    HERNIA REPAIR         Immunization History:   Immunization History   Administered Date(s) Administered    COVID-19, MODERNA BLUE border, Primary or Immunocompromised, (age 12y+), IM, 100 mcg/0.5mL 2021, 04/10/2021    COVID-19, PFIZER Bivalent BOOSTER, DO NOT Dilute, (age 12y+), IM, 30 mcg/0.3 mL 10/22/2022    COVID-19, PFIZER PURPLE top, DILUTE for use, (age 15 y+), 30mcg/0.3mL 2021    Influenza A (K7O3-56) Vaccine IM 2010    Influenza A (F5F2-99) Vaccine PF IM 2010    Influenza Virus Vaccine 2010, 2012, 10/06/2014    Influenza, FLUARIX, FLULAVAL, FLUZONE (age 10 mo+) AND AFLURIA, (age 1 y+), PF, 0.5mL 2018, 10/22/2018    Influenza, FLUZONE (age 72 y+), High Dose, 0.7mL 10/06/2020, 10/22/2022    Influenza, High Dose (Fluzone 65 yrs and older) 10/13/2015, 2019, 2021    Pneumococcal Conjugate 13-valent ([id number]) 2016    Pneumococcal Polysaccharide (Ghtgmgoxr51) 2009, 2012, 2018    Td vaccine (adult) 2007    Tdap (Boostrix, Adacel) 2012       Active Problems:  Patient Active Problem List   Diagnosis Code    Acute exacerbation of chronic obstructive pulmonary disease (COPD) ([facility] J44.1    Essential hypertension I10    Former smoker Z87.891    Atrial fibrillation (HCC) I48.91    Acute on chronic respiratory failure with hypoxia and hypercapnia (HCC) J96.21, J96.22    Acute hyponatremia E87.1    Hyperbilirubinemia E80.6    Influenzal pneumonia J11.00    Unstable angina pectoris (HCC) I20.0    Acute hypokalemia E87.6    Hypophosphatemia E83.39    Long term current use of diuretic Z79.899    Acute on chronic combined systolic (congestive) and diastolic (congestive) heart failure (HCC) I50.43    Hypotension I95.9    COPD with chronic bronchitis (HCC) J44.9    Chronic respiratory failure with hypoxia and hypercapnia (HCC) J96.11, J96.12    Nocturnal hypoxia G47.34    Acute unilateral cerebral infarction in a watershed distribution [facility]) I63.89    Aphasia due to acute cerebrovascular accident (CVA) (HCC) I63.9, R47.01    Acute cerebrovascular accident (CVA) due to stenosis of left carotid artery (HCC) M67.968    ALEXANDREA treated with BiPAP G47.33       Isolation/Infection:   Isolation            No Isolation          Patient Infection Status       Infection Onset Added Last Indicated Last Indicated By Review Planned Expiration Resolved Resolved By    None active    Resolved    COVID-19 (Rule Out) 04/03/22 04/03/22 04/03/22 COVID-19, Rapid (Ordered)   04/03/22 Rule-Out Test Resulted            Nurse Assessment:  Last Vital Signs: BP (!) 106/57   Pulse 61   Temp 97 °F (36.1 °C) (Axillary)   Resp 19   Ht 6' (1.829 m)   Wt 181 lb (82.1 kg)   SpO2 94%   BMI 24.55 kg/m²     Last documented pain score (0-10 scale): Pain Level: 0  Last Weight:   Wt Readings from Last 1 Encounters:   03/03/23 181 lb (82.1 kg)     Mental Status:  oriented    IV Access:  - Peripheral IV - site  L Basilic, insertion date: 3/2/2023 and right forearm, 3/3/2023    Nursing Mobility/ADLs:  Walking   Assisted  Transfer  Assisted  Bathing  Assisted  Dressing Assisted  Toileting  Assisted  Feeding  Independent  Med Admin  Assisted  Med Delivery   whole    Wound Care Documentation and Therapy:        Elimination:  Continence: Bowel: Yes  Bladder: Yes  Urinary Catheter: None   Colostomy/Ileostomy/Ileal Conduit: No       Date of Last BM: 3/3/2023    Intake/Output Summary (Last 24 hours) at 3/3/2023 1257  Last data filed at 3/3/2023 1016  Gross per 24 hour   Intake 971 ml   Output 867 ml   Net 104 ml     I/O last 3 completed shifts: In: 445 [P.O.:350; I.V.:321]  Out: 869 [Urine:867; Blood:2]    Safety Concerns: At Risk for Falls    Impairments/Disabilities:      None    Nutrition Therapy:  Current Nutrition Therapy:   - Oral Diet:  General    Routes of Feeding: Oral  Liquids: Thin Liquids    Rehab Therapies: Physical Therapy and Occupational Therapy  Weight Bearing Status/Restrictions: No weight bearing restrictions    RN SIGNATURE:  {Esignature:[id number]}    CASE MANAGEMENT/SOCIAL WORK SECTION    Inpatient Status Date: 3/3/2023    Readmission Risk Assessment Score:  Readmission Risk              Risk of Unplanned Readmission:  16         Discharging to Facility/ [facility]  Address: [address] [place], [place] [address]Nikita, [address] [place] [address]  Phone: [phone]    / signature: Electronically signed by Mary Rouse RN on 3/3/23 at 12:58 PM EST    PHYSICIAN SECTION    Prognosis: Good    Condition at Discharge: Stable    Rehab Potential (if transferring to Rehab): Good    Recommended Labs or Other Treatments After Discharge:     PT/OT, SLP, cognitive therapies as per rehab protocol    Cardiac meds:  Continue diltiazem 30 mg every 6 hours, okay to change to diltiazem 120 ER when tolerates all daily doses based on BP and HR parameters. Titrate diuretics to euvolemia, prior doses ranged up to torsemide 40 mg BID. Currently on torsemide 10 mg daily. Uptirate as needed based on volume status.      Neurovascular meds:  Continue aspirin and plavix through 4/16/23, then stop plavix, continue aspirin and resume eliquis 5mg BID on 4/17/23. Physician Certification: I certify the above information and transfer of Tania Disla  is necessary for the continuing treatment of the diagnosis listed and that he requires Acute Rehab for less 30 days.      Update Admission H&P: Changes in H&P as follows - as above    PHYSICIAN SIGNATURE:  Electronically signed by Ivy Mello MD on 3/6/23 at 10:21 AM EST

## 2023-03-03 NOTE — PROGRESS NOTES
Hospitalist Progress Note      PCP: Darien Kendall    Date of Admission: 2/25/2023    Subjective:  seen and examined no visitors at this time  Patient denies any active complaints. Denies any chest pain or dyspnea  Expressive aphasia present      Medications:  Reviewed    Infusion Medications    sodium chloride      sodium chloride       Scheduled Medications    [START ON 3/4/2023] dilTIAZem  120 mg Oral Daily    ipratropium-albuterol  1 ampule Inhalation BID    sodium chloride flush  5-40 mL IntraVENous 2 times per day    clopidogrel  75 mg Oral Daily    atorvastatin  80 mg Oral Nightly    sodium chloride flush  5-40 mL IntraVENous 2 times per day    [Held by provider] torsemide  40 mg Oral BID    aspirin  325 mg Oral Daily    Or    aspirin  300 mg Rectal Daily    arformoterol tartrate  15 mcg Nebulization BID    And    budesonide  0.5 mg Nebulization BID     PRN Meds: sodium chloride flush, sodium chloride, HYDROmorphone, acetaminophen, HYDROcodone 5 mg - acetaminophen **OR** HYDROcodone 5 mg - acetaminophen, ondansetron, perflutren lipid microspheres, albuterol, ondansetron **OR** [DISCONTINUED] ondansetron, polyethylene glycol, sodium chloride flush, sodium chloride      Intake/Output Summary (Last 24 hours) at 3/3/2023 1539  Last data filed at 3/3/2023 1300  Gross per 24 hour   Intake 900 ml   Output 792 ml   Net 108 ml         Physical Exam Performed:    /66   Pulse 68   Temp 98.3 °F (36.8 °C) (Temporal)   Resp 16   Ht 6' (1.829 m)   Wt 181 lb (82.1 kg)   SpO2 93%   BMI 24.55 kg/m²     General appearance: No apparent distress, appears stated age and cooperative. Respiratory:  diminished without wheezing or crackles  Cardiovascular: Regular rate and rhythm with normal S1/S2 without murmurs, rubs or gallops. Abdomen: Soft, non-tender, non-distended with normal bowel sounds. Musculoskeletal: No clubbing, cyanosis or edema bilaterally. Full range of motion without deformity.   Skin: Skin color, texture, turgor normal.  No rashes or lesions. Neurologic:  expressive aphasia present  Psychiatric: Alert and oriented, thought content appropriate, normal insight  Capillary Refill: Brisk, 3 seconds, normal   Peripheral Pulses: +2 palpable, equal bilaterally       Labs:   Recent Labs     03/01/23  0525 03/02/23  0540   WBC 13.2* 11.2*   HGB 15.6 15.1   HCT 46.1 44.8    280       Recent Labs     03/02/23  0540 03/03/23  0702    141   K 3.9 5.0    103   CO2 31 27   BUN 16 16   CREATININE 0.8 0.7*   CALCIUM 9.1 8.7       No results for input(s): AST, ALT, BILIDIR, BILITOT, ALKPHOS in the last 72 hours. No results for input(s): INR in the last 72 hours. No results for input(s): Ana Kaska in the last 72 hours. Urinalysis:      Lab Results   Component Value Date/Time    NITRU Negative 04/03/2022 01:35 PM    WBCUA 0-2 03/29/2022 03:55 PM    BACTERIA 1+ 01/11/2018 08:50 AM    RBCUA 0-2 03/29/2022 03:55 PM    BLOODU Negative 04/03/2022 01:35 PM    SPECGRAV 1.010 04/03/2022 01:35 PM    GLUCOSEU Negative 04/03/2022 01:35 PM       Radiology:  IR ANGIOGRAM CAROTID CEREBRAL RIGHT   Final Result      XR CHEST PORTABLE   Final Result      1. Findings of pulmonary edema which are increased compared to chest radiograph from one day prior. MRI brain without contrast   Final Result      Left MCA border zone infarcts without hemorrhage or mass effect.       IR ANGIOGRAM CAROTID CEREBRAL LEFT    (Results Pending)       IP CONSULT TO NEUROCRITICAL CARE  IP CONSULT TO PHARMACY  IP CONSULT TO NEUROSURGERY  IP CONSULT TO PHYSICAL MEDICINE REHAB    Assessment/Plan:    Active Hospital Problems    Diagnosis     ALEXANDREA treated with BiPAP [G47.33]      Priority: Medium    Acute cerebrovascular accident (CVA) due to stenosis of left carotid artery ([facility] [I63.232]      Priority: Medium    Acute unilateral cerebral infarction in a watershed distribution [facility]) [I63.89]      Priority: Medium Aphasia due to acute cerebrovascular accident (CVA) ([facility] [I63.9, R47.01]      Priority: Medium    COPD with chronic bronchitis ([facility] [J44.9]      Priority: Medium     Acute ischemic stroke, due to moderate right MCA, nearly occluded LICA, completely occluded verts. S/p left carotid stent placement 3/2  Plan for DAPT for 6 weeks, then can stop plavix, continue aspirin and resume eliquis  Monitor volume status closely off diuretics, on torsemide at home for diast CHF.    Cont home O2 at 3 liters, bronchodilators  Low dose diltiazem and cont to hold eliquis for 6 more weeks  PTOT     DVT Prophylaxis: scds  Diet: ADULT DIET; Easy to Chew; No Added Salt (3-4 gm)  Code Status: Full Code  PT/OT Eval Status: order    Dispo - inpatient , likely DC when ARU can accept over the weekend         Aby Shook MD

## 2023-03-03 NOTE — PROGRESS NOTES
NEUROSURGERY PROGRESS NOTE    3/3/2023 2:36 PM                               Bang Bailey                      LOS: 6 days       Subjective: Patient sitting up in chair upon entering the room. No acute events overnight. Patient continues to have expressive aphasia. Physical Exam:  Patient seen and examined    Vitals:    03/03/23 1300   BP: 100/66   Pulse: 68   Resp: 16   Temp: 98.3 °F (36.8 °C)   SpO2: 93%     GCS:  4 - Opens eyes on own  5 - Alert and oriented  6 - Follows simple motor commands  General: Well developed. Alert and cooperative in no acute distress. HENT: atraumatic, neck supple  Eyes: Optic discs: Not tested  Pulmonary: unlabored respiratory effort  Cardiovascular:  Warm well perfused. No peripheral edema  Gastrointestinal: abdomen soft, NT, ND     Neurological:  Mental Status: Awake, alert, oriented x 4, speech clear and appropriate  Attention: Intact  Language: Expressive Aphasia  Sensation: Intact to all extremities to light touch  Coordination: Intact     Cranial Nerves:  II: Visual acuity not tested, denies new visual changes / diplopia  III, IV, VI: PERRL, 3 mm bilaterally, EOMI, no nystagmus noted  V: Facial sensation intact bilaterally to touch  VII: Face symmetric  VIII: Hearing intact bilaterally to spoken voice  IX: Palate movement equal bilaterally  XI: Shoulder shrug equal bilaterally  XII: Tongue midline     Musculoskeletal:   Gait: Not tested   Assist devices: None   Tone: Normal  Motor strength:     Right  Left      Right  Left    Deltoid  5 5   Hip Flex  5 5   Biceps  5 5   Knee Extensors  5 5   Triceps  5 5   Knee Flexors  5 5   Wrist Ext  5 5   Ankle Dorsiflex. 5 5   Wrist Flex  5 5   Ankle Plantarflex. 5 5   Handgrip  5 5   Ext Gabriel Longus  5 5   Thumb Ext  5 5              Radiological Findings:  CT HEAD WO CONTRAST & CTA HEAD NECK W CONTRAST  Result Date: 2/26/2023  1. Proximal left internal carotid artery near occlusion with distal lumen collapse.  Reduced blood flow to the left MCA territory. 2. Bilateral proximal vertebral artery occlusions with distal reconstitution. 3. Age-indeterminate, possibly acute small left frontal lobe infarct. 4. Chronic thoracic compression deformities. MRI brain without contrast  Result Date: 2/25/2023  Left MCA border zone infarcts without hemorrhage or mass effect. Labs:  Recent Labs     03/02/23  0540   WBC 11.2*   HGB 15.1   HCT 44.8          Recent Labs     03/02/23  0540 03/03/23  0702    141   K 3.9 5.0    103   CO2 31 27   BUN 16 16   CREATININE 0.8 0.7*   GLUCOSE 110* 137*   CALCIUM 9.1 8.7   MG 2.00  --        No results for input(s): PROTIME, INR, APTT in the last 72 hours.     Patient Active Problem List    Diagnosis Date Noted    ALEXANDREA treated with BiPAP 02/27/2023    Acute cerebrovascular accident (CVA) due to stenosis of left carotid artery ([address] 02/26/2023    Acute unilateral cerebral infarction in a watershed distribution [facility]) 02/25/2023    Aphasia due to acute cerebrovascular accident (CVA) ([address] 02/25/2023    COPD with chronic bronchitis ([address] 11/29/2022    Chronic respiratory failure with hypoxia and hypercapnia ([address] 11/29/2022    Nocturnal hypoxia 11/29/2022    Hypotension 04/14/2022    Acute on chronic combined systolic (congestive) and diastolic (congestive) heart failure ([address] 04/03/2022    Acute hypokalemia 03/29/2022    Hypophosphatemia 03/29/2022    Long term current use of diuretic 03/29/2022    Unstable angina pectoris ([address] 04/17/2019    Atrial fibrillation ([address] 01/11/2018    Acute on chronic respiratory failure with hypoxia and hypercapnia (HCC) 01/11/2018    Acute hyponatremia 01/11/2018    Hyperbilirubinemia 01/11/2018    Influenzal pneumonia     Acute exacerbation of chronic obstructive pulmonary disease (COPD) ([address] 04/07/2011    Essential hypertension 04/07/2011    Former smoker 04/16/2010     Assessment:  Patient is a 68 y.o. male w/LICA nearly occluded and 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. Patient has a history of Atrial fibrillation for which he has previously been on Eliquis. Plan:  Neurologic exams frequency: Defer to Neurology  For change in exam MUST contact neurosurgery team along with critical care or primary team  LICA stenosis:  - s/p Left carotid stent placement with distal embolic protection device. Excellent post-stent result after placing a 6-8 mm tapered 30 mm length carotid stent with distal embolic protection within the left internal carotid and left common carotid arteries spanning the stenotic region and the cervical bifurcation.  - Groin checks per protocol  - Continue 325 mg PO Aspirin Daily and 75 mg PO Plavix Daily x 6 weeks, then stop Plavix and continue with Aspirin monotherapy  - Restart Eliquis in 6 weeks when Plavix stops  - CTA of the neck in 4-6 weeks immediately prior to follow-up as outpatient with Dr. Giuliana Handy in 4-6 weeks. - Neurology following, appreciate recs  Advance diet / activity per primary team  Will follow peripherally while inpatient. Please call with any questions or decline in neurological status     DISPO: Dispo timing to be determined by primary team once patient is medically stable for discharge. Patient was discussed and images reviewed with Dr. Giuliana Handy who agrees with above assessment and plan.      Electronically signed by: ERIN Bartlett CNP, APRN-CNP, 3/3/2023 2:36 PM  [phone]

## 2023-03-03 NOTE — PLAN OF CARE
Problem: Safety - Adult  Goal: Free from fall injury  3/3/2023 0921 by Dionicia Scheuermann, RN  Outcome: Progressing    Problem: Pain  Goal: Verbalizes/displays adequate comfort level or baseline comfort level  Outcome: Progressing

## 2023-03-03 NOTE — PROGRESS NOTES
Progress Note  Physical Medicine and Rehabilitation    Patient: Domi s  [id number]  Date: 3/3/2023      Chief Complaint: CVA    History of Present Illness/Hospital Course:  68 old male with past medical history HTN, a fib on Eliquis, HFpEF, COPD (4 L), chronic bronchitis, hernia repair who initially presented to OSH with acute onset aphasia. Currently having some speech issues but able to move his bilateral extremities. Recovering well. Plan for a procedure today but will be able to come to the ARU post ICU admission. Interval history: procedure went well. Plan for [facility]. Prior Level of Function:  Independent for mobility, ADLs, and IADLs    Current Level of Function:  Mod assist     Pertinent Social History:  Support: family in town  Home set-up: Lives in [facility]     Past Medical History:   Diagnosis Date    Atrial fibrillation ([address]     Bronchitis chronic     Emphysema of lung ([address]     HTN (hypertension)     Influenza A 01/10/2018    Lung disease     copd    Pneumonia     2009    Stroke 2023    L MCA watershed       Past Surgical History:   Procedure Laterality Date    FRACTURE SURGERY      johnson in femur/hip on right    HERNIA REPAIR         Family History   Problem Relation Age of Onset    Diabetes Sister     Cancer Sister     Cancer Sister     Cancer Brother     Asthma Daughter     Asthma Son     Diabetes Mother     Cancer Father        Social History     Socioeconomic History    Marital status:     Tobacco Use    Smoking status: Former     Packs/day: 1.00     Years: 30.00     Pack years: 30.00     Types: Cigarettes     Quit date: 2016     Years since quittin.6    Smokeless tobacco: Never    Tobacco comments:     Just quit about 3 months ago 16   Vaping Use    Vaping Use: Never used   Substance and Sexual Activity    Alcohol use: No    Drug use: No    Sexual activity: Not Currently           REVIEW OF SYSTEMS:   CONSTITUTIONAL: negative for fevers, chills, diaphoresis, appetite change, night sweats, unexpected weight change, fatigue  EYES: negative for blurred vision, eye discharge, visual disturbance and icterus  HEENT: negative for hearing loss, tinnitus, ear drainage, sinus pressure, nasal congestion, epistaxis and snoring  RESPIRATORY: Negative for hemoptysis, cough, sputum production  CARDIOVASCULAR: negative for chest pain, palpitations, exertional chest pressure/discomfort, syncope, edema  GASTROINTESTINAL: negative for nausea, vomiting, diarrhea, blood in stool, abdominal pain, constipation  GENITOURINARY: negative for frequency, dysuria, urinary incontinence, decreased urine volume, and hematuria  HEMATOLOGIC/LYMPHATIC: negative for easy bruising, bleeding and lymphadenopathy  ALLERGIC/IMMUNOLOGIC: negative for recurrent infections, angioedema, anaphylaxis and drug reactions  ENDOCRINE: negative for weight changes and diabetic symptoms including polyuria, polydipsia and polyphagia  MUSCULOSKELETAL: negative for pain, joint swelling, decreased range of motion  NEUROLOGICAL: negative for headaches, Positive slurred speech, unilateral weakness  PSYCHIATRIC/BEHAVIORAL: negative for hallucinations, behavioral problems, confusion and agitation.      Physical Examination:  Vitals: Patient Vitals for the past 24 hrs:   BP Temp Temp src Pulse Resp SpO2 Height Weight   03/03/23 0800 (!) 106/57 -- -- 56 13 92 % -- --   03/03/23 0400 112/73 97 °F (36.1 °C) Axillary 66 16 96 % -- --   03/03/23 0340 -- -- -- -- -- -- -- 181 lb (82.1 kg)   03/03/23 0200 116/74 -- -- 63 16 97 % -- --   03/03/23 0005 -- -- -- 58 14 96 % -- --   03/03/23 0000 (!) 104/92 98 °F (36.7 °C) Oral 58 16 96 % -- --   03/02/23 2324 -- -- -- 73 18 97 % -- --   03/02/23 2205 -- -- -- 61 17 98 % -- --   03/02/23 2200 (!) 108/55 98 °F (36.7 °C) Oral 66 16 95 % -- --   03/02/23 2000 111/64 98 °F (36.7 °C) Oral 65 16 95 % -- --   03/02/23 1900 (!) 140/77 -- -- 79 -- 92 % -- --   03/02/23 1800 122/77 -- -- 56 -- 92 % -- --   03/02/23 1700 108/65 -- -- 62 -- 96 % -- --   03/02/23 1600 90/63 -- -- 56 -- (!) 81 % -- --   03/02/23 1530 127/86 97.2 °F (36.2 °C) Temporal 62 18 90 % -- --   03/02/23 1500 105/64 -- -- 57 -- 93 % -- --   03/02/23 1430 (!) 100/57 -- -- 54 -- (!) 88 % -- --   03/02/23 1400 99/67 -- -- 60 -- (!) 87 % -- --   03/02/23 1345 104/62 -- -- 55 -- (!) 88 % -- --   03/02/23 1330 95/65 -- -- 54 -- 91 % -- --   03/02/23 1300 116/62 97.1 °F (36.2 °C) Temporal 63 18 92 % -- --   03/02/23 1030 125/81 -- -- 77 20 92 % -- --   03/02/23 1000 (!) 143/79 -- -- 69 19 91 % 6' (1.829 m) 174 lb 6.1 oz (79.1 kg)       Const: Alert. WDWN. No distress  Eyes: Conjunctiva noninjected, no icterus noted; pupils equal, round, and reactive to light. HENT: Atraumatic, normocephalic; Oral mucosa moist  Neck: Trachea midline, neck supple. No thyromegaly noted. CV: Regular rate and rhythm, no murmur rub or gallop noted  Resp: Lungs clear to auscultation bilaterally, no rales wheezes or ronchi, no retractions. Respirations unlabored. GI: Soft, nontender, nondistended. Normal bowel sounds. No palpable masses. Skin: Normal temperature and turgor. No rashes or breakdown noted. Ext: No significant edema appreciated. No varicosities. MSK: No joint tenderness, erythema, warmth noted. AROM intact. Neuro: Alert, oriented, appropriate. No cranial nerve deficits appreciated. Sensation intact to light touch. Motor examination reveals 4/5 strength in all four limbs diffusely.  Speech deficit   Psych: Stable mood, normal judgement, normal affect     Lab Results   Component Value Date    WBC 11.2 (H) 03/02/2023    HGB 15.1 03/02/2023    HCT 44.8 03/02/2023    MCV 91.9 03/02/2023     03/02/2023     Lab Results   Component Value Date    INR 1.13 02/25/2023    INR 1.19 (H) 01/11/2018    PROTIME 14.4 02/25/2023    PROTIME 13.4 (H) 01/11/2018     Lab Results   Component Value Date    CREATININE 0.7 (L) 03/03/2023    BUN 16 03/03/2023  03/03/2023    K 5.0 03/03/2023     03/03/2023    CO2 27 03/03/2023     Lab Results   Component Value Date    ALT 17 02/25/2023    AST 21 02/25/2023    ALKPHOS 97 02/25/2023    BILITOT 0.8 02/25/2023           IR ANGIOGRAM CAROTID CEREBRAL RIGHT   Final Result      XR CHEST PORTABLE   Final Result      1. Findings of pulmonary edema which are increased compared to chest radiograph from one day prior. MRI brain without contrast   Final Result      Left MCA border zone infarcts without hemorrhage or mass effect. IR ANGIOGRAM CAROTID CEREBRAL LEFT    (Results Pending)         Assessment:  1. Subacute ischemic CVA- MRI brain which showed left MCA border zone infarcts without hemorrhage or mass effect  - PT/OT/SLP required  - -160  2. COPD- Continue Brovana and Pulmicort treatments  - DuoNeb PRN  3. HTN- Continue home antihypertensive regimen      Impairments- Decreased functional mobility, Decreased ADLs    Recommendations:  Wants to go to [address] [place] [address] 434. Excellent candidate     Patient with new functional deficits and ongoing medical complexity. Demonstrates ability to tolerate 3 hours therapy/day. He is a good candidate for acute inpatient rehab when medically appropriate. Thank you for this consult. Please contact me with any questions or concerns.      Ramya Lilly D.O. M.P.H  PM&R  3/3/2023  9:56 AM

## 2023-03-03 NOTE — PLAN OF CARE
Problem: Discharge Planning  Goal: Discharge to home or other facility with appropriate resources  3/2/2023 1934 by April Alexey Wallace RN  Outcome: Progressing  3/2/2023 1820 by Ely Recio RN  Outcome: Progressing  Flowsheets (Taken 3/2/2023 0800)  Discharge to home or other facility with appropriate resources: Identify barriers to discharge with patient and caregiver     Problem: Safety - Adult  Goal: Free from fall injury  3/2/2023 1934 by April Alexey Wallace RN  Outcome: Progressing  3/2/2023 1820 by Ely Recio RN  Outcome: Progressing  Flowsheets (Taken 3/2/2023 1043)  Free From Fall Injury: Instruct family/caregiver on patient safety     Problem: Chronic Conditions and Co-morbidities  Goal: Patient's chronic conditions and co-morbidity symptoms are monitored and maintained or improved  3/2/2023 1820 by Ely Recio RN  Outcome: Progressing  Flowsheets (Taken 3/2/2023 0800)  Care Plan - Patient's Chronic Conditions and Co-Morbidity Symptoms are Monitored and Maintained or Improved:   Monitor and assess patient's chronic conditions and comorbid symptoms for stability, deterioration, or improvement   Collaborate with multidisciplinary team to address chronic and comorbid conditions and prevent exacerbation or deterioration

## 2023-03-03 NOTE — CARE COORDINATION
Addendum 1320  CM spoke with Dr. Kenny Caba. Neuro/NSGY are not ready to discharge today. Transportation was canceled. Updated Mayra Henriquez at [facility]. Updated Mr. Polly Anaya and his family. Case Management Assessment            Discharge Note                    Date / Time of Note: 3/3/2023 12:06 PM                  Discharge Note Completed by: Jamshid Parish RN    Mr. Polly Anaya will discharge to [address] [place] [address] today. [facility] will transport at .      RN CALL REPORT [phone]     Patient Name: Domi Scott   YOB: 1950  Diagnosis: Acute CVA (cerebrovascular accident) [facility]) [I63.9]  Aphasia due to acute cerebrovascular accident (CVA) [facility]) [I63.9, R47.01]   Date / Time: 2/25/2023  6:32 PM    Current PCP: Humberto Spain    Advance Directives:  Code Status: Full Code    Financial:  Payor: Alessandra Raman / Plan: LUIS Moreno / Product Type: *No Product type* /      Pharmacy:    [facility], [address] [place], [place] [phone] - F [phone]  [address] [place] [id number]  Phone: [phone] Fax: [phone]    [address] [phone] - f [phone]  [address] [place] [address]  Phone: [phone] Fax: [phone]    [address], [address]  [address]  Phone: [phone] Fax: [phone]      ADLS:  Current PT AM-PAC Score: 14 /24  Current OT AM-PAC Score: 15 /24    DISCHARGE Disposition:   [facility]  Address: [address] [facility] [address], [address] [place] [address]  Phone: [phone]    [address] Completed: Yes    Notification completed in HENS/PAS?:  Not Applicable    IMM Completed:   Not Indicated    Transportation:  Transportation PLAN for discharge: EMS transportation   Mode of Transport: Ambulance stretcher - BLS  Name of [address] [place] [address],[facility] [address]: [address] Phone: [phone]  Time of Transport: 2p    Transport form completed: Yes    The Patient and/or patient representative Meli Weaver and his family were provided with a choice of provider and agrees with the discharge plan Yes    Freedom of choice list was provided with basic dialogue that supports the patient's individualized plan of care/goals and shares the quality data associated with the providers.  Yes    Care Transitions patient: No    Elissa Heard RN  [facility]  Case Management Department  [phone]  used

## 2023-03-03 NOTE — PROGRESS NOTES
Speech Language Pathology  Facility/Department:[facility] ICU  Speech/Language/Dysphagia Treatment                                                         Patient Diagnosis(es):   Patient Active Problem List    Diagnosis Date Noted    ALEXANDREA treated with BiPAP 02/27/2023    Acute cerebrovascular accident (CVA) due to stenosis of left carotid artery ([address] 02/26/2023    Acute unilateral cerebral infarction in a watershed distribution [facility]) 02/25/2023    Aphasia due to acute cerebrovascular accident (CVA) ([address] 02/25/2023    COPD with chronic bronchitis ([address] 11/29/2022    Chronic respiratory failure with hypoxia and hypercapnia ([address] 11/29/2022    Nocturnal hypoxia 11/29/2022    Hypotension 04/14/2022    Acute on chronic combined systolic (congestive) and diastolic (congestive) heart failure ([address] 04/03/2022    Acute hypokalemia 03/29/2022    Hypophosphatemia 03/29/2022    Long term current use of diuretic 03/29/2022    Unstable angina pectoris ([address] 04/17/2019    Atrial fibrillation ([address] 01/11/2018    Acute on chronic respiratory failure with hypoxia and hypercapnia (HCC) 01/11/2018    Acute hyponatremia 01/11/2018    Hyperbilirubinemia 01/11/2018    Influenzal pneumonia     Acute exacerbation of chronic obstructive pulmonary disease (COPD) ([address] 04/07/2011    Essential hypertension 04/07/2011    Former smoker 04/16/2010       Past Medical History:   Diagnosis Date    Atrial fibrillation ([address]     Bronchitis chronic     Emphysema of lung ([address]     HTN (hypertension)     Influenza A 01/10/2018    Lung disease     copd    Pneumonia     12/2009    Stroke 02/25/2023    L MCA watershed     Past Surgical History:   Procedure Laterality Date    FRACTURE SURGERY      johnson in femur/hip on right    HERNIA REPAIR         Reason for Referral:  Ministerio Amaral  was referred for a Speech Therapy evaluation to assess swallow function and/or communication.     History of Present Illness  Interval history: Patient seen and examined this morning at bedside. He was sitting up comfortably in his bed. Overnight patient became short of breath. A chest x-ray was done and showed increased pulmonary edema. He was given a dose of 40 mg IV Lasix as well as DuoNeb treatments. He stated this improved his breathing. His IV fluids were stopped. He states he feels much better this morning and is having good urine output. He has no other complaints at this time. He remains afebrile and hemodynamically stable. HPI: 68 old male with past medical history HTN (Eliquis), HFpEF, COPD (4 L), chronic bronchitis, hernia repair who initially presented to OSH with acute onset aphasia. Son reported at OSH patient had waxing/waning speech changes over the past week worse than his typical stuttering. He noticed symptoms onset around 1100 on 2/24, but symptoms gradually resolved and were completely absent that night prior to bed. Patient slept in this morning until 1100 at which point family noted symptoms have recurred and he called 911. At [facility] ED, pt hemodynamically stable with mild aphasia and mild R arm drift; NIHSS 3. EKF showing AF but otherwise no acute changes. CT head w/o showed age-indeterminate small, possibly acute L frontal lobe infacrt as well as proximal left ICA near-occlusion. Pt was transferred to [facility] for neurosurgical evaluation and management. Pt in NAD on arrival. He continues with difficulty finding words; has good insight into sx. Denies any headache, fever, chills, chest pain, dyspnea, abdominal pain, nausea, vomiting, numbness, visual changes. CXR: 2/26/23  Impression       1. Findings of pulmonary edema which are increased compared to chest radiograph from one day prior.      MRI: 2/25/23  Impression       Left MCA border zone infarcts without hemorrhage or mass effect       Date of onset: 2/26/23    Current Diet:  NPO    Treatment Diagnosis:  Dysphagia, Aphasia    Pain:  None    General:  Chart reviewed: Yes  Behavior/Cognition: Cooperative, alert,   Communication Observation:  aphasic with baseline stuttering  Follows Directions: Simple  Dentition/Oral Mucosa: Edentulous with clean moist tongue  Oral motor exam: Right labial weakness especially upon retraction, reduced lateral lingual movement to the right. No tongue deviation  Vocal Quality: WFL  Respiratory Status/oxygen requirements: O2 cannula  Vision/Hearing: No glasses; Craig concerns  Patient Complaint: Trouble talking; pt and family deny problems with swallowing at breakfast yesterday just when they noticed problems with using right hand etc.   Patient Positioning: upright in chair    Prior Dysphagia History:   none    Bedside Swallowing Evaluation Impression 2/26/23:   Pt alert, oriented to self, general place following review, to year with cues, month/day of week without cues. Pt edentulous (able to eat all types of foods at home with difficulty per family, even just prior to going to hospital). Right labial weakness noted with no tongue deviation but reduced lateral lingual movement to the right. Oral acceptance of all foods presented and able to feed self. No cough/wet vocal quality/throat clearing with any po trials including thins via cup/straw for 3 oz water test and 10 single sip trials, 4 oz  puree and cracker trials (except a delayed cough 15 seconds post po trials x1). No oral residue with any po trials. Pt denies globus sensation when questioned. D/W nursing re: recent chest xray and nursing reported pt given some extra fluids last night which may be impacting current lung status (in regards to heart issues). Lungs listened to by nursing today have only been diminished. Swallow initiation fairly timely. Recommend SOFT and BITE sized with thins - If any s/s of aspiration or if lung status decline emerges, then  d/c po until we recheck.      Speech, Language, Cognitive Evaluation 2/26/23:   Pt with mild receptive and moderate expressive aphasia with baseline dysfluency. Pt alert, oriented to self, general place following review, to year with cues, month/day of week without cues. Right labial weakness noted with no tongue deviation but educed lateral lingual movement to the right. Pt with baseline stuttering but increased difficulty with word recall in past month but significantly worse past couple days. Pt with high school education and no major hobbies reported by pt /family. Hesitations with speaking noted with reduced word recall in conversation. Confrontational naming of objects, simple pictures 8/8 correct. Difficulty with recall of address, but knew birthdate. Breakdown especially following 3 step commands. Able to name 3 words in one minute given a category (up to 3 more given a cue). [place] tasks at 100%. Able to read single words without difficulty. Education  Pt educated to rationale for tx session and specific skills targeted. Prognosis:  Prognosis for improvement: Good  Barriers to reach goals: age  [de-identified] and agree with results and recommendations: yes    Treatment:  Dysphagia Goals: The pt will be seen  2-4 x per week to address the following goals:  Goal 1: The pt will tolerate least restrictive diet without s/s of aspiration  2/27: Pt seated upright in bed and seen during afternoon meal of soft and bite sized solids and thin liquids, as well as trial of regular solids. Pt demonstrated adequate, yet mildly prolonged mastication of regular solids. Pt edentulous, however expressed no difficulty. Pt appeared to swallow all trials with no overt s/s penetration/aspiration. No significant oral residue and appropriately taking liquid wash as needed. Pt indicated wanting to continue SBS prior to advancing. SLP in agreement. Cont. 2/28: Goal not targeted- pt NPO for PPM. Cont goal  3/3: Pt seen with AM meal. Upon entry, pt consuming easy to chew solids and thin liquids.  Pt edentulous but states that he is able to eat almost anything at home despite this . Pt demo'd adequate and functional mastication with complete oral clearance. Per the last dysphagia tx session (2/27), pt wanted to continue with SBS prior to advancing diet, however, orders this date were for easy to chew, which pt appeared to tolerate well. No overt clinical s/s associated with aspiration observed with any texture/consistency. Recommend continuing easy to chew solids and trials of regular solids. Cont goal     Goal 2: Pt/family will verbalize and/or demonstrate understanding of swallowing recommendations. 2/26:  Pt and family educated to role of dysphagia, what aspiration is, what s/s of aspiration are and diet/strategies recommended for pt. Pt able to demonstrate understanding by taking small amounts of food and liquid as practice at the end of the session following initial review with min to no cues. Both pt and family verbalized understanding. 2/27: SLP provided education regarding diet level rec's, swallow strategies, oral hygiene and further dysphagia treatment for safety of swallow. Pt demonstrated understanding and followed all commands to implement strategies during meal. No other family present at this time. Cont. 2/28: Goal not targeted- pt NPO for PPM. Cont goal   3/3: Pt able to independently implement all recommended swallowing strategies and aspiration precautions. Goal met- continue to ensure consistency as diet is advanced. Speech Goals: The pt will be seen  2-4 x per week to address the following goals:   Pt will follow 2- 3 step directions with 50% accuracy, mod cues. 2/27: followed 1-step with 90% accuracy. Followed 2-step with object use with ~30%. Multiple repetitions required for comprehension, as well as single step breakdown. Cont. Pt will answer yes/no questions of varying complexity with 90% accuracy, min to no cues. 2/27: answered basic yes/no with 100% accuracy.  Answered semi-complex with added prepositions with ~75%. Pt did self correct x1 for accuracy. Cont. 2/28: comparative yes/no questions: pt with perseverative response during this task resulting in 50% accuracy independently; no awareness of perseverative responses. When questions were rephrased, pt able to correct response increasing accuracy to 75% accuracy. Cont goal.   3/1: basic yes/rq=952% accuracy; semi-complex yes/no=80% accuracy. Cont. 3/2: Pt answered moderately complex yes/no questions with 93% accuracy. Cont goal     Pt will name 4-5 words in one minute given a category  2/27: not targeted this session. 2/28: goal not targeted this session. 3/1: listed x5 objects related to category of furniture with 100% accuracy. X1 paraphasia noted. Cont. 3/3: targeted without a time constraint this session, however, completed with initial letter constraint: pt completed with 25% accuracy independently, increasing to 87% accuracy given mod phonemic and semantic cues. Cont goal     Pt/family will verbalize and/or demonstrate 2-3 speech strategies to maximize verbal communication and/or fluency. 2/27: SLP discussed use of SFA for verbal descriptions when events of anomia occur. Pt utilized x1 during session to describe and communicate difficulty with self feeding with use of R hand. Pt also independently pausing and re-starting own statement prior to continuing to assist with word finding/fluency. Cont. 2/28: demo'd SFA evidenced by some description containing only content words being stated by pt; able to name high frequency objects. Completed verbal description of routine familiar task: pt appeared to have increased disfluency related to baseline fluency disorder; descriptions consisted of 2-3 content words; required mod to MAX phonemic cues during this task. Cont goal   3/1: pt attempting to utilize object description to assist with word finding/anomia at times. Benefited from min cues and increased wait time to facilitate. Cont.    3/2: connected speech/description task: pt completed with identifying objects prominent in description task. Able to provide 3-4 word utterances with content words to describe task. Automatic speech task with opposites: pt completed with 92% accuracy independently. Cont goal   3/3: Pt stated he does not feel as if his fluency disorder has worsened post CVA. Pt with increased utterances this session during conversation, as well as increased utterance length. Cont goal     Pt will participate in further reading/writing/and cognitive assessment. 2/27: noted frustrations during session. SLP provided education regarding aphasia vs fluency. Pt with continued accuracy of confrontation naming of high frequency objects and following basic commands. Responsive naming task completed with 90% accuracy. Min phonemic/semantic cues required intermittently. Cont. 2/28: completed matching verbal word to written word in visual field of 6:  pt completed with 100% accuracy independently. Cont goal   3/1: pt completed responsive naming task with 90% accuracy, increased to 100% with mod phonemic/semantic cues. Completed medication label reading+answer wh-questions with 25% accuracy. Increased difficulty noted with complex reading and wh-questions. Pt benefited from min-mod cues to assist with scanning page to locate information, as well as ? Comprehension of questions. Cont. 3/2: Targeted reading comprehension via matching picture to sentence in visual field of 2: pt completed with mod cues including some sentences read aloud. Cont goal     Plan:  Continue per POC  Recommended diet:  Easy to chew Solids, Thin Liquids-  If any s/s of aspiration or if lung status decline emerges, then  d/c po until we recheck.    Recommended form of medication: Pills with water  Swallowing Strategies:   -small bites/sips  -alternate solids/liquids  -check for pocketing  -oral hygiene 2-3x/day    Pt goal: \"all of it\" better  Pt discharge goal: Home    Total treatment time: 15 dysphagia, 10 language  Discharge Recommendation: TBD  Discussed with RN: Abby Bryant- prior to session   Needs met prior to leaving room, call light within reach    Electronically signed by:  Kj Roth, [address] [facility] [place], [address] [place] [address]  Speech-Language Pathologist  Pg #: [phone]    This document will serve as a dc summary if pt dc prior to next visit

## 2023-03-03 NOTE — PROGRESS NOTES
Patient straight catheterized with 750 mL urine return, after multiple attempts at using the urinal. Post-void bladder scan 42 mL.

## 2023-03-03 NOTE — PROGRESS NOTES
NEUROLOGY / NEUROCRITICAL CARE PROGRESS NOTE       Patient Name: Nicki Carr YOB: 1950   Sex: Male Age: 68 yrs     CC / Reason for Consult: stroke    Interval Hx / Changes over last 24 hours:   POD # 1 from stent placement, on ASA and plavix  Reports he feels well, feels like his speech is improving, continues to have some expressive aphasia  VSS overnight, no fever  Plavix initiated yesterday by Neurosurgery    ROS: aphasia is stable; no new focal deficits    HISTORY   Admission HPI:   Meeting patient for the first time. Initial consultation note was completed by Neurology CNP yesterday (2/25/23) evening, which I have reviewed. 79yo man with emphysema on home O2; HTN; afib on apixaban; HFpEF. Presented to OSH ER with abrupt onset of left arm weakness and difficulty speaking. Per my interview with pt and pt's son at the bedside:  Pt has had intermittent episodes of difficulty speaking over the last week. They seemed to come and go and were random in occurrence and resolution. However, yesterday morning son noted that pt was having great difficulty feeding himself with his right hand and his language was markedly impaired. He was brought to the ER. In the ER, BP was 125/88. Head CT demonstrated a hypodensity in the left frontal region. CTA head and neck showed bilateral occluded vertebral arteries, nearly occluded left carotid, and poor flow in left MCA territory. There was no LVO. After discussion on Viz. ai between ER physician, Stroke Team Physician, Neurovascular surgery, and myself, it was concluded that pt required ICU admission for frequent neuro checks and to ensure adequate perfusion of left MCA territory through stenotic left ICA with BP augmentation with IVF, if he tolerated, or pressor, if necessary. Ultimate plan will be for conventional angiogram with neurovascular surgery next week and CEA vs stent after that. No acute events overnight. Afebrile.  Currently, pt feels that he is improved overall. He still is having difficulty with language and describes it as difficulty in saying what he wants to say. His arm is much better than it was yesterday. His SBP has ranged from 110s to as high as 160, with most in the mid 130s. He has not had any worsening of symptoms with these blood pressures. He was on IVF through most of the night but he has developed some mild pulmonary edema and so fluids have been discontinued. he is unsure what would have precipitated these symptoms, other than the above. he feels that nothing makes them better and nothing makes them worse. he rates the symptoms as severe. he denies any other associated symptoms including no HA, no other speech/language difficulties, no swallowing difficulties, no visual changes, no diplopia, no hearing loss, no tinnitus, no vertigo, no imbalance, no light-headedness, no other focal weakness, no sensory changes, no nausea or vomiting. he has never had this problem before. There is no history of this problem or anything similar in his family members.      [facility] Past Medical History:   Diagnosis Date    Atrial fibrillation ([address]     Bronchitis chronic     Emphysema of lung (HCC)     HTN (hypertension)     Influenza A 01/10/2018    Lung disease     copd    Pneumonia     12/2009    Stroke 02/25/2023    L MCA watershed      Allergies No Known Allergies   Diet ADULT DIET; Easy to Chew; No Added Salt (3-4 gm)   Isolation No active isolations     CURRENT SCHEDULED MEDICATIONS   Inpatient Medications     ipratropium-albuterol, 1 ampule, Inhalation, BID    sodium chloride flush, 5-40 mL, IntraVENous, 2 times per day    [COMPLETED] clopidogrel, 300 mg, Oral, BID **FOLLOWED BY** clopidogrel, 75 mg, Oral, Daily    atorvastatin, 80 mg, Oral, Nightly    sodium chloride flush, 5-40 mL, IntraVENous, 2 times per day    dilTIAZem, 180 mg, Oral, Daily    [Held by provider] torsemide, 40 mg, Oral, BID    aspirin, 325 mg, Oral, Daily **OR** aspirin, 300 mg, Rectal, Daily    arformoterol tartrate, 15 mcg, Nebulization, BID **AND** budesonide, 0.5 mg, Nebulization, BID   Infusions    sodium chloride 125 mL/hr at 03/02/23 2020    sodium chloride      sodium chloride                LABS   Metabolic Panel Recent Labs     03/02/23  0540 03/03/23  0702    141   K 3.9 5.0    103   CO2 31 27   BUN 16 16   CREATININE 0.8 0.7*   GLUCOSE 110* 137*   CALCIUM 9.1 8.7   MG 2.00  --         CBC / Coags Recent Labs     03/01/23  0525 03/02/23  0540   WBC 13.2* 11.2*   RBC 5.12 4.87   HGB 15.6 15.1   HCT 46.1 44.8    280        Other No results for input(s): LABA1C, LDLCALC, TRIG, TSH, YOLCYGXG90, FOLATE, LABSALI, COVID19 in the last 72 hours. No results for input(s): PHENYTOIN, KEPPRA, LACOSA, LAMO, VALPROATE, LACTSEPSIS, LACTA in the last 72 hours.        DIAGNOSTICS   IMAGES:    No new imaging to review today    PHYSICAL EXAMINATION     PHYSICAL EXAM:  Vitals:    03/03/23 0200 03/03/23 0340 03/03/23 0400 03/03/23 0800   BP: 116/74  112/73 (!) 106/57   Pulse: 63  66 56   Resp: 16  16 13   Temp:   97 °F (36.1 °C)    TempSrc:   Axillary    SpO2: 97%  96% 92%   Weight:  181 lb (82.1 kg)     Height:           Neurologic  Mental status:   orientation to person, place, time, and situation   Attention intact as able to attend well to the exam     Language expressive aphasia remains, but appears improved since admission   Comprehension intact; follows simple commands  Cranial nerves:   CN2: Visual Fields full w/o extinction on confrontational testing,   CN 3,4,6: pupils equal and reactive to light, extraocular muscles intact,  CN5: facial sensation symmetric   CN7:face symmetric without dysarthria,   CN8: hearing symmetric to finger rub   CN9: palate elevated symmetrically  CN11: trap full strength on shoulder shrug  CN12: tongue midline with protrusion  Motor Exam:   R  L    Deltoid 5  5   Biceps 5 5   Triceps 5 5   Wrist extension  5 5 Interossei 5 5      R  L    Hip flexion  5  5   Hip extension  5 5   Knee flexion  5 5   Knee extension  5 5   Ankle dorsiflexion  5 5   Ankle plantar flexion  5 5       Sensory: light touch intact and symmetric in all 4 extremities. No sensory extinction on double simultaneous stimulation  Cerebellar/coordination: finger nose finger a bit slow on R but otherwise normal without ataxia  Tone: normal in all 4 extremities  Gait: normal gait, normal tandem    ASSESSMENT & RECOMMENDATIONS   Assessment:  Mr. Sivan Hernandez is a 77yo man with afib on apixaban and heart failure who presented with stuttering episodes of aphasia over the last week which culminated in abrupt moderate expressive aphasia and right hemiparesis, found to have 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 as well as nearly occluded LICA and completely occluded bilateral verts     SBP has ranged from 110s to as high as 160, with most in the mid 130s. Unfortunately, BP augmentation with IVF cannot be continued due to his HFpEF and the development of pulmonary edema, though his exam has remained stable with recent BPs. If his exam were to worsen will need to continue pressure modification w/ vasopressors. Recommendations  Q4 hr neuro checks  Continue ASA 325mg po / 300mg [place], plavix per Neurosurgery for stent  No anticoagulation for now given acute stroke  Overall plan with antiplatelets and anticoagulation discussed with Neurosurgery, planning for DAPT for 6 weeks, then stopping plavix and restarting eliquis at that point. Discussed with attending Neurologist and agreeable to this plan  Will need to follow up with Neurology after discharge  Stroke education at discharge  PT/OT/SLP  Notify Neurology of any exam changes  SCDs for DVT prophylaxis, can start DVT chemoprophylaxis if medically indicated if clear to do so after stent placement    Case discussed with Dr. Kurt An, Neurosurgery, and bedside RNJustice Martinez, APRN - CNP   Neurology & Neurocritical Care   3/3/2023 10:25 AM    ICU Patients:   Neurocritical Care Line: [phone]  [employer]: [address]    I spent 37 minutes in the care of this patient. Over 50% of that time was in face-to-face counseling regarding disease process, diagnostic testing, preventative measures, and answering patient and family questions.

## 2023-03-03 NOTE — PROGRESS NOTES
Physical Therapy  Facility/Department: [facility] ICU  Physical Therapy Treatment    Name: Annita Ríos  : 1950  MRN: [id number]  Date of Service: 3/3/2023    Discharge Recommendations:  Annita Ríos scored a 16/24 on the AM-PAC short mobility form. Current research shows that an AM-PAC score of 17 or less is typically not associated with a discharge to the patient's home setting. Based on the patient's AM-PAC score and their current functional mobility deficits, it is recommended that the patient have 5-7 sessions per week of Physical Therapy at d/c to increase the patient's independence. At this time, this patient demonstrates complex nursing, medical, and rehabilitative needs, and would benefit from intensive rehabilitation services upon discharge from the Inpatient setting. This patient demonstrates the ability to participate in and benefit from an intensive therapy program with a coordinated interdisciplinary team approach to foster frequent, structured, and documented communication among disciplines, who will work together to establish, prioritize, and achieve treatment goals. Please see assessment section for further patient specific details. If patient discharges prior to next session this note will serve as a discharge summary. Please see below for the latest assessment towards goals. PT Equipment Recommendations  Other: plan to continue to assess pending progress and likely defer recommendations to the next level of care - recommend RW; owns rollator      Patient Diagnosis(es): There were no encounter diagnoses. Past Medical History:  has a past medical history of Atrial fibrillation ([address] Bronchitis chronic, Emphysema of lung ([address] HTN (hypertension), Influenza A, Lung disease, Pneumonia, and Stroke. Past Surgical History:  has a past surgical history that includes fracture surgery and hernia repair.     Assessment   Body Structures, Functions, Activity Limitations Requiring Skilled Therapeutic Intervention: Decreased functional mobility ; Decreased ADL status; Decreased strength;Decreased endurance;Decreased balance;Decreased fine motor control;Decreased coordination  Assessment: Pt continues to demonstrate decreased endurance, requesting to sit after 30' of ambulation. Pt does report that he feels like he has more energy today vs previous session. Pt continues to require Kush and cues for hand placement/RW management for safety. Safety concerns for d/c home due to assist required for mobility, making him a high fall risk. Pt will continue to benefit from skilled therapy to maximize safety and independence. Treatment Diagnosis: impaired functional mobility  Activity Tolerance  Activity Tolerance: Patient limited by fatigue;Patient limited by endurance  Activity Tolerance Comments: no reports of SOB this session     Plan   Physcial Therapy Plan  General Plan: 5-7 times per week  Current Treatment Recommendations: Strengthening, ROM, Balance training, Functional mobility training, Transfer training, Endurance training, Gait training, Stair training, Neuromuscular re-education, Home exercise program, Safety education & training, Patient/Caregiver education & training, Equipment evaluation, education, & procurement, Therapeutic activities  Safety Devices  Type of Devices: All fall risk precautions in place, Call light within reach, Chair alarm in place, Gait belt, Left in chair, Nurse notified     Restrictions  Restrictions/Precautions  Restrictions/Precautions: Fall Risk  Position Activity Restriction  Other position/activity restrictions: up as tolerated, up with assistance     Subjective   General  Chart Reviewed: Yes  Patient assessed for rehabilitation services?: Yes  Additional Pertinent Hx: [facility] ED 2/25 67 yo male who presents with expressive aphasia and RUE ataxia. CT head shows an age-indeterminate, possibly acute L frontal lobe infarct.  CTA head/neck shows proximal L ICA near occlusion with distal lumen collapse, and bilateral proximal vertebral artery occlusion with distal reconstitution. PMH: HTN, HFpEF, COPD, emphysema, PNA, chronic bronchitis, hernia repair, AFib  Family / Caregiver Present: No  Referring Practitioner: Tk Eckert MD  Diagnosis: acute CVA  Follows Commands: Within Functional Limits  General Comment  Comments: Pt found seated in recliner upon PT arrival.  Pt agreeable to therapy session. Some confusion with conversation. Subjective  Subjective: \"I get a little tired after walking across the room. \"         Social/Functional History  Social/Functional History  Lives With: Family (son and daughter-in-law)  Type of Home: House  Home Layout: One level  Home Access: Stairs to enter without rails  Entrance Stairs - Number of Steps: 2 stairs to enter - uses rollator  Bathroom Shower/Tub: Walk-in shower  Bathroom Toilet: Standard  Bathroom Equipment: Shower chair  Home Equipment: [employer], 4 wheeled, Oxygen  Has the patient had two or more falls in the past year or any fall with injury in the past year?: No (\"near falls\" - attributes to dizziness when he first stands)  Receives Help From: Family (available for near 24 hour (A))  ADL Assistance: Independent  Homemaking Assistance: Needs assistance (family performs)  Ambulation Assistance: Independent (no assistive device for household mobility, limited community mobility with (S) and rollator)  Transfer Assistance: Independent  Active : No  Occupation: Retired  Vision/Hearing       Cognition         Objective   Heart Rate: [address]: Monitor  BP: (!) 106/57  BP Location: Right upper arm  BP Method: Automatic  Patient Position: Semi fowlers  MAP (Calculated): 73  Resp: 19  SpO2: 94 %  O2 Device: Nasal cannula                                Transfers  Sit to Stand: Minimal Assistance (from recliner to RW, cues for hand placement)  Stand to Sit: Minimal Assistance (cues for hand placement)  Ambulation  Surface: Level tile  Device: Rolling Walker  Other Apparatus: O2 (3L)  Assistance: Minimal assistance  Quality of Gait: decreased sasha, forward trunk flexion, ataxic, decreased B step height/length, inconsistent pattern (mostly step to leading with LLE), wide ROBERT  Distance: 30'  Comments: Distance limited by reported fatigue. No reports of SOB this session. Cues for upright posture and RW management.   Stairs/Curb  Stairs?: No        Exercise Treatment: x20 reps seated ankle pumps, LAQs, marches, glute squeezes, cues and demonstration for technique, for LE strengthening        OutComes Score                                                  AM-PAC Score  AM-PAC Inpatient Mobility Raw Score : 16 (03/03/23 1307)  AM-PAC Inpatient T-Scale Score : 40.78 (03/03/23 1307)  Mobility Inpatient CMS 0-100% Score: 54.16 (03/03/23 1307)  Mobility Inpatient CMS G-Code Modifier : CK (03/03/23 1307)          Tinneti Score       Goals  Short Term Goals  Time Frame for Short Term Goals: discharge  Short Term Goal 1: Pt. will demonstrate (I) bed mobility. -ongoing  Short Term Goal 2: Pt. will demonstrate sit <-> stand SBA and no assistive device -ongoing  Short Term Goal 3: Pt. will demonstrate stand pivot with CGA and no assistive device -ongoing  Short Term Goal 4: Pt. will ambulate >/= 150ft with CGA and LRAD -ongoing  Short Term Goal 5: Pt. will negotiate >/= 2 stairs with no rail and LRAD with min A -ongoing  Patient Goals   Patient Goals : \"get better, go home\"       Education  Patient Education  Education Given To: Patient  Education Provided Comments: seated therex  Education Method: Demonstration;Verbal  Barriers to Learning: None  Education Outcome: Verbalized understanding;Demonstrated understanding      Therapy Time   Individual Concurrent Group Co-treatment   Time In 1246         Time Out 1312         Minutes 26             Timed Code Treatment Minutes:  26    Total Treatment Minutes: Marcus Javier 76, PT   This note to serve as discharge summary if patient discharged before next session.

## 2023-03-03 NOTE — PLAN OF CARE
Aox4,on CPAP @HS, noted expressive aphasia, NIH-2,NVS q2hs, denies pain and SOB, skin CDI with exception to post operative site-no hematoma, drainage noted with distal pulses heard using doppler, call light within reach, bed on lowest position, fall precaution in place.

## 2023-03-03 NOTE — PROGRESS NOTES
Occupational Therapy  Facility/Department: [facility] ICU  Daily Treatment    Name: Bang Jean Baptiste  : 1950  MRN: [id number]  Date of Service: 3/3/2023    Discharge Recommendations:  Bang Jean Baptiste scored a 16/24 on the AM-PAC ADL Inpatient form. Current research shows that an AM-PAC score of 17 or less is typically not associated with a discharge to the patient's home setting. Based on the patient's AM-PAC score and their current ADL deficits, it is recommended that the patient have 5-7 sessions per week of Occupational Therapy at d/c to increase the patient's independence.  At this time, this patient demonstrates complex nursing, medical, and rehabilitative needs, and would benefit from intensive rehabilitation services upon discharge from the Inpatient setting.  This patient demonstrates the ability to participate in and benefit from an intensive therapy program with a coordinated interdisciplinary team approach to foster frequent, structured, and documented communication among disciplines, who will work together to establish, prioritize, and achieve treatment goals. Please see assessment section for further patient specific details.    If patient discharges prior to next session this note will serve as a discharge summary.  Please see below for the latest assessment towards goals.        OT Equipment Recommendations  Equipment Needed: No  Other: defer to next care facility     Patient Diagnosis(es): There were no encounter diagnoses.  Past Medical History:  has a past medical history of Atrial fibrillation ([facility]), Bronchitis chronic, Emphysema of lung ([facility]), HTN (hypertension), Influenza A, Lung disease, Pneumonia, and Stroke.  Past Surgical History:  has a past surgical history that includes fracture surgery and hernia repair.    Treatment Diagnosis: impaired ADLs /functional transfers / decreased RUE functional use 2/2 CVA    Assessment   Performance deficits / Impairments: Decreased functional mobility  ;Decreased endurance;Decreased ADL status; Decreased coordination;Decreased high-level IADLs;Decreased safe awareness;Decreased fine motor control;Decreased cognition;Decreased strength;Decreased balance  Assessment: Pt from home alone. Pt demo with significant decline in functional independence with mobility / transfers and ADLs/ IADLs. Pt limited by global aphasia and poor insight into defecits. Pt requires 3L O2/ poor activity tolerance at baseline. Pt would benefit from inpt OT at d/c to maximize functional level. Will follow as inpt. If home recommend 24hr assist / Miguelito Wharton. Treatment Diagnosis: impaired ADLs /functional transfers / decreased RUE functional use 2/2 CVA  Prognosis: Good  REQUIRES OT FOLLOW-UP: Yes  Activity Tolerance  Activity Tolerance: Patient limited by fatigue;Treatment limited secondary to medical complications (free text)  Activity Tolerance Comments: Pt demo increased HANKINS with minimal activity -- Pt wears 3L o2 at baseline. Seated vitals: 71bpm, 89% on 2L, increased to 3L with SPO2 = 92%. Plan   Occupational Therapy Plan  Times Per Week: 5-7x  Times Per Day: Once a day  Current Treatment Recommendations: Self-Care / ADL, Functional mobility training, Endurance training, Safety education & training, Patient/Caregiver education & training, Neuromuscular re-education, Strengthening, ROM, Balance training, Equipment evaluation, education, & procurement     Restrictions  Restrictions/Precautions  Restrictions/Precautions: Fall Risk  Position Activity Restriction  Other position/activity restrictions: up as tolerated, up with assistance    Subjective   General  Chart Reviewed: Yes  Patient assessed for rehabilitation services?: Yes  Additional Pertinent Hx: Admit [facility]  with expressive aphasia and RUE ataxia. CT head shows an age-indeterminate, possibly acute L frontal lobe infarct.  CTA head/neck shows proximal L ICA near occlusion with distal lumen collapse, and bilateral proximal vertebral artery occlusion with distal reconstitution   Neuro consult    PMHX: HTN, HFpEF, COPD, emphysema, PNA, chronic bronchitis, hernia repair, AFib  Family / Caregiver Present: No  Referring Practitioner: Jhonny Allan MD  Diagnosis: Acute L MCA  Subjective  Subjective: RN cleared pt for tx. Pt sitting in chair upon entry. Motivated. Denies pain. \"I want to get my right arm working again. \"     Social/Functional History  Social/Functional History  Lives With: Family (son and daughter-in-law)  Type of Home: House  Home Layout: One level  Home Access: Stairs to enter without rails  Entrance Stairs - Number of Steps: 2 stairs to enter - uses rollator  Bathroom Shower/Tub: Walk-in shower  Bathroom Toilet: Standard  Bathroom Equipment: Shower chair  Home Equipment: [address], 4 wheeled, Oxygen  Has the patient had two or more falls in the past year or any fall with injury in the past year?: No (\"near falls\" - attributes to dizziness when he first stands)  Receives Help From: Family (available for near 24 hour (A))  ADL Assistance: Independent  Homemaking Assistance: Needs assistance (family performs)  Ambulation Assistance: Independent (no assistive device for household mobility, limited community mobility with (S) and rollator)  Transfer Assistance: Independent  Active : No  Occupation: Retired       Objective            Safety Devices  Type of Devices: Call light within reach; Chair alarm in place;Gait belt;Left in chair;Nurse notified       Toilet Transfers  Toilet - Technique: Ambulating (with RW)  Equipment Used: Standard toilet (with L grab bar)  Toilet Transfer: Minimal assistance       ADL  UE Bathing:  (ventral seated sponge bathing)  LE Bathing: Minimal assistance (sponge bathing standing with RW and set-up, hips to knees with CGA, knees to ankles seated with set-up)  Toileting: Minimal assistance (vasyl-area hygiene standing)  Additional Comments: moderate VCs to incorporate RUE into ADL after initial education on neuro re-ed strategies             Transfers  Sit to stand: Minimal assistance  Stand to sit: Minimal assistance  Transfer Comments: Max VCs for safe t/f technique and safe use of RW       Cognition  Overall Cognitive Status: Exceptions  Arousal/Alertness: Delayed responses to stimuli  Following Commands: Follows one step commands with increased time; Follows one step commands with repetition  Attention Span: Attends with cues to redirect  Memory: Decreased short term memory  Safety Judgement: Decreased awareness of need for assistance  Problem Solving: Assistance required to generate solutions;Assistance required to implement solutions;Assistance required to identify errors made;Assistance required to correct errors made;Decreased awareness of errors  Insights: Decreased awareness of deficits  Initiation: Requires cues for some  Sequencing: Requires cues for some  Cognition Comment: increased time for processing - pt presenting with symptoms of global aphasia, expressive deficits appear more severe than receptive  Orientation  Overall Orientation Status: Within Functional Limits  Orientation Level: Oriented X4                                      Functional mobility: Pt walked to/from bathroom with gait belt, RW, and CGA with increased time. Pt completed 5 consecutive sit to stands with min A. Limited by fatigue, needing cues for RUE incorporation and proper technique.     Education: Role of OT, safe t/f training, safe use of DME, awareness of deficits, discharge planning, ADL as therapeutic exercise, importance of OOB, neuro re-ed strategies       AM-PAC Score        AM-PAC Inpatient Daily Activity Raw Score: 16 (03/03/23 1226)  AM-PAC Inpatient ADL T-Scale Score : 35.96 (03/03/23 1226)  ADL Inpatient CMS 0-100% Score: 53.32 (03/03/23 1226)  ADL Inpatient CMS G-Code Modifier : CK (03/03/23 1226)    Goals  Short Term Goals  Time Frame for Short Term Goals: at d/c  Short Term Goal 1: Stance x 5 mins with supervision for ADLs/ IADLs  Short Term Goal 2: Functional transfers with SBA  Short Term Goal 3: LE Dressing with CGA and AE prn  Short Term Goal 4: Increase functional use of RUE for all ADls to 61% of tasks. Short Term Goal 5: Pt demo independence with HEP for RUE coordination. Patient Goals   Patient goals : \"I want to get my right arm working again. \"       Therapy Time   Individual Concurrent Group Co-treatment   Time In 1120         Time Out 1218         Minutes 58         Timed Code Treatment Minutes: 62 Minutes       If patient is discharged prior to next treatment session, this note will serve as the discharge summary.   Liberty Smart, OTR/L #[id number]

## 2023-03-04 ENCOUNTER — APPOINTMENT (OUTPATIENT)
Dept: GENERAL RADIOLOGY | Age: 73
DRG: 035 | End: 2023-03-04
Attending: INTERNAL MEDICINE
Payer: MEDICARE

## 2023-03-04 PROBLEM — I49.5 TACHY-BRADY SYNDROME (HCC): Status: ACTIVE | Noted: 2023-03-04

## 2023-03-04 LAB
ANION GAP SERPL CALCULATED.3IONS-SCNC: 6 MMOL/L (ref 3–16)
BASOPHILS ABSOLUTE: 0 K/UL (ref 0–0.2)
BASOPHILS RELATIVE PERCENT: 0.4 %
BUN BLDV-MCNC: 15 MG/DL (ref 7–20)
CALCIUM SERPL-MCNC: 8.4 MG/DL (ref 8.3–10.6)
CHLORIDE BLD-SCNC: 104 MMOL/L (ref 99–110)
CO2: 29 MMOL/L (ref 21–32)
CREAT SERPL-MCNC: 0.7 MG/DL (ref 0.8–1.3)
EOSINOPHILS ABSOLUTE: 0.2 K/UL (ref 0–0.6)
EOSINOPHILS RELATIVE PERCENT: 2.2 %
GFR SERPL CREATININE-BSD FRML MDRD: >60 ML/MIN/{1.73_M2}
GLUCOSE BLD-MCNC: 113 MG/DL (ref 70–99)
HCT VFR BLD CALC: 41.5 % (ref 40.5–52.5)
HEMOGLOBIN: 13.6 G/DL (ref 13.5–17.5)
LYMPHOCYTES ABSOLUTE: 2.2 K/UL (ref 1–5.1)
LYMPHOCYTES RELATIVE PERCENT: 20.8 %
MCH RBC QN AUTO: 30.4 PG (ref 26–34)
MCHC RBC AUTO-ENTMCNC: 32.7 G/DL (ref 31–36)
MCV RBC AUTO: 92.9 FL (ref 80–100)
MONOCYTES ABSOLUTE: 1 K/UL (ref 0–1.3)
MONOCYTES RELATIVE PERCENT: 9.2 %
NEUTROPHILS ABSOLUTE: 7.2 K/UL (ref 1.7–7.7)
NEUTROPHILS RELATIVE PERCENT: 67.4 %
PDW BLD-RTO: 14.5 % (ref 12.4–15.4)
PLATELET # BLD: 321 K/UL (ref 135–450)
PMV BLD AUTO: 9.5 FL (ref 5–10.5)
POTASSIUM REFLEX MAGNESIUM: 4.3 MMOL/L (ref 3.5–5.1)
PROCALCITONIN: 0.04 NG/ML (ref 0–0.15)
RBC # BLD: 4.47 M/UL (ref 4.2–5.9)
SODIUM BLD-SCNC: 139 MMOL/L (ref 136–145)
WBC # BLD: 10.7 K/UL (ref 4–11)

## 2023-03-04 PROCEDURE — 6370000000 HC RX 637 (ALT 250 FOR IP)

## 2023-03-04 PROCEDURE — 2700000000 HC OXYGEN THERAPY PER DAY

## 2023-03-04 PROCEDURE — 94150 VITAL CAPACITY TEST: CPT

## 2023-03-04 PROCEDURE — 97110 THERAPEUTIC EXERCISES: CPT

## 2023-03-04 PROCEDURE — 94640 AIRWAY INHALATION TREATMENT: CPT

## 2023-03-04 PROCEDURE — 94660 CPAP INITIATION&MGMT: CPT

## 2023-03-04 PROCEDURE — 97530 THERAPEUTIC ACTIVITIES: CPT

## 2023-03-04 PROCEDURE — 36592 COLLECT BLOOD FROM PICC: CPT

## 2023-03-04 PROCEDURE — 6370000000 HC RX 637 (ALT 250 FOR IP): Performed by: INTERNAL MEDICINE

## 2023-03-04 PROCEDURE — 99223 1ST HOSP IP/OBS HIGH 75: CPT | Performed by: INTERNAL MEDICINE

## 2023-03-04 PROCEDURE — 36415 COLL VENOUS BLD VENIPUNCTURE: CPT

## 2023-03-04 PROCEDURE — 2580000003 HC RX 258: Performed by: ANESTHESIOLOGY

## 2023-03-04 PROCEDURE — 94664 DEMO&/EVAL PT USE INHALER: CPT

## 2023-03-04 PROCEDURE — 99232 SBSQ HOSP IP/OBS MODERATE 35: CPT | Performed by: NURSE PRACTITIONER

## 2023-03-04 PROCEDURE — 2580000003 HC RX 258

## 2023-03-04 PROCEDURE — 71045 X-RAY EXAM CHEST 1 VIEW: CPT

## 2023-03-04 PROCEDURE — 84145 PROCALCITONIN (PCT): CPT

## 2023-03-04 PROCEDURE — 94761 N-INVAS EAR/PLS OXIMETRY MLT: CPT

## 2023-03-04 PROCEDURE — 6360000002 HC RX W HCPCS

## 2023-03-04 PROCEDURE — 85025 COMPLETE CBC W/AUTO DIFF WBC: CPT

## 2023-03-04 PROCEDURE — 80048 BASIC METABOLIC PNL TOTAL CA: CPT

## 2023-03-04 PROCEDURE — 2000000000 HC ICU R&B

## 2023-03-04 PROCEDURE — 97116 GAIT TRAINING THERAPY: CPT

## 2023-03-04 RX ADMIN — CLOPIDOGREL BISULFATE 75 MG: 75 TABLET ORAL at 08:31

## 2023-03-04 RX ADMIN — BUDESONIDE 500 MCG: 0.5 SUSPENSION RESPIRATORY (INHALATION) at 20:32

## 2023-03-04 RX ADMIN — IPRATROPIUM BROMIDE AND ALBUTEROL SULFATE 1 AMPULE: 2.5; .5 SOLUTION RESPIRATORY (INHALATION) at 20:33

## 2023-03-04 RX ADMIN — ALBUTEROL SULFATE 2.5 MG: 2.5 SOLUTION RESPIRATORY (INHALATION) at 15:04

## 2023-03-04 RX ADMIN — SODIUM CHLORIDE, PRESERVATIVE FREE 10 ML: 5 INJECTION INTRAVENOUS at 20:19

## 2023-03-04 RX ADMIN — ASPIRIN 325 MG: 325 TABLET, COATED ORAL at 08:31

## 2023-03-04 RX ADMIN — IPRATROPIUM BROMIDE AND ALBUTEROL SULFATE 1 AMPULE: 2.5; .5 SOLUTION RESPIRATORY (INHALATION) at 09:35

## 2023-03-04 RX ADMIN — SODIUM CHLORIDE, PRESERVATIVE FREE 10 ML: 5 INJECTION INTRAVENOUS at 20:18

## 2023-03-04 RX ADMIN — ATORVASTATIN CALCIUM 80 MG: 80 TABLET, FILM COATED ORAL at 20:17

## 2023-03-04 RX ADMIN — ARFORMOTEROL TARTRATE 15 MCG: 15 SOLUTION RESPIRATORY (INHALATION) at 20:33

## 2023-03-04 RX ADMIN — ARFORMOTEROL TARTRATE 15 MCG: 15 SOLUTION RESPIRATORY (INHALATION) at 09:35

## 2023-03-04 RX ADMIN — SODIUM CHLORIDE, PRESERVATIVE FREE 10 ML: 5 INJECTION INTRAVENOUS at 09:24

## 2023-03-04 RX ADMIN — BUDESONIDE 500 MCG: 0.5 SUSPENSION RESPIRATORY (INHALATION) at 09:35

## 2023-03-04 RX ADMIN — SODIUM CHLORIDE, PRESERVATIVE FREE 10 ML: 5 INJECTION INTRAVENOUS at 08:36

## 2023-03-04 ASSESSMENT — PAIN SCALES - GENERAL
PAINLEVEL_OUTOF10: 0

## 2023-03-04 NOTE — RT PROTOCOL NOTE
RT Nebulizer Bronchodilator Protocol Note    There is a bronchodilator order in the chart from a provider indicating to follow the RT Bronchodilator Protocol and there is an “Initiate RT Bronchodilator Protocol” order as well (see protocol at bottom of note).    CXR Findings:  No results found.    The findings from the last RT Protocol Assessment were as follows:  Smoking: Chronic pulmonary disease  Respiratory Pattern: Regular pattern and RR 12-20 bpm  Breath Sounds: Slightly diminished and/or crackles  Cough: Strong, spontaneous, non-productive  Indication for Bronchodilator Therapy: Decreased or absent breath sounds, On home bronchodilators  Bronchodilator Assessment Score: 4    Aerosolized bronchodilator medication orders have been revised according to the RT Nebulizer Bronchodilator Protocol below.    Respiratory Therapist to perform RT Therapy Protocol Assessment initially then follow the protocol.  Repeat RT Therapy Protocol Assessment PRN for score 0-3 or on second treatment, BID, and PRN for scores above 3.    No Indications - adjust the frequency to every 6 hours PRN wheezing or bronchospasm, if no treatments needed after 48 hours then discontinue using Per Protocol order mode.     If indication present, adjust the RT bronchodilator orders based on the Bronchodilator Assessment Score as indicated below.  If a patient is on this medication at home then do not decrease Frequency below that used at home.    0-3 - enter or revise RT bronchodilator order(s) to equivalent RT Bronchodilator order with Frequency of every 4 hours PRN for wheezing or increased work of breathing using Per Protocol order mode.       4-6 - enter or revise RT Bronchodilator order(s) to two equivalent RT bronchodilator orders with one order with BID Frequency and one order with Frequency of every 4 hours PRN wheezing or increased work of breathing using Per Protocol order mode.         7-10 - enter or revise RT Bronchodilator order(s) to  two equivalent RT bronchodilator orders with one order with TID Frequency and one order with Frequency of every 4 hours PRN wheezing or increased work of breathing using Per Protocol order mode. 11-13 - enter or revise RT Bronchodilator order(s) to one equivalent RT bronchodilator order with QID Frequency and an Albuterol order with Frequency of every 4 hours PRN wheezing or increased work of breathing using Per Protocol order mode. Greater than 13 - enter or revise RT Bronchodilator order(s) to one equivalent RT bronchodilator order with every 4 hours Frequency and an Albuterol order with Frequency of every 2 hours PRN wheezing or increased work of breathing using Per Protocol order mode. RT to enter RT Home Evaluation for COPD & MDI Assessment order using Per Protocol order mode.     Electronically signed by Ryan Arriaga RCP on 3/4/2023 at 9:40 AM

## 2023-03-04 NOTE — PROGRESS NOTES
NEUROLOGY / NEUROCRITICAL CARE PROGRESS NOTE       Patient Name: Nanda Ewing YOB: 1950   Sex: Male Age: 68 yrs     CC / Reason for Consult: stroke    Interval Hx / Changes over last 24 hours:   POD # 2 from stent placement, on ASA and plavix  Reports he feels well      ROS: no complaints    HISTORY   Admission HPI:   nitial consultation note was completed by Neurology CNP yesterday (2/25/23) evening, which I have reviewed. 79yo man with emphysema on home O2; HTN; afib on apixaban; HFpEF. Presented to OSH ER with abrupt onset of left arm weakness and difficulty speaking. Per my interview with pt and pt's son at the bedside:  Pt has had intermittent episodes of difficulty speaking over the last week. They seemed to come and go and were random in occurrence and resolution. However, yesterday morning son noted that pt was having great difficulty feeding himself with his right hand and his language was markedly impaired. He was brought to the ER. In the ER, BP was 125/88. Head CT demonstrated a hypodensity in the left frontal region. CTA head and neck showed bilateral occluded vertebral arteries, nearly occluded left carotid, and poor flow in left MCA territory. There was no LVO. After discussion on Viz. ai between ER physician, Stroke Team Physician, Neurovascular surgery, and myself, it was concluded that pt required ICU admission for frequent neuro checks and to ensure adequate perfusion of left MCA territory through stenotic left ICA with BP augmentation with IVF, if he tolerated, or pressor, if necessary. Ultimate plan will be for conventional angiogram with neurovascular surgery next week and CEA vs stent after that. No acute events overnight. Afebrile. Currently, pt feels that he is improved overall. He still is having difficulty with language and describes it as difficulty in saying what he wants to say. His arm is much better than it was yesterday.       His SBP has ranged from 110s to as high as 160, with most in the mid 130s. He has not had any worsening of symptoms with these blood pressures. He was on IVF through most of the night but he has developed some mild pulmonary edema and so fluids have been discontinued. he is unsure what would have precipitated these symptoms, other than the above. he feels that nothing makes them better and nothing makes them worse. he rates the symptoms as severe. he denies any other associated symptoms including no HA, no other speech/language difficulties, no swallowing difficulties, no visual changes, no diplopia, no hearing loss, no tinnitus, no vertigo, no imbalance, no light-headedness, no other focal weakness, no sensory changes, no nausea or vomiting. he has never had this problem before. There is no history of this problem or anything similar in his family members.      [facility] Past Medical History:   Diagnosis Date    Atrial fibrillation ([facility]     Bronchitis chronic     Emphysema of lung (HCC)     HTN (hypertension)     Influenza A 01/10/2018    Lung disease     copd    Pneumonia     12/2009    Stroke 02/25/2023    L MCA watershed      Allergies No Known Allergies   Diet ADULT DIET; Easy to Chew; No Added Salt (3-4 gm)   Isolation No active isolations     CURRENT SCHEDULED MEDICATIONS   Inpatient Medications     dilTIAZem, 120 mg, Oral, Daily    ipratropium-albuterol, 1 ampule, Inhalation, BID    sodium chloride flush, 5-40 mL, IntraVENous, 2 times per day    [COMPLETED] clopidogrel, 300 mg, Oral, BID **FOLLOWED BY** clopidogrel, 75 mg, Oral, Daily    atorvastatin, 80 mg, Oral, Nightly    sodium chloride flush, 5-40 mL, IntraVENous, 2 times per day    [Held by provider] torsemide, 40 mg, Oral, BID    aspirin, 325 mg, Oral, Daily **OR** aspirin, 300 mg, Rectal, Daily    arformoterol tartrate, 15 mcg, Nebulization, BID **AND** budesonide, 0.5 mg, Nebulization, BID   Infusions    sodium chloride      sodium chloride                LABS Metabolic Panel Recent Labs     03/02/23  0540 03/03/23  0702 03/04/23  0530    141 139   K 3.9 5.0 4.3    103 104   CO2 31 27 29   BUN 16 16 15   CREATININE 0.8 0.7* 0.7*   GLUCOSE 110* 137* 113*   CALCIUM 9.1 8.7 8.4   MG 2.00  --   --         CBC / Coags Recent Labs     03/02/23  0540   WBC 11.2*   RBC 4.87   HGB 15.1   HCT 44.8           Other Recent Labs     03/03/23  1230   COVID19 Not Detected       No results for input(s): PHENYTOIN, KEPPRA, LACOSA, LAMO, VALPROATE, LACTSEPSIS, LACTA in the last 72 hours. DIAGNOSTICS   IMAGES:    No new imaging to review today    PHYSICAL EXAMINATION     PHYSICAL EXAM:  Vitals:    03/04/23 0001 03/04/23 0400 03/04/23 0600 03/04/23 0800   BP:  116/89  108/67   Pulse: 59 52  (!) 47   Resp: 13 13  12   Temp:  98.7 °F (37.1 °C)  98.5 °F (36.9 °C)   TempSrc:  Temporal  Temporal   SpO2:  100%  97%   Weight:   181 lb 10.5 oz (82.4 kg)    Height:           Neurologic  Mental status:   orientation to person, place, time, and situation   Attention intact as able to attend well to the exam     Language some mild expressive aphasia, mostly with word-finding difficulties    Comprehension intact; follows simple commands  Cranial nerves:   CN2: Visual Fields full w/o extinction on confrontational testing,   CN 3,4,6: pupils equal and reactive to light, extraocular muscles intact,  CN5: facial sensation symmetric   CN7:face symmetric without dysarthria,   CN8: hearing symmetric to finger rub   CN9: palate elevated symmetrically  CN11: trap full strength on shoulder shrug  CN12: tongue midline with protrusion  Motor Exam:   R  L    Deltoid 5  5   Biceps 5 5   Triceps 5 5   Wrist extension  5 5   Interossei 5 5      R  L    Hip flexion  5  5   Hip extension  5 5   Knee flexion  5 5   Knee extension  5 5   Ankle dorsiflexion  5 5   Ankle plantar flexion  5 5       Sensory: light touch intact and symmetric in all 4 extremities.   No sensory extinction on double simultaneous stimulation  Cerebellar/coordination: finger nose finger a bit slow on R but otherwise normal without ataxia  Tone: normal in all 4 extremities  Gait: normal gait, normal tandem    ASSESSMENT & RECOMMENDATIONS   Assessment:  Mr. Daniel Malik is a 79yo man with afib on apixaban and heart failure who presented with stuttering episodes of aphasia over the last week which culminated in abrupt moderate expressive aphasia and right hemiparesis, found to have 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 as well as nearly occluded LICA and completely occluded bilateral verts     He is POD #2 after left carotid stent      Recommendations  Q4 hr neuro checks  Continue ASA 325mg po / 300mg NH, plavix per Neurosurgery for stent  No anticoagulation for now given acute stroke  Overall plan with antiplatelets and anticoagulation discussed with Neurosurgery, planning for DAPT for 6 weeks, then stopping plavix and restarting eliquis at that point.  Discussed with attending Neurologist and agreeable to this plan  Will need to follow up with Neurology after discharge  Stroke education at discharge  PT/OT/SLP  Notify Neurology of any exam changes  SCDs for DVT prophylaxis, can start DVT chemoprophylaxis if medically indicated if clear to do so after stent placement    Case discussed with patient, bedside RN    [address] [facility]   Neurology & Neurocritical Care   3/4/2023 8:38 AM    ICU Patients:   Neurocritical Care Line: [phone]  [employer]: [facility] Neurocritical Care

## 2023-03-04 NOTE — PROGRESS NOTES
Patient remains A&O x4, with mild R sided drift and expressive aphasia. PT and OT have worked with patient. Patient continues to be short of breath after ambulating short distances. SpO2 drops low to mid 80s before returning to 90% after rest. MD is aware. Continuing to monitor.

## 2023-03-04 NOTE — CONSULTS
[place] [id number]   Cardiac Electrophysiology Consultation   Date: 3/4/2023  Admit Date:  2/25/2023  Reason for Consultation: Bradycardia  Consult Requesting Physician: Gianni Warren MD     No chief complaint on file. HPI: Chanelle Man is a 68 y.o. gentleman with a past medical history significant for permanent atrial fibrillation, on oral anticoagulation, COPD, oxygen dependent, hypertension was admitted to the hospital secondary to CVA. Is being followed by Dr. Christina Marcum at [address].  In the past, he also have episodes of A-fib with poorly controlled ventricular rate, for which he is on Cardizem 180 mg p.o. daily. Currently, he is on Cardizem 120 mg daily for management of his atrial fibrillation. Last night, patient had A-fib with slow ventricular rate. Hence, EP was consulted for further evaluation management. Patient denies any symptoms of lightheadedness or syncopal episodes. EKG shows A-fib with controlled ventricular rate    Telemetry shows A-fib with controlled ventricular rate; longest pause is less than 3 seconds    Echocardiogram showed LV ejection fraction of 60%, mild concentric LVH. 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 Fortunately, his pauses are less than 3 seconds. Moreover, patient is not symptomatic with these episodes. I stopped his Cardizem; lets monitor in telemetry  Due to the recent stroke, his Eliquis is on hold.   We will follow    Moises Zavala MD   Cardiac Electrophysiology  [address] [place] [address]  Office [phone]    Past Medical History:   Diagnosis Date    Atrial fibrillation [facility])     Bronchitis chronic     Emphysema of lung ([facility] [address]     HTN (hypertension)     Influenza A 01/10/2018    Lung disease     copd    Pneumonia     2009    Stroke 2023    L MCA watershed        Past Surgical History:   Procedure Laterality Date    FRACTURE SURGERY      johnson in femur/hip on right    HERNIA REPAIR         No Known Allergies    Social History:  Reviewed. reports that he quit smoking about 6 years ago. His smoking use included cigarettes. He has a 30.00 pack-year smoking history. He has never used smokeless tobacco. He reports that he does not drink alcohol and does not use drugs. Family History:  Reviewed. family history includes Asthma in his daughter and son; Cancer in his brother, father, sister, and sister; Diabetes in his mother and sister. No premature CAD. Review of System:  All other systems reviewed except for that noted above. Pertinent negatives and positives are:     Objective      General: negative for fever, chills   Ophthalmic ROS: negative for - eye pain or loss of vision  ENT ROS: negative for - headaches, sore throat   Respiratory: negative for - cough, sputum  Cardiovascular: Reviewed in HPI  Gastrointestinal: negative for - abdominal pain, diarrhea, N/V  Hematology: negative for - bleeding, blood clots, bruising or jaundice  Genito-Urinary:  negative for - Dysuria or incontinence  Musculoskeletal: negative for - Joint swelling, muscle pain  Neurological: negative for - confusion, dizziness, headaches   Psychiatric: No anxiety, no depression.   Dermatological: negative for - rash    Physical Examination:  Vitals:    23 1000   BP: (!) 141/80   Pulse: 72   Resp: 23   Temp:    SpO2: 91%        Intake/Output Summary (Last 24 hours) at 3/4/2023 1030  Last data filed at 3/4/2023 0600  Gross per 24 hour   Intake 480 ml   Output 475 ml   Net 5 ml     In: 840 [P.O.:840]  Out: 475    Wt Readings from Last 3 Encounters:   23 181 lb 10.5 oz (82.4 kg)   23 177 lb 14.6 oz (80.7 kg)   23 178 lb (80.7 kg)     Temp  Av.3 °F (36.8 °C)  Min: 97.8 °F (36.6 °C)  Max: 98.7 °F (37.1 °C)  Pulse  Av.7  Min: 45  Max: 72  BP  Min: 100/66  Max: 141/80  SpO2  Av.5 %  Min: 89 %  Max: 100 %  FiO2   Av %  Min: 32 %  Max: 32 %    Telemetry: A-fib with controlled ventricular rate  Constitutional: Alert. Oriented to person, place, and time. No distress. Head: Normocephalic and atraumatic. Mouth/Throat: Lips appear moist. Oropharynx is clear and moist.  Eyes: Conjunctivae normal. EOM are normal.   Neck: Neck supple. No lymphadenopathy. No rigidity. No JVD present. Cardiovascular: Normal rate, regular rhythm. Normal S1&S2. Carotid pulse 2+ bilaterally. Pulmonary/Chest: Bilateral respiratory sounds present. No respiratory accessory muscle use. No wheezes, No rhonchi. Abdominal: Soft. Normal bowel sounds present. No distension, No tenderness. No splenomegaly. No hernia. Musculoskeletal: No tenderness. No edema    Lymphadenopathy: Has no cervical adenopathy. Neurological: Alert and oriented. Cranial nerve II-XII grossly intact, No gross deficit to touch. Skin: Skin is warm and dry. No rash, lesions, ulcerations noted. Psychiatric: No anxiety nor agitation. Labs:  Reviewed. Recent Labs     23  0540 23  0702 23  0530    141 139   K 3.9 5.0 4.3    103 104   CO2 31 27 29   BUN 16 16 15   CREATININE 0.8 0.7* 0.7*     Recent Labs     23  0540   WBC 11.2*   HGB 15.1   HCT 44.8   MCV 91.9        Lab Results   Component Value Date/Time    CKTOTAL 282 2022 01:52 PM    TROPONINI <0.01 2023 02:55 PM     No results found for: BNP  Lab Results   Component Value Date/Time    PROTIME 14.4 2023 02:55 PM    PROTIME 13.4 2018 06:53 AM    INR 1.13 2023 02:55 PM    INR 1.19 2018 06:53 AM     Lab Results   Component Value Date/Time    CHOL 187 2023 04:59 AM    HDL 38 2023 04:59 AM    TRIG 105 2023 04:59 AM       Diagnostic and imaging results reviewed.      I independently reviewed the ECG and telemetry.      Scheduled Meds:   ipratropium-albuterol  1 ampule Inhalation BID    sodium chloride flush  5-40 mL IntraVENous 2 times per day    clopidogrel  75 mg Oral Daily    atorvastatin  80 mg Oral Nightly    sodium chloride flush  5-40 mL IntraVENous 2 times per day    [Held by provider] torsemide  40 mg Oral BID    aspirin  325 mg Oral Daily    Or    aspirin  300 mg Rectal Daily    arformoterol tartrate  15 mcg Nebulization BID    And    budesonide  0.5 mg Nebulization BID     Continuous Infusions:   sodium chloride      sodium chloride       PRN Meds:.sodium chloride flush, sodium chloride, HYDROmorphone, acetaminophen, HYDROcodone 5 mg - acetaminophen **OR** HYDROcodone 5 mg - acetaminophen, ondansetron, perflutren lipid microspheres, albuterol, ondansetron **OR** [DISCONTINUED] ondansetron, polyethylene glycol, sodium chloride flush, sodium chloride     Assessment:   Patient Active Problem List    Diagnosis Date Noted    ALEXANDREA treated with BiPAP 02/27/2023    Acute cerebrovascular accident (CVA) due to stenosis of left carotid artery ([address] 02/26/2023    Acute unilateral cerebral infarction in a watershed distribution [facility]) 02/25/2023    Aphasia due to acute cerebrovascular accident (CVA) ([address] 02/25/2023    COPD with chronic bronchitis ([address] 11/29/2022    Chronic respiratory failure with hypoxia and hypercapnia ([address] 11/29/2022    Nocturnal hypoxia 11/29/2022    Hypotension 04/14/2022    Acute on chronic combined systolic (congestive) and diastolic (congestive) heart failure ([address] 04/03/2022    Acute hypokalemia 03/29/2022    Hypophosphatemia 03/29/2022    Long term current use of diuretic 03/29/2022    Unstable angina pectoris ([address] 04/17/2019    Atrial fibrillation ([address] 01/11/2018    Acute on chronic respiratory failure with hypoxia and hypercapnia ([address] 01/11/2018    Acute hyponatremia 01/11/2018    Hyperbilirubinemia 01/11/2018    Influenzal pneumonia     Acute exacerbation of chronic obstructive pulmonary disease (COPD) ([facility] 04/07/2011    Essential hypertension 04/07/2011    Former smoker 04/16/2010      Active Hospital Problems    Diagnosis Date Noted    ALEXANDREA treated with BiPAP [G47.33] 02/27/2023     Priority: Medium    Acute cerebrovascular accident (CVA) due to stenosis of left carotid artery [facility]) [I63.232] 02/26/2023     Priority: Medium    Acute unilateral cerebral infarction in a watershed distribution [facility]) [I63.89] 02/25/2023     Priority: Medium    Aphasia due to acute cerebrovascular accident (CVA) ([facility] [I63.9, R47.01] 02/25/2023     Priority: Medium    COPD with chronic bronchitis ([facility] [J44.9] 11/29/2022     Priority: Medium         Recommendation (s):    Thank you for allowing me to participate in the care of Michel Adamson . If you have any questions/comments, please do not hesitate to contact us. Chhaya Grant MD   Cardiac Electrophysiology  [address] [place] [address]    For any EP related issues after 5 PM, contact [facility] on call cardiology through .

## 2023-03-04 NOTE — PLAN OF CARE
Problem: Discharge Planning  Goal: Discharge to home or other facility with appropriate resources  Outcome: Progressing     Problem: Safety - Adult  Goal: Free from fall injury  Outcome: Progressing     Problem: Chronic Conditions and Co-morbidities  Goal: Patient's chronic conditions and co-morbidity symptoms are monitored and maintained or improved  Outcome: Progressing     Problem: Pain  Goal: Verbalizes/displays adequate comfort level or baseline comfort level  Outcome: Progressing     Problem: ABCDS Injury Assessment  Goal: Absence of physical injury  Outcome: Progressing     Problem: Neurosensory - Adult  Goal: Achieves stable or improved neurological status  Outcome: Progressing  Goal: Achieves maximal functionality and self care  Outcome: Progressing

## 2023-03-04 NOTE — PROGRESS NOTES
Patient night rather unremarkable. A&Ox4. Expressive asphasia unchanged. NIH 2 and GCS 15. Weakness remains but patient tolerating ambulation to BR well. VSS. Neuro vascular assessment unchanged from baseline. Room safety measures in place. Will continue to monitor.

## 2023-03-04 NOTE — PROGRESS NOTES
At 0800, patient A&O x4. NIH 2. Mild R arm drift and expressive aphasia. Bradycardic in the 40's and 50's while awake. Cardizem held and Dr. Isabel Merrill notified and is aware. SpO2 97% with end-expiratory wheezing. Patient ambulated to bathroom. Weakness, but tolerated well. Became short of breath with return to bed. SpO2 in low 80s. Titrated nasal cannula oxygen to 6L. SpO2 at 96%. RT notified and to bedside with breathing treatment, O2 decreased to baseline 3 L. Will continue to monitor.

## 2023-03-04 NOTE — PROGRESS NOTES
Physical Therapy  Facility/Department: [facility]  Daily Treatment Note  NAME: Michel Adamson  : 1950  MRN: [id number]    Date of Service: 3/4/2023    Discharge Recommendations:  Michel Adamson scored a 16/24 on the AM-PAC short mobility form. Current research shows that an AM-PAC score of 17 or less is typically not associated with a discharge to the patient's home setting. Based on the patient's AM-PAC score and their current functional mobility deficits, it is recommended that the patient have 5-7 sessions per week of Physical Therapy at d/c to increase the patient's independence. At this time, this patient demonstrates complex nursing, medical, and rehabilitative needs, and would benefit from intensive rehabilitation services upon discharge from the Inpatient setting. This patient demonstrates the ability to participate in and benefit from an intensive therapy program with a coordinated interdisciplinary team approach to foster frequent, structured, and documented communication among disciplines, who will work together to establish, prioritize, and achieve treatment goals. Please see assessment section for further patient specific details. If patient discharges prior to next session this note will serve as a discharge summary. Please see below for the latest assessment towards goals. PT Equipment Recommendations  Equipment Needed: Yes  Mobility Devices: Pankaj Garnett: Vianca    Patient Diagnosis(es): There were no encounter diagnoses. Assessment   Assessment: Pt was agreeable to PT tx. Melany Single continues to function below his baseline mobility & requiring one person assist to complete basic functional transfers & to ambulate short distances with a walker. He demo's global weakness, dec'd act tolerance levels, dec'd safety awareness & s/s consistent with global aphasia. Inc'd time & cues for slow PLB required with all activity.  Pt on 5L O2 via NC with activity & SpO2 dec'ing to 86% & slowly rebounding to 90% with time & rest. Pt will continue to benefit from skilled PT services during his hospital stay to address the above stated deficits & to maximize his functional independence. Activity Tolerance: Patient limited by fatigue;Patient limited by endurance  Equipment Needed: Yes  Mobility Devices: [address] [place] [address]    Physcial Therapy Plan  General Plan: 5-7 times per week  Current Treatment Recommendations: Strengthening;ROM;Balance training;Functional mobility training;Transfer training; Endurance training;Gait training;Stair training;Neuromuscular re-education;Home exercise program;Safety education & training;Patient/Caregiver education & training;Equipment evaluation, education, & procurement; Therapeutic activities     Restrictions  Restrictions/Precautions  Restrictions/Precautions: Fall Risk  Position Activity Restriction  Other position/activity restrictions: up as tolerated, up with assistance     Subjective    Subjective  Subjective: Pt was agreeable to PT tx today. Pt demo's s/s of global aphasia with dysarthria noted  Pain: Pt denies any pain  Orientation  Overall Orientation Status: Within Functional Limits  Orientation Level: Oriented X4  Cognition  Overall Cognitive Status: Exceptions  Arousal/Alertness: Delayed responses to stimuli  Following Commands: Follows one step commands with increased time; Follows one step commands with repetition  Attention Span: Attends with cues to redirect  Memory: Decreased short term memory  Safety Judgement: Decreased awareness of need for assistance  Problem Solving: Assistance required to generate solutions;Assistance required to implement solutions;Assistance required to identify errors made;Assistance required to correct errors made;Decreased awareness of errors  Insights: Decreased awareness of deficits  Initiation: Requires cues for some  Sequencing: Requires cues for some  Cognition Comment: increased time for processing - pt presenting with symptoms of global aphasia, expressive deficits appear more severe than receptive     Objective   Vitals     Bed Mobility Training  Bed Mobility Training: Yes  Supine to Sit: Contact-guard assistance (with HOB elevated. +time & effortful to complete. SOB s/p task requiring cues for slow PLB)  Balance  Sitting: Intact (Mod I in recliner)  Standing: Impaired (CGA<>Avinash with RW)  Transfer Training  Transfer Training: Yes  Sit to Stand: Minimum assistance (up to RW with cues for hand placement. +time to complete & effortful.)  Stand to Sit: Minimum assistance (with cues for positioning & hand placement.)  Bed to Chair: Minimum assistance (with RW via a step pivot transfer. +time to complete with cues for sequencing.)  Gait Training  Gait Training: Yes  Gait  Overall Level of Assistance: Minimum assistance (with RW)  Gait Abnormalities:  (Pt ambulates with NBOS, inc'd trunk flexion, dec'd B step length (R more so than L), dec'd B foot clearance, dec'd B heel strike with a slow & staggering gait pattern. +time to complete. Pt quickly fatigues.)  Distance (ft):  (15' x 1 repetition, 30' x 1 repetition)     PT Exercises  Exercise Treatment: Pt participating in seated BLE therex. Exercises included: hip flexion, LAQs, APs. x 10 repetitions of each. Pt completing x 10 repetitions of shoulder flexion completed while sitting in bedside chair. x 5 repetitions completed of repetitive sit<>stand transfers. VCs & visual demo required for improved technique. +time & therapeutic rest breaks required 2/2 fatigue & SOB. Exercises completed to facilitate LE & UE strengthening. Safety Devices  Type of Devices: All fall risk precautions in place;Call light within reach; Chair alarm in place; Left in chair;Nurse notified  Restraints  Restraints Initially in Place: No       Goals  Short Term Goals  Time Frame for Short Term Goals: discharge  Short Term Goal 1: Pt. will demonstrate (I) bed mobility. -ongoing  Short Term Goal 2: Pt. will demonstrate sit <-> stand SBA and no assistive device -ongoing  Short Term Goal 3: Pt. will demonstrate stand pivot with CGA and no assistive device -ongoing  Short Term Goal 4: Pt. will ambulate >/= 150ft with CGA and LRAD -ongoing  Short Term Goal 5: Pt. will negotiate >/= 2 stairs with no rail and LRAD with min A -ongoing  Patient Goals   Patient Goals : \"get better, go home\"    Education  Patient Education  Education Given To: Patient  Education Provided: Role of Therapy;Home Exercise Program;Transfer Training  Education Method: Demonstration;Verbal  Barriers to Learning: Cognition  Education Outcome: Continued education needed    Therapy Time   Individual Concurrent Group Co-treatment   Time In 1154         Time Out 1234         Minutes 40         Timed Code Treatment Minutes: [address], PT

## 2023-03-04 NOTE — PLAN OF CARE
Problem: Discharge Planning  Goal: Discharge to home or other facility with appropriate resources  3/3/2023 1949 by Jolly Hernandez RN  Outcome: Progressing     Problem: Safety - Adult  Goal: Free from fall injury  3/3/2023 1949 by Jolly Hernandez RN  Outcome: Progressing     Problem: Chronic Conditions and Co-morbidities  Goal: Patient's chronic conditions and co-morbidity symptoms are monitored and maintained or improved  3/3/2023 1949 by Jolly Hernandez RN  Outcome: Progressing     Problem: Pain  Goal: Verbalizes/displays adequate comfort level or baseline comfort level  3/3/2023 1949 by Jolly Hernandez RN  Outcome: Progressing     Problem: Neurosensory - Adult  Goal: Achieves stable or improved neurological status  3/3/2023 1949 by Jolly Hernandez RN  Outcome: Progressing     Problem: Neurosensory - Adult  Goal: Achieves maximal functionality and self care  3/3/2023 1949 by Jolly Hernandez RN  Outcome: Progressing

## 2023-03-04 NOTE — PROGRESS NOTES
Occupational Therapy  Facility/Department: [facility] ICU  Daily Treatment    Name: Kala Damico  : 1950  MRN: [id number]  Date of Service: 3/4/2023    Discharge Recommendations:  Kala Damico scored a 16/24 on the AM-PAC ADL Inpatient form. Current research shows that an AM-PAC score of 17 or less is typically not associated with a discharge to the patient's home setting. Based on the patient's AM-PAC score and their current ADL deficits, it is recommended that the patient have 5-7 sessions per week of Occupational Therapy at d/c to increase the patient's independence. At this time, this patient demonstrates complex nursing, medical, and rehabilitative needs, and would benefit from intensive rehabilitation services upon discharge from the Inpatient setting. This patient demonstrates the ability to participate in and benefit from an intensive therapy program with a coordinated interdisciplinary team approach to foster frequent, structured, and documented communication among disciplines, who will work together to establish, prioritize, and achieve treatment goals. Please see assessment section for further patient specific details. If patient discharges prior to next session this note will serve as a discharge summary. Please see below for the latest assessment towards goals. OT Equipment Recommendations  Equipment Needed: No  Other: defer to next care facility    Patient Diagnosis(es): There were no encounter diagnoses. Past Medical History:  has a past medical history of Atrial fibrillation ([address] Bronchitis chronic, Emphysema of lung ([address] HTN (hypertension), Influenza A, Lung disease, Pneumonia, and Stroke. Past Surgical History:  has a past surgical history that includes fracture surgery and hernia repair.     Treatment Diagnosis: impaired ADLs /functional transfers / decreased RUE functional use 2/2 CVA    Assessment   Performance deficits / Impairments: Decreased functional mobility ;Decreased endurance;Decreased ADL status; Decreased coordination;Decreased high-level IADLs;Decreased safe awareness;Decreased fine motor control;Decreased cognition;Decreased strength;Decreased balance  Assessment: Pt from home alone. Pt demo with significant decline in functional independence with mobility / transfers and ADLs/ IADLs. Pt limited by global aphasia and poor insight into defecits. Pt requires 3L O2/ poor activity tolerance at baseline. Needing increased need for rest breaks today. Pt would benefit from inpt OT at d/c to maximize functional level. Will follow as inpt. If home recommend 24hr assist / [employer]. Treatment Diagnosis: impaired ADLs /functional transfers / decreased RUE functional use 2/2 CVA  Prognosis: Good  REQUIRES OT FOLLOW-UP: Yes  Activity Tolerance  Activity Tolerance Comments: Pt demo increased HANKINS with minimal activity -- Pt wears 3L O2 at baseline. Seated vitals after walking ~20ft: 77bpm, 83% on 3L needing ~3 mins to recover to 90%. RN aware of pt's request for breathing tx. Pt educated on self-advocacy for needs. Plan   Occupational Therapy Plan  Times Per Week: 5-7x  Times Per Day: Once a day  Current Treatment Recommendations: Self-Care / ADL, Functional mobility training, Endurance training, Safety education & training, Patient/Caregiver education & training, Neuromuscular re-education, Strengthening, ROM, Balance training, Equipment evaluation, education, & procurement     Restrictions  Restrictions/Precautions  Restrictions/Precautions: Fall Risk  Position Activity Restriction  Other position/activity restrictions: up as tolerated, up with assistance    Subjective   General  Chart Reviewed: Yes  Patient assessed for rehabilitation services?: Yes  Additional Pertinent Hx: Admit 2/25 from [facility]  with expressive aphasia and RUE ataxia. CT head shows an age-indeterminate, possibly acute L frontal lobe infarct.  CTA head/neck shows proximal L ICA near occlusion with distal lumen collapse, and bilateral proximal vertebral artery occlusion with distal reconstitution   Neuro consult    PMHX: HTN, HFpEF, COPD, emphysema, PNA, chronic bronchitis, hernia repair, AFib  Family / Caregiver Present: No  Referring Practitioner: Shayy Montez MD  Diagnosis: Acute L MCA  Subjective  Subjective: RN cleared pt for tx. Pt sitting in chair upon entry. Motivated. Denies pain. \"I had trouble breathing this morning. I went to long in between breathing treatments I think. \"    Pt denies any pain  Social/Functional History  Social/Functional History  Lives With: Family (son and daughter-in-law)  Type of Home: House  Home Layout: One level  Home Access: Stairs to enter without rails  Entrance Stairs - Number of Steps: 2 stairs to enter - uses rollator  Bathroom Shower/Tub: Walk-in shower  Bathroom Toilet: Standard  Bathroom Equipment: Shower chair  Home Equipment: Beccajoshua Zaragoza, 4 wheeled, Oxygen  Has the patient had two or more falls in the past year or any fall with injury in the past year?: No (\"near falls\" - attributes to dizziness when he first stands)  Receives Help From: Family (available for near 24 hour (A))  ADL Assistance: Independent  Homemaking Assistance: Needs assistance (family performs)  Ambulation Assistance: Independent (no assistive device for household mobility, limited community mobility with (S) and rollator)  Transfer Assistance: Independent  Active : No  Occupation: Retired       Objective            Safety Devices  Type of Devices: All fall risk precautions in place;Call light within reach; Chair alarm in place; Left in chair;Nurse notified  Restraints  Restraints Initially in Place: No       ADL  Additional Comments: moderate VCs to incorporate RUE into ADL after initial education on neuro re-ed strategies. Pt declining ADL at this time.      Activity Tolerance  Activity Tolerance: Patient limited by fatigue;Patient limited by endurance       Transfers  Sit to stand: Contact guard assistance  Stand to sit: Contact guard assistance  Transfer Comments: Max VCs for safe t/f technique and safe use of RW    Toilet t/f: CGA with VCs for proper technique, from ambulatory level with RW and grab bar. Cognition  Overall Cognitive Status: Exceptions  Arousal/Alertness: Delayed responses to stimuli  Following Commands: Follows one step commands with increased time; Follows one step commands with repetition  Attention Span: Attends with cues to redirect  Memory: Decreased short term memory  Safety Judgement: Decreased awareness of need for assistance  Problem Solving: Assistance required to generate solutions;Assistance required to implement solutions;Assistance required to identify errors made;Assistance required to correct errors made;Decreased awareness of errors  Insights: Decreased awareness of deficits  Initiation: Requires cues for some  Sequencing: Requires cues for some  Cognition Comment: increased time for processing - pt presenting with symptoms of global aphasia, expressive deficits appear more severe than receptive  Orientation  Overall Orientation Status: Within Functional Limits  Orientation Level: Oriented X4                                      Functional mobility: Pt walked ~20ft with increased time/effort with gait belt, RW, and CGA. Therapeutic Exercise  Pt completed 1 set, 10 reps of B digit, wrist, elbow flex/ext and supination/pronation while seated after education.    5 consecutive sit to stands from recliner    Education: Role of OT, safe t/f training, safe use of DME, awareness of deficits, discharge planning, ADL as therapeutic exercise, importance of OOB, neuro re-ed strategies, energy conservation and breathing techniques, self-advocacy       AM-PAC Score        AM-PAC Inpatient Daily Activity Raw Score: 16 (03/04/23 1437)  AM-PAC Inpatient ADL T-Scale Score : 35.96 (03/04/23 1437)  ADL Inpatient CMS 0-100% Score: 53.32 (03/04/23 1437)  ADL Inpatient CMS G-Code Modifier : CK (03/04/23 8774)    Goals  Short Term Goals  Time Frame for Short Term Goals: at d/c  Short Term Goal 1: Stance x 5 mins with supervision for ADLs/ IADLs  Short Term Goal 2: Functional transfers with SBA  Short Term Goal 3: LE Dressing with CGA and AE prn  Short Term Goal 4: Increase functional use of RUE for all ADls to 97% of tasks. Short Term Goal 5: Pt demo independence with HEP for RUE coordination. Patient Goals   Patient goals : \"I want to get my right arm working again. \"       Therapy Time   Individual Concurrent Group Co-treatment   Time In 1423         Time Out 1447         Minutes 24         Timed Code Treatment Minutes: 24 Minutes       If patient is discharged prior to next treatment session, this note will serve as the discharge summary.   Giovanny Sawyer, OTR/L #[id number]

## 2023-03-04 NOTE — PROGRESS NOTES
Patient remains A&O x4. Mild R sided drift and expressive aphasia still present, but showing improvement. Instructed patient on use of the incentive spirometer. Receptive to learning, performed adequately. Instructed to use every hour while awake. Ambulated to bathroom and back to bed. Displayed mild shortness of breath, but returned to baseline 94% SpO2 quickly. Continuing to monitor.

## 2023-03-04 NOTE — PROGRESS NOTES
Hospitalist Progress Note      PCP: Lady Blandon    Date of Admission: 2/25/2023    Subjective:  seen and examined no visitors at this time  Was short of breath after ambulation, improved with rest and breathing treatment. Tele- slow a fib, cardizem held. Consulted cardiology. Medications:  Reviewed    Infusion Medications    sodium chloride      sodium chloride       Scheduled Medications    ipratropium-albuterol  1 ampule Inhalation BID    sodium chloride flush  5-40 mL IntraVENous 2 times per day    clopidogrel  75 mg Oral Daily    atorvastatin  80 mg Oral Nightly    sodium chloride flush  5-40 mL IntraVENous 2 times per day    [Held by provider] torsemide  40 mg Oral BID    aspirin  325 mg Oral Daily    Or    aspirin  300 mg Rectal Daily    arformoterol tartrate  15 mcg Nebulization BID    And    budesonide  0.5 mg Nebulization BID     PRN Meds: sodium chloride flush, sodium chloride, HYDROmorphone, acetaminophen, HYDROcodone 5 mg - acetaminophen **OR** HYDROcodone 5 mg - acetaminophen, ondansetron, perflutren lipid microspheres, albuterol, ondansetron **OR** [DISCONTINUED] ondansetron, polyethylene glycol, sodium chloride flush, sodium chloride      Intake/Output Summary (Last 24 hours) at 3/4/2023 1248  Last data filed at 3/4/2023 1035  Gross per 24 hour   Intake 720 ml   Output 475 ml   Net 245 ml         Physical Exam Performed:    /81   Pulse 73   Temp 98.3 °F (36.8 °C) (Temporal)   Resp 23   Ht 6' (1.829 m)   Wt 181 lb 10.5 oz (82.4 kg)   SpO2 91%   BMI 24.64 kg/m²     General appearance: No apparent distress, appears stated age and cooperative. Respiratory:  diminished without wheezing or crackles  Cardiovascular: Regular rate and rhythm with normal S1/S2 without murmurs, rubs or gallops. Abdomen: Soft, non-tender, non-distended with normal bowel sounds. Musculoskeletal: No clubbing, cyanosis or edema bilaterally. Full range of motion without deformity.   Skin: Skin color, texture, turgor normal.  No rashes or lesions. Neurologic:  expressive aphasia present  Psychiatric: Alert and oriented, thought content appropriate, normal insight  Capillary Refill: Brisk, 3 seconds, normal   Peripheral Pulses: +2 palpable, equal bilaterally       Labs:   Recent Labs     03/02/23  0540   WBC 11.2*   HGB 15.1   HCT 44.8          Recent Labs     03/02/23  0540 03/03/23  0702 03/04/23  0530    141 139   K 3.9 5.0 4.3    103 104   CO2 31 27 29   BUN 16 16 15   CREATININE 0.8 0.7* 0.7*   CALCIUM 9.1 8.7 8.4       No results for input(s): AST, ALT, BILIDIR, BILITOT, ALKPHOS in the last 72 hours. No results for input(s): INR in the last 72 hours. No results for input(s): [place] in the last 72 hours. Urinalysis:      Lab Results   Component Value Date/Time    NITRU Negative 04/03/2022 01:35 PM    WBCUA 0-2 03/29/2022 03:55 PM    BACTERIA 1+ 01/11/2018 08:50 AM    RBCUA 0-2 03/29/2022 03:55 PM    BLOODU Negative 04/03/2022 01:35 PM    SPECGRAV 1.010 04/03/2022 01:35 PM    GLUCOSEU Negative 04/03/2022 01:35 PM       Radiology:  IR ANGIOGRAM CAROTID CEREBRAL RIGHT   Final Result      XR CHEST PORTABLE   Final Result      1. Findings of pulmonary edema which are increased compared to chest radiograph from one day prior. MRI brain without contrast   Final Result      Left MCA border zone infarcts without hemorrhage or mass effect.       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]      Priority: Medium    ALEXANDREA treated with BiPAP [G47.33]      Priority: Medium    Acute cerebrovascular accident (CVA) due to stenosis of left carotid artery ([facility] [I63.232] Priority: Medium    Acute unilateral cerebral infarction in a watershed distribution [facility]) [I63.89]      Priority: Medium    Aphasia due to acute cerebrovascular accident (CVA) ([address] [I63.9, R47.01]      Priority: Medium    COPD with chronic bronchitis ([address] [J44.9]      Priority: Medium     Acute ischemic stroke, due to moderate right MCA, nearly occluded LICA, completely occluded verts. S/p left carotid stent placement 3/2  Plan for DAPT for 6 weeks, then can stop plavix, continue aspirin and resume eliquis  Monitor volume status closely off diuretics, on torsemide at home for diast CHF. Cont home O2 at 3 liters, bronchodilators  Obtain CXR today  hold diltiazem due to slow a fib and consult cardiology.  cont to hold eliquis for 6 more weeks  PTOT     DVT Prophylaxis: scds  Diet: ADULT DIET; Easy to Chew; No Added Salt (3-4 gm)  Code Status: Full Code  PT/OT Eval Status: order    Dispo - inpatient , likely DC when ARU can accept over the weekend Ru         Юлия Santos MD

## 2023-03-05 LAB
ANION GAP SERPL CALCULATED.3IONS-SCNC: 5 MMOL/L (ref 3–16)
BUN BLDV-MCNC: 13 MG/DL (ref 7–20)
CALCIUM SERPL-MCNC: 8.6 MG/DL (ref 8.3–10.6)
CHLORIDE BLD-SCNC: 105 MMOL/L (ref 99–110)
CO2: 33 MMOL/L (ref 21–32)
CREAT SERPL-MCNC: 0.7 MG/DL (ref 0.8–1.3)
GFR SERPL CREATININE-BSD FRML MDRD: >60 ML/MIN/{1.73_M2}
GLUCOSE BLD-MCNC: 123 MG/DL (ref 70–99)
POTASSIUM REFLEX MAGNESIUM: 4.5 MMOL/L (ref 3.5–5.1)
SODIUM BLD-SCNC: 143 MMOL/L (ref 136–145)

## 2023-03-05 PROCEDURE — 99232 SBSQ HOSP IP/OBS MODERATE 35: CPT | Performed by: INTERNAL MEDICINE

## 2023-03-05 PROCEDURE — 6370000000 HC RX 637 (ALT 250 FOR IP): Performed by: INTERNAL MEDICINE

## 2023-03-05 PROCEDURE — 6370000000 HC RX 637 (ALT 250 FOR IP)

## 2023-03-05 PROCEDURE — 94150 VITAL CAPACITY TEST: CPT

## 2023-03-05 PROCEDURE — 2580000003 HC RX 258: Performed by: ANESTHESIOLOGY

## 2023-03-05 PROCEDURE — 94664 DEMO&/EVAL PT USE INHALER: CPT

## 2023-03-05 PROCEDURE — 94640 AIRWAY INHALATION TREATMENT: CPT

## 2023-03-05 PROCEDURE — 80048 BASIC METABOLIC PNL TOTAL CA: CPT

## 2023-03-05 PROCEDURE — 2000000000 HC ICU R&B

## 2023-03-05 PROCEDURE — 94761 N-INVAS EAR/PLS OXIMETRY MLT: CPT

## 2023-03-05 PROCEDURE — 36415 COLL VENOUS BLD VENIPUNCTURE: CPT

## 2023-03-05 PROCEDURE — 94660 CPAP INITIATION&MGMT: CPT

## 2023-03-05 PROCEDURE — 2700000000 HC OXYGEN THERAPY PER DAY

## 2023-03-05 PROCEDURE — 2580000003 HC RX 258

## 2023-03-05 PROCEDURE — 6360000002 HC RX W HCPCS

## 2023-03-05 RX ORDER — TORSEMIDE 20 MG/1
40 TABLET ORAL DAILY
Status: DISCONTINUED | OUTPATIENT
Start: 2023-03-05 | End: 2023-03-06

## 2023-03-05 RX ORDER — PREDNISONE 20 MG/1
20 TABLET ORAL DAILY
Status: DISCONTINUED | OUTPATIENT
Start: 2023-03-05 | End: 2023-03-06 | Stop reason: HOSPADM

## 2023-03-05 RX ORDER — IPRATROPIUM BROMIDE AND ALBUTEROL SULFATE 2.5; .5 MG/3ML; MG/3ML
1 SOLUTION RESPIRATORY (INHALATION) 4 TIMES DAILY
Status: DISCONTINUED | OUTPATIENT
Start: 2023-03-05 | End: 2023-03-06 | Stop reason: HOSPADM

## 2023-03-05 RX ORDER — GUAIFENESIN 600 MG/1
600 TABLET, EXTENDED RELEASE ORAL 2 TIMES DAILY
Status: DISCONTINUED | OUTPATIENT
Start: 2023-03-05 | End: 2023-03-06 | Stop reason: HOSPADM

## 2023-03-05 RX ORDER — DILTIAZEM HYDROCHLORIDE 120 MG/1
120 CAPSULE, COATED, EXTENDED RELEASE ORAL DAILY
Status: DISCONTINUED | OUTPATIENT
Start: 2023-03-05 | End: 2023-03-05

## 2023-03-05 RX ORDER — FAMOTIDINE 20 MG/1
20 TABLET, FILM COATED ORAL 2 TIMES DAILY
Status: DISCONTINUED | OUTPATIENT
Start: 2023-03-05 | End: 2023-03-06 | Stop reason: HOSPADM

## 2023-03-05 RX ADMIN — DILTIAZEM HYDROCHLORIDE 30 MG: 30 TABLET, FILM COATED ORAL at 10:21

## 2023-03-05 RX ADMIN — IPRATROPIUM BROMIDE AND ALBUTEROL SULFATE 1 AMPULE: 2.5; .5 SOLUTION RESPIRATORY (INHALATION) at 22:47

## 2023-03-05 RX ADMIN — SODIUM CHLORIDE, PRESERVATIVE FREE 10 ML: 5 INJECTION INTRAVENOUS at 09:40

## 2023-03-05 RX ADMIN — FAMOTIDINE 20 MG: 20 TABLET ORAL at 21:04

## 2023-03-05 RX ADMIN — PREDNISONE 20 MG: 20 TABLET ORAL at 13:38

## 2023-03-05 RX ADMIN — CLOPIDOGREL BISULFATE 75 MG: 75 TABLET ORAL at 09:11

## 2023-03-05 RX ADMIN — ARFORMOTEROL TARTRATE 15 MCG: 15 SOLUTION RESPIRATORY (INHALATION) at 09:56

## 2023-03-05 RX ADMIN — FAMOTIDINE 20 MG: 20 TABLET ORAL at 15:02

## 2023-03-05 RX ADMIN — TORSEMIDE 40 MG: 20 TABLET ORAL at 10:21

## 2023-03-05 RX ADMIN — ASPIRIN 325 MG: 325 TABLET, COATED ORAL at 09:11

## 2023-03-05 RX ADMIN — DILTIAZEM HYDROCHLORIDE 30 MG: 30 TABLET, FILM COATED ORAL at 18:01

## 2023-03-05 RX ADMIN — BUDESONIDE 500 MCG: 0.5 SUSPENSION RESPIRATORY (INHALATION) at 22:47

## 2023-03-05 RX ADMIN — BUDESONIDE 500 MCG: 0.5 SUSPENSION RESPIRATORY (INHALATION) at 09:56

## 2023-03-05 RX ADMIN — ARFORMOTEROL TARTRATE 15 MCG: 15 SOLUTION RESPIRATORY (INHALATION) at 22:47

## 2023-03-05 RX ADMIN — SODIUM CHLORIDE, PRESERVATIVE FREE 10 ML: 5 INJECTION INTRAVENOUS at 21:04

## 2023-03-05 RX ADMIN — GUAIFENESIN 600 MG: 600 TABLET, EXTENDED RELEASE ORAL at 21:04

## 2023-03-05 RX ADMIN — IPRATROPIUM BROMIDE AND ALBUTEROL SULFATE 1 AMPULE: 2.5; .5 SOLUTION RESPIRATORY (INHALATION) at 17:26

## 2023-03-05 RX ADMIN — SODIUM CHLORIDE, PRESERVATIVE FREE 10 ML: 5 INJECTION INTRAVENOUS at 21:05

## 2023-03-05 RX ADMIN — IPRATROPIUM BROMIDE AND ALBUTEROL SULFATE 1 AMPULE: 2.5; .5 SOLUTION RESPIRATORY (INHALATION) at 09:56

## 2023-03-05 RX ADMIN — ATORVASTATIN CALCIUM 80 MG: 80 TABLET, FILM COATED ORAL at 21:04

## 2023-03-05 RX ADMIN — GUAIFENESIN 600 MG: 600 TABLET, EXTENDED RELEASE ORAL at 15:02

## 2023-03-05 ASSESSMENT — PAIN SCALES - GENERAL
PAINLEVEL_OUTOF10: 0

## 2023-03-05 NOTE — PLAN OF CARE
Problem: Discharge Planning  Goal: Discharge to home or other facility with appropriate resources  3/4/2023 1928 by Shreyas Raygoza RN  Outcome: Progressing     Problem: Safety - Adult  Goal: Free from fall injury  3/4/2023 1928 by Shreyas Raygoza RN  Outcome: Progressing     Problem: Pain  Goal: Verbalizes/displays adequate comfort level or baseline comfort level  3/4/2023 1928 by Shreyas Raygoza RN  Outcome: Progressing     Problem: ABCDS Injury Assessment  Goal: Absence of physical injury  3/4/2023 1928 by Shreyas Raygoza RN  Outcome: Progressing     Problem: Neurosensory - Adult  Goal: Achieves stable or improved neurological status  3/4/2023 1928 by Shreyas Raygoza RN  Outcome: Progressing     Problem: Neurosensory - Adult  Goal: Achieves maximal functionality and self care  3/4/2023 1928 by Shreyas Raygoza RN  Outcome: Progressing

## 2023-03-05 NOTE — PROGRESS NOTES
Patient /98 at 0800 assessment. Patient denied any pain. A&O x4. NIH 2, with mild drift on right side. Mild expressive aphasia present. Administered Cardizem and torsemide per order at 1021. Remains baseline hypertensive, exacerbated by ambulation.

## 2023-03-05 NOTE — PROGRESS NOTES
Patient A&Ox4. VSS and on 3L NC. Room safety measures in place. Neuro unchanged over shift. Will continue to monitor.

## 2023-03-05 NOTE — PROGRESS NOTES
During 1600 assessment, patient stated feeling dry and congested. Patient displayed tiredness and leaning over in chair. Output 4,455 mL from 0700 to 1600. Oral temperature 99.9. A&O x4. NIH 2, with R sided drift and expressive aphasia still present. This student nurse returned the patient to the bed and dimmed the lights. Patient tolerated well 5 feet of ambulation with no apparent SOB. Call light and urinal within reach. Patient resting comfortably. Continuing to monitor.

## 2023-03-05 NOTE — PROGRESS NOTES
Patient A&Ox4. Ambulated to BR. Desat to 80's. O2 increased and regained target spo2 of 90's. Patient resting comfortably will call light in hand. Room safety measures in place. VSS. Will continue to monitor.

## 2023-03-05 NOTE — PROGRESS NOTES
Pulmonary Followup Note    CC: ischemic stroke, COPD with chronic hypoxemic respiratory failure  Subjective:  Patient continues to have some word find problems but feels it's getting a little better. He is having more dyspnea and cough than he was a couple days ago when I transferred him out of the ICU    ROS:  Denies headache, nausea or chest pain. 24HR INTAKE/OUTPUT:    Intake/Output Summary (Last 24 hours) at 3/5/2023 1200  Last data filed at 3/5/2023 1135  Gross per 24 hour   Intake 1060 ml   Output 2300 ml   Net -1240 ml        torsemide  40 mg Oral Daily    dilTIAZem  30 mg Oral 4 times per day    predniSONE  20 mg Oral Daily    ipratropium-albuterol  1 ampule Inhalation BID    sodium chloride flush  5-40 mL IntraVENous 2 times per day    clopidogrel  75 mg Oral Daily    atorvastatin  80 mg Oral Nightly    sodium chloride flush  5-40 mL IntraVENous 2 times per day    aspirin  325 mg Oral Daily    Or    aspirin  300 mg Rectal Daily    arformoterol tartrate  15 mcg Nebulization BID    And    budesonide  0.5 mg Nebulization BID           PHYSICAL EXAMINATION:  /79   Pulse 93   Temp 98.3 °F (36.8 °C) (Oral)   Resp 19   Ht 6' (1.829 m)   Wt 182 lb 1.6 oz (82.6 kg)   SpO2 96%   BMI 24.70 kg/m²   CURRENT PULSE OXIMETRY:  SpO2: 96 %  24HR PULSE OXIMETRY RANGE:  SpO2  Av.5 %  Min: 84 %  Max: 100 % on 3L      Gen: mild distress. Speaking in full sentences with trace accessory muscle use  HEENT: PERRL, EOMI, OP nl  Lung:  diminished breath sounds throughout. No wheezing or ronchi. CV: RRR without M/R/R  Abd: +BS, soft, NT/ND  Ext: No edema.     DATA  CBC:   Recent Labs     23  1621   WBC 10.7   HGB 13.6   HCT 41.5   MCV 92.9        BMP:   Recent Labs     23  0702 23  0530 23  0501    139 143   K 5.0 4.3 4.5    104 105   CO2 27 29 33*   BUN 16 15 13   CREATININE 0.7* 0.7* 0.7*     No results for input(s): PHART, XMM5SSV, PO2ART in the last 72 hours. LIVER PROFILE: No results for input(s): AST, ALT, LIPASE, BILIDIR, BILITOT, ALKPHOS in the last 72 hours. Invalid input(s): AMYLASE,  ALB      CXR REVIEWED BY ME AND SHOWED:  XR CHEST PORTABLE   Final Result      Bibasilar airspace density/atelectasis. No other acute cardiopulmonary findings. IR ANGIOGRAM CAROTID CEREBRAL RIGHT   Final Result      XR CHEST PORTABLE   Final Result      1. Findings of pulmonary edema which are increased compared to chest radiograph from one day prior. MRI brain without contrast   Final Result      Left MCA border zone infarcts without hemorrhage or mass effect. IR ANGIOGRAM CAROTID CEREBRAL LEFT    (Results Pending)        ASSESSMENT/PLAN:  This is a 68 y.o. male with ischemic stroke, mild aphasia and COPD with chronic respiratory failure    Clinically looks about the same as when I last saw him, but subjectively says he's more short of breath and coughing more. He feels like this is how he feels when he gets a burst of steroids. With the severity of his COPD he may not move enough air to wheeze, so I'll give him a low burst to see if that gets him over the hump. Continue his home bronchodilators.         Marla Licea MD

## 2023-03-05 NOTE — PROGRESS NOTES
[facility]   Cardiac Electrophysiology Progress Note     Admit Date: 2023     Reason for follow up: A-fib    HPI and Interval History:   Patient seen and examined. Clinical notes reviewed. Telemetry reviewed. No new complaint today. No further long pauses noted  Continues to be on oxygen    Review of System:  All other systems reviewed except for that noted above. Pertinent negatives and positives are:     General: negative for fever, chills   Ophthalmic ROS: negative for - eye pain or loss of vision  ENT ROS: negative for - headaches, sore throat   Respiratory: negative for - cough, sputum  Cardiovascular: Reviewed in HPI  Gastrointestinal: negative for - abdominal pain, diarrhea, N/V  Hematology: negative for - bleeding, blood clots, bruising or jaundice  Genito-Urinary:  negative for - Dysuria or incontinence  Musculoskeletal: negative for - Joint swelling, muscle pain  Neurological: negative for - confusion, dizziness, headaches   Psychiatric: No anxiety, no depression. Dermatological: negative for - rash      Physical Examination:  Vitals:    23 1419   BP:    Pulse: 79   Resp: 18   Temp:    SpO2: 99%        Intake/Output Summary (Last 24 hours) at 3/5/2023 1524  Last data filed at 3/5/2023 1502  Gross per 24 hour   Intake 580 ml   Output 4605 ml   Net -4025 ml     In: 1300 [P.O.:1300]  Out: 4730    Wt Readings from Last 3 Encounters:   23 182 lb 1.6 oz (82.6 kg)   23 177 lb 14.6 oz (80.7 kg)   23 178 lb (80.7 kg)     Temp  Av.2 °F (36.8 °C)  Min: 97.9 °F (36.6 °C)  Max: 98.5 °F (36.9 °C)  Pulse  Av.3  Min: 62  Max: 94  BP  Min: 122/75  Max: 164/89  SpO2  Av.3 %  Min: 84 %  Max: 100 %  FiO2   Av %  Min: 32 %  Max: 32 %    Telemetry: A-fib with decent ventricular rate control  Constitutional: Alert. Oriented to person, place, and time. No distress. Head: Normocephalic and atraumatic.    Mouth/Throat: Lips appear moist. Oropharynx is clear and moist.  Eyes: Conjunctivae normal. EOM are normal.   Neck: Neck supple. No lymphadenopathy. No rigidity. No JVD present. Cardiovascular: Normal rate, irregular rhythm. Normal S1&S2. Carotid pulse 2+ bilaterally. Pulmonary/Chest: Bilateral respiratory sounds present. No respiratory accessory muscle use. No wheezes, No rhonchi. Abdominal: Soft. Normal bowel sounds present. No distension, No tenderness. No splenomegaly. No hernia. Musculoskeletal: No tenderness. No edema    Lymphadenopathy: Has no cervical adenopathy. Neurological: Alert and oriented. Cranial nerve II-XII grossly intact, No gross deficit to touch. Skin: Skin is warm and dry. No rash, lesions, ulcerations noted. Psychiatric: No anxiety nor agitation. Labs, diagnostic and imaging results reviewed. Reviewed. Recent Labs     03/03/23  0702 03/04/23  0530 03/05/23  0501    139 143   K 5.0 4.3 4.5    104 105   CO2 27 29 33*   BUN 16 15 13   CREATININE 0.7* 0.7* 0.7*     Recent Labs     03/04/23  1621   WBC 10.7   HGB 13.6   HCT 41.5   MCV 92.9        Lab Results   Component Value Date/Time    CKTOTAL 282 03/29/2022 01:52 PM    TROPONINI <0.01 02/25/2023 02:55 PM     Estimated Creatinine Clearance: 103 mL/min (A) (based on SCr of 0.7 mg/dL (L)).    No results found for: BNP  Lab Results   Component Value Date/Time    PROTIME 14.4 02/25/2023 02:55 PM    PROTIME 13.4 01/11/2018 06:53 AM    INR 1.13 02/25/2023 02:55 PM    INR 1.19 01/11/2018 06:53 AM     Lab Results   Component Value Date/Time    CHOL 187 02/27/2023 04:59 AM    HDL 38 02/27/2023 04:59 AM    TRIG 105 02/27/2023 04:59 AM       Scheduled Meds:   torsemide  40 mg Oral Daily    dilTIAZem  30 mg Oral 4 times per day    predniSONE  20 mg Oral Daily    ipratropium-albuterol  1 ampule Inhalation 4x daily    guaiFENesin  600 mg Oral BID    famotidine  20 mg Oral BID    sodium chloride flush  5-40 mL IntraVENous 2 times per day    clopidogrel  75 mg Oral Daily atorvastatin  80 mg Oral Nightly    sodium chloride flush  5-40 mL IntraVENous 2 times per day    aspirin  325 mg Oral Daily    Or    aspirin  300 mg Rectal Daily    arformoterol tartrate  15 mcg Nebulization BID    And    budesonide  0.5 mg Nebulization BID     Continuous Infusions:   sodium chloride      sodium chloride       PRN Meds:sodium chloride flush, sodium chloride, HYDROmorphone, acetaminophen, HYDROcodone 5 mg - acetaminophen **OR** HYDROcodone 5 mg - acetaminophen, ondansetron, perflutren lipid microspheres, albuterol, ondansetron **OR** [DISCONTINUED] ondansetron, polyethylene glycol, sodium chloride flush, sodium chloride     Patient Active Problem List    Diagnosis Date Noted    Tachy-bear syndrome ([facility] [address] 03/04/2023    ALEXANDREA treated with BiPAP 02/27/2023    Acute cerebrovascular accident (CVA) due to stenosis of left carotid artery ([address] 02/26/2023    Acute unilateral cerebral infarction in a watershed distribution [facility]) 02/25/2023    Aphasia due to acute cerebrovascular accident (CVA) ([address] 02/25/2023    COPD with chronic bronchitis ([address] 11/29/2022    Chronic respiratory failure with hypoxia and hypercapnia ([address] 11/29/2022    Nocturnal hypoxia 11/29/2022    Hypotension 04/14/2022    Acute on chronic combined systolic (congestive) and diastolic (congestive) heart failure ([address] 04/03/2022    Acute hypokalemia 03/29/2022    Hypophosphatemia 03/29/2022    Long term current use of diuretic 03/29/2022    Unstable angina pectoris ([address] 04/17/2019    Atrial fibrillation ([address] 01/11/2018    Acute on chronic respiratory failure with hypoxia and hypercapnia ([address] 01/11/2018    Acute hyponatremia 01/11/2018    Hyperbilirubinemia 01/11/2018    Influenzal pneumonia     Acute exacerbation of chronic obstructive pulmonary disease (COPD) ([address] 04/07/2011    Essential hypertension 04/07/2011    Former smoker 04/16/2010      Active Hospital Problems    Diagnosis Date Noted    Tachy-bear syndrome ([address] [I49.5] 03/04/2023     Priority: Medium    ALEXANDREA treated with BiPAP [G47.33] 02/27/2023     Priority: Medium    Acute cerebrovascular accident (CVA) due to stenosis of left carotid artery [facility]) [I63.232] 02/26/2023     Priority: Medium    Acute unilateral cerebral infarction in a watershed distribution [facility]) [I63.89] 02/25/2023     Priority: Medium    Aphasia due to acute cerebrovascular accident (CVA) ([facility] [address] [I63.9, R47.01] 02/25/2023     Priority: Medium    COPD with chronic bronchitis ([facility] [address] [J44.9] 11/29/2022     Priority: Medium       Assessment and Plan:     Impression:  Tachy-bear syndrome, 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 I will restart him on Cardizem 30 mg p.o. every 6 hours and assess his heart response in the next 1 day. Due to the recent stroke, his Eliquis is on hold. Thank you for allowing me to participate in the care of this patient. If you have any questions, please do not hesitate to contact me. Regi Nair MD   Cardiac Electrophysiology  [address] [facility]    For any EP related issues after 5 PM, contact Lisa 81 on call cardiology through .

## 2023-03-05 NOTE — PROGRESS NOTES
Hospitalist Progress Note      PCP: Maine Niño    Date of Admission: 2/25/2023    Subjective:  seen and examined no visitors at this time  Called family (son and dtr) and left VM. Patient has been having desaturating on ambulation. He recovers with rest. Feels more short of breath compared to his baseline. Does not looks overly overloaded. Doing IS with RT. Was in slow a fib yesterday, no up to normal range a fib. Medications:  Reviewed    Infusion Medications    sodium chloride      sodium chloride       Scheduled Medications    torsemide  40 mg Oral Daily    dilTIAZem  30 mg Oral 4 times per day    predniSONE  20 mg Oral Daily    ipratropium-albuterol  1 ampule Inhalation BID    sodium chloride flush  5-40 mL IntraVENous 2 times per day    clopidogrel  75 mg Oral Daily    atorvastatin  80 mg Oral Nightly    sodium chloride flush  5-40 mL IntraVENous 2 times per day    aspirin  325 mg Oral Daily    Or    aspirin  300 mg Rectal Daily    arformoterol tartrate  15 mcg Nebulization BID    And    budesonide  0.5 mg Nebulization BID     PRN Meds: sodium chloride flush, sodium chloride, HYDROmorphone, acetaminophen, HYDROcodone 5 mg - acetaminophen **OR** HYDROcodone 5 mg - acetaminophen, ondansetron, perflutren lipid microspheres, albuterol, ondansetron **OR** [DISCONTINUED] ondansetron, polyethylene glycol, sodium chloride flush, sodium chloride      Intake/Output Summary (Last 24 hours) at 3/5/2023 1410  Last data filed at 3/5/2023 1404  Gross per 24 hour   Intake 580 ml   Output 4055 ml   Net -3475 ml         Physical Exam Performed:    BP (!) 149/79   Pulse 93   Temp 98.5 °F (36.9 °C) (Oral)   Resp 19   Ht 6' (1.829 m)   Wt 182 lb 1.6 oz (82.6 kg)   SpO2 92%   BMI 24.70 kg/m²     General appearance: mild resp distress, appears stated age and cooperative.   Respiratory:  diminished without wheezing or crackles  Cardiovascular: Regular rate and rhythm with normal S1/S2 without murmurs, rubs or gallops. Abdomen: Soft, non-tender, non-distended with normal bowel sounds. Musculoskeletal: No clubbing, cyanosis. Trace pitting ankle edema  Skin: Skin color, texture, turgor normal.  No rashes or lesions. Neurologic:  expressive aphasia present  Psychiatric: Alert and oriented, thought content appropriate, normal insight  Capillary Refill: Brisk, 3 seconds, normal   Peripheral Pulses: +2 palpable, equal bilaterally       Labs:   Recent Labs     03/04/23  1621   WBC 10.7   HGB 13.6   HCT 41.5          Recent Labs     03/03/23  0702 03/04/23  0530 03/05/23  0501    139 143   K 5.0 4.3 4.5    104 105   CO2 27 29 33*   BUN 16 15 13   CREATININE 0.7* 0.7* 0.7*   CALCIUM 8.7 8.4 8.6       No results for input(s): AST, ALT, BILIDIR, BILITOT, ALKPHOS in the last 72 hours. No results for input(s): INR in the last 72 hours. No results for input(s): Essie Holliston in the last 72 hours. Urinalysis:      Lab Results   Component Value Date/Time    NITRU Negative 04/03/2022 01:35 PM    WBCUA 0-2 03/29/2022 03:55 PM    BACTERIA 1+ 01/11/2018 08:50 AM    RBCUA 0-2 03/29/2022 03:55 PM    BLOODU Negative 04/03/2022 01:35 PM    SPECGRAV 1.010 04/03/2022 01:35 PM    GLUCOSEU Negative 04/03/2022 01:35 PM       Radiology:  XR CHEST PORTABLE   Final Result      Bibasilar airspace density/atelectasis. No other acute cardiopulmonary findings. IR ANGIOGRAM CAROTID CEREBRAL RIGHT   Final Result      XR CHEST PORTABLE   Final Result      1. Findings of pulmonary edema which are increased compared to chest radiograph from one day prior. MRI brain without contrast   Final Result      Left MCA border zone infarcts without hemorrhage or mass effect.       IR ANGIOGRAM CAROTID CEREBRAL LEFT    (Results Pending)       IP CONSULT TO NEUROCRITICAL CARE  IP CONSULT TO PHARMACY  IP CONSULT TO NEUROSURGERY  IP CONSULT TO PHYSICAL MEDICINE REHAB  IP CONSULT TO CARDIOLOGY  IP CONSULT TO PULMONOLOGY    Assessment/Plan:    Active Hospital Problems    Diagnosis     Tachy-bear syndrome ([facility] [address] [I49.5]      Priority: Medium    ALEXANDREA treated with BiPAP [G47.33]      Priority: Medium    Acute cerebrovascular accident (CVA) due to stenosis of left carotid artery (HCC) [I63.232]      Priority: Medium    Acute unilateral cerebral infarction in a watershed distribution [facility]) [I63.89]      Priority: Medium    Aphasia due to acute cerebrovascular accident (CVA) ([facility] [address] [I63.9, R47.01]      Priority: Medium    COPD with chronic bronchitis ([facility] [address] [J44.9]      Priority: Medium     Acute ischemic stroke, due to moderate right MCA, nearly occluded LICA, completely occluded verts. S/p left carotid stent placement 3/2  Plan for DAPT for 6 weeks, then can stop plavix, continue aspirin and resume eliquis  Resume torsemide for d CHF  Cont home O2 at 3 liters, bronchodilators  CXR negative for pulm edema or consoliaation- encouraged IS  Consulted pulmonology for eval for his dyspnea  Reduced dose of dilt and on immediate release.  cont to hold eliquis for 6 more weeks  PTOT     DVT Prophylaxis: scds  Diet: ADULT DIET; Easy to Chew; No Added Salt (3-4 gm)  Code Status: Full Code  PT/OT Eval Status: order    Dispo - inpatient, held DC due to fluctuating HR and also worsening resp status today        Carmelo Zepeda MD

## 2023-03-06 ENCOUNTER — HOSPITAL ENCOUNTER (INPATIENT)
Age: 73
DRG: 057 | End: 2023-03-06
Attending: PHYSICAL MEDICINE & REHABILITATION | Admitting: STUDENT IN AN ORGANIZED HEALTH CARE EDUCATION/TRAINING PROGRAM
Payer: MEDICARE

## 2023-03-06 VITALS
HEART RATE: 85 BPM | WEIGHT: 181.44 LBS | RESPIRATION RATE: 14 BRPM | HEIGHT: 72 IN | OXYGEN SATURATION: 95 % | DIASTOLIC BLOOD PRESSURE: 72 MMHG | BODY MASS INDEX: 24.58 KG/M2 | SYSTOLIC BLOOD PRESSURE: 124 MMHG | TEMPERATURE: 99 F

## 2023-03-06 DIAGNOSIS — J44.9 CHRONIC OBSTRUCTIVE PULMONARY DISEASE, UNSPECIFIED COPD TYPE (HCC): ICD-10-CM

## 2023-03-06 DIAGNOSIS — I49.5 TACHY-BRADY SYNDROME (HCC): Primary | ICD-10-CM

## 2023-03-06 DIAGNOSIS — I48.21 PERMANENT ATRIAL FIBRILLATION (HCC): ICD-10-CM

## 2023-03-06 PROBLEM — I63.9 ACUTE CVA (CEREBROVASCULAR ACCIDENT) (HCC): Status: ACTIVE | Noted: 2023-03-06

## 2023-03-06 LAB
ANION GAP SERPL CALCULATED.3IONS-SCNC: 13 MMOL/L (ref 3–16)
BUN BLDV-MCNC: 18 MG/DL (ref 7–20)
CALCIUM SERPL-MCNC: 8.9 MG/DL (ref 8.3–10.6)
CHLORIDE BLD-SCNC: 96 MMOL/L (ref 99–110)
CO2: 34 MMOL/L (ref 21–32)
CREAT SERPL-MCNC: 0.9 MG/DL (ref 0.8–1.3)
GFR SERPL CREATININE-BSD FRML MDRD: >60 ML/MIN/{1.73_M2}
GLUCOSE BLD-MCNC: 130 MG/DL (ref 70–99)
POTASSIUM REFLEX MAGNESIUM: 4.3 MMOL/L (ref 3.5–5.1)
SODIUM BLD-SCNC: 143 MMOL/L (ref 136–145)

## 2023-03-06 PROCEDURE — 94640 AIRWAY INHALATION TREATMENT: CPT

## 2023-03-06 PROCEDURE — 6370000000 HC RX 637 (ALT 250 FOR IP): Performed by: INTERNAL MEDICINE

## 2023-03-06 PROCEDURE — 99233 SBSQ HOSP IP/OBS HIGH 50: CPT | Performed by: NURSE PRACTITIONER

## 2023-03-06 PROCEDURE — 6370000000 HC RX 637 (ALT 250 FOR IP): Performed by: STUDENT IN AN ORGANIZED HEALTH CARE EDUCATION/TRAINING PROGRAM

## 2023-03-06 PROCEDURE — 1280000000 HC REHAB R&B

## 2023-03-06 PROCEDURE — 92507 TX SP LANG VOICE COMM INDIV: CPT

## 2023-03-06 PROCEDURE — 6360000002 HC RX W HCPCS: Performed by: STUDENT IN AN ORGANIZED HEALTH CARE EDUCATION/TRAINING PROGRAM

## 2023-03-06 PROCEDURE — 97110 THERAPEUTIC EXERCISES: CPT

## 2023-03-06 PROCEDURE — 6370000000 HC RX 637 (ALT 250 FOR IP): Performed by: NURSE PRACTITIONER

## 2023-03-06 PROCEDURE — 94761 N-INVAS EAR/PLS OXIMETRY MLT: CPT

## 2023-03-06 PROCEDURE — 97530 THERAPEUTIC ACTIVITIES: CPT

## 2023-03-06 PROCEDURE — 94660 CPAP INITIATION&MGMT: CPT

## 2023-03-06 PROCEDURE — 6360000002 HC RX W HCPCS

## 2023-03-06 PROCEDURE — 6370000000 HC RX 637 (ALT 250 FOR IP)

## 2023-03-06 PROCEDURE — 36415 COLL VENOUS BLD VENIPUNCTURE: CPT

## 2023-03-06 PROCEDURE — 80048 BASIC METABOLIC PNL TOTAL CA: CPT

## 2023-03-06 PROCEDURE — 97116 GAIT TRAINING THERAPY: CPT

## 2023-03-06 PROCEDURE — 2700000000 HC OXYGEN THERAPY PER DAY

## 2023-03-06 PROCEDURE — 2580000003 HC RX 258

## 2023-03-06 PROCEDURE — 99232 SBSQ HOSP IP/OBS MODERATE 35: CPT | Performed by: INTERNAL MEDICINE

## 2023-03-06 RX ORDER — ACETAMINOPHEN 325 MG/1
650 TABLET ORAL EVERY 4 HOURS PRN
Status: DISCONTINUED | OUTPATIENT
Start: 2023-03-06 | End: 2023-03-15 | Stop reason: HOSPADM

## 2023-03-06 RX ORDER — HYDROCODONE BITARTRATE AND ACETAMINOPHEN 5; 325 MG/1; MG/1
2 TABLET ORAL EVERY 4 HOURS PRN
Status: CANCELLED | OUTPATIENT
Start: 2023-03-06

## 2023-03-06 RX ORDER — PREDNISONE 20 MG/1
20 TABLET ORAL DAILY
Status: COMPLETED | OUTPATIENT
Start: 2023-03-07 | End: 2023-03-11

## 2023-03-06 RX ORDER — POLYETHYLENE GLYCOL 3350 17 G/17G
17 POWDER, FOR SOLUTION ORAL DAILY PRN
Status: DISCONTINUED | OUTPATIENT
Start: 2023-03-06 | End: 2023-03-15 | Stop reason: HOSPADM

## 2023-03-06 RX ORDER — ASPIRIN 300 MG/1
300 SUPPOSITORY RECTAL DAILY
Status: DISCONTINUED | OUTPATIENT
Start: 2023-03-07 | End: 2023-03-08

## 2023-03-06 RX ORDER — CLOPIDOGREL BISULFATE 75 MG/1
75 TABLET ORAL DAILY
Qty: 40 TABLET | Refills: 0 | Status: ON HOLD | DISCHARGE
Start: 2023-03-07 | End: 2023-04-16

## 2023-03-06 RX ORDER — BUDESONIDE 0.5 MG/2ML
0.5 INHALANT ORAL 2 TIMES DAILY
Status: DISCONTINUED | OUTPATIENT
Start: 2023-03-06 | End: 2023-03-15 | Stop reason: HOSPADM

## 2023-03-06 RX ORDER — IPRATROPIUM BROMIDE AND ALBUTEROL SULFATE 2.5; .5 MG/3ML; MG/3ML
1 SOLUTION RESPIRATORY (INHALATION) 4 TIMES DAILY
Status: DISCONTINUED | OUTPATIENT
Start: 2023-03-06 | End: 2023-03-06

## 2023-03-06 RX ORDER — ONDANSETRON 4 MG/1
4 TABLET, ORALLY DISINTEGRATING ORAL EVERY 8 HOURS PRN
Status: CANCELLED | OUTPATIENT
Start: 2023-03-06

## 2023-03-06 RX ORDER — FAMOTIDINE 20 MG/1
20 TABLET, FILM COATED ORAL 2 TIMES DAILY
Status: CANCELLED | OUTPATIENT
Start: 2023-03-06

## 2023-03-06 RX ORDER — ENOXAPARIN SODIUM 100 MG/ML
40 INJECTION SUBCUTANEOUS DAILY
Status: DISCONTINUED | OUTPATIENT
Start: 2023-03-07 | End: 2023-03-15 | Stop reason: HOSPADM

## 2023-03-06 RX ORDER — PREDNISONE 20 MG/1
20 TABLET ORAL DAILY
Qty: 5 TABLET | Refills: 0 | Status: ON HOLD | DISCHARGE
Start: 2023-03-07 | End: 2023-03-12

## 2023-03-06 RX ORDER — FAMOTIDINE 20 MG/1
20 TABLET, FILM COATED ORAL 2 TIMES DAILY
Status: DISCONTINUED | OUTPATIENT
Start: 2023-03-06 | End: 2023-03-15 | Stop reason: HOSPADM

## 2023-03-06 RX ORDER — BUDESONIDE 0.5 MG/2ML
0.5 INHALANT ORAL 2 TIMES DAILY
Status: CANCELLED | OUTPATIENT
Start: 2023-03-06

## 2023-03-06 RX ORDER — SODIUM CHLORIDE 0.9 % (FLUSH) 0.9 %
5-40 SYRINGE (ML) INJECTION EVERY 12 HOURS SCHEDULED
OUTPATIENT
Start: 2023-03-06

## 2023-03-06 RX ORDER — ATORVASTATIN CALCIUM 80 MG/1
80 TABLET, FILM COATED ORAL NIGHTLY
Qty: 30 TABLET | Refills: 3 | Status: ON HOLD | DISCHARGE
Start: 2023-03-06

## 2023-03-06 RX ORDER — IPRATROPIUM BROMIDE AND ALBUTEROL SULFATE 2.5; .5 MG/3ML; MG/3ML
1 SOLUTION RESPIRATORY (INHALATION) 4 TIMES DAILY
Status: CANCELLED | OUTPATIENT
Start: 2023-03-06

## 2023-03-06 RX ORDER — TORSEMIDE 10 MG/1
10 TABLET ORAL DAILY
Status: ON HOLD | DISCHARGE
Start: 2023-03-06

## 2023-03-06 RX ORDER — ARFORMOTEROL TARTRATE 15 UG/2ML
15 SOLUTION RESPIRATORY (INHALATION) 2 TIMES DAILY
Status: CANCELLED | OUTPATIENT
Start: 2023-03-06

## 2023-03-06 RX ORDER — DILTIAZEM HYDROCHLORIDE 60 MG/1
30 TABLET, FILM COATED ORAL EVERY 6 HOURS SCHEDULED
Status: DISCONTINUED | OUTPATIENT
Start: 2023-03-06 | End: 2023-03-08

## 2023-03-06 RX ORDER — PREDNISONE 20 MG/1
20 TABLET ORAL DAILY
Status: CANCELLED | OUTPATIENT
Start: 2023-03-07

## 2023-03-06 RX ORDER — CLOPIDOGREL BISULFATE 75 MG/1
75 TABLET ORAL DAILY
Status: CANCELLED | OUTPATIENT
Start: 2023-03-07

## 2023-03-06 RX ORDER — ALBUTEROL SULFATE 2.5 MG/3ML
2.5 SOLUTION RESPIRATORY (INHALATION) EVERY 4 HOURS PRN
Status: CANCELLED | OUTPATIENT
Start: 2023-03-06

## 2023-03-06 RX ORDER — IPRATROPIUM BROMIDE AND ALBUTEROL SULFATE 2.5; .5 MG/3ML; MG/3ML
1 SOLUTION RESPIRATORY (INHALATION) 2 TIMES DAILY
Status: DISCONTINUED | OUTPATIENT
Start: 2023-03-06 | End: 2023-03-08

## 2023-03-06 RX ORDER — HYDROCODONE BITARTRATE AND ACETAMINOPHEN 5; 325 MG/1; MG/1
1 TABLET ORAL EVERY 4 HOURS PRN
Status: CANCELLED | OUTPATIENT
Start: 2023-03-06

## 2023-03-06 RX ORDER — ARFORMOTEROL TARTRATE 15 UG/2ML
15 SOLUTION RESPIRATORY (INHALATION) 2 TIMES DAILY
Status: DISCONTINUED | OUTPATIENT
Start: 2023-03-06 | End: 2023-03-15 | Stop reason: HOSPADM

## 2023-03-06 RX ORDER — POLYETHYLENE GLYCOL 3350 17 G/17G
17 POWDER, FOR SOLUTION ORAL DAILY PRN
Status: CANCELLED | OUTPATIENT
Start: 2023-03-06

## 2023-03-06 RX ORDER — HYDROCODONE BITARTRATE AND ACETAMINOPHEN 5; 325 MG/1; MG/1
1 TABLET ORAL EVERY 4 HOURS PRN
Status: DISCONTINUED | OUTPATIENT
Start: 2023-03-06 | End: 2023-03-15 | Stop reason: HOSPADM

## 2023-03-06 RX ORDER — ONDANSETRON 4 MG/1
4 TABLET, ORALLY DISINTEGRATING ORAL EVERY 8 HOURS PRN
Status: DISCONTINUED | OUTPATIENT
Start: 2023-03-06 | End: 2023-03-15 | Stop reason: HOSPADM

## 2023-03-06 RX ORDER — SODIUM CHLORIDE 0.9 % (FLUSH) 0.9 %
5-40 SYRINGE (ML) INJECTION PRN
Status: DISCONTINUED | OUTPATIENT
Start: 2023-03-06 | End: 2023-03-11

## 2023-03-06 RX ORDER — CLOPIDOGREL BISULFATE 75 MG/1
75 TABLET ORAL DAILY
Status: DISCONTINUED | OUTPATIENT
Start: 2023-03-07 | End: 2023-03-15 | Stop reason: HOSPADM

## 2023-03-06 RX ORDER — GUAIFENESIN 600 MG/1
600 TABLET, EXTENDED RELEASE ORAL 2 TIMES DAILY
Status: DISCONTINUED | OUTPATIENT
Start: 2023-03-06 | End: 2023-03-15 | Stop reason: HOSPADM

## 2023-03-06 RX ORDER — ASPIRIN 300 MG/1
300 SUPPOSITORY RECTAL DAILY
Status: CANCELLED | OUTPATIENT
Start: 2023-03-07

## 2023-03-06 RX ORDER — HYDROCODONE BITARTRATE AND ACETAMINOPHEN 5; 325 MG/1; MG/1
2 TABLET ORAL EVERY 4 HOURS PRN
Status: DISCONTINUED | OUTPATIENT
Start: 2023-03-06 | End: 2023-03-15 | Stop reason: HOSPADM

## 2023-03-06 RX ORDER — ENOXAPARIN SODIUM 100 MG/ML
40 INJECTION SUBCUTANEOUS DAILY
Status: CANCELLED | OUTPATIENT
Start: 2023-03-07

## 2023-03-06 RX ORDER — ACETAMINOPHEN 325 MG/1
650 TABLET ORAL EVERY 4 HOURS PRN
Status: CANCELLED | OUTPATIENT
Start: 2023-03-06

## 2023-03-06 RX ORDER — SODIUM CHLORIDE 0.9 % (FLUSH) 0.9 %
5-40 SYRINGE (ML) INJECTION PRN
Status: CANCELLED | OUTPATIENT
Start: 2023-03-06

## 2023-03-06 RX ORDER — ALBUTEROL SULFATE 2.5 MG/3ML
2.5 SOLUTION RESPIRATORY (INHALATION) EVERY 4 HOURS PRN
Status: DISCONTINUED | OUTPATIENT
Start: 2023-03-06 | End: 2023-03-15 | Stop reason: HOSPADM

## 2023-03-06 RX ORDER — ATORVASTATIN CALCIUM 80 MG/1
80 TABLET, FILM COATED ORAL NIGHTLY
Status: DISCONTINUED | OUTPATIENT
Start: 2023-03-06 | End: 2023-03-15 | Stop reason: HOSPADM

## 2023-03-06 RX ORDER — GUAIFENESIN 600 MG/1
600 TABLET, EXTENDED RELEASE ORAL 2 TIMES DAILY
Status: CANCELLED | OUTPATIENT
Start: 2023-03-06

## 2023-03-06 RX ORDER — ATORVASTATIN CALCIUM 80 MG/1
80 TABLET, FILM COATED ORAL NIGHTLY
Status: CANCELLED | OUTPATIENT
Start: 2023-03-06

## 2023-03-06 RX ADMIN — BUDESONIDE 500 MCG: 0.5 INHALANT RESPIRATORY (INHALATION) at 20:24

## 2023-03-06 RX ADMIN — ARFORMOTEROL TARTRATE 15 MCG: 15 SOLUTION RESPIRATORY (INHALATION) at 07:22

## 2023-03-06 RX ADMIN — IPRATROPIUM BROMIDE AND ALBUTEROL SULFATE 1 AMPULE: 2.5; .5 SOLUTION RESPIRATORY (INHALATION) at 07:22

## 2023-03-06 RX ADMIN — FAMOTIDINE 20 MG: 20 TABLET ORAL at 08:34

## 2023-03-06 RX ADMIN — GUAIFENESIN 600 MG: 600 TABLET, EXTENDED RELEASE ORAL at 08:35

## 2023-03-06 RX ADMIN — SODIUM CHLORIDE, PRESERVATIVE FREE 10 ML: 5 INJECTION INTRAVENOUS at 08:35

## 2023-03-06 RX ADMIN — FAMOTIDINE 20 MG: 20 TABLET ORAL at 21:26

## 2023-03-06 RX ADMIN — IPRATROPIUM BROMIDE AND ALBUTEROL SULFATE 1 AMPULE: 2.5; .5 SOLUTION RESPIRATORY (INHALATION) at 20:24

## 2023-03-06 RX ADMIN — PREDNISONE 20 MG: 20 TABLET ORAL at 08:34

## 2023-03-06 RX ADMIN — IPRATROPIUM BROMIDE AND ALBUTEROL SULFATE 1 AMPULE: 2.5; .5 SOLUTION RESPIRATORY (INHALATION) at 11:45

## 2023-03-06 RX ADMIN — DILTIAZEM HYDROCHLORIDE 30 MG: 30 TABLET, FILM COATED ORAL at 13:19

## 2023-03-06 RX ADMIN — GUAIFENESIN 600 MG: 600 TABLET, EXTENDED RELEASE ORAL at 21:26

## 2023-03-06 RX ADMIN — DILTIAZEM HYDROCHLORIDE 30 MG: 60 TABLET, FILM COATED ORAL at 23:55

## 2023-03-06 RX ADMIN — CLOPIDOGREL BISULFATE 75 MG: 75 TABLET ORAL at 08:35

## 2023-03-06 RX ADMIN — ASPIRIN 325 MG: 325 TABLET, COATED ORAL at 08:34

## 2023-03-06 RX ADMIN — ARFORMOTEROL TARTRATE 15 MCG: 15 SOLUTION RESPIRATORY (INHALATION) at 20:24

## 2023-03-06 RX ADMIN — BUDESONIDE 500 MCG: 0.5 SUSPENSION RESPIRATORY (INHALATION) at 07:22

## 2023-03-06 RX ADMIN — DILTIAZEM HYDROCHLORIDE 30 MG: 60 TABLET, FILM COATED ORAL at 17:21

## 2023-03-06 RX ADMIN — ATORVASTATIN CALCIUM 80 MG: 80 TABLET, FILM COATED ORAL at 21:26

## 2023-03-06 ASSESSMENT — PAIN SCALES - GENERAL
PAINLEVEL_OUTOF10: 0

## 2023-03-06 NOTE — PROGRESS NOTES
Electrophysiology - PROGRESS NOTE    Admit Date: 2/25/2023     Chief Complaint: AF w/ SVR     Interval History:   Patient seen and examined and notes reviewed. Patient is being followed for AF w/ SVR. Patient had presented secondary to a CVA. He was noted to have atrial fibrillation with slow ventricular response. In the past he has had AF with RVR for which he had been on diltiazem 180 mg daily. While he was in the hospital he was noted to have slow ventricular rate and his Cardizem was decreased 220 mg daily. He continued to have a slow ventricular response and diltiazem was changed to 30 mg every 6 hours. He had 2 doses held overnight for low blood pressure and low heart rate. Patient is asymptomatic. His Eliquis is currently on hold due to his recent stroke. Patient is asymptomatic with his atrial fibrillation.     In: 56 [P.O.:940]  Out: 5195    Wt Readings from Last 2 Encounters:   03/06/23 181 lb 7 oz (82.3 kg)   02/25/23 177 lb 14.6 oz (80.7 kg)       Data:   Scheduled Meds:   Scheduled Meds:   dilTIAZem  30 mg Oral 4 times per day    predniSONE  20 mg Oral Daily    ipratropium-albuterol  1 ampule Inhalation 4x daily    guaiFENesin  600 mg Oral BID    famotidine  20 mg Oral BID    sodium chloride flush  5-40 mL IntraVENous 2 times per day    clopidogrel  75 mg Oral Daily    atorvastatin  80 mg Oral Nightly    sodium chloride flush  5-40 mL IntraVENous 2 times per day    aspirin  325 mg Oral Daily    Or    aspirin  300 mg Rectal Daily    arformoterol tartrate  15 mcg Nebulization BID    And    budesonide  0.5 mg Nebulization BID     Continuous Infusions:   sodium chloride      sodium chloride       PRN Meds:.sodium chloride flush, sodium chloride, HYDROmorphone, acetaminophen, HYDROcodone 5 mg - acetaminophen **OR** HYDROcodone 5 mg - acetaminophen, ondansetron, perflutren lipid microspheres, albuterol, ondansetron **OR** [DISCONTINUED] ondansetron, polyethylene glycol, sodium chloride flush, sodium chloride  Continuous Infusions:   sodium chloride      sodium chloride         Intake/Output Summary (Last 24 hours) at 3/6/2023 0824  Last data filed at 3/6/2023 0300  Gross per 24 hour   Intake 940 ml   Output 5195 ml   Net -4255 ml       CBC:   Lab Results   Component Value Date/Time    WBC 10.7 03/04/2023 04:21 PM    HGB 13.6 03/04/2023 04:21 PM     03/04/2023 04:21 PM     BMP:  Lab Results   Component Value Date/Time     03/06/2023 04:36 AM    K 4.3 03/06/2023 04:36 AM    CL 96 03/06/2023 04:36 AM    CO2 34 03/06/2023 04:36 AM    BUN 18 03/06/2023 04:36 AM    CREATININE 0.9 03/06/2023 04:36 AM    GLUCOSE 130 03/06/2023 04:36 AM     INR:   Lab Results   Component Value Date/Time    INR 1.13 02/25/2023 02:55 PM    INR 1.19 01/11/2018 06:53 AM        CARDIAC LABS  ENZYMES:No results for input(s): CKMB, CKMBINDEX, TROPONINI in the last 72 hours. Invalid input(s): CKTOTAL;3  FASTING LIPID PANEL:  Lab Results   Component Value Date/Time    HDL 38 02/27/2023 04:59 AM    LDLCALC 128 02/27/2023 04:59 AM    TRIG 105 02/27/2023 04:59 AM     LIVER PROFILE:  Lab Results   Component Value Date/Time    AST 21 02/25/2023 02:55 PM    AST 17 04/12/2022 12:00 AM    ALT 17 02/25/2023 02:55 PM    ALT 26 04/12/2022 12:00 AM       -----------------------------------------------------------------  Telemetry: Personally reviewed  AF, bear, no pauses > 2 secs, not tachy    Objective:   Vitals: BP 97/65   Pulse 87   Temp 98.1 °F (36.7 °C) (Temporal)   Resp 26   Ht 6' (1.829 m)   Wt 181 lb 7 oz (82.3 kg)   SpO2 (!) 87%   BMI 24.61 kg/m²   General appearance: alert, appears stated age and cooperative, No acute distress   Eyes: Conjunctiva and pupils normal and reactive  Skin: Skin color, texture, turgor normal. No rashes or ecchymosis.   Neck: no JVD, supple, symmetrical, trachea midline   Lungs: , no accessory muscle use, no respiratory distress  Heart: irreg, not tachy  Abdomen: soft, non-tender; bowel sounds normal  Extremities: No edema, DP +  Psychiatric: normal insight and affect    Patient Active Problem List:     Acute exacerbation of chronic obstructive pulmonary disease (COPD) ([facility] [address]     Essential hypertension     Former smoker     Atrial fibrillation (HCC)     Acute on chronic respiratory failure with hypoxia and hypercapnia (HCC)     Acute hyponatremia     Hyperbilirubinemia     Influenzal pneumonia     Unstable angina pectoris (HCC)     Acute hypokalemia     Hypophosphatemia     Long term current use of diuretic     Acute on chronic combined systolic (congestive) and diastolic (congestive) heart failure (HCC)     Hypotension     COPD with chronic bronchitis (HCC)     Chronic respiratory failure with hypoxia and hypercapnia (HCC)     Nocturnal hypoxia     Acute unilateral cerebral infarction in a watershed distribution [facility])     Aphasia due to acute cerebrovascular accident (CVA) ([facility] [address]     Acute cerebrovascular accident (CVA) due to stenosis of left carotid artery (HCC)     ALEXANDREA treated with BiPAP     Tachy-bear syndrome ([facility] [address]        Assessment & Plan:      Chronic AF  On [facility]  Acute CVA  TBS  O2 dependent COPD    69 y/o man with a h/o HTN, COPD, O2 dependent, chronic AF, on [facility], follows with Dr. Jenelle Zuluaga, who p/w a CVA, noted to have AF w/ slow ventricular response. [id number] 2. TSH 0.94 (2.2.22).      Chronic AF/TBS  - In AF, occasional pauses < 2 secs in length, HR controlled  - Eliquis on hold d/t CVA - would restart when ok with neurology  - On dilt Q 6 hrs - overnight doses held for low BP - will add hold parameters - if can tolerate all doses would place back on dilt at 120 mg QD  - Reviewed risk factors, pathophysiology, treatment options and lifestyle modification for atrial fibrillation: Blood pressure control, blood sugar control, healthy diet, minimal alcohol intake, no smoking, activity and exercise, manage stress sleep apnea evaluation and symptoms of a stroke. - Keep K+ > 4.0 and Mg > 2.0  - Reviewed recent labs  - Patient to f/u with Dr. Adan Grove CNP  [facility]      I spent a total of 50 minutes in care of the patient and greater than 50% of the time was spent counseling with Soni Barrera and coordinating care regarding their diagnosis, treatments and plan of care.

## 2023-03-06 NOTE — PROGRESS NOTES
Physical Therapy  Facility/Department: [address] [place] [address] ICU  Daily Treatment Note  NAME: Michael Pearson  : 1950  MRN: [id number]    Date of Service: 3/6/2023    Discharge Recommendations:  Michael Pearson scored a 16/24 on the AM-PAC short mobility form. Current research shows that an AM-PAC score of 17 or less is typically not associated with a discharge to the patient's home setting. Based on the patient's AM-PAC score and their current functional mobility deficits, it is recommended that the patient have 5-7 sessions per week of Physical Therapy at d/c to increase the patient's independence. At this time, this patient demonstrates complex nursing, medical, and rehabilitative needs, and would benefit from intensive rehabilitation services upon discharge from the Inpatient setting. This patient demonstrates the ability to participate in and benefit from an intensive therapy program with a coordinated interdisciplinary team approach to foster frequent, structured, and documented communication among disciplines, who will work together to establish, prioritize, and achieve treatment goals. Please see assessment section for further patient specific details. If patient discharges prior to next session this note will serve as a discharge summary. Please see below for the latest assessment towards goals. Patient would benefit from continued therapy after discharge   PT Equipment Recommendations  Equipment Needed:  (defer)    Patient Diagnosis(es): The encounter diagnosis was Permanent atrial fibrillation ([address] Assessment   Assessment: Pt slowly progressing with mobility & endurance. Continue to recommend further inpt PT upon D/C. Will follow per plan of care.   Activity Tolerance: Patient tolerated treatment well  Equipment Needed:  (defer)     Plan    Physcial Therapy Plan  General Plan: 5-7 times per week  Current Treatment Recommendations: Strengthening;ROM;Balance training;Functional mobility training;Transfer training; Endurance training;Gait training;Stair training;Neuromuscular re-education;Home exercise program;Safety education & training;Patient/Caregiver education & training;Equipment evaluation, education, & procurement; Therapeutic activities     Restrictions  Restrictions/Precautions  Restrictions/Precautions: Fall Risk  Position Activity Restriction  Other position/activity restrictions: up as tolerated, up with assistance     Subjective    Subjective  Subjective: Pt supine in bed & agreeable to PT. Pain: Pt denies     Objective      Bed Mobility Training  Bed Mobility Training: Yes  Supine to Sit: Stand-by assistance; Additional time (HOB elevated)  Balance  Sitting: Intact  Standing:  (static: SBA with RW, dynamic: CGA with RW)  Transfer Training  Transfer Training: Yes  Sit to Stand: Contact-guard assistance (from bed, chair, toilet using bar.)  Stand to Sit: Contact-guard assistance (verbal cues required)  Gait Training  Gait Training: Yes  Gait  Overall Level of Assistance: Contact-guard assistance; Additional time (pt amb 12 ft x 2, 90 ft x 1 with RW CGA. decreased step length & height bilat, narrow ROBERT, flexed posture, 3 L O2 in tow. O2 sat: 96% seated, 92% after ambulation, back to 96% after ~3 min seated rest.)     PT Exercises  Exercise Treatment: x 20 bilat: LAQ, marching, hip abd, ankle DF/PF. pt performed sit<->stand x 7 from chair. Other Specialty Interventions  Other Treatments/Modalities: pt toileted (urinated) with SBA. washed hands at sink with CGA. Safety Devices  Type of Devices: All fall risk precautions in place;Call light within reach; Chair alarm in place; Left in chair;Nurse notified       Goals  Short Term Goals  Time Frame for Short Term Goals: discharge  Short Term Goal 1: Pt. will demonstrate (I) bed mobility. -ongoing  Short Term Goal 2: Pt. will demonstrate sit <-> stand SBA and no assistive device -ongoing  Short Term Goal 3: Pt. will demonstrate stand pivot with CGA and no assistive device -ongoing  Short Term Goal 4: Pt. will ambulate >/= 150ft with CGA and LRAD -ongoing  Short Term Goal 5: Pt. will negotiate >/= 2 stairs with no rail and LRAD with min A -ongoing  Patient Goals   Patient Goals : \"get better, go home\"    Education  Patient Education  Education Given To: Patient  Education Provided: Plan of Care;Transfer Training  Education Method: Verbal  Education Outcome: Verbalized understanding;Demonstrated understanding;Continued education needed    Therapy Time   Individual Concurrent Group Co-treatment   Time In 0950         Time Out [address] Priscilla Sheikh, PT

## 2023-03-06 NOTE — PLAN OF CARE
ARU PATIENT TREATMENT PLAN  [address] [place] [address] 6, [address] [place] [address]   [phone]) [address] [facility]    : 1950  Acct #: [de-identified]  MRN: [id number]   PHYSICIAN:  Mae Gregory MD  Primary Problem    Patient Active Problem List   Diagnosis    Acute exacerbation of chronic obstructive pulmonary disease (COPD) ([facility]    Essential hypertension    Former smoker    Atrial fibrillation ([facility]    Acute on chronic respiratory failure with hypoxia and hypercapnia (HCC)    Acute hyponatremia    Hyperbilirubinemia    Influenzal pneumonia    Unstable angina pectoris (HCC)    Acute hypokalemia    Hypophosphatemia    Long term current use of diuretic    Acute on chronic combined systolic (congestive) and diastolic (congestive) heart failure (HCC)    Hypotension    COPD with chronic bronchitis (HCC)    Chronic respiratory failure with hypoxia and hypercapnia (HCC)    Nocturnal hypoxia    Acute unilateral cerebral infarction in a watershed distribution [facility])    Aphasia due to acute cerebrovascular accident ([facility]) [facility]    Acute cerebrovascular accident (CVA) due to stenosis of left carotid artery ([facility])    ALEXANDREA treated with BiPAP    Tachy-bear syndrome ([facility]    Acute CVA (cerebrovascular accident) [facility])       Rehabilitation Diagnosis:     Acute CVA (cerebrovascular accident) ([facility] [I63.9]       ADMIT DATE:3/6/2023    Patient Goals: \"get back to where I was\"    Admitting Impairments: Pt. Admitted s/p CVA resulting in Decreased functional mobility ; Decreased endurance;Decreased ADL status; Decreased coordination;Decreased high-level IADLs;Decreased safe awareness;Decreased fine motor control;Decreased cognition;Decreased strength;Decreased balance;Decreased posture  Barriers: home set up, endurance, O2 line management, comorbidities  Participation: good     CARE PLAN     NURSING:  Ariela Vance while on this unit will:     [x] Be continent of bowel and bladder     [x] Have an adequate number of bowel movements  [] Urinate with no urinary retention >300ml in bladder  [] Complete bladder protocol with duval removal  [x] Maintain O2 SATs at 90%  [x] Have pain managed while on ARU       [] Be pain free by discharge   [x] Have no skin breakdown while on ARU  [] Have improved skin integrity via wound measurements  [] Have no signs/symptoms of infection at the wound site  [x] Be free from injury during hospitalization   [x] Complete education with patient/family with understanding demonstrated for:  [x] Adjustment   [x] Other:   Nursing interventions may include bowel/bladder training, education for medical assistive devices, medication education, O2 saturation management, energy conservation, stress management techniques, fall prevention, alarms protocol, seating and positioning, skin/wound care, pressure relief instruction,dressing changes,  infection protection, DVT prophylaxis, and/or assistance with in room safety with transfers to bed, toilet, wheelchair, shower as well as bathroom activities and hygiene. Patient/caregiver education for:   [x] Disease/sustained injury/management      [x] Medication Use   [] Surgical intervention   [x] Safety   [x] Body mechanics and or joint protection   [x] Health maintenance         PHYSICAL THERAPY:  Goals:                  Short Term Goals  Time Frame for Short Term Goals: 3/14/2023  Short Term Goal 1: Patient demo SBA in bed<>chair, sit<>Stand, car transfer with LRAD. Short Term Goal 2: Patient demo walking 100-150 feet with SpO2 90% or greater with SBA. Short Term Goal 3: Patient demo up and down 6 steps with LRAD min assist  Short Term Goal 4: Patient demo indep in [facility] propulsion 150 feet with l/r turns and indep in WC parts management  Short Term Goal 5: Patient demo stoop to recover wtih LRAD indep.             Long Term Goals  Time Frame for Long Term Goals : 3/21/2023  Long Term Goal 1: Patient demo indep in bed<>chair, sit<>Stand, car transfer with LRAD.  Long Term Goal 2: Patient demo gait 150 feet with 2 turns on turf carpet of 10 feet or greater indep with LRAD. Long Term Goal 3: Patient demo up and down 12 steps with LRAD indep  Long Term Goal 4: Patient demo indep in LE strengthening and coordination seated and standing exercises. These goals were reviewed with this patient at the time of assessment and Nena Wells is in agreement. Plan of Care: Pt to be seen 5 out of 7 days per week, 60   mins (exact) per day for 13 days (exact)                   Current Treatment Recommendations: Strengthening, ROM, Balance training, Functional mobility training, Transfer training, Endurance training, Gait training, Stair training, Neuromuscular re-education, Home exercise program, Safety education & training, Patient/Caregiver education & training, Equipment evaluation, education, & procurement, Therapeutic activities      OCCUPATIONAL THERAPY:  Goals:             Short Term Goals  Time Frame for Short Term Goals: 6 days- 3/11/23  Short Term Goal 1: Pt will complete functional transfers with SPV. Short Term Goal 2: Pt will perform toileting with SPV. Short Term Goal 3: Pt will complete full body dressing with SPV. Short Term Goal 4: Pt will perform full body bathing with SPV.   Short Term Goal 5: Pt will perform grooming at sink with mod I. :  Long Term Goals  Time Frame for Long Term Goals : 13 days- 3/18/23  Long Term Goal 1: Pt will perform functional transfers with mod I.  Long Term Goal 2: Pt will complete BADLs with mod I.  Long Term Goal 3: Pt will improve coordination in RUE as evidenced by at least 5 second improvement in NHPT. :    These goals were reviewed with this patient at the time of assessment and Nena Wells is in agreement    Plan of Care:  Pt to be seen 5 out of 7 days per week, 60 mins (exact) per day for 13 days (exact)  Current Treatment Recommendations: Self-Care / ADL, Functional mobility training, Endurance training, Safety education & training, Patient/Caregiver education & training, Neuromuscular re-education, Strengthening, ROM, Balance training, Equipment evaluation, education, & procurement, Home management training, Coordination training      SPEECH THERAPY:   Dysphagia Goals:             Short-term Goals  Timeframe for Short-term Goals: (10 days; 03/15/23)  Goal 1: The patient will tolerate recommended diet with no clinical s/s of aspiration 5/5  Goal 2: The patient/caregiver will demonstrate understanding of compensatory swallow strategies, for improved swallow safety   Long Term Goals:   Timeframe for Long Term Goals: (14 days; 03/18/23)  Goal 1: The patient will tolerate least restrictive diet with no clinical s/s of aspiration or worsening respiratory/pulmonary status    Cognitive-linguistic Goals:              Short Term Goals  Time Frame for Short Term Goals: 10 Days (03/15/23)  Goal 1: Pt will complete moderately complex comprehension tasks (2 step and multi-step directions/commands, abstract yes/no questions) with 80% acc given min cues  Goal 2: Pt will complete moderately complex naming tasks (divergent naming, convergent, responsive, etc) with 80% acc given min cues  Goal 3: Pt will complete additional cognitive assessment with goals to be added per POC as needed. Time Frame for Short Term Goals: 10 Days (03/15/23)  Long Term Goals:   Time Frame for Long Term Goals: 13 Days (03/18/23)  Goal 1: Pt will improve expressive/receptive language tasks to highest functional level to decreased burden of care upon discharge. Goal 2: Pt will demonstrate improved cognitive linguistic function for safe return to prior level of care.     These goals were reviewed with this patient at the time of assessment and Domi Scott is in agreement    Plan of Care: Pt to be seen 5 out of 7 days per week, 60 mins (exact) per day for 13 days (exact)             Dysphagia:  diet tolerance, safe swallow strategies Speech/Language/Cognition: Yes/no questions, following commands, automatic speech tasks, repetition, auditory comprehension, naming         CASE MANAGEMENT:  Goals:   Assist patient/family with discharge planning, patient/family counseling,   and coordination with insurance during ARU stay. QIM / IRF MARNI SCORES:  ITEM CURRENT SCORE GOAL   Eating CARE Score: 6 Discharge Goal: Independent   Oral Hygiene CARE Score: 4 Discharge Goal: Independent   Toileting Hygiene CARE Score: 4 Discharge Goal: Independent   Shower/Bathe Self CARE Score: 4 Discharge Goal: Independent   Upper Body Dressing CARE Score: 5 Discharge Goal: Independent   Lower Body Dressing CARE Score: 4 Discharge Goal: Independent   On/Off Footwear CARE Score: 5 Discharge Goal: Independent   Roll Left & Right CARE Score: 6 Discharge Goal: Independent   Sit to Lying  CARE Score: 6 Discharge Goal: Independent   Lying to Sitting EOB CARE Score: 6 Discharge Goal: Independent   Sit to Stand CARE Score: 4 Discharge Goal: Independent   Chair/Bed to Chair Transfer CARE Score: 3 Discharge Goal: Independent   Toilet Transfer CARE Score: 4 Discharge Goal: Independent   Car Transfer CARE Score: 3 Discharge Goal: Independent   Walk 10 Feet CARE Score: 6 Discharge Goal: Independent   Walk 50 Feet, 2 Turns CARE Score: 88 Discharge Goal: Independent   Walk 150 Feet CARE Score: 88 Discharge Goal: Independent   Walk 10 Feet, Uneven Surface CARE Score: 1 Discharge Goal: Independent   1 Step (Curb) CARE Score: 3 Discharge Goal: Independent   4 Steps CARE Score: 88 Discharge Goal: Independent   12 Steps CARE Score: 88 Discharge Goal: Independent    Object CARE Score: 3 Discharge Goal: Independent   Wheel 50 feet, 2 turns CARE Score: 3 Discharge Goal: Independent   Wheel 150 Feet CARE Score: 3 Discharge Goal: [address] [facility] will be seen a minimum of 3 hours of therapy per day, a minimum of 5 out of 7 days per week.     [] In this rare instance due to the nature of this patient's medical involvement, this patient will be seen 15 hours per week (900 minutes within a 7 day period). Treatments may include therapeutic exercises, gait training, neuromuscular re-ed, transfer training, community reintegration, bed mobility, w/c mobility and training, self care, home mgmt, cognitive training, energy conservation,dysphagia tx, speech/language/communication therapy, group therapy, and patient/family education. In addition, dietician/nutritionist may monitor calorie count as well as intake and collaboratively work with SLP on dietary upgrades. Neuropsychology/Psychology may evaluate and provide necessary support. Medical issues being managed closely and that require 24 hour availability of a physician:   [x] Swallowing Precautions  [x] Bowel/Bladder Fx  [] Weight bearing precautions   [x] Wound Care    [] Pain Mgmt   [x] Infection Protection   [x] DVT Prophylaxis   [x] Fall Precautions  [x] Fluid/Electrolyte/Nutrition Balance   [] Voice Protection   [x] Respiratory  [] Other:    Medical Prognosis: [x] Good  [] Fair    [] Guarded   Total expected IRF days 13  Anticipated discharge destination:    [] Home Independently   [] Home Modified Independent  [x] Home with supervision    []SNF     [] Other                                           Physician anticipated functional outcomes:  Home w/ supervision and outpatient services. IPOC brief synthesis: Sandro Quick is a 68year old male with a past medical history significant for atrial fibrillation on Eliquis, HFpEF, COPD with chronic respiratory failure on 4 L O2 baseline who [facility] to [facility] with right sided weakness and aphasia, found to have left MCA CVA and subsequently transferred to [facility]. for left ICA stent placement. He was admitted to [facility] on 3/6/23 due to functional deficits below his baseline.      This plan has been reviewed with Nicki Carr on 3/7/22  in a language the patient understands. Michael Pearson has had the opportunity to include input with the therapy team.      I have reviewed this initial plan of care and agree with its contents:    Title   Name    Date    Time    Physician: Carol Al.  Micheal Jones MD    Case Mgmt: Maile Sheldon RN    OT: Joyce Gotti, OTR/L    PT:Brandie Antoine PT      RN: Christina Raya RN    ST: Jus Valdes MA CCC-SLP    : Lilian Coley, OTR/L    Other:

## 2023-03-06 NOTE — PROGRESS NOTES
Speech Language Pathology  Facility/Department:[facility] ICU  Speech/Language/Dysphagia Treatment                                                         Patient Diagnosis(es):   Patient Active Problem List    Diagnosis Date Noted    Tachy-bear syndrome ([address] 03/04/2023    ALEXANDREA treated with BiPAP 02/27/2023    Acute cerebrovascular accident (CVA) due to stenosis of left carotid artery ([address] 02/26/2023    Acute unilateral cerebral infarction in a watershed distribution [facility]) 02/25/2023    Aphasia due to acute cerebrovascular accident (CVA) ([address] 02/25/2023    COPD with chronic bronchitis ([address] 11/29/2022    Chronic respiratory failure with hypoxia and hypercapnia ([address] 11/29/2022    Nocturnal hypoxia 11/29/2022    Hypotension 04/14/2022    Acute on chronic combined systolic (congestive) and diastolic (congestive) heart failure ([address] 04/03/2022    Acute hypokalemia 03/29/2022    Hypophosphatemia 03/29/2022    Long term current use of diuretic 03/29/2022    Unstable angina pectoris ([address] 04/17/2019    Atrial fibrillation ([address] 01/11/2018    Acute on chronic respiratory failure with hypoxia and hypercapnia (HCC) 01/11/2018    Acute hyponatremia 01/11/2018    Hyperbilirubinemia 01/11/2018    Influenzal pneumonia     Acute exacerbation of chronic obstructive pulmonary disease (COPD) ([address] 04/07/2011    Essential hypertension 04/07/2011    Former smoker 04/16/2010       Past Medical History:   Diagnosis Date    Atrial fibrillation ([address]     Bronchitis chronic     Emphysema of lung ([address]     HTN (hypertension)     Influenza A 01/10/2018    Lung disease     copd    Pneumonia     12/2009    Stroke 02/25/2023    L MCA watershed     Past Surgical History:   Procedure Laterality Date    FRACTURE SURGERY      johnson in femur/hip on right    HERNIA REPAIR         Reason for Referral:  Jeremiah Allison  was referred for a Speech Therapy evaluation to assess swallow function and/or communication.     History of Present Illness  Interval history: Patient seen and examined this morning at bedside. He was sitting up comfortably in his bed. Overnight patient became short of breath. A chest x-ray was done and showed increased pulmonary edema. He was given a dose of 40 mg IV Lasix as well as DuoNeb treatments. He stated this improved his breathing. His IV fluids were stopped. He states he feels much better this morning and is having good urine output. He has no other complaints at this time. He remains afebrile and hemodynamically stable. HPI: 68 old male with past medical history HTN (Eliquis), HFpEF, COPD (4 L), chronic bronchitis, hernia repair who initially presented to OSH with acute onset aphasia. Son reported at OSH patient had waxing/waning speech changes over the past week worse than his typical stuttering. He noticed symptoms onset around 1100 on 2/24, but symptoms gradually resolved and were completely absent that night prior to bed. Patient slept in this morning until 1100 at which point family noted symptoms have recurred and he called 911. At [id number] [facility] ED, pt hemodynamically stable with mild aphasia and mild R arm drift; NIHSS 3. EKF showing AF but otherwise no acute changes. CT head w/o showed age-indeterminate small, possibly acute L frontal lobe infacrt as well as proximal left ICA near-occlusion. Pt was transferred to [facility] for neurosurgical evaluation and management. Pt in NAD on arrival. He continues with difficulty finding words; has good insight into sx. Denies any headache, fever, chills, chest pain, dyspnea, abdominal pain, nausea, vomiting, numbness, visual changes. CXR: 2/26/23  Impression       1. Findings of pulmonary edema which are increased compared to chest radiograph from one day prior.      MRI: 2/25/23  Impression       Left MCA border zone infarcts without hemorrhage or mass effect       Date of onset: 2/26/23    Current Diet:  NPO    Treatment Diagnosis:  Dysphagia, Aphasia    Pain:  None    General:  Chart reviewed: Yes  Behavior/Cognition: Cooperative, alert,   Communication Observation:  aphasic with baseline stuttering  Follows Directions: Simple  Dentition/Oral Mucosa: Edentulous with clean moist tongue  Oral motor exam: Right labial weakness especially upon retraction, reduced lateral lingual movement to the right. No tongue deviation  Vocal Quality: WFL  Respiratory Status/oxygen requirements: O2 cannula  Vision/Hearing: No glasses; [place] concerns  Patient Complaint: Trouble talking; pt and family deny problems with swallowing at breakfast yesterday just when they noticed problems with using right hand etc.   Patient Positioning: upright in chair    Prior Dysphagia History:   none    Bedside Swallowing Evaluation Impression 2/26/23:   Pt alert, oriented to self, general place following review, to year with cues, month/day of week without cues. Pt edentulous (able to eat all types of foods at home with difficulty per family, even just prior to going to hospital).   Right labial weakness noted with no tongue deviation but reduced lateral lingual movement to the right.  Oral acceptance of all foods presented and able to feed self.  No cough/wet vocal quality/throat clearing with any po trials including thins via cup/straw for 3 oz water test and 10 single sip trials, 4 oz  puree and cracker trials (except a delayed cough 15 seconds post po trials x1).  No oral residue with any po trials. Pt denies globus sensation when questioned.  D/W nursing re: recent chest xray and nursing reported pt given some extra fluids last night which may be impacting current lung status (in regards to heart issues).  Lungs listened to by nursing today have only been diminished.  Swallow initiation fairly timely.  Recommend SOFT and BITE sized with thins - If any s/s of aspiration or if lung status decline emerges, then  d/c po until we recheck.     Speech, Language, Cognitive Evaluation 2/26/23:  Pt with mild receptive and moderate expressive aphasia with baseline dysfluency. Pt alert, oriented to self, general place following review, to year with cues, month/day of week without cues. Right labial weakness noted with no tongue deviation but educed lateral lingual movement to the right. Pt with baseline stuttering but increased difficulty with word recall in past month but significantly worse past couple days. Pt with high school education and no major hobbies reported by pt /family. Hesitations with speaking noted with reduced word recall in conversation. Confrontational naming of objects, simple pictures 8/8 correct. Difficulty with recall of address, but knew birthdate. Breakdown especially following 3 step commands. Able to name 3 words in one minute given a category (up to 3 more given a cue). [place] tasks at 100%. Able to read single words without difficulty. Education  Pt educated to rationale for tx session and specific skills targeted. Prognosis:  Prognosis for improvement: Good  Barriers to reach goals: age  [de-identified] and agree with results and recommendations: yes    Treatment:  Dysphagia Goals: The pt will be seen  2-4 x per week to address the following goals:  Goal 1: The pt will tolerate least restrictive diet without s/s of aspiration  2/27: Pt seated upright in bed and seen during afternoon meal of soft and bite sized solids and thin liquids, as well as trial of regular solids. Pt demonstrated adequate, yet mildly prolonged mastication of regular solids. Pt edentulous, however expressed no difficulty. Pt appeared to swallow all trials with no overt s/s penetration/aspiration. No significant oral residue and appropriately taking liquid wash as needed. Pt indicated wanting to continue SBS prior to advancing. SLP in agreement. Cont. 2/28: Goal not targeted- pt NPO for PPM. Cont goal  3/3: Pt seen with AM meal. Upon entry, pt consuming easy to chew solids and thin liquids.  Pt edentulous but states that he is able to eat almost anything at home despite this . Pt demo'd adequate and functional mastication with complete oral clearance. Per the last dysphagia tx session (2/27), pt wanted to continue with SBS prior to advancing diet, however, orders this date were for easy to chew, which pt appeared to tolerate well. No overt clinical s/s associated with aspiration observed with any texture/consistency. Recommend continuing easy to chew solids and trials of regular solids. Cont goal     Goal 2: Pt/family will verbalize and/or demonstrate understanding of swallowing recommendations. 2/26:  Pt and family educated to role of dysphagia, what aspiration is, what s/s of aspiration are and diet/strategies recommended for pt. Pt able to demonstrate understanding by taking small amounts of food and liquid as practice at the end of the session following initial review with min to no cues. Both pt and family verbalized understanding. 2/27: SLP provided education regarding diet level rec's, swallow strategies, oral hygiene and further dysphagia treatment for safety of swallow. Pt demonstrated understanding and followed all commands to implement strategies during meal. No other family present at this time. Cont. 2/28: Goal not targeted- pt NPO for PPM. Cont goal   3/3: Pt able to independently implement all recommended swallowing strategies and aspiration precautions. Goal met- continue to ensure consistency as diet is advanced. Speech Goals: The pt will be seen  2-4 x per week to address the following goals:   Pt will follow 2- 3 step directions with 50% accuracy, mod cues. 2/27: followed 1-step with 90% accuracy. Followed 2-step with object use with ~30%. Multiple repetitions required for comprehension, as well as single step breakdown. Cont. Pt will answer yes/no questions of varying complexity with 90% accuracy, min to no cues. 2/27: answered basic yes/no with 100% accuracy.  Answered semi-complex with added prepositions with ~75%. Pt did self correct x1 for accuracy. Cont. 2/28: comparative yes/no questions: pt with perseverative response during this task resulting in 50% accuracy independently; no awareness of perseverative responses. When questions were rephrased, pt able to correct response increasing accuracy to 75% accuracy. Cont goal.   3/1: basic yes/eg=734% accuracy; semi-complex yes/no=80% accuracy. Cont. 3/2: Pt answered moderately complex yes/no questions with 93% accuracy. Cont goal     Pt will name 4-5 words in one minute given a category  2/27: not targeted this session. 2/28: goal not targeted this session. 3/1: listed x5 objects related to category of furniture with 100% accuracy. X1 paraphasia noted. Cont. 3/3: targeted without a time constraint this session, however, completed with initial letter constraint: pt completed with 25% accuracy independently, increasing to 87% accuracy given mod phonemic and semantic cues. Cont goal     Pt/family will verbalize and/or demonstrate 2-3 speech strategies to maximize verbal communication and/or fluency. 2/27: SLP discussed use of SFA for verbal descriptions when events of anomia occur. Pt utilized x1 during session to describe and communicate difficulty with self feeding with use of R hand. Pt also independently pausing and re-starting own statement prior to continuing to assist with word finding/fluency. Cont. 2/28: demo'd SFA evidenced by some description containing only content words being stated by pt; able to name high frequency objects. Completed verbal description of routine familiar task: pt appeared to have increased disfluency related to baseline fluency disorder; descriptions consisted of 2-3 content words; required mod to MAX phonemic cues during this task. Cont goal   3/1: pt attempting to utilize object description to assist with word finding/anomia at times.  Benefited from min cues and increased wait time to facilitate. Cont. 3/2: connected speech/description task: pt completed with identifying objects prominent in description task. Able to provide 3-4 word utterances with content words to describe task. Automatic speech task with opposites: pt completed with 92% accuracy independently. Cont goal   3/3: Pt stated he does not feel as if his fluency disorder has worsened post CVA. Pt with increased utterances this session during conversation, as well as increased utterance length. Cont goal     Pt will participate in further reading/writing/and cognitive assessment. 2/27: noted frustrations during session. SLP provided education regarding aphasia vs fluency. Pt with continued accuracy of confrontation naming of high frequency objects and following basic commands. Responsive naming task completed with 90% accuracy. Min phonemic/semantic cues required intermittently. Cont. 2/28: completed matching verbal word to written word in visual field of 6:  pt completed with 100% accuracy independently. Cont goal   3/1: pt completed responsive naming task with 90% accuracy, increased to 100% with mod phonemic/semantic cues. Completed medication label reading+answer wh-questions with 25% accuracy. Increased difficulty noted with complex reading and wh-questions. Pt benefited from min-mod cues to assist with scanning page to locate information, as well as ? Comprehension of questions. Cont. 3/2: Targeted reading comprehension via matching picture to sentence in visual field of 2: pt completed with mod cues including some sentences read aloud. Cont goal  3/6: Targeted word finding via cloze paragraph task- pt completed with min cues; single instance of paraphasia; pt's difficulties appeared to be attributed to fluency disorder vs language impairment. Also completed portion of functional math activity- pt required mod cues.  Pt placed meal orders with no instances of word finding difficulties or paraphasias; benefited from multiple choices. Cont goal     Plan:  Continue per POC  Recommended diet:  Easy to chew Solids, Thin Liquids-  If any s/s of aspiration or if lung status decline emerges, then  d/c po until we recheck.    Recommended form of medication: Pills with water  Swallowing Strategies:   -small bites/sips  -alternate solids/liquids  -check for pocketing  -oral hygiene 2-3x/day    Pt goal: \"all of it\" better  Pt discharge goal: Home    Total treatment time: 20 language  Discharge Recommendation: TBD  Discussed with RN: Sudha Tolentino- prior to session   Needs met prior to leaving room, call light within reach    Electronically signed by:  Murray Villalobos [place], [address] [facility]  Speech-Language Pathologist  Pg #: [phone]    This document will serve as a dc summary if pt dc prior to next visit

## 2023-03-06 NOTE — PLAN OF CARE
Problem: Discharge Planning  Goal: Discharge to home or other facility with appropriate resources  3/5/2023 2026 by Sumit Connell RN  Outcome: Progressing     Problem: Safety - Adult  Goal: Free from fall injury  3/5/2023 2026 by Sumit Connell RN  Outcome: Progressing     Problem: Chronic Conditions and Co-morbidities  Goal: Patient's chronic conditions and co-morbidity symptoms are monitored and maintained or improved  3/5/2023 2026 by Sumit Connell RN  Outcome: Progressing     Problem: ABCDS Injury Assessment  Goal: Absence of physical injury  3/5/2023 2026 by Sumit Connell RN  Outcome: Progressing     Problem: Neurosensory - Adult  Goal: Achieves stable or improved neurological status  3/5/2023 2026 by Sumit Connell RN  Outcome: Progressing     Problem: Neurosensory - Adult  Goal: Achieves maximal functionality and self care  3/5/2023 2026 by Sumit Connell RN  Outcome: Progressing

## 2023-03-06 NOTE — PROGRESS NOTES
Patient discharged to [facility] [address] via [facility]. Iv's and tele removed per policy. Patient without complaints and A&Ox4.

## 2023-03-06 NOTE — CARE COORDINATION
Case management continues to follow for discharge disposition. The plan is to go to [address] [place] [address]. Pre-cert is good through tomorrow, per Liliane Patel in admissions.     Allyson Christianson RN  Case Management  [phone]

## 2023-03-06 NOTE — PROGRESS NOTES
Called nursing report to Stephany ESPINOSA at [facility]. Plan is for patient to be picked up by ambulance @ 1500 today.

## 2023-03-06 NOTE — PROGRESS NOTES
IRF [facility] Admission Assessment  [facility] Acute Rehab Unit    Patient:Bang Starr Necessary     Rehab Dx/Hx: Acute CVA (cerebrovascular accident) [facility]) [I63.9]   ILO:5/59/3305  OCP:[id number]  Date of Admit: 3/6/2023     Pain Effect on Sleep:  Over the past 5 days, how much of the time has pain made it hard for you to sleep at night?  []  0. Does not apply - I have not had any pain or hurting in the past 5 days  [x]  1. Rarely or not at all  []  2. Occasionally  []  3. Frequently  []  4. Almost constantly  []  8. Unable to answer    Over the past 5 days, how often have you limited your participation in rehabilitation therapy sessions due to pain?  []  0. Does not apply - I have not received rehabilitation therapy in the past 5 days  [x]  1. Rarely or not at all  []  2. Occasionally  []  3. Frequently  []  4. Almost constantly  []  8. Unable to answer    Pain Interference with Day-to-Day Activities:  Over the past 5 days, how often have you limited your day-to-day activities (excluding rehabilitation therapy session)? [x]  1. Rarely or not at all  []  2. Occasionally  []  3. Frequently  []  4. Almost constantly  []  8. Unable to answer      High-Risk Drug Classes: Use and Indication   Is taking: Check if the pt is taking any medications by pharmacological classification  Indication noted: If column 1 is checked, check if there is an indication noted for all meds in the drug class Is taking  (check all that apply) Indication noted (check all that apply)   Antipsychotic [] []   Anticoagulant [x] [x]   Antibiotic [] []   Opioid [x] [x]   Antiplatelet [] []   Hypoglycemic (including insulin) [] []   None of the above [] []     Special Treatments, Procedures, and Programs   Check all of the following treatments, procedures, and programs that apply on admission. On admission (check all that apply)   Cancer Treatments    A1. Chemotherapy []   A2. IV []   A3. Oral []   A10.  Other []   B1. Radiation []   Respiratory Therapies C1. Oxygen Therapy []   C2. Continuous [x]   C3. Intermittent []   C4. High-concentration []   D1. Suctioning []   D2. Scheduled []   D3. As needed []   E1. Tracheostomy Care []   F1. Invasive Mechanical Ventilator (ventilator or respirator) []   G1. Non-invasive Mechanical Ventilator []   G2. BiPAP []   G3. CPAP []   Other    H1. IV Medications []   H2. Vasoactive medications []   H3. Antibiotics []   H4. Anticoagulation []   H10. Other []   I1. Transfusions []   J1. Dialysis []   J2. Hemodialysis []   J3. Peritoneal dialysis []   O1. IV access []   O2. Peripheral []   O3. Midline []   O4.  Central (PICC, tunneled, port) []   None of the above []     Signature:  Fallon Holt RN

## 2023-03-06 NOTE — PROGRESS NOTES
Pulmonary Followup Note    CC: ischemic stroke, COPD with chronic hypoxemic respiratory failure  Subjective:  Did better with word finding with me today. Breathing a bit better, but still not back to baseline. ROS:  Denies headache, nausea or chest pain. 24HR INTAKE/OUTPUT:    Intake/Output Summary (Last 24 hours) at 3/6/2023 1059  Last data filed at 3/6/2023 0300  Gross per 24 hour   Intake 940 ml   Output 4545 ml   Net -3605 ml          dilTIAZem  30 mg Oral 4 times per day    predniSONE  20 mg Oral Daily    ipratropium-albuterol  1 ampule Inhalation 4x daily    guaiFENesin  600 mg Oral BID    famotidine  20 mg Oral BID    sodium chloride flush  5-40 mL IntraVENous 2 times per day    clopidogrel  75 mg Oral Daily    atorvastatin  80 mg Oral Nightly    sodium chloride flush  5-40 mL IntraVENous 2 times per day    aspirin  325 mg Oral Daily    Or    aspirin  300 mg Rectal Daily    arformoterol tartrate  15 mcg Nebulization BID    And    budesonide  0.5 mg Nebulization BID           PHYSICAL EXAMINATION:  /82   Pulse 81   Temp 98.7 °F (37.1 °C) (Oral)   Resp 15   Ht 6' (1.829 m)   Wt 181 lb 7 oz (82.3 kg)   SpO2 90%   BMI 24.61 kg/m²   CURRENT PULSE OXIMETRY:  SpO2: 90 %  24HR PULSE OXIMETRY RANGE:  SpO2  Av.3 %  Min: 87 %  Max: 99 % on 3L      Gen: mild distress. Speaking in full sentences with trace accessory muscle use  HEENT: PERRL, EOMI, OP nl  Lung:  diminished breath sounds throughout. Scattered wheezing but no ronchi. CV: RRR without M/R/R  Abd: +BS, soft, NT/ND  Ext: No edema. DATA  CBC:   Recent Labs     23  1621   WBC 10.7   HGB 13.6   HCT 41.5   MCV 92.9          BMP:   Recent Labs     23  0530 23  0501 23  0436    143 143   K 4.3 4.5 4.3    105 96*   CO2 29 33* 34*   BUN 15 13 18   CREATININE 0.7* 0.7* 0.9       No results for input(s): PHART, SMG5DLK, PO2ART in the last 72 hours.   LIVER PROFILE: No results for input(s): AST, ALT, LIPASE, BILIDIR, BILITOT, ALKPHOS in the last 72 hours. Invalid input(s): AMYLASE,  ALB      CXR REVIEWED BY ME AND SHOWED:  XR CHEST PORTABLE   Final Result      Bibasilar airspace density/atelectasis. No other acute cardiopulmonary findings. IR ANGIOGRAM CAROTID CEREBRAL RIGHT   Final Result      XR CHEST PORTABLE   Final Result      1. Findings of pulmonary edema which are increased compared to chest radiograph from one day prior. MRI brain without contrast   Final Result      Left MCA border zone infarcts without hemorrhage or mass effect. IR ANGIOGRAM CAROTID CEREBRAL LEFT    (Results Pending)          ASSESSMENT/PLAN:  This is a 68 y.o. male with ischemic stroke, mild aphasia and COPD with chronic respiratory failure    Wheezing on exam matches his clinical improvements as he is likely moving enough air to make noise now. Continue a 5 day burst and his home bronchodilators. Maintains on his home oxygen dose. Plan for discharge to [facility] today is appropriate. Please call if I can be of further assistance.       Samantha Lares MD

## 2023-03-06 NOTE — PROGRESS NOTES
NURSING ASSESSMENT: [address]    Patient:Bang Jean Baptiste     Rehab Dx/Hx: Acute CVA (cerebrovascular accident) [facility]) [I63.9]   OWF:7/90/7445  LGR:[id number]  Date of Admit: 3/6/2023  Room #: 5589/4444-60    Subjective:   Patient admitted to PBCF990@ 3:45pm from [facility] via stretcher. Alert and oriented x4. Oriented to room and call light system. Oriented to rehab routine and therapy schedules. Informed about care conferences and ordering of meals with PCA. Drug / Medication Review:   Medications were reviewed by RN at time of admission  [x]  No potential or actual clinically significant medication issues were noted. []  Potential or actual clinically significant medication issues were found and MD was notified. (Specified below if applicable)   []  Allergy to medication   []  Drug interactions (drug/drug, drug/food, drug/disease interactions)   []  Duplicate drug   []  Omission (drug missing from prescribed regimen)   []  Non adherence   []  Adverse reaction   []  Wrong patient, drug, dose, route, time error   []  Ineffective drug therapy    Patient Mood Interview    Symptom Presence  0. No (enter 0 in column 2)  1. Yes (enter 0-3 in column 2)  9. No response (leave column 2 blank  Symptom Frequency  0. Never or 1 day  1. 2-6 days (several days)  2. 7-11 days (half or more days)  3. 12-14 days (nearly everyday) 1. Symptom Presence 2. Symptom Frequency    Enter scores in boxes   Little interest or pleasure in doing things   0 0   Feeling down, depressed, or hopeless   0 0   If either A or B is coded 2 or 3, CONTINUE asking the questions below. If not, END the interview.      Trouble falling or staying asleep, or sleeping too much       Feeling tired or having little energy       Poor appetite or overeating       Feeling bad about yourself - or that you are a failure or have let yourself or your family down       Trouble concentrating on things, such as reading the newspaper or watching television       Moving or speaking so slowly that other people could have noticed. Or the opposite- being so fidgety or restless that you have been moving around a lot more than usual.       Thoughts that you would be better off dead, or of hurting yourself in some way. Total Severity: Add scores for all frequency responses in column 2       Social isolation (from [employer])  How often do you feel lonely or isolated from those around you?   [] 0. Never  [x] 1. Rarely  [] 2. Sometimes  [] 3. Often  [] 4. Always  [] 8. Patient unable to respond. Admission BIMS:  Number of word repeated after first attempt:  []  0. None []  1. One []  2. Two [x]  3. Three    Able to report correct year:  []  0. Missed by >5 years, or no answer  []  1. Missed by 2-5 years  []  2. Missed by 1 year  [x]  3. Correct    Able to report correct month:   []  0. Missed by >1 month, or no answer  []  1. Missed by 6 days to 1 month  [x]  2. Accurate within 5 days    Able to report correct day of the week:  []  0. Incorrect or no answer  [x]  1. Correct    Recall:   Able to recall \"sock\":  []  0. No could not recall  []  1. Yes, after cueing  [x]  2. Yes, no cue required  Able to recall \"blue\":  []  0. No could not recall  []  1. Yes, after cueing  [x]  2. Yes, no cue required  Able to recall \"bed\":  []  0. No could not recall  []  1. Yes, after cueing  [x]  2. Yes, no cue required      4 Eyes Skin Assessment   The patient is being assessed for: Admission     I agree that 2 RN's have performed a thorough Head to Toe Skin Assessment on the patient. ALL assessment sites listed below have been assessed. Areas assessed by both nurses:   [x]   Head, Face, and Ears   [x]   Shoulders, Back, and Chest, Abdomen  [x]   Arms, Elbows, and Hands   [x]   Coccyx, Sacrum, and Ischium  [x]   Legs, Feet, and Heel    Scattered ecchymosis, dried blood to left 2nd toe     Does the Patient have Skin Breakdown?   Abiola Francisco Prevention initiated:  No  Wound Care Orders initiated:  Not Applicable      Carlos Moy consulted for Pressure Injury (Stage 3,4, Unstageable, DTI, NWPT, Complex wounds)and New or Established Ostomies:  Not Applicable    Primary Nurse eSignature: Naya Syed RN  Co-signer eSignature:   Darius Bates RN

## 2023-03-06 NOTE — DISCHARGE SUMMARY
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] 03/04/2023     Priority: Medium    ALEXANDREA treated with BiPAP [G47.33] 02/27/2023     Priority: Medium    Acute cerebrovascular accident (CVA) due to stenosis of left carotid artery [facility]) [I63.232] 02/26/2023     Priority: Medium    Acute unilateral cerebral infarction in a watershed distribution [facility]) [I63.89] 02/25/2023     Priority: Medium    Aphasia due to acute cerebrovascular accident (CVA) ([facility] [address] [I63.9, R47.01] 02/25/2023     Priority: Medium    COPD with chronic bronchitis ([facility] [address] [J44.9] 11/29/2022     Priority: Medium         The patient was seen and examined on day of discharge and this discharge summary is in conjunction with any daily progress note from day of discharge. Hospital Course:     Patient is a 77-year-old male with medical history of A-fib on Eliquis, diastolic CHF, COPD with chronic respiratory failure on 4 L O2 who presented to the ER with strokelike symptoms of aphasia and right-sided weakness. NIHSS 3.  Vessel imaging was significant for proximal left ICA near occlusion. Patient was transferred for neurosurgical evaluation. Hospital course:    Acute ischemic CVA: MRI significant for left MCA border zone infarcts. Symptoms included right upper extremity weakness and aphasia. Continue therapies on discharge. Left ICA occlusion: Status post left carotid stent placement 3/2 with reconstitution of flow. Per neurosurgical recommendation plan is to continue DAPT with aspirin and Plavix for 6 weeks, stop Plavix, continue aspirin and resume Eliquis on 4/17.   Patient will follow-up with neurovascular surgery at [facility] on discharge. Atrial fibrillation with tachybradycardia syndrome:  Patient had bradycardia and pauses after resumption of home dose of diltiazem 180 mg.  Diltiazem was held and resumed once heart rate was in normal range. Cardiology advised to continue immediate release diltiazem 30 mg every 6 hours and to convert to extended release 120 Mg daily once BP and heart rate stable. Eliquis to resume on 4/17. Chronic diastolic CHF:  Initially diuretics were held to allow good perfusion and avoid JYOTI given repeated contrast exposure. Patient was on torsemide 40 twice daily at home. Torsemide resumed at 10 mg daily, uptitrate to home dose as needed based on volume status. Consults:     IP CONSULT TO NEUROCRITICAL CARE  IP CONSULT TO PHARMACY  IP CONSULT TO NEUROSURGERY  IP CONSULT TO PHYSICAL MEDICINE REHAB  IP CONSULT TO CARDIOLOGY  IP CONSULT TO PULMONOLOGY  IP CONSULT TO CASE MANAGEMENT  IP CONSULT TO DIETITIAN      Disposition: ARU    Discharged Condition: Stable    Code Status: Full Code    Activity: activity as tolerated    Diet: cardiac diet    Follow Up: Primary Care Physician in one week    Exam:     General appearance: no acute distress, appears stated age and cooperative. Respiratory:  diminished without wheezing or crackles  Cardiovascular: Regular rate and rhythm with normal S1/S2 without murmurs, rubs or gallops. Abdomen: Soft, non-tender, non-distended with normal bowel sounds. Musculoskeletal: No clubbing, cyanosis. Trace pitting ankle edema  Skin: Skin color, texture, turgor normal.  No rashes or lesions. Neurologic:  expressive aphasia present  Psychiatric: Alert and oriented, thought content appropriate, normal insight  Capillary Refill: Brisk, 3 seconds, normal   Peripheral Pulses: +2 palpable, equal bilaterally       Labs:  For convenience and continuity at follow-up the following most recent labs are provided:    CBC:   Lab Results   Component Value Date/Time    WBC 10.7 03/04/2023 04:21 PM    HGB 13.6 03/04/2023 04:21 PM    HCT 41.5 03/04/2023 04:21 PM     03/04/2023 04:21 PM       RENAL:   Lab Results   Component Value Date/Time     03/06/2023 04:36 AM    K 4.3 03/06/2023 04:36 AM    CL 96 03/06/2023 04:36 AM    CO2 34 03/06/2023 04:36 AM    BUN 18 03/06/2023 04:36 AM    CREATININE 0.9 03/06/2023 04:36 AM           Discharge Medications:   Current Discharge Medication List             Details   aspirin 325 MG EC tablet Take 1 tablet by mouth daily  Qty: 30 tablet, Refills: 3      atorvastatin (LIPITOR) 80 MG tablet Take 1 tablet by mouth nightly  Qty: 30 tablet, Refills: 3      dilTIAZem (CARDIZEM) 30 MG tablet Take 1 tablet by mouth in the morning and 1 tablet at noon and 1 tablet in the evening and 1 tablet before bedtime. Hold for HR < 55 bpm, SBP < 100 mmHg. Tracie Rico: 120 tablet, Refills: 3      predniSONE (DELTASONE) 20 MG tablet Take 1 tablet by mouth daily for 5 days  Qty: 5 tablet, Refills: 0      clopidogrel (PLAVIX) 75 MG tablet Take 1 tablet by mouth daily  Qty: 40 tablet, Refills: 0                Details   apixaban (ELIQUIS) 5 MG TABS tablet Restart on 4/17/2023 and stop plavix   TAKE 1 TABLET TWICE DAILY  Qty: 60 tablet, Refills: 11    Associated Diagnoses: Permanent atrial fibrillation (HCC)      torsemide (DEMADEX) 10 MG tablet Take 1 tablet by mouth daily                Details   pramipexole (MIRAPEX) 0.5 MG tablet Take 0.5 mg by mouth daily      VENTOLIN  (90 Base) MCG/ACT inhaler INHALE TWO (2) PUFFS EVERY 6 HOURS AS NEEDED FOR WHEEZING OR SHORTNESS OF BREATH  Qty: 18 g, Refills: 0    Associated Diagnoses: Stage 3 severe COPD by GOLD classification ([facility])      ipratropium-albuterol (DUONEB) 0.5-2.5 (3) MG/3ML SOLN nebulizer solution INHALE THREE (3) MILLILIERS (1 VIAL) VIA NEBULIZATION ROUTE EVERY 6 HOURS AS NEEDED FOR SHORTNESS OF BREATH  Qty: 360 mL, Refills: 5    Associated Diagnoses: Chronic obstructive pulmonary disease, unspecified COPD type (HCC)      budesonide-formoterol (SYMBICORT) 160-4.5 MCG/ACT AERO INHALE 2 PUFFS INTO THE LUNGS 2 TIMES DAILY  Qty: 1 each, Refills: 5      Roflumilast (DALIRESP) 500 MCG tablet TAKE (1) TABLET DAILY  Qty: 30 tablet, Refills: 5      nitroGLYCERIN (NITROSTAT) 0.4 MG SL tablet PLACE 1 TABLET UNDER THE TONGUE EVERY 5 MINUTES AS NEEDED FOR CHEST PAIN  Qty: 25 tablet, Refills: 3      famotidine (PEPCID) 20 MG tablet Take 1 tablet by mouth 2 times daily  Qty: 180 tablet, Refills: 3      guaiFENesin (MUCINEX) 600 MG extended release tablet Take 1 tablet by mouth 2 times daily as needed for Congestion  Qty: 60 tablet, Refills: 6                Time Spent on discharge is 45 minutes in the examination, evaluation, counseling and review of medications and discharge plan. Signed:  Maya Moreno MD   3/6/2023      Thank you Cirilo Merrill for the opportunity to be involved in this patient's care. If you have any questions or concerns please feel free to contact me at [phone].

## 2023-03-06 NOTE — PLAN OF CARE
Problem: Discharge Planning  Goal: Discharge to home or other facility with appropriate resources  3/5/2023 2026 by Dandre Benítez RN  Outcome: Progressing     Problem: Safety - Adult  Goal: Free from fall injury  3/6/2023 0943 by Connie Hickey RN  Outcome: Progressing  Flowsheets (Taken 3/6/2023 0943)  Free From Fall Injury:   Instruct family/caregiver on patient safety   Based on caregiver fall risk screen, instruct family/caregiver to ask for assistance with transferring infant if caregiver noted to have fall risk factors  3/5/2023 2026 by Dandre Benítez RN  Outcome: Progressing     Problem: Chronic Conditions and Co-morbidities  Goal: Patient's chronic conditions and co-morbidity symptoms are monitored and maintained or improved  3/5/2023 2026 by Dandre Benítez RN  Outcome: Progressing     Problem: Pain  Goal: Verbalizes/displays adequate comfort level or baseline comfort level  3/6/2023 0943 by Connie Hickey RN  Outcome: Progressing  Flowsheets (Taken 3/6/2023 0943)  Verbalizes/displays adequate comfort level or baseline comfort level:   Encourage patient to monitor pain and request assistance   Assess pain using appropriate pain scale  3/5/2023 2026 by Dandre Benítez RN  Outcome: Progressing     Problem: ABCDS Injury Assessment  Goal: Absence of physical injury  3/5/2023 2026 by Dandre Benítez RN  Outcome: Progressing     Problem: Neurosensory - Adult  Goal: Achieves stable or improved neurological status  3/5/2023 2026 by Dandre Benítze RN  Outcome: Progressing  Goal: Achieves maximal functionality and self care  3/5/2023 2026 by Dandre Benítez RN  Outcome: Progressing

## 2023-03-06 NOTE — CARE COORDINATION
Case Management Assessment            Discharge Note                    Date / Time of Note: 3/6/2023 10:37 AM                  Discharge Note Completed by: Joaquin Cuellar, FRANCISCA    Mr. Alex Ruelas will discharge to [facility] today.  [facility] will transport at [facility] REPORT [phone]    Patient Name: Wu Roberson   YOB: 1950  Diagnosis: Acute CVA (cerebrovascular accident) [facility]) [I63.9]  Aphasia due to acute cerebrovascular accident (CVA) [facility]) [I63.9, R47.01]   Date / Time: 2/25/2023  6:32 PM    Current PCP: Zachary Huynh    Advance Directives:  Code Status: Full Code    Financial:  Payor: Greg Henriquez / Plan: LUIS Thacker Jose / Product Type: *No Product type* /      Pharmacy:    Varsha Borden, [address] [phone] - F [phone]  [facility] [address]  Phone: [phone] Fax: [phone]    [address] , [address] [phone] - f [phone]  [facility] [address]  Phone: [phone] Fax: [phone]    [address]  [address] [facility] [address] [place] [address]  Phone: [phone] Fax: [phone]      ADLS:  Current PT AM-PAC Score: 16 /24  Current OT AM-PAC Score: 16 /24    DISCHARGE Disposition:   [facility]  Address: [facility], [address] [place] [address]  Phone: [phone]    LOC at discharge: ARU  KT Completed: Yes    Notification completed in HENS/PAS?:  Not Applicable    IMM Completed:   Not Indicated    Transportation:  Transportation PLAN for discharge: EMS transportation   Mode of Transport: Ambulance stretcher - BLS  Name of [address] [place] [address],[address]: [facility]  Phone: [phone]  Time of Transport: 3p    Transport form completed: Yes    The Patient and/or patient representative Katherine Robles and his family were provided with a choice of provider and agrees with the discharge plan Yes    Freedom of choice list was provided with basic dialogue that supports the patient's individualized plan of care/goals and shares the quality data associated with the providers.  Yes    Care Transitions patient: No    Rozina Still RN  [facility]  Case Management Department  [phone]

## 2023-03-07 DIAGNOSIS — J44.9 STAGE 3 SEVERE COPD BY GOLD CLASSIFICATION (HCC): ICD-10-CM

## 2023-03-07 PROCEDURE — 92610 EVALUATE SWALLOWING FUNCTION: CPT

## 2023-03-07 PROCEDURE — 97535 SELF CARE MNGMENT TRAINING: CPT

## 2023-03-07 PROCEDURE — 6370000000 HC RX 637 (ALT 250 FOR IP): Performed by: STUDENT IN AN ORGANIZED HEALTH CARE EDUCATION/TRAINING PROGRAM

## 2023-03-07 PROCEDURE — 97163 PT EVAL HIGH COMPLEX 45 MIN: CPT

## 2023-03-07 PROCEDURE — 97167 OT EVAL HIGH COMPLEX 60 MIN: CPT

## 2023-03-07 PROCEDURE — 97542 WHEELCHAIR MNGMENT TRAINING: CPT

## 2023-03-07 PROCEDURE — 94761 N-INVAS EAR/PLS OXIMETRY MLT: CPT

## 2023-03-07 PROCEDURE — 6360000002 HC RX W HCPCS: Performed by: STUDENT IN AN ORGANIZED HEALTH CARE EDUCATION/TRAINING PROGRAM

## 2023-03-07 PROCEDURE — 92523 SPEECH SOUND LANG COMPREHEN: CPT

## 2023-03-07 PROCEDURE — 97110 THERAPEUTIC EXERCISES: CPT

## 2023-03-07 PROCEDURE — 1280000000 HC REHAB R&B

## 2023-03-07 PROCEDURE — 97112 NEUROMUSCULAR REEDUCATION: CPT

## 2023-03-07 PROCEDURE — 2700000000 HC OXYGEN THERAPY PER DAY

## 2023-03-07 PROCEDURE — 97116 GAIT TRAINING THERAPY: CPT

## 2023-03-07 PROCEDURE — 94640 AIRWAY INHALATION TREATMENT: CPT

## 2023-03-07 PROCEDURE — 97530 THERAPEUTIC ACTIVITIES: CPT

## 2023-03-07 RX ORDER — TRAZODONE HYDROCHLORIDE 50 MG/1
50 TABLET ORAL NIGHTLY PRN
Status: DISCONTINUED | OUTPATIENT
Start: 2023-03-07 | End: 2023-03-15 | Stop reason: HOSPADM

## 2023-03-07 RX ORDER — SENNA AND DOCUSATE SODIUM 50; 8.6 MG/1; MG/1
1 TABLET, FILM COATED ORAL DAILY
Status: DISCONTINUED | OUTPATIENT
Start: 2023-03-07 | End: 2023-03-15 | Stop reason: HOSPADM

## 2023-03-07 RX ORDER — BISACODYL 10 MG
10 SUPPOSITORY, RECTAL RECTAL DAILY PRN
Status: DISCONTINUED | OUTPATIENT
Start: 2023-03-07 | End: 2023-03-15 | Stop reason: HOSPADM

## 2023-03-07 RX ADMIN — ARFORMOTEROL TARTRATE 15 MCG: 15 SOLUTION RESPIRATORY (INHALATION) at 19:36

## 2023-03-07 RX ADMIN — BUDESONIDE 500 MCG: 0.5 INHALANT RESPIRATORY (INHALATION) at 09:03

## 2023-03-07 RX ADMIN — CLOPIDOGREL BISULFATE 75 MG: 75 TABLET ORAL at 08:49

## 2023-03-07 RX ADMIN — DILTIAZEM HYDROCHLORIDE 30 MG: 60 TABLET, FILM COATED ORAL at 17:45

## 2023-03-07 RX ADMIN — FAMOTIDINE 20 MG: 20 TABLET ORAL at 08:49

## 2023-03-07 RX ADMIN — DILTIAZEM HYDROCHLORIDE 30 MG: 60 TABLET, FILM COATED ORAL at 23:40

## 2023-03-07 RX ADMIN — ARFORMOTEROL TARTRATE 15 MCG: 15 SOLUTION RESPIRATORY (INHALATION) at 09:03

## 2023-03-07 RX ADMIN — FAMOTIDINE 20 MG: 20 TABLET ORAL at 22:10

## 2023-03-07 RX ADMIN — SENNOSIDES AND DOCUSATE SODIUM 1 TABLET: 50; 8.6 TABLET ORAL at 15:47

## 2023-03-07 RX ADMIN — ATORVASTATIN CALCIUM 80 MG: 80 TABLET, FILM COATED ORAL at 22:10

## 2023-03-07 RX ADMIN — IPRATROPIUM BROMIDE AND ALBUTEROL SULFATE 1 AMPULE: 2.5; .5 SOLUTION RESPIRATORY (INHALATION) at 19:30

## 2023-03-07 RX ADMIN — GUAIFENESIN 600 MG: 600 TABLET, EXTENDED RELEASE ORAL at 22:10

## 2023-03-07 RX ADMIN — PREDNISONE 20 MG: 20 TABLET ORAL at 08:49

## 2023-03-07 RX ADMIN — DILTIAZEM HYDROCHLORIDE 30 MG: 60 TABLET, FILM COATED ORAL at 11:00

## 2023-03-07 RX ADMIN — IPRATROPIUM BROMIDE AND ALBUTEROL SULFATE 1 AMPULE: 2.5; .5 SOLUTION RESPIRATORY (INHALATION) at 09:03

## 2023-03-07 RX ADMIN — ASPIRIN 325 MG: 325 TABLET, COATED ORAL at 08:49

## 2023-03-07 RX ADMIN — BUDESONIDE 500 MCG: 0.5 INHALANT RESPIRATORY (INHALATION) at 19:33

## 2023-03-07 RX ADMIN — DILTIAZEM HYDROCHLORIDE 30 MG: 60 TABLET, FILM COATED ORAL at 06:02

## 2023-03-07 RX ADMIN — GUAIFENESIN 600 MG: 600 TABLET, EXTENDED RELEASE ORAL at 11:00

## 2023-03-07 RX ADMIN — ENOXAPARIN SODIUM 40 MG: 100 INJECTION SUBCUTANEOUS at 08:49

## 2023-03-07 ASSESSMENT — 9 HOLE PEG TEST
TEST_RESULT: FUNCTIONAL
TESTTIME_SECONDS: 41.5
TEST_RESULT: FUNCTIONAL
TESTTIME_SECONDS: 41.5

## 2023-03-07 ASSESSMENT — PAIN SCALES - GENERAL
PAINLEVEL_OUTOF10: 0

## 2023-03-07 NOTE — PLAN OF CARE
SLP completed evaluation. Please refer to notes in EMR.       Ty Keita MA [address] [facility]  Speech Language Pathologist

## 2023-03-07 NOTE — PLAN OF CARE
Problem: Chronic Conditions and Co-morbidities  Goal: Patient's chronic conditions and co-morbidity symptoms are monitored and maintained or improved  Outcome: Progressing     Problem: Safety - Adult  Goal: Free from fall injury  Outcome: Progressing     Problem: Skin/Tissue Integrity  Goal: Absence of new skin breakdown  Description:  Monitor for areas of redness and/or skin breakdown  Outcome: Progressing

## 2023-03-07 NOTE — PLAN OF CARE
Please see Physical therapy individual plan of care and shared plan of care dated 3/7/2023 with overall goal of increased function towards baseline.

## 2023-03-07 NOTE — PROGRESS NOTES
03/06/23 2241   NIV Type   $NIV $Daily Charge   Skin Assessment Clean, dry, & intact   Skin Protection for O2 Device Yes   Orientation Middle   Location Nose   NIV Started/Stopped On   Equipment Type v60   Mode Bilevel   Mask Type Full face mask   Mask Size Medium   Settings/Measurements   PIP Observed 18 cm H20   IPAP 18 cmH20   CPAP/EPAP 10 cmH2O   Vt (Measured) 1151 mL   Resp 16   FiO2  35 %   I Time/ I Time % 0.9 s   Minute Volume (L/min) 18.7 Liters   Mask Leak (lpm) 33 lpm   Comfort Level Good   Using Accessory Muscles No   SpO2 97   Patient's Home Machine No   Electrical Safety Check Performed Yes   Alarm Settings   Alarms On Y   Low Pressure (cmH2O) 6 cmH2O   High Pressure (cmH2O) 30 cmH2O   RR Low (bpm) 6   RR High (bpm) 40 br/min

## 2023-03-07 NOTE — H&P
Patient: Marci Gillette  [id number]  Date: 3/7/2023      Chief Complaint: right sided weakness    History of Present Illness/Hospital Course:  Angella Mckeon is a 68year old male with a past medical history significant for atrial fibrillation on Eliquis, HFpEF, COPD with chronic respiratory failure on 4 L O2 baseline who presented to [facility] on 2/25/23 with right sided weakness and aphasia. 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 He was transferred to [facility] for Neurosurgery evaluation. On 2/28 he underwent cerebral angiogram. On 3/2 he underwent left carotid stent placement. EP was consulted due to history of atrial fibrillation. He was admitted to [facility] on 3/6/23 due to functional deficits below his baseline. Today he is seen with nursing present. He reports weakness on his right sided. He reports living with his son and daughter in law. Prior Level of Function:  Independent in self care, indoor mobility, stairs, functional cognition     Current Level of Function:  Supervision for lying to sitting on side of bed, sit to stand, chair/bed transfer, walk 10 feet, walk 50 feet with two turns  Mod assist for toileting hygiene, shower/bathe self, toilet transfer     has a past medical history of Atrial fibrillation ([facility] [address] Bronchitis chronic, Emphysema of lung ([facility] [address] HTN (hypertension), Influenza A, Lung disease, Pneumonia, and Stroke.     has a past surgical history that includes fracture surgery and hernia repair. reports that he quit smoking about 6 years ago. His smoking use included cigarettes. He has a 30.00 pack-year smoking history. He has never used smokeless tobacco. He reports that he does not drink alcohol and does not use drugs.     family history includes Asthma in his daughter and son; Cancer in his brother, father, sister, and sister; Diabetes in his mother and sister.     Current Facility-Administered Medications   Medication Dose Route Frequency Provider Last Rate Last Admin    acetaminophen (TYLENOL) tablet 650 mg  650 mg Oral Q4H PRN Bart Krueger MD        albuterol (PROVENTIL) nebulizer solution 2.5 mg  2.5 mg Nebulization Q4H PRN Bart Krueger MD        arformoterol tartrate [facility]) nebulizer solution 15 mcg  15 mcg Nebulization BID Bart Krueger MD   15 mcg at 03/07/23 7214    And    budesonide (PULMICORT) nebulizer suspension 500 mcg  0.5 mg Nebulization BID Bart Krueger MD   500 mcg at 03/07/23 6391    aspirin EC tablet 325 mg  325 mg Oral Daily Bart Krueger MD   325 mg at 03/07/23 1636    Or    aspirin suppository 300 mg  300 mg Rectal Daily Bart Krueger MD        atorvastatin (LIPITOR) tablet 80 mg  80 mg Oral Nightly Bart Krueger MD   80 mg at 03/06/23 2126    clopidogrel (PLAVIX) tablet 75 mg  75 mg Oral Daily Bart Krueger MD   75 mg at 03/07/23 0849    dilTIAZem (CARDIZEM) tablet 30 mg  30 mg Oral 4 times per day Bart Krueger MD   30 mg at 03/07/23 0602    famotidine (PEPCID) tablet 20 mg  20 mg Oral BID Bart Krueger MD   20 mg at 03/07/23 0849    guaiFENesin [facility]) extended release tablet 600 mg  600 mg Oral BID Bart Krueger MD   600 mg at 03/06/23 2126    HYDROcodone-acetaminophen (NORCO) 5-325 MG per tablet 1 tablet  1 tablet Oral Q4H PRN Bart Krueger MD        Or    HYDROcodone-acetaminophen [facility]) 5-325 MG per tablet 2 tablet  2 tablet Oral Q4H PRN Bart Krueger MD        ondansetron (ZOFRAN-ODT) disintegrating tablet 4 mg  4 mg Oral Q8H PRN Bart Krueger MD        polyethylene glycol [facility]) packet 17 g  17 g Oral Daily PRN Bart Krueger MD        predniSONE (DELTASONE) tablet 20 mg  20 mg Oral Daily Maru Lombardi MD   20 mg at 03/07/23 0849    sodium chloride flush 0.9 % injection 5-40 mL  5-40 mL IntraVENous PRN Maru Lombardi MD        enoxaparin (LOVENOX) injection 40 mg  40 mg SubCUTAneous Daily Maru Lombardi MD   40 mg at 03/07/23 0849    ipratropium-albuterol (DUONEB) nebulizer solution 1 ampule  1 ampule Inhalation BID Maru Lombardi MD   1 ampule at 03/07/23 0903       No Known Allergies    Current Facility-Administered Medications   Medication Dose Route Frequency Provider Last Rate Last Admin    acetaminophen (TYLENOL) tablet 650 mg  650 mg Oral Q4H PRN Maru Lombardi MD        albuterol (PROVENTIL) nebulizer solution 2.5 mg  2.5 mg Nebulization Q4H PRN Maru Lombardi MD        arformoterol tartrate (BROVANA) nebulizer solution 15 mcg  15 mcg Nebulization BID Maru Lombardi MD   15 mcg at 03/07/23 0903    And    budesonide (PULMICORT) nebulizer suspension 500 mcg  0.5 mg Nebulization BID Maru Lombardi MD   500 mcg at 03/07/23 0903    aspirin EC tablet 325 mg  325 mg Oral Daily Maru Lombardi MD   325 mg at 03/07/23 0849    Or    aspirin suppository 300 mg  300 mg Rectal Daily Maru Lombardi MD        atorvastatin (LIPITOR) tablet 80 mg  80 mg Oral Nightly Maru Lombardi MD   80 mg at 03/06/23 2126    clopidogrel (PLAVIX) tablet 75 mg  75 mg Oral Daily Maru Lombardi MD   75 mg at 03/07/23 0849    dilTIAZem (CARDIZEM) tablet 30 mg  30 mg Oral 4 times per day Maru Lombardi MD   30 mg at 03/07/23 0602    famotidine (PEPCID) tablet 20 mg  20 mg Oral BID Maru Lombardi MD   20 mg at 03/07/23 0849    guaiFENesin (MUCINEX) extended release tablet 600 mg  600 mg Oral BID Maru Lombardi MD   600 mg at 03/06/23 2126    HYDROcodone-acetaminophen (NORCO) 5-325 MG per tablet 1 tablet  1 tablet Oral Q4H PRN Maru Lombardi MD        Or    HYDROcodone-acetaminophen (NORCO) 5-325 MG per tablet 2 tablet  2 tablet Oral  Q4H PRN Garrett Herndon MD        ondansetron (ZOFRAN-ODT) disintegrating tablet 4 mg  4 mg Oral Q8H PRN Garrett Herndon MD        polyethylene glycol [facility]) packet 17 g  17 g Oral Daily PRN Garrett Herndon MD        predniSONE (DELTASONE) tablet 20 mg  20 mg Oral Daily Garrett Herndon MD   20 mg at 03/07/23 0849    sodium chloride flush 0.9 % injection 5-40 mL  5-40 mL IntraVENous PRN Garrett Herndon MD        enoxaparin (LOVENOX) injection 40 mg  40 mg SubCUTAneous Daily Garrett Herndon MD   40 mg at 03/07/23 0849    ipratropium-albuterol (DUONEB) nebulizer solution 1 ampule  1 ampule Inhalation BID Garrett Herndon MD   1 ampule at 03/07/23 7959         REVIEW OF SYSTEMS:   CONSTITUTIONAL: negative for fevers, chills, diaphoresis, activity change, appetite change, fatigue, night sweats and unexpected weight change. EYES: negative for blurred vision, eye discharge, visual disturbance and icterus. HEENT: negative for hearing loss, tinnitus, ear drainage, sinus pressure, nasal congestion, epistaxis and snoring. RESPIRATORY: Negative for hemoptysis, cough, sputum production. CARDIOVASCULAR: negative for chest pain, palpitations, exertional chest pressure/discomfort, edema, syncope. GASTROINTESTINAL: negative for nausea, vomiting, diarrhea, constipation, blood in stool and abdominal pain. GENITOURINARY: negative for frequency, dysuria, urinary incontinence, decreased urine volume, and hematuria. HEMATOLOGIC/LYMPHATIC: negative for easy bruising, bleeding and lymphadenopathy. ALLERGIC/IMMUNOLOGIC: negative for recurrent infections, angioedema, anaphylaxis and drug reactions. ENDOCRINE: negative for weight changes and diabetic symptoms including polyuria, polydipsia and polyphagia. MUSCULOSKELETAL: negative for pain, joint swelling, decreased range of motion and muscle weakness.    NEUROLOGICAL: positive for right sided weakness; negative for headaches, slurred speech  PSYCHIATRIC/BEHAVIORAL: negative for hallucinations, behavioral problems, confusion and agitation. All pertinent positives are noted in the HPI. Physical Examination:  Vitals: Patient Vitals for the past 24 hrs:   BP Temp Temp src Pulse Resp SpO2 Height Weight   03/07/23 0907 -- -- -- 74 18 93 % -- --   03/07/23 0715 108/69 97.6 °F (36.4 °C) Oral 68 18 94 % -- --   03/07/23 0545 109/72 -- -- 66 18 96 % -- --   03/06/23 2357 -- -- -- 76 17 98 % -- --   03/06/23 2345 112/73 -- -- 78 -- -- -- --   03/06/23 2241 -- -- -- -- 16 -- -- --   03/06/23 2115 114/77 97.4 °F (36.3 °C) Oral 77 18 93 % -- --   03/06/23 1715 (!) 155/90 -- -- 85 -- -- -- --   03/06/23 1644 -- -- Oral 82 16 -- -- --   03/06/23 1545 135/82 97.3 °F (36.3 °C) Oral 82 16 94 % 6' (1.829 m) 182 lb (82.6 kg)     Psych: Stable mood, normal judgement, normal affect   Const: No distress  Eyes: Conjunctiva noninjected, no icterus noted; pupils equal, round, and reactive to light. HENT: Atraumatic, normocephalic; Oral mucosa moist  Neck: Trachea midline, neck supple. No thyromegaly noted. CV: Regular rate and rhythm, no murmur rub or gallop noted  Resp: Lungs clear to auscultation bilaterally, no rales wheezes or ronchi, no retractions. Respirations unlabored. GI: Soft, nontender, nondistended. Normal bowel sounds. No palpable masses. Neuro: Alert, oriented, appropriate. No cranial nerve deficits appreciated. Sensation intact to light touch. Motor examination reveals 4/5 strength in RUE and RLE and 5/5 strength in LUE and LLE. No abnormalities with finger/nose or heel/shin noted. Skin: Normal temperature and turgor  MSK: No joint abnormalities noted. Ext: No significant edema appreciated. No varicosities.     Lab Results   Component Value Date    WBC 10.7 03/04/2023    HGB 13.6 03/04/2023    HCT 41.5 03/04/2023    MCV 92.9 03/04/2023     03/04/2023     Lab Results   Component Value Date    INR 1.13 02/25/2023    INR 1.19 (H) 01/11/2018    PROTIME 14.4 02/25/2023    PROTIME 13.4 (H) 01/11/2018     Lab Results   Component Value Date    CREATININE 0.9 03/06/2023    BUN 18 03/06/2023     03/06/2023    K 4.3 03/06/2023    CL 96 (L) 03/06/2023    CO2 34 (H) 03/06/2023     Lab Results   Component Value Date    ALT 17 02/25/2023    AST 21 02/25/2023    ALKPHOS 97 02/25/2023    BILITOT 0.8 02/25/2023       Most recent echocardiogram 2/28/23  Left ventricle cavity size is normal. Mild concentric hypertrophy. Ejection fraction is visually estimated to be 60%. No obvious regional wall   motion abnormalities. Unable to determine diastolic function due to atrial fibrillation. The right ventricle is normal in size and function. TAPSE is 1.71 cm. No pericardial effusion noted. Most recent EKG revealed atrial fibrillation. IMAGING    CT head/ CTA head and neck 2/25/23  1. 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 Reduced blood flow to the left MCA territory. 2. Bilateral proximal vertebral artery occlusions with distal reconstitution. 3. Age-indeterminate, possibly acute small left frontal lobe infarct. 4. Chronic thoracic compression deformities. MRI brain 2/25/23     Left MCA border zone infarcts without hemorrhage or mass effect. XR chest 3/4/23  Bibasilar airspace density/atelectasis. No other acute cardiopulmonary findings. The above laboratory data have been reviewed. The above imaging data have been reviewed. The above medical testing have been reviewed. Body mass index is 24.68 kg/m². Barriers to Discharge: home set up, endurance, O2 line management, comorbidities    POST ADMISSION PHYSICIAN EVALUATION  The patient has agreed to being admitted to our comprehensive inpatient rehabilitation facility consisting of at least 180 minutes of therapy a day, 5 out of 7 days a week. The patient/family has a good understanding of our discharge process.  The patient has potential to make improvement and is in need of at least two of the following multidisciplinary therapies including but not limited to physical, occupational, respiratory, and speech, nutritional services, wound care, and prosthetics and orthotics. Given the patients complex condition and risk of further medical complications, rehabilitation services cannot be safely provided at a lower level of care such as a skilled nursing facility. I have compared the patients medical and functional status at the time of the preadmission screening and the same on this date, and there are no significant changes. By signing this document, I acknowledge that I have personally performed a full physical examination on this patient within 24 hours of admission to this inpatient rehabilitation facility and have determined the patient to be able to tolerate the above course of treatment at an intensive level for a reasonable period of time. I will be completing a detailed individualized Plan of Care for this patient by day four of the patients stay based upon the Preadmission Screen, this Post-Admission Evaluation, and the therapy evaluations. Rehabilitation Diagnosis:  Stroke, 1.2, Right Body (L Brain)    Assessment and Plan:  Donell Lagos is a 68year old male with a past medical history significant for atrial fibrillation on Eliquis, HFpEF, COPD with chronic respiratory failure on 4 L O2 baseline who presented to [facility] on 2/25/23 with right sided weakness and aphasia, found to have left MCA CVA and subsequently transferred to [facility]. for left ICA stent placement. He was admitted to [facility] on 3/6/23 due to functional deficits below his baseline. Left MCA CVA  - due to left ICA stenosis  - DAPT as below, statin  - PT, OT, ST    Left Internal Carotid Stenosis  - s/p stent placement on 3/2  - DAPT for 6 weeks then stop Plavix, continue asa.  Resume Eliquis 4/17  - follow up with Calin PATTERSON    Atrial Fibrillation  - with tachy-bear syndrome  - diltiazem 30 mg every 6 hours. Per EP if tolerating all doses can switch to 120 mg daily  - Eliquis to be resume 4/17    HFpEF  - home regimen: torsemide 40 mg daily  - torsemide 10 mg daily  - daily weights, I/Os    Chronic Respiratory Failure  COPD  - on baseline 4 L O2  - brovana, pulmicort  - duo-nebs  - short burst of steroids per Pulm for possible exacerbation   - wean oxygen for goal SpO2>88%    Bowels: adjust medications as needed for regular bowel movements    Bladder: Check PVR x 3. [address] if PVR > 200ml or if any volume is > 500 ml. Sleep: Trazodone provided prn. PPX   DVT: lovenox  GI: not indicated    Follow up appointments: Neurosurgery, PCP, EP, Pulmonology    [address] [place] [address]  Alma Banda MD 3/7/2023, 9:36 AM

## 2023-03-07 NOTE — CARE COORDINATION
Case Management Assessment  Initial Evaluation    Date/Time of Evaluation: 3/7/2023 11:22 AM  Assessment Completed by: Shay Kurtz RN    If patient is discharged prior to next notation, then this note serves as note for discharge by case management. Patient Name: Ariela Vance                   YOB: 1950  Diagnosis: Acute CVA (cerebrovascular accident) [facility]) [I63.9]                   Date / Time: 3/6/2023  3:45 PM    Patient Admission Status: REHAB IP   Readmission Risk (Low < 19, Mod (19-27), High > 27): Readmission Risk Score: 17.1    Current PCP: Elton Handy  PCP verified by CM? (P) Yes    Chart Reviewed: Yes      History Provided by: (P) Patient, Medical Record  Patient Orientation: (P) Alert and Oriented    Patient Cognition: (P) Alert    Hospitalization in the last 30 days (Readmission):  No    If yes, Readmission Assessment in CM Navigator will be completed.   Readmission Assessment  Number of Days since last admission?: 8-30 days (Not a readmission, pt from [facility] to [place])     Advance Directives:      Code Status: Full Code   Patient's Primary Decision Maker is: (P) Named in [address] [place] [address]    Primary Decision MakerTracy Menard Child - [phone]    Secondary Decision Maker: Rui Gonzalez - Daughter-in-Law - [phone]    Supplemental (Other) Decision Maker: Tawana Lukas - [phone]    Discharge Planning:    Patient lives with: (P) Family Members, Children Type of Home: (P) House  Primary Care Giver: (P) Self  Patient Support Systems include: (P) Family Members, Children   Current Financial resources: (P) Medicare  Current community resources: (P) ECF/Home Care  Current services prior to admission: (P) [address], [address], Home Bipap and Home oxygen with [employer]            Current DME: (P) Walker, Shower Chair, Oxygen Therapy (Comment) (Home Bi-pap, 4 ww)            Type of Home Care services:  (P) Nursing Services, PT, OT (Had [facility] previously for SN/PT/OT)    ADLS  Prior functional level: (P) Assistance with the following:, Cooking, Housework, Shopping  Current functional level: (P) Assistance with the following:, Bathing, Dressing, Toileting, Mobility    PT AM-PAC:   /24  OT AM-PAC:   /24    Family can provide assistance at DC: (P) Yes  Would you like Case Management to discuss the discharge plan with any other family members/significant others, and if so, who? (P) Yes (Narciso Vargas and Madiha Galloway)  Plans to Return to Present Housing: (P) Yes  Other Identified Issues/Barriers to RETURNING to current housin step entry  Potential Assistance needed at discharge: (P) [address], Outpatient PT/OT, Durable Medical Equipment            Potential DME: (P) Walker (front wheel walker)  Patient expects to discharge to: (P) [facility] for transportation at discharge:  private    Financial    Payor: Gavino Springer / Plan: Verenice Cruz / Product Type: *No Product type* /     Does insurance require precert for SNF: Yes    Potential assistance Purchasing Medications: (P) No  Meds-to-Beds request: Yes      [address] [address] [phone] Chhaya Bianchi [phone]  [address] [place] [id number]  Phone: [phone] Fax: [phone]    [address] [phone] - F [phone]  [address] [place] [address]  Phone: [phone] Fax: [phone]    [address], [address]  [address] [facility] [address]  Phone: [phone] Fax: [phone]      Notes:    Factors facilitating achievement of predicted outcomes: Family support, Motivated, Cooperative, Pleasant, and Has needed Durable Medical Equipment at home    Barriers to discharge: Decreased endurance and Lower extremity weakness    Additional Case Management Notes: Patient independent PLOF w/o AD. Lives with son and DIL in a one level home with a 2 step entry. Home has a walk-in-shower with grab bars. Patient owns a 4 wheel walker, shower chair and a bi-pap with [employer]. The Plan for Transition of Care is related to the following treatment goals of Acute CVA (cerebrovascular accident) ([facility] [address] [Q11.7]    IF APPLICABLE: The Patient and/or patient representative Markell Taylor and his family were provided with a choice of provider and agrees with the discharge plan. Freedom of choice list with basic dialogue that supports the patient's individualized plan of care/goals and shares the quality data associated with the providers was provided to: (P) Patient   Patient Representative Name:  Lakshmi Fam     The Patient and/or Patient Representative Agree with the Discharge Plan? (P) Yes        Transportation needs: Family can provide   Has lack of transportation kept you from medical appointments, meetings, work, or ADL's? [] Yes, it has kept me from medical appointments or from getting my meds  [] Yes, It has kept me from non-medical meetings, appointments, work, or getting things I need  [x] No  [] Pt unable to respond  [] Pt declines to respond     How often do you need to have someone help you when you read instructions, pamphlets, or other written material from your doctor or pharmacy? [] Never                             [] Always  [x] Rarely                            [] Patient unable to respond  [] Sometimes                     [] Pt declines to respond  [] Often         Ethnicity: Are you of , /a, or Palauan origin?   [x] No, not of , /a, or Palauan origin  [] Yes, [place], [facility], Chicano/a  [] Yes, [facility]  [] Yes, [place]  [] Yes, another , , or Palauan origin  [] Pt unable to respond  [] Pt declines to respond     Race: [x] White                                                [] Urban Craig              [] [facility]  [] Mattie [place] or [facility] American [] [place]          [] [place] or Akbar  [] American [place] [facility] or [place] Native     [] [place]             [] Guatemalan  []  [place] [facility]                                      [] [place]      [] Other [place]  [] [facility]                                             [] Other        [] Pt unable to respond                      [] Pt declines to respond                  [] None of the above   Preferred Language: 24 Yaniv Rubio RN  Case Management Department  Ph: [phone] Fax: [phone]

## 2023-03-07 NOTE — PROGRESS NOTES
Speech Language Pathology  Clinical Bedside Swallow Assessment  Facility/Department: [facility]        Recommendations:  Diet recommendation: IDDSI 7 Regular Solids; IDDSI 0 Thin Liquids; Meds PO as tolerated  Instrumentation: Not clinically indicated. Will continue to monitor. Risk management: upright for all intake, stay upright for at least 30 mins after intake, small bites/sips, oral care 2-3x/day to reduce adverse affects in the event of aspiration, alternate bites/sips, slow rate of intake, general GERD precautions, and general aspiration precautions      NAME:Bang Jean Baptiste  : 1950 (68 y.o.)   MRN: [id number]  ROOM: [address] [place] [address]  ADMISSION DATE: 3/6/2023  PATIENT DIAGNOSIS(ES): Acute CVA (cerebrovascular accident) ([address] [I63.9]  No chief complaint on file.     Patient Active Problem List    Diagnosis Date Noted    Acute CVA (cerebrovascular accident) ([address] 2023    Tachy-bear syndrome ([address] 2023    ALEXANDREA treated with BiPAP 2023    Acute cerebrovascular accident (CVA) due to stenosis of left carotid artery ([address] 2023    Acute unilateral cerebral infarction in a watershed distribution [facility]) 2023    Aphasia due to acute cerebrovascular accident (CVA) ([address] 2023    COPD with chronic bronchitis ([address] 2022    Chronic respiratory failure with hypoxia and hypercapnia ([address] 2022    Nocturnal hypoxia 2022    Hypotension 2022    Acute on chronic combined systolic (congestive) and diastolic (congestive) heart failure ([address] 2022    Acute hypokalemia 2022    Hypophosphatemia 2022    Long term current use of diuretic 2022    Unstable angina pectoris ([address] 2019    Atrial fibrillation ([address] 2018    Acute on chronic respiratory failure with hypoxia and hypercapnia (HCC) 2018    Acute hyponatremia 2018    Hyperbilirubinemia 2018    Influenzal pneumonia     Acute exacerbation of chronic obstructive pulmonary disease (COPD) ([facility] 04/07/2011    Essential hypertension 04/07/2011    Former smoker 04/16/2010     Past Medical History:   Diagnosis Date    Atrial fibrillation ([facility]     Bronchitis chronic     Emphysema of lung ([facility]     HTN (hypertension)     Influenza A 01/10/2018    Lung disease     copd    Pneumonia     12/2009    Stroke 02/25/2023    L MCA watershed     Past Surgical History:   Procedure Laterality Date    FRACTURE SURGERY      johnson in femur/hip on right    HERNIA REPAIR       No Known Allergies    DATE ONSET: Pt admitted to [address] [place] [address] 02/25/23    Date of Evaluation: 3/7/2023   Evaluating Therapist: CALLIE Granado    Chart Reviewed: : [x] Yes [] No    Current Diet: ADULT DIET; Easy to Chew; No Added Salt (3-4 gm)    Recent Chest Radiography: [x] Chest XR   [] CT of Chest  Date: 03/04/23  Impression       Bibasilar airspace density/atelectasis. No other acute cardiopulmonary findings. Pain: denies     Reason for Referral  Ministerio Amaral was referred for a bedside swallow evaluation to assess the efficiency of their swallow function, identify signs and symptoms of aspiration and make recommendations regarding safe dietary consistencies, effective compensatory strategies, and safe eating environment. Assessment    Medical record review/interview: Per MD H&P: Raymon Hatch is a 68year old male with a past medical history significant for atrial fibrillation on Eliquis, HFpEF, COPD with chronic respiratory failure on 4 L O2 baseline who presented to [facility] on 2/25/23 with right sided weakness and aphasia. 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 He was transferred to [facility] for Neurosurgery evaluation.  On 2/28 he underwent cerebral angiogram. On 3/2 he underwent left carotid stent placement. EP was consulted due to history of atrial fibrillation. He was admitted to [facility] on 3/6/23 due to functional deficits below his baseline. Today he is seen with nursing present. He reports weakness on his right sided. He reports living with his son and daughter in law. \"      Predisposing dysphagia risk factors: COPD and Chronic Respiratory Failure  Clinical signs of possible chronic dysphagia: hx of PNA  Precipitating dysphagia risk factors: acute neurological event    Patient Complaints:  Odynophagia: [] Yes [x] No  Globus Sensation: [] Yes [x] No  SOB with PO intake: [] Yes [x] No  Increased WOB with PO intake: [] Yes [x] No  Reflux Sx's: [] Yes [x] No  Weight loss: [] Yes [x] No  Coughing/Choking with PO intake: [] Yes [] No  Reduced Appetite: [] Yes [x] No    Additional Reported Symptoms/Complaints/Hospital Course:   Pt admitted to [facility] s/p L MCA infarct and left ICA occlusion. Pt had been seen by  at [facility]; currently on an easy to chew diet with thin liquids. Pt denies difficulty swallowing. Pt's goals: \"get back to where I was before\"     Vitals/labs:   SpO2: 94%  RR: 18  BP: 108/69  HR: 68  O2 device: 3L O2 via NC    CBC:   Recent Labs     03/04/23  1621   WBC 10.7   HGB 13.6         BMP:  Recent Labs     03/06/23  0436      K 4.3   CL 96*   CO2 34*   BUN 18   CREATININE 0.9   GLUCOSE 130*          Cranial nerve exam:   CN V (trigeminal): ophthalmic, maxillary, and mandibular facial sensation- WNL  CN VII (facial): Impaired R  CN IX/X (glossopharyngeal/vagus): MPT: DNT; pitch range: WNL; vocal quality: WNL; cough: Strong-perceptually  CN XII (hypoglossal):  Impaired R    Laryngeal function exam:   Secretions: WFL  Vocal quality: See CN exam above  MPT: See CN exam above  S/Z ratio: DNT  Pitch range: See CN exam above  Cough: See CN exam above    Oral Care Status:    [] Oral Care [facility]  [] Poor oral care status  [x] Edentulous - pt reports having upper and lower dentures, but he does not wear them  [] Upper Dentures  [] Lower Dentures  [] Missing/Broken Teeth  [] Evidence of dental cavities/carries    PO trials:   IDDSI 0 (thin): via cup and straw     IDDSI 4 (puree): pudding x3    IDDSI 5 (minced and moist): x1    IDDSI 6 (soft and bite sized): x2    IDDSI 7 (regular): x5    Meds whole with thin liquid: administered by RN     Impressions:  Pt alert and cooperative, agreeable to eval. Pt upright in bedside chair for eval. Pt edentulous; has upper and lower dentures, but does not wear them. Pt eats all solids at home despite not wearing dentures. Pt denies difficulty swallowing. Oral mech exam indicated right labial weakness and reduced lingual ROM to right. Pt with slow and mildly prolonged mastication with regular solids; adequate A-P transit and oral clearance given increased time. Adequate A-P transit and oral clearance across consistencies. Suspect timely swallow and adequate laryngeal elevation. Pt with occ post-swallow throat clearing - pt reports phlegm in throat. SLP to continue to monitor. RN administered meds whole with water - pt tolerated with no overt s/s of aspiration. SLP edu pt re: safe swallow strategies - small bites/sips, slow rate, alternate liquids and solids, fully upright. Pt verbalized understanding. SLP recommends upgrade to regular solids. Continue thin liquids and meds PO. Diet orders changed in Epic and on Whiteboard in room. RN made aware of recommendations. SLP to continue to follow for diet tolerance monitoring. Recommendations:  Diet recommendation: IDDSI 7 Regular Solids; IDDSI 0 Thin Liquids; Meds PO as tolerated  Instrumentation: Not clinically indicated. Will continue to monitor.    Risk management: upright for all intake, stay upright for at least 30 mins after intake, small bites/sips, oral care 2-3x/day to reduce adverse affects in the event of aspiration, alternate bites/sips, slow rate of intake, general GERD precautions, and general aspiration precautions    Prognosis: Good    Recommended Intervention:   [x] Dysphagia tx  [] Videostroboscopy                      [] NPO   [] MBS       [] Speech/Cog Eval   [x] Therapeutic PO Trials     [] Ice Chips   [] Other:  [] FEES                                                 Dysphagia Therapeutic Intervention:   []  Bolus control Exercises  []  Oral Motor Exercises  []  George Water Protocol  []  Thermal Stimulation  []  Oral Care    []  Vital Stim/NMES  []  Laryngeal Exercises  [x]  Patient/Family Education  []  Pharyngeal Exercises  []  Therapeutic PO trials with SLP  [x]  Diet tolerance monitoring  []  Other:     Referrals:  N/A    Goals:  Short Term Goals:  Timeframe for Short Term Goals: (10 days; 03/15/23)  Goal 1: The patient will tolerate recommended diet with no clinical s/s of aspiration 5/5  Goal 2: The patient/caregiver will demonstrate understanding of compensatory swallow strategies, for improved swallow safety      Long Term Goals:   Timeframe for Long Term Goals: (14 days; 03/18/23)  Goal 1: The patient will tolerate least restrictive diet with no clinical s/s of aspiration or worsening respiratory/pulmonary status      Treatment:  Skilled instruction completed with patient re: evidenced based practice regarding recommendations and POC, importance of oral care to reduce adverse affects in the event of aspiration, and instruction of recommended compensatory strategies developed based upon clinical exam. Pt able to recall/demonstrate compensatory strategies with min cues.       Pt Education: SLP educated the patient re: Role of SLP, rationale for completion of assessment, results of assessment, recommendations, and POC  Pt Education Response: verbalized understanding    Duration/Frequency of Tx: 5x/week for LOS     Individuals Consulted:   [x]  Patient     []  NP         [x]  RN   []  RD                   []  MD      []  Family Member []  PA    []  Other:      Safety Devices / Report:  [x]  All fall risk precautions in place []  Safety handoff completed with RN  [x]  Bed alarm in place  []  Left in bed     []  Chair alarm in place  []  Left in chair   []  Call light in reach   []  Other:            Total Treatment Time / Charges       Time in Time out Total Time / units   Swallow Eval/Tx Time  0845 0900 15 min / 1 unit      Signature:  Serge Lauren MA [id number]  Speech Language Pathologist

## 2023-03-07 NOTE — PROGRESS NOTES
Physical Therapy  Facility/Department: [facility]  Rehabilitation Physical Therapy Initial Assessment    NAME: Panfilo Palma  : 1950 (80 y.o.)  MRN: [id number]  CODE STATUS: Full Code    Date of Service: 3/7/23      Past Medical History:   Diagnosis Date    Atrial fibrillation ([facility]     Bronchitis chronic     Emphysema of lung ([facility]     HTN (hypertension)     Influenza A 01/10/2018    Lung disease     copd    Pneumonia     2009    Stroke 2023    L MCA watershed     Past Surgical History:   Procedure Laterality Date    FRACTURE SURGERY      johnson in femur/hip on right    HERNIA REPAIR         Chart Reviewed: Yes  Patient assessed for rehabilitation services?: Yes  Additional Pertinent [facility]  69 yo male who presents with expressive aphasia and RUE ataxia. CT head shows an age-indeterminate, possibly acute L frontal lobe infarct. CTA head/neck shows proximal L ICA near occlusion with distal lumen collapse, and bilateral proximal vertebral artery occlusion with distal reconstitution. PMH: HTN, HFpEF, COPD, emphysema, PNA, chronic bronchitis, hernia repair, AFib  Family / Caregiver Present: No  Referring Practitioner: Dr. Molina Rodriguez  Referral Date : 23  Diagnosis: acute CVA  Other (Comment): with increased time  General Comment  Comments: Up to chair on arrival notes minimal sob    Restrictions:  Restrictions/Precautions: Fall Risk;General Precautions  Position Activity Restriction  Other position/activity restrictions: Strict I&Os, 3L O2; up ith assist. Notify physician for pulse less than 50 or greater than 120, respiratory rate less than 12 or greater than 25, oral temperature greater than 101.3 F (08.8 C), systolic BP less than 90 or greater than 828, diastolic BP less than 50 or greater than 100. SUBJECTIVE  Subjective: Pateint states, \" I get sob- that is normal I just ahd a shower earlier. \"  Pateint deneis pain  Pain: Pt denies       Post Treatment Pain Screening: Denies       Prior Level of Function:  Social/Functional History  Lives With: Family  Type of Home: House  Home Layout: One level  Home Access: Stairs to enter without rails  Entrance Stairs - Number of Steps: 1+1  Bathroom Shower/Tub: Walk-in shower  Home Equipment: Radford Dessert, 4 wheeled, Grab bars, Oxygen  Receives Help From: Family  ADL Assistance: Independent  Homemaking Assistance: Needs assistance  Ambulation Assistance: Independent  Transfer Assistance: Independent  Active : No  Occupation: Retired  Additional Comments: Pt reports that son is home during the day and can assist physically 24HR/day. Pt is questionable historian at times due to aphasia. OBJECTIVE  Vision  Vision: Impaired (failed cataract surgery has not driven since then)  Vision Exceptions: Wears glasses for reading    Hearing  Hearing: Exceptions to [facility]  Hearing Exceptions: Hard of hearing/hearing concerns    Cognition  Overall Cognitive Status: Exceptions  Arousal/Alertness: Delayed responses to stimuli  Following Commands: Follows one step commands with increased time; Follows one step commands consistently  Memory: Decreased short term memory (remembers 2/3 questions when asked needed third orienation question repeated.)  Safety Judgement: Decreased awareness of need for assistance  Problem Solving: Assistance required to generate solutions;Assistance required to implement solutions;Assistance required to identify errors made  Insights: Decreased awareness of deficits  Initiation: Requires cues for some  Sequencing: Requires cues for some  Cognition Comment: increased time for processing - pt presenting with symptoms of global aphasia, expressive deficits appear more severe than receptive    ROM  AROM RLE (degrees)  RLE AROM: WNL  AROM LLE (degrees)  LLE AROM : WNL    Strength  Strength RLE  Strength RLE: Exception  R Hip Flexion: 4+/5  R Hip Extension: 4+/5  R Hip ABduction: 3/5  R Knee Flexion: 4+/5;3+/5  R Knee Extension: 4-/5  R Ankle Dorsiflexion: 4+/5  R Ankle Inversion: 4/5  Strength LLE  Strength LLE: Exception  L Hip Flexion: 4-/5  L Hip Extension: 4/5  L Hip ABduction: 3/5  L Knee Flexion: 4+/5  L Knee Extension: 4+/5  L Ankle Dorsiflexion: 5/5  L Ankle Plantar Flexion: 4+/5  Strength RUE  Strength RUE: Exception    Quality of Movement  Coordination  Rapid Alternating Movements: Dysdiadokinesia  Ankle proprioception : Abnormal (BLE)  Great toe proprioception : Abnormal (BLE)    Sensation  Overall Sensation Status: Impaired  Light Touch: Partial deficits in the LLE;Partial deficits in the RLE (stocking distribution  decreased sensation which patient notes is normal.)    Functional Mobility  Bed mobility  Bridging: Independent  Rolling to Left: Independent  Rolling to Right: Independent  Supine to Sit: Independent  Sit to Supine: Independent  Scooting: Independent  Transfers  Sit to Stand: Contact guard assistance  Stand to Sit: Contact guard assistance  Bed to Chair: Minimal assistance (ataxia noted, uses UE and environmental support.)  Car Transfer: Minimal Assistance  Comment: patient with crouched gait wtihoutt AD. Patient with transfers uses environmental HHA or uses FWW unsteady ataxic transfers with decreased safety note on turns. Balance  Posture: Fair (forward flexed rounded shoulders.)  Sitting - Static: Good  Sitting - Dynamic: Good  Standing - Static: Fair  Standing - Dynamic: Poor (unable to march in place without AD and unable to coordinate or complete alternate toe tap to target with cues and min assist, has difficulty with reciprocal movment and coordination.)  Comments: unsteady without AD and with attempt to walk 3 feet unable with max assist due to incoordination/ataxia  and posterior lob, needed to sit in chair. Stoop to recover with FWW and retrieve tissue box from floor in wide alicia unsteady with min assist needed.     Environmental Mobility  Ambulation  WB Status: FWB  Ambulation  Surface: Level tile  Device: Rolling Walker  Other Apparatus: O2 (2L)  Assistance: Minimal assistance  Quality of Gait: decreased sasha, forward trunk flexion, ataxic, decreased B step height/length, inconsistent pattern (mostly step to leading with LLE), wide ROBERT  Distance: 20' needed WC follow after first 10 feet as gait more unsteady and had to sit immediately with  with WC follow needed. More Ambulation?: Yes  Ambulation 2  Surface - 2: carpet  Device 2: Rolling Walker  Other Apparatus 2: O2 (2L needed assist to manage tubing on carpet)  Assistance 2: 2 Person assistance  Quality of Gait 2: forward flexed posture, slow sasha, step to with intermittent reciprocal gait, wide base of support, decreased (B) step length, foot clearance, and heel strike;  Distance: 10 feet on carpet with L/R turns with WC follow  Continued Gait with WC follow and min assist and cues for pacing and cues for with education on PLB and faciltiation for weight shift and up to min assist with turns(gait unchanged from above note for quality) :  96feet of gait with FWW and WC follow Vitals post transfer tested in sitting: HR:89bpm  SpO2:82%O2  at 1 minute 89% spO2 and at 11/2 minute of seated rest with PLB 89-90% spO2   114 feet of walking with FWW and WC follow Vitals post transfer tested in sitting: HR:83bpm  SpO2:89%O2 after 1 minute PLB HR:79bpm  SpO2:91%O2   STAIRS: up and down 4 stairs with bilateral rails up alt, down non alt min assist with dynamic blocking of knee coming down due to ataxia post actiity 87% SPO@ with 2 minutes seated rest 92%. Pt requested seated rest after 4 steps stating fatigue  WC propulsion 150 feet with L/R turns with mod assist for turns and straight as running into objects on Right, cued for brakes management and coordination of UE as poor coordination of RUE with LUE propulsion push strokes, unable to employ feet and UE at the same time due to incoordination.  Cued for hand placement and required up to mod assist with [facility] training for propulsion. PT Exercises  Exercise Treatment: bridges x10, sidelying hip abduction (3-/5 RLE) x10 BLE, hooklying  knee extension  Multiple sit<>Stand with walking with min assist needed with turning around to chair if WC not directly behind patient. ASSESSMENT  Vitals  Heart Rate: 73  Heart Rate Source: Monitor  BP: 117/72  BP Location: Right upper arm  MAP (Calculated): 87  SpO2: 93 %  O2 Device: Nasal cannula (2L via nc)  Comment: 2L via nc         Assessment:  Per H&P Dr Arriaga Proper 3/7/2023, Elo Landry is a 68year old male with a past medical history significant for atrial fibrillation on Eliquis, HFpEF, COPD with chronic respiratory failure on 4 L O2 baseline who [facility] with right sided weakness and aphasia. 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 He was transferred to [facility] for Neurosurgery evaluation. On 2/28 he underwent cerebral angiogram. On 3/2 he underwent left carotid stent placement. EP was consulted due to history of atrial fibrillation. He was admitted to [facility] on 3/6/23 due to functional deficits below his baseline. Today he is seen with nursing present. He reports weakness on his right sided. He reports living with his son and daughter in law. \"  Pateint evaluated by PT for mobility and found to have impaired RLE strength and coordination with expressive aphasia, impaired BLE proprioception and sensation to light touch in stocking distribution with ataxia/incoordination in BLE with associated weakness, delayed processing and poor endurance with decreased safety noted with gait, transfers, steps and WC propulsion.   Patient appropriate for skilled PT to progress to PLOF indep in home mobility. Treatment Diagnosis: impaired functional mobility  Therapy Prognosis: Good  Decision Making: High Complexity  Barriers to Learning: expressive aphasia and delayed processing noted. Treatment Initiated : gait training and transfer training with cues for turning for safety to completely turn to transfer surfaces and cues for pacing with mobility  Discharge Recommendations: Patient would benefit from continued therapy after discharge;Home with Home health PT  PT D/C Equipment  Walker: Rolling  PT Equipment Recommendations  Equipment Needed: Yes  Med Alejandro: Rolling        GOALS  Patient Goals   Patient Goals : \"Get back to normal.\"  Short Term Goals  Time Frame for Short Term Goals: 3/14/2023  Short Term Goal 1: Patient demo SBA in bed<>chair, sit<>Stand, car transfer with LRAD. Short Term Goal 2: Patient demo walking 100-150 feet with SpO2 90% or greater with SBA. Short Term Goal 3: Patient demo up and down 6 steps with LRAD min assist  Short Term Goal 4: Patient demo indep in [facility] propulsion 150 feet with l/r turns and indep in WC parts management  Short Term Goal 5: Patient demo stoop to recover wtih LRAD indep. Long Term Goals  Time Frame for Long Term Goals : 3/21/2023  Long Term Goal 1: Patient demo indep in bed<>chair, sit<>Stand, car transfer with LRAD. Long Term Goal 2: Patient demo gait 150 feet with 2 turns on turf carpet of 10 feet or greater indep with LRAD. Long Term Goal 3: Patient demo up and down 12 steps with LRAD indep  Long Term Goal 4: Patient demo indep in LE strengthening and coordination seated and standing exercises. PLAN OF CARE  Frequency: 1-2 treatment sessions per day, 5-7 days per week  Physcial Therapy Plan  General Plan: 5-7 times per week  Current Treatment Recommendations: Strengthening;ROM;Balance training;Functional mobility training;Transfer training; Endurance training;Gait training;Stair training;Neuromuscular re-education;Home exercise program;Safety education & training;Patient/Caregiver education & training;Equipment evaluation, education, & procurement; Therapeutic activities  Safety Devices  Type of Devices: All fall risk precautions in place;Call light within reach; Chair alarm in place; Left in chair;Nurse notified;Gait belt  Restraints  Restraints Initially in Place: No    EDUCATION  Education  Education Given To: Patient  Education Provided: Role of Therapy;Plan of Care;Precautions; Safety;DME/Home Modifications;Transfer Training;Mobility Training; Fall Prevention Strategies; Energy Conservation;Equipment  Education Provided Comments: Role of PT, safety with mobility, transfer training, gait training for pacing and turning, WC mobility training  Education Method: Demonstration;Verbal  Barriers to Learning: Cognition  Education Outcome: Verbalized understanding;Continued education needed    ELOS: 13 days          Therapy Time   Individual Concurrent Group Co-treatment   Time In 1230         Time Out 1400         Minutes 90           Timed Code Treatment Minutes: 70 Minutes (+20eval)       Rodo Moore, PT, 03/07/23 at 4:57 PM

## 2023-03-07 NOTE — ACP (ADVANCE CARE PLANNING)
Advance Care Planning     General Advance Care Planning (ACP) Conversation    Date of Conversation: 3/3/2023  Conducted with: Patient with Decision Making Capacity    Healthcare Decision Maker:    Primary Decision Maker: Macario Ureña - Child - [phone]    Secondary Decision Maker: John Ryan - Daughter-in-Law - [phone]    Supplemental (Other) Decision Maker: [facility]into [facility] - [phone]  Click here to complete Healthcare Decision Makers including selection of the Healthcare Decision Maker Relationship (ie \"Primary\"). Today we documented Decision Maker(s) consistent with ACP documents on file. Content/Action Overview: Has ACP document(s) on file - reflects the patient's care preferences  Reviewed DNR/DNI and patient elects Full Code (Attempt Resuscitation)  ventilation preferences and resuscitation preferences   Patient stated Aundria Colon if there is chance that I will have quality of life. \"     Length of Voluntary ACP Conversation in minutes:  <16 minutes (Non-Billable)    Rossana Lilly RN

## 2023-03-07 NOTE — PROGRESS NOTES
Speech Language Pathology  Facility/Department: [facility]  Initial Speech/Language/Cognitive Assessment       NAME:Bang Mcpherson  : 1950 (78 y.o.)   MRN: [id number]  ROOM: [facility]/4220-28  ADMISSION DATE: 3/6/2023  PATIENT DIAGNOSIS(ES): Acute CVA (cerebrovascular accident) [facility]) [I63.9]  Patient Active Problem List    Diagnosis Date Noted    Acute CVA (cerebrovascular accident) ([address] 2023    Tachy-bear syndrome ([address] 2023    ALEXANDREA treated with BiPAP 2023    Acute cerebrovascular accident (CVA) due to stenosis of left carotid artery ([address] 2023    Acute unilateral cerebral infarction in a watershed distribution [facility]) 2023    Aphasia due to acute cerebrovascular accident (CVA) ([address] 2023    COPD with chronic bronchitis ([address] 2022    Chronic respiratory failure with hypoxia and hypercapnia ([address] 2022    Nocturnal hypoxia 2022    Hypotension 2022    Acute on chronic combined systolic (congestive) and diastolic (congestive) heart failure ([address] 2022    Acute hypokalemia 2022    Hypophosphatemia 2022    Long term current use of diuretic 2022    Unstable angina pectoris ([address] 2019    Atrial fibrillation ([address] 2018    Acute on chronic respiratory failure with hypoxia and hypercapnia (HCC) 2018    Acute hyponatremia 2018    Hyperbilirubinemia 2018    Influenzal pneumonia     Acute exacerbation of chronic obstructive pulmonary disease (COPD) ([address] 2011    Essential hypertension 2011    Former smoker 2010     Past Medical History:   Diagnosis Date    Atrial fibrillation ([address]     Bronchitis chronic     Emphysema of lung ([address]     HTN (hypertension)     Influenza A 01/10/2018    Lung disease     copd    Pneumonia     2009    Stroke 2023    L [facility] watershed     Past Surgical History:   Procedure Laterality Date    FRACTURE SURGERY      johnson in femur/hip on right    HERNIA REPAIR No Known Allergies    Date of Evaluation: 3/7/2023   Evaluating Therapist: CALLIE Grayson    Subjective:   Chart Reviewed: : [x] Yes [] No    Onset Date: Pt admitted to [facility] 02/25/23    Recent Results of CT of Head / MRI:  Date: 02/25/23  Impression       Left MCA border zone infarcts without hemorrhage or mass effect. Medical record review/interview: Per MD H&P: Maria Guadalupe Duarte is a 68year old male with a past medical history significant for atrial fibrillation on Eliquis, HFpEF, COPD with chronic respiratory failure on 4 L O2 baseline who presented to [facility] on 2/25/23 with right sided weakness and aphasia. 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 He was transferred to [facility] for Neurosurgery evaluation. On 2/28 he underwent cerebral angiogram. On 3/2 he underwent left carotid stent placement. EP was consulted due to history of atrial fibrillation. He was admitted to [facility] on 3/6/23 due to functional deficits below his baseline. Today he is seen with nursing present. He reports weakness on his right sided. He reports living with his son and daughter in law. \"    Behavior / Cognition: alert, cooperative, and pleasant mood    Primary Complaint: Pt reports that it is harder to get his words out. Comprehension of language \"feels okay. \" When asked about memory, pt stated \"bad. \" PT with baseline stuttering.      Pt's goals: \"get back to where I was before\"     Pain: The patient does not complain of pain     Vitals/labs:   SpO2: 94%  RR: 18  BP: 108/69  HR: 68  O2 device: 3L O2 via NC    Vision / Hearing:  Vision: Wears glasses for reading - pt also reports sensitivity to bright light (sunlight)   Hearing: Hard of hearing / hearing concerns; no hearing aids. Reports right ear may be better than left     Previous Level of Function:  Living Status: son and daughter-in-law   Occupation: Retired - worked at a [employer] Level of Education: 12th  Homemaking Responsibilities:   Meal Prep Responsibility: [address], very simple. Family does majority of cooking   Laundry Responsibility: No  Cleaning Responsibility: No  Bill Paying / Finance Responsibility: Primary  Shopping Responsibility: No  Health Care Management: Primary  Active : no  Leisure and Hobbies: \"start doing the small things\"   Additional Comments: pt does present with aphasia, so may be questionable historian, although answers did appear to be reliable       Assessment   Cognitive Diagnosis: Did not fully assess   Aphasia Diagnosis: Mild receptive and expressive aphasia   Speech Diagnosis: baseline stuttering       Impressions: Pt alert and cooperative, agreeable to eval. Pt seen by ST at [facility] - reported mild receptive and moderate expressive aphasia. Pt reports he has a baseline stutter. The pt was administered the [address] [facility] [employer] Test (MAST) this date to assess pt's expressive and receptive language. The pt's scores are listed below. SLP also completed less formal assessments. Pt able to state his name,  (required increased timing). Pt oriented to month, year, date, and hospital. Pt unsure of name of hospital. Pt able to name 7 animals in 1 minute. Pt named objects by description with 10/10 acc. Pt able to verbalize he would call 911 in an emergency. Areas of strength include automatics, naming objects, simple word finding, simple 1-step and 2-step commands. Areas of deficit include word finding in conversation, divergent naming, multi-step commands. Pt will benefit from ongoing speech therapy to promote improvement in these areas and achieve safe and independent return home at [facility] if safe and able.  Discussed areas of deficit, goals, POC for ST with pt who is in agreement for participation in therapy. Formal Assessments:     [place] Aphasia Screening Test (MAST)  Section Subtest Score Comments   Expressive Language Index       Naming 10/10     Automatic Speech 10/10     Repetition 10/10     Writing  8/10 Pt self-correct error x1    Verbal fluency 5/10 Pt with some difficulty with word finding on cookie theft picture scene      Expressive Subscale: 43/50      Receptive Language Index   Yes/No Accuracy 20/20     Object Recognition 10/10     Following Instructions 9/10 Increased difficulty as complexity of command increased     Reading Instructions 8/10        Receptive Subscale: 47/50     Total score: 90/100      Optional Ratings:  -Dysarthria: none noted; baseline stutter   -Paraphasia: none noted   -Perseveration: baseline stuttering   -Orientation: [facility]    Goals:  Long Term Goals:   Time Frame for Long Term Goals: 13 Days (03/18/23)  Goal 1: Pt will improve expressive/receptive language tasks to highest functional level to decreased burden of care upon discharge. Goal 2: Pt will demonstrate improved cognitive linguistic function for safe return to prior level of care. Short Term Goals:  Time Frame for Short Term Goals: 10 Days (03/15/23)  Goal 1: Pt will complete moderately complex comprehension tasks (2 step and multi-step directions/commands, abstract yes/no questions) with 80% acc given min cues   Goal 2: Pt will complete moderately complex naming tasks (divergent naming, convergent, responsive, etc) with 80% acc given min cues  Goal 3: Pt will complete additional cognitive assessment with goals to be added per POC as needed. Objective:   Cranial nerve / Oral motor exam:   CN V (trigeminal): ophthalmic, maxillary, and mandibular facial sensation- WNL  CN VII (facial): Impaired R  CN IX/X (glossopharyngeal/vagus): MPT: DNT; pitch range: WNL; vocal quality: WNL; cough: Strong-perceptually  CN XII (hypoglossal):  Impaired R    Oral motor exam Labial symmetry impaired right and Lingual coordination impaired    Dentition: edentulous     Motor Speech: WFL - none noted, baseline stuttering       Comprehension:   Auditory Comprehension: Mild  Impairments: Multi-step commands, Complex/Abstract Commands, and Conversation     Reading Comprehension: Mild  Impairments: Phrase and Paragraph      Expression:   Primary Mode of Expression: Verbal  Verbal Expression: Mild  Impairments: Convergent, Divergent, Responsive, and Conversation    Written Expression: Mild; Dominant Hand: Right  Impairments: Self-formulation     Pragmatics/Social Functioning: WFL      Cognition:  Overall Orientation : .WFL    Attention: To be assessed    Memory: To be assessed    Problem Solving: To be assessed    Numeric Reasoning: To be assessed    Abstract Reasoning: To be assessed    Safety/Judgement: To be assessed      Voice:   Voice: WFL     Plan  Prognosis:  Speech Therapy Prognosis  Prognosis: Good  Prognosis Considerations: Motivation, Participation Level, and Potential  Individuals consulted and agree with results and recommendations: Patient  and RN  Safety Devices in place: Yes  Type of Devices:  All fall risk precautions in place , Call light within reach, and Left in chair    Recommendations:   Requires SLP Intervention: yes  Duration / Frequency of Treatment: 60 min; 5 days per week for 13 days    Therapeutic Interventions:  Yes/no questions, following commands, automatic speech tasks, repetition, auditory comprehension, naming     Education:  Patient education: SLP edu pt re: role of SLP, rationale of speech eval, goals, POC   Patient Education Responses: pt verbalized understanding       Total Treatment Time / Charges       Time in Time out Total Time / units   Speech / Amanda Borrero / Oscar Pereira Eval:  0800 0845 45 min / 1 unit      Signature:  Monet Tejeda MA [address] [facility]  Speech Language Pathologist

## 2023-03-07 NOTE — PATIENT CARE CONFERENCE
[facility]  Inpatient Rehabilitation  Weekly Team Conference Note    Date: 3/8/2023  Patient Name: Berny Pineda        MRN: [id number]    : 1950  [de-identified]68 y.o.)  Gender: male   Referring Practitioner: Dr. Vero Sanchez  Diagnosis: acute CVA      Interventions to be utilized toward barriers to discharge, per discipline:  [address] observed barriers to dc: Decreased endurance, Decreased sensation, Upper extremity weakness, Lower extremity weakness, Medication managment, and on oxygen therapy  Nursing interventions: Assist with ADL's, Medication management,oob    Family Education: No  Fall Risk:  Yes    Stay with me?: No    PHYSICAL THERAPY  Physical therapy observed barriers to dc:    Baseline: indep household mobility on O2   Current level: requires up to assist of 2 for gait, ataxia with gait distances limited to 20' initially due to fatigue and sob post gait. Barriers to DC: steps to enter home, alone at times during the day   Needs in order to achieve dc home/next level of care:indep in home mobility      Physical therapy interventions:   Current Treatment Recommendations: Strengthening, ROM, Balance training, Functional mobility training, Transfer training, Endurance training, Gait training, Stair training, Neuromuscular re-education, Home exercise program, Safety education & training, Patient/Caregiver education & training, Equipment evaluation, education, & procurement, Therapeutic activities    PT Goals:            Short Term Goals  Time Frame for Short Term Goals: 3/14/2023  Short Term Goal 1: Patient demo SBA in bed<>chair, sit<>Stand, car transfer with LRAD. Short Term Goal 2: Patient demo walking 100-150 feet with SpO2 90% or greater with SBA.   Short Term Goal 3: Patient demo up and down 6 steps with LRAD min assist  Short Term Goal 4: Patient demo indep in [facility] propulsion 150 feet with l/r turns and indep in WC parts management  Short Term Goal 5: Patient demo stoop to recover wtih LRAD indep. Long Term Goals  Time Frame for Long Term Goals : 3/21/2023  Long Term Goal 1: Patient demo indep in bed<>chair, sit<>Stand, car transfer with LRAD. Long Term Goal 2: Patient demo gait 150 feet with 2 turns on turf carpet of 10 feet or greater indep with LRAD. Long Term Goal 3: Patient demo up and down 12 steps with LRAD indep  Long Term Goal 4: Patient demo indep in LE strengthening and coordination seated and standing exercises. PT Assessment:   Pateint evaluated by PT for mobility and found to have impaired RLE strength and coordination with expressive aphasia, impaired BLE proprioception and sensation to light touch in stocking distribution with ataxia/incoordination in BLE with associated weakness, delayed processing and poor endurance with decreased safety noted with gait, transfers, steps and WC propulsion. Patient appropriate for skilled PT to progress to PLOF indep in home mobility.        Recommendation:   PT Equipment Recommendations  Equipment Needed: Yes  Walker: Rolling      Assessment  Discharge Recommendations: Home with Home health PT  PT Equipment Recommendations  Equipment Needed: Yes  Walker: Rolling        OCCUPATIONAL THERAPY  Occupational therapy observed barriers to dc:    Baseline: mod I all ADLs and transfers   Current level: SBA ADLs and CGA transfers   Barriers to DC: R weakness, unsteadiness, limited activity tolerance   Needs in order to achieve dc home/next level of care: mod I all ADLs and transfers    Occupational Therapy interventions:  Current Treatment Recommendations: Self-Care / ADL, Functional mobility training, Endurance training, Safety education & training, Patient/Caregiver education & training, Neuromuscular re-education, Strengthening, ROM, Balance training, Equipment evaluation, education, & procurement, Home management training, Coordination training    OT Goals:  Patient Goals   Patient goals : \"get back to where I was\"  Short Term Goals  Time Frame for Short Term Goals: 6 days- 3/11/23  Short Term Goal 1: Pt will complete functional transfers with SPV. Short Term Goal 2: Pt will perform toileting with SPV. Short Term Goal 3: Pt will complete full body dressing with SPV. Short Term Goal 4: Pt will perform full body bathing with SPV. Short Term Goal 5: Pt will perform grooming at sink with mod I.  Long Term Goals  Time Frame for Long Term Goals : 13 days- 3/18/23  Long Term Goal 1: Pt will perform functional transfers with mod I.  Long Term Goal 2: Pt will complete BADLs with mod I.  Long Term Goal 3: Pt will improve coordination in RUE as evidenced by at least 5 second improvement in NHPT. OT Assessment:  Assessment  Pt presents with the above deficits requiring skilled OT services to return to [facility]. Pt demonstrated fair balance with CGA for transfers and bathroom mobility and SBA for BADLs. Pt exhibited multiple coughing spells with drop in O2, but rebounding in less than 1 minute to 90% on 3L O2. Pt completed [facility] assessments with decreased accuracy in BUEs, RUE worse than LUE. Discharge Recommendations: 24 hour supervision or assist;Outpatient OT  OT Equipment Recommendations  Equipment Needed: No         SPEECH THERAPY   Speech therapy observed barriers to dc:    Baseline: Lives with son and daughter-in-law. Indep with meds and finances. Family does all other household tasks. Not an active .  Baseline stuttering    Current level: mild expressive and receptive aphasia; needs cognitive assessment    Barriers to DC: reduced insight into deficits, need to assess cognition    Needs in order to achieve dc home/next level of care: possible 24 hr supervision, possible assist with meds and finances, ongoing cognitive assessment     Speech Therapy interventions:  Dysphagia:  diet tolerance, safe swallow strategies   Speech/Language/Cognition: Yes/no questions, following commands, automatic speech tasks, repetition, auditory comprehension, naming      Dysphagia Goals:  Timeframe for Long-term Goals: (14 days; 03/18/23)  Goal 1: The patient will tolerate least restrictive diet with no clinical s/s of aspiration or worsening respiratory/pulmonary status -  03/07 - goal established this date     Short-term Goals  Timeframe for Short-term Goals: (10 days; 03/15/23)  Goal 1: The patient will tolerate recommended diet with no clinical s/s of aspiration 5/5 -  03/07 - goal established this date   Goal 2: The patient/caregiver will demonstrate understanding of compensatory swallow strategies, for improved swallow safety -  03/07 - goal established this date        Speech/Language/Cog Goals:  Time Frame for Long Term Goals: 13 Days (03/18/23)  Goal 1: Pt will improve expressive/receptive language tasks to highest functional level to decreased burden of care upon discharge.-  03/07 - goal established this date   Goal 2: Pt will demonstrate improved cognitive linguistic function for safe return to prior level of care -  03/07 - goal established this date     Short Term Goals  Time Frame for Short Term Goals: 10 Days (03/15/23)  Goal 1: Pt will complete moderately complex comprehension tasks (2 step and multi-step directions/commands, abstract yes/no questions) with 80% acc given min cues -  03/07 - goal established this date   Goal 2: Pt will complete moderately complex naming tasks (divergent naming, convergent, responsive, etc) with 80% acc given min cues -  03/07 - goal established this date   Goal 3: Pt will complete additional cognitive assessment with goals to be added per POC as needed. -  03/07 - goal established this date       ST Assessment:  Speech / José Miguel Shaker / cog eval:  Pt alert and cooperative, agreeable to eval. Pt seen by ST at [facility] - reported mild receptive and moderate expressive aphasia. Pt reports he has a baseline stutter.  The pt was administered the [address] Test (MAST) this date to assess pt's expressive and receptive language. The pt's scores are listed below. SLP also completed less formal assessments. Pt able to state his name,  (required increased timing). Pt oriented to month, year, date, and hospital. Pt unsure of name of hospital. Pt able to name 7 animals in 1 minute. Pt named objects by description with 10/10 acc. Pt able to verbalize he would call 911 in an emergency. Areas of strength include automatics, naming objects, simple word finding, simple 1-step and 2-step commands. Areas of deficit include word finding in conversation, divergent naming, multi-step commands. Pt will benefit from ongoing speech therapy to promote improvement in these areas and achieve safe and independent return home at [facility] if safe and able. Discussed areas of deficit, goals, POC for ST with pt who is in agreement for participation in therapy. Dysphagia eval:   Pt alert and cooperative, agreeable to eval. Pt upright in bedside chair for eval. Pt edentulous; has upper and lower dentures, but does not wear them. Pt eats all solids at home despite not wearing dentures. Pt denies difficulty swallowing. Oral mech exam indicated right labial weakness and reduced lingual ROM to right. Pt with slow and mildly prolonged mastication with regular solids; adequate A-P transit and oral clearance given increased time. Adequate A-P transit and oral clearance across consistencies. Suspect timely swallow and adequate laryngeal elevation. Pt with occ post-swallow throat clearing - pt reports phlegm in throat. SLP to continue to monitor. RN administered meds whole with water - pt tolerated with no overt s/s of aspiration. SLP edu pt re: safe swallow strategies - small bites/sips, slow rate, alternate liquids and solids, fully upright. Pt verbalized understanding. SLP recommends upgrade to regular solids. Continue thin liquids and meds PO. Diet orders changed in Epic and on Whiteboard in room.  RN made aware of recommendations. SLP to continue to follow for diet tolerance monitoring. NUTRITION  Weight: 173 lb 11.2 oz (78.8 kg) / Body mass index is 23.56 kg/m². Diet Order: ADULT DIET; Regular; No Added Salt (3-4 gm)  PO Meals Eaten (%): 76 - 100%  Education: Declined       CASE MANAGEMENT  Assessment: 68 yr old male. Dx:Acute CVA (cerebrovascular accident). Patient independent PLOF w/o AD. Lives with son and DIL in a one level home with a 2 step entry. Home has a walk-in-shower with grab bars. Patient owns a 4 wheel walker, shower chair and a bi-pap with Ting respiratory. Home oxygen with inogen. Therapy recommendations are 24 hour supervision or assist;Outpatient PT/OT. DME rolling walker, CTA. Interdisciplinary Goals:   1.) Pt will complete toileting with SPV.  2.) Pt will tolerate regular solids and thin liquids with no overt s/s of aspiration   3.) Patient will complete all transfers without AD      [x]  Family Training discussed at conference and to be scheduled. Discharge Plan   Estimated discharge date: 3/15/2023  Destination: Home with outpatient therapy  Pass:No  Services at Discharge: Outpatient Physical Therapy, Occupational Therapy, Speech Therapy  Equipment at Discharge: Possible RW pending progress. Team Members Present at Conference:  : Taisha Flores RN  Occupational Therapist: Jesica Hilliard OTR/L  Physical Therapist: Carmelita Butcher PT  Speech Therapist: Gisselle Gordillo CCC-SLP   Nurse: Miguel Cintron RN  Dietician: Eulalia Moore RDN, LD  : Casandra Matthews OTR/L  Psychiatry: N/A    Family members present at conference: No      I led this team conference and I approve the established interdisciplinary plan of care as documented within the medical record of Chanelle Man.     MD: Electronically signed by Antoinette Henry MD on 3/8/2023 at 1:25 PM

## 2023-03-07 NOTE — PROGRESS NOTES
Occupational Therapy  Facility/Department: [facility]  Rehabilitation Occupational Therapy Evaluation/Treatment       Date: 3/7/23  Patient Name: Berny Pineda       Room: 6670/5454-08  MRN: [id number]  Account: [de-identified]   : 1950  (78 y.o.) Gender: male     Referring Practitioner: Agatha Ford MD  Diagnosis: CVA  Additional Pertinent Hx: PMHX: HTN, HFpEF, COPD, emphysema, PNA, chronic bronchitis, hernia repair, AFib    Restrictions  Restrictions/Precautions: Fall Risk;General Precautions  Other position/activity restrictions: Strict I&Os, 3L O2    Subjective  Subjective: Pt in recliner, pleasant, no pain, agreeable to OT evaluation. Vitals  Temp: 97.6 °F (36.4 °C)  Heart Rate: 73  Resp: 18  BP: 117/72  Height: 6' (182.9 cm)  Oxygen Therapy  SpO2: 93 %  Pulse Oximetry Type: Intermittent  Pulse Oximeter Device Mode: Intermittent  Pulse Oximeter Device Location: Right;Finger  O2 Device: Nasal cannula (2L via nc)  O2 Flow Rate (L/min): 3 L/min  Level of Consciousness: Alert (0)    Objective  Vision - Basic Assessment  Prior Vision: Wears glasses only for reading  Visual History: No significant visual history  Patient Visual Report: No visual complaint reported. Visual Field Cut: No  Oculo Motor Control: WNL  Cognition  Overall Cognitive Status: Exceptions  Arousal/Alertness: Delayed responses to stimuli  Following Commands: Follows one step commands with increased time; Follows one step commands consistently  Attention Span: Attends with cues to redirect  Memory: Decreased short term memory  Safety Judgement: Decreased awareness of need for assistance  Problem Solving: Assistance required to generate solutions;Assistance required to implement solutions;Assistance required to identify errors made  Insights: Decreased awareness of deficits  Initiation: Requires cues for some  Sequencing: Requires cues for some  Orientation  Overall Orientation Status: Within Functional Limits Perception  Overall Perceptual Status: Impaired  Unilateral Attention: Appears intact  Initiation: Cues to initiate tasks  Motor Planning: Appears intact  Perseveration: Not present  Sensation  Overall Sensation Status: WFL   ROM  LUE AROM (degrees)  LUE AROM : WFL  Left Hand AROM (degrees)  Left Hand AROM: WFL  RUE AROM (degrees)  RUE AROM : WFL  Right Hand AROM (degrees)  Right Hand AROM: WFL  LUE Strength  Gross LUE Strength: Exceptions to [facility] [place]  L Shoulder Flex: 4-/5  L Elbow Flex: 4/5  L Elbow Ext: 4/5  L Wrist Flex: 4/5  L Wrist Ext: 4/5  L Hand General: 4/5  RUE Strength  Gross RUE Strength: Exceptions to [facility]  R Shoulder Flex: 3+/5  R Elbow Flex: 4-/5  R Elbow Ext: 4-/5  R Wrist Flex: 4-/5  R Wrist Ext: 4-/5  R Hand General: 4-/5  Fine Motor Skills  Coordination  Movements Are Fluid And Coordinated: No  Coordination and Movement Description: Fine motor impairments;Gross motor impairments;Right UE;Left UE;Tremors;Decreased accuracy   Comment: R decreased speed, L increased tremors   Hand Assessment  Hand Dominance  Hand Dominance: Right  Right Hand Strength -  (lbs)  Handle Setting 2: 37, 39, 38  Functional Mobility  Balance  Sitting Balance: Stand by assistance  Standing Balance: Contact guard assistance  Standing Balance  Time: 30 seconds x5, 10 minutes  Activity: transfer to/from bathroom, grooming at sink, LB bathing, LB dressing, toileting  Transfers  Stand Step Transfers: Contact guard assistance  Sit to stand: Stand by assistance  Stand to sit: Stand by assistance  Toilet Transfers  Toilet - Technique: Ambulating  Equipment Used: Standard toilet  Toilet Transfer: Contact guard assistance  Toilet Transfers Comments: no AD  Shower Transfers  Shower - Transfer From: Other  Shower - Transfer Type: To and From  Shower - Transfer To:  Transfer tub bench  Shower - Technique: Ambulating  Shower Transfers: Contact Guard  Social/Functional History  Lives With: Family (son and DIL)  Type of Home: [facility] Layout: One level  Home Access: Stairs to enter without rails  Entrance Stairs - Number of Steps: 1+1  Bathroom Shower/Tub: Walk-in shower  Bathroom Toilet: Standard  Bathroom Equipment: Shower chair;Grab bars in shower  Home Equipment: [place], 4 wheeled; Oxygen (3L O2 at baseline, typically was not using AD in house PTA)  Has the patient had two or more falls in the past year or any fall with injury in the past year?: No  ADL Assistance: Independent  Homemaking Assistance: Needs assistance  Homemaking Responsibilities: Yes  Meal Prep Responsibility: No  Laundry Responsibility: No  Cleaning Responsibility: No  Bill Paying/Finance Responsibility: Secondary (\"I pay the garbage and water\")  Shopping Responsibility: No  Ambulation Assistance: Independent  Transfer Assistance: Independent  Active : No  Occupation: Retired  Additional Comments: Pt reports that son is home during the day and can assist physically 24HR/day. Pt is questionable historian at times due to aphasia. ADL  Feeding: Independent  Grooming: Supervision  Grooming Skilled Clinical Factors: standing at sink to shave, rinse mouth, and comb hair for 10 minutes  UE Bathing: Supervision; Increased time to complete;Setup  LE Bathing: Stand by assistance;Verbal cueing; Increased time to complete  LE Bathing Skilled Clinical Factors: in stance to bathe buttocks/groin  UE Dressing: Setup  UE Dressing Skilled Clinical Factors: to don shirt  LE Dressing: Stand by assistance; Increased time to complete;Verbal cueing;Setup  LE Dressing Skilled Clinical Factors: SBA to stand and pull pants/brief over hips, good ability to thread BLEs into brief/pants while seated, setup to don socks  Toileting: Stand by assistance  Toileting Skilled Clinical Factors: during clothing management    Goals  Patient Goals   Patient goals : \"get back to where I was\"  Short Term Goals  Time Frame for Short Term Goals: 6 days- 3/11/23  Short Term Goal 1: Pt will complete functional transfers with SPV. Short Term Goal 2: Pt will perform toileting with SPV. Short Term Goal 3: Pt will complete full body dressing with SPV. Short Term Goal 4: Pt will perform full body bathing with SPV. Short Term Goal 5: Pt will perform grooming at sink with mod I.  Long Term Goals  Time Frame for Long Term Goals : 13 days- 3/18/23  Long Term Goal 1: Pt will perform functional transfers with mod I.  Long Term Goal 2: Pt will complete BADLs with mod I.  Long Term Goal 3: Pt will improve coordination in RUE as evidenced by at least 5 second improvement in NHPT. Assessment  Performance deficits / Impairments: Decreased functional mobility ; Decreased endurance;Decreased ADL status; Decreased coordination;Decreased high-level IADLs;Decreased safe awareness;Decreased fine motor control;Decreased cognition;Decreased strength;Decreased balance;Decreased posture  Assessment: Per MD H&P: Julita King is a 68year old male with a past medical history significant for atrial fibrillation on Eliquis, HFpEF, COPD with chronic respiratory failure on 4 L O2 baseline who presented to [facility] on 2/25/23 with right sided weakness and aphasia. 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 He was transferred to [facility] for Neurosurgery evaluation. On 2/28 he underwent cerebral angiogram. On 3/2 he underwent left carotid stent placement. EP was consulted due to history of atrial fibrillation. He was admitted to [facility] on 3/6/23 due to functional deficits below his baseline. \" Pt presents with the above deficits requiring skilled OT services to return to [facility]. Pt demonstrated fair balance with CGA for transfers and bathroom mobility and SBA for BADLs.  Pt exhibited multiple coughing spells with drop in O2, but rebounding in less than 1 minute to 90% on 3L O2. Pt completed [facility] assessments with decreased accuracy in CHANTAL [place] worse than ROBERTE. Prognosis: Good  Decision Making: High Complexity  Discharge Recommendations: 24 hour supervision or assist;Outpatient OT  Occupational Therapy Plan  Times Per Week: 5/7x  Current Treatment Recommendations: Self-Care / ADL; Functional mobility training; Endurance training; Safety education & training;Patient/Caregiver education & training;Neuromuscular re-education;Strengthening;ROM;Balance training;Equipment evaluation, education, & procurement;Home management training;Coordination training       Therapy Time   Individual Concurrent Group Co-treatment   Time In 0930         Time Out 1100         Minutes 90         Timed Code Treatment Minutes: 75 Minutes (15 minute evaluation)       Alexy Jasso OT

## 2023-03-07 NOTE — CONSULTS
Nutrition Assessment     Type and Reason for Visit: Initial, Consult    Nutrition Recommendations/Plan:   Continue easy to chew, no added salt diet - textures and consistencies per SLP   Encourage PO intakes   Monitor diet education needs   Monitor nutrition adequacy, pertinent labs, bowel habits, wt changes, and clinical progress     Malnutrition Assessment:  Malnutrition Status: No malnutrition    Nutrition Assessment:  New ARU pt admitted with acute CVA. Hx of CHF, COPD and HTN. Consulted for oral nutrition supplements. Pt nutritionally stable AEB good appetite and PO intakes of %. Diet advanced to easy to chew diet on 3/3 by SLP. Pt reports good appetite and PO intakes, eating similarly to at home. Denies any recent weight loss, no weight loss noted in EMR. Declined ONS. Denies any diet questions at this time. Will monitor. Estimated Daily Nutrient Needs:  Energy (kcal):  9807-4487 kcal Weight Used for Energy Requirements: Current     Protein (g):  82-99 g Weight Used for Protein Requirements: Current (1.0-1.2 g/kg)        Fluid (ml/day):  Less than 2000 mL per CHF recommendations      Nutrition Related Findings:   Active BS. BM on 3/4. Trace BLE edema. Labs reviewed.  Wound Type: None    Current Nutrition Therapies:    ADULT DIET; Easy to Chew; No Added Salt (3-4 gm)    Anthropometric Measures:  Height: 6' (182.9 cm)  Current Body Wt: 182 lb (82.6 kg)   BMI: 24.7    Nutrition Diagnosis:   No nutrition diagnosis at this time     Nutrition Interventions:   Food and/or Nutrient Delivery: Continue Current Diet  Nutrition Education/Counseling: Education declined  Coordination of Nutrition Care: Continue to monitor while inpatient, Interdisciplinary Rounds       Goals:     Goals: PO intake 50% or greater, prior to discharge       Nutrition Monitoring and Evaluation:   Behavioral-Environmental Outcomes: None Identified  Food/Nutrient Intake Outcomes: Food and Nutrient Intake  Physical Signs/Symptoms Outcomes: Biochemical Data, Weight    Discharge Planning:    Continue current diet     Refugio Kauffman MS, RD, LD  Contact: [phone]

## 2023-03-07 NOTE — PROGRESS NOTES
03/06/23 2357   NIV Type   Skin Assessment Clean, dry, & intact   Skin Protection for O2 Device Yes   Location Nose   Equipment Type v60   Mode Bilevel   Mask Type Full face mask   Mask Size Medium   Settings/Measurements   PIP Observed 18 cm H20   IPAP 18 cmH20   CPAP/EPAP 10 cmH2O   Vt (Measured) 615 mL   Rate Ordered 16   Resp 17   FiO2  35 %   I Time/ I Time % 0.9 s   Minute Volume (L/min) 10.8 Liters   Mask Leak (lpm) 0 lpm   Comfort Level Good   Using Accessory Muscles No   SpO2 97   Patient's Home Machine No   Alarm Settings   Alarms On Y   Low Pressure (cmH2O) 6 cmH2O   High Pressure (cmH2O) 30 cmH2O   RR Low (bpm) 6   RR High (bpm) 40 br/min   Breath Sounds   Right Upper Lobe Diminished   Right Middle Lobe Diminished   Right Lower Lobe End Expiratory Wheezes   Left Upper Lobe Diminished   Left Lower Lobe End Expiratory Wheezes   Oxygen Therapy/Pulse Ox   O2 Therapy Oxygen   O2 Device PAP (positive airway pressure)   Heart Rate 76   SpO2 98 %

## 2023-03-08 PROBLEM — J98.11 ATELECTASIS: Status: ACTIVE | Noted: 2023-03-08

## 2023-03-08 LAB
ANION GAP SERPL CALCULATED.3IONS-SCNC: 9 MMOL/L (ref 3–16)
BASOPHILS ABSOLUTE: 0.1 K/UL (ref 0–0.2)
BASOPHILS RELATIVE PERCENT: 0.7 %
BUN BLDV-MCNC: 17 MG/DL (ref 7–20)
CALCIUM SERPL-MCNC: 9.1 MG/DL (ref 8.3–10.6)
CHLORIDE BLD-SCNC: 98 MMOL/L (ref 99–110)
CO2: 31 MMOL/L (ref 21–32)
CREAT SERPL-MCNC: 0.8 MG/DL (ref 0.8–1.3)
EOSINOPHILS ABSOLUTE: 0.2 K/UL (ref 0–0.6)
EOSINOPHILS RELATIVE PERCENT: 1.6 %
GFR SERPL CREATININE-BSD FRML MDRD: >60 ML/MIN/{1.73_M2}
GLUCOSE BLD-MCNC: 103 MG/DL (ref 70–99)
HCT VFR BLD CALC: 42.7 % (ref 40.5–52.5)
HEMOGLOBIN: 14 G/DL (ref 13.5–17.5)
LYMPHOCYTES ABSOLUTE: 2.4 K/UL (ref 1–5.1)
LYMPHOCYTES RELATIVE PERCENT: 19.3 %
MAGNESIUM: 2.2 MG/DL (ref 1.8–2.4)
MCH RBC QN AUTO: 30.4 PG (ref 26–34)
MCHC RBC AUTO-ENTMCNC: 32.7 G/DL (ref 31–36)
MCV RBC AUTO: 92.9 FL (ref 80–100)
MONOCYTES ABSOLUTE: 1 K/UL (ref 0–1.3)
MONOCYTES RELATIVE PERCENT: 7.8 %
NEUTROPHILS ABSOLUTE: 8.7 K/UL (ref 1.7–7.7)
NEUTROPHILS RELATIVE PERCENT: 70.6 %
PDW BLD-RTO: 14.8 % (ref 12.4–15.4)
PLATELET # BLD: 399 K/UL (ref 135–450)
PMV BLD AUTO: 9.6 FL (ref 5–10.5)
POTASSIUM REFLEX MAGNESIUM: 3.5 MMOL/L (ref 3.5–5.1)
RBC # BLD: 4.6 M/UL (ref 4.2–5.9)
SODIUM BLD-SCNC: 138 MMOL/L (ref 136–145)
WBC # BLD: 12.4 K/UL (ref 4–11)

## 2023-03-08 PROCEDURE — 85025 COMPLETE CBC W/AUTO DIFF WBC: CPT

## 2023-03-08 PROCEDURE — 97542 WHEELCHAIR MNGMENT TRAINING: CPT

## 2023-03-08 PROCEDURE — 80048 BASIC METABOLIC PNL TOTAL CA: CPT

## 2023-03-08 PROCEDURE — 97110 THERAPEUTIC EXERCISES: CPT

## 2023-03-08 PROCEDURE — 94761 N-INVAS EAR/PLS OXIMETRY MLT: CPT

## 2023-03-08 PROCEDURE — 6360000002 HC RX W HCPCS: Performed by: STUDENT IN AN ORGANIZED HEALTH CARE EDUCATION/TRAINING PROGRAM

## 2023-03-08 PROCEDURE — 94660 CPAP INITIATION&MGMT: CPT

## 2023-03-08 PROCEDURE — 94640 AIRWAY INHALATION TREATMENT: CPT

## 2023-03-08 PROCEDURE — 6370000000 HC RX 637 (ALT 250 FOR IP): Performed by: STUDENT IN AN ORGANIZED HEALTH CARE EDUCATION/TRAINING PROGRAM

## 2023-03-08 PROCEDURE — 97129 THER IVNTJ 1ST 15 MIN: CPT

## 2023-03-08 PROCEDURE — 83735 ASSAY OF MAGNESIUM: CPT

## 2023-03-08 PROCEDURE — 2700000000 HC OXYGEN THERAPY PER DAY

## 2023-03-08 PROCEDURE — 97116 GAIT TRAINING THERAPY: CPT

## 2023-03-08 PROCEDURE — 36415 COLL VENOUS BLD VENIPUNCTURE: CPT

## 2023-03-08 PROCEDURE — 6370000000 HC RX 637 (ALT 250 FOR IP): Performed by: INTERNAL MEDICINE

## 2023-03-08 PROCEDURE — 1280000000 HC REHAB R&B

## 2023-03-08 PROCEDURE — 97535 SELF CARE MNGMENT TRAINING: CPT

## 2023-03-08 PROCEDURE — 99222 1ST HOSP IP/OBS MODERATE 55: CPT | Performed by: INTERNAL MEDICINE

## 2023-03-08 PROCEDURE — 97130 THER IVNTJ EA ADDL 15 MIN: CPT

## 2023-03-08 PROCEDURE — 92507 TX SP LANG VOICE COMM INDIV: CPT

## 2023-03-08 PROCEDURE — 99223 1ST HOSP IP/OBS HIGH 75: CPT | Performed by: INTERNAL MEDICINE

## 2023-03-08 PROCEDURE — 97530 THERAPEUTIC ACTIVITIES: CPT

## 2023-03-08 PROCEDURE — 97112 NEUROMUSCULAR REEDUCATION: CPT

## 2023-03-08 RX ORDER — DILTIAZEM HYDROCHLORIDE 120 MG/1
120 CAPSULE, COATED, EXTENDED RELEASE ORAL EVERY EVENING
Status: DISCONTINUED | OUTPATIENT
Start: 2023-03-08 | End: 2023-03-15 | Stop reason: HOSPADM

## 2023-03-08 RX ORDER — POTASSIUM CHLORIDE 750 MG/1
10 TABLET, EXTENDED RELEASE ORAL DAILY
Status: DISCONTINUED | OUTPATIENT
Start: 2023-03-08 | End: 2023-03-15 | Stop reason: HOSPADM

## 2023-03-08 RX ORDER — IPRATROPIUM BROMIDE AND ALBUTEROL SULFATE 2.5; .5 MG/3ML; MG/3ML
1 SOLUTION RESPIRATORY (INHALATION) EVERY 4 HOURS PRN
Status: DISCONTINUED | OUTPATIENT
Start: 2023-03-08 | End: 2023-03-13

## 2023-03-08 RX ADMIN — DILTIAZEM HYDROCHLORIDE 120 MG: 120 CAPSULE, COATED, EXTENDED RELEASE ORAL at 17:26

## 2023-03-08 RX ADMIN — IPRATROPIUM BROMIDE AND ALBUTEROL SULFATE 1 AMPULE: 2.5; .5 SOLUTION RESPIRATORY (INHALATION) at 08:01

## 2023-03-08 RX ADMIN — ENOXAPARIN SODIUM 40 MG: 100 INJECTION SUBCUTANEOUS at 10:03

## 2023-03-08 RX ADMIN — ARFORMOTEROL TARTRATE 15 MCG: 15 SOLUTION RESPIRATORY (INHALATION) at 08:01

## 2023-03-08 RX ADMIN — DILTIAZEM HYDROCHLORIDE 30 MG: 60 TABLET, FILM COATED ORAL at 11:58

## 2023-03-08 RX ADMIN — CLOPIDOGREL BISULFATE 75 MG: 75 TABLET ORAL at 10:04

## 2023-03-08 RX ADMIN — FAMOTIDINE 20 MG: 20 TABLET ORAL at 21:35

## 2023-03-08 RX ADMIN — GUAIFENESIN 600 MG: 600 TABLET, EXTENDED RELEASE ORAL at 21:35

## 2023-03-08 RX ADMIN — POTASSIUM CHLORIDE 10 MEQ: 750 TABLET, EXTENDED RELEASE ORAL at 16:02

## 2023-03-08 RX ADMIN — GUAIFENESIN 600 MG: 600 TABLET, EXTENDED RELEASE ORAL at 10:04

## 2023-03-08 RX ADMIN — BUDESONIDE 500 MCG: 0.5 INHALANT RESPIRATORY (INHALATION) at 08:01

## 2023-03-08 RX ADMIN — ASPIRIN 325 MG: 325 TABLET, COATED ORAL at 10:03

## 2023-03-08 RX ADMIN — DILTIAZEM HYDROCHLORIDE 30 MG: 60 TABLET, FILM COATED ORAL at 05:01

## 2023-03-08 RX ADMIN — ATORVASTATIN CALCIUM 80 MG: 80 TABLET, FILM COATED ORAL at 21:34

## 2023-03-08 RX ADMIN — FAMOTIDINE 20 MG: 20 TABLET ORAL at 10:04

## 2023-03-08 RX ADMIN — ARFORMOTEROL TARTRATE 15 MCG: 15 SOLUTION RESPIRATORY (INHALATION) at 20:10

## 2023-03-08 RX ADMIN — PREDNISONE 20 MG: 20 TABLET ORAL at 10:04

## 2023-03-08 RX ADMIN — BUDESONIDE 500 MCG: 0.5 INHALANT RESPIRATORY (INHALATION) at 20:16

## 2023-03-08 ASSESSMENT — PAIN SCALES - GENERAL
PAINLEVEL_OUTOF10: 0
PAINLEVEL_OUTOF10: 0

## 2023-03-08 NOTE — PROGRESS NOTES
03/08/23 0012   NIV Type   Skin Assessment Clean, dry, & intact   Skin Protection for O2 Device Yes   Orientation Middle   Location Nose   Intervention(s) Skin Barrier   NIV Started/Stopped On   Equipment Type V60   Mode Bilevel   Mask Type Full face mask   Mask Size Medium   Settings/Measurements   IPAP 18 cmH20   CPAP/EPAP 10 cmH2O   Vt (Measured) 737 mL   Rate Ordered 16   Resp 19   FiO2  35 %   Minute Volume (L/min) 15.8 Liters   Mask Leak (lpm) 0 lpm   Comfort Level Good   Using Accessory Muscles No   SpO2 96   Patient's Home Machine No   Alarm Settings   Alarms On Y   Breath Sounds   Right Upper Lobe Diminished   Right Middle Lobe Diminished   Right Lower Lobe Diminished   Left Upper Lobe Diminished   Left Lower Lobe Diminished

## 2023-03-08 NOTE — PROGRESS NOTES
03/08/23 0323   NIV Type   NIV Started/Stopped On   Equipment Type v0   Mode Bilevel   Mask Type Full face mask   Settings/Measurements   PIP Observed 19 cm H20   IPAP 18 cmH20   CPAP/EPAP 10 cmH2O   Vt (Measured) 554 mL   Rate Ordered 16   Resp 22   Insp Rise Time (%) 2 %   FiO2  35 %   Minute Volume (L/min) 12.2 Liters   Mask Leak (lpm) 0 lpm   Comfort Level Good   Using Accessory Muscles No   Patient's Home Machine No   Alarm Settings   Alarms On Y   Low Pressure (cmH2O) 6 cmH2O   High Pressure (cmH2O) 30 cmH2O   Delay Alarm 20 sec(s)   RR Low (bpm) 6   RR High (bpm) 40 br/min

## 2023-03-08 NOTE — PROGRESS NOTES
03/07/23 2214   NIV Type   Skin Protection for O2 Device Yes   Location Nose   NIV Started/Stopped On   Equipment Type v60   Mode (S)  Bilevel   Mask Type Full face mask   Settings/Measurements   IPAP (S)  18 cmH20   CPAP/EPAP (S)  10 cmH2O   Vt (Measured) 584 mL   Resp 16   FiO2  35 %   I Time/ I Time % 0.9 s   Mask Leak (lpm) 3 lpm   Comfort Level Good   Using Accessory Muscles No

## 2023-03-08 NOTE — PROGRESS NOTES
[facility]: ACUTE REHAB UNIT  SPEECH-LANGUAGE PATHOLOGY      [x] Daily  [] Weekly Care Conference Note  [] Discharge    Patient:Bang Anaya      MDR:1[birth year]80/[birth year]  FLU:[id number]  Rehab Dx/Hx: Acute CVA (cerebrovascular accident) ([facility] [address] [I63.9]    Precautions: falls  Home situation: Lives with son and daughter-in-law. Manages own meds and finances. Family does all other household tasks. Not an active . ST Dx: [x] Aphasia  [] Dysarthria  [] Apraxia   [x] Oropharyngeal dysphagia [x] Cognitive Impairment  [] Other:   Date of Admit: 3/6/2023  Room #: 3845/5962-96    Current functional status (updated daily):         Pt being seen for : [x] Speech/Language Treatment  [x] Dysphagia Treatment [x] Cognitive Treatment  [] Other:  Communication: []WFL  [x] Aphasia  [] Dysarthria  [] Apraxia  [] Pragmatic Impairment [] Non-verbal  [] Hearing Loss  [x] Other: baseline stuttering   Cognition: [] WFL  [] Mild  [x] Moderate  [] Severe [] Unable to Assess  [] Other:  Memory: [] WFL  [] Mild  [x] Moderate  [] Severe [] Unable to Assess  [] Other:  Behavior: [x] Alert  [x] Cooperative  [x]  Pleasant  [] Confused  [] Agitated  [] Uncooperative  [] Distractible [] Motivated  [] Self-Limiting [] Anxious  [] Other:  Endurance:  [x] Adequate for participation in SLP sessions  [] Reduced overall  [] Lethargic  [] Other:  Safety: [] No concerns at this time  [] Reduced insight into deficits  []  Reduced safety awareness [] Not following call light procedures  [x] Unable to Assess  [x] Other: continue to assess     Current Diet Order:ADULT DIET; Regular;  No Added Salt (3-4 gm)  Swallowing Precautions: upright for all intake, stay upright for at least 30 mins after intake, small bites/sips, oral care 2-3x/day to reduce adverse affects in the event of aspiration, alternate bites/sips, slow rate of intake, general GERD precautions, and general aspiration precautions        Date: 3/8/2023      Tx session 1  1230 - 1030 Tx session 2  All tx needs met in session 1   Total Timed Code Min 30 0   Total Treatment Minutes 60 0   Individual Treatment Minutes 60 0   Group Treatment Minutes 0 0   Co-Treat Minutes 0 0   Variance/Reason:  N/A N/A   Pain Denies     Pain Intervention [] RN notified  [] Repositioned  [] Intervention offered and patient declined  [x] N/A  [] Other: [] RN notified  [] Repositioned  [] Intervention offered and patient declined  [] N/A  [] Other:   Subjective     Pt alert and cooperative, agreeable to tx. Pt upright in w/c, seen in community room. Objective:  Goals     Dysphagia Goals:   Short-term Goals  Timeframe for Short-term Goals: (10 days; 03/15/23)    Goal 1: The patient will tolerate recommended diet with no clinical s/s of aspiration 5/5   Indirectly targeted - pt reports no difficulty with upgrade to regular diet. Pt taking sips of thin liquid via cup with no overt s/s of aspiration. RN provided medications whole with puree -pt did have a post-swallow cough, but pt reported \"it didn't go down the wrong pipe. \" SLP to continue to monitor. Goal 2: The patient/caregiver will demonstrate understanding of compensatory swallow strategies, for improved swallow safety     Goal not directly targeted.     Cognitive-linguistic Goals:   Short Term Goals  Time Frame for Short Term Goals: 10 Days (03/15/23)    Goal 1: Pt will complete moderately complex comprehension tasks (2 step and multi-step directions/commands, abstract yes/no questions) with 80% acc given min cues   Auditory Comprehension on SLUMS  -pt completed with 6/8 points indep    Following Commands on SLUMS  -pt completed with 2/2 acc indep     Goal 2: Pt will complete moderately complex naming tasks (divergent naming, convergent, responsive, etc) with 80% acc given min cues   Divergent Naming on SLUMS  -pt named 6 animals in 1 min     [employer]  -pt completed with 50/60 acc indep  +3 given MC cue  +2 given phonemic cues     -1x paraphasia - donut / Valentín Neal Goal 3: Pt will complete additional cognitive assessment with goals to be added per POC as needed. SLUMS -   -orientation: 3/3  -calculation: 3/3  -divergent namin/3  -delayed recall:   -backward number repetition: 0/2  -clock drawin/4  -following commands: 2/2  -auditory comprehension:       New goal added 23:    Goal 4: Pt will complete problem solving and thought organization tasks with 75% acc given mod cues    New goal created this date. Pt with difficulty with backward number repetition on the SLUMS. Other areas targeted: N/A    Education:   SLP edu pt re: role of SLP, rationale of cog eval, results of SLUMS     Safety Devices: [x] Call light within reach  [] Chair alarm activated  [] Bed alarm activated  [] Other: [] Call light within reach  [] Chair alarm activated  [] Bed alarm activated  [] Other:    Assessment: Pt alert and cooperative, agreeable to tx. SLP administered the SLUMS for assessment of cognitive-linguistic abilities. Due to mild expressive / receptive aphasia, some parts of cognitive assessment may have been more difficult (if more language focused). Pt oriented and completed calc indep. Pt with difficulty with delayed recall, thought organization, and clock drawing (incorrect spacing of numbers). New problem solving / thought organization goal added this date. Pt did well naming pictured items on the [facility] Naming Test - 1x paraphasia. Pt benefited from [facility] cues and phonemic cues to improve word finding. Pt is motivated to improve. Continue goals above. Plan: Continue as per plan of care.       Additional Information:     Barriers toward progress: Communication deficit  Discharge recommendations:  [] Home independently  [] Home with assistance [x]  24 hour supervision  [] ECF [x] Other: See PT / OT recs  Continued Tx Upon Discharge: ? [x] Yes [] No [] TBD based on progress while on ARU [] Vital Stim indicated [] Other:   Estimated discharge date: TBD    Interventions used this date:  [x] Speech/Language Treatment  [] Instruction in HEP [] Group [] Dysphagia Treatment [x] Cognitive Treatment   [] Other:      Total Time Breakdown / Charges    Time in Time out Total Time / units   Cognitive Tx 0930 1000 30 min / 2 units   Speech Tx 1000 1030 30 min / 1 unit    Dysphagia Tx          Electronically Signed by     Brandie Thakkar MA CCC-SLP #[id number]  Speech Language Pathologist

## 2023-03-08 NOTE — CONSULTS
INPATIENT PULMONARY CRITICAL CARE CONSULT NOTE      Chief Complaint/Referring Provider:  Patient is being seen at the request of Dr. Marychuy Vargas for a consultation for chronic respiratory failure, COPD     Presenting HPI: Patient was admitted with Rt sided weakness       As per admitting provider-Bang Epstein is a 68year old male with a past medical history significant for atrial fibrillation on Eliquis, HFpEF, COPD with chronic respiratory failure on 4 L O2 baseline who presented to [facility] on 2/25/23 with right sided weakness and aphasia. 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 He was transferred to [facility] for Neurosurgery evaluation. On 2/28 he underwent cerebral angiogram. On 3/2 he underwent left carotid stent placement. EP was consulted due to history of atrial fibrillation. He was admitted to [facility] on 3/6/23 due to functional deficits below his baseline. Today he is seen with nursing present. He reports weakness on his right sided. He reports living with his son and daughter in law.    Patient when seen earlier states that he came to the hospital because of stroke, patient was having right-sided weakness, patient has been stabilized and then patient was transferred to a ARU for rehabilitation; patient states that he does not have any headaches of any blurring of vision, patient does not have any significant sinus congestion, patient does have some cough with mucoid expectoration which he can bring up and patient does not feel that he has more chest congestion, patient does not have any significant chest pain or palpitations, patient does not have any increasing wheezing, patient states that he does have chronic respiratory failure and COPD and patient uses 3 L of nasal cannula oxygen at home on a regular basis, patient has been a former smoker but he quit about 7 to 8 years back, patient was not having any palpitations or diaphoresis when evaluated, patient was not complaining of significant abdominal pain nausea vomiting, no hematochezia or melena, patient does have some ankle swelling at times as per the patient, patient has been having no difficulty in swallowing as per nursing, patient does not have any confusion lethargy, no other pertinent review of system of concern      Patient Active Problem List    Diagnosis Date Noted    Atelectasis 03/08/2023    Acute CVA (cerebrovascular accident) ([address] 03/06/2023    Tachy-bear syndrome ([address] 03/04/2023    ALEXANDREA treated with BiPAP 02/27/2023    Acute cerebrovascular accident (CVA) due to stenosis of left carotid artery ([address] 02/26/2023    Acute unilateral cerebral infarction in a watershed distribution [facility]) 02/25/2023    Aphasia due to acute cerebrovascular accident (CVA) ([address] 02/25/2023    COPD, very severe ([address] 11/29/2022    Chronic respiratory failure with hypoxia and hypercapnia ([address] 11/29/2022    Nocturnal hypoxia 11/29/2022    Hypotension 04/14/2022    Acute on chronic combined systolic (congestive) and diastolic (congestive) heart failure ([address] 04/03/2022    Acute hypokalemia 03/29/2022    Hypophosphatemia 03/29/2022    Long term current use of diuretic 03/29/2022    Unstable angina pectoris ([address] 04/17/2019    Atrial fibrillation ([address] 01/11/2018    Acute on chronic respiratory failure with hypoxia and hypercapnia (HCC) 01/11/2018    Acute hyponatremia 01/11/2018    Hyperbilirubinemia 01/11/2018    Influenzal pneumonia     Acute exacerbation of chronic obstructive pulmonary disease (COPD) ([address] 04/07/2011    Essential hypertension 04/07/2011    Former smoker 04/16/2010       Past Medical History:   Diagnosis Date    Atrial fibrillation ([address]     Bronchitis chronic     Emphysema of lung ([address] HTN (hypertension)     Influenza A 01/10/2018    Lung disease     copd    Pneumonia     2009    Stroke 2023    L MCA watershed        Past Surgical History:   Procedure Laterality Date    FRACTURE SURGERY      johnson in femur/hip on right    HERNIA REPAIR          Family History   Problem Relation Age of Onset    Diabetes Sister     Cancer Sister     Cancer Sister     Cancer Brother     Asthma Daughter     Asthma Son     Diabetes Mother     Cancer Father         Social History     Tobacco Use    Smoking status: Former     Packs/day: 1.00     Years: 30.00     Pack years: 30.00     Types: Cigarettes     Quit date: 2016     Years since quittin.6    Smokeless tobacco: Never    Tobacco comments:     Just quit about 3 months ago 16   Substance Use Topics    Alcohol use: No        No Known Allergies            Physical Exam:  Blood pressure 119/62, pulse 70, temperature 97.5 °F (36.4 °C), temperature source Oral, resp. rate 20, height 6' (1.829 m), weight 173 lb 11.2 oz (78.8 kg), SpO2 94 %.'   Constitutional:  No acute distress. HENT:  Oropharynx is clear and moist. No thyromegaly. Eyes:  Conjunctivae are normal. Pupils equal, round, and reactive to light. No scleral icterus. Minimal facial asymmetry  Neck: . No tracheal deviation present. No obvious thyroid mass. Cardiovascular: S1S2 irregularly irregular , normal heart sounds. No right ventricular heave. Minimal lower extremity edema. Pulmonary/Chest: No wheezes. Minimal basilar rales. Chest wall is not dull to percussion. No accessory muscle usage or stridor. Decreased breath sound intensity  Abdominal: Soft. Bowel sounds present. No distension or hernia. No tenderness. Musculoskeletal: No cyanosis. No clubbing. No obvious joint deformity. Lymphadenopathy: No cervical or supraclavicular adenopathy. Skin: Skin is warm and dry. No rash or nodules on the exposed extremities. Psychiatric: Normal mood and affect.  Behavior is normal. No anxiety. Neurologic: Alert, awake and oriented. Slight right-sided weakness. Speech fluent with some slowing at times        Results:  CBC:   Recent Labs     03/08/23  0639   WBC 12.4*   HGB 14.0   HCT 42.7   MCV 92.9        BMP:   Recent Labs     03/06/23  0436 03/08/23  0639    138   K 4.3 3.5   CL 96* 98*   CO2 34* 31   BUN 18 17   CREATININE 0.9 0.8       Imaging:  I have reviewed radiology images personally. No orders to display     CT HEAD WO CONTRAST    Result Date: 2/26/2023  EXAMINATION: CT OF THE HEAD WITHOUT CONTRAST; CTA OF THE HEAD AND NECK WITH CONTRAST 2/25/2023 2:57 pm TECHNIQUE: CT of the head was performed without the administration of intravenous contrast. Automated exposure control, iterative reconstruction, and/or weight based adjustment of the mA/kV was utilized to reduce the radiation dose to as low as reasonably achievable.; CTA of the head and neck was performed with the administration of intravenous contrast. Multiplanar reformatted images are provided for review. MIP images are provided for review. Stenosis of the internal carotid arteries measured using NASCET criteria. Automated exposure control, iterative reconstruction, and/or weight based adjustment of the mA/kV was utilized to reduce the radiation dose to as low as reasonably achievable. Noncontrast CT of the head with reconstructed 2-D images are also provided for review. This scan was analyzed using [employer]. ai contact LVO. Identification of suspected findings is not for diagnostic use beyond notification. Viz LVO is limited to analysis of imaging data and should not be used in-lieu of full patient evaluation or relied upon to make or confirm diagnosis. COMPARISON: None HISTORY: ORDERING SYSTEM PROVIDED HISTORY: Stroke Symptoms TECHNOLOGIST PROVIDED HISTORY: Has a \"code stroke\" or \"stroke alert\" been called? ->Yes Reason for exam:->Stroke Symptoms Decision Support Exception - unselect if not a suspected or confirmed emergency medical condition->Emergency Medical Condition (MA) Reason for Exam: aphasia right arm weakness, symptoms @ 11am today Additional signs and symptoms: no prev stroke FINDINGS: CT HEAD: BRAIN/VENTRICLES: There is a 19 mm area of left frontal white matter hypodensity possibly representing an acute infarct within the deep watershed territory of the left internal carotid artery. There are additional areas of hypodensity in the white matter bilaterally which may be due to chronic microvascular disease. There is no acute hemorrhage, herniation, or hydrocephalus. ORBITS: The visualized portion of the orbits demonstrate no acute abnormality. SINUSES:  The visualized paranasal sinuses and mastoid air cells demonstrate no acute abnormality. SOFT TISSUES/SKULL: No acute abnormality of the visualized skull or soft tissues. CTA NECK: AORTIC ARCH/ARCH VESSELS: No dissection or arterial injury. No significant stenosis of the brachiocephalic or subclavian arteries. CAROTID ARTERIES: Evaluation of the carotid bifurcations is motion degraded. There is a near occlusion of the proximal left internal carotid artery with partial distal lumen collapse. The stenosis measures 14 mm in length. There is mild stenosis of the left common carotid artery. There appears to be mild stenosis of the proximal right internal carotid artery. Measurement of stenosis percentage is precluded due to the motion artifact. The right common carotid artery is patent. VERTEBRAL ARTERIES: The right vertebral artery V1 and proximal V2 segments are occluded. There is reconstitution of the mid V2 segment and the artery is patent more distally. The left vertebral artery origin is occluded for a short segment. There is immediate reconstitution of the V1 segment and the artery is patent more distally. SOFT TISSUES: Moderate emphysema in the upper lungs. No neck mass is identified.  BONES: There are chronic appearing compression deformities of T3, T4, T5, T6, and T7. CTA HEAD: ANTERIOR CIRCULATION: There is attenuation of the left middle cerebral artery and its branches due to reduction in blood flow. The right middle cerebral artery is patent. The anterior cerebral arteries are patent. POSTERIOR CIRCULATION: The basilar and posterior cerebral arteries are patent. The cerebellar arteries are patent. OTHER: No dural venous sinus thrombosis on this non-dedicated study. 1. Proximal left internal carotid artery near occlusion with distal lumen collapse. Reduced blood flow to the left MCA territory. 2. Bilateral proximal vertebral artery occlusions with distal reconstitution. 3. Age-indeterminate, possibly acute small left frontal lobe infarct. 4. Chronic thoracic compression deformities. Critical results were called by Dr. Vera Andersen MD to [place] [id number] on 2/25/2023 at 15:29. XR CHEST PORTABLE    Result Date: 3/4/2023  PORTABLE AP CHEST AT 1310 HOURS:   HISTORY: Shortness of breath. COMPARISON: 2/26/2023. FINDINGS: The heart and pulmonary vasculature are normal. Bibasilar airspace density is seen, most consistent in appearance with atelectasis, infiltrate not excluded. No significant pleural effusions seen. Bibasilar airspace density/atelectasis. No other acute cardiopulmonary findings. XR CHEST PORTABLE    Result Date: 2/26/2023  EXAM: XR CHEST PORTABLE INDICATION: worsening hypoxia, COMPARISON: 2/25/2023 FINDINGS: SUPPORT DEVICES: None HEART / MEDIASTINUM: Cardiac silhouette is within normal limits in size. LUNGS/PLEURA: There are diffuse bilateral interstitial opacities with septal thickening consistent with pulmonary edema. No evidence of pneumothorax. BONES / SOFT TISSUES: No acute osseous abnormality. OTHER: None. 1.  Findings of pulmonary edema which are increased compared to chest radiograph from one day prior.     XR CHEST PORTABLE    Result Date: 2/25/2023  EXAMINATION: ONE XRAY VIEW OF THE CHEST 2/25/2023 12:21 pm COMPARISON: 07/27/2022 HISTORY: ORDERING SYSTEM PROVIDED HISTORY: stroke symptoms TECHNOLOGIST PROVIDED HISTORY: Reason for exam:->stroke symptoms FINDINGS: Cardial pericardial silhouette is stable. Increased interstitial opacity noted. No focal infiltrate identified. Emphysematous changes noted in the upper lungs. No pneumothorax. No free air. No acute bony abnormality. Increased interstitial opacity. While much or all of this may be chronic, please correlate with any clinical evidence of acute interstitial edema. IR ANGIOGRAM CAROTID CEREBRAL RIGHT    Result Date: 2/28/2023  Fortino Askew MD     2/28/2023  5:36 PM DATE OF THE PROCEDURE: 02/28/2023 HISTORY OF PRESENT ILLNESS: 69 y/o male with left subcortical white matter stroke and CTA with high grade stenosis of left ICA in the neck. Catheter angiography to confirm stenosis severity and document intracranial collateral flow / [place] patency. OPERATORS: Primary: Mallika Roth M.D., attending. Assistant: None. SUPERVISION AND INTERPRETATION: Dr. Reva Yanez personally performed the technical portions of the procedure. Following completion of the technical portions of the procedure, the angiographic images were reviewed at the neurography PACS work station by Dr. Isidro uCtler. PROCEDURE: 1. Three-Vessel Diagnostic Cerebral Angiogram. 2. Ultrasound-guided arterial assessment and access of the right common femoral artery. 3. Moderate IV Sedation supervised by Dr. Reva Yanez with the assistance of an independent trained observer (IR Nurse) who performed patient assessments and monitored patient vital signs, adult, > 15 minutes. VESSELS CATHETERIZED: 1. Right internal carotid artery. 2. Left common carotid artery. 3. Left subclavian artery for angiography of the left vertebral artery.  ANESTHESIA: Prior to the procedure, the patient's personal and family history were reviewed for any adverse reactions to anesthesia and no pertinent concerns were raised. ASA stgstrstastdstest:st st1st Malampati: 2 Conscious sedation and intravenous anesthesia was given by the radiology nurse under direct supervision of the attending physician for 40 minutes. Monitored nursing care and medication reports are available on the chart. Local anesthesia was provided at the puncture site with 1% lidocaine. MODERATE SEDATION START TIME: 1625 MODERATE SEDATION END TIME: 1705 TOTAL DURATION MODERATE IV SEDATION: 40 minutes MEDICATIONS GIVEN: 1 mcg Fentanyl IV 50 mg Versed IV 1% lidocaine, subcutaneous, 7 mL 2000 unit IV bolus unfractionated heparin RADIOCONTRAST: 100 mL 270 mgI/ml Visipaque contrast IA. EBL: 50 mL DEVICES USED: 1. 5-Fr Stiff Micropuncture kit. 2. 5-Bolivian 10 cm sheath. 3. 0.035\" Glidewire. 4. 5-Bolivian angled glide catheter. 5. 5-Bolivian King 2 glide catheter. 6. 5-Bolivian Mynx  closure device. AIR KERMA: 334 mGy FLUOROSCOPY TIME: 17.1 minutes. CONSENT: Prior to the procedure, the technical aspect of the procedure as well as the potential risks and benefits were explained to the patient. Specifically, the risk of cerebral infarction, puncture site hemorrhage, femoral nerve injury, retroperitoneal hemorrhage, hemorrhagic shock, infection, device failure, anaphylaxis, renal failure, limb loss, death, radiation effects (hair loss, cataracts, radiation burns, tumors, dementia), arterial dissection, arterial pseudoaneurysms and A-V fistula were discussed. The advantages and disadvantages of alternative procedures were presented. An opportunity to state any questions or concerns was given and these were then addressed. Following this process, it was requested that we proceed with the proposed intervention and this was expressed in the form of a written consent. DETAILS OF THE PROCEDURE: The patient was brought to the neuroangiography suite where the groin was shaved, prepped and draped in usual sterile fashion.  A whole room time out was performed to confirm the identify of the patient, the procedure to be performed, and the location of the procedure. Ultrasound was used to insonate the potential vascular access sites including the right common femoral artery to insure the proper size and patency prior to access. 1% lidocaine was administered subcutaneously for local anesthetic. Then with live ultrasound guidance arterial puncture of the right common femoral artery was accessed percutaneously using a single wall puncture technique. A 5-St Helenian arterial introducer sheath was placed at the puncture site prior to arterial catheterization and was connected to a pressurized heparin saline flush line. A 5-[place]ian angled diagnostic catheter was advanced over an 0.035\" Glidewire into the aortic arch under the fluoroscopic guidance and used to selectively catheterize the vessels listed above. In each case, the vessel origin was visualized fluoroscopically by injection of contrast prior to selective catheterization. Biplane diagnostic cerebral angiograms were acquired at each of these catheterizations. After reviewing the images, the catheter was removed. The groin sheath was exchanged over a 5-St Helenian Mynx  closure device and the arteriotomy was closed without difficulty with excellent hemostasis. The patient tolerated the procedure well and there were no complications. FINDINGS: LEFT CAROTID, CERVICAL AND INTRACRANIAL:  Adequate contrast enhancement of the carotid artery is seen. There is a high grade stenosis at the origin of the left ICA within the neck at the carotid bifurcation. This is 99% stenosis with a string sign and distal opacification due to a focal atherosclerotic plaque at the left carotid bifurcation. The ophthalmic artery is adequately patent. The carotid terminus is normal with adequate caliber of M1 segments of the middle cerebral artery. The left anterior cerebral artery does not fill, likely from competitive flow from the right side through the anterior communicating artery. There is contrast arrival delay in the left MCA territory secondary to this high grade stenosis, therefore it is flow limiting. The capillary and venous phases are within normal limits. There are no signs of fistulas, dissections or de juan luis aneurysms. RIGHT CAROTID, INTRACRANIAL: The right internal carotid artery has normal caliber over its entire course. The middle and anterior cerebral arteries fill normally. The left anterior cerebral artery fills distal to the anterior communicating artery. The middle and anterior cerebral arteries have normal distribution to the cortical branches and the capillary and venous phases are normal. The right posterior communicating artery strongly opacifies the right posterior cerebral artery. LEFT VERTEBRAL, INTRACRANIAL: Normal contrast enhancement of the vertebral artery is noted. It demonstrates a normal contour and caliber. The left posterior inferior cerebellar artery is adequately seen. The basilar artery appears normal course and caliber. There is normal filling of bilateral superior cerebellar arteries and bilateral posterior cerebral arteries. The left posterior cerebral artery does supply some of the left MCA territory via pial to pial collateral flow. RIGHT COMMON FEMORAL ARTERY: The common, iliac, external iliac and common femoral arteries are normal in course and caliber. The common femoral artery bifurcation appears normal. The groin sheath was inserted within the common femoral artery above the bifurcation without evidence of any injury or thrombosis. IMPRESSION: High grade stenosis (approximately 99%) string-sign type of left carotid stenosis in the neck at the origin of the left internal carotid artery. This is a symptomatic carotid stenosis with flow limitation. Left cerebral hemisphere flow is augmented through pial-pial collateral flow from the left posterior cerebral artery and minimal flow from the right ICA through the anterior communicating artery. RECOMMENDATIONS: I am recommending a left carotid stent with distal embolic protection. Will attempt to schedule for Thursday at about 10:00 AM.  My other availability is Thursday at 5 PM, otherwise this would have to wait until next week. Jessica Kenyon MD Neurovascular and Stroke [facility]     CTA HEAD NECK W CONTRAST    Result Date: 2/26/2023  EXAMINATION: CT OF THE HEAD WITHOUT CONTRAST; CTA OF THE HEAD AND NECK WITH CONTRAST 2/25/2023 2:57 pm TECHNIQUE: CT of the head was performed without the administration of intravenous contrast. Automated exposure control, iterative reconstruction, and/or weight based adjustment of the mA/kV was utilized to reduce the radiation dose to as low as reasonably achievable.; CTA of the head and neck was performed with the administration of intravenous contrast. Multiplanar reformatted images are provided for review. MIP images are provided for review. Stenosis of the internal carotid arteries measured using NASCET criteria. Automated exposure control, iterative reconstruction, and/or weight based adjustment of the mA/kV was utilized to reduce the radiation dose to as low as reasonably achievable. Noncontrast CT of the head with reconstructed 2-D images are also provided for review. This scan was analyzed using [employer]. ai contact LVO. Identification of suspected findings is not for diagnostic use beyond notification. Viz LVO is limited to analysis of imaging data and should not be used in-lieu of full patient evaluation or relied upon to make or confirm diagnosis. COMPARISON: None HISTORY: ORDERING SYSTEM PROVIDED HISTORY: Stroke Symptoms TECHNOLOGIST PROVIDED HISTORY: Has a \"code stroke\" or \"stroke alert\" been called? ->Yes Reason for exam:->Stroke Symptoms Decision Support Exception - unselect if not a suspected or confirmed emergency medical condition->Emergency Medical Condition (MA) Reason for Exam: aphasia right arm weakness, symptoms @ 11am today Additional signs and symptoms: no prev stroke FINDINGS: CT HEAD: BRAIN/VENTRICLES: There is a 19 mm area of left frontal white matter hypodensity possibly representing an acute infarct within the deep watershed territory of the left internal carotid artery. There are additional areas of hypodensity in the white matter bilaterally which may be due to chronic microvascular disease. There is no acute hemorrhage, herniation, or hydrocephalus. ORBITS: The visualized portion of the orbits demonstrate no acute abnormality. SINUSES:  The visualized paranasal sinuses and mastoid air cells demonstrate no acute abnormality. SOFT TISSUES/SKULL: No acute abnormality of the visualized skull or soft tissues. CTA NECK: AORTIC ARCH/ARCH VESSELS: No dissection or arterial injury. No significant stenosis of the brachiocephalic or subclavian arteries. CAROTID ARTERIES: Evaluation of the carotid bifurcations is motion degraded. There is a near occlusion of the proximal left internal carotid artery with partial distal lumen collapse. The stenosis measures 14 mm in length. There is mild stenosis of the left common carotid artery. There appears to be mild stenosis of the proximal right internal carotid artery. Measurement of stenosis percentage is precluded due to the motion artifact. The right common carotid artery is patent. VERTEBRAL ARTERIES: The right vertebral artery V1 and proximal V2 segments are occluded. There is reconstitution of the mid V2 segment and the artery is patent more distally. The left vertebral artery origin is occluded for a short segment. There is immediate reconstitution of the V1 segment and the artery is patent more distally. SOFT TISSUES: Moderate emphysema in the upper lungs. No neck mass is identified. BONES: There are chronic appearing compression deformities of T3, T4, T5, T6, and T7.  CTA HEAD: ANTERIOR CIRCULATION: There is attenuation of the left middle cerebral artery and its branches due to reduction in blood flow. The right middle cerebral artery is patent. The anterior cerebral arteries are patent. POSTERIOR CIRCULATION: The basilar and posterior cerebral arteries are patent. The cerebellar arteries are patent. OTHER: No dural venous sinus thrombosis on this non-dedicated study. 1. Proximal left internal carotid artery near occlusion with distal lumen collapse. Reduced blood flow to the left MCA territory. 2. Bilateral proximal vertebral artery occlusions with distal reconstitution. 3. Age-indeterminate, possibly acute small left frontal lobe infarct. 4. Chronic thoracic compression deformities. Critical results were called by Dr. Juliette Juarez MD to [place] [id number] on 2/25/2023 at 15:29. MRI brain without contrast    Result Date: 2/25/2023  EXAM: MRI BRAIN WO CONTRAST INDICATION: stroke COMPARISON: Head CT dated February 25, 2023 TECHNIQUE: Standard per department protocol without contrast. FINDINGS: Multiple foci of diffusion restriction are present in the left MCA border zone. No hemorrhage or mass effect. Moderate patchy hyperintense T2 signal within the periventricular and subcortical white matter. The ventricles and extra-axial spaces are normal in size and configuration. The midline structures including the pituitary gland, optic chiasm, corpus callosum, brainstem, pineal gland, and cerebellar tonsils are normal. The paranasal sinuses are clear. The globes and orbits appear normal. The intracranial arterial and venous flow-voids are normal. No skull base or calvarial abnormality. Left MCA border zone infarcts without hemorrhage or mass effect.      Latest Reference Range & Units 3/29/22 19:47 4/3/22 14:35 4/5/22 03:55 2/25/23 14:55   Hemoglobin, Art, Extended 13.5 - 17.5 g/dL   14.3    pH, Arterial 7.350 - 7.450    7.394    pCO2, Arterial 35.0 - 45.0 mmHg   54.6 (H)    pO2, Arterial 75.0 - 108.0 mmHg   79.0    HCO3, Arterial 21.0 - 29.0 mmol/L   32.6 (H)    TCO2 (calc), Art Not Established mmol/L   34.3    Base Excess, Arterial -3.0 - 3.0 mmol/L   6.1 (H)    O2 Sat, Arterial >92 %   95.7    Methemoglobin, Arterial <1.5 %   0.7    Carboxyhgb, Arterial 0.0 - 1.5 %   0.9    pH, Gregg 7.350 - 7.450  7.471 (H) 7.417  7.412   pCO2, Gregg 40.0 - 50.0 mmHg 66.6 (H) 51.9 (H)  54.5 (H)   pO2, Gregg 25.0 - 40.0 mmHg 43.4 (H) 81.4 (H)  58.7 (H)   HCO3, Venous 23.0 - 29.0 mmol/L 47.5 (H) 32.7 (H)  33.9 (H)   TC02 (Calc), Gregg Not Established mmol/L 50 34  36   Base Excess, Gregg -3.0 - 3.0 mmol/L 19.5 (H) 6.6 (H)  7.2 (H)   MetHgb, Gregg <1.5 % 0.5 0.5  0.5   O2 Sat, Gregg Not Established % 81 96  92     PORTABLE AP CHEST AT 1310 HOURS:         HISTORY: Shortness of breath. COMPARISON: 2/26/2023. FINDINGS: The heart and pulmonary vasculature are normal. Bibasilar airspace density is seen, most consistent in appearance with atelectasis, infiltrate not excluded. No significant pleural effusions seen. Impression       Bibasilar airspace density/atelectasis. No other acute cardiopulmonary findings.      EKG-   Latest Reference Range & Units 4/3/22 11:21 2/25/23 15:32   Atrial Rate BPM 69 375   Diagnosis  Atrial fibrillation Baseline artifactLow voltage QRS RSR' or QR pattern in V1 suggests right ventricular conduction delayNonspecific ST abnormality , probably digitalis effectAbnormal ECGWhen compared with ECG of 29-MAR-2022 14:22,QT has shortenedConfirmed by Evelia Mcelroy MD, [address] ([id number]) on 4/3/2022 3:38:44 PM Atrial fibrillationLow voltage QRS in limb leadsIncomplete right bundle branch blockAbnormal ECGWhen compared with ECG of 03-APR-2022 11:21,Incomplete right bundle branch block is now PresentConfirmed by Claude Fetter ([id number]) on 2/25/2023 3:55:45 PM   Q-T Interval ms 364 406   QRS Duration ms 90 102   QTc Calculation (Bazett) ms 435 425   R Axis degrees -6 -20   T Axis degrees -16 12   Ventricular Rate BPM 86 66         Echocardiogram:Summary   Left ventricle cavity size is normal. Mild concentric hypertrophy. Ejection fraction is visually estimated to be 60%. No obvious regional wall   motion abnormalities. Unable to determine diastolic function due to atrial fibrillation. The right ventricle is normal in size and function. TAPSE is 1.71 cm. No pericardial effusion noted. PFT:2016-INDICATION:  Shortness of breath. 1.  Spirometry: The FEV1 is 1.05 L, which is 29% of predicted. The FEV1/FVC  ratio is reduced. Inhaled bronchodilators are given and there is no  significant improvement. 2.  Lung volumes: Total lung capacity was normal at 7.39 L. Air trapping is  present. 3.  Diffusing capacity:  DLCO is reduced at 11.81 mL/min per mmHg, which is  37% of predicted. 4.  Flow volume loop shows an obstructive pattern. 5.  A 6-minute walk test per [employer] Baseline oxygen saturation 94%  on room air. With ambulation the patient desaturated to 87% and required 2 L  supplemental oxygen to complete the 6-minute walk test.  He ambulated 640  feet. IMPRESSION:  1. A very severe obstructive lung defect is present along with air trapping  and a severe reduction in diffusing capacity. There was also significant  oxyhemoglobin desaturation requiring supplemental oxygen. Assessment:  Principal Problem:    Acute CVA (cerebrovascular accident) ([address]  Active Problems:    COPD, very severe ([address]    Chronic respiratory failure with hypoxia and hypercapnia (HCC)    Atelectasis    Former smoker    Atrial fibrillation ([address]  Resolved Problems:    * No resolved hospital problems.  *          Plan:   Oxygen supplementation to keep saturation between 90 to 94% only  Please titrate oxygen as per the above parameters  Patient when seen was on 3 L of nasal oxygen with saturation 94% which is his baseline  Patient is getting BiPAP at nighttime which needs to be continued-patient may need NIV at the time of discharge  Bronchodilators  Patient is on Brovana and Pulmicort  No need for any DuoNeb nebulization along with Juliaette Naas as it can cause patient to have worsening tachycardia and for that reason DuoNeb was changed to every 4 on whenever necessary basis  Patient is on p.o. prednisone will stop date which can be continued  No need for any antibiotics from pulm standpoint of view  Incentive spirometry and Acapella may help  Patient does not appear to have any significant oropharyngeal dysphagia as per nursing  Evaluation and management of atrial fibrillation including need for any anticoagulation as per EP  Monitor input output and BMP  Correct electrolytes on whenever necessary basis  PUD prophylaxis  PT OT as per inpatient rehab protocol      Case discussed with patient and nursing        Electronically signed by:  Migdalia Pallas, MD    3/8/2023    4:52 PM.

## 2023-03-08 NOTE — PROGRESS NOTES
Occupational Therapy  Facility/Department: [facility]  Rehabilitation Occupational Therapy Daily Treatment Note    Date: 3/8/23  Patient Name: Chris Mesa       Room: [address] [place] [address]  MRN: [id number]  Account: [de-identified]   : 1950  (78 y.o.) Gender: male                    Past Medical History:  has a past medical history of Atrial fibrillation ([facility] Bronchitis chronic, Emphysema of lung ([facility] HTN (hypertension), Influenza A, Lung disease, Pneumonia, and Stroke. Past Surgical History:   has a past surgical history that includes fracture surgery and hernia repair. Restrictions  Restrictions/Precautions: Fall Risk, General Precautions  Other position/activity restrictions: Strict I&Os, 3L O2; up ith assist. Notify physician for pulse less than 50 or greater than 120, respiratory rate less than 12 or greater than 25, oral temperature greater than 101.3 F (85.8 C), systolic BP less than 90 or greater than 674, diastolic BP less than 50 or greater than 100. Required Braces or Orthoses?: No    Subjective  Subjective: Pt in recliner, pleasant, no pain, agreeable to OT session, /72, HR 85, O2 sats 96% on 3L O2. Restrictions/Precautions: Fall Risk;General Precautions             Objective     Cognition  Overall Cognitive Status: Exceptions  Arousal/Alertness: Delayed responses to stimuli  Following Commands: Follows one step commands with increased time; Follows one step commands consistently  Attention Span: Attends with cues to redirect  Memory: Decreased short term memory  Safety Judgement: Decreased awareness of need for assistance  Problem Solving: Assistance required to generate solutions;Assistance required to implement solutions;Assistance required to identify errors made  Insights: Decreased awareness of deficits  Initiation: Requires cues for some  Sequencing: Requires cues for some  Orientation  Overall Orientation Status: Within Functional Limits   Perception  Overall Perceptual Status: Impaired  Unilateral Attention: Cues to attend to right side of body  Initiation: Cues to initiate tasks  Motor Planning: Appears intact  Perseveration: Not present     ADL  Grooming/Oral Hygiene  Assistance Level: Stand by assist  Skilled Clinical Factors: standing at sink for shaving for 4:45, SBA for balance  Toileting  Assistance Level: Stand by assist  Skilled Clinical Factors: during clothing management, with RW  Toilet Transfers  Technique: Stand step  Equipment: Standard toilet  Additional Factors: Set-up; Verbal cues; With handrails  Assistance Level: Stand by assist  Skilled Clinical Factors: with RW          Functional Mobility  Device: Rolling walker  Activity: To/From therapy gym; To/From bathroom; Retrieve items;Transport items  Assistance Level: Minimal assistance  Skilled Clinical Factors: SBA/CGA with and without AD to/from bathroom, SBA/CGA with RW to/from gym, CGA/min A without AD to complete mobility around gym to collect LEGOs, able to stand for 4:45 at sink and 2-3 minutes to collect items in gym x3  Transfers  Surface: To chair with arms;From chair with arms;Standard toilet  Additional Factors: Set-up; Verbal cues; Hand placement cues; With handrails  Device: Walker  Sit to Stand  Assistance Level: Stand by assist  Stand to Sit  Assistance Level: Stand by assist  Bed To/From Chair  Technique: Stand step  Assistance Level: Contact guard assist   OT Exercises  Resistive Exercises: 4# dowel johnson for 15 reps of elbow flexion, wrist extension, chest press, and shoulder flexion with BUEs  Circulation/Endurance Exercises: hitting ball with 4# dowel johnson for 20 reps     Assessment  Assessment  Assessment: Pt agreeable to OT session. Pt performed functional transfers with CGA/SBA with and without AD this date. Pt required min A at times without AD when turning after being fatigued in gym. Pt required mod VCs for problem solving LEGO task but fair coordination for placing items with RUE.  Pt completed BUE ther ex with fair strength and cues for RUE incorporation and attention. Pt completed ADLs with SBA and good sequencing for routine tasks. O2 sats above 90% on 3L O2 for entire session. Continue POC. Activity Tolerance: Patient tolerated treatment well;Patient limited by fatigue;Patient limited by endurance  Discharge Recommendations: 24 hour supervision or assist;Outpatient OT  OT Equipment Recommendations  Equipment Needed: No  Safety Devices  Safety Devices in place: Yes  Type of devices: Left in chair;Call light within reach;Nurse notified; Chair alarm in place;Gait belt    Patient Education  Education  Education Given To: Patient  Education Provided: Role of Therapy; ADL Function;Plan of Care;Precautions; Safety; Fall Prevention Strategies;DME/Home Modifications;Transfer Training;Mobility Training;Equipment  Education Provided Comments: role of OT, transfer training, ADL retraining, BUE ther ex, NMR, balance, PLB  Education Method: Demonstration;Verbal  Barriers to Learning: Cognition  Education Outcome: Verbalized understanding;Demonstrated understanding;Continued education needed    Plan  Occupational Therapy Plan  Times Per Week: 5/7x  Current Treatment Recommendations: Self-Care / ADL; Functional mobility training; Endurance training; Safety education & training;Patient/Caregiver education & training;Neuromuscular re-education;Strengthening;ROM;Balance training;Equipment evaluation, education, & procurement;Home management training;Coordination training    Goals  Short Term Goals  Time Frame for Short Term Goals: 6 days- 3/11/23  Short Term Goal 1: Pt will complete functional transfers with SPV. Short Term Goal 2: Pt will perform toileting with SPV. Short Term Goal 3: Pt will complete full body dressing with SPV. Short Term Goal 4: Pt will perform full body bathing with SPV.   Short Term Goal 5: Pt will perform grooming at sink with mod I.  Long Term Goals  Time Frame for Long Term Goals : 13 days- 3/18/23  Long Term Goal 1: Pt will perform functional transfers with mod I.  Long Term Goal 2: Pt will complete BADLs with mod I.  Long Term Goal 3: Pt will improve coordination in RUE as evidenced by at least 5 second improvement in NHPT.     Therapy Time   Individual Concurrent Group Co-treatment   Time In 1230         Time Out 1330         Minutes 60         Timed Code Treatment Minutes: [address]

## 2023-03-08 NOTE — CONSULTS
Electrophysiology Consultation   Date: 3/8/2023  Admit Date:  3/6/2023  Reason for Consultation: Permanent atrial fibrillation. Consult Requesting Physician: Maki Perdomo MD     No chief complaint on file. HPI:   Mr. Zeke Nixon is a pleasant 68year old male with a medical history significant for permanent atrial fibrillation, tachy-bear syndrome, cerebral vascular disease, COPD on chronic oxygen, chronic bilateral lower extremity edema, obstructive sleep apnea, and hypotension who presents from [facility] for rehabilitation. According to patient he was in his usual state of health until 02/25/2023 when he suddenly began to have hand/arm movement along with aphasia. He sought medical attention ER where he was found to have an acute CVA. He underwent left carotid stent placement on 03/02/2022 by neurosurgery. His hospital course was complicated by tachy-bear syndrome and he was seen by EP, Dr. Wolf Michael. His rate control was adjusted and he was monitored until he was discharged to the ARU. Electrophysiology was consulted to evaluate patient to assist with rate control management. Patient denies fevers, chest pain, orthopnea, PND, lower extremity edema, abdominal swelling, shortness of breath, dyspnea on exertion, chills, visual changes, headaches, sore throat, cough, abdominal pain, nausea, vomiting, bleeding, bruising, dysuria, muscle/joint pain, confusion, depression, anxiety, skin lesions, etc.    Past Medical History:   Diagnosis Date    Atrial fibrillation ([address]     Bronchitis chronic     Emphysema of lung ([address]     HTN (hypertension)     Influenza A 01/10/2018    Lung disease     copd    Pneumonia     12/2009    Stroke 02/25/2023    L MCA watershed        Past Surgical History:   Procedure Laterality Date    FRACTURE SURGERY      johnson in femur/hip on right    HERNIA REPAIR         No Known Allergies    Social History:  Reviewed. reports that he quit smoking about 6 years ago.  His smoking use included cigarettes. He has a 30.00 pack-year smoking history. He has never used smokeless tobacco. He reports that he does not drink alcohol and does not use drugs. Family History:  Reviewed. family history includes Asthma in his daughter and son; Cancer in his brother, father, sister, and sister; Diabetes in his mother and sister. No premature CAD. Review of System:  All other systems reviewed except for that noted above. Pertinent negatives and positives are:     General: negative for fever, chills   Ophthalmic ROS: negative for - eye pain or loss of vision  ENT ROS: negative for - headaches, sore throat   Respiratory: negative for - cough, sputum  Cardiovascular: Reviewed in HPI  Gastrointestinal: negative for - abdominal pain, diarrhea, N/V  Hematology: negative for - bleeding, blood clots, bruising or jaundice  Genito-Urinary:  negative for - Dysuria or incontinence  Musculoskeletal: negative for - Joint swelling, muscle pain  Neurological: negative for - confusion, dizziness, headaches   Psychiatric: No anxiety, no depression. Dermatological: negative for - rash    Physical Examination:  Vitals:    23 1158   BP: 119/62   Pulse: 70   Resp:    Temp:    SpO2:         Intake/Output Summary (Last 24 hours) at 3/8/2023 1626  Last data filed at 3/8/2023 0824  Gross per 24 hour   Intake 240 ml   Output --   Net 240 ml     In: 240 [P.O.:240]  Out: -    Wt Readings from Last 3 Encounters:   23 173 lb 11.2 oz (78.8 kg)   23 181 lb 7 oz (82.3 kg)   23 177 lb 14.6 oz (80.7 kg)     Temp  Av.1 °F (36.7 °C)  Min: 97.5 °F (36.4 °C)  Max: 98.6 °F (37 °C)  Pulse  Av.2  Min: 62  Max: 91  BP  Min: 107/67  Max: 139/67  SpO2  Av.6 %  Min: 94 %  Max: 100 %  FiO2   Av %  Min: 35 %  Max: 35 %    Telemetry: Patient not on telemetry. Constitutional: Alert. Oriented to person, place, and time. No distress. Slow speech. Head: Normocephalic and atraumatic. Mouth/Throat: Lips appear moist. Oropharynx is clear and moist.  Eyes: Conjunctivae normal. EOM are normal.   Neck: Neck supple. No lymphadenopathy. No rigidity. No JVD present. Cardiovascular: Irregular rate and rhythm w/o M/G/R  Pulmonary/Chest: Bilateral respiratory sounds present. No respiratory accessory muscle use. Moving air well. Abdominal: Soft. Normal bowel sounds present. No distension, No tenderness. No splenomegaly. No hernia. Musculoskeletal: No tenderness. +1 bialteral edema   Neurological: Alert and oriented. Slow speech. Skin: Skin is warm and dry. No rash, lesions, ulcerations noted. Psychiatric: No anxiety nor agitation. Labs:  Reviewed. Recent Labs     03/06/23  0436 03/08/23  0639    138   K 4.3 3.5   CL 96* 98*   CO2 34* 31   BUN 18 17   CREATININE 0.9 0.8     Recent Labs     03/08/23  0639   WBC 12.4*   HGB 14.0   HCT 42.7   MCV 92.9        Lab Results   Component Value Date/Time    CKTOTAL 282 03/29/2022 01:52 PM    TROPONINI <0.01 02/25/2023 02:55 PM     No results found for: BNP  Lab Results   Component Value Date/Time    PROTIME 14.4 02/25/2023 02:55 PM    PROTIME 13.4 01/11/2018 06:53 AM    INR 1.13 02/25/2023 02:55 PM    INR 1.19 01/11/2018 06:53 AM     Lab Results   Component Value Date/Time    CHOL 187 02/27/2023 04:59 AM    HDL 38 02/27/2023 04:59 AM    TRIG 105 02/27/2023 04:59 AM       Diagnostic and imaging results reviewed. ECG: Rate controlled atrial fibrillation with incomplete RBBB and LAFB. No sign of ongoing ischemia. Echo: 02/28/2023   Summary   Left ventricle cavity size is normal. Mild concentric hypertrophy. Ejection fraction is visually estimated to be 60%. No obvious regional wall   motion abnormalities. Unable to determine diastolic function due to atrial fibrillation. The right ventricle is normal in size and function. TAPSE is 1.71 cm. No pericardial effusion noted.     Nuclear Stress Test: 05/02/2019   Summary    Normal LV function. There is normal isotope uptake at stress and rest. There is no evidence of    myocardial ischemia or scar. I independently reviewed the ECG and telemetry.     Scheduled Meds:   potassium chloride  10 mEq Oral Daily    sennosides-docusate sodium  1 tablet Oral Daily    arformoterol tartrate  15 mcg Nebulization BID    And    budesonide  0.5 mg Nebulization BID    aspirin  325 mg Oral Daily    Or    aspirin  300 mg Rectal Daily    atorvastatin  80 mg Oral Nightly    clopidogrel  75 mg Oral Daily    dilTIAZem  30 mg Oral 4 times per day    famotidine  20 mg Oral BID    guaiFENesin  600 mg Oral BID    predniSONE  20 mg Oral Daily    enoxaparin  40 mg SubCUTAneous Daily    ipratropium-albuterol  1 ampule Inhalation BID     Continuous Infusions:  PRN Meds:.traZODone, bisacodyl, acetaminophen, albuterol, HYDROcodone 5 mg - acetaminophen **OR** HYDROcodone 5 mg - acetaminophen, ondansetron, polyethylene glycol, sodium chloride flush     Assessment:   Patient Active Problem List    Diagnosis Date Noted    Acute CVA (cerebrovascular accident) ([address] 03/06/2023    Tachy-bear syndrome ([address] 03/04/2023    ALEXANDREA treated with BiPAP 02/27/2023    Acute cerebrovascular accident (CVA) due to stenosis of left carotid artery ([address] 02/26/2023    Acute unilateral cerebral infarction in a watershed distribution [facility]) 02/25/2023    Aphasia due to acute cerebrovascular accident (CVA) ([address] 02/25/2023    COPD with chronic bronchitis ([address] 11/29/2022    Chronic respiratory failure with hypoxia and hypercapnia ([address] 11/29/2022    Nocturnal hypoxia 11/29/2022    Hypotension 04/14/2022    Acute on chronic combined systolic (congestive) and diastolic (congestive) heart failure ([address] 04/03/2022    Acute hypokalemia 03/29/2022    Hypophosphatemia 03/29/2022    Long term current use of diuretic 03/29/2022    Unstable angina pectoris ([address] 04/17/2019    Atrial fibrillation ([address] 01/11/2018    Acute on chronic respiratory failure with hypoxia and hypercapnia ([facility] 01/11/2018    Acute hyponatremia 01/11/2018    Hyperbilirubinemia 01/11/2018    Influenzal pneumonia     Acute exacerbation of chronic obstructive pulmonary disease (COPD) ([facility] 04/07/2011    Essential hypertension 04/07/2011    Former smoker 04/16/2010      Active Hospital Problems    Diagnosis Date Noted    Acute CVA (cerebrovascular accident) [facility]) [I63.9] 03/06/2023     Priority: Medium     Mr. Polly Anaya is a pleasant 68year old male with a medical history significant for permanent atrial fibrillation, tachy-bear syndrome, cerebral vascular disease, peripheral vascular disease, COPD on chronic oxygen, chronic bilateral lower extremity edema, obstructive sleep apnea, and hypotension who presents from [facility] for rehabilitation. Problem List:  1. Permanent atrial fibrillation ([id number] score 3). 2. Tachy-bear syndrome. 3. Cerebral vascular disease. 4. COPD. 5. Chronic bilateral lower extremity edema. Assessment and Plan:  1. Permanent atrial fibrillation ([id number] score 3). Patient is a pleasant 66-year-old male with a medical history significant for permanent atrial fibrillation, cerebrovascular disease, COPD, peripheral vascular disease, chronic lower extremity edema, and obstructive sleep apnea who presents from outside hospital post CVA secondary to peripheral vascular disease. Patient had some tachybradycardia syndrome while at [facility] and was put on a every 6 hour diltiazem regimen. Patient denies any symptoms consistent with bradycardia. Per EP from [facility] he had pauses of less than 3 seconds and was asymptomatic. We will transition him to long-acting diltiazem at nighttime and get an EKG in the morning. She is EKG reassuring and continue to be asymptomatic we will recommend a 2-week heart monitor at time of discharge and follow-up with Dr. Kavon Hernandez.  - Diltiazem 120 mg QPM.  - Apixaban per neurosurgery.   - EKG in am.  - Two week cardiac monitor at time of discharge. - Follow up with Dr. Erie Brunner. I'd be happy to see as well if Dr. Erie Brunner would like. 2. Tachy-bear syndrome.  - Plan as per above. 3. Cerebral vascular disease.  - Plan as per above. 4. COPD. - Per pulmonology. 5. Chronic bilateral lower extremity edema. Stable and asymptomatic.  - Continue to follow clinically. Thank you for allowing me to participate in the care of Nigel Palacios . If you have any questions/comments, please do not hesitate to contact us.     Jessa Fry MD  Cardiac Electrophysiology  [address] [facility]  [phone] [facility]

## 2023-03-08 NOTE — PROGRESS NOTES
Jacob Nixon  3/8/2023  [id number]    Chief Complaint: Acute CVA (cerebrovascular accident) [facility])    Subjective:   No acute events overnight. Today Joseph Sol is seen in his room. He reports continued shortness of breath. He denies significant shortness of breath above his baseline. He is currently on steroids per Pulm at OSH. Will consult Pulm here for assistance. ROS: denies f/c, n/v, cp    Objective:  Patient Vitals for the past 24 hrs:   BP Temp Temp src Pulse Resp SpO2 Weight   03/08/23 1158 119/62 -- -- 70 -- -- --   03/08/23 0847 (!) 113/59 -- -- 74 20 94 % --   03/08/23 0845 107/67 97.5 °F (36.4 °C) Oral 74 20 97 % --   03/08/23 0824 -- -- -- -- -- -- 173 lb 11.2 oz (78.8 kg)   03/08/23 0803 -- -- -- -- -- 97 % --   03/08/23 0501 127/84 -- -- 68 -- -- --   03/08/23 0323 -- -- -- -- 22 -- --   03/08/23 0012 -- -- -- -- 19 -- --   03/07/23 2340 115/71 98.6 °F (37 °C) Oral 62 16 100 % --   03/07/23 2214 -- -- -- -- 16 -- --   03/07/23 1930 -- -- -- -- -- 95 % --   03/07/23 1745 139/67 -- -- 91 -- -- --     Gen: No distress, pleasant. HEENT: Normocephalic, atraumatic. CV: irregularly irregular rhythm  Resp: No respiratory distress. No wheezing  Abd: Soft, nontender   Ext: No edema. Neuro: Alert, oriented, appropriately interactive.      Wt Readings from Last 3 Encounters:   03/08/23 173 lb 11.2 oz (78.8 kg)   03/06/23 181 lb 7 oz (82.3 kg)   02/25/23 177 lb 14.6 oz (80.7 kg)       Laboratory data:   Lab Results   Component Value Date    WBC 12.4 (H) 03/08/2023    HGB 14.0 03/08/2023    HCT 42.7 03/08/2023    MCV 92.9 03/08/2023     03/08/2023       Lab Results   Component Value Date/Time     03/08/2023 06:39 AM    K 3.5 03/08/2023 06:39 AM    CL 98 03/08/2023 06:39 AM    CO2 31 03/08/2023 06:39 AM    BUN 17 03/08/2023 06:39 AM    CREATININE 0.8 03/08/2023 06:39 AM    GLUCOSE 103 03/08/2023 06:39 AM    CALCIUM 9.1 03/08/2023 06:39 AM        Therapy progress:  PT  Position Activity Restriction  Other position/activity restrictions: Strict I&Os, 3L O2; up ith assist. Notify physician for pulse less than 50 or greater than 120, respiratory rate less than 12 or greater than 25, oral temperature greater than 101.3 F (99.9 C), systolic BP less than 90 or greater than 131, diastolic BP less than 50 or greater than 100. Objective     Sit to Stand: Contact guard assistance  Stand to Sit: Contact guard assistance  Bed to Chair: Minimal assistance (ataxia noted, uses UE and environmental support.)  Device: Rolling Walker  Other Apparatus: O2 (2L)  Assistance: Minimal assistance  Distance: 20' needed WC follow after first 10 feet as gait more unsteady and had to sit immediately with  with WC follow needed. OT  PT Equipment Recommendations  Equipment Needed: Yes  Walker: Rolling  Other: rolling walker  Toilet - Technique: Ambulating  Equipment Used: Standard toilet  Toilet Transfers Comments: no AD  Assessment        SLP                Body mass index is 23.56 kg/m². Assessment and Plan:  Apple Delcid is a 68year old male with a past medical history significant for atrial fibrillation on Eliquis, HFpEF, COPD with chronic respiratory failure on 4 L O2 baseline who presented to [facility] on 2/25/23 with right sided weakness and aphasia, found to have left MCA CVA and subsequently transferred to [facility]. for left ICA stent placement. He was admitted to [facility] on 3/6/23 due to functional deficits below his baseline. Left MCA CVA  - due to left ICA stenosis  - DAPT as below, statin  - PT, OT, ST    Oropharyngeal Dysphagia  - ST     Left Internal Carotid Stenosis  - s/p stent placement on 3/2  - DAPT for 6 weeks then stop Plavix, continue asa. Resume Eliquis 4/17  - follow up with Calin PATTERSON     Atrial Fibrillation  - with tachy-bear syndrome  - diltiazem 30 mg every 6 hours.  Per EP if tolerating all doses can switch to 120 mg daily  - Eliquis to be resume 4/17  - consult Cardiology     HFpEF  - home regimen: torsemide 40 mg daily  - torsemide 10 mg daily, add K while on diuretic  - daily weights, I/Os     Chronic Respiratory Failure  COPD  - on baseline 4 L O2  - brovana, pulmicort  - duo-nebs  - short burst of steroids per Pulm for possible exacerbation   - wean oxygen for goal SpO2>88%  - consult Pulmonology     Bowels: adjust medications as needed for regular bowel movements     Bladder: Check PVR x 3. [address] if PVR > 200ml or if any volume is > 500 ml. Sleep: Trazodone provided prn. PPX   DVT: lovenox  GI: not indicated     Follow up appointments: Neurosurgery, PCP, EP, Pulmonology    Services: [facility] OP  EDOD: 3/15    Interdisciplinary team conference was held today with entire rehab treatment team including PT, OT, SLP, Dietician, RN, and SW. Discussion focused on progress toward rehab goals and discharge planning. Barriers: O2 line management, respiratory status, endurance, comorbidities. Total treatment time >35 min with greater than 50% spent in care coordination. [facility]jo MD 3/8/2023, 2:56 PM

## 2023-03-08 NOTE — PROGRESS NOTES
Consult Call Back    Who: Soledad Auguste   Date:3/8/2023,  Time:1:26 PM  - was added to the treatment team @1363     Electronically signed by Cheyenne Lopez on 3/8/23 at 1:26 PM EST

## 2023-03-08 NOTE — PROGRESS NOTES
Physical Therapy  Facility/Department: [facility]  Rehabilitation Physical Therapy Treatment Note    NAME: Keary Boast  : 1950 (68 y.o.)  MRN: [id number]  CODE STATUS: Full Code    Date of Service: 3/8/23       Restrictions:  Restrictions/Precautions: Fall Risk, General Precautions  Position Activity Restriction  Other position/activity restrictions: Strict I&Os, 3L O2; up ith assist. Notify physician for pulse less than 50 or greater than 120, respiratory rate less than 12 or greater than 25, oral temperature greater than 101.3 F (13.3 C), systolic BP less than 90 or greater than 977, diastolic BP less than 50 or greater than 100. SUBJECTIVE: denies pain throughout session but endorses sob up to mod with activity or light to somewhat hard on KIRA RPE with mobility  Post Treatment Pain Screening:   denies pain throughout session but endorses sob up to mod with activity or light to somewhat hard on KIRA RPE with mobility    OBJECTIVE       Functional Mobility  Bed Mobility  Overall Assistance Level: Independent  Roll Left  Assistance Level: Independent  Roll Right  Assistance Level: Independent  Sit to Supine  Assistance Level: Independent  Supine to Sit  Assistance Level: Independent  Scooting  Assistance Level:  Independent  Bed To/From Chair  Technique: Stand step  Assistance Level: Minimal assistance  Skilled Clinical Factors: cues to hold thereapist forearms, faciitated weight shift, noted ataxia chair>bed>WC(SpO2 94%, noted sob with RR to 24/minute need for 1 minute rest for relief of sob)  >bed>chair>bed>WC  cued for wide alicia and facilitated weight shift in BLE      Environmental Mobility  Ambulation  Surface: Level surface  Device: Proacta  Patient demonstrated  200' over 12'48\" in parallel Helios wtih min assist with demo for wide alicia walking straight and with turns with reciprocal gait and arm swing WB on Parallel bars, cues for RUE advancement and weight shift facilitation for RLE with wide alicia with turns L or R every 10 feet. RR 24 post activity with SpO2 93% and report of sob mod/KIRA light to somewhat hard during walking. 4# RLE ankle weigh tto facilitate body mapping RLE. Wheelchair  Surface: Level surface  Patient with WC propulsion 168'  trianing needed redirection to avoid hitting objects on right side with patient using intemrittent BLE and UE for propulsion with patient noting his family will bring in shoes for him and states he will call them today. post WC propulsion 96%  Additional Factors: Verbal cues (assist to steeer and cues for use of feet, patient without shoes, asked patient to request from family to bring in.)      PT Exercises  Exercise Treatment: bridges x15,  sidelying hip abduction (3-/5 RLE) x15 BLE, hooklying  knee extension x15 BLE cues for set up with all. DBE with IS x10. Needs cues for therex, substitutes with quads for glut medius due to abductor weakness. ASSESSMENT/PROGRESS TOWARDS GOALS  Vital Signs  Heart Rate: 74  Heart Rate Source: Monitor  BP: (!) 113/59  BP Location: Right upper arm (sitting)  MAP (Calculated): 77  Resp: 20  SpO2: 94 %  O2 Device: Nasal cannula (3L via nc)  Comment: 3L via nc    Assessment  Assessment: Patient seen for transfer training with no AD but with demo of wide alicia for transfers, gait and turning with patient needing BUE HHA and facilitated weight shift for coordination and turning with transfers min to mod assist of 1. Patient demonstrated  200' over 12'48\" in parallel bars wtih min assist with demo, cues for RUE advancement and weight shift facilitation for RLE with wide alicia with turns L or R every 10 feet. Patient with WC propulsion 168'  trianing needed redirection to avoid hitting objects on right side with patient using intemrittent BLE and UE for propulsion with patient noting his family will bring in shoes for him and states he will call them today.   Patient noted with activity to require frequent standing rest breaks with gait and with transfer training seated rest due to sob with increased RR with mobility/activity o f up to 24 with SpO2 in 90's throughout session. Patient completed LE and DBE with cues as noted. Continued coordination/weakness impairment right UE/LR wtih resultant impaired balance, ataxia,  transfers, gait. Activity Tolerance: Patient tolerated treatment well;Patient limited by fatigue;Patient limited by endurance  Discharge Recommendations: Home with Home health PT  PT Equipment Recommendations  Equipment Needed: Yes  Other: rolling walker  Dispoistion On portable O2 in WC.  with SLP at end of PT session. Goals  Patient Goals   Patient Goals : \"Get back to normal.\"  Short Term Goals  Time Frame for Short Term Goals: 3/14/2023  Short Term Goal 1: Patient demo SBA in bed<>chair, sit<>Stand, car transfer with LRAD. Short Term Goal 2: Patient demo walking 100-150 feet with SpO2 90% or greater with SBA. Goal partially met 3/8/2023 Patient demo 200' over 12'48\" in parallel bars wtih min assist with demo, cues for RUE advancement and weight shift facilitation for RLE with wide alicia with turns L or R every 10 feet. Short Term Goal 3: Patient demo up and down 6 steps with LRAD min assist  Short Term Goal 4: Patient demo indep in [facility] propulsion 150 feet with l/r turns and indep in  parts management  Short Term Goal 5: Patient demo stoop to recover wtih LRAD indep. Long Term Goals  Time Frame for Long Term Goals : 3/21/2023  Long Term Goal 1: Patient demo indep in bed<>chair, sit<>Stand, car transfer with LRAD. Long Term Goal 2: Patient demo gait 150 feet with 2 turns on turf carpet of 10 feet or greater indep with LRAD. Long Term Goal 3: Patient demo up and down 12 steps with LRAD indep  Long Term Goal 4: Patient demo indep in LE strengthening and coordination seated and standing exercises. PLAN OF CARE/SAFETY 5/7 days week per [facility].         EDUCATION  Education  Education Given To: Patient  Education Provided: Role of Therapy;Plan of Care;Precautions; Safety;DME/Home Modifications;Transfer Training;Mobility Training; Fall Prevention Strategies; Energy Conservation;Equipment        Therapy Time   Individual Concurrent Group Co-treatment   Time In 0830         Time Out 0930         Minutes 60           Timed Code Treatment Minutes: 61 Minutes       Alex Perez, 03/08/23 at 11:46 AM

## 2023-03-09 ENCOUNTER — TELEPHONE (OUTPATIENT)
Dept: CARDIOLOGY | Age: 73
End: 2023-03-09

## 2023-03-09 LAB
ANION GAP SERPL CALCULATED.3IONS-SCNC: 10 MMOL/L (ref 3–16)
BUN BLDV-MCNC: 16 MG/DL (ref 7–20)
CALCIUM SERPL-MCNC: 9 MG/DL (ref 8.3–10.6)
CHLORIDE BLD-SCNC: 103 MMOL/L (ref 99–110)
CO2: 29 MMOL/L (ref 21–32)
CREAT SERPL-MCNC: 0.7 MG/DL (ref 0.8–1.3)
EKG ATRIAL RATE: 78 BPM
EKG DIAGNOSIS: NORMAL
EKG Q-T INTERVAL: 424 MS
EKG QRS DURATION: 98 MS
EKG QTC CALCULATION (BAZETT): 430 MS
EKG R AXIS: 9 DEGREES
EKG T AXIS: 43 DEGREES
EKG VENTRICULAR RATE: 62 BPM
GFR SERPL CREATININE-BSD FRML MDRD: >60 ML/MIN/{1.73_M2}
GLUCOSE BLD-MCNC: 95 MG/DL (ref 70–99)
POTASSIUM REFLEX MAGNESIUM: 4 MMOL/L (ref 3.5–5.1)
SODIUM BLD-SCNC: 142 MMOL/L (ref 136–145)

## 2023-03-09 PROCEDURE — 93010 ELECTROCARDIOGRAM REPORT: CPT | Performed by: INTERNAL MEDICINE

## 2023-03-09 PROCEDURE — 94761 N-INVAS EAR/PLS OXIMETRY MLT: CPT

## 2023-03-09 PROCEDURE — 94660 CPAP INITIATION&MGMT: CPT

## 2023-03-09 PROCEDURE — 6370000000 HC RX 637 (ALT 250 FOR IP): Performed by: STUDENT IN AN ORGANIZED HEALTH CARE EDUCATION/TRAINING PROGRAM

## 2023-03-09 PROCEDURE — 2700000000 HC OXYGEN THERAPY PER DAY

## 2023-03-09 PROCEDURE — 93005 ELECTROCARDIOGRAM TRACING: CPT | Performed by: INTERNAL MEDICINE

## 2023-03-09 PROCEDURE — 97116 GAIT TRAINING THERAPY: CPT

## 2023-03-09 PROCEDURE — 97535 SELF CARE MNGMENT TRAINING: CPT

## 2023-03-09 PROCEDURE — 1280000000 HC REHAB R&B

## 2023-03-09 PROCEDURE — 80048 BASIC METABOLIC PNL TOTAL CA: CPT

## 2023-03-09 PROCEDURE — 99232 SBSQ HOSP IP/OBS MODERATE 35: CPT | Performed by: NURSE PRACTITIONER

## 2023-03-09 PROCEDURE — 94640 AIRWAY INHALATION TREATMENT: CPT

## 2023-03-09 PROCEDURE — 36415 COLL VENOUS BLD VENIPUNCTURE: CPT

## 2023-03-09 PROCEDURE — 97129 THER IVNTJ 1ST 15 MIN: CPT

## 2023-03-09 PROCEDURE — 97112 NEUROMUSCULAR REEDUCATION: CPT

## 2023-03-09 PROCEDURE — 6370000000 HC RX 637 (ALT 250 FOR IP): Performed by: INTERNAL MEDICINE

## 2023-03-09 PROCEDURE — 92526 ORAL FUNCTION THERAPY: CPT

## 2023-03-09 PROCEDURE — 97530 THERAPEUTIC ACTIVITIES: CPT

## 2023-03-09 PROCEDURE — 6360000002 HC RX W HCPCS: Performed by: STUDENT IN AN ORGANIZED HEALTH CARE EDUCATION/TRAINING PROGRAM

## 2023-03-09 PROCEDURE — 99232 SBSQ HOSP IP/OBS MODERATE 35: CPT | Performed by: INTERNAL MEDICINE

## 2023-03-09 PROCEDURE — 92507 TX SP LANG VOICE COMM INDIV: CPT

## 2023-03-09 RX ADMIN — ARFORMOTEROL TARTRATE 15 MCG: 15 SOLUTION RESPIRATORY (INHALATION) at 20:52

## 2023-03-09 RX ADMIN — ENOXAPARIN SODIUM 40 MG: 100 INJECTION SUBCUTANEOUS at 08:44

## 2023-03-09 RX ADMIN — PREDNISONE 20 MG: 20 TABLET ORAL at 08:44

## 2023-03-09 RX ADMIN — BUDESONIDE 500 MCG: 0.5 INHALANT RESPIRATORY (INHALATION) at 20:58

## 2023-03-09 RX ADMIN — ATORVASTATIN CALCIUM 80 MG: 80 TABLET, FILM COATED ORAL at 20:36

## 2023-03-09 RX ADMIN — POTASSIUM CHLORIDE 10 MEQ: 750 TABLET, EXTENDED RELEASE ORAL at 08:44

## 2023-03-09 RX ADMIN — GUAIFENESIN 600 MG: 600 TABLET, EXTENDED RELEASE ORAL at 08:43

## 2023-03-09 RX ADMIN — DILTIAZEM HYDROCHLORIDE 120 MG: 120 CAPSULE, COATED, EXTENDED RELEASE ORAL at 17:38

## 2023-03-09 RX ADMIN — FAMOTIDINE 20 MG: 20 TABLET ORAL at 20:37

## 2023-03-09 RX ADMIN — TRAZODONE HYDROCHLORIDE 50 MG: 50 TABLET ORAL at 20:36

## 2023-03-09 RX ADMIN — FAMOTIDINE 20 MG: 20 TABLET ORAL at 08:44

## 2023-03-09 RX ADMIN — SENNOSIDES AND DOCUSATE SODIUM 1 TABLET: 50; 8.6 TABLET ORAL at 08:43

## 2023-03-09 RX ADMIN — ARFORMOTEROL TARTRATE 15 MCG: 15 SOLUTION RESPIRATORY (INHALATION) at 07:56

## 2023-03-09 RX ADMIN — ASPIRIN 325 MG: 325 TABLET, COATED ORAL at 08:44

## 2023-03-09 RX ADMIN — CLOPIDOGREL BISULFATE 75 MG: 75 TABLET ORAL at 08:44

## 2023-03-09 RX ADMIN — GUAIFENESIN 600 MG: 600 TABLET, EXTENDED RELEASE ORAL at 20:36

## 2023-03-09 RX ADMIN — BUDESONIDE 500 MCG: 0.5 INHALANT RESPIRATORY (INHALATION) at 07:56

## 2023-03-09 ASSESSMENT — PAIN SCALES - GENERAL
PAINLEVEL_OUTOF10: 0

## 2023-03-09 NOTE — PLAN OF CARE
Problem: Safety - Adult  Goal: Free from fall injury  3/8/2023 2237 by Diego Benavidez RN  Outcome: Progressing  3/8/2023 1551 by Ye Riley RN  Outcome: Progressing

## 2023-03-09 NOTE — PROGRESS NOTES
Physical Therapy  Facility/Department: [facility]  Rehabilitation Physical Therapy Treatment Note    NAME: Jessee Bosch  : 1950 (68 y.o.)  MRN: [id number]  CODE STATUS: Full Code    Date of Service: 3/9/23       Restrictions:  Restrictions/Precautions: Fall Risk, General Precautions  Position Activity Restriction  Other position/activity restrictions: Strict I&Os, 3L O2; up ith assist. Notify physician for pulse less than 50 or greater than 120, respiratory rate less than 12 or greater than 25, oral temperature greater than 101.3 F (26.3 C), systolic BP less than 90 or greater than 005, diastolic BP less than 50 or greater than 100. SUBJECTIVE  Subjective  Pain: Pt denies        Post Treatment Pain Screening: denies but endorses sob with mobility. OBJECTIVE       Functional Mobility  Sup<>sit indep, rolls to left indep. Completes x10 bridges with sob notes orthopnea O2 sat dropping to 88% with rebound with upright sitting rest after 1 minute 92% on 3L via nc. Chair<>bed x5 with demo and frequent  cues due to poor motor planning side stpping right, turns around uneccessarily, min asssit and sob after 5 trials with [id number]%% Hr 96bpm, with 2 minutes rest rebound to 98% Hr 69bpm    Chair<>bed x6 with progression to cGA without cues but with slow rate and hesitancy initiating RLE movement and impaired dynamic blance with increased a/p sway necessitating  CGA, post activity sob spO2 91% on 3L via nc HR 79bpmneeded brief 1 minute rest to recover to no sob and 94% SpO2 HR 73bpm. Noted needed cues for wider alicia with backing up/stepping back due to near scissoring. Patient states he \"can't tell where my right leg is. \"   Cued for wide alicia to allow increased reaction time and hip/knee strategy.     Standing in wide alicia with outline of feet position for wide alicia and right foot forward to target and back  to feet outline drawn on floot  to start stepping with Left HHA with facilitation for balance and weight shift needed 25 steps/1 minute   Standing in wide alicia with outline of feet position for wide alicia and LEFT foot forward to target and back  to feet outline drawn on floot  to start stepping with RHHA with facilitation for balance and weight shift needed 25 steps/1 minute  Vitals post transfer tested in sitting: HR:85bpm  SpO2:91%O2 sob with patient asking for 1 minute seated rest for recovery. Standing in wide alicia with outline of feet position for wide alicia and Left  foot forward and back to start stepping with Left HHA with facilitation for balance and weight shift needed 25 steps/1 minute. , ability to weight shift to Right LE poor and leads to dragging or sliding LLE forward/ back with stepping with poor approximation of stepping target and need for increased assist to facilite weight shift to Right side with Left stepping. Vitals post transfer tested in sitting: HR:82bpm  SpO2:91%O2 sob with patient asking for 1 minute seated rest for recovery. Environmental Mobility    Walked with assist of 2 WC follow with assist for tubing ()2) management with reciprocal gait and decreased stability of gait with decrased stance time RLE vs LLE  and min assist to faciltiate weight shift, cue for wider alicia and cue to start stepping initiating with LLE to facilitate RLE weight shift with min assist for reciprocal gait slow sasha relies heavily on UE on walker with shakiness noted with gait. Post first walk HR 88bpm SpO2 92% on 3L via nc, post second walk SpO2 77% completed 2 walks of 150 feet with frequent cues for wider alicia as patient noted when turning forward or backing up turning with near scissoring. Disposition: up to chair with 3L O2 via nc and call light engaged sLP with patient.        ASSESSMENT/PROGRESS TOWARDS GOALS  Vital Signs  Heart Rate: 67  BP: 129/68  BP Location: Right upper arm  MAP (Calculated): 88  SpO2: 95 %  Comment: 3L    Assessment  Assessment: Patient seen for transfer training in wide alicia with frequent cues and demo due to apraxia with impaired motor planning stepping to the right and sob necessitating frequent brief rest brakes. Patient able to demo therex for endurance of bed mobility (indep) and x10 bridges but noted orthopnea with sob and SpO2 dropped to 88% with rebound in sitting noted. Patient noted with expressive aphasia, increased processing time and noted word finding but able to communicate needs during session and describe limitations of movement during session. Continued ataxia with faciltiated weight shift with FWW and cues for wider alicia to allow increased loading of RLE as patient with less WB on RLE without facilitation as well as need to cue for stepping with LLE first to facilitate RLE wB during gait and allow reciprocal gait. Unsteadiness and poor endurance necessitate need for second assist WC follow. Patient takes seated rest intermittently during session due to sob. Continue to progress with balance, coordination standing activities. Goals  Patient Goals   Patient Goals : \"Get back to normal.\"  Short Term Goals  Time Frame for Short Term Goals: 3/14/2023  Short Term Goal 1: Patient demo SBA in bed<>chair, sit<>Stand, car transfer with LRAD. Short Term Goal 2: Patient demo walking 100-150 feet with SpO2 90% or greater with SBA. Goal partially met 3/8/2023 Patient demo 200' over 12'48\" in parallel bars wtih min assist with demo, cues for RUE advancement and weight shift facilitation for RLE with wide alicia with turns L or R every 10 feet. Short Term Goal 3: Patient demo up and down 6 steps with LRAD min assist  Short Term Goal 4: Patient demo indep in [facility] propulsion 150 feet with l/r turns and indep in WC parts management  Short Term Goal 5: Patient demo stoop to recover wtih LRAD indep. Long Term Goals  Time Frame for Long Term Goals : 3/21/2023  Long Term Goal 1: Patient demo indep in bed<>chair, sit<>Stand, car transfer with LRAD.   Long Term Goal 2: Patient demo gait 150 feet with 2 turns on turf carpet of 10 feet or greater indep with LRAD. Long Term Goal 3: Patient demo up and down 12 steps with LRAD indep  Long Term Goal 4: Patient demo indep in LE strengthening and coordination seated and standing exercises. PLAN OF CARE/SAFETY 5/7 days week per shared poc       EDUCATION in wide alicia, faciltated weight shift onto RLE with gait and transfers, facilitated balance and cued for motor planning and reciprocal gait as noted above, needs intermittent cues for stepping in wide alicia, ataxia worse without FWW.            Therapy Time   Individual Concurrent Group Co-treatment   Time In 0930         Time Out 1030         Minutes 60           Timed Code Treatment Minutes: 61 Minutes       Alex Smith, 03/09/23 at 10:00 AM

## 2023-03-09 NOTE — DISCHARGE INSTRUCTIONS
FOLLOW-UP APPOINTMENTS    [facility] OFFICE - Follow-up appointment on May 3rd at 10:45am with Dr[facility] T[facility] Chavez, [facility]. You and your one visitor will need to have your mouth and nose covered. This can be with a mask. No children please. [facility]) office location. [address] [place] [address]), C/ Artie Moore [address]. Office #: [phone]. Located behind [facility]. If you are unable to make this appointment, please call to reschedule. [employer] MONITOR PATCH PATIENT INSTRUCTIONS    Remove Patch in 1 week from application. Recalibrate and apply next patch. Call  [employer] CUSTOMER SUPPORT: [phone] and they will assist if needed. o PIN: [id number]    o Keep the phone with you as much as possible. Be sure to charge the phone overnight and when the battery gets low. If the phone dies completely, be sure to restart the phone and enter the PIN number to confirm the patch is connected to your phone.    o If you are away from the phone for more than 8 hours, please stay within a 30 foot range of the phone for 3 hours to ensure your data fully uploads. o If you go somewhere with no cellphone service, still keep the phone with you and charged. Your data will upload when you reach cellphone service. You can stay outside of cellphone range for up to 10 days. o This phone cannot make calls, take pictures, etc. It is solely for medical use and will only Bluetooth connect to your patch. · Patch Instructions:    o Wait to exercise or shower for 30 minutes after application. o Shower with the water stream to your back and avoid putting the patch directly under the water stream.    o Do not bathe, swim or fully submerge the patch.    o If the patch starts to lift off after showering or sweating, dry the patch with a towel and allow the adhesive to dry. It should re-adhere to your skin. If it continues to lift, use the adhesive overlay to re-adhere the patch.  Video instructions for the adhesive overlay are on the phone under the Menu Tab in the right hand corner - [address]    o One patch lasts for up to 7 days. If you need to wear an additional patch or replace a patch, utilize the included application instructions or the video instructions - Menu - Help - [employer] Application    · End of Wear - Returning the Phone    o [facility] are responsible for returning the phone. You will be financially responsible for an unreturned phone. o Discard the used patch in the trash    o Return the phone,  and any unopened additional patches or adhesives in the pre-paid mailing pouch or drop off at the office. o Drop the  at [address] [place] [address] location or box or schedule a home  by calling    ups - [phone]. Keep the tracking number for your records.     CONTACT [employer] CUSTOMER SUPPORT:  [phone]

## 2023-03-09 NOTE — PLAN OF CARE
Problem: Safety - Adult  Goal: Free from fall injury  3/9/2023 1012 by Jordana Soto  Outcome: Progressing  3/8/2023 2237 by Tracy Narayan RN  Outcome: Progressing

## 2023-03-09 NOTE — TELEPHONE ENCOUNTER
Monitor company [employer]  Length of monitor 14 days  Monitor ordered by Allegra Cason CNP   Patch ID [id number]   Device number [id number]  Monitor given to Mary Chávez RN. Nurse to apply at the time of discharge.

## 2023-03-09 NOTE — PROGRESS NOTES
Iris Orlucas  3/9/2023  [id number]    Chief Complaint: Acute CVA (cerebrovascular accident) [facility])    Subjective:   No acute events overnight. Today Gwyn Neri is seen working with therapy. He reports that therapy is going well. He denies acute complaints at this time. ROS: denies f/c, n/v, cp    Objective:  Patient Vitals for the past 24 hrs:   BP Temp Temp src Pulse Resp SpO2 Weight   03/09/23 0944 129/68 -- -- 67 -- 95 % --   03/09/23 0756 -- -- -- -- -- 96 % --   03/09/23 0715 118/65 97.4 °F (36.3 °C) Oral 60 14 97 % --   03/09/23 0630 -- -- -- -- -- -- 170 lb 4 oz (77.2 kg)   03/09/23 0454 -- -- -- -- 18 -- --   03/09/23 0009 -- -- -- -- 17 -- --   03/08/23 2158 -- -- -- -- 18 -- --   03/08/23 2130 115/75 97.8 °F (36.6 °C) Oral 63 20 96 % --   03/08/23 2010 -- -- -- -- -- 98 % --   03/08/23 1719 114/71 -- -- 65 -- -- --     Gen: No distress, pleasant. HEENT: Normocephalic, atraumatic. CV: irregularly irregular rhythm  Resp: No respiratory distress. No wheezing  Abd: Soft, nontender   Ext: No edema. Neuro: Alert, oriented, appropriately interactive.  Ambulates in hallway with walker and therapy assistance  Psych: appropriate mood and affect    Wt Readings from Last 3 Encounters:   03/09/23 170 lb 4 oz (77.2 kg)   03/06/23 181 lb 7 oz (82.3 kg)   02/25/23 177 lb 14.6 oz (80.7 kg)       Laboratory data:   Lab Results   Component Value Date    WBC 12.4 (H) 03/08/2023    HGB 14.0 03/08/2023    HCT 42.7 03/08/2023    MCV 92.9 03/08/2023     03/08/2023       Lab Results   Component Value Date/Time     03/09/2023 07:21 AM    K 4.0 03/09/2023 07:21 AM     03/09/2023 07:21 AM    CO2 29 03/09/2023 07:21 AM    BUN 16 03/09/2023 07:21 AM    CREATININE 0.7 03/09/2023 07:21 AM    GLUCOSE 95 03/09/2023 07:21 AM    CALCIUM 9.0 03/09/2023 07:21 AM        Therapy progress:  PT  Position Activity Restriction  Other position/activity restrictions: Strict I&Os, 3L O2; up ith assist. Notify physician for pulse less than 50 or greater than 120, respiratory rate less than 12 or greater than 25, oral temperature greater than 101.3 F (99.9 C), systolic BP less than 90 or greater than 814, diastolic BP less than 50 or greater than 100. Objective     Sit to Stand: Contact guard assistance  Stand to Sit: Contact guard assistance  Bed to Chair: Minimal assistance (ataxia noted, uses UE and environmental support.)  Device: Rolling Walker  Other Apparatus: O2 (2L)  Assistance: Minimal assistance  Distance: 20' needed WC follow after first 10 feet as gait more unsteady and had to sit immediately with  with WC follow needed. OT  PT Equipment Recommendations  Equipment Needed: Yes  Walker: Rolling  Other: rolling walker  Toilet - Technique: Ambulating  Equipment Used: Standard toilet  Toilet Transfers Comments: no AD  Assessment        SLP                Body mass index is 23.09 kg/m². Assessment and Plan:  Margene Brunner is a 68year old male with a past medical history significant for atrial fibrillation on Eliquis, HFpEF, COPD with chronic respiratory failure on 4 L O2 baseline who presented to [facility] on 2/[facility]23 with right sided weakness and aphasia, found to have left MCA CVA and subsequently transferred to [facility]. for left ICA stent placement. He was admitted to [facility] on 3/6/23 due to functional deficits below his baseline. Left MCA CVA  - due to left ICA stenosis  - DAPT as below, statin  - PT, OT, ST    Oropharyngeal Dysphagia  - ST     Left Internal Carotid Stenosis  - s/p stent placement on 3/2  - DAPT for 6 weeks then stop Plavix, continue asa. Resume Eliquis 4/17  - follow up with [facility]     Atrial Fibrillation  - with tachy-bear syndrome during acute stay  - EP switched cardizem to 120 mg nightly. Appreciate assistance. Planning on two week event monitor on discharge and follow up as outpatient.    - Eliquis to be resume 4/17     HFpEF  - home regimen: torsemide 40 mg daily  - torsemide 10 mg daily, added K while on diuretic  - daily weights, I/Os     Chronic Respiratory Failure  COPD  - on baseline 4 L O2  - brovana pulmicort  - duo-nebs PRN  - short burst of steroids per Pulm for possible exacerbation   - wean oxygen for goal SpO2>88%  - Pulmonology consulted, appreciate assistance. Bowels: adjust medications as needed for regular bowel movements     Bladder: Check PVR x 3. [facility] if PVR > 200ml or if any volume is > 500 ml. Sleep: Trazodone provided prn. PPX   DVT: lovenox  GI: not indicated     Follow up appointments: Neurosurgery, PCP, EP, Pulmonology    Services: [facility] OP  EDOD: 3/15    [place] .  Mahin Chao MD 3/9/2023, 2:57 PM

## 2023-03-09 NOTE — PROGRESS NOTES
03/08/23 2158   NIV Type   Skin Assessment Clean, dry, & intact   Skin Protection for O2 Device Yes   Orientation Middle   Location Nose   Intervention(s) Skin Barrier   NIV Started/Stopped On   Equipment Type V60   Mode Bilevel   Mask Type Full face mask   Mask Size Medium   Settings/Measurements   IPAP 18 cmH20   CPAP/EPAP 10 cmH2O   Vt (Measured) 578 mL   Rate Ordered 16   Resp 18   FiO2  35 %   Minute Volume (L/min) 13 Liters   Mask Leak (lpm) 0 lpm   Comfort Level Good   Using Accessory Muscles No   SpO2 97   Patient's Home Machine No   Alarm Settings   Alarms On Y   Breath Sounds   Right Upper Lobe Diminished   Right Middle Lobe Diminished   Right Lower Lobe Diminished   Left Upper Lobe Diminished   Left Lower Lobe Diminished

## 2023-03-09 NOTE — PROGRESS NOTES
03/09/23 0009   NIV Type   Skin Assessment Clean, dry, & intact   Skin Protection for O2 Device Yes   Orientation Middle   Location Nose   Intervention(s) Skin Barrier   NIV Started/Stopped On   Equipment Type V60   Mode Bilevel   Mask Type Full face mask   Mask Size Medium   Settings/Measurements   IPAP 18 cmH20   CPAP/EPAP 10 cmH2O   Vt (Measured) 798 mL   Rate Ordered 16   Resp 17   FiO2  35 %   Minute Volume (L/min) 12.7 Liters   Mask Leak (lpm) 0 lpm   Comfort Level Good   Using Accessory Muscles No   SpO2 96   Patient's Home Machine No   Alarm Settings   Alarms On Y

## 2023-03-09 NOTE — PROGRESS NOTES
INPATIENT PULMONARY CRITICAL CARE PROGRESS NOTE      Reason for visit     chronic respiratory failure, COPD    SUBJECTIVE: Patient when seen this morning states that he is doing reasonably well and did not have any increasing shortness of breath or cough or chest congestion per se, patient used the BiPAP last night without any issues, patient was not having any increasing wheezing, patient was afebrile and hemodynamically maintained, patient was on 3 L of nasal cannula, saturation of 96%, patient's urine output has not been recorded in the epic for review, patient glycemic control was acceptable, no other pertinent review of system of concern           Physical Exam:  Blood pressure 118/65, pulse 60, temperature 97.4 °F (36.3 °C), temperature source Oral, resp. rate 14, height 6' (1.829 m), weight 170 lb 4 oz (77.2 kg), SpO2 96 %.'     Constitutional:  No acute distress. HENT:  Oropharynx is clear and moist. No thyromegaly. Eyes:  Conjunctivae are normal. Pupils equal, round, and reactive to light. No scleral icterus. Minimal facial asymmetry  Neck: . No tracheal deviation present. No obvious thyroid mass. Cardiovascular: S1S2 irregularly irregular , normal heart sounds. No right ventricular heave. Minimal lower extremity edema. Pulmonary/Chest: No wheezes. Minimal basilar rales. Chest wall is not dull to percussion. No accessory muscle usage or stridor. Decreased breath sound intensity  Abdominal: Soft. Bowel sounds present. No distension or hernia. No tenderness. Musculoskeletal: No cyanosis. No clubbing. No obvious joint deformity. Lymphadenopathy: No cervical or supraclavicular adenopathy. Skin: Skin is warm and dry. No rash or nodules on the exposed extremities. Psychiatric: Normal mood and affect. Behavior is normal.  No anxiety. Neurologic: Alert, awake and oriented. Slight right-sided weakness.   Speech fluent with some slowing at times      Results:  CBC:   Recent Labs 03/08/23  0639   WBC 12.4*   HGB 14.0   HCT 42.7   MCV 92.9        BMP:   Recent Labs     03/08/23  0639 03/09/23  0721    142   K 3.5 4.0   CL 98* 103   CO2 31 29   BUN 17 16   CREATININE 0.8 0.7*     LIVER PROFILE: No results for input(s): AST, ALT, LIPASE, BILIDIR, BILITOT, ALKPHOS in the last 72 hours. Invalid input(s): AMYLASE,  ALB  PT/INR: No results for input(s): PROTIME, INR in the last 72 hours. APTT: No results for input(s): APTT in the last 72 hours. UA:No results for input(s): NITRITE, COLORU, PHUR, LABCAST, WBCUA, RBCUA, MUCUS, TRICHOMONAS, YEAST, BACTERIA, CLARITYU, SPECGRAV, LEUKOCYTESUR, UROBILINOGEN, BILIRUBINUR, BLOODU, GLUCOSEU, AMORPHOUS in the last 72 hours. Invalid input(s): [facility]    Imaging:  I have reviewed radiology images personally. No orders to display     CT HEAD WO CONTRAST    Result Date: 2/26/2023  EXAMINATION: CT OF THE HEAD WITHOUT CONTRAST; CTA OF THE HEAD AND NECK WITH CONTRAST 2/25/2023 2:57 pm TECHNIQUE: CT of the head was performed without the administration of intravenous contrast. Automated exposure control, iterative reconstruction, and/or weight based adjustment of the mA/kV was utilized to reduce the radiation dose to as low as reasonably achievable.; CTA of the head and neck was performed with the administration of intravenous contrast. Multiplanar reformatted images are provided for review. MIP images are provided for review. Stenosis of the internal carotid arteries measured using NASCET criteria. Automated exposure control, iterative reconstruction, and/or weight based adjustment of the mA/kV was utilized to reduce the radiation dose to as low as reasonably achievable. Noncontrast CT of the head with reconstructed 2-D images are also provided for review. This scan was analyzed using Lauri. ai contact LVO. Identification of suspected findings is not for diagnostic use beyond notification.  Lauri LVO is limited to analysis of imaging data and should not be used in-lieu of full patient evaluation or relied upon to make or confirm diagnosis. COMPARISON: None HISTORY: ORDERING SYSTEM PROVIDED HISTORY: Stroke Symptoms TECHNOLOGIST PROVIDED HISTORY: Has a \"code stroke\" or \"stroke alert\" been called? ->Yes Reason for exam:->Stroke Symptoms Decision Support Exception - unselect if not a suspected or confirmed emergency medical condition->Emergency Medical Condition (MA) Reason for Exam: aphasia right arm weakness, symptoms @ 11am today Additional signs and symptoms: no prev stroke FINDINGS: CT HEAD: BRAIN/VENTRICLES: There is a 19 mm area of left frontal white matter hypodensity possibly representing an acute infarct within the deep watershed territory of the left internal carotid artery. There are additional areas of hypodensity in the white matter bilaterally which may be due to chronic microvascular disease. There is no acute hemorrhage, herniation, or hydrocephalus. ORBITS: The visualized portion of the orbits demonstrate no acute abnormality. SINUSES:  The visualized paranasal sinuses and mastoid air cells demonstrate no acute abnormality. SOFT TISSUES/SKULL: No acute abnormality of the visualized skull or soft tissues. CTA NECK: AORTIC ARCH/ARCH VESSELS: No dissection or arterial injury. No significant stenosis of the brachiocephalic or subclavian arteries. CAROTID ARTERIES: Evaluation of the carotid bifurcations is motion degraded. There is a near occlusion of the proximal left internal carotid artery with partial distal lumen collapse. The stenosis measures 14 mm in length. There is mild stenosis of the left common carotid artery. There appears to be mild stenosis of the proximal right internal carotid artery. Measurement of stenosis percentage is precluded due to the motion artifact. The right common carotid artery is patent. VERTEBRAL ARTERIES: The right vertebral artery V1 and proximal V2 segments are occluded.   There is reconstitution of the mid V2 segment and the artery is patent more distally. The left vertebral artery origin is occluded for a short segment. There is immediate reconstitution of the V1 segment and the artery is patent more distally. SOFT TISSUES: Moderate emphysema in the upper lungs. No neck mass is identified. BONES: There are chronic appearing compression deformities of T3, T4, T5, T6, and T7. CTA HEAD: ANTERIOR CIRCULATION: There is attenuation of the left middle cerebral artery and its branches due to reduction in blood flow. The right middle cerebral artery is patent. The anterior cerebral arteries are patent. POSTERIOR CIRCULATION: The basilar and posterior cerebral arteries are patent. The cerebellar arteries are patent. OTHER: No dural venous sinus thrombosis on this non-dedicated study. 1. Proximal left internal carotid artery near occlusion with distal lumen collapse. Reduced blood flow to the left MCA territory. 2. Bilateral proximal vertebral artery occlusions with distal reconstitution. 3. Age-indeterminate, possibly acute small left frontal lobe infarct. 4. Chronic thoracic compression deformities. Critical results were called by Dr. Jill Martinez MD to [place] [id number] on 2/25/2023 at 15:29. XR CHEST PORTABLE    Result Date: 3/4/2023  PORTABLE AP CHEST AT 1310 HOURS:   HISTORY: Shortness of breath. COMPARISON: 2/26/2023. FINDINGS: The heart and pulmonary vasculature are normal. Bibasilar airspace density is seen, most consistent in appearance with atelectasis, infiltrate not excluded. No significant pleural effusions seen. Bibasilar airspace density/atelectasis. No other acute cardiopulmonary findings. XR CHEST PORTABLE    Result Date: 2/26/2023  EXAM: XR CHEST PORTABLE INDICATION: worsening hypoxia, COMPARISON: 2/25/2023 FINDINGS: SUPPORT DEVICES: None HEART / MEDIASTINUM: Cardiac silhouette is within normal limits in size.  LUNGS/PLEURA: There are diffuse bilateral interstitial opacities with septal thickening consistent with pulmonary edema. No evidence of pneumothorax. BONES / SOFT TISSUES: No acute osseous abnormality. OTHER: None. 1.  Findings of pulmonary edema which are increased compared to chest radiograph from one day prior. XR CHEST PORTABLE    Result Date: 2/25/2023  EXAMINATION: ONE XRAY VIEW OF THE CHEST 2/25/2023 12:21 pm COMPARISON: 07/27/2022 HISTORY: ORDERING SYSTEM PROVIDED HISTORY: stroke symptoms TECHNOLOGIST PROVIDED HISTORY: Reason for exam:->stroke symptoms FINDINGS: Cardial pericardial silhouette is stable. Increased interstitial opacity noted. No focal infiltrate identified. Emphysematous changes noted in the upper lungs. No pneumothorax. No free air. No acute bony abnormality. Increased interstitial opacity. While much or all of this may be chronic, please correlate with any clinical evidence of acute interstitial edema. IR ANGIOGRAM CAROTID CEREBRAL RIGHT    Result Date: 2/28/2023  No Conteh MD     2/28/2023  5:36 PM DATE OF THE PROCEDURE: 02/28/2023 HISTORY OF PRESENT ILLNESS: 67 y/o male with left subcortical white matter stroke and CTA with high grade stenosis of left ICA in the neck. Catheter angiography to confirm stenosis severity and document intracranial collateral flow / [place] patency. OPERATORS: Primary: Ru Freitas M.D., attending. Assistant: None. SUPERVISION AND INTERPRETATION: Dr. Libby Laughlin personally performed the technical portions of the procedure. Following completion of the technical portions of the procedure, the angiographic images were reviewed at the neurography PACS work station by Dr. Nancy Laureano. PROCEDURE: 1. Three-Vessel Diagnostic Cerebral Angiogram. 2. Ultrasound-guided arterial assessment and access of the right common femoral artery.  3. Moderate IV Sedation supervised by Dr. Libby Laughlin with the assistance of an independent trained observer (IR Nurse) who performed patient assessments and monitored patient vital signs, adult, > 15 minutes. VESSELS CATHETERIZED: 1. Right internal carotid artery. 2. Left common carotid artery. 3. Left subclavian artery for angiography of the left vertebral artery. ANESTHESIA: Prior to the procedure, the patient's personal and family history were reviewed for any adverse reactions to anesthesia and no pertinent concerns were raised. ASA rdgrdrrdarddrderd:rd rd3rd Malampati: 2 Conscious sedation and intravenous anesthesia was given by the radiology nurse under direct supervision of the attending physician for 40 minutes. Monitored nursing care and medication reports are available on the chart. Local anesthesia was provided at the puncture site with 1% lidocaine. MODERATE SEDATION START TIME: 1625 MODERATE SEDATION END TIME: 1705 TOTAL DURATION MODERATE IV SEDATION: 40 minutes MEDICATIONS GIVEN: 1 mcg Fentanyl IV 50 mg Versed IV 1% lidocaine, subcutaneous, 7 mL 2000 unit IV bolus unfractionated heparin RADIOCONTRAST: 100 mL 270 mgI/ml Visipaque contrast IA. EBL: 50 mL DEVICES USED: 1. 5-Fr Stiff Micropuncture kit. 2. 5-Greek 10 cm sheath. 3. 0.035\" Glidewire. 4. 5-Greek angled glide catheter. 5. 5-Greek King 2 glide catheter. 6. 5-Greek Mynx  closure device. AIR KERMA: 334 mGy FLUOROSCOPY TIME: 17.1 minutes. CONSENT: Prior to the procedure, the technical aspect of the procedure as well as the potential risks and benefits were explained to the patient. Specifically, the risk of cerebral infarction, puncture site hemorrhage, femoral nerve injury, retroperitoneal hemorrhage, hemorrhagic shock, infection, device failure, anaphylaxis, renal failure, limb loss, death, radiation effects (hair loss, cataracts, radiation burns, tumors, dementia), arterial dissection, arterial pseudoaneurysms and A-V fistula were discussed. The advantages and disadvantages of alternative procedures were presented. An opportunity to state any questions or concerns was given and these were then addressed.  Following this process, it was requested that we proceed with the proposed intervention and this was expressed in the form of a written consent. DETAILS OF THE PROCEDURE: The patient was brought to the neuroangiography suite where the groin was shaved, prepped and draped in usual sterile fashion. A whole room time out was performed to confirm the identify of the patient, the procedure to be performed, and the location of the procedure. Ultrasound was used to insonate the potential vascular access sites including the right common femoral artery to insure the proper size and patency prior to access. 1% lidocaine was administered subcutaneously for local anesthetic. Then with live ultrasound guidance arterial puncture of the right common femoral artery was accessed percutaneously using a single wall puncture technique. A 5-Amharic arterial introducer sheath was placed at the puncture site prior to arterial catheterization and was connected to a pressurized heparin saline flush line. A 5-Amharic angled diagnostic catheter was advanced over an 0.035\" Glidewire into the aortic arch under the fluoroscopic guidance and used to selectively catheterize the vessels listed above. In each case, the vessel origin was visualized fluoroscopically by injection of contrast prior to selective catheterization. Biplane diagnostic cerebral angiograms were acquired at each of these catheterizations. After reviewing the images, the catheter was removed. The groin sheath was exchanged over a 5-Amharic Mynx  closure device and the arteriotomy was closed without difficulty with excellent hemostasis. The patient tolerated the procedure well and there were no complications. FINDINGS: LEFT CAROTID, CERVICAL AND INTRACRANIAL:  Adequate contrast enhancement of the carotid artery is seen. There is a high grade stenosis at the origin of the left ICA within the neck at the carotid bifurcation.   This is 99% stenosis with a string sign and distal opacification due to a focal atherosclerotic plaque at the left carotid bifurcation. The ophthalmic artery is adequately patent. The carotid terminus is normal with adequate caliber of M1 segments of the middle cerebral artery. The left anterior cerebral artery does not fill, likely from competitive flow from the right side through the anterior communicating artery. There is contrast arrival delay in the left MCA territory secondary to this high grade stenosis, therefore it is flow limiting. The capillary and venous phases are within normal limits. There are no signs of fistulas, dissections or de juan luis aneurysms. RIGHT CAROTID, INTRACRANIAL: The right internal carotid artery has normal caliber over its entire course. The middle and anterior cerebral arteries fill normally. The left anterior cerebral artery fills distal to the anterior communicating artery. The middle and anterior cerebral arteries have normal distribution to the cortical branches and the capillary and venous phases are normal. The right posterior communicating artery strongly opacifies the right posterior cerebral artery. LEFT VERTEBRAL, INTRACRANIAL: Normal contrast enhancement of the vertebral artery is noted. It demonstrates a normal contour and caliber. The left posterior inferior cerebellar artery is adequately seen. The basilar artery appears normal course and caliber. There is normal filling of bilateral superior cerebellar arteries and bilateral posterior cerebral arteries. The left posterior cerebral artery does supply some of the left MCA territory via pial to pial collateral flow. RIGHT COMMON FEMORAL ARTERY: The common, iliac, external iliac and common femoral arteries are normal in course and caliber. The common femoral artery bifurcation appears normal. The groin sheath was inserted within the common femoral artery above the bifurcation without evidence of any injury or thrombosis.  IMPRESSION: High grade stenosis (approximately 99%) string-sign type of left carotid stenosis in the neck at the origin of the left internal carotid artery. This is a symptomatic carotid stenosis with flow limitation. Left cerebral hemisphere flow is augmented through pial-pial collateral flow from the left posterior cerebral artery and minimal flow from the right ICA through the anterior communicating artery. RECOMMENDATIONS: I am recommending a left carotid stent with distal embolic protection. Will attempt to schedule for Thursday at about 10:00 AM.  My other availability is Thursday at 5 PM, otherwise this would have to wait until next week. Antony Lepe MD Neurovascular and Stroke [facility]     CTA HEAD NECK W CONTRAST    Result Date: 2/26/2023  EXAMINATION: CT OF THE HEAD WITHOUT CONTRAST; CTA OF THE HEAD AND NECK WITH CONTRAST 2/25/2023 2:57 pm TECHNIQUE: CT of the head was performed without the administration of intravenous contrast. Automated exposure control, iterative reconstruction, and/or weight based adjustment of the mA/kV was utilized to reduce the radiation dose to as low as reasonably achievable.; CTA of the head and neck was performed with the administration of intravenous contrast. Multiplanar reformatted images are provided for review. MIP images are provided for review. Stenosis of the internal carotid arteries measured using NASCET criteria. Automated exposure control, iterative reconstruction, and/or weight based adjustment of the mA/kV was utilized to reduce the radiation dose to as low as reasonably achievable. Noncontrast CT of the head with reconstructed 2-D images are also provided for review. This scan was analyzed using Viz. ai contact LVO. Identification of suspected findings is not for diagnostic use beyond notification. Viz LVO is limited to analysis of imaging data and should not be used in-lieu of full patient evaluation or relied upon to make or confirm diagnosis.  COMPARISON: None HISTORY: ORDERING SYSTEM PROVIDED HISTORY: Stroke Symptoms TECHNOLOGIST PROVIDED HISTORY: Has a \"code stroke\" or \"stroke alert\" been called? ->Yes Reason for exam:->Stroke Symptoms Decision Support Exception - unselect if not a suspected or confirmed emergency medical condition->Emergency Medical Condition (MA) Reason for Exam: aphasia right arm weakness, symptoms @ 11am today Additional signs and symptoms: no prev stroke FINDINGS: CT HEAD: BRAIN/VENTRICLES: There is a 19 mm area of left frontal white matter hypodensity possibly representing an acute infarct within the deep watershed territory of the left internal carotid artery. There are additional areas of hypodensity in the white matter bilaterally which may be due to chronic microvascular disease. There is no acute hemorrhage, herniation, or hydrocephalus. ORBITS: The visualized portion of the orbits demonstrate no acute abnormality. SINUSES:  The visualized paranasal sinuses and mastoid air cells demonstrate no acute abnormality. SOFT TISSUES/SKULL: No acute abnormality of the visualized skull or soft tissues. CTA NECK: AORTIC ARCH/ARCH VESSELS: No dissection or arterial injury. No significant stenosis of the brachiocephalic or subclavian arteries. CAROTID ARTERIES: Evaluation of the carotid bifurcations is motion degraded. There is a near occlusion of the proximal left internal carotid artery with partial distal lumen collapse. The stenosis measures 14 mm in length. There is mild stenosis of the left common carotid artery. There appears to be mild stenosis of the proximal right internal carotid artery. Measurement of stenosis percentage is precluded due to the motion artifact. The right common carotid artery is patent. VERTEBRAL ARTERIES: The right vertebral artery V1 and proximal V2 segments are occluded. There is reconstitution of the mid V2 segment and the artery is patent more distally. The left vertebral artery origin is occluded for a short segment.   There is immediate reconstitution of the V1 segment and the artery is patent more distally. SOFT TISSUES: Moderate emphysema in the upper lungs. No neck mass is identified. BONES: There are chronic appearing compression deformities of T3, T4, T5, T6, and T7. CTA HEAD: ANTERIOR CIRCULATION: There is attenuation of the left middle cerebral artery and its branches due to reduction in blood flow. The right middle cerebral artery is patent. The anterior cerebral arteries are patent. POSTERIOR CIRCULATION: The basilar and posterior cerebral arteries are patent. The cerebellar arteries are patent. OTHER: No dural venous sinus thrombosis on this non-dedicated study. 1. Proximal left internal carotid artery near occlusion with distal lumen collapse. Reduced blood flow to the left MCA territory. 2. Bilateral proximal vertebral artery occlusions with distal reconstitution. 3. Age-indeterminate, possibly acute small left frontal lobe infarct. 4. Chronic thoracic compression deformities. Critical results were called by Dr. Jacinta Wong MD to [place] [id number] on 2/25/2023 at 15:29. MRI brain without contrast    Result Date: 2/25/2023  EXAM: MRI BRAIN WO CONTRAST INDICATION: stroke COMPARISON: Head CT dated February 25, 2023 TECHNIQUE: Standard per department protocol without contrast. FINDINGS: Multiple foci of diffusion restriction are present in the left MCA border zone. No hemorrhage or mass effect. Moderate patchy hyperintense T2 signal within the periventricular and subcortical white matter. The ventricles and extra-axial spaces are normal in size and configuration. The midline structures including the pituitary gland, optic chiasm, corpus callosum, brainstem, pineal gland, and cerebellar tonsils are normal. The paranasal sinuses are clear. The globes and orbits appear normal. The intracranial arterial and venous flow-voids are normal. No skull base or calvarial abnormality.      Left MCA border zone infarcts without hemorrhage or mass effect. Assessment:  Principal Problem:    Acute CVA (cerebrovascular accident) ([facility] [address]  Active Problems:    COPD, very severe ([address]    Chronic respiratory failure with hypoxia and hypercapnia (HCC)    Atelectasis    Former smoker    Atrial fibrillation ([address]  Resolved Problems:    * No resolved hospital problems.  *          Plan:   Oxygen supplementation to keep saturation between 90 to 94% only  Please titrate oxygen as per the above parameters  Patient when seen was on 3 L of nasal oxygen with saturation 96% when seen  Patient is getting BiPAP at nighttime which needs to be continued-patient may need NIV at the time of discharge  Bronchodilators  Patient is on Brovana and Pulmicort  No need for any DuoNeb nebulization along with Dl Neither as it can cause patient to have worsening tachycardia and for that reason DuoNeb was changed to every 4 on whenever necessary basis  Patient is on p.o. prednisone will stop date which can be continued  No need for any antibiotics from pulm standpoint of view  Incentive spirometry and Acapella may help  Patient does not appear to have any significant oropharyngeal dysphagia as per nursing  Evaluation and management of atrial fibrillation including need for any anticoagulation as per EP-EP recommendations reviewed  Monitor input output and BMP  Correct electrolytes on whenever necessary basis  PUD prophylaxis  PT OT as per inpatient rehab protocol            Electronically signed by:  Aneudy Rico MD    3/9/2023    8:29 AM.

## 2023-03-09 NOTE — PROGRESS NOTES
[facility]: ACUTE REHAB UNIT  SPEECH-LANGUAGE PATHOLOGY      [x] Daily  [] Weekly Care Conference Note  [] Discharge    Patient:Bang Cifuentes      OOM:4/26/3217  [facility]:[id number]  Rehab Dx/Hx: Acute CVA (cerebrovascular accident) ([facility] [address] [I63.9]    Precautions: falls  Home situation: Lives with son and daughter-in-law. Manages own meds and finances. Family does all other household tasks. Not an active . ST Dx: [x] Aphasia  [] Dysarthria  [] Apraxia   [x] Oropharyngeal dysphagia [x] Cognitive Impairment  [] Other:   Date of Admit: 3/6/2023  Room #: 1148/1099-48    Current functional status (updated daily):         Pt being seen for : [x] Speech/Language Treatment  [x] Dysphagia Treatment [x] Cognitive Treatment  [] Other:  Communication: []WFL  [x] Aphasia  [] Dysarthria  [] Apraxia  [] Pragmatic Impairment [] Non-verbal  [] Hearing Loss  [x] Other: baseline stuttering   Cognition: [] WFL  [] Mild  [x] Moderate  [] Severe [] Unable to Assess  [] Other:  Memory: [] WFL  [] Mild  [x] Moderate  [] Severe [] Unable to Assess  [] Other:  Behavior: [x] Alert  [x] Cooperative  [x]  Pleasant  [] Confused  [] Agitated  [] Uncooperative  [] Distractible [] Motivated  [] Self-Limiting [] Anxious  [] Other:  Endurance:  [x] Adequate for participation in SLP sessions  [] Reduced overall  [] Lethargic  [] Other:  Safety: [] No concerns at this time  [] Reduced insight into deficits  []  Reduced safety awareness [] Not following call light procedures  [x] Unable to Assess  [x] Other: continue to assess     Current Diet Order:ADULT DIET; Regular;  No Added Salt (3-4 gm)  Swallowing Precautions: upright for all intake, stay upright for at least 30 mins after intake, small bites/sips, oral care 2-3x/day to reduce adverse affects in the event of aspiration, alternate bites/sips, slow rate of intake, general GERD precautions, and general aspiration precautions        Date: 3/9/2023      Tx session 1  1030 - 1130 Tx session 2  All tx needs met in session 1   Total Timed Code Min 15 0   Total Treatment Minutes 60 0   Individual Treatment Minutes 60 0   Group Treatment Minutes 0 0   Co-Treat Minutes 0 0   Variance/Reason:  N/A N/A   Pain Denies     Pain Intervention [] RN notified  [] Repositioned  [] Intervention offered and patient declined  [x] N/A  [] Other: [] RN notified  [] Repositioned  [] Intervention offered and patient declined  [] N/A  [] Other:   Subjective     Pt alert and cooperative, agreeable to tx. Pt upright in bedside chair. Objective:  Goals     Dysphagia Goals:   Short-term Goals  Timeframe for Short-term Goals: (10 days; 03/15/23)    Goal 1: The patient will tolerate recommended diet with no clinical s/s of aspiration 5/5   Thin liquid via straw   -pt tolerated with 1x post-swallow cough (towards end of drink as pt was trying to get last bit out)  -no additional s/s of aspiration    Portion of lunch meal:  -regular solids (pasta, asparagus, and peas)  -pt tolerated with adequate mastication, A-P transit, and oral clearance  -no overt s/s of aspiration    Of note, pt did report he has noticed he is drooling more. This was not observed by SLP, but will continue to monitor. Goal 2: The patient/caregiver will demonstrate understanding of compensatory swallow strategies, for improved swallow safety     SLP edu pt re; safe swallow strategies - small bites/sips, slow rate, alternate liquids and solids, fully upright. Pt verbalized understanding. Pt does benefit from cues to alternate liquids and solids.       Cognitive-linguistic Goals:   Short Term Goals  Time Frame for Short Term Goals: 10 Days (03/15/23)    Goal 1: Pt will complete moderately complex comprehension tasks (2 step and multi-step directions/commands, abstract yes/no questions) with 80% acc given min cues   Complex Yes/No Questions  -e.g. does Tuesday come before Monday?  -pt answered with 70% acc indep, improved to 90% acc given mod cues    2-step commands  -pt completed with 80% acc indep, improved to 100% acc given min cues    Goal 2: Pt will complete moderately complex naming tasks (divergent naming, convergent, responsive, etc) with 80% acc given min cues   Naming objects within sentences  -e.g. I drink coffee from a ___  -100% acc indep    Completing common sentences - moderate  -e.g. it is noon. I need to eat my ____  -90% acc indep    Completing common sentences - complex  -e.g. it's time to go to ___  -90% acc indep    Naming objects from description  -e.g. It has a back, a seat, and 4 legs = chair  -80% acc indep, improved to 90% acc given min cues       Goal 3: Pt will complete additional cognitive assessment with goals to be added per POC as needed. Goal met 03/08/23. SLUMS completed. Pt scored 19/30 points. New goal added 03/08/23:    Goal 4: Pt will complete problem solving and thought organization tasks with 75% acc given mod cues    Goal not directly targeted. New goal added 03/08/23:      Goal 5: Pt will complete executive function tasks (e.g. meds, money, timet, etc) with 75% acc given mod cues   Pt indep with meds and finances prior to admission, and would like to continue to manage them at d/c if able. Pt does have a supportive family that can assist if needed. Telling Time on JOEY  -pt completed with 90% acc indep, improved to 100% acc given min cues     Counting Money on JOEY   -only 3 questions completed - pt completed with 2/3 acc thus far  -required increased time  -will plan to complete in upcoming sessions. Other areas targeted: N/A    Education:   SLP edu pt re: word finding strategies, safe swallow strategies, POC     Safety Devices: [x] Call light within reach  [] Chair alarm activated  [] Bed alarm activated  [] Other: [] Call light within reach  [] Chair alarm activated  [] Bed alarm activated  [] Other:    Assessment: Pt alert and cooperative, agreeable to tx.  Pt did very well with structured language tasks this date. Pt with good acc completing sentences (moderate and complex) and naming objects from description. Pt does require increased time for word finding. Pt benefited from mod cues to increase acc with complex yes/no questions, and min cues to increase acc with following 2-step commands. PTA, pt indep managed meds and finances. If able, pt wants to continue to do this at d/c (does have family support if needed), thus executive function goal added. Pt did well telling time on the JOEY, but had more difficulty with counting money. Pt tolerated regular solids and thin liquids during lunch meal. Complaints of drooling - no observed by SLP, but will continue to monitor. Pt is motivated to improve. Continue goals above. Plan: Continue as per plan of care. Additional Information:     Barriers toward progress: Communication deficit  Discharge recommendations:  [] Home independently  [] Home with assistance [x]  24 hour supervision  [] ECF [x] Other: See PT / OT recs  Continued Tx Upon Discharge: ? [x] Yes [] No [] TBD based on progress while on ARU [] Vital Stim indicated [] Other:   Estimated discharge date: TBD    Interventions used this date:  [x] Speech/Language Treatment  [] Instruction in HEP [] Group [] Dysphagia Treatment [x] Cognitive Treatment   [] Other:       Total Time Breakdown / Charges    Time in Time out Total Time / units   Cognitive Tx 1100 1115 15 min / 1 unit   Speech Tx 1030 1100 30 min / 1 unit    Dysphagia Tx 1115 1130 15 min / 1 unit        Electronically Signed by     Levar Flynn MA CCC-SLP #[id number]  Speech Language Pathologist

## 2023-03-09 NOTE — PROGRESS NOTES
Occupational Therapy  Facility/Department: [facility]  Rehabilitation Occupational Therapy Daily Treatment Note    Date: 3/9/23  Patient Name: Luis Lewis       Room: 0954/0602-19  MRN: [id number]  Account: [de-identified]   : 1950  (78 y.o.) Gender: male                    Past Medical History:  has a past medical history of Atrial fibrillation ([facility] [address] Bronchitis chronic, Emphysema of lung ([facility] [address] HTN (hypertension), Influenza A, Lung disease, Pneumonia, and Stroke. Past Surgical History:   has a past surgical history that includes fracture surgery and hernia repair. Restrictions  Restrictions/Precautions: Fall Risk, General Precautions  Other position/activity restrictions: Strict I&Os, 3L O2; up ith assist. Notify physician for pulse less than 50 or greater than 120, respiratory rate less than 12 or greater than 25, oral temperature greater than 101.3 F (41.5 C), systolic BP less than 90 or greater than 678, diastolic BP less than 50 or greater than 100. Required Braces or Orthoses?: No    Subjective  Subjective: Pt in recliner, pleasant, no pain, needing to use restroom, agreeable to OT session, /79, HR 66, O2 sats 97% on 3L O2. Restrictions/Precautions: Fall Risk;General Precautions             Objective     Cognition  Overall Cognitive Status: Exceptions  Arousal/Alertness: Delayed responses to stimuli  Following Commands: Follows one step commands with increased time; Follows one step commands consistently  Attention Span: Attends with cues to redirect  Memory: Decreased short term memory  Safety Judgement: Decreased awareness of need for assistance  Problem Solving: Assistance required to generate solutions;Assistance required to implement solutions;Assistance required to identify errors made  Insights: Decreased awareness of deficits  Initiation: Requires cues for some  Sequencing: Requires cues for some  Orientation  Overall Orientation Status: Within Functional Limits   Perception  Overall Perceptual Status: Impaired  Unilateral Attention: Cues to attend to right side of body  Initiation: Cues to initiate tasks  Motor Planning: Appears intact  Perseveration: Not present     ADL  Grooming/Oral Hygiene  Assistance Level: Supervision  Skilled Clinical Factors: standing at sink to wash hands  Toileting  Assistance Level: Supervision  Skilled Clinical Factors: during clothing management, no AD  Toilet Transfers  Technique: Stand step  Equipment: Standard toilet  Additional Factors: Set-up; Verbal cues; With handrails  Assistance Level: Supervision  Skilled Clinical Factors: no AD              Weight Bearing  Weight Bearing Technique: Yes  RUE Weight Bearing: Extended arm standing  LUE Weight Bearing: Extended arm standing  Response To Weight Bearing Technique: rolling large exercise ball for x10 reps of anterior-posterior and L/R on table     Assessment  Assessment  Assessment: Pt agreeable to OT session. Pt completed toileting with SPV and improved balance without AD for mobility to/from bathroom. Pt completed w/c mobility to gym with min VCs for technique and use of RUE with min A to turn initially but improved turning as task progressed. Pt demonstrated fair [address] for nuts/bolts task ans small beads but poor problem solving for hopping beads over each other to remove from pegboard. Pt demonstrated fair tolerance for weight bearing on ball and bouncing/catching ball while standing with CGA for balance. O2 sats dropping to 89% on 2 occasions but recovering to 90% in less than 30 seconds. Continue POC. Activity Tolerance: Patient tolerated treatment well;Patient limited by fatigue;Patient limited by endurance  Discharge Recommendations: 24 hour supervision or assist;Outpatient OT  OT Equipment Recommendations  Equipment Needed: No  Safety Devices  Safety Devices in place: Yes  Type of devices: Left in chair;Call light within reach;Nurse notified; Chair alarm in place;Gait belt    Patient Education  Education  Education Given To: Patient  Education Provided: Role of Therapy; ADL Function;Plan of Care;Precautions; Safety; Fall Prevention Strategies;DME/Home Modifications;Transfer Training;Mobility Training;Equipment  Education Provided Comments: role of OT, transfer training, ADL retraining, BUE ther ex, NMR, balance, PLB  Education Method: Demonstration;Verbal  Barriers to Learning: Cognition  Education Outcome: Verbalized understanding;Demonstrated understanding;Continued education needed    Plan  Occupational Therapy Plan  Times Per Week: 5/7x  Current Treatment Recommendations: Self-Care / ADL; Functional mobility training; Endurance training; Safety education & training;Patient/Caregiver education & training;Neuromuscular re-education;Strengthening;ROM;Balance training;Equipment evaluation, education, & procurement;Home management training;Coordination training    Goals  Short Term Goals  Time Frame for Short Term Goals: 6 days- 3/11/23  Short Term Goal 1: Pt will complete functional transfers with SPV. Short Term Goal 2: Pt will perform toileting with SPV. GOAL MET 3/9/23 Pt performed toileting with SPV. Short Term Goal 3: Pt will complete full body dressing with SPV. Short Term Goal 4: Pt will perform full body bathing with SPV. Short Term Goal 5: Pt will perform grooming at sink with mod I.  Long Term Goals  Time Frame for Long Term Goals : 13 days- 3/18/23  Long Term Goal 1: Pt will perform functional transfers with mod I.  Long Term Goal 2: Pt will complete BADLs with mod I.  Long Term Goal 3: Pt will improve coordination in E as evidenced by at least 5 second improvement in NHPT.     Therapy Time   Individual Concurrent Group Co-treatment   Time In 1330         Time Out 1430         Minutes 60         Timed Code Treatment Minutes: 16 [facility] [place]

## 2023-03-09 NOTE — PROGRESS NOTES
03/09/23 0454   NIV Type   Skin Assessment Clean, dry, & intact   Skin Protection for O2 Device Yes   Orientation Middle   Location Nose   Intervention(s) Skin Barrier   NIV Started/Stopped On   Equipment Type V60   Mode Bilevel   Mask Type Full face mask   Mask Size Medium   Settings/Measurements   IPAP 18 cmH20   CPAP/EPAP 10 cmH2O   Vt (Measured) 520 mL   Rate Ordered 16   Resp 18   FiO2  35 %   Minute Volume (L/min) 10.1 Liters   Mask Leak (lpm) 0 lpm   Comfort Level Good   Using Accessory Muscles No   SpO2 97   Patient's Home Machine No   Alarm Settings   Alarms On Y

## 2023-03-09 NOTE — PROGRESS NOTES
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 EP consulted for permanent atrial fibrillation and tachy-bear syndrome. He is not on telemetry. Subjective: He is feeling well. Denies chest pain, palpitations, shortness of breath, and dizziness. Vitals:  Blood pressure 118/65, pulse 60, temperature 97.4 °F (36.3 °C), temperature source Oral, resp. rate 14, height 6' (1.829 m), weight 170 lb 4 oz (77.2 kg), SpO2 96 %.   Temp  Av.6 °F (36.4 °C)  Min: 97.4 °F (36.3 °C)  Max: 97.8 °F (36.6 °C)  Pulse  Av.5  Min: 60  Max: 70  BP  Min: 114/71  Max: 119/62  SpO2  Av.8 %  Min: 96 %  Max: 98 %  FiO2   Av %  Min: 35 %  Max: 35 %    24 hour I/O    Intake/Output Summary (Last 24 hours) at 3/9/2023 0944  Last data filed at 3/9/2023 0748  Gross per 24 hour   Intake 1040 ml   Output --   Net 1040 ml     Current Facility-Administered Medications   Medication Dose Route Frequency Provider Last Rate Last Admin    potassium chloride (KLOR-CON M) extended release tablet 10 mEq  10 mEq Oral Daily Leroy Arita MD   10 mEq at 23 0844    dilTIAZem (CARDIZEM CD) extended release capsule 120 mg  120 mg Oral QPM PAT Limon MD   120 mg at 23 1726    ipratropium-albuterol (DUONEB) nebulizer solution 1 ampule  1 ampule Inhalation Q4H PRN Luis Valdez MD        traZODone (DESYREL) tablet 50 mg  50 mg Oral Nightly PRN Leroy Arita MD        bisacodyl (DULCOLAX) suppository 10 mg  10 mg Rectal Daily PRN Leroy Arita MD        sennosides-docusate sodium (SENOKOT-S) 8.6-50 MG tablet 1 tablet  1 tablet Oral Daily Leroy Arita MD   1 tablet at 23 0843    acetaminophen (TYLENOL) tablet 650 mg  650 mg Oral Q4H PRN Leroy Arita MD albuterol (PROVENTIL) nebulizer solution 2.5 mg  2.5 mg Nebulization Q4H PRN Rosibel Becerra MD        arformoterol tartrate [facility]) nebulizer solution 15 mcg  15 mcg Nebulization BID Rosibel Becerra MD   15 mcg at 03/09/23 0756    And    budesonide (PULMICORT) nebulizer suspension 500 mcg  0.5 mg Nebulization BID Rosibel Becerra MD   500 mcg at 03/09/23 0756    aspirin EC tablet 325 mg  325 mg Oral Daily Rosibel Becerra MD   325 mg at 03/09/23 0844    atorvastatin (LIPITOR) tablet 80 mg  80 mg Oral Nightly Rosibel Becerra MD   80 mg at 03/08/23 2134    clopidogrel (PLAVIX) tablet 75 mg  75 mg Oral Daily Rosibel Becerra MD   75 mg at 03/09/23 0844    famotidine (PEPCID) tablet 20 mg  20 mg Oral BID Rosibel Becerra MD   20 mg at 03/09/23 0844    guaiFENesin [facility]) extended release tablet 600 mg  600 mg Oral BID Rosibel Becerra MD   600 mg at 03/09/23 0843    HYDROcodone-acetaminophen (NORCO) 5-325 MG per tablet 1 tablet  1 tablet Oral Q4H PRN Rosibel Becerra MD        Or    HYDROcodone-acetaminophen [facility]) 5-325 MG per tablet 2 tablet  2 tablet Oral Q4H PRN Rosibel Becerra MD        ondansetron (ZOFRAN-ODT) disintegrating tablet 4 mg  4 mg Oral Q8H PRN Rosibel Becerra MD        polyethylene glycol [facility]) packet 17 g  17 g Oral Daily PRN Rosibel Becerra MD        predniSONE (DELTASONE) tablet 20 mg  20 mg Oral Daily Rosibel Becerra MD   20 mg at 03/09/23 0844    sodium chloride flush 0.9 % injection 5-40 mL  5-40 mL IntraVENous PRN Rosibel Becerra MD        enoxaparin (LOVENOX) injection 40 mg  40 mg SubCUTAneous Daily Rosibel Becerra MD   40 mg at 03/09/23 0844       Objective:     Telemetry monitor: N/A    Physical Exam:  Constitutional and general appearance: alert, cooperative, no distress, and appears stated age  HEENT: PERRL, no cervical lymphadenopathy. No masses palpable.  Normal oral mucosa  Respiratory:  Normal excursion and expansion without use of accessory muscles  Resp auscultation: Normal breath sounds without wheezing, rhonchi, and rales  Cardiovascular: The apical impulse is not displaced  Heart tones are crisp and normal. irregular S1 and S2.  Jugular venous pulsation Normal  The carotid upstroke is normal in amplitude and contour without delay or bruit  Peripheral pulses are symmetrical and full   Abdomen:  No masses or tenderness  Bowel sounds present  Extremities:   No cyanosis or clubbing   No lower extremity edema   Skin: warm and dry  Neurological:  Alert and oriented  Moves all extremities well  No abnormalities of mood, affect, memory, mentation, or behavior are noted    Data  Echo: 02/28/2023   Summary   Left ventricle cavity size is normal. Mild concentric hypertrophy. Ejection fraction is visually estimated to be 60%. No obvious regional wall   motion abnormalities. Unable to determine diastolic function due to atrial fibrillation. The right ventricle is normal in size and function. TAPSE is 1.71 cm. No pericardial effusion noted. Nuclear Stress Test: 05/02/2019   Summary    Normal LV function. There is normal isotope uptake at stress and rest. There is no evidence of    myocardial ischemia or scar. Echo 3/14/2022:  Summary   Left ventricular systolic function is normal with ejection fraction   estimated at 55%. No regional wall motion abnormalities. There is mild concentric left ventricular hypertrophy. Elevated left ventricular filling pressure. Mild bi-atrial enlargement. Mild mitral regurgitation. Mild tricuspid regurgitation. Systolic pulmonary artery pressure (SPAP) is normal estimated at 38 mmHg   (Right atrial pressure of 3 mmHg). No significant change from exam done 1/11/2018. All labs and testing reviewed.   Lab Review     Renal Profile:   Lab Results   Component Value Date/Time    CREATININE 0.7 03/09/2023 07:21 AM    BUN 16 03/09/2023 07:21 AM  03/09/2023 07:21 AM    K 4.0 03/09/2023 07:21 AM     03/09/2023 07:21 AM    CO2 29 03/09/2023 07:21 AM     CBC:    Lab Results   Component Value Date/Time    WBC 12.4 03/08/2023 06:39 AM    RBC 4.60 03/08/2023 06:39 AM    HGB 14.0 03/08/2023 06:39 AM    HCT 42.7 03/08/2023 06:39 AM    MCV 92.9 03/08/2023 06:39 AM    RDW 14.8 03/08/2023 06:39 AM     03/08/2023 06:39 AM     BNP:  No results found for: BNP  Fasting Lipid Panel:    Lab Results   Component Value Date/Time    CHOL 187 02/27/2023 04:59 AM    HDL 38 02/27/2023 04:59 AM    TRIG 105 02/27/2023 04:59 AM     Cardiac Enzymes:  CK/MbTroponin  Lab Results   Component Value Date/Time    CKTOTAL 282 03/29/2022 01:52 PM    TROPONINI <0.01 02/25/2023 02:55 PM     PT/ INR   Lab Results   Component Value Date/Time    INR 1.13 02/25/2023 02:55 PM    INR 1.19 01/11/2018 06:53 AM    PROTIME 14.4 02/25/2023 02:55 PM    PROTIME 13.4 01/11/2018 06:53 AM     PTT No results found for: PTT   Lab Results   Component Value Date/Time    MG 2.20 03/08/2023 06:39 AM    No results found for: TSH    Assessment:  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 Continue Cardizem   2. Resume Eliquis per neurosurgery recommendations  3. Two week event monitor at discharge   4. Follow up with Dr. Modesto Hooper. We will arrange. EP will sign off but remains available if needed.     Selena Moctezuma, APRN-CNP  [facility]  [phone]

## 2023-03-10 LAB
ANION GAP SERPL CALCULATED.3IONS-SCNC: 9 MMOL/L (ref 3–16)
BASOPHILS ABSOLUTE: 0.1 K/UL (ref 0–0.2)
BASOPHILS RELATIVE PERCENT: 0.6 %
BUN BLDV-MCNC: 17 MG/DL (ref 7–20)
CALCIUM SERPL-MCNC: 8.6 MG/DL (ref 8.3–10.6)
CHLORIDE BLD-SCNC: 102 MMOL/L (ref 99–110)
CO2: 28 MMOL/L (ref 21–32)
CREAT SERPL-MCNC: 0.7 MG/DL (ref 0.8–1.3)
EOSINOPHILS ABSOLUTE: 0.2 K/UL (ref 0–0.6)
EOSINOPHILS RELATIVE PERCENT: 1.5 %
GFR SERPL CREATININE-BSD FRML MDRD: >60 ML/MIN/{1.73_M2}
GLUCOSE BLD-MCNC: 104 MG/DL (ref 70–99)
HCT VFR BLD CALC: 40.4 % (ref 40.5–52.5)
HEMOGLOBIN: 13.4 G/DL (ref 13.5–17.5)
LYMPHOCYTES ABSOLUTE: 2.6 K/UL (ref 1–5.1)
LYMPHOCYTES RELATIVE PERCENT: 24 %
MCH RBC QN AUTO: 30.7 PG (ref 26–34)
MCHC RBC AUTO-ENTMCNC: 33.2 G/DL (ref 31–36)
MCV RBC AUTO: 92.7 FL (ref 80–100)
MONOCYTES ABSOLUTE: 0.9 K/UL (ref 0–1.3)
MONOCYTES RELATIVE PERCENT: 8.1 %
NEUTROPHILS ABSOLUTE: 7.2 K/UL (ref 1.7–7.7)
NEUTROPHILS RELATIVE PERCENT: 65.8 %
PDW BLD-RTO: 14.6 % (ref 12.4–15.4)
PLATELET # BLD: 367 K/UL (ref 135–450)
PMV BLD AUTO: 9.5 FL (ref 5–10.5)
POTASSIUM REFLEX MAGNESIUM: 3.6 MMOL/L (ref 3.5–5.1)
RBC # BLD: 4.36 M/UL (ref 4.2–5.9)
SODIUM BLD-SCNC: 139 MMOL/L (ref 136–145)
WBC # BLD: 11 K/UL (ref 4–11)

## 2023-03-10 PROCEDURE — 97530 THERAPEUTIC ACTIVITIES: CPT

## 2023-03-10 PROCEDURE — 6370000000 HC RX 637 (ALT 250 FOR IP): Performed by: STUDENT IN AN ORGANIZED HEALTH CARE EDUCATION/TRAINING PROGRAM

## 2023-03-10 PROCEDURE — 6360000002 HC RX W HCPCS: Performed by: STUDENT IN AN ORGANIZED HEALTH CARE EDUCATION/TRAINING PROGRAM

## 2023-03-10 PROCEDURE — 85025 COMPLETE CBC W/AUTO DIFF WBC: CPT

## 2023-03-10 PROCEDURE — 99231 SBSQ HOSP IP/OBS SF/LOW 25: CPT | Performed by: INTERNAL MEDICINE

## 2023-03-10 PROCEDURE — 94640 AIRWAY INHALATION TREATMENT: CPT

## 2023-03-10 PROCEDURE — 94761 N-INVAS EAR/PLS OXIMETRY MLT: CPT

## 2023-03-10 PROCEDURE — 36415 COLL VENOUS BLD VENIPUNCTURE: CPT

## 2023-03-10 PROCEDURE — 94660 CPAP INITIATION&MGMT: CPT

## 2023-03-10 PROCEDURE — 2700000000 HC OXYGEN THERAPY PER DAY

## 2023-03-10 PROCEDURE — 97130 THER IVNTJ EA ADDL 15 MIN: CPT

## 2023-03-10 PROCEDURE — 80048 BASIC METABOLIC PNL TOTAL CA: CPT

## 2023-03-10 PROCEDURE — 6370000000 HC RX 637 (ALT 250 FOR IP): Performed by: INTERNAL MEDICINE

## 2023-03-10 PROCEDURE — 97129 THER IVNTJ 1ST 15 MIN: CPT

## 2023-03-10 PROCEDURE — 97110 THERAPEUTIC EXERCISES: CPT

## 2023-03-10 PROCEDURE — 1280000000 HC REHAB R&B

## 2023-03-10 PROCEDURE — 97116 GAIT TRAINING THERAPY: CPT

## 2023-03-10 PROCEDURE — 97535 SELF CARE MNGMENT TRAINING: CPT

## 2023-03-10 RX ORDER — TORSEMIDE 20 MG/1
10 TABLET ORAL DAILY
Status: DISCONTINUED | OUTPATIENT
Start: 2023-03-10 | End: 2023-03-13

## 2023-03-10 RX ADMIN — TORSEMIDE 10 MG: 20 TABLET ORAL at 16:15

## 2023-03-10 RX ADMIN — POTASSIUM CHLORIDE 10 MEQ: 750 TABLET, EXTENDED RELEASE ORAL at 09:12

## 2023-03-10 RX ADMIN — BUDESONIDE 500 MCG: 0.5 INHALANT RESPIRATORY (INHALATION) at 11:28

## 2023-03-10 RX ADMIN — ATORVASTATIN CALCIUM 80 MG: 80 TABLET, FILM COATED ORAL at 21:15

## 2023-03-10 RX ADMIN — SENNOSIDES AND DOCUSATE SODIUM 1 TABLET: 50; 8.6 TABLET ORAL at 09:12

## 2023-03-10 RX ADMIN — PREDNISONE 20 MG: 20 TABLET ORAL at 09:12

## 2023-03-10 RX ADMIN — GUAIFENESIN 600 MG: 600 TABLET, EXTENDED RELEASE ORAL at 21:15

## 2023-03-10 RX ADMIN — ASPIRIN 325 MG: 325 TABLET, COATED ORAL at 09:12

## 2023-03-10 RX ADMIN — FAMOTIDINE 20 MG: 20 TABLET ORAL at 21:15

## 2023-03-10 RX ADMIN — CLOPIDOGREL BISULFATE 75 MG: 75 TABLET ORAL at 09:12

## 2023-03-10 RX ADMIN — ENOXAPARIN SODIUM 40 MG: 100 INJECTION SUBCUTANEOUS at 09:12

## 2023-03-10 RX ADMIN — FAMOTIDINE 20 MG: 20 TABLET ORAL at 09:12

## 2023-03-10 RX ADMIN — BUDESONIDE 500 MCG: 0.5 INHALANT RESPIRATORY (INHALATION) at 20:13

## 2023-03-10 RX ADMIN — GUAIFENESIN 600 MG: 600 TABLET, EXTENDED RELEASE ORAL at 09:12

## 2023-03-10 RX ADMIN — ARFORMOTEROL TARTRATE 15 MCG: 15 SOLUTION RESPIRATORY (INHALATION) at 20:13

## 2023-03-10 RX ADMIN — DILTIAZEM HYDROCHLORIDE 120 MG: 120 CAPSULE, COATED, EXTENDED RELEASE ORAL at 16:14

## 2023-03-10 ASSESSMENT — PAIN SCALES - GENERAL
PAINLEVEL_OUTOF10: 0

## 2023-03-10 NOTE — PROGRESS NOTES
03/10/23 0401   NIV Type   Skin Assessment Clean, dry, & intact   Skin Protection for O2 Device Yes   Orientation Middle   Location Nose   Intervention(s) Skin Barrier   Equipment Type v60   Mode Bilevel   Mask Type Full face mask   Mask Size Medium   Settings/Measurements   IPAP 18 cmH20   CPAP/EPAP 10 cmH2O   Vt (Measured) 635 mL   Resp 18   FiO2  35 %   Minute Volume (L/min) 12.4 Liters   Mask Leak (lpm) 0 lpm   Comfort Level Good   Using Accessory Muscles No   Patient's Home Machine No   Alarm Settings   Alarms On Y

## 2023-03-10 NOTE — PROGRESS NOTES
INPATIENT PULMONARY CRITICAL CARE PROGRESS NOTE      Reason for visit     chronic respiratory failure, COPD    SUBJECTIVE: Patient when seen this morning states that he was able to bring up some phlegm which is mucoid in nature, patient does not have any increasing shortness of breath or wheezing, patient does not have any chest pain or palpitations, no abdominal symptoms of concern, patient still has some leg edema which is not bothering him, patient is able to participate in the physical therapy in the inpatient rehab, patient was on 3 L of nasal cannula, saturating 97%, patient's glycemic control was acceptable, patient was alert and communicative, no other pertinent review of system of concern       Physical Exam:  Blood pressure 117/78, pulse 70, temperature 97.3 °F (36.3 °C), temperature source Oral, resp. rate 18, height 6' (1.829 m), weight 170 lb 6 oz (77.3 kg), SpO2 97 %.'     Constitutional:  No acute distress. HENT:  Oropharynx is clear and moist. No thyromegaly. Eyes:  Conjunctivae are normal. Pupils equal, round, and reactive to light. No scleral icterus. Neck: . No tracheal deviation present. No obvious thyroid mass. Cardiovascular: S1S2 irregularly irregular , normal heart sounds. No right ventricular heave. Minimal lower extremity edema. Pulmonary/Chest: No wheezes. No significant rales. Chest wall is not dull to percussion. No accessory muscle usage or stridor. Decreased breath sound intensity  Abdominal: Soft. Bowel sounds present. No distension or hernia. No tenderness. Musculoskeletal: No cyanosis. No clubbing. No obvious joint deformity. Lymphadenopathy: No cervical or supraclavicular adenopathy. Skin: Skin is warm and dry. No rash or nodules on the exposed extremities. Psychiatric: Normal mood and affect. Behavior is normal.  No anxiety. Neurologic: Alert, awake and oriented. Slight right-sided weakness.   Speech fluent with some slowing at times      Results:  CBC: Recent Labs     03/08/23  0639 03/10/23  0646   WBC 12.4* 11.0   HGB 14.0 13.4*   HCT 42.7 40.4*   MCV 92.9 92.7    367       BMP:   Recent Labs     03/08/23  0639 03/09/23  0721 03/10/23  0646    142 139   K 3.5 4.0 3.6   CL 98* 103 102   CO2 31 29 28   BUN 17 16 17   CREATININE 0.8 0.7* 0.7*           Imaging:  I have reviewed radiology images personally. No orders to display     CT HEAD WO CONTRAST    Result Date: 2/26/2023  EXAMINATION: CT OF THE HEAD WITHOUT CONTRAST; CTA OF THE HEAD AND NECK WITH CONTRAST 2/25/2023 2:57 pm TECHNIQUE: CT of the head was performed without the administration of intravenous contrast. Automated exposure control, iterative reconstruction, and/or weight based adjustment of the mA/kV was utilized to reduce the radiation dose to as low as reasonably achievable.; CTA of the head and neck was performed with the administration of intravenous contrast. Multiplanar reformatted images are provided for review. MIP images are provided for review. Stenosis of the internal carotid arteries measured using NASCET criteria. Automated exposure control, iterative reconstruction, and/or weight based adjustment of the mA/kV was utilized to reduce the radiation dose to as low as reasonably achievable. Noncontrast CT of the head with reconstructed 2-D images are also provided for review. This scan was analyzed using Viz. ai contact LVO. Identification of suspected findings is not for diagnostic use beyond notification. Viz LVO is limited to analysis of imaging data and should not be used in-lieu of full patient evaluation or relied upon to make or confirm diagnosis. COMPARISON: None HISTORY: ORDERING SYSTEM PROVIDED HISTORY: Stroke Symptoms TECHNOLOGIST PROVIDED HISTORY: Has a \"code stroke\" or \"stroke alert\" been called? ->Yes Reason for exam:->Stroke Symptoms Decision Support Exception - unselect if not a suspected or confirmed emergency medical condition->Emergency Medical Condition (MA) Reason for Exam: aphasia right arm weakness, symptoms @ 11am today Additional signs and symptoms: no prev stroke FINDINGS: CT HEAD: BRAIN/VENTRICLES: There is a 19 mm area of left frontal white matter hypodensity possibly representing an acute infarct within the deep watershed territory of the left internal carotid artery. There are additional areas of hypodensity in the white matter bilaterally which may be due to chronic microvascular disease. There is no acute hemorrhage, herniation, or hydrocephalus. ORBITS: The visualized portion of the orbits demonstrate no acute abnormality. SINUSES:  The visualized paranasal sinuses and mastoid air cells demonstrate no acute abnormality. SOFT TISSUES/SKULL: No acute abnormality of the visualized skull or soft tissues. CTA NECK: AORTIC ARCH/ARCH VESSELS: No dissection or arterial injury. No significant stenosis of the brachiocephalic or subclavian arteries. CAROTID ARTERIES: Evaluation of the carotid bifurcations is motion degraded. There is a near occlusion of the proximal left internal carotid artery with partial distal lumen collapse. The stenosis measures 14 mm in length. There is mild stenosis of the left common carotid artery. There appears to be mild stenosis of the proximal right internal carotid artery. Measurement of stenosis percentage is precluded due to the motion artifact. The right common carotid artery is patent. VERTEBRAL ARTERIES: The right vertebral artery V1 and proximal V2 segments are occluded. There is reconstitution of the mid V2 segment and the artery is patent more distally. The left vertebral artery origin is occluded for a short segment. There is immediate reconstitution of the V1 segment and the artery is patent more distally. SOFT TISSUES: Moderate emphysema in the upper lungs. No neck mass is identified. BONES: There are chronic appearing compression deformities of T3, T4, T5, T6, and T7.  CTA HEAD: ANTERIOR CIRCULATION: There is attenuation of the left middle cerebral artery and its branches due to reduction in blood flow. The right middle cerebral artery is patent. The anterior cerebral arteries are patent. POSTERIOR CIRCULATION: The basilar and posterior cerebral arteries are patent. The cerebellar arteries are patent. OTHER: No dural venous sinus thrombosis on this non-dedicated study. 1. Proximal left internal carotid artery near occlusion with distal lumen collapse. Reduced blood flow to the left MCA territory. 2. Bilateral proximal vertebral artery occlusions with distal reconstitution. 3. Age-indeterminate, possibly acute small left frontal lobe infarct. 4. Chronic thoracic compression deformities. Critical results were called by Dr. Lore Carlos MD to [place] [id number] on 2/25/2023 at 15:29. XR CHEST PORTABLE    Result Date: 3/4/2023  PORTABLE AP CHEST AT 1310 HOURS:   HISTORY: Shortness of breath. COMPARISON: 2/26/2023. FINDINGS: The heart and pulmonary vasculature are normal. Bibasilar airspace density is seen, most consistent in appearance with atelectasis, infiltrate not excluded. No significant pleural effusions seen. Bibasilar airspace density/atelectasis. No other acute cardiopulmonary findings. XR CHEST PORTABLE    Result Date: 2/26/2023  EXAM: XR CHEST PORTABLE INDICATION: worsening hypoxia, COMPARISON: 2/25/2023 FINDINGS: SUPPORT DEVICES: None HEART / MEDIASTINUM: Cardiac silhouette is within normal limits in size. LUNGS/PLEURA: There are diffuse bilateral interstitial opacities with septal thickening consistent with pulmonary edema. No evidence of pneumothorax. BONES / SOFT TISSUES: No acute osseous abnormality. OTHER: None. 1.  Findings of pulmonary edema which are increased compared to chest radiograph from one day prior.     XR CHEST PORTABLE    Result Date: 2/25/2023  EXAMINATION: ONE XRAY VIEW OF THE CHEST 2/25/2023 12:21 pm COMPARISON: 07/27/2022 HISTORY: ORDERING SYSTEM PROVIDED HISTORY: stroke symptoms TECHNOLOGIST PROVIDED HISTORY: Reason for exam:->stroke symptoms FINDINGS: Cardial pericardial silhouette is stable. Increased interstitial opacity noted. No focal infiltrate identified. Emphysematous changes noted in the upper lungs. No pneumothorax. No free air. No acute bony abnormality. Increased interstitial opacity. While much or all of this may be chronic, please correlate with any clinical evidence of acute interstitial edema. IR ANGIOGRAM CAROTID CEREBRAL RIGHT    Result Date: 2/28/2023  Gary Lenz MD     2/28/2023  5:36 PM DATE OF THE PROCEDURE: 02/28/2023 HISTORY OF PRESENT ILLNESS: 69 y/o male with left subcortical white matter stroke and CTA with high grade stenosis of left ICA in the neck. Catheter angiography to confirm stenosis severity and document intracranial collateral flow / [place] patency. OPERATORS: Primary: Radha Hernandez M.D., attending. Assistant: None. SUPERVISION AND INTERPRETATION: Dr. Dora Hi personally performed the technical portions of the procedure. Following completion of the technical portions of the procedure, the angiographic images were reviewed at the neurography PACS work station by Dr. Brian Hollingsworth. PROCEDURE: 1. Three-Vessel Diagnostic Cerebral Angiogram. 2. Ultrasound-guided arterial assessment and access of the right common femoral artery. 3. Moderate IV Sedation supervised by Dr. Dora Hi with the assistance of an independent trained observer (IR Nurse) who performed patient assessments and monitored patient vital signs, adult, > 15 minutes. VESSELS CATHETERIZED: 1. Right internal carotid artery. 2. Left common carotid artery. 3. Left subclavian artery for angiography of the left vertebral artery. ANESTHESIA: Prior to the procedure, the patient's personal and family history were reviewed for any adverse reactions to anesthesia and no pertinent concerns were raised.  ASA stgstrstastdstest:st st1st Malampati: 2 Conscious sedation and intravenous anesthesia was given by the radiology nurse under direct supervision of the attending physician for 40 minutes. Monitored nursing care and medication reports are available on the chart. Local anesthesia was provided at the puncture site with 1% lidocaine. MODERATE SEDATION START TIME: 1625 MODERATE SEDATION END TIME: 1705 TOTAL DURATION MODERATE IV SEDATION: 40 minutes MEDICATIONS GIVEN: 1 mcg Fentanyl IV 50 mg Versed IV 1% lidocaine, subcutaneous, 7 mL 2000 unit IV bolus unfractionated heparin RADIOCONTRAST: 100 mL 270 mgI/ml Visipaque contrast IA. EBL: 50 mL DEVICES USED: 1. 5-Fr Stiff Micropuncture kit. 2. 5-Surinamese 10 cm sheath. 3. 0.035\" Glidewire. 4. 5-Surinamese angled glide catheter. 5. 5-Surinamese Kign 2 glide catheter. 6. 5-Surinamese Mynx  closure device. AIR KERMA: 334 mGy FLUOROSCOPY TIME: 17.1 minutes. CONSENT: Prior to the procedure, the technical aspect of the procedure as well as the potential risks and benefits were explained to the patient. Specifically, the risk of cerebral infarction, puncture site hemorrhage, femoral nerve injury, retroperitoneal hemorrhage, hemorrhagic shock, infection, device failure, anaphylaxis, renal failure, limb loss, death, radiation effects (hair loss, cataracts, radiation burns, tumors, dementia), arterial dissection, arterial pseudoaneurysms and A-V fistula were discussed. The advantages and disadvantages of alternative procedures were presented. An opportunity to state any questions or concerns was given and these were then addressed. Following this process, it was requested that we proceed with the proposed intervention and this was expressed in the form of a written consent. DETAILS OF THE PROCEDURE: The patient was brought to the neuroangiography suite where the groin was shaved, prepped and draped in usual sterile fashion.  A whole room time out was performed to confirm the identify of the patient, the procedure to be performed, and the location of the procedure. Ultrasound was used to insonate the potential vascular access sites including the right common femoral artery to insure the proper size and patency prior to access. 1% lidocaine was administered subcutaneously for local anesthetic. Then with live ultrasound guidance arterial puncture of the right common femoral artery was accessed percutaneously using a single wall puncture technique. A 5-Tongan arterial introducer sheath was placed at the puncture site prior to arterial catheterization and was connected to a pressurized heparin saline flush line. A 5-Tongan angled diagnostic catheter was advanced over an 0.035\" Glidewire into the aortic arch under the fluoroscopic guidance and used to selectively catheterize the vessels listed above. In each case, the vessel origin was visualized fluoroscopically by injection of contrast prior to selective catheterization. Biplane diagnostic cerebral angiograms were acquired at each of these catheterizations. After reviewing the images, the catheter was removed. The groin sheath was exchanged over a 5-Tongan Mynx  closure device and the arteriotomy was closed without difficulty with excellent hemostasis. The patient tolerated the procedure well and there were no complications. FINDINGS: LEFT CAROTID, CERVICAL AND INTRACRANIAL:  Adequate contrast enhancement of the carotid artery is seen. There is a high grade stenosis at the origin of the left ICA within the neck at the carotid bifurcation. This is 99% stenosis with a string sign and distal opacification due to a focal atherosclerotic plaque at the left carotid bifurcation. The ophthalmic artery is adequately patent. The carotid terminus is normal with adequate caliber of M1 segments of the middle cerebral artery. The left anterior cerebral artery does not fill, likely from competitive flow from the right side through the anterior communicating artery.   There is contrast arrival delay in the left MCA territory secondary to this high grade stenosis, therefore it is flow limiting. The capillary and venous phases are within normal limits. There are no signs of fistulas, dissections or de juan luis aneurysms. RIGHT CAROTID, INTRACRANIAL: The right internal carotid artery has normal caliber over its entire course. The middle and anterior cerebral arteries fill normally. The left anterior cerebral artery fills distal to the anterior communicating artery. The middle and anterior cerebral arteries have normal distribution to the cortical branches and the capillary and venous phases are normal. The right posterior communicating artery strongly opacifies the right posterior cerebral artery. LEFT VERTEBRAL, INTRACRANIAL: Normal contrast enhancement of the vertebral artery is noted. It demonstrates a normal contour and caliber. The left posterior inferior cerebellar artery is adequately seen. The basilar artery appears normal course and caliber. There is normal filling of bilateral superior cerebellar arteries and bilateral posterior cerebral arteries. The left posterior cerebral artery does supply some of the left MCA territory via pial to pial collateral flow. RIGHT COMMON FEMORAL ARTERY: The common, iliac, external iliac and common femoral arteries are normal in course and caliber. The common femoral artery bifurcation appears normal. The groin sheath was inserted within the common femoral artery above the bifurcation without evidence of any injury or thrombosis. IMPRESSION: High grade stenosis (approximately 99%) string-sign type of left carotid stenosis in the neck at the origin of the left internal carotid artery. This is a symptomatic carotid stenosis with flow limitation. Left cerebral hemisphere flow is augmented through pial-pial collateral flow from the left posterior cerebral artery and minimal flow from the right ICA through the anterior communicating artery.  RECOMMENDATIONS: I am recommending a left carotid stent with distal embolic protection. Will attempt to schedule for Thursday at about 10:00 AM.  My other availability is Thursday at 5 PM, otherwise this would have to wait until next week. Amena Lyn MD Neurovascular and Stroke [facility]     CTA HEAD NECK W CONTRAST    Result Date: 2/26/2023  EXAMINATION: CT OF THE HEAD WITHOUT CONTRAST; CTA OF THE HEAD AND NECK WITH CONTRAST 2/25/2023 2:57 pm TECHNIQUE: CT of the head was performed without the administration of intravenous contrast. Automated exposure control, iterative reconstruction, and/or weight based adjustment of the mA/kV was utilized to reduce the radiation dose to as low as reasonably achievable.; CTA of the head and neck was performed with the administration of intravenous contrast. Multiplanar reformatted images are provided for review. MIP images are provided for review. Stenosis of the internal carotid arteries measured using NASCET criteria. Automated exposure control, iterative reconstruction, and/or weight based adjustment of the mA/kV was utilized to reduce the radiation dose to as low as reasonably achievable. Noncontrast CT of the head with reconstructed 2-D images are also provided for review. This scan was analyzed using ModusP. ai contact LVO. Identification of suspected findings is not for diagnostic use beyond notification. Viz LVO is limited to analysis of imaging data and should not be used in-lieu of full patient evaluation or relied upon to make or confirm diagnosis. COMPARISON: None HISTORY: ORDERING SYSTEM PROVIDED HISTORY: Stroke Symptoms TECHNOLOGIST PROVIDED HISTORY: Has a \"code stroke\" or \"stroke alert\" been called? ->Yes Reason for exam:->Stroke Symptoms Decision Support Exception - unselect if not a suspected or confirmed emergency medical condition->Emergency Medical Condition (MA) Reason for Exam: aphasia right arm weakness, symptoms @ 11am today Additional signs and symptoms: no prev stroke FINDINGS: CT HEAD: BRAIN/VENTRICLES: There is a 19 mm area of left frontal white matter hypodensity possibly representing an acute infarct within the deep watershed territory of the left internal carotid artery. There are additional areas of hypodensity in the white matter bilaterally which may be due to chronic microvascular disease. There is no acute hemorrhage, herniation, or hydrocephalus. ORBITS: The visualized portion of the orbits demonstrate no acute abnormality. SINUSES:  The visualized paranasal sinuses and mastoid air cells demonstrate no acute abnormality. SOFT TISSUES/SKULL: No acute abnormality of the visualized skull or soft tissues. CTA NECK: AORTIC ARCH/ARCH VESSELS: No dissection or arterial injury. No significant stenosis of the brachiocephalic or subclavian arteries. CAROTID ARTERIES: Evaluation of the carotid bifurcations is motion degraded. There is a near occlusion of the proximal left internal carotid artery with partial distal lumen collapse. The stenosis measures 14 mm in length. There is mild stenosis of the left common carotid artery. There appears to be mild stenosis of the proximal right internal carotid artery. Measurement of stenosis percentage is precluded due to the motion artifact. The right common carotid artery is patent. VERTEBRAL ARTERIES: The right vertebral artery V1 and proximal V2 segments are occluded. There is reconstitution of the mid V2 segment and the artery is patent more distally. The left vertebral artery origin is occluded for a short segment. There is immediate reconstitution of the V1 segment and the artery is patent more distally. SOFT TISSUES: Moderate emphysema in the upper lungs. No neck mass is identified. BONES: There are chronic appearing compression deformities of T3, T4, T5, T6, and T7. CTA HEAD: ANTERIOR CIRCULATION: There is attenuation of the left middle cerebral artery and its branches due to reduction in blood flow.   The right middle cerebral artery is patent. The anterior cerebral arteries are patent. POSTERIOR CIRCULATION: The basilar and posterior cerebral arteries are patent. The cerebellar arteries are patent. OTHER: No dural venous sinus thrombosis on this non-dedicated study. 1. Proximal left internal carotid artery near occlusion with distal lumen collapse. Reduced blood flow to the left MCA territory. 2. Bilateral proximal vertebral artery occlusions with distal reconstitution. 3. Age-indeterminate, possibly acute small left frontal lobe infarct. 4. Chronic thoracic compression deformities. Critical results were called by Dr. Lore Carlos MD to [place] [id number] on 2/25/2023 at 15:29. MRI brain without contrast    Result Date: 2/25/2023  EXAM: MRI BRAIN WO CONTRAST INDICATION: stroke COMPARISON: Head CT dated February 25, 2023 TECHNIQUE: Standard per department protocol without contrast. FINDINGS: Multiple foci of diffusion restriction are present in the left MCA border zone. No hemorrhage or mass effect. Moderate patchy hyperintense T2 signal within the periventricular and subcortical white matter. The ventricles and extra-axial spaces are normal in size and configuration. The midline structures including the pituitary gland, optic chiasm, corpus callosum, brainstem, pineal gland, and cerebellar tonsils are normal. The paranasal sinuses are clear. The globes and orbits appear normal. The intracranial arterial and venous flow-voids are normal. No skull base or calvarial abnormality. Left MCA border zone infarcts without hemorrhage or mass effect. Assessment:  Principal Problem:    Acute CVA (cerebrovascular accident) ([address]  Active Problems:    COPD, very severe ([address]    Chronic respiratory failure with hypoxia and hypercapnia (HCC)    Atelectasis    Former smoker    Atrial fibrillation ([address]  Resolved Problems:    * No resolved hospital problems.  *          Plan:   Oxygen supplementation to keep saturation between 90 to 94% only  Please titrate oxygen as per the above parameters-nursing communication has been sent to that effect  Patient when seen was on 3 L of nasal oxygen with saturation 97% when seen  Patient is getting BiPAP at nighttime which needs to be continued-patient may need NIV at the time of discharge  Bronchodilators  Patient is on Brovana and Pulmicort  No need for any DuoNeb nebulization along with Daquan Blanks as it can cause patient to have worsening tachycardia and for that reason DuoNeb was changed to every 4 on whenever necessary basis  Patient is on p.o. prednisone will stop date which can be continued  No need for any antibiotics from pulm standpoint of view  Incentive spirometry and Acapella may help  Patient does not appear to have any significant oropharyngeal dysphagia as per nursing  Evaluation and management of atrial fibrillation including need for any anticoagulation as per EP-EP recommendations reviewed  Monitor input output and BMP  Correct electrolytes on whenever necessary basis  Consider Salt and fluid restriction-if deemed appropriate   PUD prophylaxis  PT OT as per inpatient rehab protocol    No other recommendations from pulmonary/critical care standpoint of view- will sign off-please call on whenever necessary basis            Electronically signed by:  Reg Bunch MD    3/10/2023    12:33 PM.

## 2023-03-10 NOTE — PROGRESS NOTES
03/09/23 2109   NIV Type   Skin Assessment Clean, dry, & intact   Skin Protection for O2 Device Yes   Orientation Middle   Location Nose   Intervention(s) Skin Barrier   Equipment Type v60   Mode Bilevel   Mask Type Full face mask   Mask Size Medium   Settings/Measurements   IPAP 18 cmH20   CPAP/EPAP 10 cmH2O   Vt (Measured) 1091 mL   Resp 19   FiO2  35 %   Minute Volume (L/min) 20.4 Liters   Mask Leak (lpm) 16 lpm   Comfort Level Good   Using Accessory Muscles No

## 2023-03-10 NOTE — PROGRESS NOTES
[facility]: ACUTE REHAB UNIT  SPEECH-LANGUAGE PATHOLOGY      [x] Daily  [] Weekly Care Conference Note  [] Discharge    Patient:Bang Walters      UMW:[birth year]  KXB:[id number]  Rehab Dx/Hx: Acute CVA (cerebrovascular accident) ([facility] [address] [I63.9]    Precautions: falls  Home situation: Lives with son and daughter-in-law. Manages own meds and finances. Family does all other household tasks. Not an active . ST Dx: [x] Aphasia  [] Dysarthria  [] Apraxia   [x] Oropharyngeal dysphagia [x] Cognitive Impairment  [] Other:   Date of Admit: 3/6/2023  Room #: 7660/8863-28    Current functional status (updated daily):         Pt being seen for : [] Speech/Language Treatment  [] Dysphagia Treatment [x] Cognitive Treatment  [] Other:  Communication: []WFL  [x] Aphasia  [] Dysarthria  [] Apraxia  [] Pragmatic Impairment [] Non-verbal  [] Hearing Loss  [x] Other: baseline stuttering   Cognition: [] WFL  [] Mild  [x] Moderate  [] Severe [] Unable to Assess  [] Other:  Memory: [] WFL  [] Mild  [x] Moderate  [] Severe [] Unable to Assess  [] Other:  Behavior: [x] Alert  [x] Cooperative  [x]  Pleasant  [] Confused  [] Agitated  [] Uncooperative  [] Distractible [] Motivated  [] Self-Limiting [] Anxious  [] Other:  Endurance:  [x] Adequate for participation in SLP sessions  [] Reduced overall  [] Lethargic  [] Other:  Safety: [] No concerns at this time  [] Reduced insight into deficits  []  Reduced safety awareness [] Not following call light procedures  [x] Unable to Assess  [x] Other: continue to assess     Current Diet Order:ADULT DIET; Regular;  No Added Salt (3-4 gm)  Swallowing Precautions: upright for all intake, stay upright for at least 30 mins after intake, small bites/sips, oral care 2-3x/day to reduce adverse affects in the event of aspiration, alternate bites/sips, slow rate of intake, general GERD precautions, and general aspiration precautions        Date: 3/10/2023      Tx session 1  [id number] Tx session 2  All tx needs met in session 1   Total Timed Code Min 60 0   Total Treatment Minutes 60 0   Individual Treatment Minutes 60 0   Group Treatment Minutes 0 0   Co-Treat Minutes 0 0   Variance/Reason:  N/A N/A   Pain Denies     Pain Intervention [] RN notified  [] Repositioned  [] Intervention offered and patient declined  [x] N/A  [] Other: [] RN notified  [] Repositioned  [] Intervention offered and patient declined  [] N/A  [] Other:   Subjective     Pt alert and cooperative, agreeable to tx. Pt upright in bedside chair. Objective:  Goals     Dysphagia Goals:   Short-term Goals  Timeframe for Short-term Goals: (10 days; 03/15/23)    Goal 1: The patient will tolerate recommended diet with no clinical s/s of aspiration 5/5   Did not target     Goal 2: The patient/caregiver will demonstrate understanding of compensatory swallow strategies, for improved swallow safety     Did not target     Cognitive-linguistic Goals:   Short Term Goals  Time Frame for Short Term Goals: 10 Days (03/15/23)    Goal 1: Pt will complete moderately complex comprehension tasks (2 step and multi-step directions/commands, abstract yes/no questions) with 80% acc given min cues   Indirectly targeted through goal #5    Goal 2: Pt will complete moderately complex naming tasks (divergent naming, convergent, responsive, etc) with 80% acc given min cues   Did not target      Goal met 03/08/23.          New goal added 03/08/23:    Goal 4: Pt will complete problem solving and thought organization tasks with 75% acc given mod cues    Indirectly targeted through goal #5    New goal added 03/08/23:      Goal 5: Pt will complete executive function tasks (e.g. meds, money, time, etc) with 75% acc given mod cues   Counting Money on JOEY  -80% acc indep improving to 100% acc given min cues     Solving Daily Math Problems on JOEY  -30% acc indep improving to 50% acc given min cues improving to 75% acc given mod cues improving to 100% acc given max cues Understanding Medicine Labels on JOEY  -70% acc indep improving to 100% acc given min cues     Using a Calendar on JOEY  -100% acc indep     Reading Instructions on JOEY  -60% acc indep improving to 75% acc given min cues improving to 90% acc given mod cues improving to 100% acc given max cues        Other areas targeted: N/A    Education:   SLP edu pt re: rationale for assessment, POC     Safety Devices: [x] Call light within reach  [] Chair alarm activated  [] Bed alarm activated  [] Other: [] Call light within reach  [] Chair alarm activated  [] Bed alarm activated  [] Other:    Assessment: Pt alert and cooperative, agreeable to tx. Pt assessed with mixed portions of JOEY assessment. Pt did well with counting money, understanding medicine labels and using a calendar. Pt appeared overwhelmed with solving math problems and reading instructions. He stated \"lots of words\" while trying to complete these portions. Pt required repetitions of stimuli, increased time for word finding and slow rate of speech from SLP when reading directions aloud. Pt motivated to improve across all goals. Cont POC. Plan: Continue as per plan of care. Additional Information:     Barriers toward progress: Communication deficit  Discharge recommendations:  [] Home independently  [] Home with assistance [x]  24 hour supervision  [] ECF [x] Other: See PT / OT recs  Continued Tx Upon Discharge: ? [x] Yes [] No [] TBD based on progress while on ARU [] Vital Stim indicated [] Other:   Estimated discharge date: TBD    Interventions used this date:  [] Speech/Language Treatment  [] Instruction in HEP [] Group [] Dysphagia Treatment [x] Cognitive Treatment   [] Other:       Total Time Breakdown / Charges    Time in Time out Total Time / units   Cognitive Tx 1330 1430 60 min/ 4 units    Speech Tx -- -- --   Dysphagia Tx -- -- --       Electronically Signed by     Gladys HOOKS-SLP  Clinical Fellow  [id number]

## 2023-03-10 NOTE — PROGRESS NOTES
Physical Therapy  Facility/Department: [facility]  Rehabilitation Physical Therapy Treatment Note    NAME: Serg Garcia  : 1950 (68 y.o.)  MRN: [id number]  CODE STATUS: Full Code    Date of Service: 3/10/23       Restrictions:  Restrictions/Precautions: Fall Risk, General Precautions  Position Activity Restriction  Other position/activity restrictions: Strict I&Os, 3L O2; up ith assist. Notify physician for pulse less than 50 or greater than 120, respiratory rate less than 12 or greater than 25, oral temperature greater than 101.3 F (84.0 C), systolic BP less than 90 or greater than 668, diastolic BP less than 50 or greater than 100. SUBJECTIVE  Subjective  Subjective: Patient up to chair on arrival, denies pain, states he slept well. Post Treatment Pain Screening Denies pain, endorses sob post activity requiring seated rest with PLB on 3L O2 for recovery          OBJECTIVE  Cognition  Overall Cognitive Status: Exceptions  Arousal/Alertness: Delayed responses to stimuli  Following Commands: Follows one step commands with increased time; Follows one step commands consistently  Attention Span: Attends with cues to redirect  Memory: Appears intact  Safety Judgement: Decreased awareness of need for assistance  Problem Solving: Assistance required to generate solutions;Assistance required to implement solutions;Assistance required to identify errors made  Insights: Decreased awareness of deficits  Initiation: Requires cues for some  Sequencing: Requires cues for some  Orientation  Overall Orientation Status: Within Functional Limits    Functional Mobility  Bed Mobility  Overall Assistance Level: Independent  Sit to Supine  Assistance Level: Independent  Supine to Sit  Assistance Level:  Independent  Balance  Sitting Balance: Independent  Standing Balance: Minimal assistance  Sit to Stand  Assistance Level: Contact guard assist  Stand to Sit  Assistance Level: Contact guard assist  Bed To/From Chair  Assistance Level: Contact guard assist  Skilled Clinical Factors: patient demo for wide alicia transfer chair>bed, with patient unable to complete /motor plan for chair to bed, needed cues for side stepping and turning on first trial, able to complete wtih remaining trials, needed seated rest after 3 trasfers bed<>chair (SpO2 82%, HR 76bpm), after 5 transfers,  after 7 transfers 94% spO2, and after 10 transfers      Environmental Mobility  GAIT:  ASSIST: HHA on right side with  WC follow managing O2 tubing with O2 tank attached to WC -WC follow needed as patient needed to sit suddenly due to sob during walking . FACILITATION: PT faciltiating with gait with RHHA, 3# left ankle weight to error LLE, and cues for wide alicia with PT facilitating  weight shift to Left side. GAIT Quality: ataxia, reciprocal gait, decreased swing clearance LLE with cues needed for wide alicia and facilitation to weight shift to LLE due to decreased control/coordination and weight shift LLE with decreased accuracy of LLE placement with mobility. Cued for pacing to avoid over exertion with patient slowing rate to improve distance before seated rest needed. Distances: patient walked 62' and 64 feet. Activity tolerance:   after 62' needed 2minutes seated rest to recover to SPO2 97% after immediate post walk SpO2 on3L via nc 89% with patient with increased RR and request for immediate seated rest due to sob  after 64' needed 2minutes seated rest to recover to SPO2 91% after immediate post walk SpO2 on3L via nc 88% with patient with increased RR and request for immediate seated rest due to sob  WC propulsion with BLE andUE therex needed cues for 100 feet with going thru doorways to [facility] doorframe as runs into doorframe otherwise on the right  with both left and right turns.         PT Exercises  Exercise Treatment: bridges x10,  sidelying hip abduction (3-/5 RLE) x10 BLE rief 1 minute break after each duee to sob, hooklying  knee extension x10 BLE cues for set up with all      ASSESSMENT/PROGRESS TOWARDS GOALS  Vital Signs  Heart Rate: 70  BP: 117/78  MAP (Calculated): 91  SpO2: 97 %  O2 Device: Nasal cannula (3L)  Comment: 3L via nc    Assessment  Assessment: Patient seend for transfer training with fading assist from CGA with cues  to SBA with cues for O2 management. With first transfer had difficulty with coordinating and motor planning steps chair to bed. With subsequent steps able to complete without coordination/motor planning cues but needed cues for O2 tubing,  Patient sob with SpO2 post activity 88-89% with rebound to 90% or greater in a few minutes of seated rest but noted sob with incrased RR with all activity today. Patient had not had his breathing tx this am- physician asked nurse to see if respiratory can return to deliver his tx. During gait training patient  needed assist of 2 with second person managing O2 tubing and WC with O2 tank attached as patient needed to sit suddenly due to sob. PT faciltiating with gait with RHHA, 3# left ankle weight to error LLE, and cues for wide alicia with PT facilitating  weight shift to Left side. patient walked 62' and 64 feet. Patient with WC propulsion for BLE andUE therex needed cues for 100 feet with going thru doorways to [facility] doorframe as runs into doorframe otherwise on the right  with both left and right turns. With LE therex needed cues and frequent  rest breaks due to sob. Patient up to chair at end of session on 3L O2 via nc. Goals  Patient Goals   Patient Goals : \"Get back to normal.\"  Short Term Goals  Time Frame for Short Term Goals: 3/14/2023  Short Term Goal 1: Patient demo SBA in bed<>chair, sit<>Stand, car transfer with LRAD. 3/10/2023 Goal partailly met Patient demoCGA to SBA bed<>chair and sit<>Stand. Short Term Goal 2: Patient demo walking 100-150 feet with SpO2 90% or greater with SBA.  Goal partially met 3/8/2023 Patient demo 200' over 12'48\" in parallel bars wtih min assist with demo, cues for RUE advancement and weight shift facilitation for RLE with wide alicia with turns L or R every 10 feet. 3/10/2023 patient wallking 58-64' with HHA mod for balance and  WC follow with sob and transient hypoxia 88-89% limiting endurance. Short Term Goal 3: Patient demo up and down 6 steps with LRAD min assist  Short Term Goal 4: Patient demo indep in [place] propulsion 150 feet with l/r turns and indep in WC parts management. 3/10/2023 Pt needs cues with 100 feet WC proulsion with ext x4 to avoid running into doorframes on right. Short Term Goal 5: Patient demo stoop to recover wtih LRAD indep. Long Term Goals  Time Frame for Long Term Goals : 3/21/2023  Long Term Goal 1: Patient demo indep in bed<>chair, sit<>Stand, car transfer with LRAD. Long Term Goal 2: Patient demo gait 150 feet with 2 turns on turf carpet of 10 feet or greater indep with LRAD. Long Term Goal 3: Patient demo up and down 12 steps with LRAD indep  Long Term Goal 4: Patient demo indep in LE strengthening and coordination seated and standing exercises. PLAN OF CARE/SAFETY 5/7 days week per shared poc       EDUCATION: in wide alicia with transfers and with gait with faciliation for weight shift and balance to reduce incoordination and ataxia. Cues with LE therex for set up and cues with gait for pacing. See above for response to tx. DISPOSITION: up to chair with alarm in place and call light in reach.             Therapy Time   Individual Concurrent Group Co-treatment   Time In 0930         Time Out 1030         Minutes 60           Timed Code Treatment Minutes: [address] [place] [address] Alex Isaacs, 03/10/23 at 1:14 PM

## 2023-03-10 NOTE — PLAN OF CARE
Problem: Safety - Adult  Goal: Free from fall injury  3/9/2023 2303 by Viv Britt RN  Outcome: Progressing  3/9/2023 1012 by Mehul Sawyer  Outcome: Progressing

## 2023-03-10 NOTE — CARE COORDINATION
Received notification from German Maloney at [facility] that patient is approved through 3/15/23. Reginald Mccarty RN

## 2023-03-10 NOTE — PROGRESS NOTES
Iris Henson  3/10/2023  [id number]    Chief Complaint: Acute CVA (cerebrovascular accident) [facility])    Subjective:   No acute events overnight. Today Gwyn Neri is seen with therapy. He reports feeling short of breath. He has not had his morning breathing treatment yet. He denies other acute complaints at this time. ROS: denies f/c, n/v, cp    Objective:  Patient Vitals for the past 24 hrs:   BP Temp Temp src Pulse Resp SpO2 Height Weight   03/10/23 1128 -- -- -- -- -- 97 % -- --   03/10/23 0943 117/78 -- -- 70 -- 97 % -- --   03/10/23 0845 112/65 97.3 °F (36.3 °C) Oral 67 18 97 % 6' (1.829 m) 170 lb 6 oz (77.3 kg)   03/10/23 0401 -- -- -- -- 18 -- -- --   03/10/23 0025 -- -- -- -- 18 -- -- --   03/09/23 2109 -- -- -- -- 19 -- -- --   03/09/23 2055 -- -- -- -- -- 96 % -- --   03/09/23 2030 (!) 147/94 97.6 °F (36.4 °C) Oral 57 18 -- -- --   03/09/23 1738 128/70 -- -- 71 -- -- -- --     Gen: No distress, pleasant. HEENT: Normocephalic, atraumatic. CV: irregularly irregular rhythm  Resp: No respiratory distress. No wheezing  Abd: Soft, nondistended  Neuro: Alert, oriented, appropriately interactive. assistance  Psych: appropriate mood and affect    Wt Readings from Last 3 Encounters:   03/10/23 170 lb 6 oz (77.3 kg)   03/06/23 181 lb 7 oz (82.3 kg)   02/25/23 177 lb 14.6 oz (80.7 kg)       Laboratory data:   Lab Results   Component Value Date    WBC 11.0 03/10/2023    HGB 13.4 (L) 03/10/2023    HCT 40.4 (L) 03/10/2023    MCV 92.7 03/10/2023     03/10/2023       Lab Results   Component Value Date/Time     03/10/2023 06:46 AM    K 3.6 03/10/2023 06:46 AM     03/10/2023 06:46 AM    CO2 28 03/10/2023 06:46 AM    BUN 17 03/10/2023 06:46 AM    CREATININE 0.7 03/10/2023 06:46 AM    GLUCOSE 104 03/10/2023 06:46 AM    CALCIUM 8.6 03/10/2023 06:46 AM        Therapy progress:  PT  Position Activity Restriction  Other position/activity restrictions: Strict I&Os, 3L O2; up ith assist. Notify physician for pulse less than 50 or greater than 120, respiratory rate less than 12 or greater than 25, oral temperature greater than 101.3 F (65.7 C), systolic BP less than 90 or greater than 878, diastolic BP less than 50 or greater than 100. Objective     Sit to Stand: Contact guard assistance  Stand to Sit: Contact guard assistance  Bed to Chair: Minimal assistance (ataxia noted, uses UE and environmental support.)  Device: Rolling Walker  Other Apparatus: O2 (2L)  Assistance: Minimal assistance  Distance: 20' needed WC follow after first 10 feet as gait more unsteady and had to sit immediately with  with WC follow needed. OT  PT Equipment Recommendations  Equipment Needed: Yes  Walker: Rolling  Other: rolling walker  Toilet - Technique: Ambulating  Equipment Used: Standard toilet  Toilet Transfers Comments: no AD  Assessment        SLP                Body mass index is 23.11 kg/m². Assessment and Plan:  Sol Tenorio is a 68year old male with a past medical history significant for atrial fibrillation on Eliquis, HFpEF, COPD with chronic respiratory failure on 4 L O2 baseline who presented to [facility] on 2/25/23 with right sided weakness and aphasia, found to have left MCA CVA and subsequently transferred to [facility]. for left ICA stent placement. He was admitted to [facility] on 3/6/23 due to functional deficits below his baseline. Left MCA CVA  - due to left ICA stenosis  - DAPT as below, statin  - PT, OT, ST    Oropharyngeal Dysphagia  - ST     Left Internal Carotid Stenosis  - s/p stent placement on 3/2  - DAPT for 6 weeks then stop Plavix, continue asa. Resume Eliquis 4/17  - follow up with [facility]     Atrial Fibrillation  - with tachy-bear syndrome during acute stay  - EP switched cardizem to 120 mg nightly. Appreciate assistance. Planning on two week event monitor on discharge and follow up as outpatient.    - Eliquis to be resume 4/17     HFpEF  - home regimen: torsemide 40 mg daily  - start torsemide 10 mg daily, added K while on diuretic  - daily weights, I/Os     Chronic Respiratory Failure  COPD  - on baseline 4 L O2  - brovana, pulmicort  - duo-nebs PRN  - short burst of steroids per Pulm for possible exacerbation   - wean oxygen for goal SpO2>88%  - Pulmonology consulted, appreciate assistance. Bowels: adjust medications as needed for regular bowel movements     Bladder: Check PVR x 3. [address] if PVR > 200ml or if any volume is > 500 ml. Sleep: Trazodone provided prn. PPX   DVT: lovenox  GI: not indicated     Follow up appointments: Neurosurgery, PCP, EP, Pulmonology    Services: [place] [facility] OP  EDOD: 3/15    Aidee Perry MD 3/10/2023, 2:12 PM

## 2023-03-10 NOTE — PROGRESS NOTES
Occupational Therapy  Facility/Department: [facility]  Rehabilitation Occupational Therapy Daily Treatment Note    Date: 3/10/23  Patient Name: Chris Mesa       Room: [facility]/9366-19  MRN: [id number]  Account: [de-identified]   : 1950  (78 y.o.) Gender: male                    Past Medical History:  has a past medical history of Atrial fibrillation ([facility] [address] Bronchitis chronic, Emphysema of lung ([facility] [address] HTN (hypertension), Influenza A, Lung disease, Pneumonia, and Stroke. Past Surgical History:   has a past surgical history that includes fracture surgery and hernia repair. Restrictions  Restrictions/Precautions: Fall Risk, General Precautions  Other position/activity restrictions: Strict I&Os, 3L O2; up ith assist. Notify physician for pulse less than 50 or greater than 120, respiratory rate less than 12 or greater than 25, oral temperature greater than 101.3 F (26.8 C), systolic BP less than 90 or greater than 084, diastolic BP less than 50 or greater than 100. Required Braces or Orthoses?: No    Subjective  Subjective: Pt in bathroom with nursing, pleasant, no pain, agreeable to OT session, /66, HR 68, O2 sats 95% on 3L O2. Restrictions/Precautions: Fall Risk;General Precautions             Objective     Cognition  Overall Cognitive Status: Exceptions  Arousal/Alertness: Delayed responses to stimuli  Following Commands: Follows one step commands with increased time; Follows one step commands consistently  Attention Span: Attends with cues to redirect  Memory: Appears intact  Safety Judgement: Decreased awareness of need for assistance  Problem Solving: Assistance required to generate solutions;Assistance required to implement solutions;Assistance required to identify errors made  Insights: Decreased awareness of deficits  Initiation: Requires cues for some  Sequencing: Requires cues for some  Orientation  Overall Orientation Status: Within Functional Limits         ADL  Feeding  Assistance Level: Independent  Upper Extremity Bathing  Assistance Level: Independent  Lower Extremity Bathing  Assistance Level: Supervision  Skilled Clinical Factors: to stand to bathe buttocks  Upper Extremity Dressing  Assistance Level: Set-up  Skilled Clinical Factors: to doff/don shirt  Lower Extremity Dressing  Assistance Level: Supervision  Skilled Clinical Factors: to doff/don brief/pants  Putting On/Taking Off Footwear  Assistance Level: Set-up  Skilled Clinical Factors: to doff/don socks  Toileting  Assistance Level: Supervision  Skilled Clinical Factors: during clothing management, with RW  Toilet Transfers  Technique: Stand step  Equipment: Standard toilet  Additional Factors: Set-up; Verbal cues; With handrails  Assistance Level: Supervision  Skilled Clinical Factors: with RW  Tub/Shower Transfers  Type: Shower  Transfer From: Standing without device  Transfer To: Tub transfer bench  Additional Factors: With handrails  Assistance Level: Supervision          Functional Mobility  Device:  (no AD)  Activity: To/From bathroom  Assistance Level: Supervision  Transfers  Surface: To chair with arms;From chair with arms;Standard toilet  Additional Factors: Set-up; Verbal cues; Hand placement cues; With handrails  Device:  (no AD)  Sit to Stand  Assistance Level: Supervision  Stand to Sit  Assistance Level: Supervision  Bed To/From Chair  Technique: Stand step  Assistance Level: Supervision         Assessment  Assessment  Assessment: Pt agreeable to OT session. Pt demonstrated improved balance for bathroom transfers and ADLs with SPV and no LOB without AD this date. Pt continues to require cues for manage O2 tubing with dressing to keep tube from getting caught between legs. Pt required frequent rest breaks due to SOB and fatigue and cues to take breaks as needed. Pt completed all BADLs with SPV/mod I with cues for safety and rest breaks. Continue POC.   Activity Tolerance: Patient tolerated treatment well;Patient limited by fatigue;Patient limited by endurance  Discharge Recommendations: 24 hour supervision or assist;Outpatient OT  OT Equipment Recommendations  Equipment Needed: No  Safety Devices  Safety Devices in place: Yes  Type of devices: Left in chair;Call light within reach;Nurse notified; Chair alarm in place;Gait belt    Patient Education  Education  Education Given To: Patient  Education Provided: Role of Therapy; ADL Function;Plan of Care;Precautions; Safety; Fall Prevention Strategies;DME/Home Modifications;Transfer Training;Mobility Training;Equipment; Energy Conservation  Education Provided Comments: role of OT, transfer training, ADL retraining, energy conservation and rest breaks with ADLs, PLB  Education Method: Demonstration;Verbal  Barriers to Learning: Cognition  Education Outcome: Verbalized understanding;Demonstrated understanding;Continued education needed    Plan  Occupational Therapy Plan  Times Per Week: 5/7x  Current Treatment Recommendations: Self-Care / ADL; Functional mobility training; Endurance training; Safety education & training;Patient/Caregiver education & training;Neuromuscular re-education;Strengthening;ROM;Balance training;Equipment evaluation, education, & procurement;Home management training;Coordination training    Goals  Short Term Goals  Time Frame for Short Term Goals: 6 days- 3/11/23  Short Term Goal 1: Pt will complete functional transfers with SPV. GOAL MET 3/10/23 Pt completed functional transfers with SPV. Short Term Goal 2: Pt will perform toileting with SPV. GOAL MET 3/9/23 Pt performed toileting with SPV. Short Term Goal 3: Pt will complete full body dressing with SPV. GOAL MET 3/10/23 Pt completed full body dressing with SPV. Short Term Goal 4: Pt will perform full body bathing with SPV. GOAL MET 3/10/23 Pt performed full body bathing with SPV.   Short Term Goal 5: Pt will perform grooming at sink with mod I.  Long Term Goals  Time Frame for Long Term Goals : 13 days- 3/18/23  Long Term Goal 1: Pt will perform functional transfers with mod I.  Long Term Goal 2: Pt will complete BADLs with mod I.  Long Term Goal 3: Pt will improve coordination in RUE as evidenced by at least 5 second improvement in NHPT.     Therapy Time   Individual Concurrent Group Co-treatment   Time In 0730         Time Out 0830         Minutes 60         Timed Code Treatment Minutes: 900 Job Aquino

## 2023-03-11 PROCEDURE — 6360000002 HC RX W HCPCS: Performed by: STUDENT IN AN ORGANIZED HEALTH CARE EDUCATION/TRAINING PROGRAM

## 2023-03-11 PROCEDURE — 94660 CPAP INITIATION&MGMT: CPT

## 2023-03-11 PROCEDURE — 94761 N-INVAS EAR/PLS OXIMETRY MLT: CPT

## 2023-03-11 PROCEDURE — 97130 THER IVNTJ EA ADDL 15 MIN: CPT

## 2023-03-11 PROCEDURE — 97530 THERAPEUTIC ACTIVITIES: CPT

## 2023-03-11 PROCEDURE — 97110 THERAPEUTIC EXERCISES: CPT

## 2023-03-11 PROCEDURE — 6370000000 HC RX 637 (ALT 250 FOR IP): Performed by: STUDENT IN AN ORGANIZED HEALTH CARE EDUCATION/TRAINING PROGRAM

## 2023-03-11 PROCEDURE — 94640 AIRWAY INHALATION TREATMENT: CPT

## 2023-03-11 PROCEDURE — 97112 NEUROMUSCULAR REEDUCATION: CPT

## 2023-03-11 PROCEDURE — 6370000000 HC RX 637 (ALT 250 FOR IP): Performed by: INTERNAL MEDICINE

## 2023-03-11 PROCEDURE — 97535 SELF CARE MNGMENT TRAINING: CPT

## 2023-03-11 PROCEDURE — 2700000000 HC OXYGEN THERAPY PER DAY

## 2023-03-11 PROCEDURE — 1280000000 HC REHAB R&B

## 2023-03-11 PROCEDURE — 97116 GAIT TRAINING THERAPY: CPT

## 2023-03-11 PROCEDURE — 97129 THER IVNTJ 1ST 15 MIN: CPT

## 2023-03-11 PROCEDURE — 92507 TX SP LANG VOICE COMM INDIV: CPT

## 2023-03-11 RX ADMIN — ENOXAPARIN SODIUM 40 MG: 100 INJECTION SUBCUTANEOUS at 08:27

## 2023-03-11 RX ADMIN — ARFORMOTEROL TARTRATE 15 MCG: 15 SOLUTION RESPIRATORY (INHALATION) at 19:43

## 2023-03-11 RX ADMIN — POTASSIUM CHLORIDE 10 MEQ: 750 TABLET, EXTENDED RELEASE ORAL at 08:28

## 2023-03-11 RX ADMIN — BUDESONIDE 500 MCG: 0.5 INHALANT RESPIRATORY (INHALATION) at 19:43

## 2023-03-11 RX ADMIN — DILTIAZEM HYDROCHLORIDE 120 MG: 120 CAPSULE, COATED, EXTENDED RELEASE ORAL at 17:40

## 2023-03-11 RX ADMIN — FAMOTIDINE 20 MG: 20 TABLET ORAL at 08:28

## 2023-03-11 RX ADMIN — GUAIFENESIN 600 MG: 600 TABLET, EXTENDED RELEASE ORAL at 08:28

## 2023-03-11 RX ADMIN — PREDNISONE 20 MG: 20 TABLET ORAL at 08:28

## 2023-03-11 RX ADMIN — ATORVASTATIN CALCIUM 80 MG: 80 TABLET, FILM COATED ORAL at 20:06

## 2023-03-11 RX ADMIN — GUAIFENESIN 600 MG: 600 TABLET, EXTENDED RELEASE ORAL at 20:06

## 2023-03-11 RX ADMIN — CLOPIDOGREL BISULFATE 75 MG: 75 TABLET ORAL at 08:28

## 2023-03-11 RX ADMIN — ARFORMOTEROL TARTRATE 15 MCG: 15 SOLUTION RESPIRATORY (INHALATION) at 08:30

## 2023-03-11 RX ADMIN — BUDESONIDE 500 MCG: 0.5 INHALANT RESPIRATORY (INHALATION) at 08:30

## 2023-03-11 RX ADMIN — FAMOTIDINE 20 MG: 20 TABLET ORAL at 20:06

## 2023-03-11 RX ADMIN — TORSEMIDE 10 MG: 20 TABLET ORAL at 08:28

## 2023-03-11 RX ADMIN — ASPIRIN 325 MG: 325 TABLET, COATED ORAL at 08:28

## 2023-03-11 NOTE — PLAN OF CARE
Problem: Safety - Adult  Goal: Free from fall injury  3/11/2023 1121 by Jaime Gilmore  Outcome: Progressing  3/10/2023 2319 by Mallika Pepe RN  Outcome: Progressing

## 2023-03-11 NOTE — PROGRESS NOTES
03/11/23 0035   NIV Type   Skin Assessment Clean, dry, & intact   Skin Protection for O2 Device Yes   Orientation Middle   Location Nose   Intervention(s) Skin Barrier   Equipment Type V60   Mode Bilevel   Mask Type Full face mask   Mask Size Medium   Settings/Measurements   PIP Observed 18 cm H20   IPAP 18 cmH20   CPAP/EPAP 10 cmH2O   Vt (Measured) 627 mL   Rate Ordered 16   Resp 18   FiO2  35 %   I Time/ I Time % 0.9 s   Minute Volume (L/min) 11.9 Liters   Mask Leak (lpm) 0 lpm   Comfort Level Good   Using Accessory Muscles No   SpO2 96   Alarm Settings   Alarms On Y   Low Pressure (cmH2O) 6 cmH2O   High Pressure (cmH2O) 30 cmH2O   Delay Alarm 20 sec(s)   RR Low (bpm) 6   RR High (bpm) 40 br/min

## 2023-03-11 NOTE — PROGRESS NOTES
03/11/23 0353   NIV Type   Skin Assessment Clean, dry, & intact   Skin Protection for O2 Device Yes   Orientation Middle   Location Nose   Intervention(s) Skin Barrier   Equipment Type V60   Mode Bilevel   Mask Type Full face mask   Mask Size Medium   Settings/Measurements   PIP Observed 19 cm H20   IPAP 18 cmH20   CPAP/EPAP 10 cmH2O   Vt (Measured) 578 mL   Rate Ordered 16   Resp 19   FiO2  35 %   I Time/ I Time % 0.9 s   Minute Volume (L/min) 12.7 Liters   Mask Leak (lpm) 0 lpm   Comfort Level Good   Using Accessory Muscles No   SpO2 97   Alarm Settings   Alarms On Y   Low Pressure (cmH2O) 6 cmH2O   High Pressure (cmH2O) 30 cmH2O   Delay Alarm 20 sec(s)   RR Low (bpm) 6   RR High (bpm) 40 br/min

## 2023-03-11 NOTE — PROGRESS NOTES
[facility]: ACUTE REHAB UNIT  SPEECH-LANGUAGE PATHOLOGY      [x] Daily  [] Weekly Care Conference Note  [] Discharge    Patient:Bang Sanchez      DBY:[birth year]  XRD:[id number]  Rehab Dx/Hx: Acute CVA (cerebrovascular accident) ([facility] [address] [I63.9]    Precautions: falls  Home situation: Lives with son and daughter-in-law. Manages own meds and finances. Family does all other household tasks. Not an active . ST Dx: [x] Aphasia  [] Dysarthria  [] Apraxia   [x] Oropharyngeal dysphagia [x] Cognitive Impairment  [] Other:   Date of Admit: 3/6/2023  Room #: 5067/0686-69    Current functional status (updated daily):         Pt being seen for : [] Speech/Language Treatment  [] Dysphagia Treatment [x] Cognitive Treatment  [] Other:  Communication: []WFL  [x] Aphasia  [] Dysarthria  [] Apraxia  [] Pragmatic Impairment [] Non-verbal  [] Hearing Loss  [x] Other: baseline stuttering   Cognition: [] WFL  [] Mild  [x] Moderate  [] Severe [] Unable to Assess  [] Other:  Memory: [] WFL  [] Mild  [x] Moderate  [] Severe [] Unable to Assess  [] Other:  Behavior: [x] Alert  [x] Cooperative  [x]  Pleasant  [] Confused  [] Agitated  [] Uncooperative  [] Distractible [] Motivated  [] Self-Limiting [] Anxious  [] Other:  Endurance:  [x] Adequate for participation in SLP sessions  [] Reduced overall  [] Lethargic  [] Other:  Safety: [] No concerns at this time  [] Reduced insight into deficits  []  Reduced safety awareness [] Not following call light procedures  [x] Unable to Assess  [x] Other: continue to assess     Current Diet Order:ADULT DIET; Regular;  No Added Salt (3-4 gm)  Swallowing Precautions: upright for all intake, stay upright for at least 30 mins after intake, small bites/sips, oral care 2-3x/day to reduce adverse affects in the event of aspiration, alternate bites/sips, slow rate of intake, general GERD precautions, and general aspiration precautions        Date: 3/11/2023      Tx session 1  [id number] Tx session 2  All tx needs met in session 1   Total Timed Code Min 45 0   Total Treatment Minutes 60 0   Individual Treatment Minutes 60 0   Group Treatment Minutes 0 0   Co-Treat Minutes 0 0   Variance/Reason:  N/A N/A   Pain Denies     Pain Intervention [] RN notified  [] Repositioned  [] Intervention offered and patient declined  [x] N/A  [] Other: [] RN notified  [] Repositioned  [] Intervention offered and patient declined  [] N/A  [] Other:   Subjective     Pt alert and cooperative, agreeable to tx. Pt upright in bedside chair. Objective:  Goals     Dysphagia Goals:   Short-term Goals  Timeframe for Short-term Goals: (10 days; 03/15/23)    Goal 1: The patient will tolerate recommended diet with no clinical s/s of aspiration 5/5   Did not target     Goal 2: The patient/caregiver will demonstrate understanding of compensatory swallow strategies, for improved swallow safety     Did not target     Cognitive-linguistic Goals:   Short Term Goals  Time Frame for Short Term Goals: 10 Days (03/15/23)    Goal 1: Pt will complete moderately complex comprehension tasks (2 step and multi-step directions/commands, abstract yes/no questions) with 80% acc given min cues   Wh-questions: where   -88% acc indep improving to 100% acc given min-mod cues     Wh-questions: what   -82% acc indep improving to 100% acc given min-mod cues     Following Written and Oral directions  -85% acc indep improving to 100% acc given min cues     Goal 2: Pt will complete moderately complex naming tasks (divergent naming, convergent, responsive, etc) with 80% acc given min cues   Naming the Category   -name the category for each list of items   -73% acc indep improving to 100% acc given min-mod cues      Goal met 03/08/23.          New goal added 03/08/23:    Goal 4: Pt will complete problem solving and thought organization tasks with 75% acc given mod cues    Sequencing the Steps   -Number the steps in the correct order   -75% acc indep improving to 100% acc given min-mod cues     Differences Between Pictures   -Locate at least 10 differences between pictures   -80% acc indep improving to 100% acc given min-mod cues       New goal added 03/08/23:      Goal 5: Pt will complete executive function tasks (e.g. meds, money, time, etc) with 75% acc given mod cues   Did not target     Other areas targeted: N/A    Education:   SLP edu pt re: rationale for cog tasks, word finding strategies     Safety Devices: [x] Call light within reach  [] Chair alarm activated  [] Bed alarm activated  [] Other: [] Call light within reach  [] Chair alarm activated  [] Bed alarm activated  [] Other:    Assessment: Tx session: Pt alert and cooperative, agreeable to tx. Pt overall, did well with moderately complex comprehension tasks with 85% avg acc. Pt required min phonemic cues, to assisst with word finding difficulties. Pt able to follow oral directions well with no-min cues. Pt completed naming the category with 73% acc indep. Pt overall, did well with problem solving and thought organization tasks with 77.5% avg acc. Pt is motivated to improve. Cont POC. Plan: Continue as per plan of care. Additional Information:     Barriers toward progress: Communication deficit  Discharge recommendations:  [] Home independently  [] Home with assistance [x]  24 hour supervision  [] ECF [x] Other: See PT / OT recs  Continued Tx Upon Discharge: ? [x] Yes [] No [] TBD based on progress while on ARU [] Vital Stim indicated [] Other:   Estimated discharge date: TBD    Interventions used this date:  [] Speech/Language Treatment  [] Instruction in HEP [] Group [] Dysphagia Treatment [x] Cognitive Treatment   [] Other:       Total Time Breakdown / Charges    Time in Time out Total Time / units   Cognitive Tx 0845 0930 45 min/ 3 units    Speech Tx 0830 0845 15 min/ 1 unit    Dysphagia Tx -- -- --       Electronically Signed by     Benita HOOKS-SLP  Clinical Fellow  [id number]

## 2023-03-11 NOTE — PLAN OF CARE
Problem: Discharge Planning  Goal: Discharge to home or other facility with appropriate resources  3/10/2023 2319 by Tila Nicole RN  Outcome: Progressing  3/10/2023 1954 by Zoe Faye  Outcome: Progressing     Problem: Chronic Conditions and Co-morbidities  Goal: Patient's chronic conditions and co-morbidity symptoms are monitored and maintained or improved  3/10/2023 2319 by Tila Nicole RN  Outcome: Progressing  3/10/2023 1954 by Zoe Faye  Outcome: Progressing     Problem: Safety - Adult  Goal: Free from fall injury  3/10/2023 2319 by Tila Nicole RN  Outcome: Progressing  3/10/2023 1954 by Zoe Faye  Outcome: Progressing     Problem: Pain  Goal: Verbalizes/displays adequate comfort level or baseline comfort level  3/10/2023 2319 by Tila Nicole RN  Outcome: Progressing  3/10/2023 1954 by Zoe Faye  Outcome: Progressing     Problem: Skin/Tissue Integrity  Goal: Absence of new skin breakdown  Description: 1. Monitor for areas of redness and/or skin breakdown  2. Assess vascular access sites hourly  3. Every 4-6 hours minimum:  Change oxygen saturation probe site  4. Every 4-6 hours:  If on nasal continuous positive airway pressure, respiratory therapy assess nares and determine need for appliance change or resting period.   3/10/2023 2319 by Tila Nicole RN  Outcome: Progressing  3/10/2023 1954 by Zoe Faye  Outcome: Progressing

## 2023-03-11 NOTE — PROGRESS NOTES
Occupational Therapy  Facility/Department: [facility]  Rehabilitation Occupational Therapy Daily Treatment Note    Date: 3/11/23  Patient Name: Jacob Nixon       Room: 7836/3585-04  MRN: [id number]  Account: [de-identified]   : 1950  (78 y.o.) Gender: male                    Past Medical History:  has a past medical history of Atrial fibrillation ([facility] [address] Bronchitis chronic, Emphysema of lung ([facility] [address] HTN (hypertension), Influenza A, Lung disease, Pneumonia, and Stroke. Past Surgical History:   has a past surgical history that includes fracture surgery and hernia repair. Restrictions  Restrictions/Precautions: Fall Risk, General Precautions  Other position/activity restrictions: Strict I&Os, 3L O2; up ith assist. Notify physician for pulse less than 50 or greater than 120, respiratory rate less than 12 or greater than 25, oral temperature greater than 101.3 F (43.2 C), systolic BP less than 90 or greater than 741, diastolic BP less than 50 or greater than 100. Required Braces or Orthoses?: No    Subjective  Subjective: Pt in recliner, agreeable and pleasant. Requesting to use bathroom prior to ambulation down to gym. Once in bathroom after toileting, requesting shaving. Vitals remain WFL on 3L O2. Restrictions/Precautions: Fall Risk;General Precautions             Objective  Vision - Basic Assessment  Patient Visual Report: No visual complaint reported. Cognition  Overall Cognitive Status: Exceptions  Arousal/Alertness: Delayed responses to stimuli  Following Commands: Follows one step commands with increased time; Follows one step commands consistently  Attention Span: Attends with cues to redirect  Memory: Appears intact  Safety Judgement: Decreased awareness of need for assistance  Problem Solving: Assistance required to generate solutions;Assistance required to implement solutions;Assistance required to identify errors made  Insights: Decreased awareness of deficits  Initiation: Requires cues for some  Sequencing: Requires cues for some  Cognition Comment: increased time for processing - pt presenting with symptoms of global aphasia, w/ continued word finding difficulties; improves with increased time  Orientation  Overall Orientation Status: Within Functional Limits  Orientation Level: Oriented X4   Perception  Overall Perceptual Status: Impaired  Unilateral Attention: Cues to attend to right side of body  Initiation: Cues to initiate tasks  Motor Planning: Appears intact  Perseveration: Not present     ADL  Feeding  Assistance Level: Independent  Grooming/Oral Hygiene  Assistance Level: Modified independent  Skilled Clinical Factors: standing at sink to wash hands, brush hair/teeth, and shave x7.5 minutes  Upper Extremity Dressing  Assistance Level: Set-up  Skilled Clinical Factors: to doff/don shirt  Lower Extremity Dressing  Assistance Level: Supervision  Skilled Clinical Factors: to doff/don brief/pants  Putting On/Taking Off Footwear  Assistance Level: Set-up  Skilled Clinical Factors: to doff/don socks  Toileting  Assistance Level: Supervision  Skilled Clinical Factors: during clothing management, with RW  Toilet Transfers  Technique: Stand step; To the right; To the left (RW)  Equipment: Standard toilet  Additional Factors: Set-up; Verbal cues; With handrails  Assistance Level: Supervision  Skilled Clinical Factors: with RW    Instrumental ADL's  Instrumental ADLs: Yes  Light Housekeeping  Light Housekeeping Level: [employer] Level of Assistance: Stand by assistance;Contact guard assistance  Light Housekeeping: CGA with O2 line management in room to gather clothing and bring to bathroom with RW     Functional Mobility  Device: Rolling walker  Activity: To/From bathroom;Transport items; Retrieve items; To/From therapy gym  Assistance Level: Supervision  Skilled Clinical Factors: SBA/CGA with RW in room/bathroom for ADL/IADL management, SBA/CGA with RW to/from gym with rest break sitting after 100 ft of mobility  Transfers  Surface: To chair with arms;From chair with arms;Standard toilet  Additional Factors: Set-up; Verbal cues; Hand placement cues; With handrails  Device: Walker  Sit to Stand  Assistance Level: Supervision  Stand to [employer]  Assistance Level: Supervision  Bed To/From Chair  Technique: Stand step  Assistance Level: Supervision   Weight Bearing  Weight Bearing Technique: Yes  RUE Weight Bearing: Extended arm standing  LUE Weight Bearing: Extended arm standing  OT Exercises  Exercise Treatment: 2# free weight 1 UE at a time seated 2x10 reps with rest breaks PRN: chest press, circles forwards/backwards, internal/external rotation, elbow flexion/extension, supination/pronation, wrist flexion/extension  Dynamic Standing Balance Exercises: Reaching out of ROBERT above/below waist to gather squiz various locations in gym x6 minutes, x4 minutes, x3 minutes; able to ambulate in room/bathroom to gather clothing and completion of ADLs standing CGA to SBA with mod cues for O2 line management using RW     Assessment  Assessment  Assessment: Patient remains very pleasant and agreeable to OT, able to ambulate in room/bathroom with RW per patient request SPV-CGA with mod cues for reaching out of ROBERT and transporting items. Pt was able to ambulate multiple times in hallway and in community room/gym for dynamic balance/endurance tasks. Pt does demo SOB upon exertion with minimal exercises and towards end of session. O2 does drop to high 80s needing rest breaks for recovery. HR in the 90s during BUE ex. Pt progressing well towards goals but limited by endurance. Cont OT POC. Activity Tolerance: Patient tolerated treatment well;Patient limited by fatigue;Patient limited by endurance  Discharge Recommendations: 24 hour supervision or assist;Outpatient OT  OT Equipment Recommendations  Equipment Needed: Yes  Mobility Devices: ADL Assistive Devices  ADL Assistive Devices:  Shower Chair with back  Safety Devices  Safety Devices in place: Yes  Type of devices: Left in chair;Call light within reach;Nurse notified; Chair alarm in place;Gait belt; All fall risk precautions in place; Patient at risk for falls    Patient Education  Education  Education Given To: Patient  Education Provided: Role of Therapy; ADL Function;Plan of Care;Precautions; Safety; Fall Prevention Strategies;DME/Home Modifications;Transfer Training;Mobility Training;Equipment; Energy Conservation  Education Provided Comments: role of OT, transfer training, ADL retraining, energy conservation and rest breaks with ADLs, PLB  Education Method: Demonstration;Verbal  Barriers to Learning: Cognition  Education Outcome: Verbalized understanding;Demonstrated understanding;Continued education needed    Plan  Occupational Therapy Plan  Times Per Week: 5/7x  Times Per Day: Once a day  Current Treatment Recommendations: Self-Care / ADL; Functional mobility training; Endurance training; Safety education & training;Patient/Caregiver education & training;Neuromuscular re-education;Strengthening;ROM;Balance training;Equipment evaluation, education, & procurement;Home management training;Coordination training    Goals  Patient Goals   Patient goals : \"get back to where I was\"  Short Term Goals  Time Frame for Short Term Goals: 6 days- 3/11/23  Short Term Goal 1: Pt will complete functional transfers with SPV. GOAL MET 3/10/23 Pt completed functional transfers with SPV. Short Term Goal 2: Pt will perform toileting with SPV. GOAL MET 3/9/23 Pt performed toileting with SPV. Short Term Goal 3: Pt will complete full body dressing with SPV. GOAL MET 3/10/23 Pt completed full body dressing with SPV. Short Term Goal 4: Pt will perform full body bathing with SPV. GOAL MET 3/10/23 Pt performed full body bathing with SPV.   Short Term Goal 5: Pt will perform grooming at sink with mod I.-GOAL MET 3/11  Long Term Goals  Time Frame for Long Term Goals : 13 days- 3/18/23  Long Term Goal 1: Pt will perform functional transfers with mod I.  Long Term Goal 2: Pt will complete BADLs with mod I.  Long Term Goal 3: Pt will improve coordination in RUE as evidenced by at least 5 second improvement in NHPT.       Therapy Time   Individual Concurrent Group Co-treatment   Time In 1230         Time Out 1330         Minutes 60         Timed Code Treatment Minutes: 9533 Esteban Kurtz, OTR/L

## 2023-03-11 NOTE — PLAN OF CARE
Problem: Safety - Adult  Goal: Free from fall injury  3/10/2023 2319 by Ana Paula Ribera RN  Outcome: Progressing  3/10/2023 1954 by Matilde Raphael  Outcome: Progressing

## 2023-03-11 NOTE — PROGRESS NOTES
Physical Therapy  Facility/Department: [facility]  Rehabilitation Physical Therapy Treatment Note    NAME: Annita Ríos  : 1950 (68 y.o.)  MRN: [id number]  CODE STATUS: Full Code    Date of Service: 3/11/23       Restrictions:  Restrictions/Precautions: Fall Risk, General Precautions  Position Activity Restriction  Other position/activity restrictions: Strict I&Os, 3L O2; up ith assist. Notify physician for pulse less than 50 or greater than 120, respiratory rate less than 12 or greater than 25, oral temperature greater than 101.3 F (69.5 C), systolic BP less than 90 or greater than 832, diastolic BP less than 50 or greater than 100. SUBJECTIVE  Subjective  Subjective: Pt was sitting up in chair at start of session and agreeable to PT treatment. Pt requesting to use bathroom before going to therapy gym. Pain: Pt denies pain        Post Treatment Pain Screening; denies         OBJECTIVE  Cognition  Overall Cognitive Status: Exceptions  Arousal/Alertness: Delayed responses to stimuli  Following Commands: Follows one step commands with increased time; Follows one step commands consistently  Attention Span: Attends with cues to redirect  Memory: Appears intact  Safety Judgement: Decreased awareness of need for assistance  Problem Solving: Assistance required to generate solutions;Assistance required to implement solutions;Assistance required to identify errors made  Insights: Decreased awareness of deficits  Initiation: Requires cues for some  Sequencing: Requires cues for some  Cognition Comment: increased time for processing - pt presenting with symptoms of global aphasia, w/ continued word finding difficulties; improves with increased time  Orientation  Overall Orientation Status: Within Functional Limits  Orientation Level: Oriented X4    Functional Mobility  Balance  Sitting Balance: Independent  Standing Balance: Minimal assistance (w/ standing balance activities without AD; CGA with RW)  Sit to Stand  Assistance Level: Stand by assist (from recliner x 2, commode and multiple reps from w/c to RW)  Stand to Sit  Assistance Level: Stand by assist (from RW; cues for eccentric control with fatigue)      Environmental Mobility  Ambulation  Surface: Level surface  Device: Rolling walker  Distance: 119 ft x 2  Assistance Level: Contact guard assist  Gait Deviations: Slow sasha;Decreased step length bilateral;Decreased weight shift bilateral;Narrow base of support;Decreased heel strike right;Decreased heel strike left (flexed posture with cues for upright and to maintain ROBERT within RW with obstacle negotation; cues to increase width of ROBERT with poor response from patient)      Neuromuscular Education  Neuromuscular Education: Yes  Neuromuscular Comments: Gait training without AD in parallel bars with use of mirror for visual feedback for foot placement, ROBERT and foot clearance; performed forward and backward 10 ft x 4 reps (seated rest break x 1)      PT Exercises  Static Standing Balance Exercises: static stance on airex foam x 45 seconds  Dynamic Standing Balance Exercises: side steps in parallel bars x 10 ft each direction; ball toss on ariex foam (no LOB with SBA) x 45 seconds; forward flexion to 90 degrees (alternating + bilateral x 3 reps) pm airex foam; obstacke course negotiation x 15 ft (stepping over cane x 2 + around bolster x 2) x 2 reps      ASSESSMENT/PROGRESS TOWARDS GOALS  Vital Signs  Heart Rate: 62  Heart Rate Source: Monitor  SpO2: 94 %  O2 Device: Nasal cannula  Comment: 3L  SPO2 was 88-91% on 3L with all activity; recovers to 90% or greater within 20-30 seconds seated rest break    Assessment  Assessment: Pt tolerated treatment session well this nik ewith good motivation to participate and progress mobility. Pt demonstrated improved balance reactions with balance training including dynamic activity on uneven surface as well as with obstacle negotiation without AD.  Pt does still present wiht high levle balance impairments with poor upright posture while completing activity without AD leading to increased risk of fall. Pt will continue to benefit from skilled PT services to maximize balance reactions, LE strength and aerobic capacity to normalize gait pattern and reduce risk of falls for safe d/c home. Activity Tolerance: Patient tolerated treatment well;Patient limited by fatigue;Patient limited by endurance  Discharge Recommendations: Home with Home health PT  PT Equipment Recommendations  Equipment Needed: Yes  Walker: Rolling  Other: rolling walker    Goals  Short Term Goals  Short Term Goal 1: Patient demo SBA in bed<>chair, sit<>Stand, car transfer with LRAD. 3/10/2023 Goal partailly met Patient demoCGA to SBA bed<>chair and sit<>Stand. Short Term Goal 2: Patient demo walking 100-150 feet with SpO2 90% or greater with SBA. Goal partially met 3/8/2023 Patient demo 200' over 12'48\" in parallel bars wtih min assist with demo, cues for RUE advancement and weight shift facilitation for RLE with wide alicia with turns L or R every 10 feet. 3/10/2023 patient wallking 58-64' with HHA mod for balance and  WC follow with sob and transient hypoxia 88-89% limiting endurance. Short Term Goal 3: Patient demo up and down 6 steps with LRAD min assist  Short Term Goal 4: Patient demo indep in [facility] propulsion 150 feet with l/r turns and indep in WC parts management. 3/10/2023 Pt needs cues with 100 feet WC proulsion with ext x4 to avoid running into doorframes on right. Short Term Goal 5: Patient demo stoop to recover wtih LRAD indep. Long Term Goals  Long Term Goal 1: Patient demo indep in bed<>chair, sit<>Stand, car transfer with LRAD. Long Term Goal 2: Patient demo gait 150 feet with 2 turns on turf carpet of 10 feet or greater indep with LRAD.   Long Term Goal 3: Patient demo up and down 12 steps with LRAD indep  Long Term Goal 4: Patient demo indep in LE strengthening and coordination seated and standing exercises. PLAN OF CARE/SAFETY  Physcial Therapy Plan  General Plan: 5-7 times per week  Current Treatment Recommendations: Strengthening;ROM;Balance training;Functional mobility training;Transfer training; Endurance training;Gait training;Stair training;Neuromuscular re-education;Home exercise program;Safety education & training;Patient/Caregiver education & training;Equipment evaluation, education, & procurement; Therapeutic activities  Safety Devices  Type of Devices: All fall risk precautions in place;Call light within reach; Chair alarm in place; Left in chair;Nurse notified;Gait belt  Restraints  Restraints Initially in Place: No    EDUCATION  Education  Education Given To: Patient  Education Provided: Role of Therapy;Precautions; Safety;Transfer Training;Mobility Training; Fall Prevention Strategies; Energy Conservation;Equipment  Education Method: Demonstration;Verbal  Barriers to Learning: Cognition  Education Outcome: Verbalized understanding;Continued education needed        Therapy Time   Individual Concurrent Group Co-treatment   Time In 0930         Time Out 1030         Minutes 60           Timed Code Treatment Minutes: [address], [place], DPT, 03/11/23 at 12:04 PM

## 2023-03-11 NOTE — PLAN OF CARE
Problem: Skin/Tissue Integrity  Goal: Absence of new skin breakdown  Description: 1. Monitor for areas of redness and/or skin breakdown  2. Assess vascular access sites hourly  3. Every 4-6 hours minimum:  Change oxygen saturation probe site  4. Every 4-6 hours:  If on nasal continuous positive airway pressure, respiratory therapy assess nares and determine need for appliance change or resting period. 3/11/2023 0422 by Cade Solomon RN  Note: Pt turns self side to side in bed,pillow support in place. Pt encouraged to shift weight readjust self frequently.    3/10/2023 2319 by Liliya Calixto RN  Outcome: Progressing  3/10/2023 1954 by Kishore Wells  Outcome: Progressing

## 2023-03-12 PROCEDURE — 1280000000 HC REHAB R&B

## 2023-03-12 PROCEDURE — 6360000002 HC RX W HCPCS: Performed by: STUDENT IN AN ORGANIZED HEALTH CARE EDUCATION/TRAINING PROGRAM

## 2023-03-12 PROCEDURE — 6370000000 HC RX 637 (ALT 250 FOR IP): Performed by: STUDENT IN AN ORGANIZED HEALTH CARE EDUCATION/TRAINING PROGRAM

## 2023-03-12 PROCEDURE — 94761 N-INVAS EAR/PLS OXIMETRY MLT: CPT

## 2023-03-12 PROCEDURE — 6370000000 HC RX 637 (ALT 250 FOR IP): Performed by: INTERNAL MEDICINE

## 2023-03-12 PROCEDURE — 94640 AIRWAY INHALATION TREATMENT: CPT

## 2023-03-12 PROCEDURE — 94660 CPAP INITIATION&MGMT: CPT

## 2023-03-12 PROCEDURE — 2700000000 HC OXYGEN THERAPY PER DAY

## 2023-03-12 RX ADMIN — IPRATROPIUM BROMIDE AND ALBUTEROL SULFATE 1 AMPULE: .5; 2.5 SOLUTION RESPIRATORY (INHALATION) at 16:11

## 2023-03-12 RX ADMIN — ASPIRIN 325 MG: 325 TABLET, COATED ORAL at 11:25

## 2023-03-12 RX ADMIN — DILTIAZEM HYDROCHLORIDE 120 MG: 120 CAPSULE, COATED, EXTENDED RELEASE ORAL at 17:52

## 2023-03-12 RX ADMIN — POTASSIUM CHLORIDE 10 MEQ: 750 TABLET, EXTENDED RELEASE ORAL at 11:24

## 2023-03-12 RX ADMIN — BUDESONIDE 500 MCG: 0.5 INHALANT RESPIRATORY (INHALATION) at 20:00

## 2023-03-12 RX ADMIN — GUAIFENESIN 600 MG: 600 TABLET, EXTENDED RELEASE ORAL at 19:43

## 2023-03-12 RX ADMIN — ENOXAPARIN SODIUM 40 MG: 100 INJECTION SUBCUTANEOUS at 11:25

## 2023-03-12 RX ADMIN — GUAIFENESIN 600 MG: 600 TABLET, EXTENDED RELEASE ORAL at 11:24

## 2023-03-12 RX ADMIN — FAMOTIDINE 20 MG: 20 TABLET ORAL at 19:43

## 2023-03-12 RX ADMIN — ATORVASTATIN CALCIUM 80 MG: 80 TABLET, FILM COATED ORAL at 19:43

## 2023-03-12 RX ADMIN — CLOPIDOGREL BISULFATE 75 MG: 75 TABLET ORAL at 11:25

## 2023-03-12 RX ADMIN — TORSEMIDE 10 MG: 20 TABLET ORAL at 11:24

## 2023-03-12 RX ADMIN — FAMOTIDINE 20 MG: 20 TABLET ORAL at 11:25

## 2023-03-12 RX ADMIN — ARFORMOTEROL TARTRATE 15 MCG: 15 SOLUTION RESPIRATORY (INHALATION) at 19:55

## 2023-03-12 NOTE — PROGRESS NOTES
Patient's EF (Ejection Fraction) is greater than 40%    Heart Failure Medications:  Diuretics[de-identified] Torsemide    (One of the following REQUIRED for EF </= 40%/SYSTOLIC FAILURE but MAY be used in EF% >40%/DIASTOLIC FAILURE)        ACE[de-identified] None        ARB[de-identified] None         ARNI[de-identified] None    (Beta Blockers)  NON- Evidenced Based Beta Blocker (for EF% >40%/DIASTOLIC FAILURE): None    Evidenced Based Beta Blocker::(REQUIRED for EF% <40%/SYSTOLIC FAILURE) None  . .................................................................................................................................................. Patient's weights and intake/output reviewed:  What is charted, no strict I & O documented    Patient's Last Weight: None charted        Intake/Output Summary (Last 24 hours) at 3/12/2023 1707  Last data filed at 3/12/2023 1600  Gross per 24 hour   Intake 120 ml   Output 200 ml   Net -80 ml       Daily Weight log at bedside and being used: no    Education Booklet Provided: no    Comorbidities Reviewed Yes    Patient has a past medical history of Atrial fibrillation ([address] Bronchitis chronic, Emphysema of lung ([address] HTN (hypertension), Influenza A, Lung disease, Pneumonia, and Stroke. >>For CHF and Comorbidity documentation on Education Time and Topics, please see Education Tab    Progressive Mobility Assessment:  What is this patient's Current Level of Mobility?: Ambulatory- with Assistance  How was this patient Mobilized today?: Edge of Bed, Up to Chair, Bedside Commode,  Up to Toilet/Shower, Up in Room, Up in [place], Unable to Mobilize, and Patient Refuses to Mobilize, ambulated 10 ft                 With Whom? Nurse                 Level of Difficulty/Assistance:  Stand by assist with walker      Pt up in chair at this time on  3 L O2. Pt with complaints of shortness of breath. Pt with nonpitting lower extremity edema.      Patient and/or Family's stated Goal of Care this Admission: reduce shortness of breath, increase activity tolerance, better understand heart failure and disease management, be more comfortable, and reduce lower extremity edema prior to discharge        :

## 2023-03-12 NOTE — PROGRESS NOTES
03/12/23 0021   NIV Type   $NIV $Daily Charge   Skin Assessment Clean, dry, & intact   Skin Protection for O2 Device Yes   Location Nose   Equipment Type V60   Mode Bilevel   Mask Type Full face mask   Mask Size Medium   Settings/Measurements   PIP Observed 18 cm H20   IPAP 18 cmH20   CPAP/EPAP 10 cmH2O   Vt (Measured) 620 mL   Resp 18   FiO2  35 %   Minute Volume (L/min) 11 Liters   Mask Leak (lpm) 36 lpm   Comfort Level Good   Using Accessory Muscles No   SpO2 98   Patient's Home Machine No   Alarm Settings   Alarms On Y

## 2023-03-12 NOTE — PROGRESS NOTES
Called placed to [facility] with respiratory therapy. Patient was unable to receive AM treatment. Feels SOB, lung sounds diminished with expiratory wheezing. States will come assess patient and give PRN treatment.

## 2023-03-12 NOTE — PROGRESS NOTES
03/12/23 0441   NIV Type   Skin Assessment Clean, dry, & intact   Skin Protection for O2 Device Yes   Location Nose   Equipment Type V60   Mode Bilevel   Mask Type Full face mask   Mask Size Medium   Settings/Measurements   PIP Observed 18 cm H20   IPAP 18 cmH20   CPAP/EPAP 10 cmH2O   Vt (Measured) 550 mL   Resp 16   FiO2  35 %   Minute Volume (L/min) 10 Liters   Mask Leak (lpm) 0 lpm   Comfort Level Good   Using Accessory Muscles No   Patient's Home Machine No   Alarm Settings   Alarms On Y

## 2023-03-13 ENCOUNTER — TELEPHONE (OUTPATIENT)
Dept: CARDIOLOGY CLINIC | Age: 73
End: 2023-03-13

## 2023-03-13 VITALS
SYSTOLIC BLOOD PRESSURE: 114 MMHG | BODY MASS INDEX: 23.08 KG/M2 | HEIGHT: 72 IN | WEIGHT: 170.38 LBS | TEMPERATURE: 98.9 F | DIASTOLIC BLOOD PRESSURE: 80 MMHG | OXYGEN SATURATION: 95 % | RESPIRATION RATE: 16 BRPM | HEART RATE: 80 BPM

## 2023-03-13 LAB
ANION GAP SERPL CALCULATED.3IONS-SCNC: 10 MMOL/L (ref 3–16)
BASOPHILS ABSOLUTE: 0.1 K/UL (ref 0–0.2)
BASOPHILS RELATIVE PERCENT: 0.4 %
BUN BLDV-MCNC: 19 MG/DL (ref 7–20)
CALCIUM SERPL-MCNC: 8.7 MG/DL (ref 8.3–10.6)
CHLORIDE BLD-SCNC: 99 MMOL/L (ref 99–110)
CO2: 33 MMOL/L (ref 21–32)
CREAT SERPL-MCNC: 0.8 MG/DL (ref 0.8–1.3)
EOSINOPHILS ABSOLUTE: 0.3 K/UL (ref 0–0.6)
EOSINOPHILS RELATIVE PERCENT: 2 %
GFR SERPL CREATININE-BSD FRML MDRD: >60 ML/MIN/{1.73_M2}
GLUCOSE BLD-MCNC: 111 MG/DL (ref 70–99)
HCT VFR BLD CALC: 45.5 % (ref 40.5–52.5)
HEMOGLOBIN: 14.7 G/DL (ref 13.5–17.5)
LYMPHOCYTES ABSOLUTE: 2.2 K/UL (ref 1–5.1)
LYMPHOCYTES RELATIVE PERCENT: 16.4 %
MCH RBC QN AUTO: 30.2 PG (ref 26–34)
MCHC RBC AUTO-ENTMCNC: 32.4 G/DL (ref 31–36)
MCV RBC AUTO: 93.2 FL (ref 80–100)
MONOCYTES ABSOLUTE: 1.2 K/UL (ref 0–1.3)
MONOCYTES RELATIVE PERCENT: 8.9 %
NEUTROPHILS ABSOLUTE: 9.9 K/UL (ref 1.7–7.7)
NEUTROPHILS RELATIVE PERCENT: 72.3 %
PDW BLD-RTO: 14.5 % (ref 12.4–15.4)
PLATELET # BLD: 346 K/UL (ref 135–450)
PMV BLD AUTO: 8.9 FL (ref 5–10.5)
POTASSIUM REFLEX MAGNESIUM: 3.8 MMOL/L (ref 3.5–5.1)
RBC # BLD: 4.88 M/UL (ref 4.2–5.9)
SODIUM BLD-SCNC: 142 MMOL/L (ref 136–145)
WBC # BLD: 13.7 K/UL (ref 4–11)

## 2023-03-13 PROCEDURE — 2700000000 HC OXYGEN THERAPY PER DAY

## 2023-03-13 PROCEDURE — 97530 THERAPEUTIC ACTIVITIES: CPT

## 2023-03-13 PROCEDURE — 6370000000 HC RX 637 (ALT 250 FOR IP): Performed by: STUDENT IN AN ORGANIZED HEALTH CARE EDUCATION/TRAINING PROGRAM

## 2023-03-13 PROCEDURE — 6360000002 HC RX W HCPCS: Performed by: STUDENT IN AN ORGANIZED HEALTH CARE EDUCATION/TRAINING PROGRAM

## 2023-03-13 PROCEDURE — 97129 THER IVNTJ 1ST 15 MIN: CPT

## 2023-03-13 PROCEDURE — 85025 COMPLETE CBC W/AUTO DIFF WBC: CPT

## 2023-03-13 PROCEDURE — 1280000000 HC REHAB R&B

## 2023-03-13 PROCEDURE — 92526 ORAL FUNCTION THERAPY: CPT

## 2023-03-13 PROCEDURE — 97112 NEUROMUSCULAR REEDUCATION: CPT

## 2023-03-13 PROCEDURE — 36415 COLL VENOUS BLD VENIPUNCTURE: CPT

## 2023-03-13 PROCEDURE — 6370000000 HC RX 637 (ALT 250 FOR IP): Performed by: INTERNAL MEDICINE

## 2023-03-13 PROCEDURE — 97110 THERAPEUTIC EXERCISES: CPT

## 2023-03-13 PROCEDURE — 80048 BASIC METABOLIC PNL TOTAL CA: CPT

## 2023-03-13 PROCEDURE — 94640 AIRWAY INHALATION TREATMENT: CPT

## 2023-03-13 PROCEDURE — 94660 CPAP INITIATION&MGMT: CPT

## 2023-03-13 PROCEDURE — 97116 GAIT TRAINING THERAPY: CPT

## 2023-03-13 PROCEDURE — 97130 THER IVNTJ EA ADDL 15 MIN: CPT

## 2023-03-13 PROCEDURE — 94761 N-INVAS EAR/PLS OXIMETRY MLT: CPT

## 2023-03-13 PROCEDURE — 97535 SELF CARE MNGMENT TRAINING: CPT

## 2023-03-13 PROCEDURE — 92507 TX SP LANG VOICE COMM INDIV: CPT

## 2023-03-13 RX ORDER — IPRATROPIUM BROMIDE AND ALBUTEROL SULFATE 2.5; .5 MG/3ML; MG/3ML
1 SOLUTION RESPIRATORY (INHALATION) 4 TIMES DAILY
Status: DISCONTINUED | OUTPATIENT
Start: 2023-03-13 | End: 2023-03-15 | Stop reason: HOSPADM

## 2023-03-13 RX ORDER — TORSEMIDE 20 MG/1
20 TABLET ORAL DAILY
Status: DISCONTINUED | OUTPATIENT
Start: 2023-03-14 | End: 2023-03-15 | Stop reason: HOSPADM

## 2023-03-13 RX ADMIN — CLOPIDOGREL BISULFATE 75 MG: 75 TABLET ORAL at 09:37

## 2023-03-13 RX ADMIN — GUAIFENESIN 600 MG: 600 TABLET, EXTENDED RELEASE ORAL at 09:38

## 2023-03-13 RX ADMIN — ASPIRIN 325 MG: 325 TABLET, COATED ORAL at 09:38

## 2023-03-13 RX ADMIN — TORSEMIDE 10 MG: 20 TABLET ORAL at 09:38

## 2023-03-13 RX ADMIN — IPRATROPIUM BROMIDE AND ALBUTEROL SULFATE 1 AMPULE: .5; 2.5 SOLUTION RESPIRATORY (INHALATION) at 20:11

## 2023-03-13 RX ADMIN — BUDESONIDE 500 MCG: 0.5 INHALANT RESPIRATORY (INHALATION) at 20:21

## 2023-03-13 RX ADMIN — DILTIAZEM HYDROCHLORIDE 120 MG: 120 CAPSULE, COATED, EXTENDED RELEASE ORAL at 18:22

## 2023-03-13 RX ADMIN — FAMOTIDINE 20 MG: 20 TABLET ORAL at 09:37

## 2023-03-13 RX ADMIN — POTASSIUM CHLORIDE 10 MEQ: 750 TABLET, EXTENDED RELEASE ORAL at 09:38

## 2023-03-13 RX ADMIN — SENNOSIDES AND DOCUSATE SODIUM 1 TABLET: 50; 8.6 TABLET ORAL at 09:38

## 2023-03-13 RX ADMIN — FAMOTIDINE 20 MG: 20 TABLET ORAL at 20:04

## 2023-03-13 RX ADMIN — GUAIFENESIN 600 MG: 600 TABLET, EXTENDED RELEASE ORAL at 20:04

## 2023-03-13 RX ADMIN — ARFORMOTEROL TARTRATE 15 MCG: 15 SOLUTION RESPIRATORY (INHALATION) at 20:16

## 2023-03-13 RX ADMIN — ATORVASTATIN CALCIUM 80 MG: 80 TABLET, FILM COATED ORAL at 20:04

## 2023-03-13 RX ADMIN — ENOXAPARIN SODIUM 40 MG: 100 INJECTION SUBCUTANEOUS at 09:38

## 2023-03-13 RX ADMIN — IPRATROPIUM BROMIDE AND ALBUTEROL SULFATE 1 AMPULE: .5; 2.5 SOLUTION RESPIRATORY (INHALATION) at 10:00

## 2023-03-13 NOTE — PROGRESS NOTES
[facility]: ACUTE REHAB UNIT  SPEECH-LANGUAGE PATHOLOGY      [x] Daily  [] Weekly Care Conference Note  [] Discharge    Patient:Bang Smart      HQL:[birth year]  [facility]:[id number]  Rehab Dx/Hx: Acute CVA (cerebrovascular accident) ([facility] [address] [I63.9]    Precautions: falls  Home situation: Lives with son and daughter-in-law. Manages own meds and finances. Family does all other household tasks. Not an active . ST Dx: [x] Aphasia  [] Dysarthria  [] Apraxia   [x] Oropharyngeal dysphagia [x] Cognitive Impairment  [] Other:   Date of Admit: 3/6/2023  Room #: 5563/6440-50    Current functional status (updated daily):         Pt being seen for : [] Speech/Language Treatment  [] Dysphagia Treatment [x] Cognitive Treatment  [] Other:  Communication: []WFL  [x] Aphasia  [] Dysarthria  [] Apraxia  [] Pragmatic Impairment [] Non-verbal  [] Hearing Loss  [x] Other: baseline stuttering   Cognition: [] WFL  [] Mild  [x] Moderate  [] Severe [] Unable to Assess  [] Other:  Memory: [] WFL  [] Mild  [x] Moderate  [] Severe [] Unable to Assess  [] Other:  Behavior: [x] Alert  [x] Cooperative  [x]  Pleasant  [] Confused  [] Agitated  [] Uncooperative  [] Distractible [] Motivated  [] Self-Limiting [] Anxious  [] Other:  Endurance:  [x] Adequate for participation in SLP sessions  [] Reduced overall  [] Lethargic  [] Other:  Safety: [] No concerns at this time  [] Reduced insight into deficits  []  Reduced safety awareness [] Not following call light procedures  [x] Unable to Assess  [x] Other: continue to assess     Current Diet Order:ADULT DIET; Regular;  No Added Salt (3-4 gm)  Swallowing Precautions: upright for all intake, stay upright for at least 30 mins after intake, small bites/sips, oral care 2-3x/day to reduce adverse affects in the event of aspiration, alternate bites/sips, slow rate of intake, general GERD precautions, and general aspiration precautions        Date: 3/13/2023      Tx session 1  [id number] Tx session 2  All tx needs met in session 1   Total Timed Code Min 30    Total Treatment Minutes 60    Individual Treatment Minutes 60    Group Treatment Minutes 0    Co-Treat Minutes 0    Variance/Reason:  N/A    Pain Denies     Pain Intervention [] RN notified  [] Repositioned  [] Intervention offered and patient declined  [x] N/A  [] Other: [] RN notified  [] Repositioned  [] Intervention offered and patient declined  [] N/A  [] Other:   Subjective     Pt alert and cooperative, agreeable to tx. Pt upright in bedside chair. Pt's son initially present, but left after a few minutes of SLP being in room. Objective:  Goals     Dysphagia Goals:   Short-term Goals  Timeframe for Short-term Goals: (10 days; 03/15/23)    Goal 1: The patient will tolerate recommended diet with no clinical s/s of aspiration 5/5   TL via cup:  -timely swallow, adequate laryngeal elevation, no overt s/s of aspiration     [place] [employer]:  -adequate mastication, A-P transit, min to no residue in oral cavity post swallow, no s/s of aspiration     Goal met 03/13/23. Pt tolerating IDDSI Level 7 regular solids, IDDSI Level 0 thin liquids, meds as tolerated as LRD. Goal 2: The patient/caregiver will demonstrate understanding of compensatory swallow strategies, for improved swallow safety     SLP and pt discussed and reviewed ongoing use of strategies during all PO trials. Pt verbalized understanding and demonstrated appropriate understanding. Goal met 03/13/23. Cognitive-linguistic Goals:   Short Term Goals  Time Frame for Short Term Goals: 10 Days (03/15/23)    Goal 1: Pt will complete moderately complex comprehension tasks (2 step and multi-step directions/commands, abstract yes/no questions) with 80% acc given min cues   Simple yes/no questions:   -ex: is it daytime? 100% acc    Moderate yes/no questions:   -ex: are we in [facility] now?   80% acc     Complex yes/no questions:   -ex: does Tuesday come before Monday?  70% acc       Goal 2: Pt will complete moderately complex naming tasks (divergent naming, convergent, responsive, etc) with 80% acc given min cues   Naming objects within sentences:  85% acc indep improving to 100% acc given min cues     Naming to description:  90% acc indep improving to 100% acc given min cues        Goal met 03/08/23. New goal added 03/08/23:    Goal 4: Pt will complete problem solving and thought organization tasks with 75% acc given mod cues    Thought organization/verbal reasoning task:  -odd one out  -pt given a picture card with four items  -pt asked to determine which item didn't belong and why  -70% acc indep improving to 100% acc given min cues       New goal added 03/08/23:      Goal 5: Pt will complete executive function tasks (e.g. meds, money, time, etc) with 75% acc given mod cues   Did not target     Other areas targeted: N/A    Education:   Edu provided re: safe swallow strategies, word finding strategies, progress towards goals    Safety Devices: [x] Call light within reach  [x] Chair alarm activated  [] Bed alarm activated  [] Other: [] Call light within reach  [] Chair alarm activated  [] Bed alarm activated  [] Other:    Assessment: Pt pleasant and cooperative, agreeable to participate. Pt met dysphagia goals this date- tolerating IDDSI Level 7 regular solids, IDDSI Level 0 thin liquids, meds as tolerated as LRD. Pt is making consistent progress towards goals. Pt continues to benefit from word finding strategies and phonemic cues. Pt did very well with comprehension and increased complexity of yes/no questions, naming objects and items to description with % acc indep-min cues. Pt also did well with verbal reasoning task, completing with 70% acc indep. Pt remains strongly motivated to improve. Plan: Continue as per plan of care.       Additional Information:     Barriers toward progress: Communication deficit  Discharge recommendations:  [] Home independently  [] Home with assistance [x]  24 hour supervision  [] ECF [x] Other: See PT / OT recs  Continued Tx Upon Discharge: ? [x] Yes [] No [] TBD based on progress while on ARU [] Vital Stim indicated [] Other:   Estimated discharge date: TBD    Interventions used this date:  [] Speech/Language Treatment  [] Instruction in HEP [] Group [] Dysphagia Treatment [x] Cognitive Treatment   [] Other: Total Time Breakdown / Charges    Time in Time out Total Time / units   Cognitive Tx 1430 1500 30 mins/ 2 units    Speech Tx 1410 1430 20 mins/ 1 unit    Dysphagia Tx 1400 1410 10 mins/ 1 unit        Electronically Signed by     Radames CRUZ CCC-SLP [id number]  Speech-Language Pathologist

## 2023-03-13 NOTE — PROGRESS NOTES
03/12/23 2043   NIV Type   Skin Assessment Clean, dry, & intact   Skin Protection for O2 Device Yes   Orientation Middle   Location Nose   NIV Started/Stopped On   Equipment Type v60   Mode Bilevel   Mask Type Full face mask   Mask Size Medium   Settings/Measurements   IPAP 18 cmH20   CPAP/EPAP 10 cmH2O   Vt (Measured) 714 mL   Rate Ordered 16   Resp 26   FiO2  35 %   Minute Volume (L/min) 15.8 Liters   Mask Leak (lpm) 0 lpm   Comfort Level Good   Using Accessory Muscles No   Alarm Settings   Alarms On Y   Low Pressure (cmH2O) 6 cmH2O   High Pressure (cmH2O) 30 cmH2O   Delay Alarm 20 sec(s)   RR Low (bpm) 6   RR High (bpm) 40 br/min

## 2023-03-13 NOTE — PROGRESS NOTES
Physical Therapy  Facility/Department: [facility]  Rehabilitation Physical Therapy Treatment Note    NAME: Carmen Cerrato  : 1950 (68 y.o.)  MRN: [id number]  CODE STATUS: Full Code    Date of Service: 3/13/23       Restrictions:  Restrictions/Precautions: Fall Risk, General Precautions  Position Activity Restriction  Other position/activity restrictions: Strict I&Os, 3L O2; up ith assist. Notify physician for pulse less than 50 or greater than 120, respiratory rate less than 12 or greater than 25, oral temperature greater than 101.3 F (79.9 C), systolic BP less than 90 or greater than 801, diastolic BP less than 50 or greater than 100. SUBJECTIVE: Patient denies pain but reports sob. Post Treatment Pain Screening deneis pain  Seated in WC  /73 HR 84bpm, SpO2 94%. OBJECTIVE  Cognition  Overall Cognitive Status: Exceptions  Arousal/Alertness: Delayed responses to stimuli  Following Commands: Follows one step commands with increased time; Follows one step commands consistently  Attention Span: Attends with cues to redirect  Memory: Appears intact  Safety Judgement: Decreased awareness of need for assistance  Problem Solving: Assistance required to generate solutions  Insights: Decreased awareness of deficits  Initiation: Requires cues for some  Sequencing: Requires cues for some  Orientation  Overall Orientation Status: Within Functional Limits    Functional Mobility:  Sit<>Stand with out AD:    5STS in 19.7econds and 8STS in 30 seconds. BALANCE:  mCTSIB   firm surface EO: tremulous LE with mild increased sway  SBA  firm suface EC:  no change;SBA  FOAM EO  mild increased sway unchanged from previous SBA  FOAM EC lateral LEft propulsive LOB without righting reaction and therapist assist needed.    (Impaired sensation and proprioception noted due to neuropathy on evaluation as well as impaired vision Bilaterally due to failed cataract surgery)   THEREX:    Patient completed with cues KAI Mora NP for set up and balance assist for standing activity:  UE WB support LE strengthening for hip ext leaning over high mat with foam pad and mod assist for balance  Gastroc heel raises  x10 BLE with inability to lock into inversion/PF  Ankle DF resisted with green band x30 BLE   and practiced transferes sit<>stand from WC x6, chair x9, toilet x1 all with SBA and no AD with cuing needed for wider alicia and for managing O2 tubing with turning. Environmental Mobility      Gait with FWW and second person WC follow due to sudden need to sit at variable distances due to sob  Distances  45' with WC follow and SBA 10meter walk test 0.3m/sec  61' with with WC follow and CGA head turns and nods dual task  with dual task 10meter walk test 27.3m/sec  Gait: forward lean on walker with flexion in trunk throughout gait  cycle, swing does not completely pass stance LE, slow rate with mild ataxia. Patient cued during gait to pace self with standing rest and pace for KIRA RPE light to very light. Gait with no AD HHA min assist with cues for wide alicia with mild ataxia and tremulous BLE  in and out of the bathroom 12 feetx2. Constant assist needed for balance. STAIRS:  2 up to landing and back down with bilateral rails non-alternating pattern. needed cues to advance UE down rails and completed 2 steps with turn at top of landing and bilateral rails  non-alt pattern with min assist wih impaired accuracy  and control of placing LLE on step with extra time needed . Patient needed seated rest after each bout of steps due to sob. Patient cued during steps to pace self with standing rest and pace for KIRA RPE light to very light. WC mobility: cues for reciprocal BLE propulsion with patient needing cues with doorways x2 as patient running into door frame on right side 2/2 doorways with cues needed to turn head to right, back up and re-adjust wC path to avoid right side of door frame.      ASSESSMENT/PROGRESS TOWARDS GOALS Assessment  Assessment: Patient seen for session in room and in gym/[facility] holman for gait training with FWW, WC follow, cues for O2 tubing and sba due to unsteady gait with dual tasking. Patient walked 10 meter walk test wtih .3m/sec and with head turns L/R and nods . 22m/sec with tremulous LE and need to sit due to sob with 1-2 minute seated rest breaks needed throughout session after 2 minutes or less of standing activity. Patient completed 5STS in 19.7seconds and 8STS in 30 seconds. Patient noted with mCTSIB tremulous LE with mild increased sway firm surface EO, EC no change; with foam EO  mild increased sway unchanged from previous and with FOAM EC lateral LEft propulsive LOB without righting reaction and therapist assist needed. First 3 conditions SBA. Patient completed LE strengthening for hip ext, gastroc, ant tib and practiced transferes sit<>stand from [facility] x6, chair x9, toilet x1 all with SBA and no AD with cuing needed for wider alicia and for managing O2 tubing with turning. Patient with steps needed cues to advance UE down rails and completed 2 steps with turn at top of landing and bilateral rails  non-alt pattern with min assist wih impaired accuracy  and control of placing LLE on step with extra time needed . Patient needed seated rest after each bout of steps due to sob. Patient cued during gait adn setps to pace self with standing rest and pace for KIRA RPE light to very light. Patient with improving steadiness with sit<>Stand but continued unsteady gait worse with head movements and with steps. Patient with continued poor endurance. Goals  Patient Goals   Patient Goals : \"Get back to normal.\"  Short Term Goals  Time Frame for Short Term Goals: 3/14/2023  Short Term Goal 1: Patient demo SBA in bed<>chair, sit<>Stand, car transfer with LRAD. 3/10/2023 Goal partailly met Patient demoCGA to SBA bed<>chair and sit<>Stand.   Short Term Goal 2: Patient demo walking 100-150 feet with SpO2 90% or greater with SBA. Goal partially met 3/8/2023 Patient demo 200' over 12'48\" in parallel bars wtih min assist with demo, cues for RUE advancement and weight shift facilitation for RLE with wide alicia with turns L or R every 10 feet. 3/10/2023 patient wallking 58-64' with HHA mod for balance and  WC follow with sob and transient hypoxia 88-89% limiting endurance. Short Term Goal 3: Patient demo up and down 6 steps with LRAD min assist. 3/13/2023 Patient demo up and down 2steps with min assist and number of steps  limited to 2 due to sob. Short Term Goal 4: Patient demo indep in [facility] propulsion 150 feet with l/r turns and indep in WC parts management. 3/10/2023 Pt needs cues with 100 feet WC proulsion with ext x4 to avoid running into doorframes on right. Short Term Goal 5: Patient demo stoop to recover wtih LRAD indep. Long Term Goals  Time Frame for Long Term Goals : 3/21/2023  Long Term Goal 1: Patient demo indep in bed<>chair, sit<>Stand, car transfer with LRAD. Long Term Goal 2: Patient demo gait 150 feet with 2 turns on turf carpet of 10 feet or greater indep with LRAD. Long Term Goal 3: Patient demo up and down 12 steps with LRAD indep  Long Term Goal 4: Patient demo indep in LE strengthening and coordination seated and standing exercises. PLAN OF CARE/SAFETY  Physcial Therapy Plan  General Plan: 5-7 times per week  Current Treatment Recommendations: Strengthening;ROM;Balance training;Functional mobility training;Transfer training; Endurance training;Gait training;Stair training;Neuromuscular re-education;Home exercise program;Safety education & training;Patient/Caregiver education & training;Equipment evaluation, education, & procurement; Therapeutic activities  Safety Devices  Type of Devices: All fall risk precautions in place;Call light within reach; Chair alarm in place; Left in chair;Nurse notified;Gait belt    EDUCATION: Educated in pacing, set up assist with therex, managing O2 tubing. Therapy Time   Individual Concurrent Group Co-treatment   Time In 8781         Time Out 1140         Minutes 68           Timed Code Treatment Minutes: [facility] [place], 03/13/23 at 2:00 PM

## 2023-03-13 NOTE — PROGRESS NOTES
Albert Iraan  3/13/2023  [id number]    Chief Complaint: Acute CVA (cerebrovascular accident) [facility])    Subjective:   No acute events overnight. Today Leslee Ariza is seen with nursing present. He endorses shortness of breath. He was unable to get his morning breathing treatment because he was in therapy. Will discuss scheduling his therapy at a later time to ensure he gets breathing treatments prior to therapy. ROS: denies f/c, n/v, cp    Objective:  Patient Vitals for the past 24 hrs:   BP Temp Temp src Pulse Resp SpO2   03/13/23 0930 105/64 98.1 °F (36.7 °C) Oral 77 18 95 %   03/13/23 0000 -- -- -- -- 16 --   03/12/23 2043 -- -- -- -- 26 --   03/12/23 1955 -- 98.9 °F (37.2 °C) Oral 80 16 95 %   03/12/23 1930 114/80 98.9 °F (37.2 °C) Oral 80 16 95 %   03/12/23 1612 -- -- -- -- -- 96 %   03/12/23 1600 (!) 121/95 -- -- 75 24 96 %     Gen: No distress, pleasant. HEENT: Normocephalic, atraumatic. CV: extremities well perfused  Resp: No respiratory distress. No wheezing  Abd: Soft, nondistended  Neuro: Alert, oriented, appropriately interactive. assistance  Psych: appropriate mood and affect    Wt Readings from Last 3 Encounters:   03/10/23 170 lb 6 oz (77.3 kg)   03/06/23 181 lb 7 oz (82.3 kg)   02/25/23 177 lb 14.6 oz (80.7 kg)       Laboratory data:   Lab Results   Component Value Date    WBC 13.7 (H) 03/13/2023    HGB 14.7 03/13/2023    HCT 45.5 03/13/2023    MCV 93.2 03/13/2023     03/13/2023       Lab Results   Component Value Date/Time     03/13/2023 07:11 AM    K 3.8 03/13/2023 07:11 AM    CL 99 03/13/2023 07:11 AM    CO2 33 03/13/2023 07:11 AM    BUN 19 03/13/2023 07:11 AM    CREATININE 0.8 03/13/2023 07:11 AM    GLUCOSE 111 03/13/2023 07:11 AM    CALCIUM 8.7 03/13/2023 07:11 AM        Therapy progress:  PT  Position Activity Restriction  Other position/activity restrictions: Strict I&Os, 3L O2; up ith assist. Notify physician for pulse less than 50 or greater than 120, respiratory rate less than 12 or greater than 25, oral temperature greater than 101.3 F (79.2 C), systolic BP less than 90 or greater than 456, diastolic BP less than 50 or greater than 100. Objective     Sit to Stand: Contact guard assistance  Stand to Sit: Contact guard assistance  Bed to Chair: Minimal assistance (ataxia noted, uses UE and environmental support.)  Device: Rolling Walker  Other Apparatus: O2 (2L)  Assistance: Minimal assistance  Distance: 20' needed WC follow after first 10 feet as gait more unsteady and had to sit immediately with  with WC follow needed. OT  PT Equipment Recommendations  Equipment Needed: Yes  Walker: Rolling  Other: rolling walker  Toilet - Technique: Ambulating  Equipment Used: Standard toilet  Toilet Transfers Comments: no AD  Assessment        SLP                Body mass index is 23.11 kg/m². Assessment and Plan:  Juwan Mims is a 68year old male with a past medical history significant for atrial fibrillation on Eliquis, HFpEF, COPD with chronic respiratory failure on 4 L O2 baseline who presented to [facility] on 2/25/23 with right sided weakness and aphasia, found to have left MCA CVA and subsequently transferred to [facility]. for left ICA stent placement. He was admitted to [facility] on 3/6/23 due to functional deficits below his baseline. Left MCA CVA  - due to left ICA stenosis  - DAPT as below, statin  - PT, OT, ST    Oropharyngeal Dysphagia  - ST     Left Internal Carotid Stenosis  - s/p stent placement on 3/2  - DAPT for 6 weeks then stop Plavix, continue asa. Resume Eliquis 4/17  - follow up with [facility]     Atrial Fibrillation  - with tachy-bear syndrome during acute stay  - EP switched cardizem to 120 mg nightly. Appreciate assistance. Planning on two week event monitor on discharge and follow up as outpatient.    - Eliquis to be resume 4/17     HFpEF  - home regimen: torsemide 40 mg daily  - increase torsemide to 20 mg daily, added K while on diuretic  - daily weights, I/Os     Chronic Respiratory Failure  COPD  - on baseline 4 L O2  - completed short burst of steroids per Pulm for possible exacerbation  - brovana, pulmicort  - duo-nebs PRN  - wean oxygen for goal SpO2>88%  - Pulmonology consulted, appreciate assistance. Bowels: adjust medications as needed for regular bowel movements     Bladder: Check PVR x 3. [facility] if PVR > 200ml or if any volume is > 500 ml. Sleep: Trazodone provided prn. PPX   DVT: lovenox  GI: not indicated     Follow up appointments: Neurosurgery, PCP, EP, Pulmonology    Services: [facility] v OP  EDOD: 3/15    Jeferson Nieves.  Blake Mattson MD 3/13/2023, 12:26 PM

## 2023-03-13 NOTE — PROGRESS NOTES
03/13/23 0000   NIV Type   $NIV $Daily Charge   Skin Protection for O2 Device Yes   Orientation Middle   Location Nose   Equipment Type v60   Mode Bilevel   Mask Type Full face mask   Mask Size Medium   Settings/Measurements   PIP Observed 18 cm H20   IPAP 18 cmH20   CPAP/EPAP 10 cmH2O   Vt (Measured) 623 mL   Rate Ordered 16   Resp 16   FiO2  35 %   I Time/ I Time % 0.9 s   Minute Volume (L/min) 10.2 Liters   Mask Leak (lpm) 0 lpm   Comfort Level Good   Using Accessory Muscles No   SpO2 96   Alarm Settings   Alarms On Y   Low Pressure (cmH2O) 6 cmH2O   High Pressure (cmH2O) 30 cmH2O   Apnea (secs) 20 secs   RR Low (bpm) 6   RR High (bpm) 40 br/min

## 2023-03-13 NOTE — PROGRESS NOTES
Occupational Therapy  Facility/Department: [facility]  Rehabilitation Occupational Therapy Daily Treatment Note    Date: 3/13/23  Patient Name: Jhonny Peterson       Room: [facility]/3410-41  MRN: [id number]  Account: [de-identified]   : 1950  (78 y.o.) Gender: male                    Past Medical History:  has a past medical history of Atrial fibrillation ([facility] [address] Bronchitis chronic, Emphysema of lung ([facility] [address] HTN (hypertension), Influenza A, Lung disease, Pneumonia, and Stroke. Past Surgical History:   has a past surgical history that includes fracture surgery and hernia repair. Restrictions  Restrictions/Precautions: Fall Risk, General Precautions  Other position/activity restrictions: Strict I&Os, 3L O2; up ith assist. Notify physician for pulse less than 50 or greater than 120, respiratory rate less than 12 or greater than 25, oral temperature greater than 101.3 F (34.8 C), systolic BP less than 90 or greater than 781, diastolic BP less than 50 or greater than 100. Required Braces or Orthoses?: No    Subjective  Subjective: Pt in recliner, eating breakfast, no pain, agreeable to OT session, requesting shower, /69, HR 86, O2 sats 94% on 3L O2. Restrictions/Precautions: Fall Risk;General Precautions             Objective     Cognition  Overall Cognitive Status: Exceptions  Arousal/Alertness: Delayed responses to stimuli  Following Commands: Follows one step commands with increased time; Follows one step commands consistently  Attention Span: Attends with cues to redirect  Memory: Appears intact  Safety Judgement: Decreased awareness of need for assistance  Problem Solving: Assistance required to generate solutions  Insights: Decreased awareness of deficits  Initiation: Requires cues for some  Sequencing: Requires cues for some  Orientation  Overall Orientation Status: Within Functional Limits   Perception  Overall Perceptual Status: Impaired  Unilateral Attention: Appears intact  Initiation: Cues to initiate tasks  Motor Planning: Appears intact  Perseveration: Not present     ADL  Feeding  Assistance Level: Independent  Grooming/Oral Hygiene  Assistance Level: Independent  Skilled Clinical Factors: to comb hair standing at sink  Upper Extremity Bathing  Assistance Level: Independent  Lower Extremity Bathing  Assistance Level: Modified independent  Upper Extremity Dressing  Assistance Level: Supervision  Skilled Clinical Factors: cues to sit down to don shirt to conserve energy  Lower Extremity Dressing  Assistance Level: Supervision  Skilled Clinical Factors: to doff/don brief/pants  Putting On/Taking Off Footwear  Assistance Level: Set-up  Skilled Clinical Factors: to doff/don socks and shoes  Toileting  Assistance Level: Modified independent  Skilled Clinical Factors: no AD  Toilet Transfers  Technique: Stand step  Equipment: Standard toilet  Additional Factors: Set-up; With handrails  Assistance Level: Supervision  Skilled Clinical Factors: no AD  Tub/Shower Transfers  Type: Shower  Transfer From: Standing without device  Transfer To: Tub transfer bench  Additional Factors: With handrails  Assistance Level: Supervision          Functional Mobility  Device:  (no AD)  Activity: To/From bathroom;Transport items; Retrieve items  Assistance Level: Minimal assistance  Skilled Clinical Factors: to step up/down on balance board, SPV for mobility to/from bathroom without AD  Transfers  Surface: To chair with arms;From chair with arms;Standard toilet; Wheelchair  Additional Factors: Set-up; Verbal cues; Hand placement cues; With handrails  Sit to Stand  Assistance Level: Supervision  Stand to Sit  Assistance Level: Supervision  Bed To/From Chair  Technique: Stand step  Assistance Level: Supervision         Assessment  Assessment  Assessment: Pt agreeable to OT session and requesting shower. Pt completed toileting with mod I and bathing with mod I.  Pt required SPV for dressing for cues to sit down prior to donning shirt and mild unsteadiness when pulling pants over hips. Pt demonstrated good coordination to don socks/shoes seated. Pt performed balance task to step up/down on balance board with min A for HHA on L side and mod VCs for problem solving simple math problems. Pt demonstrated tremors in RUE when reaching to hook but able to place on 4/4 hooks without assistance or dropping. Pt O2 sats droping to 89% on 3L O2 after standing tasks of 1:30-2:00 for 2 rings x2. Continue POC. Activity Tolerance: Patient tolerated treatment well;Patient limited by fatigue;Patient limited by endurance  Discharge Recommendations: 24 hour supervision or assist;Outpatient OT  OT Equipment Recommendations  Equipment Needed: Yes  Mobility Devices: ADL Assistive Devices  ADL Assistive Devices: Shower Chair with back  [facility] in place: Yes  Type of devices: Left in chair;Call light within reach;Nurse notified; Chair alarm in place;Gait belt; All fall risk precautions in place; Patient at risk for falls    Patient Education  Education  Education Given To: Patient  Education Provided: Role of Therapy; ADL Function;Plan of Care;Precautions; Safety; Fall Prevention Strategies;DME/Home Modifications;Transfer Training;Mobility Training;Equipment; Energy Conservation  Education Provided Comments: role of OT, transfer training, ADL retraining, energy conservation and rest breaks with ADLs, PLB  Education Method: Demonstration;Verbal  Barriers to Learning: Cognition  Education Outcome: Verbalized understanding;Demonstrated understanding;Continued education needed    Plan  Occupational Therapy Plan  Times Per Week: 5/7x  Current Treatment Recommendations: Self-Care / ADL; Functional mobility training; Endurance training; Safety education & training;Patient/Caregiver education & training;Neuromuscular re-education;Strengthening;ROM;Balance training;Equipment evaluation, education, & procurement;Home management training;Coordination training    Goals  Short Term Goals  Time Frame for Short Term Goals: 6 days- 3/11/23  Short Term Goal 1: Pt will complete functional transfers with SPV. GOAL MET 3/10/23 Pt completed functional transfers with SPV. Short Term Goal 2: Pt will perform toileting with SPV. GOAL MET 3/9/23 Pt performed toileting with SPV. Short Term Goal 3: Pt will complete full body dressing with SPV. GOAL MET 3/10/23 Pt completed full body dressing with SPV. Short Term Goal 4: Pt will perform full body bathing with SPV. GOAL MET 3/10/23 Pt performed full body bathing with SPV. Short Term Goal 5: Pt will perform grooming at sink with mod I.-GOAL MET 3/11  Long Term Goals  Time Frame for Long Term Goals : 13 days- 3/18/23  Long Term Goal 1: Pt will perform functional transfers with mod I.  Long Term Goal 2: Pt will complete BADLs with mod I.  Long Term Goal 3: Pt will improve coordination in RUE as evidenced by at least 5 second improvement in NHPT.     Therapy Time   Individual Concurrent Group Co-treatment   Time In 0730         Time Out 0830         Minutes 60         Timed Code Treatment Minutes: [address]

## 2023-03-14 ENCOUNTER — APPOINTMENT (OUTPATIENT)
Dept: GENERAL RADIOLOGY | Age: 73
DRG: 057 | End: 2023-03-14
Attending: PHYSICAL MEDICINE & REHABILITATION
Payer: MEDICARE

## 2023-03-14 LAB
ANION GAP SERPL CALCULATED.3IONS-SCNC: 8 MMOL/L (ref 3–16)
BUN SERPL-MCNC: 18 MG/DL (ref 7–20)
CALCIUM SERPL-MCNC: 8.8 MG/DL (ref 8.3–10.6)
CHLORIDE SERPL-SCNC: 97 MMOL/L (ref 99–110)
CO2 SERPL-SCNC: 32 MMOL/L (ref 21–32)
CREAT SERPL-MCNC: 0.7 MG/DL (ref 0.8–1.3)
GFR SERPLBLD CREATININE-BSD FMLA CKD-EPI: >60 ML/MIN/{1.73_M2}
GLUCOSE SERPL-MCNC: 121 MG/DL (ref 70–99)
MAGNESIUM SERPL-MCNC: 1.9 MG/DL (ref 1.8–2.4)
NT-PROBNP SERPL-MCNC: 282 PG/ML (ref 0–124)
POTASSIUM SERPL-SCNC: 3.5 MMOL/L (ref 3.5–5.1)
SODIUM SERPL-SCNC: 137 MMOL/L (ref 136–145)

## 2023-03-14 PROCEDURE — 2700000000 HC OXYGEN THERAPY PER DAY

## 2023-03-14 PROCEDURE — 83735 ASSAY OF MAGNESIUM: CPT

## 2023-03-14 PROCEDURE — 71045 X-RAY EXAM CHEST 1 VIEW: CPT

## 2023-03-14 PROCEDURE — 6360000002 HC RX W HCPCS: Performed by: STUDENT IN AN ORGANIZED HEALTH CARE EDUCATION/TRAINING PROGRAM

## 2023-03-14 PROCEDURE — 97110 THERAPEUTIC EXERCISES: CPT

## 2023-03-14 PROCEDURE — 94761 N-INVAS EAR/PLS OXIMETRY MLT: CPT

## 2023-03-14 PROCEDURE — 6370000000 HC RX 637 (ALT 250 FOR IP): Performed by: STUDENT IN AN ORGANIZED HEALTH CARE EDUCATION/TRAINING PROGRAM

## 2023-03-14 PROCEDURE — 6370000000 HC RX 637 (ALT 250 FOR IP): Performed by: INTERNAL MEDICINE

## 2023-03-14 PROCEDURE — 80048 BASIC METABOLIC PNL TOTAL CA: CPT

## 2023-03-14 PROCEDURE — 97129 THER IVNTJ 1ST 15 MIN: CPT

## 2023-03-14 PROCEDURE — 97116 GAIT TRAINING THERAPY: CPT

## 2023-03-14 PROCEDURE — 92507 TX SP LANG VOICE COMM INDIV: CPT

## 2023-03-14 PROCEDURE — 97530 THERAPEUTIC ACTIVITIES: CPT

## 2023-03-14 PROCEDURE — 1280000000 HC REHAB R&B

## 2023-03-14 PROCEDURE — 94660 CPAP INITIATION&MGMT: CPT

## 2023-03-14 PROCEDURE — 94640 AIRWAY INHALATION TREATMENT: CPT

## 2023-03-14 PROCEDURE — 97112 NEUROMUSCULAR REEDUCATION: CPT

## 2023-03-14 PROCEDURE — 83880 ASSAY OF NATRIURETIC PEPTIDE: CPT

## 2023-03-14 PROCEDURE — 97535 SELF CARE MNGMENT TRAINING: CPT

## 2023-03-14 PROCEDURE — 36415 COLL VENOUS BLD VENIPUNCTURE: CPT

## 2023-03-14 RX ORDER — FAMOTIDINE 20 MG/1
20 TABLET, FILM COATED ORAL 2 TIMES DAILY
Qty: 60 TABLET | Refills: 2 | Status: SHIPPED | OUTPATIENT
Start: 2023-03-14

## 2023-03-14 RX ORDER — TORSEMIDE 10 MG/1
10 TABLET ORAL DAILY
Qty: 30 TABLET | Refills: 2 | Status: SHIPPED | OUTPATIENT
Start: 2023-03-14

## 2023-03-14 RX ORDER — IPRATROPIUM BROMIDE AND ALBUTEROL SULFATE 2.5; .5 MG/3ML; MG/3ML
SOLUTION RESPIRATORY (INHALATION)
Qty: 360 ML | Refills: 5 | Status: SHIPPED | OUTPATIENT
Start: 2023-03-14

## 2023-03-14 RX ORDER — ATORVASTATIN CALCIUM 80 MG/1
80 TABLET, FILM COATED ORAL DAILY
Qty: 30 TABLET | Refills: 2 | Status: SHIPPED | OUTPATIENT
Start: 2023-03-14

## 2023-03-14 RX ORDER — GUAIFENESIN 600 MG/1
600 TABLET, EXTENDED RELEASE ORAL 2 TIMES DAILY PRN
Qty: 60 TABLET | Refills: 0 | Status: SHIPPED | OUTPATIENT
Start: 2023-03-14

## 2023-03-14 RX ORDER — CLOPIDOGREL BISULFATE 75 MG/1
75 TABLET ORAL DAILY
Qty: 31 TABLET | Refills: 0 | Status: SHIPPED | OUTPATIENT
Start: 2023-03-16 | End: 2023-04-16

## 2023-03-14 RX ORDER — POTASSIUM CHLORIDE 750 MG/1
10 TABLET, EXTENDED RELEASE ORAL DAILY
Qty: 30 TABLET | Refills: 2 | Status: SHIPPED | OUTPATIENT
Start: 2023-03-15

## 2023-03-14 RX ORDER — LEVOFLOXACIN 500 MG/1
500 TABLET, FILM COATED ORAL DAILY
Status: DISCONTINUED | OUTPATIENT
Start: 2023-03-14 | End: 2023-03-15 | Stop reason: HOSPADM

## 2023-03-14 RX ORDER — LEVOFLOXACIN 500 MG/1
500 TABLET, FILM COATED ORAL DAILY
Qty: 5 TABLET | Refills: 0 | Status: SHIPPED | OUTPATIENT
Start: 2023-03-16 | End: 2023-03-21

## 2023-03-14 RX ADMIN — IPRATROPIUM BROMIDE AND ALBUTEROL SULFATE 1 AMPULE: .5; 2.5 SOLUTION RESPIRATORY (INHALATION) at 12:03

## 2023-03-14 RX ADMIN — FAMOTIDINE 20 MG: 20 TABLET ORAL at 07:58

## 2023-03-14 RX ADMIN — IPRATROPIUM BROMIDE AND ALBUTEROL SULFATE 1 AMPULE: .5; 2.5 SOLUTION RESPIRATORY (INHALATION) at 07:14

## 2023-03-14 RX ADMIN — IPRATROPIUM BROMIDE AND ALBUTEROL SULFATE 1 AMPULE: .5; 2.5 SOLUTION RESPIRATORY (INHALATION) at 16:00

## 2023-03-14 RX ADMIN — POTASSIUM CHLORIDE 10 MEQ: 750 TABLET, EXTENDED RELEASE ORAL at 07:58

## 2023-03-14 RX ADMIN — GUAIFENESIN 600 MG: 600 TABLET, EXTENDED RELEASE ORAL at 21:09

## 2023-03-14 RX ADMIN — FAMOTIDINE 20 MG: 20 TABLET ORAL at 21:09

## 2023-03-14 RX ADMIN — BUDESONIDE 500 MCG: 0.5 INHALANT RESPIRATORY (INHALATION) at 20:52

## 2023-03-14 RX ADMIN — ENOXAPARIN SODIUM 40 MG: 100 INJECTION SUBCUTANEOUS at 07:57

## 2023-03-14 RX ADMIN — ASPIRIN 325 MG: 325 TABLET, COATED ORAL at 07:58

## 2023-03-14 RX ADMIN — SENNOSIDES AND DOCUSATE SODIUM 1 TABLET: 50; 8.6 TABLET ORAL at 07:58

## 2023-03-14 RX ADMIN — BUDESONIDE 500 MCG: 0.5 INHALANT RESPIRATORY (INHALATION) at 07:17

## 2023-03-14 RX ADMIN — IPRATROPIUM BROMIDE AND ALBUTEROL SULFATE 1 AMPULE: .5; 2.5 SOLUTION RESPIRATORY (INHALATION) at 20:52

## 2023-03-14 RX ADMIN — TORSEMIDE 20 MG: 20 TABLET ORAL at 07:58

## 2023-03-14 RX ADMIN — CLOPIDOGREL BISULFATE 75 MG: 75 TABLET ORAL at 07:58

## 2023-03-14 RX ADMIN — DILTIAZEM HYDROCHLORIDE 120 MG: 120 CAPSULE, COATED, EXTENDED RELEASE ORAL at 16:20

## 2023-03-14 RX ADMIN — ARFORMOTEROL TARTRATE 15 MCG: 15 SOLUTION RESPIRATORY (INHALATION) at 07:17

## 2023-03-14 RX ADMIN — ARFORMOTEROL TARTRATE 15 MCG: 15 SOLUTION RESPIRATORY (INHALATION) at 20:52

## 2023-03-14 RX ADMIN — LEVOFLOXACIN 500 MG: 500 TABLET, FILM COATED ORAL at 16:20

## 2023-03-14 RX ADMIN — GUAIFENESIN 600 MG: 600 TABLET, EXTENDED RELEASE ORAL at 07:58

## 2023-03-14 RX ADMIN — ATORVASTATIN CALCIUM 80 MG: 80 TABLET, FILM COATED ORAL at 21:09

## 2023-03-14 RX ADMIN — TRAZODONE HYDROCHLORIDE 50 MG: 50 TABLET ORAL at 21:09

## 2023-03-14 ASSESSMENT — PAIN SCALES - GENERAL
PAINLEVEL_OUTOF10: 0

## 2023-03-14 NOTE — DISCHARGE INSTR - COC
Continuity of Care Form    Patient Name: Pedro Pablo Agrawal   :  1950  MRN:  [id number]    Admit date:  3/6/2023  Discharge date:  03/15/23    Code Status Order: Full Code   Advance Directives:     Admitting Physician:  iFona Umaña MD  PCP: Emmett Boast    Discharging Nurse: [facility]/Room#: 9157/6869-69  Discharging Unit Phone Number: [phone]    Emergency Contact:   Extended Emergency Contact Information  Primary Emergency Contact: [address] of [address] [facility] Phone: [phone]  Relation: Daughter-in-Law  Secondary Emergency Contact: [address] Phone: [phone]  Relation: Grandchild    Past Surgical History:  Past Surgical History:   Procedure Laterality Date    FRACTURE SURGERY      johnson in femur/hip on right    HERNIA REPAIR         Immunization History:   Immunization History   Administered Date(s) Administered    COVID-19, MODERNA BLUE border, Primary or Immunocompromised, (age 12y+), IM, 100 mcg/0.5mL 2021, 04/10/2021    COVID-19, PFIZER Bivalent BOOSTER, DO NOT Dilute, (age 12y+), IM, 30 mcg/0.3 mL 10/22/2022    COVID-19, PFIZER PURPLE top, DILUTE for use, (age 15 y+), 30mcg/0.3mL 2021    Influenza A (Q5T7-31) Vaccine IM 2010    Influenza A (P3A2-70) Vaccine PF IM 2010    Influenza Virus Vaccine 2010, 2012, 10/06/2014    Influenza, FLUARIX, FLULAVAL, FLUZONE (age 10 mo+) AND AFLURIA, (age 1 y+), PF, 0.5mL 2018, 10/22/2018    Influenza, FLUZONE (age 72 y+), High Dose, 0.7mL 10/06/2020, 10/22/2022    Influenza, High Dose (Fluzone 65 yrs and older) 10/13/2015, 2019, 2021    Pneumococcal Conjugate 13-valent (Tbjjwsq57) 2016    Pneumococcal Polysaccharide (Ppxxuwsuj09) 2009, 2012, 2018    Td vaccine (adult) 2007    Tdap (Boostrix, Adacel) 2012       Active Problems:  Patient Active Problem List   Diagnosis Code    Acute exacerbation of chronic obstructive pulmonary disease (COPD) ([facility] J44.1    Essential hypertension I10    Former smoker Z87.891    Atrial fibrillation (HCC) I48.91    Acute on chronic respiratory failure with hypoxia and hypercapnia (HCC) J96.21, J96.22    Acute hyponatremia E87.1    Hyperbilirubinemia E80.6    Influenzal pneumonia J11.00    Unstable angina pectoris (HCC) I20.0    Acute hypokalemia E87.6    Hypophosphatemia E83.39    Long term current use of diuretic Z79.899    Acute on chronic combined systolic (congestive) and diastolic (congestive) heart failure ([facility]) I50.43    Hypotension I95.9    COPD, very severe (HCC) J44.9    Chronic respiratory failure with hypoxia and hypercapnia (HCC) J96.11, J96.12    Nocturnal hypoxia G47.34    Acute unilateral cerebral infarction in a watershed distribution [facility]) I63.89    Aphasia due to acute cerebrovascular accident (CVA) ([facility]) I63.9, R47.01    Acute cerebrovascular accident (CVA) due to stenosis of left carotid artery ([facility]) W81.571    ALEXANDREA treated with BiPAP G47.33    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    Acute CVA (cerebrovascular accident) ([facility] I63.9    Atelectasis J98.11       Isolation/Infection:   Isolation            No Isolation          Patient Infection Status       Infection Onset Added Last Indicated Last Indicated By Review Planned Expiration Resolved Resolved By    None active    Resolved    COVID-19 (Rule Out) 04/03/22 04/03/22 04/03/22 COVID-19, Rapid (Ordered)   04/03/22 Rule-Out Test Resulted            Nurse Assessment:  Last Vital Signs: /75   Pulse 73   Temp 97.5 °F (36.4 °C) (Oral)   Resp 18   Ht 6' (1.829 m)   Wt 166 lb 8 oz (75.5 kg)   SpO2 94%   BMI 22.58 kg/m²     Last documented pain score (0-10 scale): Pain Level: 0  Last Weight:   Wt Readings from Last 1 Encounters:   03/14/23 166 lb 8 oz (75.5 kg)     Mental Status:  oriented and alert    IV Access:  - None    Nursing Mobility/ADLs:  Walking   Assisted  Transfer  Assisted  Bathing Assisted  Dressing  Assisted  Toileting  Assisted  Feeding  Independent  Med Admin  Assisted  Med Delivery   whole    Wound Care Documentation and Therapy:        Elimination:  Continence: Bowel: Yes  Bladder: Yes  Urinary Catheter: None   Colostomy/Ileostomy/Ileal Conduit: No       Date of Last BM: 3/14/23    Intake/Output Summary (Last 24 hours) at 3/14/2023 1109  Last data filed at 3/14/2023 1050  Gross per 24 hour   Intake 357 ml   Output 200 ml   Net 157 ml     I/O last 3 completed shifts: In: 0   Out: 200 [Urine:200]    Safety Concerns: At Risk for Falls    Impairments/Disabilities:      Expressive aphasia    Nutrition Therapy:  Current Nutrition Therapy:   - Oral Diet:  General NO ADDED SALT (3-4-GM)    Routes of Feeding: Oral   Liquids: Thin Liquids  Daily Fluid Restriction: no  Last Modified Barium Swallow with Video (Video Swallowing Test): not done    Treatments at the Time of Hospital Discharge:   Respiratory Treatments: HHN  Oxygen Therapy:  is on oxygen at 3 L/min per nasal cannula.   Ventilator:    CPAP AT NIGHT    Rehab Therapies:   Weight Bearing Status/Restrictions: No weight bearing restrictions  Other Medical Equipment (for information only, NOT a DME order):  walker  Other Treatments:     Patient's personal belongings (please select all that are sent with patient):  Da    RN SIGNATURE:  Electronically signed by Yoselin Roe RN on 3/15/23 at 2:21 PM EDT    CASE MANAGEMENT/SOCIAL WORK SECTION    Inpatient Status Date: 03/06/23    Readmission Risk Assessment Score:  Readmission Risk              Risk of Unplanned Readmission:  17       Outpatient script and location list given to patient on 03/14    / signature: Electronically signed by Mani Burton RN on 3/15/23 at 9:12 AM EDT    PHYSICIAN SECTION    Prognosis: Good    Condition at Discharge: Stable    Rehab Potential (if transferring to Rehab): Good    Recommended Labs or Other Treatments After Discharge: - Follow up with your family doctor, Cardiology, Pulmonology, and Neurosurgery  - No driving until cleared by a physician     Physician Certification: I certify the above information and transfer of Nathaniel Matta  is necessary for the continuing treatment of the diagnosis listed and that he requires [address] for less 30 days.      Update Admission H&P: No change in H&P    PHYSICIAN SIGNATURE:  Electronically signed by Sybil Chand MD on 3/14/23 at 11:10 AM EDT

## 2023-03-14 NOTE — PROGRESS NOTES
[facility]: ACUTE REHAB UNIT  SPEECH-LANGUAGE PATHOLOGY      [x] Daily  [] Weekly Care Conference Note  [x] Discharge    Patient:Bang Collazo      EWX:0/55/7300  FSS:[id number]  Rehab Dx/Hx: Acute CVA (cerebrovascular accident) ([facility] [I63.9]    Precautions: falls  Home situation: Lives with son and daughter-in-law. Manages own meds and finances. Family does all other household tasks. Not an active . ST Dx: [x] Aphasia  [] Dysarthria  [] Apraxia   [x] Oropharyngeal dysphagia [x] Cognitive Impairment  [] Other:   Date of Admit: 3/6/2023  Room #: 4212/3213-53    Current functional status (updated daily):         Pt being seen for : [] Speech/Language Treatment  [] Dysphagia Treatment [x] Cognitive Treatment  [] Other:  Communication: []WFL  [x] Aphasia  [] Dysarthria  [] Apraxia  [] Pragmatic Impairment [] Non-verbal  [] Hearing Loss  [x] Other: baseline stuttering   Cognition: [] WFL  [] Mild  [x] Moderate  [] Severe [] Unable to Assess  [] Other:  Memory: [] WFL  [] Mild  [x] Moderate  [] Severe [] Unable to Assess  [] Other:  Behavior: [x] Alert  [x] Cooperative  [x]  Pleasant  [] Confused  [] Agitated  [] Uncooperative  [] Distractible [] Motivated  [] Self-Limiting [] Anxious  [] Other:  Endurance:  [x] Adequate for participation in SLP sessions  [] Reduced overall  [] Lethargic  [] Other:  Safety: [] No concerns at this time  [] Reduced insight into deficits  []  Reduced safety awareness [] Not following call light procedures  [x] Unable to Assess  [x] Other: continue to assess     Current Diet Order:ADULT DIET; Regular;  No Added Salt (3-4 gm)  Swallowing Precautions: upright for all intake, stay upright for at least 30 mins after intake, small bites/sips, oral care 2-3x/day to reduce adverse affects in the event of aspiration, alternate bites/sips, slow rate of intake, general GERD precautions, and general aspiration precautions        Date: 3/14/2023      Tx session 1  [id number] DISCHARGE SUMMARY Total Timed Code Min 30    Total Treatment Minutes 60    Individual Treatment Minutes 60    Group Treatment Minutes 0    Co-Treat Minutes 0    Variance/Reason:  N/A    Pain Denies     Pain Intervention [] RN notified  [] Repositioned  [] Intervention offered and patient declined  [x] N/A  [] Other: [] RN notified  [] Repositioned  [] Intervention offered and patient declined  [] N/A  [] Other:   Subjective     Pt alert and oriented, cooperative and agreeable to participate in therapy. Pt seen sitting upright in bedside chair. Pt stating \"exhausted from therapy\"        Objective:  Goals      Goal met 03/13/23. Pt tolerating IDDSI Level 7 regular solids, IDDSI Level 0 thin liquids, meds as tolerated as LRD. Goal met 03/13/23. Pt tolerating IDDSI Level 7 regular solids, IDDSI Level 0 thin liquids, meds as tolerated as LRD. Goal met 03/13/23. Goal met 03/13/23. Cognitive-linguistic Goals:   Short Term Goals  Time Frame for Short Term Goals: 10 Days (03/15/23)    Goal 1: Pt will complete moderately complex comprehension tasks (2 step and multi-step directions/commands, abstract yes/no questions) with 80% acc given min cues   Indirectly targeted during calling 911 task:  -pt able to verbally state 911 appropriately  -however on dialing the actual number on his phone:  -1st trial: 910  -2nd trial: 9-1  -required 10 mins in between and max cues from SLP  -3rd trial: 911 Goal largely met. Pt does benefit from increased time to comprehend, respond, and follow through. Pt also is not always consistent with yes/no questions. Recommending 24 hour assist and ongoing ST services.     Goal 2: Pt will complete moderately complex naming tasks (divergent naming, convergent, responsive, etc) with 80% acc given min cues   Divergent naming:  -pt given 1 minute per category  -animals: 3 items named  -food: 5 items named  -sports: 0 items named    Completing sentences with two or more word responses:  -57% acc indep improving to 100% acc given mod cues    Goal largely met. Pt continues to demonstrate difficulty with expressive naming, benefiting from cues. Recommending 24 hour assist and ongoing ST services. Goal met 23. Goal met 23. New goal added 23:    Goal 4: Pt will complete problem solving and thought organization tasks with 75% acc given mod cues    Targeted on the SLUMS, see below. Goal largely met. Pt continues to present with reduced thought organization. New goal added 23:      Goal 5: Pt will complete executive function tasks (e.g. meds, money, time, etc) with 75% acc given mod cues   Targeted on the SLUMS, see below. Goal largely met. However, recommending total assist with med/finance management. Other areas targeted: Repeat SLUMS:  -pt scored a 17/30 today compared to initial evaluation score of 19/30    Orientation: 3/3  Money calculations: 3/3  Divergent namin3  Short term recall: 0/5  Number repetition: 0/2  Clock drawin/4  Shape ID: 2/2  Auditory comprehension of paragraph:     SLP also provided pt with aphasia pocket cards. Education:   Edu provided re: progress towards goals, ongoing ST services, compensatory strategies       Safety Devices: [x] Call light within reach  [x] Chair alarm activated  [] Bed alarm activated  [] Other: [] Call light within reach  [] Chair alarm activated  [] Bed alarm activated  [] Other:    Assessment: Tx session: Pt pleasant and cooperative, agreeable to participate. Pt completed repeat SLUMS scoring a 17/30. Pt with increased difficulty completing expressive and receptive language tasks today, stating \"I am exhausted\" pt demonstrated difficulty with dual tasking. SLP created pt aphasia pocket cards to take home with him. Discussed progress towards goals, ongoing ST services, and 24 hour assist at d/c. Discharge Summary: Pt has largely met all goals within POC.  However, due to communication (both receptive and expressive language) barriers, and reduced cognitive linguistic skills, pt will require 24 hour assist at d/c. Also recommending outpatient ST services. Pt in agreement. Pt is tolerating IDDSI Level 7 regular solids, IDDSI Level 0 thin liquids, meds as tolerated as LRD. Plan: Continue as per plan of care. Additional Information:     Barriers toward progress: Communication deficit  Discharge recommendations:  [] Home independently  [] Home with assistance [x]  24 hour supervision  [] ECF [] Other:   Continued Tx Upon Discharge: ? [x] Yes [] No [] TBD based on progress while on ARU [] Vital Stim indicated [] Other:   Estimated discharge date: 03/15/23    Interventions used this date:  [x] Speech/Language Treatment  [] Instruction in HEP [] Group [] Dysphagia Treatment [x] Cognitive Treatment   [] Other: Total Time Breakdown / Charges    Time in Time out Total Time / units   Cognitive Tx 1415 1445 30 mins/ 1 unit    Speech Tx 1345 1415 30 mins/ 1 unit    Dysphagia Tx -- -- --       Electronically Signed by     Maryanne CRUZ CCC-SLP [id number]  Speech-Language Pathologist eye redness

## 2023-03-14 NOTE — PLAN OF CARE

## 2023-03-14 NOTE — PROGRESS NOTES
03/13/23 2135   NIV Type   Skin Assessment Clean, dry, & intact   Skin Protection for O2 Device Yes   Orientation Middle   Location Nose   Intervention(s) Skin Barrier   NIV Started/Stopped On   Equipment Type v60   Mode Bilevel   Mask Type Full face mask   Mask Size Medium   Settings/Measurements   IPAP 18 cmH20   CPAP/EPAP 10 cmH2O   Vt (Measured) 818 mL   Rate Ordered 16   Resp 17   FiO2  35 %   Minute Volume (L/min) 14.2 Liters   Mask Leak (lpm) 0 lpm   Comfort Level Good   Using Accessory Muscles No   SpO2 96   Patient's Home Machine No   Alarm Settings   Alarms On Y

## 2023-03-14 NOTE — PROGRESS NOTES
03/14/23 0030   NIV Type   Skin Assessment Clean, dry, & intact   Skin Protection for O2 Device Yes   Orientation Middle   Location Nose   Intervention(s) Skin Barrier   Equipment Type v60   Mode Bilevel   Mask Type Full face mask   Mask Size Medium   Settings/Measurements   IPAP 18 cmH20   CPAP/EPAP 10 cmH2O   Vt (Measured) 748 mL   Rate Ordered 16   Resp 21   FiO2  35 %   Minute Volume (L/min) 15.5 Liters   Mask Leak (lpm) 0 lpm   Comfort Level Good   Using Accessory Muscles No   Patient's Home Machine No   Alarm Settings   Alarms On Y

## 2023-03-14 NOTE — PATIENT CARE CONFERENCE
[facility]  Inpatient Rehabilitation  Weekly Team Conference Note    Date: 3/15/2023  Patient Name: Lesley Hills        MRN: [id number]    : 1950  [de-identified]68 y.o.)  Gender: male   Referring Practitioner: Dr. Grace Mcguire  Diagnosis: acute CVA      Interventions to be utilized toward barriers to discharge, per discipline:  [address] observed barriers to dc: Pain, Limited safety awareness, Medical complications, and Medication managment  Nursing interventions: Assist with ADLs, Cardiac management, Oxygen management, pain and medication management  Family Education: Yes  Fall Risk:  Yes    Stay with me?: No    PHYSICAL THERAPY  Physical therapy observed barriers to dc:    Baseline: indep   Current level: requires SBA to CGA with mobility due to inability to attend to consistently attend to safety of O2 tubing management and stable alicia with gait and transfers with patient demonstrating inconsistent safety with gait and transfers due to poor endurance with ataxia, inability to consistently safely manage O2 tubing and wider stable alicia(tends to adduct with too narrow for stable balance  alicia with turning) and unsteadiness in LE due to impaired motor control and sob/fatigue with mobility . Consistently running into objects/door frame on right with WC despite cues for scanning to the right with cues needed for redirection and requiring SBA. Barriers to DC: not indep with mobility/transfers with FWW or WC. Needs in order to achieve dc home/next level of care: 24 hour supervision/assist to cue and assist patient with mobility. FAmily able to safely assist patient.        Physical therapy interventions:   Current Treatment Recommendations: Strengthening, ROM, Balance training, Functional mobility training, Transfer training, Endurance training, Gait training, Stair training, Neuromuscular re-education, Home exercise program, Safety education & training, Patient/Caregiver education & training, Equipment evaluation, education, & procurement, Therapeutic activities    PT Goals:            Short Term Goals  Time Frame for Short Term Goals: 3/14/2023  Short Term Goal 1: Patient demo SBA in bed<>chair, sit<>Stand, car transfer with LRAD. 3/10/2023 Goal partailly met Patient demoCGA to SBA bed<>chair and sit<>Stand. Short Term Goal 2: Patient demo walking 100-150 feet with SpO2 90% or greater with SBA. Goal partially met 3/8/2023 Patient demo 200' over 12'48\" in parallel bars wtih min assist with demo, cues for RUE advancement and weight shift facilitation for RLE with wide alicia with turns L or R every 10 feet. 3/10/2023 patient wallking 58-64' with HHA mod for balance and  WC follow with sob and transient hypoxia 88-89% limiting endurance. Short Term Goal 3: Patient demo up and down 6 steps with LRAD min assist. 3/13/2023 Patient demo up and down 2steps with min assist and number of steps  limited to 2 due to sob. Short Term Goal 4: Patient demo indep in [facility] propulsion 150 feet with l/r turns and indep in WC parts management. 3/10/2023 Pt needs cues with 100 feet WC proulsion with ext x4 to avoid running into doorframes on right. Short Term Goal 5: Patient demo stoop to recover wtih LRAD indep. 3/14/2023 GOAL Met patient demo indep in stoop to recover with grabber and FWW to get tissue box from floor to waist.            Long Term Goals  Time Frame for Long Term Goals : 3/21/2023  Long Term Goal 1: Patient demo indep in bed<>chair, sit<>Stand, car transfer with LRAD. Long Term Goal 2: Patient demo gait 150 feet with 2 turns on turf carpet of 10 feet or greater indep with LRAD. Long Term Goal 3: Patient demo up and down 12 steps with LRAD indep  Long Term Goal 4: Patient demo indep in LE strengthening and coordination seated and standing exercises.     PT Assessment:  Patient with increasead sob today, continued with WC activity with LE and UE propulsion to require cues  not to run into objects/door frame on right. Patient with transfers, gait and steps required SBA as gait unsteady without LOB, need to sit at variable distances of  feet or after up and down 4 steps. Patient required frequent seated rest due to sob with increased RR with spO2 maintained in 90's % on 3L but up to 3 minutes seated rest needed to alleviate sob. Educated in hep for strengthening with need for continued cues and assist as noted. Patient scheduled for family training 3/15/2023 and will need 24 hour assist for safety on dc. Recommendation:   PT Equipment Recommendations  Equipment Needed: Yes  Walker: Rolling  Other: rolling walker      OCCUPATIONAL THERAPY  Occupational therapy observed barriers to dc:    Baseline: mod I all ADLs and mobility   Current level: SPV/mod I all ADLs and mobility   Barriers to DC: none   Needs in order to achieve dc home/next level of care: outpatient OT, PRN assist from family, DME: shower chair with back    Occupational Therapy interventions:  Current Treatment Recommendations: Self-Care / ADL, Functional mobility training, Endurance training, Safety education & training, Patient/Caregiver education & training, Neuromuscular re-education, Strengthening, ROM, Balance training, Equipment evaluation, education, & procurement, Home management training, Coordination training    OT Goals:  Patient Goals   Patient goals : \"get back to where I was\"  Short Term Goals  Time Frame for Short Term Goals: 6 days- 3/11/23  Short Term Goal 1: Pt will complete functional transfers with SPV. GOAL MET 3/10/23 Pt completed functional transfers with SPV. Short Term Goal 2: Pt will perform toileting with SPV. GOAL MET 3/9/23 Pt performed toileting with SPV. Short Term Goal 3: Pt will complete full body dressing with SPV. GOAL MET 3/10/23 Pt completed full body dressing with SPV. Short Term Goal 4: Pt will perform full body bathing with SPV.  GOAL MET 3/10/23 Pt performed full body bathing with SPV.  Short Term Goal 5: Pt will perform grooming at sink with mod I.-GOAL MET 3/11  Long Term Goals  Time Frame for Long Term Goals : 13 days- 3/18/23  Long Term Goal 1: Pt will perform functional transfers with mod I. GOAL MET 3/14/23 Pt performed functional transfers with mod I.  Long Term Goal 2: Pt will complete BADLs with mod I. Goal Not Met. Pt requires SPV for LB dressing and will have assistance from family at home. Long Term Goal 3: Pt will improve coordination in RUE as evidenced by at least 5 second improvement in NHPT. Goal Not Met. Pt did not demonstrate improved NHPT score on RUE. OT Assessment:  Assessment  Assessment: Pt agreeable to OT session. Pt performed functional transfers with mod I with no AD this date but increased SOB and rest breaks throughout session. Pt demonstrated ability to stand statically for 7 minutes at sink but only able to complete mobility around gym for 3:40, 1:20, 2:40, 1:15, 3:20, and 3:50 before needing rest break. O2 sats between 90-94% throughout session on 3L O2. Pt demonstrated fair strength for BUE ther ex and verbalized understanding of HEP. Pt did not demonstrate improved [facility] with assessments compared to evaluation. Pt encouraged to continue therapy with outpatient to improve strength and coordination. Pt verbalized understanding. Pt performed stepping over obstacle to gather letter card and then stepping over obstacle to reach cabinet and step on AirEx pad to place card on table. Pt unable to verbalize letter or item with certain letter at start of word while stepping over obstacle but needed to pause in stance to answer question prior to stepping over obstacle on floor. Pt demonstrated good safety to stop prior to naming item and then step over to improve balance. Pt educated on demand of dual tasking and to be aware of safety at home. Pt verbalized understanding.   Activity Tolerance: Patient tolerated treatment well;Patient limited by fatigue;Patient limited by endurance  Discharge Recommendations: 24 hour supervision or assist;Outpatient OT  OT Equipment Recommendations  Equipment Needed: Yes  Mobility Devices: ADL Assistive Devices  ADL Assistive Devices: Shower Chair with back  [address] in place: Yes  Type of devices: Left in chair;Call light within reach;Nurse notified; Chair alarm in place;Gait belt; All fall risk precautions in place; Patient at risk for falls    SPEECH THERAPY   Speech therapy observed barriers to dc:    Baseline: lives with son and dtr in law, manages own meds/finances, family does other household tasks, not an active     Current level: mild-mod receptive/expressive aphasia, mild-mod cognitive linguistic deficit    Barriers to DC: reduced insight, communication deficit    Needs in order to achieve dc home/next level of care: 24 hour assist, carryover of compensatory strategies, assist with meds/finances     Speech Therapy interventions:  Dysphagia: Diet tolerance monitoring, safe swallow strategies, therapeutic PO trials with SLP, swallow strengthening exercises   Speech/Language/Cognition: Yes/no questions, following commands, automatic speech tasks, repetition, auditory comprehension, naming. Compensatory strategy training and carryover, recall/STM, problem solving, reasoning, exec function, thought organization, attention. Dysphagia Goals:  Timeframe for Long-term Goals: (14 days; 03/18/23)  Goal 1: The patient will tolerate least restrictive diet with no clinical s/s of aspiration or worsening respiratory/pulmonary status 03/13: goal met   Short-term Goals  Timeframe for Short-term Goals: (10 days; 03/15/23)  Goal 1: The patient will tolerate recommended diet with no clinical s/s of aspiration 5/5.  03/13: goal met   Goal 2: The patient/caregiver will demonstrate understanding of compensatory swallow strategies, for improved swallow safety.   03/13: goal met        Speech/Language/Cog Goals:  Time Frame for Long Term Goals: 13 Days (03/18/23)  Goal 1: Pt will improve expressive/receptive language tasks to highest functional level to decreased burden of care upon discharge. 03/14: goal largely met  Goal 2: Pt will demonstrate improved cognitive linguistic function for safe return to prior level of care. 03/14: goal largely met  Short Term Goals  Time Frame for Short Term Goals: 10 Days (03/15/23)  Goal 1: Pt will complete moderately complex comprehension tasks (2 step and multi-step directions/commands, abstract yes/no questions) with 80% acc given min cues. 03/14: goal largely met  Goal 2: Pt will complete moderately complex naming tasks (divergent naming, convergent, responsive, etc) with 80% acc given min cues. 03/14: goal largely met  Goal 3: Pt will complete additional cognitive assessment with goals to be added per POC as needed. 03/14: goal largely met  Time Frame for Short Term Goals: 10 Days (03/15/23)    ST Assessment:  Tx session: Pt pleasant and cooperative, agreeable to participate. Pt completed repeat SLUMS scoring a 17/30. Pt with increased difficulty completing expressive and receptive language tasks today, stating \"I am exhausted\" pt demonstrated difficulty with dual tasking. SLP created pt aphasia pocket cards to take home with him. Discussed progress towards goals, ongoing ST services, and 24 hour assist at d/c. Discharge Summary: Pt has largely met all goals within POC. However, due to communication (both receptive and expressive language) barriers, and reduced cognitive linguistic skills, pt will require 24 hour assist at d/c. Also recommending outpatient ST services. Pt in agreement. Pt is tolerating IDDSI Level 7 regular solids, IDDSI Level 0 thin liquids, meds as tolerated as LRD. NUTRITION  Weight: 166 lb 8 oz (75.5 kg) / Body mass index is 22.58 kg/m². Diet Order: ADULT DIET; Regular;  No Added Salt (3-4 gm)  PO Meals Eaten (%): 76 - 100%  Education: Education declined      CASE MANAGEMENT  Assessment:  68 yr old male. Dx:Acute CVA (cerebrovascular accident). Patient independent PLOF w/o AD. Lives with son and DIL in a one level home with a 2 step entry. Home has a walk-in-shower with grab bars. Patient owns a 4 wheel walker, shower chair and a bi-pap with Ting respiratory. Home oxygen with inogen. Therapy recommendations are 24 hour supervision or assist with Outpatient PT/OT>Script and location list given to pt on 03/14 vs [facility] AT [facility]. DME rolling walker and shower chair> not covered under insurance. Family training at 12:30pm today. Interdisciplinary Goals:   1.) Safe Discharge Home  2.) Family safe to assist patient with 24 hour assist.     [x]  Family Training discussed at conference and to be completed today. Discharge Plan   Estimated discharge date: 3/15/2023  Destination: Home with outpatient therapy and 24 hr assist at home  Pass:No  Services at Discharge: Outpatient Physical Therapy, Occupational Therapy, Speech Therapy  Equipment at Discharge: RW, BSC, shower chair with a back. Team Members Present at Conference:  : Odalis Cowan RN  Occupational Therapist: Jorje Jasso, OTR/L  [facility], PT  Speech Therapist: Mele Alvarez, [facility] SLP   Nurse: Mildred Solomon RN  Dietician: Kallie Benson RDN, LD  : Shelby Hodges OTR/L  Psychiatry: N/A    Family members present at conference: No    I led this team conference and I approve the established interdisciplinary plan of care as documented within the medical record of Hermann Salazar MD: Electronically signed by Iman Alvares MD on 3/15/2023 at 4:13 PM

## 2023-03-14 NOTE — PROGRESS NOTES
Occupational Therapy  Discharge Summary     Name:Bang Reed  CLD:[id number]  ORL:[birth year]  Treatment Diagnosis: impaired functional mobility  Diagnosis: acute CVA    Restrictions/Precautions  Restrictions/Precautions: Fall Risk, General Precautions  Required Braces or Orthoses?: No           Position Activity Restriction  Other position/activity restrictions: Strict I&Os, 3L O2; up ith assist. Notify physician for pulse less than 50 or greater than 120, respiratory rate less than 12 or greater than 25, oral temperature greater than 101.3 F (91.9 C), systolic BP less than 90 or greater than 107, diastolic BP less than 50 or greater than 100. Goals:   Short Term Goals  Time Frame for Short Term Goals: 6 days- 3/11/23  Short Term Goal 1: Pt will complete functional transfers with SPV. GOAL MET 3/10/23 Pt completed functional transfers with SPV. Short Term Goal 2: Pt will perform toileting with SPV. GOAL MET 3/9/23 Pt performed toileting with SPV. Short Term Goal 3: Pt will complete full body dressing with SPV. GOAL MET 3/10/23 Pt completed full body dressing with SPV. Short Term Goal 4: Pt will perform full body bathing with SPV. GOAL MET 3/10/23 Pt performed full body bathing with SPV. Short Term Goal 5: Pt will perform grooming at sink with mod I.-GOAL MET 3/11  Long Term Goals  Time Frame for Long Term Goals : 13 days- 3/18/23  Long Term Goal 1: Pt will perform functional transfers with mod I. GOAL MET 3/14/23 Pt performed functional transfers with mod I.  Long Term Goal 2: Pt will complete BADLs with mod I. Goal Not Met. Pt requires SPV for LB dressing and will have assistance from family at home. Long Term Goal 3: Pt will improve coordination in RUE as evidenced by at least 5 second improvement in NHPT. Goal Not Met. Pt did not demonstrate improved NHPT score on RUE. Pt. Met 5/5 short term goals and 1/3 long term goals.  Pt will have assistance from family at home as needed and will continue outpatient OT to improve [address]. ADL  Feeding  Assistance Level: Independent  Grooming/Oral Hygiene  Assistance Level: Independent  Skilled Clinical Factors: to comb hair standing at sink  Upper Extremity Bathing  Assistance Level: Independent  Lower Extremity Bathing  Assistance Level: Modified independent  Upper Extremity Dressing  Assistance Level: Supervision  Skilled Clinical Factors: cues to sit down to don shirt to conserve energy  Lower Extremity Dressing  Assistance Level: Supervision  Skilled Clinical Factors: to doff/don brief/pants  Putting On/Taking Off Footwear  Assistance Level: Set-up  Skilled Clinical Factors: to doff/don socks and shoes  Toileting  Assistance Level: Modified independent  Skilled Clinical Factors: no AD  Toilet Transfers  Technique: Stand step  Equipment: Standard toilet  Additional Factors: Set-up; With handrails  Assistance Level: Supervision  Skilled Clinical Factors: no AD  Tub/Shower Transfers  Type: Shower  Transfer From: Standing without device  Transfer To: Tub transfer bench  Additional Factors: With handrails  Assistance Level: Supervision          Functional Mobility  Device: Rolling walker  Activity: To/From bathroom;Transport items; Retrieve items  Assistance Level: Stand by assist  Skilled Clinical Factors: mod I in bathroom, SBA to step over obstacles in gym  Transfers  Surface: To chair with arms;From chair with arms;Standard toilet; Wheelchair  Additional Factors: With handrails  Device: Walker  Sit to Stand  Assistance Level: Modified independent  Stand to Sit  Assistance Level: Modified independent  Bed To/From Chair  Technique: Stand step  Assistance Level: Supervision           Assessment:   Assessment  Assessment: Pt agreeable to OT session. Pt performed functional transfers with SPV/SBA in gym but mod I in bathroom.  Pt completed toileting and grooming with mod I. Pt's family present for training and educated on ADL safety, O2 tubing safety, difficulty with novel tasks and dual tasking, and HEP. Pt's familiy verbalized understanding. Pt completed managing obstacle course for 1 rep with SBA/SPV with fair processing and min VCs and no LOB. Continue POC. Activity Tolerance: Patient tolerated treatment well;Patient limited by fatigue;Patient limited by endurance  Discharge Recommendations: 24 hour supervision or assist;Outpatient OT  OT Equipment Recommendations  Equipment Needed: Yes  Mobility Devices: ADL Assistive Devices  ADL Assistive Devices: Shower Chair with back; Toileting - 3-in-1 Commode  Safety Devices  Safety Devices in place: Yes  Type of devices: Nurse notified;Gait belt; All fall risk precautions in place; Patient at risk for falls; Left in chair (left with PT)    Equipment Recommendations:  Shower chair with back, bedside commode         Home Exercise Program  Provided Pt with handout for BUE weighted exercises and [facility]. Pt verbalized understanding of all exercises. Signs and Symptoms of Delirium (from CAM)  A. Acute Onset Mental Status Change  Is there evidence of an acute change in mental status from the patient's baseline?  [] 0. No [x] 1. Yes    B. Inattention: Did the patient have difficulty focusing attention, for example being easily distractible or having difficulty keeping track of what was being said? [x] 0. Behavior not present    [] 1. Behavior continuously present, does not fluctuate   [] 2. Behavior present, fluctuates (comes and goes, changes in severity)    C. Disorganized thinking: Was the patient's thinking disorganized or incoherent (rambling or irrelevant conversation, unclear or illogical flow of ideas, or unpredictable switching from subject to subject)? [] 0. Behavior not present    [] 1. Behavior continuously present, does not fluctuate   [x] 2. Behavior present, fluctuates (comes and goes, changes in severity)    D.  Altered level of consciousness: Did the patient have altered level of consciousness as indicated by any of the following criteria? Vigilant: startled easily to any sound or touch  Lethargic: repeatedly dozed off when being asked questions, but responded to voice or touch  Stuporous: very difficult to arouse and keep aroused for the interview  Comatose: could not be aroused  [x] 0. Behavior not present    [] 1. Behavior continuously present, does not fluctuate   [] 2.  Behavior present, fluctuates (comes and goes, changes in severity)      Electronically signed by Loretta Diaz OT on 3/15/2023 at 3:27 PM

## 2023-03-14 NOTE — CARE COORDINATION
KEVIN LVM with Napoleon Flanagan (son) to return writers call. Rod Alex, FRANCISCA    [id number] CM received a call from Napoleon Panchito (son) returning writers call r/t family training on 03/15. Napoleon Ricardojaes stated that he plans to be here at 12:30pm. CM acknowledged. CM discussed PT recommendations for 24 hr sup/assist with home care and OT recommendations of 24 hr sup/assist with outpatient therapy. Napoleon Ricardojaes stated he will speak with wife and let writer know later today or in the morning which one they want to go with.      Rod Alex, RN

## 2023-03-14 NOTE — PROGRESS NOTES
Rena Tapia  3/14/2023  [id number]    Chief Complaint: Acute CVA (cerebrovascular accident) [facility])    Subjective:   No acute events overnight. Today Paul Savage is seen with therapy and nursing present. He reports some increased shortness of breath today. He attributes this to working more with therapy. His oxygen saturations have remained baseline. Will obtain XR chest. He is unsure of his home medications, but endorses that his children manage this. ROS: denies f/c, n/v, cp    Objective:  Patient Vitals for the past 24 hrs:   BP Temp Temp src Pulse Resp SpO2 Weight   03/14/23 0745 113/75 97.5 °F (36.4 °C) Oral 73 18 94 % 166 lb 8 oz (75.5 kg)   03/14/23 0718 -- -- -- -- -- 98 % --   03/14/23 0030 -- -- -- -- 21 -- --   03/13/23 2135 -- -- -- -- 17 -- --   03/13/23 2015 -- -- -- -- -- 96 % --   03/13/23 2006 118/77 98.1 °F (36.7 °C) Oral 75 18 96 % --     Gen: No distress, pleasant. HEENT: Normocephalic, atraumatic. CV: extremities well perfused  Resp: No respiratory distress. No wheezing  Abd: Soft, nondistended  Neuro: Alert, oriented, appropriately interactive.  Ambulates in gym without assistive device  Psych: appropriate mood and affect    Wt Readings from Last 3 Encounters:   03/14/23 166 lb 8 oz (75.5 kg)   03/06/23 181 lb 7 oz (82.3 kg)   02/25/23 177 lb 14.6 oz (80.7 kg)       Laboratory data:   Lab Results   Component Value Date    WBC 13.7 (H) 03/13/2023    HGB 14.7 03/13/2023    HCT 45.5 03/13/2023    MCV 93.2 03/13/2023     03/13/2023       Lab Results   Component Value Date/Time     03/14/2023 07:11 AM    K 3.5 03/14/2023 07:11 AM    CL 97 03/14/2023 07:11 AM    CO2 32 03/14/2023 07:11 AM    BUN 18 03/14/2023 07:11 AM    CREATININE 0.7 03/14/2023 07:11 AM    GLUCOSE 121 03/14/2023 07:11 AM    CALCIUM 8.8 03/14/2023 07:11 AM        Therapy progress:  PT  Position Activity Restriction  Other position/activity restrictions: Strict I&Os, 3L O2; up ith assist. Notify physician for pulse less than 50 or greater than 120, respiratory rate less than 12 or greater than 25, oral temperature greater than 101.3 F (62.1 C), systolic BP less than 90 or greater than 132, diastolic BP less than 50 or greater than 100. Objective     Sit to Stand: Contact guard assistance  Stand to Sit: Contact guard assistance  Bed to Chair: Minimal assistance (ataxia noted, uses UE and environmental support.)  Device: Rolling Walker  Other Apparatus: O2 (2L)  Assistance: Minimal assistance  Distance: 20' needed WC follow after first 10 feet as gait more unsteady and had to sit immediately with  with WC follow needed. OT  PT Equipment Recommendations  Equipment Needed: Yes  Walker: Rolling  Other: rolling walker  Toilet - Technique: Ambulating  Equipment Used: Standard toilet  Toilet Transfers Comments: no AD  Assessment        SLP                Body mass index is 22.58 kg/m². Assessment and Plan:  Rosemarie Tiwari is a 68year old male with a past medical history significant for atrial fibrillation on Eliquis, HFpEF, COPD with chronic respiratory failure on 4 L O2 baseline who presented to [facility] on 2/25/23 with right sided weakness and aphasia, found to have left MCA CVA and subsequently transferred to [facility]. for left ICA stent placement. He was admitted to [facility] on 3/6/23 due to functional deficits below his baseline. Left MCA CVA  - due to left ICA stenosis  - DAPT as below, statin  - PT, OT, ST    Oropharyngeal Dysphagia  - ST     Left Internal Carotid Stenosis  - s/p stent placement on 3/2  - DAPT for 6 weeks then stop Plavix, continue asa. Resume Eliquis 4/17  - follow up with [facility]     Atrial Fibrillation  - with tachy-bear syndrome during acute stay  - EP switched cardizem to 120 mg nightly. Appreciate assistance. Planning on two week event monitor on discharge and follow up as outpatient.    - Eliquis to be resume 4/17     HFpEF  - home regimen: torsemide 40 mg daily  - torsemide to 10 mg daily, K while on diuretic  - daily weights, I/Os     Chronic Respiratory Failure  COPD  - on baseline 4 L O2  - completed short burst of steroids per Pulm for possible exacerbation  - brovana, pulmicort  - duo-nebs scheduled  - wean oxygen for goal SpO2>88%  - Pulmonology consulted, appreciate assistance. - obtain XR chest     Bowels: adjust medications as needed for regular bowel movements     Bladder: Check PVR x 3. [address] if PVR > 200ml or if any volume is > 500 ml. Sleep: Trazodone provided prn. PPX   DVT: lovenox  GI: not indicated     Follow up appointments: Neurosurgery, PCP, EP, Pulmonology    Services: [place] [facility]  EDOD: 3/15    Karthikeyan Taylor MD 3/14/2023, 11:25 AM Most likely related to systemic steroids. Continued and some sliding scale. Aim a blood sugar in the range between 140- 180, and not below 70

## 2023-03-14 NOTE — PROGRESS NOTES
Occupational Therapy  Facility/Department: [facility]  Rehabilitation Occupational Therapy Daily Treatment Note    Date: 3/14/23  Patient Name: Martin Manriquez       Room: 6816/8607-54  MRN: [id number]  Account: [de-identified]   : 1950  (78 y.o.) Gender: male                    Past Medical History:  has a past medical history of Atrial fibrillation ([facility] [address] Bronchitis chronic, Emphysema of lung ([facility] [address] HTN (hypertension), Influenza A, Lung disease, Pneumonia, and Stroke. Past Surgical History:   has a past surgical history that includes fracture surgery and hernia repair. Restrictions  Restrictions/Precautions: Fall Risk, General Precautions  Other position/activity restrictions: Strict I&Os, 3L O2; up ith assist. Notify physician for pulse less than 50 or greater than 120, respiratory rate less than 12 or greater than 25, oral temperature greater than 101.3 F (26.9 C), systolic BP less than 90 or greater than 608, diastolic BP less than 50 or greater than 100. Required Braces or Orthoses?: No    Subjective  Subjective: Pt in recliner, pleasant, declined shower, no pain, agreeable to OT session, /80, O2 sats 94% on 3L O2, HR 70  Restrictions/Precautions: Fall Risk;General Precautions             Objective     Cognition  Overall Cognitive Status: Exceptions  Arousal/Alertness: Delayed responses to stimuli  Following Commands: Follows one step commands with increased time; Follows one step commands consistently  Attention Span: Attends with cues to redirect  Memory: Appears intact  Safety Judgement: Decreased awareness of need for assistance  Problem Solving: Assistance required to generate solutions  Insights: Decreased awareness of deficits  Initiation: Requires cues for some  Sequencing: Requires cues for some  Orientation  Overall Orientation Status: Within Functional Limits   Perception  Overall Perceptual Status: WFL  Unilateral Attention: Appears intact  Initiation: Appears intact  Motor Planning: Appears intact  Perseveration: Not present     ADL  Grooming/Oral Hygiene  Assistance Level: Independent  Skilled Clinical Factors: to shave and rinse mouth standing at sink  Upper Extremity Dressing  Assistance Level: Independent  Skilled Clinical Factors: able to collect clothing from [facility] Level: Modified independent  Skilled Clinical Factors: to doff/don brief/pants  Putting On/Taking Off Footwear  Assistance Level: Modified independent  Skilled Clinical Factors: to doff/don socks and shoes  Toilet Transfers  Technique: Stand step  Equipment: Standard toilet  Additional Factors: With handrails  Assistance Level: Modified independent  Skilled Clinical Factors: no AD          Functional Mobility  Device:  (no AD)  Activity: To/From bathroom;Transport items; Retrieve items  Assistance Level: Stand by assist  Skilled Clinical Factors: mod I in bathroom, SBA to step over obstacles in gym  Transfers  Surface: To chair with arms;From chair with arms;Standard toilet; Wheelchair  Additional Factors: With handrails  Sit to Stand  Assistance Level: Modified independent  Stand to Sit  Assistance Level: Modified independent  Bed To/From Chair  Technique: Stand step  Assistance Level: Modified independent   OT Exercises  Resistive Exercises: 2# weight for elbow flexion, wrist flexion, wrist extension, shoulder press, chest press, and shoulder horizontal abduction/adduction x15 each hand     Assessment  Assessment  Assessment: Pt agreeable to OT session. Pt performed functional transfers with mod I with no AD this date but increased SOB and rest breaks throughout session. Pt demonstrated ability to stand statically for 7 minutes at sink but only able to complete mobility around gym for 3:40, 1:20, 2:40, 1:15, 3:20, and 3:50 before needing rest break. O2 sats between 90-94% throughout session on 3L O2. Pt demonstrated fair strength for BUE ther ex and verbalized understanding of HEP.  Pt did not demonstrate improved [address] [place] with assessments compared to evaluation. Pt encouraged to continue therapy with outpatient to improve strength and coordination. Pt verbalized understanding. Pt performed stepping over obstacle to gather letter card and then stepping over obstacle to reach cabinet and step on AirEx pad to place card on table. Pt unable to verbalize letter or item with certain letter at start of word while stepping over obstacle but needed to pause in stance to answer question prior to stepping over obstacle on floor. Pt demonstrated good safety to stop prior to naming item and then step over to improve balance. Pt educated on demand of dual tasking and to be aware of safety at home. Pt verbalized understanding. Activity Tolerance: Patient tolerated treatment well;Patient limited by fatigue;Patient limited by endurance  Discharge Recommendations: 24 hour supervision or assist;Outpatient OT  OT Equipment Recommendations  Equipment Needed: Yes  Mobility Devices: ADL Assistive Devices  ADL Assistive Devices: Shower Chair with back  [facility] in place: Yes  Type of devices: Left in chair;Call light within reach;Nurse notified; Chair alarm in place;Gait belt; All fall risk precautions in place; Patient at risk for falls    Patient Education  Education  Education Given To: Patient  Education Provided: Role of Therapy; ADL Function;Plan of Care;Precautions; Safety; Fall Prevention Strategies;DME/Home Modifications;Transfer Training;Mobility Training;Equipment; Energy Conservation;Home Exercise Program  Education Provided Comments: role of OT, transfer training, ADL retraining, energy conservation and rest breaks with ADLs, PLB, [address] at home, HEP  Education Method: Demonstration;Verbal  Barriers to Learning: Cognition  Education Outcome: Verbalized understanding;Demonstrated understanding;Continued education needed    Plan  Occupational Therapy Plan  Times Per Week: 5/7x  Current Treatment Recommendations: Self-Care / ADL; Functional mobility training; Endurance training; Safety education & training;Patient/Caregiver education & training;Neuromuscular re-education;Strengthening;ROM;Balance training;Equipment evaluation, education, & procurement;Home management training;Coordination training    Goals  Short Term Goals  Time Frame for Short Term Goals: 6 days- 3/11/23  Short Term Goal 1: Pt will complete functional transfers with SPV. GOAL MET 3/10/23 Pt completed functional transfers with SPV. Short Term Goal 2: Pt will perform toileting with SPV. GOAL MET 3/9/23 Pt performed toileting with SPV. Short Term Goal 3: Pt will complete full body dressing with SPV. GOAL MET 3/10/23 Pt completed full body dressing with SPV. Short Term Goal 4: Pt will perform full body bathing with SPV. GOAL MET 3/10/23 Pt performed full body bathing with SPV. Short Term Goal 5: Pt will perform grooming at sink with mod I.-GOAL MET 3/11  Long Term Goals  Time Frame for Long Term Goals : 13 days- 3/18/23  Long Term Goal 1: Pt will perform functional transfers with mod I. GOAL MET 3/14/23 Pt performed functional transfers with mod I.  Long Term Goal 2: Pt will complete BADLs with mod I. Goal Not Met. Pt requires SPV for LB dressing and will have assistance from family at home. Long Term Goal 3: Pt will improve coordination in RUE as evidenced by at least 5 second improvement in NHPT. Goal Not Met. Pt did not demonstrate improved NHPT score on RUE.     Therapy Time   Individual Concurrent Group Co-treatment   Time In 0900         Time Out 1030         Minutes 90         Timed Code Treatment Minutes: Ming Yung

## 2023-03-14 NOTE — PROGRESS NOTES
Physical Therapy  Facility/Department: [facility]  Rehabilitation Physical Therapy Treatment Note    NAME: Albert Torres  : 1950 (68 y.o.)  MRN: [id number]  CODE STATUS: Full Code    Date of Service: 3/14/23       Restrictions:  Restrictions/Precautions: Fall Risk, General Precautions  Position Activity Restriction  Other position/activity restrictions: Strict I&Os, 3L O2; up ith assist. Notify physician for pulse less than 50 or greater than 120, respiratory rate less than 12 or greater than 25, oral temperature greater than 101.3 F (29.2 C), systolic BP less than 90 or greater than 328, diastolic BP less than 50 or greater than 100. SUBJECTIVE  Subjective  Subjective: Patient denies pain  Pain: Pt denies pain        Post Treatment Pain Screening:  denies pain          OBJECTIVE  Cognition  Overall Cognitive Status: Exceptions  Arousal/Alertness: Delayed responses to stimuli  Following Commands: Follows one step commands with increased time; Follows one step commands consistently  Attention Span: Attends with cues to redirect  Memory: Appears intact  Safety Judgement: Decreased awareness of need for assistance  Problem Solving: Assistance required to generate solutions  Insights: Decreased awareness of deficits  Initiation: Requires cues for some  Sequencing: Requires cues for some  Orientation  Overall Orientation Status: Within Normal Limits  Orientation Level: Oriented X4    Functional Mobility  Roll Left  Assistance Level: Independent  Roll Right  Assistance Level: Independent  Sit to Supine  Assistance Level: Independent  Supine to Sit  Assistance Level:  Independent  Balance  Sitting Balance: Independent  Standing Balance: Minimal assistance  Bed To/From Chair  Assistance Level: Stand by assist;Contact guard assist  Skilled Clinical Factors: cues bed to chair to manage O2 tubing, did not realize getting tangled in tubing or stepping on without cues, needed cues for wide alicia and to turn around to motor plan for transfer. Environmental Mobility  GAIT: SBA with cues for managing O2 tubing needed. Wide robert with cues x 2 for increasing ROBERT due to narrow robert with more unsteady gait, slow gait with heavy reliance of WB on UE and cues x3 to keep walker safer distance from him. Reciprocal gait noted without path deviation with FWW Completed walking with  SBA  with patient needing to sit at 60' and 160 feet due to sob with SpO2 after each walking break in 90's% sPO2 on  3L but with RR 24 and need for 3 minutes seated rest to recover from sob with PLB. Split second session:   Denies pain:  Late entry 3/15/2023 for 3/14/2023 gait for 20 feet with L/R turns on Turf carpet with FWW and assist for O2 tubing SBA. WC propulsion through 4 doorways with patient running into doorway each time on the right with cues for redirection needed SBA for 180 feet. Car transfer<>WC  with FWW with SBA due to unsteady gait when turning back to chair did not turn around completely before attempting to sit on chair. STAIRS with FWW at bottom of steps and bilateral rails SBA as patient unsteady and needed to sit urgently after completing up and down steps due to sob with non alt pattern up and down with wide robert and mild unsteadiness stepping down steps.    STOOP to reocver with grabber tissue box from floor in standing with FWW CLEO  Transfer chair<>WC SBA with cues to manage O2 tubing            PT Exercises  Exercise Treatment: bridges x10,  sidelying hip abduction (3-/5 RLE) x15 BLE brief 1 minute break after each due to sob, hooklying  knee extension x10 BLankle DF green band x30 with cues for set up, leaning over bed hip extension in SLS with min assist for balance, standing holding onto bed rail partial heel raises CGA x20,   cues for set up with all assist as noted and need for 1-2 minutes supine or seated rest to recover from SOB with incresaed RR noted and SpO2 in 92% or greater throughtout  Needed cues for LE therex and assist as noted. ASSESSMENT/PROGRESS TOWARDS GOALS  Vital Signs  Heart Rate: 94  BP: 122/81  BP Location: Right upper arm  MAP (Calculated): 95  SpO2: 96 %  O2 Device: Nasal cannula (3L via nc)    Assessment:    Patient with increasead sob today, continued with WC activity with LE and UE propulsion to require cues  not to run into objects/door frame on right. Patient with transfers, gait and steps required SBA as gait unsteady without LOB, need to sit at variable distances of  feet or after up and down 4 steps. Patient required frequent seated rest due to sob with increased RR with spO2 maintained in 90's % on 3L but up to 3 minutes seated rest needed to alleviate sob. Educated in hep for strengthening with need for continued cues and assist as noted. Patient scheduled for family training 3/15/2023 and will need 24 hour assist for safety on dc. Activity Tolerance: Patient tolerated treatment well;Patient limited by fatigue;Patient limited by endurance  Discharge Recommendations: Home with Home health PT  Factors Affecting Discharge: will need 24 hour supervision assist as patient unsafe to manage O2 tubing and needs SBA with gait and transfers due to inability to manage O2 tubing and unsteadiness with gait necessitating SBA  PT Equipment Recommendations  Walker: Rolling    Goals  Short Term Goals  Time Frame for Short Term Goals: 3/14/2023  Short Term Goal 1: Patient demo SBA in bed<>chair, sit<>Stand, car transfer with LRAD. 3/10/2023 Goal partailly met Patient demoCGA to SBA bed<>chair and sit<>Stand. Short Term Goal 2: Patient demo walking 100-150 feet with SpO2 90% or greater with SBA. Goal partially met 3/8/2023 Patient demo 200' over 12'48\" in parallel bars wtih min assist with demo, cues for RUE advancement and weight shift facilitation for RLE with wide alicia with turns L or R every 10 feet.   3/10/2023 patient wallking 58-64' with HHA mod for balance and  WC follow with sob and transient hypoxia 88-89% limiting endurance. Short Term Goal 3: Patient demo up and down 6 steps with LRAD min assist. 3/13/2023 Patient demo up and down 2steps with min assist and number of steps  limited to 2 due to sob. Short Term Goal 4: Patient demo indep in [facility] propulsion 150 feet with l/r turns and indep in WC parts management. 3/10/2023 Pt needs cues with 100 feet WC proulsion with ext x4 to avoid running into doorframes on right. Short Term Goal 5: Patient demo stoop to recover wtih LRAD indep. 3/14/2023 GOAL Met patient demo indep in stoop to recover with grabber and FWW to get tissue box from floor to waist.  Long Term Goals  Time Frame for Long Term Goals : 3/21/2023  Long Term Goal 1: Patient demo indep in bed<>chair, sit<>Stand, car transfer with LRAD. Long Term Goal 2: Patient demo gait 150 feet with 2 turns on turf carpet of 10 feet or greater indep with LRAD. Long Term Goal 3: Patient demo up and down 12 steps with LRAD indep  Long Term Goal 4: Patient demo indep in LE strengthening and coordination seated and standing exercises. PLAN OF CARE/SAFETY: Family training tomorrow  Physcial Therapy Plan  General Plan: 5-7 times per week  Current Treatment Recommendations: Strengthening;ROM;Balance training;Functional mobility training;Transfer training; Endurance training;Gait training;Stair training;Neuromuscular re-education;Home exercise program;Safety education & training;Patient/Caregiver education & training;Equipment evaluation, education, & procurement; Therapeutic activities  Safety Devices  Type of Devices: All fall risk precautions in place;Call light within reach; Chair alarm in place; Left in chair;Nurse notified;Gait belt    EDUCATION: Patient educated in safety to attend to right side with WC propulsion for scanning for objects on right with redirection needed (SBA), cues for safety managing O2 tubing with gait and SBA with all standing tasks with exception of stoop to recover with shreyas James .             Therapy Time   Individual Concurrent Group Co-treatment   Time In 1240         Time Out 1340         Minutes 60           Timed Code Treatment Minutes: 60 Minutes   Second Session Therapy Time:   Individual Concurrent Group Co-treatment   Time In 5157         Time Out 1545         Minutes 30           Timed Code Treatment Minutes:  30    Total Treatment Minutes:  [address], [place], 03/14/23 at 4:53 PM

## 2023-03-15 VITALS
OXYGEN SATURATION: 94 % | DIASTOLIC BLOOD PRESSURE: 78 MMHG | WEIGHT: 166.5 LBS | SYSTOLIC BLOOD PRESSURE: 130 MMHG | HEIGHT: 72 IN | TEMPERATURE: 98.1 F | RESPIRATION RATE: 16 BRPM | HEART RATE: 71 BPM | BODY MASS INDEX: 22.55 KG/M2

## 2023-03-15 LAB
ANION GAP SERPL CALCULATED.3IONS-SCNC: 9 MMOL/L (ref 3–16)
BASOPHILS # BLD: 0.1 K/UL (ref 0–0.2)
BASOPHILS NFR BLD: 0.7 %
BUN SERPL-MCNC: 18 MG/DL (ref 7–20)
CALCIUM SERPL-MCNC: 9.1 MG/DL (ref 8.3–10.6)
CHLORIDE SERPL-SCNC: 95 MMOL/L (ref 99–110)
CO2 SERPL-SCNC: 34 MMOL/L (ref 21–32)
CREAT SERPL-MCNC: 0.9 MG/DL (ref 0.8–1.3)
DEPRECATED RDW RBC AUTO: 14.6 % (ref 12.4–15.4)
EOSINOPHIL # BLD: 0.4 K/UL (ref 0–0.6)
EOSINOPHIL NFR BLD: 3 %
GFR SERPLBLD CREATININE-BSD FMLA CKD-EPI: >60 ML/MIN/{1.73_M2}
GLUCOSE SERPL-MCNC: 109 MG/DL (ref 70–99)
HCT VFR BLD AUTO: 44 % (ref 40.5–52.5)
HGB BLD-MCNC: 14.5 G/DL (ref 13.5–17.5)
LYMPHOCYTES # BLD: 2.1 K/UL (ref 1–5.1)
LYMPHOCYTES NFR BLD: 16.4 %
MCH RBC QN AUTO: 30.5 PG (ref 26–34)
MCHC RBC AUTO-ENTMCNC: 33.1 G/DL (ref 31–36)
MCV RBC AUTO: 92.3 FL (ref 80–100)
MONOCYTES # BLD: 1.2 K/UL (ref 0–1.3)
MONOCYTES NFR BLD: 9.7 %
NEUTROPHILS # BLD: 9 K/UL (ref 1.7–7.7)
NEUTROPHILS NFR BLD: 70.2 %
PLATELET # BLD AUTO: 326 K/UL (ref 135–450)
PMV BLD AUTO: 8.9 FL (ref 5–10.5)
POTASSIUM SERPL-SCNC: 3.9 MMOL/L (ref 3.5–5.1)
RBC # BLD AUTO: 4.76 M/UL (ref 4.2–5.9)
SODIUM SERPL-SCNC: 138 MMOL/L (ref 136–145)
WBC # BLD AUTO: 12.7 K/UL (ref 4–11)

## 2023-03-15 PROCEDURE — 6360000002 HC RX W HCPCS: Performed by: STUDENT IN AN ORGANIZED HEALTH CARE EDUCATION/TRAINING PROGRAM

## 2023-03-15 PROCEDURE — 2700000000 HC OXYGEN THERAPY PER DAY

## 2023-03-15 PROCEDURE — 97112 NEUROMUSCULAR REEDUCATION: CPT

## 2023-03-15 PROCEDURE — 94660 CPAP INITIATION&MGMT: CPT

## 2023-03-15 PROCEDURE — 97535 SELF CARE MNGMENT TRAINING: CPT

## 2023-03-15 PROCEDURE — 6370000000 HC RX 637 (ALT 250 FOR IP): Performed by: STUDENT IN AN ORGANIZED HEALTH CARE EDUCATION/TRAINING PROGRAM

## 2023-03-15 PROCEDURE — 97110 THERAPEUTIC EXERCISES: CPT

## 2023-03-15 PROCEDURE — 36415 COLL VENOUS BLD VENIPUNCTURE: CPT

## 2023-03-15 PROCEDURE — 94761 N-INVAS EAR/PLS OXIMETRY MLT: CPT

## 2023-03-15 PROCEDURE — 94640 AIRWAY INHALATION TREATMENT: CPT

## 2023-03-15 PROCEDURE — 80048 BASIC METABOLIC PNL TOTAL CA: CPT

## 2023-03-15 PROCEDURE — 85025 COMPLETE CBC W/AUTO DIFF WBC: CPT

## 2023-03-15 RX ADMIN — FAMOTIDINE 20 MG: 20 TABLET ORAL at 10:46

## 2023-03-15 RX ADMIN — ENOXAPARIN SODIUM 40 MG: 100 INJECTION SUBCUTANEOUS at 10:46

## 2023-03-15 RX ADMIN — LEVOFLOXACIN 500 MG: 500 TABLET, FILM COATED ORAL at 10:46

## 2023-03-15 RX ADMIN — POTASSIUM CHLORIDE 10 MEQ: 750 TABLET, EXTENDED RELEASE ORAL at 10:46

## 2023-03-15 RX ADMIN — SENNOSIDES AND DOCUSATE SODIUM 1 TABLET: 50; 8.6 TABLET ORAL at 10:46

## 2023-03-15 RX ADMIN — ASPIRIN 325 MG: 325 TABLET, COATED ORAL at 10:46

## 2023-03-15 RX ADMIN — BUDESONIDE 500 MCG: 0.5 INHALANT RESPIRATORY (INHALATION) at 07:58

## 2023-03-15 RX ADMIN — TORSEMIDE 20 MG: 20 TABLET ORAL at 10:46

## 2023-03-15 RX ADMIN — GUAIFENESIN 600 MG: 600 TABLET, EXTENDED RELEASE ORAL at 10:46

## 2023-03-15 RX ADMIN — CLOPIDOGREL BISULFATE 75 MG: 75 TABLET ORAL at 10:46

## 2023-03-15 RX ADMIN — IPRATROPIUM BROMIDE AND ALBUTEROL SULFATE 1 AMPULE: .5; 2.5 SOLUTION RESPIRATORY (INHALATION) at 11:49

## 2023-03-15 RX ADMIN — ARFORMOTEROL TARTRATE 15 MCG: 15 SOLUTION RESPIRATORY (INHALATION) at 07:58

## 2023-03-15 ASSESSMENT — PAIN SCALES - GENERAL
PAINLEVEL_OUTOF10: 0
PAINLEVEL_OUTOF10: 0

## 2023-03-15 NOTE — DISCHARGE SUMMARY
Physical Medicine & Rehabilitation  Discharge Summary     Patient Identification:  Caitie Bear  : 1950  Admit date: 3/6/2023  Discharge date: 3/15/2023   Attending provider: Luis Dinh. Arnol Pérez MD       Primary care provider: Reji Moulton     Discharge Diagnoses:   Patient Active Problem List   Diagnosis    Acute exacerbation of chronic obstructive pulmonary disease (COPD) ([facility]    Essential hypertension    Former smoker    Atrial fibrillation ([facility]    Acute on chronic respiratory failure with hypoxia and hypercapnia (HCC)    Acute hyponatremia    Hyperbilirubinemia    Influenzal pneumonia    Unstable angina pectoris ([address]    Acute hypokalemia    Hypophosphatemia    Long term current use of diuretic    Acute on chronic combined systolic (congestive) and diastolic (congestive) heart failure (HCC)    Hypotension    COPD, very severe (HCC)    Chronic respiratory failure with hypoxia and hypercapnia (HCC)    Nocturnal hypoxia    Acute unilateral cerebral infarction in a watershed distribution [facility])    Aphasia due to acute cerebrovascular accident (CVA) ([address]    Acute cerebrovascular accident (CVA) due to stenosis of left carotid artery (HCC)    ALEXANDREA treated with BiPAP    Tachy-bear syndrome ([address]    Acute CVA (cerebrovascular accident) ([address]    Atelectasis       History of Present Illness/Acute Hospital Course:  Bharat Quigley is a 68year old male with a past medical history significant for atrial fibrillation on Eliquis, HFpEF, COPD with chronic respiratory failure on 4 L O2 baseline who presented to [address] [place] [address] on 23 with right sided weakness and aphasia. 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  He was transferred to [facility] for Neurosurgery evaluation. On 2/28 he underwent cerebral angiogram. On 3/2 he underwent left carotid stent placement. EP was consulted due to history of atrial fibrillation. He was admitted to [facility] on 3/6/23 due to functional deficits below his baseline. Today he is seen with nursing present. He reports weakness on his right sided. He reports living with his son and daughter in law. Inpatient Rehabilitation Course:   Rena Tapia is a 68 y.o. male admitted to inpatient rehabilitation on 3/6/2023 with Acute CVA (cerebrovascular accident) ([address] The patient participated in an aggressive multidisciplinary inpatient rehabilitation program involving 3 hours of therapy per day, at least 5 days per week. Discharging home with family. Patient will follow-up with Neurosurgery, PCP, EP, Pulmonology. Medical Management:    Left MCA CVA  - due to left ICA stenosis  - DAPT as below, statin  - PT, OT, ST     Oropharyngeal Dysphagia  - ST     Left Internal Carotid Stenosis  - s/p stent placement on 3/2  - DAPT for 6 weeks then stop Plavix, continue asa. Resume Eliquis 4/17  - follow up with [facility]     Atrial Fibrillation  - with tachy-bear syndrome during acute stay  - EP switched cardizem to 120 mg nightly. Appreciate assistance. Planning on two week event monitor on discharge and follow up as outpatient. - Eliquis to be resume 4/17     HFpEF  - home regimen: torsemide 40 mg daily  - torsemide 10 mg daily, K while on diuretic  - daily weights, I/Os     Chronic Respiratory Failure  COPD  - on baseline 4 L O2  - completed short burst of steroids per Pulm for possible exacerbation  - continue home inhaler regimen at discharge  - started short course of levaquin for possible pneumonia on XR chest  - wean oxygen for goal SpO2>88%  - Pulmonology consulted, appreciate assistance.       Discharge Exam:  Constitutional: Alert, WDWN, Pleasant, no distress  Head: Normocephalic, atruamatic, MMM  Eyes: Conjunctiva noninjected, no icterus, no drainage  Pulm: CTA bilat. Respirations non-labored. CV: No murmurs noted. RRR. Abd: Soft, nontender. NABS+  Ext: No edema, no varicosities  Neuro: Alert, fully oriented, appropriate   MSK: No joint abnormalities noted     Discharge Functional Status:    Physical therapy:  Bed Mobility:  Overall Assistance Level: Independent  Sit>supine:  Assistance Level: Independent  Supine>sit:  Assistance Level: Independent  Transfers:     Sit>stand:  Assistance Level: Stand by assist (from recliner x 2, commode and multiple reps from w/c to RW)  Stand>sit:  Assistance Level: Stand by assist (from RW; cues for eccentric control with fatigue)  Bed<>chair  Technique: Stand step  Assistance Level: Stand by assist, Contact guard assist  Skilled Clinical Factors: cues bed to chair to manage O2 tubing, did not realize getting tangled in tubing or stepping on without cues, needed cues for wide robert and to trun around to motor plan for transfer. Stand Pivot:     Lateral transfer:     Car transfer:     Ambulation:  Surface: Level surface  Device: Rolling walker  Distance: 119 ft x 2  Assistance Level: Contact guard assist  Gait Deviations: Slow sasha, Decreased step length bilateral, Decreased weight shift bilateral, Narrow base of support, Decreased heel strike right, Decreased heel strike left (flexed posture with cues for upright and to maintain ROBERT within RW with obstacle negotation; cues to increase width of ROBERT with poor response from patient)  Stairs:     Curb: Wheelchair:  Surface: Level surface  Additional Factors: Verbal cues (assist to steeer and cues for use of feet, patient without shoes, asked patient to request from family to bring in.)  Assessment:  Assessment: Patient seen with son Marylen Horton. and sammy -in-law for family training session with informed  consent given for family training for mobility.   Patient's family educated in transfer training, gait assist training for level and steps with education in how to cue patinet for managing O2 tubing, hand placment on rails, hand placment on patient for close SBA to CGA, non-alternating pattern on steps, wide alicia and pacing with seated rest breaks for seated PLB for dyspnea with patient's son verbalizing understanding for patient's need for 24 hour assist, patient's inability to dual task safely, and inability to steer WC without running into objects on the right. Pt' son demo ability after education by PT to assist and cue  patient with gait, steps, transfers. Activity Tolerance: Patient tolerated treatment well, Patient limited by fatigue, Patient limited by endurance  Discharge Recommendations: Home with Home health PT  Factors Affecting Discharge: will need 24 hour supervision assist as patient unsafe to manage O2 tubing and needs SBA with gait and transfers due to inability to manage O2 tubing and unsteadiness with gait necessitating SBA      Occupational therapy:   Feeding  Assistance Level: Independent  Grooming/Oral Hygiene  Assistance Level: Modified independent  Skilled Clinical Factors: to wash hands in stance at sink  UE Bathing  Assistance Level: Independent  LE Bathing  Assistance Level: Modified independent  Skilled Clinical Factors: to stand to bathe buttocks  UE Dressing  Assistance Level: Independent  Skilled Clinical Factors: able to collect clothing from [address] [place] [address] [place] Level: Modified independent  Skilled Clinical Factors: to doff/don brief/pants  Putting On/Taking Off Footwear  Assistance Level: Modified independent  Skilled Clinical Factors: to doff/don socks and shoes  Toileting  Assistance Level: Modified independent  Skilled Clinical Factors: with RW  Transfers: Toilet Transfers  Technique: Stand step  Equipment: Standard toilet  Additional Factors: With handrails  Assistance Level: Modified independent  Skilled Clinical Factors: with RW  Tub/Shower Transfers  Type: Shower  Transfer From: Standing without device  Transfer To:  Tub transfer bench  Additional Factors: With handrails  Assistance Level: Supervision  IADLs:  Meal Prep     Money Management     Light Housekeeping  Light Housekeeping Level: [place] [employer] Level of Assistance: Stand by assistance, Contact guard assistance  Light Housekeeping: CGA with O2 line management in room to gather clothing and bring to bathroom with [address] Management     Assessment:  Assessment: Pt agreeable to OT session. Pt performed functional transfers with SPV/SBA in gym but mod I in bathroom. Pt completed toileting and grooming with mod I. Pt's family present for training and educated on ADL safety, O2 tubing safety, difficulty with novel tasks and dual tasking, and HEP. Pt's familiy verbalized understanding. Pt completed managing obstacle course for 1 rep with SBA/SPV with fair processing and min VCs and no LOB. Continue POC.   Activity Tolerance: Patient tolerated treatment well, Patient limited by fatigue, Patient limited by endurance  Discharge Recommendations: 24 hour supervision or assist, Outpatient OT    Speech therapy:              Significant Diagnostics:   Lab Results   Component Value Date    CREATININE 0.9 03/15/2023    BUN 18 03/15/2023     03/15/2023    K 3.9 03/15/2023    CL 95 (L) 03/15/2023    CO2 34 (H) 03/15/2023       Lab Results   Component Value Date    WBC 12.7 (H) 03/15/2023    HGB 14.5 03/15/2023    HCT 44.0 03/15/2023    MCV 92.3 03/15/2023     03/15/2023       Disposition:  home    Discharge Condition: Stable    Follow-up:  See after visit summary from hospitalization    Discharge Medications:     Medication List        START taking these medications      aspirin 325 MG EC tablet  Take 1 tablet by mouth daily     atorvastatin 80 MG tablet  Commonly known as: LIPITOR  Take 1 tablet by mouth daily     clopidogrel 75 MG tablet  Commonly known as: Plavix  Take 1 tablet by mouth daily  Start taking on: March 16, 2023     levoFLOXacin 500 MG tablet  Commonly known as: LEVAQUIN  Take 1 tablet by mouth daily for 5 days  Start taking on: March 16, 2023     potassium chloride 10 MEQ extended release tablet  Commonly known as: KLOR-CON M  Take 1 tablet by mouth daily            CHANGE how you take these medications      torsemide 10 MG tablet  Commonly known as: DEMADEX  Take 1 tablet by mouth daily  What changed: how much to take            CONTINUE taking these medications      budesonide-formoterol 160-4.5 MCG/ACT Aero  Commonly known as: Symbicort  INHALE 2 PUFFS INTO THE LUNGS 2 TIMES DAILY     dilTIAZem 120 MG Tb24 extended release tablet  Commonly known as: CARDIZEM LA  Take 1 capsule by mouth daily     famotidine 20 MG tablet  Commonly known as: PEPCID  Take 1 tablet by mouth 2 times daily     guaiFENesin 600 MG extended release tablet  Commonly known as: Mucinex  Take 1 tablet by mouth 2 times daily as needed for Congestion     ipratropium-albuterol 0.5-2.5 (3) MG/3ML Soln nebulizer solution  Commonly known as: DUONEB  INHALE THREE (3) MILLILIERS (1 VIAL) VIA NEBULIZATION ROUTE EVERY 6 HOURS AS NEEDED FOR SHORTNESS OF BREATH     nitroGLYCERIN 0.4 MG SL tablet  Commonly known as: NITROSTAT  PLACE 1 TABLET UNDER THE TONGUE EVERY 5 MINUTES AS NEEDED FOR CHEST PAIN     pramipexole 0.5 MG tablet  Commonly known as: MIRAPEX     Roflumilast 500 MCG tablet  Commonly known as: Daliresp  TAKE (1) TABLET DAILY     Ventolin  (90 Base) MCG/ACT inhaler  Generic drug: albuterol sulfate HFA  INHALE TWO (2) PUFFS EVERY 6 HOURS AS NEEDED FOR WHEEZING OR SHORTNESS OF BREATH            STOP taking these medications      apixaban 5 MG Tabs tablet  Commonly known as: Eliquis               Where to Get Your Medications        These medications were sent to [address] Joseph [address] [place] [address]      Phone: [phone]   Ventolin  (22 Base) MCG/ACT inhaler       [facility] [phone] - F [phone]  [address]      Phone: [phone]   aspirin 325 MG EC tablet  atorvastatin 80 MG tablet  clopidogrel 75 MG tablet  dilTIAZem 120 MG Tb24 extended release tablet  famotidine 20 MG tablet  guaiFENesin 600 MG extended release tablet  ipratropium-albuterol 0.5-2.5 (3) MG/3ML Soln nebulizer solution  levoFLOXacin 500 MG tablet  potassium chloride 10 MEQ extended release tablet  torsemide 10 MG tablet           I spent over 35 minutes on this discharge encounter between counseling, coordination of care, and medication reconciliation. To comply with [facility] julio c R.II.4.1:   Discharge order placed in advance to facilitate patients discharge needs.       Rosy Sutton MD

## 2023-03-15 NOTE — CARE COORDINATION
Discharge summaries sent to insurance  Renae Scott MPH, RRT  Clinical Liaison [address]  R)[phone] ([phone]   Electronically signed by Renae Scott on 3/15/2023 at 8:45 AM

## 2023-03-15 NOTE — PROGRESS NOTES
Physical Therapy  Physical Therapy  Discharge Summary    Name:Bang Norman  GONZALES:[id number]  ANNA:[birth year]  Treatment Diagnosis: impaired functional mobility  Diagnosis: acute CVA    Restrictions/Precautions  Restrictions/Precautions: Fall Risk, General Precautions  Required Braces or Orthoses?: No           Position Activity Restriction  Other position/activity restrictions: Strict I&Os, 3L O2; up ith assist. Notify physician for pulse less than 50 or greater than 120, respiratory rate less than 12 or greater than 25, oral temperature greater than 101.3 F (89.1 C), systolic BP less than 90 or greater than 104, diastolic BP less than 50 or greater than 100. Goals:                  Short Term Goals  Time Frame for Short Term Goals: 3/14/2023  Short Term Goal 1: Patient demo SBA in bed<>chair, sit<>Stand, car transfer with LRAD. 3/10/2023 Goal partailly met Patient demoCGA to SBA bed<>chair and sit<>Stand. GOAL MET 3/14/2023 patient demo SBA for bed<>chair, sit<>Stand, car transfer with FWW. Short Term Goal 2: Patient demo walking 100-150 feet with SpO2 90% or greater with SBA. Goal partially met 3/8/2023 Patient demo 200' over 12'48\" in parallel bars wtih min assist with demo, cues for RUE advancement and weight shift facilitation for RLE with wide alicia with turns L or R every 10 feet. 3/10/2023 patient wallking 58-64' with HHA mod for balance and  WC follow with sob and transient hypoxia 88-89% limiting endurance. Short Term Goal 3: Patient demo up and down 6 steps with LRAD min assist. 3/13/2023 Patient demo up and down 2steps with min assist and number of steps  limited to 2 due to sob. 3/14/2023 patient demo up and down 4 steps with SBA and assist for managing O2 tubing needed seated rest immediate post steps. Short Term Goal 4: Patient demo indep in Arvis Pandy propulsion 150 feet with l/r turns and indep in WC parts management.   3/10/2023 Pt needs cues with 100 feet WC proulsion with ext x4 to avoid running into doorframes on right. 3/14/2023 patient consistently running into doorframes on right, needs SBA for redirection for [facility] navigation 180 feet. Short Term Goal 5: Patient demo stoop to recover wtih LRAD indep. 3/14/2023 GOAL Met patient demo indep in stoop to recover with grabber and FWW to get tissue box from floor to waist.            Long Term Goals  Time Frame for Long Term Goals : 3/21/2023  Long Term Goal 1: Patient demo indep in bed<>chair, sit<>Stand, car transfer with LRAD. GOAL not met patient demo transfers bed<>WC, car with FWW and sit<>stand with FWW  all with SBA and cues . Long Term Goal 2: Patient demo gait 150 feet with 2 turns on turf carpet of 10 feet or greater indep with LRAD. Patient with variable assist on carpet with 10 feet wtih L/R turns, 60 feet and 160 feet with SBA and cues for wide alicia or for O2 managment with distances limited by sob for 60 feet and 160 feet. Long Term Goal 3: Patient demo up and down 12 steps with LRAD indep. 3/14/2023 GOAL not met SBA with bilateral rails for 4 steps non-alt pattern and O2 tubing management. Long Term Goal 4: Patient demo indep in LE strengthening and coordination seated and standing exercises. 3/15/2023  Goal not met patient requires cues and set up assist with HEP, patient to plan to continue with HHPT. Pt. Met 2/2 short term goals and 0/4 long term goals. However, Patient's family demo ability to safely assist patient with gait, steps, transfers after education with patient unable to dual task sfely in standing, and needs assist with gait and transfers due to need for cues for O2 tubing and wide alicia and need for cues for navigation with WC propulsion . Pt. Currently ambulates  feet with FWW and cues for wide alicia and Ot tubing management with SBA  Up/down 4 steps with SBA bilateral rails.      Sit to/from stand with SBA FWW   Bed mobility with indep  Recommend HHPT in order to progress with indep in mobility in home  Pt. Safe to return home with assistance from family 24 hour assist/SBA. Provided patient's family With training and education in assisting patient with mobility.    Electronically signed by Radha Martin PT on 3/15/2023 at 5:00 PM

## 2023-03-15 NOTE — PLAN OF CARE
Problem: Safety - Adult  Goal: Free from fall injury  3/14/2023 2344 by Leonel Pendleton RN  Outcome: Progressing  3/14/2023 1201 by Giana Gibbs RN  Outcome: Progressing

## 2023-03-15 NOTE — PROGRESS NOTES
ACUTE REHAB UNIT: DISCHARGE  [facility]    Patient:Bang Jean Baptiste     Rehab Dx/Hx: Acute CVA (cerebrovascular accident) (HCC) [I63.9]   :1950  MRN:[id number]  Date of Admit: 3/6/2023   Date of Discharge: 3/15/2023    Subjective:   Order for patient discharge.  Reviewed discharge summary (AVS) with patient and _son______ including medications, physical instructions (safety) and diet. Pt in stable condition.     Reconciled Medication List - Patient:   Medications were reviewed by RN at time of discharge with patient and/or family:  []  Via EMR   []  Via health information exchange  []  Verbal (e.g. in person, telephone, video conferencing)  [x]  Paper-based (e.g. fax, copies, printouts)   []  Other Methods (e.g. texting, email, CDs)    Reconciled Medication List - Subsequent provider:   [] No, current reconciled medication list not provided to the subsequent provider.  [x] Yes, current reconciled medication list provided to the subsequent provider.   [] Via EMR    [] Via health information exchange  [] Verbal (e.g. in person, telephone, video conferencing)  [x] Paper-based (e.g. fax, copies, printouts)   [] Other Methods (e.g. texting, email, CDs)    Discharge disposition:  Pt was discharged via:  [x]  Wheelchair  []  Stretcher  []  Safe ambulation and use of assistive device  []  Other:     Pt was discharged to:  [x]  Private car  []  Transportation service to next destination  []  Other:     Discharge destination:  [x]  Home  []  Skilled Nursing Facility  []  Long Term Care  []  Other:        Discharge BIMS:  Number of word repeated after first attempt:  []  0. None [x]  1. One []  2. Two []  3. Three    Able to report correct year:  []  0. Missed by >5 years, or no answer  []  1. Missed by 2-5 years  []  2. Missed by 1 year  [x]  3. Correct    Able to report correct month:   []  0. Missed by >1 month, or no answer  []  1. Missed by 6 days to 1 month  [x]  2. Accurate within 5  days    Able to report correct day of the week:  []  0. Incorrect or no answer  [x]  1. Correct    Recall:   Able to recall \"sock\":  []  0. No could not recall  [x]  1. Yes, after cueing  []  2. Yes, no cue required  Able to recall \"blue\":  []  0. No could not recall  [x]  1. Yes, after cueing  []  2. Yes, no cue required  Able to recall \"bed\":  [x]  0. No could not recall  []  1. Yes, after cueing  []  2. Yes, no cue required      Patient Mood Interview0    Symptom Presence  0. No (enter 0 in column 2)  1. Yes (enter 0-3 in column 2)  9. No response (leave column 2 blank  Symptom Frequency  0. Never or 1 day  1. 2-6 days (several days)  2. 7-11 days (half or more days)  3. 12-14 days (nearly everyday) 1. Symptom Presence 2. Symptom Frequency    Enter scores in boxes   Little interest or pleasure in doing things   0    Feeling down, depressed, or hopeless   0    If either A or B is coded 2 or 3, CONTINUE asking the questions below. If not, END the interview. Trouble falling or staying asleep, or sleeping too much       Feeling tired or having little energy       Poor appetite or overeating       Feeling bad about yourself - or that you are a failure or have let yourself or your family down       Trouble concentrating on things, such as reading the newspaper or watching television       Moving or speaking so slowly that other people could have noticed. Or the opposite- being so fidgety or restless that you have been moving around a lot more than usual.       Thoughts that you would be better off dead, or of hurting yourself in some way. Total Severity: Add scores for all frequency responses in column 2       Social isolation (from iOculi)  How often do you feel lonely or isolated from those around you?  [] 0. Never  [x] 1. Rarely  [] 2. Sometimes  [] 3. Often  [] 4. Always  [] 7. Patient declines to respond  [] 8. Patient unable to respond.            Discharging Nurse Signature: Marlin Fowler RN edited with Taft Hashimoto, RN

## 2023-03-15 NOTE — PROGRESS NOTES
03/14/23 2240   NIV Type   Skin Assessment Clean, dry, & intact   Skin Protection for O2 Device Yes   Orientation Middle   Location Nose   Intervention(s) Skin Barrier   Equipment Type v60   Mode Bilevel   Mask Type Full face mask   Mask Size Medium   Settings/Measurements   PIP Observed 18 cm H20   IPAP 18 cmH20   CPAP/EPAP 10 cmH2O   Vt (Measured) 786 mL   Rate Ordered 16   Resp 24   FiO2  35 %   I Time/ I Time % 0.9 s   Minute Volume (L/min) 12.7 Liters   Mask Leak (lpm) 0 lpm   Comfort Level Good   Using Accessory Muscles No   SpO2 97   Patient's Home Machine No   Alarm Settings   Alarms On Y   Low Pressure (cmH2O) 6 cmH2O   High Pressure (cmH2O) 30 cmH2O   Delay Alarm 20 sec(s)   Apnea (secs) 20 secs   RR Low (bpm) 6   RR High (bpm) 40 br/min   Breath Sounds   Right Upper Lobe Diminished   Right Middle Lobe Diminished   Right Lower Lobe Diminished   Left Upper Lobe Diminished   Left Lower Lobe Diminished   Oxygen Therapy/Pulse Ox   O2 Therapy Oxygen   O2 Device PAP (positive airway pressure)   SpO2 97 %

## 2023-03-15 NOTE — PROGRESS NOTES
03/15/23 0353   NIV Type   Skin Assessment Clean, dry, & intact   Skin Protection for O2 Device Yes   Orientation Middle   Location Nose   Intervention(s) Skin Barrier   Equipment Type v60   Mode Bilevel   Mask Type Full face mask   Mask Size Medium   Settings/Measurements   IPAP 18 cmH20   CPAP/EPAP 10 cmH2O   Vt (Measured) 906 mL   Rate Ordered 16   Resp 18   FiO2  35 %   Minute Volume (L/min) 16.5 Liters   Mask Leak (lpm) 0 lpm   Comfort Level Good   Using Accessory Muscles No   Patient's Home Machine No   Alarm Settings   Alarms On Y   Low Pressure (cmH2O) 6 cmH2O   High Pressure (cmH2O) 30 cmH2O   Delay Alarm 20 sec(s)   Apnea (secs) 20 secs   RR Low (bpm) 6   RR High (bpm) 40 br/min

## 2023-03-15 NOTE — PROGRESS NOTES
03/14/23 2340   NIV Type   Skin Assessment Clean, dry, & intact   Skin Protection for O2 Device Yes   Orientation Middle   Location Nose   Intervention(s) Skin Barrier   Equipment Type v60   Mode Bilevel   Mask Type Full face mask   Mask Size Medium   Settings/Measurements   IPAP 18 cmH20   CPAP/EPAP 10 cmH2O   Vt (Measured) 587 mL   Rate Ordered 16   Resp 18   FiO2  35 %   I Time/ I Time % 0.9 s   Minute Volume (L/min) 10.3 Liters   Mask Leak (lpm) 0 lpm   Comfort Level Good   Using Accessory Muscles No   Patient's Home Machine No   Alarm Settings   Alarms On Y   Low Pressure (cmH2O) 6 cmH2O   High Pressure (cmH2O) 30 cmH2O   Delay Alarm 20 sec(s)   Apnea (secs) 20 secs   RR Low (bpm) 6   RR High (bpm) 40 br/min

## 2023-03-15 NOTE — PROGRESS NOTES
[facility] Discharge [address] Acute Rehab Unit    Patient:Bang ADAM [facility]     Rehab Dx/Hx: Acute CVA (cerebrovascular accident) [facility]) [I63.9]   JKS:4/91/2615  TWX:[id number]  Date of Admit: 3/6/2023     Pain Effect on Sleep:  Over the past 5 days, how much of the time has pain made it hard for you to sleep at night? [x]  0. Does not apply - I have not had any pain or hurting in the past 5 days  []  1. Rarely or not at all  []  2. Occasionally  []  3. Frequently  []  4. Almost constantly  []  8. Unable to answer    Over the past 5 days, how often have you limited your participation in rehabilitation therapy sessions due to pain?  []  0. Does not apply - I have not received rehabilitation therapy in the past 5 days  [x]  1. Rarely or not at all  []  2. Occasionally  []  3. Frequently  []  4. Almost constantly  []  8. Unable to answer    Pain Interference with Day-to-Day Activities:  Over the past 5 days, how often have you limited your day-to-day activities (excluding rehabilitation therapy session)? []  1. Rarely or not at all  []  2. Occasionally  [x]  3. Frequently  []  4. Almost constantly  []  8. Unable to answer      High-Risk Drug Classes: Use and Indication   Is taking: Check if the pt is taking any medications by pharmacological classification  Indication noted: If column 1 is checked, check if there is an indication noted for all meds in the drug class Is taking  (check all that apply) Indication noted (check all that apply)   Antipsychotic [] []   Anticoagulant [] []   Antibiotic [x] []   Opioid [] []   Antiplatelet [x] []   Hypoglycemic (including insulin) [] []   None of the above [] []     Special Treatments, Procedures, and Programs   Check all of the following treatments, procedures, and programs that apply on discharge. On discharge (check all that apply)   Cancer Treatments    A1. Chemotherapy []   A2. IV []   A3. Oral []   A10.  Other []   B1. Radiation []   Respiratory Therapies C1. Oxygen Therapy []   C2. Continuous [x]   C3. Intermittent []   C4. High-concentration []   D1. Suctioning []   D2. Scheduled []   D3. As needed []   E1. Tracheostomy Care []   F1. Invasive Mechanical Ventilator (ventilator or respirator) []   G1. Non-invasive Mechanical Ventilator []   G2. BiPAP [x]   G3. CPAP []   Other    H1. IV Medications []   H2. Vasoactive medications []   H3. Antibiotics []   H4. Anticoagulation [x]   H10. Other []   I1. Transfusions []   J1. Dialysis []   J2. Hemodialysis []   J3. Peritoneal dialysis []   O1. IV access []   O2. Peripheral []   O3. Midline []   O4.  Central (PICC, tunneled, port) []   None of the above []     Signature:  Evens Osborne RN

## 2023-03-15 NOTE — CARE COORDINATION
03/15/23 0857   Condition of Participation: Discharge Planning   The Plan for Transition of Care is related to the following treatment goals: outpatient location list   The Patient and/or Patient Representative was provided with a Choice of Provider? Patient;Patient Representative   Name of the Patient Representative who was provided with the Choice of Provider and agrees with the Discharge Plan? Ren Joeanne (son)   The Patient and/Or Patient Representative agree with the Discharge Plan? Yes   Freedom of Choice list was provided with basic dialogue that supports the patient's individualized plan of care/goals, treatment preferences, and shares the quality data associated with the providers?   Yes

## 2023-03-15 NOTE — CARE COORDINATION
Case Management Discharge Summary  [facility] - Acute Rehab Unit    Patient:Bang Theodoremarizol     Rehab Dx/Hx: Acute CVA (cerebrovascular accident) [facility]) [I63.9]       Today's Date: 3/15/2023    Discharge to:  Home with son and outpatient therapy   Pre-certification completed:  [x] No       [] Yes     [] N/A   Hospital Exemption Notification (HENS) completed:  [x] No       [] Yes     [] N/A   IMM given:  03/14/23       New Durable Medical Equipment ordered/agency:  [employer]:rolling walker   Transportation:  Private       Confirmed discharge plan with:  Patient: [] No       [x] Yes  Family:  [] No       [x] Yes      Name:Bang  Contact number: [phone]   Outpatient therapy script and location list given to patient on 03/14  RN, name: Ad Flores RN       Has lack of transportation kept you from medical appointments, meetings, work, or ADL's? [] Yes, it has kept me from medical appointments or from getting my meds  [] Yes, It has kept me from non-medical meetings, appointments, work, or getting things I need  [x] No  [] Pt unable to respond  [] Pt declines to respond     How often do you need to have someone help you when you read instructions, pamphlets, or other written material from your doctor or pharmacy? [] Never                             [] Always  [] Rarely                            [] Patient declines to respond  [x] Sometimes                    [] Patient unable to respond  [] Often       Note: Discharging nurse to complete KT, reconcile AVS, and place final copy with patient's discharge packet. RN to ensure that written prescriptions for  Level II medications are sent with patient to the facility as per protocol.           Signature:  Pina Sheridan RN

## 2023-03-15 NOTE — PROGRESS NOTES
Occupational Therapy  Facility/Department: [address] [place] [address]  Rehabilitation Occupational Therapy Daily Treatment Note    Date: 3/15/23  Patient Name: Maria Dolores Enamorado       Room: 0733/2102-05  MRN: [id number]  Account: [de-identified]   : 1950  (78 y.o.) Gender: male                    Past Medical History:  has a past medical history of Atrial fibrillation ([facility] [address] Bronchitis chronic, Emphysema of lung ([facility] [address] HTN (hypertension), Influenza A, Lung disease, Pneumonia, and Stroke. Past Surgical History:   has a past surgical history that includes fracture surgery and hernia repair. Restrictions  Restrictions/Precautions: Fall Risk, General Precautions  Other position/activity restrictions: Strict I&Os, 3L O2; up ith assist. Notify physician for pulse less than 50 or greater than 120, respiratory rate less than 12 or greater than 25, oral temperature greater than 101.3 F (91.9 C), systolic BP less than 90 or greater than 724, diastolic BP less than 50 or greater than 100. Required Braces or Orthoses?: No    Subjective  Subjective: Pt in recliner, pleasant, family present for training, no pain, agreeable to OT session, /81, HR 88, O2 sats 92% on RA. Restrictions/Precautions: Fall Risk;General Precautions             Objective     Cognition  Overall Cognitive Status: Exceptions  Arousal/Alertness: Delayed responses to stimuli  Following Commands: Follows one step commands with increased time; Follows one step commands consistently  Attention Span: Attends with cues to redirect  Memory: Decreased short term memory  Safety Judgement: Decreased awareness of need for assistance  Problem Solving: Assistance required to generate solutions  Insights: Decreased awareness of deficits  Initiation: Requires cues for some  Sequencing: Requires cues for some  Orientation  Overall Orientation Status: Within Functional Limits         ADL  Grooming/Oral Hygiene  Assistance Level: Modified independent  Skilled Clinical Factors: to wash hands in stance at sink  Toileting  Assistance Level: Modified independent  Skilled Clinical Factors: with RW  Toilet Transfers  Technique: Stand step  Equipment: Standard toilet  Additional Factors: With handrails  Assistance Level: Modified independent  Skilled Clinical Factors: with RW          Functional Mobility  Device: Rolling walker  Activity: To/From bathroom;Transport items; Retrieve items  Assistance Level: Stand by assist  Skilled Clinical Factors: mod I in bathroom, SBA to step over obstacles in gym  Transfers  Surface: To chair with arms;From chair with arms;Standard toilet; Wheelchair  Additional Factors: With handrails  Device: Walker  Sit to Stand  Assistance Level: Modified independent  Stand to Sit  Assistance Level: Modified independent  Bed To/From Chair  Technique: Stand step  Assistance Level: Supervision         Assessment  Assessment  Assessment: Pt agreeable to OT session. Pt performed functional transfers with SPV/SBA in gym but mod I in bathroom. Pt completed toileting and grooming with mod I. Pt's family present for training and educated on ADL safety, O2 tubing safety, difficulty with novel tasks and dual tasking, and HEP. Pt's familiy verbalized understanding. Pt completed managing obstacle course for 1 rep with SBA/SPV with fair processing and min VCs and no LOB. Continue POC. Activity Tolerance: Patient tolerated treatment well;Patient limited by fatigue;Patient limited by endurance  Discharge Recommendations: 24 hour supervision or assist;Outpatient OT  OT Equipment Recommendations  Equipment Needed: Yes  Mobility Devices: ADL Assistive Devices  ADL Assistive Devices: Shower Chair with back; Toileting - 3-in-1 Commode  Safety Devices  Safety Devices in place: Yes  Type of devices: Nurse notified;Gait belt; All fall risk precautions in place; Patient at risk for falls; Left in chair (left with PT)    Patient Education  Education  Education Given To: Patient  Education Provided: Role of Therapy; ADL Function;Plan of Care;Precautions; Safety; Fall Prevention Strategies;DME/Home Modifications;Transfer Training;Mobility Training;Equipment; Energy Conservation;Home Exercise Program;Family Education  Education Provided Comments: role of OT, transfer training, ADL retraining, energy conservation and rest breaks with ADLs, PLB, [facility] at home, HEP, family education on ADLs, transfers, O2 tubing safety, HEP, and dual tasking  Education Method: Demonstration;Verbal  Barriers to Learning: Cognition  Education Outcome: Verbalized understanding;Demonstrated understanding;Continued education needed  Skilled Clinical Factors: Pt's family verbalized understanding. Plan  Occupational Therapy Plan  Times Per Week: 5/7x  Current Treatment Recommendations: Self-Care / ADL; Functional mobility training; Endurance training; Safety education & training;Patient/Caregiver education & training;Neuromuscular re-education;Strengthening;ROM;Balance training;Equipment evaluation, education, & procurement;Home management training;Coordination training    Goals  Short Term Goals  Time Frame for Short Term Goals: 6 days- 3/11/23  Short Term Goal 1: Pt will complete functional transfers with SPV. GOAL MET 3/10/23 Pt completed functional transfers with SPV. Short Term Goal 2: Pt will perform toileting with SPV. GOAL MET 3/9/23 Pt performed toileting with SPV. Short Term Goal 3: Pt will complete full body dressing with SPV. GOAL MET 3/10/23 Pt completed full body dressing with SPV. Short Term Goal 4: Pt will perform full body bathing with SPV. GOAL MET 3/10/23 Pt performed full body bathing with SPV. Short Term Goal 5: Pt will perform grooming at sink with mod I.-GOAL MET 3/11  Long Term Goals  Time Frame for Long Term Goals : 13 days- 3/18/23  Long Term Goal 1: Pt will perform functional transfers with mod I.  GOAL MET 3/14/23 Pt performed functional transfers with mod I.  Long Term Goal 2: Pt will complete BADLs with mod I. Goal Not Met. Pt requires SPV for LB dressing and will have assistance from family at home. Long Term Goal 3: Pt will improve coordination in RUE as evidenced by at least 5 second improvement in NHPT. Goal Not Met. Pt did not demonstrate improved NHPT score on RUE.     Therapy Time   Individual Concurrent Group Co-treatment   Time In 1157         Time Out 1300         Minutes 25         Timed Code Treatment Minutes: [address] [facility]

## 2023-03-15 NOTE — PROGRESS NOTES
Physical Therapy  Facility/Department: [facility]  Rehabilitation Physical Therapy Treatment Note    NAME: Nigel Palacios  : 1950 (68 y.o.)  MRN: [id number]  CODE STATUS: Full Code    Date of Service: 3/15/23  Chart Reviewed: Yes    Restrictions:  Restrictions/Precautions: Fall Risk, General Precautions  Position Activity Restriction  Other position/activity restrictions: Strict I&Os, 3L O2; up ith assist. Notify physician for pulse less than 50 or greater than 120, respiratory rate less than 12 or greater than 25, oral temperature greater than 101.3 F (20.1 C), systolic BP less than 90 or greater than 826, diastolic BP less than 50 or greater than 100. SUBJECTIVE: Denies pain. Patient gives informed consent to discuss ARU progress and mobility instruction with family present. Post Treatment Pain Screening Denies pain. OBJECTIVE  Cognition  Overall Cognitive Status: Exceptions  Arousal/Alertness: Delayed responses to stimuli  Following Commands: Follows one step commands with increased time; Follows one step commands consistently  Attention Span: Attends with cues to redirect  Memory: Decreased short term memory  Safety Judgement: Decreased awareness of need for assistance  Problem Solving: Assistance required to generate solutions  Insights: Decreased awareness of deficits  Initiation: Requires cues for some  Sequencing: Requires cues for some  Cognition Comment: increased time for processing - pt presenting with symptoms of global aphasia, w/ continued word finding difficulties; improves with increased time. Inability to dual task without intermittently stopping with walking when attempting to walk and talk and unable to manage O2 tubing and transfer simulataneously.   Orientation  Overall Orientation Status: Within Functional Limits    Functional Mobility   Patient seen with son Silvina Talley. and sammy -in-law for family training session with informed  consent given for family training for mobility. Patient's family educated in transfer training, gait assist training for level and steps with education in how to cue patinet for managing O2 tubing, hand placment on rails, hand placment on patient for close SBA to CGA, non-alternating pattern on steps, wide alicia and pacing with seated rest breaks for seated PLB for dyspnea with patient's son verbalizing understanding for patient's need for 24 hour assist, patient's inability to dual task safely, and inability to steer WC without running into objects on the right. Pt' son demo ability after education by PT to assist and cue  patient with gait, steps, transfers. Vitals at rest  SPO2 93% on 3L via nc  HR 82bpm  /64 Denies pain  Activity tolerance: SOB with activity with need for seated rest after gait and transfers . Gait: 28 feet with FWW and son safely on return demo from instruction able to safely assist and cue patient for gait for wide alicia, manage O2 tubing  Transfers: sit<>stand from [facility], car transfer CGA and cues to SBA  son safely on return demo from instruction able to safely assist and cue patient for gait for wide alicia with turning , manage O2 tubing   STAIRS. Up and down 2 steps with bilateral rails and FWW at bottom fo steps.:  son safely on return demo from instruction able to safely assist and cue patient for gait for wide alicia, manage O2 tubing  AROM: DF AROm to neutral intact, dimisnished to plight touch and proprioception bilateral feet, toes and ankles, knee ROM, Hip ROM for gait WFL  MMT:   Hip flexion 3+/5 RLE, 3+to 4-/5 LLE   Ankle DF 3+ to 4-/5 RLE, 4/5 LLE   Knee ext: 3+/5 RLE ,3+to 4-/5 LLE     ASSESSMENT/PROGRESS TOWARDS GOALS       Assessment  Assessment: Patient seen with son Silvina Corbett and sammy -in-law for family training session with informed  consent given for family training for mobility.   Patient's family educated in transfer training, gait assist training for level and steps with education in how to cue patinet for managing O2 tubing, hand placment on rails, hand placment on patient for close SBA to CGA, non-alternating pattern on steps, wide alicia and pacing with seated rest breaks for seated PLB for dyspnea with patient's son verbalizing understanding for patient's need for 24 hour assist, patient's inability to dual task safely, and inability to steer WC without running into objects on the right. Pt' son demo ability after education by PT to assist and cue  patient with gait, steps, transfers. Patient plans to DC to home today with family   Activity Tolerance: Patient tolerated treatment well;Patient limited by fatigue;Patient limited by endurance  Discharge Recommendations: Home with Home health PT  Factors Affecting Discharge: will need 24 hour supervision assist as patient unsafe to manage O2 tubing and needs SBA with gait and transfers due to inability to manage O2 tubing and unsteadiness with gait necessitating SBA  PT Equipment Recommendations  Equipment Needed: Yes  Walker: Rolling  Other: rolling walker. Family notes patient has SW, WC, Rollator at home. Goals  Short Term Goals  Short Term Goal 3: Patient demo up and down 6 steps with LRAD min assist. 3/13/2023 Patient demo up and down 2steps with min assist and number of steps  limited to 2 due to sob. 3/14/2023 patient demo up and down 4 steps with SBA and assist for managing O2 tubing needed seated rest immediate post steps. Short Term Goal 4: Patient demo indep in [address] propulsion 150 feet with l/r turns and indep in WC parts management. 3/10/2023 Pt needs cues with 100 feet WC proulsion with ext x4 to avoid running into doorframes on right. 3/14/2023 patient consistently running into doorframes on right, needs SBA for redirection for [address] navigation 180 feet. Short Term Goal 5: Patient demo stoop to recover wtih LRAD indep.   3/14/2023 GOAL Met patient demo indep in stoop to recover with grabber and FWW to get tissue box from floor to waist.  Long Term Goals  Time Frame for Long Term Goals : 3/21/2023  Long Term Goal 1: Patient demo indep in bed<>chair, sit<>Stand, car transfer with LRAD. GOAL not met patient demo transfers bed<>WC, car with FWW and sit<>stand with FWW  all with SBA and cues . Long Term Goal 2: Patient demo gait 150 feet with 2 turns on turf carpet of 10 feet or greater indep with LRAD. Patient with variable assist on carpet with 10 feet wtih L/R turns, 60 feet and 160 feet with SBA and cues for wide alicia or for O2 managment with distances limited by sob for 60 feet and 160 feet. Long Term Goal 3: Patient demo up and down 12 steps with LRAD indep. 3/14/2023 GOAL not met SBA with bilateral rails for 4 steps non-alt pattern and O2 tubing management. Long Term Goal 4: Patient demo indep in LE strengthening and coordination seated and standing exercises. 3/15/2023  Goal not met patient requires cues and set up assist with HEP, patient to plan to continue with HHPT. PLAN OF CARE/SAFETY  Physcial Therapy Plan  General Plan: 5-7 times per week  Current Treatment Recommendations: Strengthening;ROM;Balance training;Functional mobility training;Transfer training; Endurance training;Gait training;Stair training;Neuromuscular re-education;Home exercise program;Safety education & training;Patient/Caregiver education & training;Equipment evaluation, education, & procurement; Therapeutic activities    EDUCATION: see above for family training    Disposition: patient on 3L O2 via nc with call light in reach.          Therapy Time   Individual Concurrent Group Co-treatment   Time In 1300         Time Out 1400         Minutes 60           Timed Code Treatment Minutes: 61 Minutes       Alex Chand, 03/15/23 at 4:46 PM

## 2023-03-27 ENCOUNTER — HOSPITAL ENCOUNTER (OUTPATIENT)
Age: 73
Discharge: HOME | End: 2023-03-27
Payer: MEDICARE

## 2023-03-27 ENCOUNTER — HOSPITAL ENCOUNTER (OUTPATIENT)
Dept: GENERAL RADIOLOGY | Age: 73
Discharge: HOME | End: 2023-03-27
Payer: MEDICARE

## 2023-03-27 DIAGNOSIS — J18.9 PNEUMONIA DUE TO INFECTIOUS ORGANISM, UNSPECIFIED LATERALITY, UNSPECIFIED PART OF LUNG: ICD-10-CM

## 2023-03-27 PROCEDURE — 71046 X-RAY EXAM CHEST 2 VIEWS: CPT

## 2023-03-29 ENCOUNTER — APPOINTMENT (OUTPATIENT)
Dept: CT IMAGING | Age: 73
End: 2023-03-29
Payer: MEDICARE

## 2023-03-29 ENCOUNTER — APPOINTMENT (OUTPATIENT)
Dept: GENERAL RADIOLOGY | Age: 73
End: 2023-03-29
Payer: MEDICARE

## 2023-03-29 ENCOUNTER — TELEPHONE (OUTPATIENT)
Dept: OTHER | Facility: CLINIC | Age: 73
End: 2023-03-29

## 2023-03-29 ENCOUNTER — HOSPITAL ENCOUNTER (INPATIENT)
Age: 73
LOS: 6 days | Discharge: NURSING, REHAB OR LONG-TERM CARE | End: 2023-04-06
Attending: STUDENT IN AN ORGANIZED HEALTH CARE EDUCATION/TRAINING PROGRAM | Admitting: INTERNAL MEDICINE
Payer: MEDICARE

## 2023-03-29 DIAGNOSIS — M54.50 MIDLINE LOW BACK PAIN WITHOUT SCIATICA, UNSPECIFIED CHRONICITY: ICD-10-CM

## 2023-03-29 DIAGNOSIS — R29.898 LEFT ARM WEAKNESS: Primary | ICD-10-CM

## 2023-03-29 DIAGNOSIS — I63.9 CEREBROVASCULAR ACCIDENT (CVA), UNSPECIFIED MECHANISM (HCC): ICD-10-CM

## 2023-03-29 LAB
ALBUMIN SERPL-MCNC: 3.7 G/DL (ref 3.4–5)
ALBUMIN/GLOB SERPL: 1.6 {RATIO} (ref 1.1–2.2)
ALP SERPL-CCNC: 94 U/L (ref 40–129)
ALT SERPL-CCNC: 12 U/L (ref 10–40)
ANION GAP SERPL CALCULATED.3IONS-SCNC: 9 MMOL/L (ref 3–16)
ANISOCYTOSIS BLD QL SMEAR: ABNORMAL
AST SERPL-CCNC: 11 U/L (ref 15–37)
BASOPHILS # BLD: 0 K/UL (ref 0–0.2)
BASOPHILS NFR BLD: 0 %
BILIRUB SERPL-MCNC: 0.5 MG/DL (ref 0–1)
BUN SERPL-MCNC: 22 MG/DL (ref 7–20)
CALCIUM SERPL-MCNC: 8.5 MG/DL (ref 8.3–10.6)
CHLORIDE SERPL-SCNC: 92 MMOL/L (ref 99–110)
CO2 SERPL-SCNC: 33 MMOL/L (ref 21–32)
CREAT SERPL-MCNC: 1 MG/DL (ref 0.8–1.3)
DEPRECATED RDW RBC AUTO: 14.9 % (ref 12.4–15.4)
EOSINOPHIL # BLD: 0.2 K/UL (ref 0–0.6)
EOSINOPHIL NFR BLD: 1 %
GFR SERPLBLD CREATININE-BSD FMLA CKD-EPI: >60 ML/MIN/{1.73_M2}
GLUCOSE BLD-MCNC: 189 MG/DL (ref 70–99)
GLUCOSE SERPL-MCNC: 192 MG/DL (ref 70–99)
HCT VFR BLD AUTO: 41.5 % (ref 40.5–52.5)
HGB BLD-MCNC: 13.7 G/DL (ref 13.5–17.5)
INR PPP: 1.1 (ref 0.84–1.16)
LYMPHOCYTES # BLD: 2.5 K/UL (ref 1–5.1)
LYMPHOCYTES NFR BLD: 7 %
MACROCYTES BLD QL SMEAR: ABNORMAL
MCH RBC QN AUTO: 30.4 PG (ref 26–34)
MCHC RBC AUTO-ENTMCNC: 33 G/DL (ref 31–36)
MCV RBC AUTO: 91.8 FL (ref 80–100)
MICROCYTES BLD QL SMEAR: ABNORMAL
MONOCYTES # BLD: 0.7 K/UL (ref 0–1.3)
MONOCYTES NFR BLD: 4 %
NEUTROPHILS # BLD: 13.1 K/UL (ref 1.7–7.7)
NEUTROPHILS NFR BLD: 75 %
NEUTS BAND NFR BLD MANUAL: 5 % (ref 0–7)
NEUTS VAC BLD QL SMEAR: PRESENT
PERFORMED ON: ABNORMAL
PLATELET # BLD AUTO: 380 K/UL (ref 135–450)
PLATELET BLD QL SMEAR: ADEQUATE
PMV BLD AUTO: 9.6 FL (ref 5–10.5)
POIKILOCYTOSIS BLD QL SMEAR: ABNORMAL
POLYCHROMASIA BLD QL SMEAR: ABNORMAL
POTASSIUM SERPL-SCNC: 3.3 MMOL/L (ref 3.5–5.1)
PROT SERPL-MCNC: 6 G/DL (ref 6.4–8.2)
PROTHROMBIN TIME: 14.2 SEC (ref 11.5–14.8)
RBC # BLD AUTO: 4.52 M/UL (ref 4.2–5.9)
SLIDE REVIEW: ABNORMAL
SODIUM SERPL-SCNC: 134 MMOL/L (ref 136–145)
TOXIC GRANULES BLD QL SMEAR: PRESENT
TROPONIN T SERPL-MCNC: <0.01 NG/ML
VARIANT LYMPHS NFR BLD MANUAL: 8 % (ref 0–6)
WBC # BLD AUTO: 16.4 K/UL (ref 4–11)

## 2023-03-29 PROCEDURE — 94761 N-INVAS EAR/PLS OXIMETRY MLT: CPT

## 2023-03-29 PROCEDURE — 2700000000 HC OXYGEN THERAPY PER DAY

## 2023-03-29 PROCEDURE — 85025 COMPLETE CBC W/AUTO DIFF WBC: CPT

## 2023-03-29 PROCEDURE — 6360000004 HC RX CONTRAST MEDICATION: Performed by: NURSE PRACTITIONER

## 2023-03-29 PROCEDURE — 80053 COMPREHEN METABOLIC PANEL: CPT

## 2023-03-29 PROCEDURE — 85610 PROTHROMBIN TIME: CPT

## 2023-03-29 PROCEDURE — 36415 COLL VENOUS BLD VENIPUNCTURE: CPT

## 2023-03-29 PROCEDURE — 70498 CT ANGIOGRAPHY NECK: CPT

## 2023-03-29 PROCEDURE — 93005 ELECTROCARDIOGRAM TRACING: CPT | Performed by: NURSE PRACTITIONER

## 2023-03-29 PROCEDURE — 70450 CT HEAD/BRAIN W/O DYE: CPT

## 2023-03-29 PROCEDURE — 71045 X-RAY EXAM CHEST 1 VIEW: CPT

## 2023-03-29 PROCEDURE — 99285 EMERGENCY DEPT VISIT HI MDM: CPT

## 2023-03-29 PROCEDURE — 84484 ASSAY OF TROPONIN QUANT: CPT

## 2023-03-29 RX ORDER — 0.9 % SODIUM CHLORIDE 0.9 %
1000 INTRAVENOUS SOLUTION INTRAVENOUS ONCE
Status: COMPLETED | OUTPATIENT
Start: 2023-03-30 | End: 2023-03-30

## 2023-03-29 RX ADMIN — IOPAMIDOL 75 ML: 755 INJECTION, SOLUTION INTRAVENOUS at 18:27

## 2023-03-29 ASSESSMENT — PAIN DESCRIPTION - LOCATION: LOCATION: BACK

## 2023-03-29 ASSESSMENT — PAIN - FUNCTIONAL ASSESSMENT: PAIN_FUNCTIONAL_ASSESSMENT: 0-10

## 2023-03-29 ASSESSMENT — LIFESTYLE VARIABLES
HOW OFTEN DO YOU HAVE A DRINK CONTAINING ALCOHOL: NEVER
HOW MANY STANDARD DRINKS CONTAINING ALCOHOL DO YOU HAVE ON A TYPICAL DAY: PATIENT DOES NOT DRINK

## 2023-03-29 ASSESSMENT — PAIN SCALES - GENERAL: PAINLEVEL_OUTOF10: 8

## 2023-03-29 NOTE — ED NOTES
@3828 Sonia called ED code stroke  @7350 stroke team consulted   @3183 CT notified  @2598 Radha Maria ( stroke team) returned call

## 2023-03-29 NOTE — TELEPHONE ENCOUNTER
Writer contacted Dr. Devang Rain to inform of 30 day readmission risk. Nakitar's attempt to contact Dr. Devang Rain was unsuccessful.      Call Back: If you need to call back to inform of disposition you can contact me at [phone]

## 2023-03-29 NOTE — ED NOTES
Patient identified as a positive fall risk on the ED triage fall screening. Patient placed in fall precautions which includes:  yellow fall risk bracelet on wrist and yellow socks on feet. Patient instructed on importance of not getting out of bed or ambulating without assistance for safety. Pt verbalized understanding.        Facundo Mahajan RN  03/29/23 [id number]

## 2023-03-30 PROBLEM — R29.818 FOCAL NEUROLOGICAL DEFICIT: Status: ACTIVE | Noted: 2023-03-30

## 2023-03-30 LAB
ANION GAP SERPL CALCULATED.3IONS-SCNC: 15 MMOL/L (ref 3–16)
BASE EXCESS BLDA CALC-SCNC: 12 MMOL/L (ref -3–3)
BASOPHILS # BLD: 0.1 K/UL (ref 0–0.2)
BASOPHILS NFR BLD: 0.4 %
BUN SERPL-MCNC: 16 MG/DL (ref 7–20)
CALCIUM SERPL-MCNC: 8.4 MG/DL (ref 8.3–10.6)
CHLORIDE SERPL-SCNC: 97 MMOL/L (ref 99–110)
CO2 BLDA-SCNC: 40 MMOL/L
CO2 SERPL-SCNC: 31 MMOL/L (ref 21–32)
CREAT SERPL-MCNC: 0.8 MG/DL (ref 0.8–1.3)
DEPRECATED RDW RBC AUTO: 15 % (ref 12.4–15.4)
EKG ATRIAL RATE: 267 BPM
EKG DIAGNOSIS: NORMAL
EKG Q-T INTERVAL: 414 MS
EKG QRS DURATION: 98 MS
EKG QTC CALCULATION (BAZETT): 456 MS
EKG R AXIS: -17 DEGREES
EKG T AXIS: 30 DEGREES
EKG VENTRICULAR RATE: 73 BPM
EOSINOPHIL # BLD: 0.2 K/UL (ref 0–0.6)
EOSINOPHIL NFR BLD: 1 %
GFR SERPLBLD CREATININE-BSD FMLA CKD-EPI: >60 ML/MIN/{1.73_M2}
GLUCOSE SERPL-MCNC: 131 MG/DL (ref 70–99)
HCO3 BLDA-SCNC: 37.9 MMOL/L (ref 21–29)
HCT VFR BLD AUTO: 44.5 % (ref 40.5–52.5)
HGB BLD-MCNC: 14.6 G/DL (ref 13.5–17.5)
LYMPHOCYTES # BLD: 1.8 K/UL (ref 1–5.1)
LYMPHOCYTES NFR BLD: 8.8 %
MAGNESIUM SERPL-MCNC: 2.2 MG/DL (ref 1.8–2.4)
MCH RBC QN AUTO: 30 PG (ref 26–34)
MCHC RBC AUTO-ENTMCNC: 32.8 G/DL (ref 31–36)
MCV RBC AUTO: 91.5 FL (ref 80–100)
MONOCYTES # BLD: 1.5 K/UL (ref 0–1.3)
MONOCYTES NFR BLD: 7.4 %
NEUTROPHILS # BLD: 16.6 K/UL (ref 1.7–7.7)
NEUTROPHILS NFR BLD: 82.4 %
PCO2 BLDA: 71.5 MM HG (ref 35–45)
PERFORMED ON: ABNORMAL
PH BLDA: 7.33 [PH] (ref 7.35–7.45)
PLATELET # BLD AUTO: 347 K/UL (ref 135–450)
PMV BLD AUTO: 9.1 FL (ref 5–10.5)
PO2 BLDA: 133 MM HG (ref 75–108)
POC SAMPLE TYPE: ABNORMAL
POTASSIUM SERPL-SCNC: 3.7 MMOL/L (ref 3.5–5.1)
RBC # BLD AUTO: 4.86 M/UL (ref 4.2–5.9)
SAO2 % BLDA: 99 % (ref 93–100)
SODIUM SERPL-SCNC: 143 MMOL/L (ref 136–145)
WBC # BLD AUTO: 20.1 K/UL (ref 4–11)

## 2023-03-30 PROCEDURE — G0378 HOSPITAL OBSERVATION PER HR: HCPCS

## 2023-03-30 PROCEDURE — 94761 N-INVAS EAR/PLS OXIMETRY MLT: CPT

## 2023-03-30 PROCEDURE — 6370000000 HC RX 637 (ALT 250 FOR IP): Performed by: INTERNAL MEDICINE

## 2023-03-30 PROCEDURE — 85025 COMPLETE CBC W/AUTO DIFF WBC: CPT

## 2023-03-30 PROCEDURE — 82803 BLOOD GASES ANY COMBINATION: CPT

## 2023-03-30 PROCEDURE — 94644 CONT INHLJ TX 1ST HOUR: CPT

## 2023-03-30 PROCEDURE — 2580000003 HC RX 258: Performed by: INTERNAL MEDICINE

## 2023-03-30 PROCEDURE — 6360000002 HC RX W HCPCS: Performed by: INTERNAL MEDICINE

## 2023-03-30 PROCEDURE — 36415 COLL VENOUS BLD VENIPUNCTURE: CPT

## 2023-03-30 PROCEDURE — 99223 1ST HOSP IP/OBS HIGH 75: CPT | Performed by: STUDENT IN AN ORGANIZED HEALTH CARE EDUCATION/TRAINING PROGRAM

## 2023-03-30 PROCEDURE — 94660 CPAP INITIATION&MGMT: CPT

## 2023-03-30 PROCEDURE — 94640 AIRWAY INHALATION TREATMENT: CPT

## 2023-03-30 PROCEDURE — 80048 BASIC METABOLIC PNL TOTAL CA: CPT

## 2023-03-30 PROCEDURE — 83735 ASSAY OF MAGNESIUM: CPT

## 2023-03-30 PROCEDURE — 2700000000 HC OXYGEN THERAPY PER DAY

## 2023-03-30 PROCEDURE — 93010 ELECTROCARDIOGRAM REPORT: CPT | Performed by: INTERNAL MEDICINE

## 2023-03-30 RX ORDER — FAMOTIDINE 20 MG/1
20 TABLET, FILM COATED ORAL 2 TIMES DAILY
Status: DISCONTINUED | OUTPATIENT
Start: 2023-03-30 | End: 2023-04-06 | Stop reason: HOSPADM

## 2023-03-30 RX ORDER — SODIUM CHLORIDE 0.9 % (FLUSH) 0.9 %
5-40 SYRINGE (ML) INJECTION EVERY 12 HOURS SCHEDULED
Status: DISCONTINUED | OUTPATIENT
Start: 2023-03-30 | End: 2023-04-06 | Stop reason: HOSPADM

## 2023-03-30 RX ORDER — ONDANSETRON 2 MG/ML
4 INJECTION INTRAMUSCULAR; INTRAVENOUS EVERY 6 HOURS PRN
Status: DISCONTINUED | OUTPATIENT
Start: 2023-03-30 | End: 2023-04-06 | Stop reason: HOSPADM

## 2023-03-30 RX ORDER — FUROSEMIDE 10 MG/ML
40 INJECTION INTRAMUSCULAR; INTRAVENOUS ONCE
Status: COMPLETED | OUTPATIENT
Start: 2023-03-30 | End: 2023-03-30

## 2023-03-30 RX ORDER — ATORVASTATIN CALCIUM 80 MG/1
80 TABLET, FILM COATED ORAL DAILY
Status: DISCONTINUED | OUTPATIENT
Start: 2023-03-30 | End: 2023-04-06 | Stop reason: HOSPADM

## 2023-03-30 RX ORDER — PRAMIPEXOLE DIHYDROCHLORIDE 0.25 MG/1
0.5 TABLET ORAL DAILY
Status: DISCONTINUED | OUTPATIENT
Start: 2023-03-30 | End: 2023-04-06 | Stop reason: HOSPADM

## 2023-03-30 RX ORDER — POLYETHYLENE GLYCOL 3350 17 G/17G
17 POWDER, FOR SOLUTION ORAL DAILY PRN
Status: DISCONTINUED | OUTPATIENT
Start: 2023-03-30 | End: 2023-04-06 | Stop reason: HOSPADM

## 2023-03-30 RX ORDER — ALBUTEROL SULFATE 2.5 MG/3ML
2.5 SOLUTION RESPIRATORY (INHALATION) EVERY 4 HOURS PRN
Status: DISCONTINUED | OUTPATIENT
Start: 2023-03-30 | End: 2023-03-30

## 2023-03-30 RX ORDER — SODIUM CHLORIDE 9 MG/ML
INJECTION, SOLUTION INTRAVENOUS PRN
Status: DISCONTINUED | OUTPATIENT
Start: 2023-03-30 | End: 2023-04-06 | Stop reason: HOSPADM

## 2023-03-30 RX ORDER — CALCIUM CARBONATE 200(500)MG
1000 TABLET,CHEWABLE ORAL 3 TIMES DAILY PRN
Status: DISCONTINUED | OUTPATIENT
Start: 2023-03-30 | End: 2023-04-06 | Stop reason: HOSPADM

## 2023-03-30 RX ORDER — ROFLUMILAST 500 UG/1
500 TABLET ORAL DAILY
Status: DISCONTINUED | OUTPATIENT
Start: 2023-03-30 | End: 2023-04-06 | Stop reason: HOSPADM

## 2023-03-30 RX ORDER — GUAIFENESIN 600 MG/1
600 TABLET, EXTENDED RELEASE ORAL 2 TIMES DAILY PRN
Status: DISCONTINUED | OUTPATIENT
Start: 2023-03-30 | End: 2023-04-06 | Stop reason: HOSPADM

## 2023-03-30 RX ORDER — CLOPIDOGREL BISULFATE 75 MG/1
75 TABLET ORAL DAILY
Status: DISCONTINUED | OUTPATIENT
Start: 2023-03-30 | End: 2023-04-06 | Stop reason: HOSPADM

## 2023-03-30 RX ORDER — IPRATROPIUM BROMIDE AND ALBUTEROL SULFATE 2.5; .5 MG/3ML; MG/3ML
1 SOLUTION RESPIRATORY (INHALATION) EVERY 4 HOURS PRN
Status: DISCONTINUED | OUTPATIENT
Start: 2023-03-30 | End: 2023-04-06 | Stop reason: HOSPADM

## 2023-03-30 RX ORDER — IPRATROPIUM BROMIDE AND ALBUTEROL SULFATE 2.5; .5 MG/3ML; MG/3ML
1 SOLUTION RESPIRATORY (INHALATION) 4 TIMES DAILY
Status: DISCONTINUED | OUTPATIENT
Start: 2023-03-30 | End: 2023-03-30

## 2023-03-30 RX ORDER — LABETALOL HYDROCHLORIDE 5 MG/ML
10 INJECTION, SOLUTION INTRAVENOUS EVERY 10 MIN PRN
Status: DISCONTINUED | OUTPATIENT
Start: 2023-03-30 | End: 2023-04-06 | Stop reason: HOSPADM

## 2023-03-30 RX ORDER — LANOLIN ALCOHOL/MO/W.PET/CERES
3 CREAM (GRAM) TOPICAL NIGHTLY PRN
Status: DISCONTINUED | OUTPATIENT
Start: 2023-03-30 | End: 2023-04-06 | Stop reason: HOSPADM

## 2023-03-30 RX ORDER — ENOXAPARIN SODIUM 100 MG/ML
40 INJECTION SUBCUTANEOUS DAILY
Status: DISCONTINUED | OUTPATIENT
Start: 2023-03-30 | End: 2023-04-06 | Stop reason: HOSPADM

## 2023-03-30 RX ORDER — SODIUM CHLORIDE 0.9 % (FLUSH) 0.9 %
5-40 SYRINGE (ML) INJECTION PRN
Status: DISCONTINUED | OUTPATIENT
Start: 2023-03-30 | End: 2023-04-06 | Stop reason: HOSPADM

## 2023-03-30 RX ORDER — DILTIAZEM HYDROCHLORIDE 120 MG/1
120 CAPSULE, COATED, EXTENDED RELEASE ORAL DAILY
Status: DISCONTINUED | OUTPATIENT
Start: 2023-03-30 | End: 2023-04-06 | Stop reason: HOSPADM

## 2023-03-30 RX ORDER — ONDANSETRON 4 MG/1
4 TABLET, ORALLY DISINTEGRATING ORAL EVERY 8 HOURS PRN
Status: DISCONTINUED | OUTPATIENT
Start: 2023-03-30 | End: 2023-04-06 | Stop reason: HOSPADM

## 2023-03-30 RX ADMIN — MUPIROCIN: 20 OINTMENT TOPICAL at 09:57

## 2023-03-30 RX ADMIN — MUPIROCIN: 20 OINTMENT TOPICAL at 21:20

## 2023-03-30 RX ADMIN — ASPIRIN 325 MG: 325 TABLET, COATED ORAL at 11:37

## 2023-03-30 RX ADMIN — ATORVASTATIN CALCIUM 80 MG: 80 TABLET, FILM COATED ORAL at 11:38

## 2023-03-30 RX ADMIN — SODIUM CHLORIDE, PRESERVATIVE FREE 10 ML: 5 INJECTION INTRAVENOUS at 21:20

## 2023-03-30 RX ADMIN — FAMOTIDINE 20 MG: 20 TABLET, FILM COATED ORAL at 11:37

## 2023-03-30 RX ADMIN — ALBUTEROL SULFATE 2.5 MG: 2.5 SOLUTION RESPIRATORY (INHALATION) at 06:04

## 2023-03-30 RX ADMIN — DILTIAZEM HYDROCHLORIDE 120 MG: 120 CAPSULE, COATED, EXTENDED RELEASE ORAL at 11:38

## 2023-03-30 RX ADMIN — IPRATROPIUM BROMIDE AND ALBUTEROL SULFATE 1 AMPULE: 2.5; .5 SOLUTION RESPIRATORY (INHALATION) at 15:39

## 2023-03-30 RX ADMIN — CLOPIDOGREL BISULFATE 75 MG: 75 TABLET ORAL at 11:38

## 2023-03-30 RX ADMIN — ALBUTEROL SULFATE 2.5 MG: 2.5 SOLUTION RESPIRATORY (INHALATION) at 06:05

## 2023-03-30 RX ADMIN — SODIUM CHLORIDE 1000 ML: 9 INJECTION, SOLUTION INTRAVENOUS at 01:47

## 2023-03-30 RX ADMIN — IPRATROPIUM BROMIDE AND ALBUTEROL SULFATE 1 AMPULE: 2.5; .5 SOLUTION RESPIRATORY (INHALATION) at 07:47

## 2023-03-30 RX ADMIN — SODIUM CHLORIDE, PRESERVATIVE FREE 10 ML: 5 INJECTION INTRAVENOUS at 10:00

## 2023-03-30 RX ADMIN — PRAMIPEXOLE DIHYDROCHLORIDE 0.5 MG: 0.25 TABLET ORAL at 11:38

## 2023-03-30 RX ADMIN — FUROSEMIDE 40 MG: 10 INJECTION, SOLUTION INTRAMUSCULAR; INTRAVENOUS at 06:29

## 2023-03-30 RX ADMIN — ALBUTEROL SULFATE 2.5 MG: 2.5 SOLUTION RESPIRATORY (INHALATION) at 01:18

## 2023-03-30 RX ADMIN — FAMOTIDINE 20 MG: 20 TABLET, FILM COATED ORAL at 01:48

## 2023-03-30 RX ADMIN — ENOXAPARIN SODIUM 40 MG: 100 INJECTION SUBCUTANEOUS at 09:58

## 2023-03-30 RX ADMIN — Medication 2 PUFF: at 19:35

## 2023-03-30 RX ADMIN — FAMOTIDINE 20 MG: 20 TABLET, FILM COATED ORAL at 21:19

## 2023-03-30 RX ADMIN — ALBUTEROL SULFATE 2.5 MG: 2.5 SOLUTION RESPIRATORY (INHALATION) at 06:06

## 2023-03-30 RX ADMIN — ROFLUMILAST 500 MCG: 500 TABLET ORAL at 12:50

## 2023-03-30 ASSESSMENT — PAIN SCALES - GENERAL
PAINLEVEL_OUTOF10: 3
PAINLEVEL_OUTOF10: 0
PAINLEVEL_OUTOF10: 6
PAINLEVEL_OUTOF10: 0

## 2023-03-30 ASSESSMENT — ENCOUNTER SYMPTOMS
VOMITING: 0
WHEEZING: 0
TROUBLE SWALLOWING: 0
DIARRHEA: 0
COLOR CHANGE: 0
STRIDOR: 0
EYE PAIN: 0
NAUSEA: 0
BACK PAIN: 0
ABDOMINAL PAIN: 0
ABDOMINAL DISTENTION: 0
COUGH: 0
EYE DISCHARGE: 0
CONSTIPATION: 0
EYE REDNESS: 0
SORE THROAT: 0
EYE ITCHING: 0
SHORTNESS OF BREATH: 1

## 2023-03-30 NOTE — RT PROTOCOL NOTE
Bronchodilator order with Frequency of every 4 hours PRN for wheezing or increased work of breathing using Per Protocol order mode. 4-6 - enter or revise RT Bronchodilator order(s) to two equivalent RT bronchodilator orders with one order with BID Frequency and one order with Frequency of every 4 hours PRN wheezing or increased work of breathing using Per Protocol order mode. 7-10 - enter or revise RT Bronchodilator order(s) to two equivalent RT bronchodilator orders with one order with TID Frequency and one order with Frequency of every 4 hours PRN wheezing or increased work of breathing using Per Protocol order mode. 11-13 - enter or revise RT Bronchodilator order(s) to one equivalent RT bronchodilator order with QID Frequency and an Albuterol order with Frequency of every 4 hours PRN wheezing or increased work of breathing using Per Protocol order mode. Greater than 13 - enter or revise RT Bronchodilator order(s) to one equivalent RT bronchodilator order with every 4 hours Frequency and an Albuterol order with Frequency of every 2 hours PRN wheezing or increased work of breathing using Per Protocol order mode. RT to enter RT Home Evaluation for COPD & MDI Assessment order using Per Protocol order mode.     Electronically signed by Briana Mejia RCP on 3/30/2023 at 1:21 AM

## 2023-03-30 NOTE — ED PROVIDER NOTES
exposure control, iterative reconstruction, and/or weight based adjustment of the mA/kV was utilized to reduce the radiation dose to as low as reasonably achievable. COMPARISON: MRI brain February 25, 2023 HISTORY: ORDERING SYSTEM PROVIDED HISTORY: left arm weakness TECHNOLOGIST PROVIDED HISTORY: Reason for exam:->left arm weakness Has a \"code stroke\" or \"stroke alert\" been called? ->Yes Decision Support Exception - unselect if not a suspected or confirmed emergency medical condition->Emergency Medical Condition (MA) Reason for Exam: left arm weakness FINDINGS: BRAIN/VENTRICLES: There is a tiny age indeterminate infarction in the posterior right frontal lobe, new since February 25, 2023 (series 2, image 47). There are subacute to chronic infarctions in the left frontal parietal centrum semi ovale. There is periventricular white matter low attenuation, likely related to moderate chronic microvascular disease. There is no acute intracranial hemorrhage, mass effect or midline shift. No abnormal extra-axial fluid collection. The gray-white differentiation is maintained without evidence of an acute infarct. There is no evidence of hydrocephalus. ORBITS: The visualized portion of the orbits demonstrate no acute abnormality. SINUSES: The visualized paranasal sinuses and mastoid air cells demonstrate no acute abnormality. SOFT TISSUES/SKULL: No acute abnormality of the visualized skull or soft tissues. Tiny age indeterminate infarction in the posterior right frontal lobe, new since February 25, 2023. Subacute to chronic infarctions in the left frontal parietal centrum semi ovale. Moderate chronic microvascular disease. Results reported to GWEN Benoit by radiology results communication at 6:26 p.m. on March 29, 2023. Additional findings reported to GWEN Benoit at 6:37 p.m. on March 29, 2023.      XR CHEST PORTABLE    Result Date: 3/29/2023  EXAMINATION: ONE XRAY VIEW OF THE CHEST 3/29/2023 6:31 pm COMPARISON:

## 2023-03-30 NOTE — SIGNIFICANT EVENT
Rapid response for respiratory distress. Patient had received 1L NS over about 3 hours in an attempt to boost his BP. Two hours after finishing a rapid response was called. Will start BiPAP. Patient has ALEXANDREA and was supposed to be on BiPAP overnight but his home settings had not been located yet. BiPAP 18 / 10 started per home settings. Will give lasix 40mg IV once and transfer to PCU if a bed is available.

## 2023-03-30 NOTE — H&P
Hospital Medicine History & Physical      Patient: Supriya Edwards  :  1950  MRN:  [id number]    Date of Service: 23    Chief Complaint   Patient presents with    Extremity Weakness     Patient went to dermatologist today to get spot removed from nose. Patient returned and son thought patient previous stroke deficits had worsened. Patient had stroke 2-3 weeks ago; left arm weakness and aphasia at bedside. LKW @ 1300. HISTORY OF PRESENT ILLNESS:    Supriya Edwards is a 68 y.o. male. He presented to the ER for worsened left arm weakness and worsened expressive aphasia. Patient was hospitalized  - 3/6/23 at [facility] for a Left MCA territory watershed infarction. A subtotal occlusion of the left ICA was found and stented. He was discharged to the [facility] and then to home on 3/15. By the time of discharge patient was mostly independent. He was able to ambulate with a walker. Patient had a lesion removed from his nose or biopsied this morning. After the procedure his son who is with him related the patient seemed to slump and list off to his left. His aphasia seemed to worsen. He had improved to be generally independent but was unable to walk in his home even with assistance. It seemed his left side was once again quite weak. Symptoms persisted throughout the day. Review of Systems:  All pertinent positives and negatives are as noted in the HPI section. All other systems were reviewed and are negative. Past Medical History:   Diagnosis Date    Atrial fibrillation ([address]     Bronchitis chronic     Emphysema of lung ([facility] [address]     HTN (hypertension)     Influenza A 01/10/2018    Lung disease     copd    Pneumonia     2009    Stroke 2023    L MCA watershed       Past Surgical History:   Procedure Laterality Date    FRACTURE SURGERY      johnson in femur/hip on right    HERNIA REPAIR           Prior to Admission medications    Medication Sig Start Date End Date Taking?

## 2023-03-31 ENCOUNTER — APPOINTMENT (OUTPATIENT)
Dept: MRI IMAGING | Age: 73
End: 2023-03-31
Payer: MEDICARE

## 2023-03-31 PROBLEM — I48.0 PAF (PAROXYSMAL ATRIAL FIBRILLATION) (HCC): Status: ACTIVE | Noted: 2018-01-11

## 2023-03-31 PROBLEM — I65.02 VERTEBRAL ARTERY STENOSIS, LEFT: Status: ACTIVE | Noted: 2023-03-31

## 2023-03-31 PROBLEM — I63.231 ARTERIAL ISCHEMIC STROKE, ICA, RIGHT, ACUTE (HCC): Status: ACTIVE | Noted: 2023-03-31

## 2023-03-31 PROBLEM — R29.818 ACUTE FOCAL NEUROLOGICAL DEFICIT: Status: ACTIVE | Noted: 2023-03-31

## 2023-03-31 LAB
ANION GAP SERPL CALCULATED.3IONS-SCNC: 7 MMOL/L (ref 3–16)
BASOPHILS # BLD: 0.1 K/UL (ref 0–0.2)
BASOPHILS NFR BLD: 0.5 %
BUN SERPL-MCNC: 15 MG/DL (ref 7–20)
CALCIUM SERPL-MCNC: 8.4 MG/DL (ref 8.3–10.6)
CHLORIDE SERPL-SCNC: 101 MMOL/L (ref 99–110)
CO2 SERPL-SCNC: 34 MMOL/L (ref 21–32)
CREAT SERPL-MCNC: 0.6 MG/DL (ref 0.8–1.3)
DEPRECATED RDW RBC AUTO: 15.2 % (ref 12.4–15.4)
EOSINOPHIL # BLD: 0.4 K/UL (ref 0–0.6)
EOSINOPHIL NFR BLD: 2.7 %
GFR SERPLBLD CREATININE-BSD FMLA CKD-EPI: >60 ML/MIN/{1.73_M2}
GLUCOSE SERPL-MCNC: 141 MG/DL (ref 70–99)
HCT VFR BLD AUTO: 42.4 % (ref 40.5–52.5)
HGB BLD-MCNC: 14.1 G/DL (ref 13.5–17.5)
LYMPHOCYTES # BLD: 1.9 K/UL (ref 1–5.1)
LYMPHOCYTES NFR BLD: 12.5 %
MAGNESIUM SERPL-MCNC: 2.3 MG/DL (ref 1.8–2.4)
MCH RBC QN AUTO: 30.3 PG (ref 26–34)
MCHC RBC AUTO-ENTMCNC: 33.2 G/DL (ref 31–36)
MCV RBC AUTO: 91.2 FL (ref 80–100)
MONOCYTES # BLD: 1.1 K/UL (ref 0–1.3)
MONOCYTES NFR BLD: 7.4 %
NEUTROPHILS # BLD: 11.6 K/UL (ref 1.7–7.7)
NEUTROPHILS NFR BLD: 76.9 %
PLATELET # BLD AUTO: 325 K/UL (ref 135–450)
PMV BLD AUTO: 9 FL (ref 5–10.5)
POTASSIUM SERPL-SCNC: 3.3 MMOL/L (ref 3.5–5.1)
RBC # BLD AUTO: 4.65 M/UL (ref 4.2–5.9)
SODIUM SERPL-SCNC: 142 MMOL/L (ref 136–145)
WBC # BLD AUTO: 15.1 K/UL (ref 4–11)

## 2023-03-31 PROCEDURE — 94640 AIRWAY INHALATION TREATMENT: CPT

## 2023-03-31 PROCEDURE — G0378 HOSPITAL OBSERVATION PER HR: HCPCS

## 2023-03-31 PROCEDURE — 99223 1ST HOSP IP/OBS HIGH 75: CPT | Performed by: PSYCHIATRY & NEUROLOGY

## 2023-03-31 PROCEDURE — 70551 MRI BRAIN STEM W/O DYE: CPT

## 2023-03-31 PROCEDURE — 36415 COLL VENOUS BLD VENIPUNCTURE: CPT

## 2023-03-31 PROCEDURE — 94761 N-INVAS EAR/PLS OXIMETRY MLT: CPT

## 2023-03-31 PROCEDURE — 2700000000 HC OXYGEN THERAPY PER DAY

## 2023-03-31 PROCEDURE — 99233 SBSQ HOSP IP/OBS HIGH 50: CPT | Performed by: STUDENT IN AN ORGANIZED HEALTH CARE EDUCATION/TRAINING PROGRAM

## 2023-03-31 PROCEDURE — 85025 COMPLETE CBC W/AUTO DIFF WBC: CPT

## 2023-03-31 PROCEDURE — 80048 BASIC METABOLIC PNL TOTAL CA: CPT

## 2023-03-31 PROCEDURE — 6360000002 HC RX W HCPCS: Performed by: INTERNAL MEDICINE

## 2023-03-31 PROCEDURE — 83735 ASSAY OF MAGNESIUM: CPT

## 2023-03-31 PROCEDURE — 2580000003 HC RX 258: Performed by: INTERNAL MEDICINE

## 2023-03-31 PROCEDURE — 1200000000 HC SEMI PRIVATE

## 2023-03-31 PROCEDURE — 6370000000 HC RX 637 (ALT 250 FOR IP): Performed by: INTERNAL MEDICINE

## 2023-03-31 PROCEDURE — 94660 CPAP INITIATION&MGMT: CPT

## 2023-03-31 RX ADMIN — MUPIROCIN: 20 OINTMENT TOPICAL at 21:13

## 2023-03-31 RX ADMIN — ENOXAPARIN SODIUM 40 MG: 100 INJECTION SUBCUTANEOUS at 10:16

## 2023-03-31 RX ADMIN — PRAMIPEXOLE DIHYDROCHLORIDE 0.5 MG: 0.25 TABLET ORAL at 10:16

## 2023-03-31 RX ADMIN — SODIUM CHLORIDE, PRESERVATIVE FREE 10 ML: 5 INJECTION INTRAVENOUS at 10:17

## 2023-03-31 RX ADMIN — DILTIAZEM HYDROCHLORIDE 120 MG: 120 CAPSULE, COATED, EXTENDED RELEASE ORAL at 10:15

## 2023-03-31 RX ADMIN — FAMOTIDINE 20 MG: 20 TABLET, FILM COATED ORAL at 10:15

## 2023-03-31 RX ADMIN — ATORVASTATIN CALCIUM 80 MG: 80 TABLET, FILM COATED ORAL at 10:16

## 2023-03-31 RX ADMIN — CLOPIDOGREL BISULFATE 75 MG: 75 TABLET ORAL at 10:15

## 2023-03-31 RX ADMIN — FAMOTIDINE 20 MG: 20 TABLET, FILM COATED ORAL at 21:13

## 2023-03-31 RX ADMIN — ASPIRIN 325 MG: 325 TABLET, COATED ORAL at 10:16

## 2023-03-31 RX ADMIN — Medication 2 PUFF: at 20:20

## 2023-03-31 RX ADMIN — SODIUM CHLORIDE, PRESERVATIVE FREE 10 ML: 5 INJECTION INTRAVENOUS at 21:13

## 2023-03-31 RX ADMIN — Medication 2 PUFF: at 07:43

## 2023-03-31 RX ADMIN — ROFLUMILAST 500 MCG: 500 TABLET ORAL at 10:16

## 2023-03-31 RX ADMIN — MUPIROCIN: 20 OINTMENT TOPICAL at 10:16

## 2023-03-31 ASSESSMENT — PAIN SCALES - GENERAL
PAINLEVEL_OUTOF10: 0

## 2023-03-31 ASSESSMENT — PAIN DESCRIPTION - DESCRIPTORS: DESCRIPTORS: SPASM

## 2023-03-31 ASSESSMENT — PAIN DESCRIPTION - LOCATION: LOCATION: BACK

## 2023-03-31 ASSESSMENT — PAIN DESCRIPTION - ORIENTATION: ORIENTATION: RIGHT;MID

## 2023-03-31 ASSESSMENT — PAIN DESCRIPTION - PAIN TYPE: TYPE: OTHER (COMMENT)

## 2023-04-01 ENCOUNTER — APPOINTMENT (OUTPATIENT)
Dept: GENERAL RADIOLOGY | Age: 73
End: 2023-04-01
Payer: MEDICARE

## 2023-04-01 LAB
ANION GAP SERPL CALCULATED.3IONS-SCNC: 6 MMOL/L (ref 3–16)
BASOPHILS # BLD: 0 K/UL (ref 0–0.2)
BASOPHILS NFR BLD: 0.3 %
BUN SERPL-MCNC: 18 MG/DL (ref 7–20)
CALCIUM SERPL-MCNC: 8.5 MG/DL (ref 8.3–10.6)
CHLORIDE SERPL-SCNC: 99 MMOL/L (ref 99–110)
CO2 SERPL-SCNC: 33 MMOL/L (ref 21–32)
CREAT SERPL-MCNC: 0.7 MG/DL (ref 0.8–1.3)
DEPRECATED RDW RBC AUTO: 14.8 % (ref 12.4–15.4)
EOSINOPHIL # BLD: 0.5 K/UL (ref 0–0.6)
EOSINOPHIL NFR BLD: 3 %
GFR SERPLBLD CREATININE-BSD FMLA CKD-EPI: >60 ML/MIN/{1.73_M2}
GLUCOSE SERPL-MCNC: 137 MG/DL (ref 70–99)
HCT VFR BLD AUTO: 42.2 % (ref 40.5–52.5)
HGB BLD-MCNC: 14.2 G/DL (ref 13.5–17.5)
LYMPHOCYTES # BLD: 1.8 K/UL (ref 1–5.1)
LYMPHOCYTES NFR BLD: 11.9 %
MAGNESIUM SERPL-MCNC: 2.2 MG/DL (ref 1.8–2.4)
MCH RBC QN AUTO: 30.6 PG (ref 26–34)
MCHC RBC AUTO-ENTMCNC: 33.8 G/DL (ref 31–36)
MCV RBC AUTO: 90.7 FL (ref 80–100)
MONOCYTES # BLD: 1.2 K/UL (ref 0–1.3)
MONOCYTES NFR BLD: 7.6 %
NEUTROPHILS # BLD: 12 K/UL (ref 1.7–7.7)
NEUTROPHILS NFR BLD: 77.2 %
PLATELET # BLD AUTO: 296 K/UL (ref 135–450)
PMV BLD AUTO: 8.9 FL (ref 5–10.5)
POTASSIUM SERPL-SCNC: 3.7 MMOL/L (ref 3.5–5.1)
RBC # BLD AUTO: 4.65 M/UL (ref 4.2–5.9)
SODIUM SERPL-SCNC: 138 MMOL/L (ref 136–145)
WBC # BLD AUTO: 15.5 K/UL (ref 4–11)

## 2023-04-01 PROCEDURE — 83735 ASSAY OF MAGNESIUM: CPT

## 2023-04-01 PROCEDURE — 94660 CPAP INITIATION&MGMT: CPT

## 2023-04-01 PROCEDURE — 94640 AIRWAY INHALATION TREATMENT: CPT

## 2023-04-01 PROCEDURE — 6370000000 HC RX 637 (ALT 250 FOR IP): Performed by: INTERNAL MEDICINE

## 2023-04-01 PROCEDURE — 2700000000 HC OXYGEN THERAPY PER DAY

## 2023-04-01 PROCEDURE — 94761 N-INVAS EAR/PLS OXIMETRY MLT: CPT

## 2023-04-01 PROCEDURE — 71045 X-RAY EXAM CHEST 1 VIEW: CPT

## 2023-04-01 PROCEDURE — 99233 SBSQ HOSP IP/OBS HIGH 50: CPT | Performed by: PSYCHIATRY & NEUROLOGY

## 2023-04-01 PROCEDURE — 1200000000 HC SEMI PRIVATE

## 2023-04-01 PROCEDURE — 6360000002 HC RX W HCPCS: Performed by: INTERNAL MEDICINE

## 2023-04-01 PROCEDURE — 80048 BASIC METABOLIC PNL TOTAL CA: CPT

## 2023-04-01 PROCEDURE — 2580000003 HC RX 258: Performed by: INTERNAL MEDICINE

## 2023-04-01 PROCEDURE — 85025 COMPLETE CBC W/AUTO DIFF WBC: CPT

## 2023-04-01 PROCEDURE — 36415 COLL VENOUS BLD VENIPUNCTURE: CPT

## 2023-04-01 RX ADMIN — FAMOTIDINE 20 MG: 20 TABLET, FILM COATED ORAL at 09:09

## 2023-04-01 RX ADMIN — Medication 2 PUFF: at 20:30

## 2023-04-01 RX ADMIN — ENOXAPARIN SODIUM 40 MG: 100 INJECTION SUBCUTANEOUS at 09:09

## 2023-04-01 RX ADMIN — ATORVASTATIN CALCIUM 80 MG: 80 TABLET, FILM COATED ORAL at 09:09

## 2023-04-01 RX ADMIN — MUPIROCIN: 20 OINTMENT TOPICAL at 09:10

## 2023-04-01 RX ADMIN — SODIUM CHLORIDE, PRESERVATIVE FREE 10 ML: 5 INJECTION INTRAVENOUS at 23:19

## 2023-04-01 RX ADMIN — ROFLUMILAST 500 MCG: 500 TABLET ORAL at 09:09

## 2023-04-01 RX ADMIN — FAMOTIDINE 20 MG: 20 TABLET, FILM COATED ORAL at 23:19

## 2023-04-01 RX ADMIN — SODIUM CHLORIDE, PRESERVATIVE FREE 10 ML: 5 INJECTION INTRAVENOUS at 09:14

## 2023-04-01 RX ADMIN — ASPIRIN 325 MG: 325 TABLET, COATED ORAL at 09:09

## 2023-04-01 RX ADMIN — PRAMIPEXOLE DIHYDROCHLORIDE 0.5 MG: 0.25 TABLET ORAL at 09:09

## 2023-04-01 RX ADMIN — DILTIAZEM HYDROCHLORIDE 120 MG: 120 CAPSULE, COATED, EXTENDED RELEASE ORAL at 09:08

## 2023-04-01 RX ADMIN — CLOPIDOGREL BISULFATE 75 MG: 75 TABLET ORAL at 09:09

## 2023-04-01 RX ADMIN — Medication 2 PUFF: at 08:26

## 2023-04-02 PROCEDURE — 97166 OT EVAL MOD COMPLEX 45 MIN: CPT

## 2023-04-02 PROCEDURE — 2700000000 HC OXYGEN THERAPY PER DAY

## 2023-04-02 PROCEDURE — 92610 EVALUATE SWALLOWING FUNCTION: CPT

## 2023-04-02 PROCEDURE — 97530 THERAPEUTIC ACTIVITIES: CPT

## 2023-04-02 PROCEDURE — 6370000000 HC RX 637 (ALT 250 FOR IP): Performed by: NURSE PRACTITIONER

## 2023-04-02 PROCEDURE — 94761 N-INVAS EAR/PLS OXIMETRY MLT: CPT

## 2023-04-02 PROCEDURE — 6360000002 HC RX W HCPCS: Performed by: INTERNAL MEDICINE

## 2023-04-02 PROCEDURE — 94640 AIRWAY INHALATION TREATMENT: CPT

## 2023-04-02 PROCEDURE — 1200000000 HC SEMI PRIVATE

## 2023-04-02 PROCEDURE — 6370000000 HC RX 637 (ALT 250 FOR IP): Performed by: INTERNAL MEDICINE

## 2023-04-02 PROCEDURE — 97162 PT EVAL MOD COMPLEX 30 MIN: CPT

## 2023-04-02 PROCEDURE — 2580000003 HC RX 258: Performed by: INTERNAL MEDICINE

## 2023-04-02 RX ORDER — OXYCODONE HYDROCHLORIDE AND ACETAMINOPHEN 5; 325 MG/1; MG/1
1 TABLET ORAL EVERY 6 HOURS PRN
Status: COMPLETED | OUTPATIENT
Start: 2023-04-02 | End: 2023-04-04

## 2023-04-02 RX ADMIN — ENOXAPARIN SODIUM 40 MG: 100 INJECTION SUBCUTANEOUS at 09:41

## 2023-04-02 RX ADMIN — Medication 2 PUFF: at 19:45

## 2023-04-02 RX ADMIN — SODIUM CHLORIDE, PRESERVATIVE FREE 10 ML: 5 INJECTION INTRAVENOUS at 20:19

## 2023-04-02 RX ADMIN — DILTIAZEM HYDROCHLORIDE 120 MG: 120 CAPSULE, COATED, EXTENDED RELEASE ORAL at 09:41

## 2023-04-02 RX ADMIN — ROFLUMILAST 500 MCG: 500 TABLET ORAL at 11:21

## 2023-04-02 RX ADMIN — FAMOTIDINE 20 MG: 20 TABLET, FILM COATED ORAL at 09:41

## 2023-04-02 RX ADMIN — Medication 2 PUFF: at 08:20

## 2023-04-02 RX ADMIN — SODIUM CHLORIDE, PRESERVATIVE FREE 10 ML: 5 INJECTION INTRAVENOUS at 09:42

## 2023-04-02 RX ADMIN — CLOPIDOGREL BISULFATE 75 MG: 75 TABLET ORAL at 09:41

## 2023-04-02 RX ADMIN — ATORVASTATIN CALCIUM 80 MG: 80 TABLET, FILM COATED ORAL at 09:41

## 2023-04-02 RX ADMIN — FAMOTIDINE 20 MG: 20 TABLET, FILM COATED ORAL at 20:20

## 2023-04-02 RX ADMIN — ASPIRIN 325 MG: 325 TABLET, COATED ORAL at 09:41

## 2023-04-02 RX ADMIN — PRAMIPEXOLE DIHYDROCHLORIDE 0.5 MG: 0.25 TABLET ORAL at 09:41

## 2023-04-02 RX ADMIN — OXYCODONE AND ACETAMINOPHEN 1 TABLET: 5; 325 TABLET ORAL at 20:20

## 2023-04-02 ASSESSMENT — PAIN DESCRIPTION - ORIENTATION: ORIENTATION: LOWER

## 2023-04-02 ASSESSMENT — PAIN SCALES - GENERAL
PAINLEVEL_OUTOF10: 9
PAINLEVEL_OUTOF10: 0

## 2023-04-02 ASSESSMENT — PAIN - FUNCTIONAL ASSESSMENT: PAIN_FUNCTIONAL_ASSESSMENT: PREVENTS OR INTERFERES SOME ACTIVE ACTIVITIES AND ADLS

## 2023-04-02 ASSESSMENT — PAIN DESCRIPTION - DESCRIPTORS: DESCRIPTORS: ACHING;DISCOMFORT

## 2023-04-02 ASSESSMENT — PAIN DESCRIPTION - LOCATION: LOCATION: BACK

## 2023-04-03 LAB
ANION GAP SERPL CALCULATED.3IONS-SCNC: 8 MMOL/L (ref 3–16)
BASOPHILS # BLD: 0.1 K/UL (ref 0–0.2)
BASOPHILS NFR BLD: 0.9 %
BUN SERPL-MCNC: 15 MG/DL (ref 7–20)
CALCIUM SERPL-MCNC: 8.8 MG/DL (ref 8.3–10.6)
CHLORIDE SERPL-SCNC: 96 MMOL/L (ref 99–110)
CO2 SERPL-SCNC: 33 MMOL/L (ref 21–32)
CREAT SERPL-MCNC: 0.6 MG/DL (ref 0.8–1.3)
DEPRECATED RDW RBC AUTO: 15.3 % (ref 12.4–15.4)
EOSINOPHIL # BLD: 0.4 K/UL (ref 0–0.6)
EOSINOPHIL NFR BLD: 2.4 %
GFR SERPLBLD CREATININE-BSD FMLA CKD-EPI: >60 ML/MIN/{1.73_M2}
GLUCOSE SERPL-MCNC: 127 MG/DL (ref 70–99)
HCT VFR BLD AUTO: 44 % (ref 40.5–52.5)
HGB BLD-MCNC: 14.6 G/DL (ref 13.5–17.5)
LYMPHOCYTES # BLD: 1.8 K/UL (ref 1–5.1)
LYMPHOCYTES NFR BLD: 11.6 %
MCH RBC QN AUTO: 30.4 PG (ref 26–34)
MCHC RBC AUTO-ENTMCNC: 33.3 G/DL (ref 31–36)
MCV RBC AUTO: 91.4 FL (ref 80–100)
MONOCYTES # BLD: 1 K/UL (ref 0–1.3)
MONOCYTES NFR BLD: 6.4 %
NEUTROPHILS # BLD: 12.2 K/UL (ref 1.7–7.7)
NEUTROPHILS NFR BLD: 78.7 %
PLATELET # BLD AUTO: 290 K/UL (ref 135–450)
PMV BLD AUTO: 9.2 FL (ref 5–10.5)
POTASSIUM SERPL-SCNC: 3.9 MMOL/L (ref 3.5–5.1)
RBC # BLD AUTO: 4.81 M/UL (ref 4.2–5.9)
SODIUM SERPL-SCNC: 137 MMOL/L (ref 136–145)
WBC # BLD AUTO: 15.5 K/UL (ref 4–11)

## 2023-04-03 PROCEDURE — 6360000002 HC RX W HCPCS: Performed by: INTERNAL MEDICINE

## 2023-04-03 PROCEDURE — 6370000000 HC RX 637 (ALT 250 FOR IP): Performed by: NURSE PRACTITIONER

## 2023-04-03 PROCEDURE — 2580000003 HC RX 258: Performed by: INTERNAL MEDICINE

## 2023-04-03 PROCEDURE — 94761 N-INVAS EAR/PLS OXIMETRY MLT: CPT

## 2023-04-03 PROCEDURE — 85025 COMPLETE CBC W/AUTO DIFF WBC: CPT

## 2023-04-03 PROCEDURE — 94640 AIRWAY INHALATION TREATMENT: CPT

## 2023-04-03 PROCEDURE — 94669 MECHANICAL CHEST WALL OSCILL: CPT

## 2023-04-03 PROCEDURE — 36415 COLL VENOUS BLD VENIPUNCTURE: CPT

## 2023-04-03 PROCEDURE — 6370000000 HC RX 637 (ALT 250 FOR IP): Performed by: INTERNAL MEDICINE

## 2023-04-03 PROCEDURE — 94667 MNPJ CHEST WALL 1ST: CPT

## 2023-04-03 PROCEDURE — 1200000000 HC SEMI PRIVATE

## 2023-04-03 PROCEDURE — 80048 BASIC METABOLIC PNL TOTAL CA: CPT

## 2023-04-03 PROCEDURE — 97110 THERAPEUTIC EXERCISES: CPT

## 2023-04-03 PROCEDURE — 2700000000 HC OXYGEN THERAPY PER DAY

## 2023-04-03 PROCEDURE — 97530 THERAPEUTIC ACTIVITIES: CPT

## 2023-04-03 PROCEDURE — 99233 SBSQ HOSP IP/OBS HIGH 50: CPT | Performed by: PSYCHIATRY & NEUROLOGY

## 2023-04-03 PROCEDURE — 99232 SBSQ HOSP IP/OBS MODERATE 35: CPT | Performed by: INTERNAL MEDICINE

## 2023-04-03 RX ORDER — BUDESONIDE 0.5 MG/2ML
0.5 INHALANT ORAL 2 TIMES DAILY
Status: DISCONTINUED | OUTPATIENT
Start: 2023-04-03 | End: 2023-04-06 | Stop reason: HOSPADM

## 2023-04-03 RX ORDER — BUDESONIDE 0.5 MG/2ML
0.5 INHALANT ORAL 2 TIMES DAILY
Qty: 60 EACH | Refills: 3 | DISCHARGE
Start: 2023-04-03

## 2023-04-03 RX ORDER — OXYCODONE HYDROCHLORIDE AND ACETAMINOPHEN 5; 325 MG/1; MG/1
1 TABLET ORAL EVERY 6 HOURS PRN
Qty: 12 TABLET | Refills: 0 | Status: SHIPPED | OUTPATIENT
Start: 2023-04-03 | End: 2023-04-06 | Stop reason: SDUPTHER

## 2023-04-03 RX ORDER — DILTIAZEM HYDROCHLORIDE 120 MG/1
120 CAPSULE, COATED, EXTENDED RELEASE ORAL DAILY
Qty: 30 CAPSULE | Refills: 3 | DISCHARGE
Start: 2023-04-04

## 2023-04-03 RX ORDER — ARFORMOTEROL TARTRATE 15 UG/2ML
15 SOLUTION RESPIRATORY (INHALATION) 2 TIMES DAILY
Qty: 120 ML | Refills: 3 | DISCHARGE
Start: 2023-04-03

## 2023-04-03 RX ORDER — ARFORMOTEROL TARTRATE 15 UG/2ML
15 SOLUTION RESPIRATORY (INHALATION) 2 TIMES DAILY
Status: DISCONTINUED | OUTPATIENT
Start: 2023-04-03 | End: 2023-04-06 | Stop reason: HOSPADM

## 2023-04-03 RX ADMIN — FAMOTIDINE 20 MG: 20 TABLET, FILM COATED ORAL at 20:38

## 2023-04-03 RX ADMIN — ROFLUMILAST 500 MCG: 500 TABLET ORAL at 07:45

## 2023-04-03 RX ADMIN — CLOPIDOGREL BISULFATE 75 MG: 75 TABLET ORAL at 07:44

## 2023-04-03 RX ADMIN — DILTIAZEM HYDROCHLORIDE 120 MG: 120 CAPSULE, COATED, EXTENDED RELEASE ORAL at 07:45

## 2023-04-03 RX ADMIN — FAMOTIDINE 20 MG: 20 TABLET, FILM COATED ORAL at 07:45

## 2023-04-03 RX ADMIN — ASPIRIN 325 MG: 325 TABLET, COATED ORAL at 07:45

## 2023-04-03 RX ADMIN — ARFORMOTEROL TARTRATE 15 MCG: 15 SOLUTION RESPIRATORY (INHALATION) at 11:34

## 2023-04-03 RX ADMIN — PRAMIPEXOLE DIHYDROCHLORIDE 0.5 MG: 0.25 TABLET ORAL at 07:45

## 2023-04-03 RX ADMIN — OXYCODONE AND ACETAMINOPHEN 1 TABLET: 5; 325 TABLET ORAL at 20:38

## 2023-04-03 RX ADMIN — ATORVASTATIN CALCIUM 80 MG: 80 TABLET, FILM COATED ORAL at 07:45

## 2023-04-03 RX ADMIN — BUDESONIDE 500 MCG: 0.5 INHALANT RESPIRATORY (INHALATION) at 11:34

## 2023-04-03 RX ADMIN — SODIUM CHLORIDE, PRESERVATIVE FREE 10 ML: 5 INJECTION INTRAVENOUS at 20:38

## 2023-04-03 RX ADMIN — ENOXAPARIN SODIUM 40 MG: 100 INJECTION SUBCUTANEOUS at 07:44

## 2023-04-03 RX ADMIN — ARFORMOTEROL TARTRATE 15 MCG: 15 SOLUTION RESPIRATORY (INHALATION) at 19:48

## 2023-04-03 RX ADMIN — BUDESONIDE 500 MCG: 0.5 INHALANT RESPIRATORY (INHALATION) at 19:53

## 2023-04-03 RX ADMIN — Medication 2 PUFF: at 08:13

## 2023-04-03 ASSESSMENT — PAIN - FUNCTIONAL ASSESSMENT: PAIN_FUNCTIONAL_ASSESSMENT: PREVENTS OR INTERFERES SOME ACTIVE ACTIVITIES AND ADLS

## 2023-04-03 ASSESSMENT — PAIN SCALES - GENERAL: PAINLEVEL_OUTOF10: 6

## 2023-04-03 ASSESSMENT — PAIN DESCRIPTION - DESCRIPTORS: DESCRIPTORS: ACHING;DISCOMFORT

## 2023-04-03 ASSESSMENT — PAIN DESCRIPTION - LOCATION: LOCATION: BACK

## 2023-04-03 ASSESSMENT — PAIN DESCRIPTION - ORIENTATION: ORIENTATION: LOWER

## 2023-04-04 LAB
BASOPHILS # BLD: 0.1 K/UL (ref 0–0.2)
BASOPHILS NFR BLD: 0.4 %
DEPRECATED RDW RBC AUTO: 15.4 % (ref 12.4–15.4)
EOSINOPHIL # BLD: 0.3 K/UL (ref 0–0.6)
EOSINOPHIL NFR BLD: 1.4 %
HCT VFR BLD AUTO: 43 % (ref 40.5–52.5)
HGB BLD-MCNC: 14.2 G/DL (ref 13.5–17.5)
LV EF: 58 %
LVEF MODALITY: NORMAL
LYMPHOCYTES # BLD: 1.6 K/UL (ref 1–5.1)
LYMPHOCYTES NFR BLD: 9 %
MCH RBC QN AUTO: 30.1 PG (ref 26–34)
MCHC RBC AUTO-ENTMCNC: 33 G/DL (ref 31–36)
MCV RBC AUTO: 91.3 FL (ref 80–100)
MONOCYTES # BLD: 1.1 K/UL (ref 0–1.3)
MONOCYTES NFR BLD: 6 %
NEUTROPHILS # BLD: 14.8 K/UL (ref 1.7–7.7)
NEUTROPHILS NFR BLD: 83.2 %
PLATELET # BLD AUTO: 297 K/UL (ref 135–450)
PMV BLD AUTO: 8.7 FL (ref 5–10.5)
RBC # BLD AUTO: 4.71 M/UL (ref 4.2–5.9)
WBC # BLD AUTO: 17.9 K/UL (ref 4–11)

## 2023-04-04 PROCEDURE — 6370000000 HC RX 637 (ALT 250 FOR IP): Performed by: INTERNAL MEDICINE

## 2023-04-04 PROCEDURE — 99232 SBSQ HOSP IP/OBS MODERATE 35: CPT | Performed by: INTERNAL MEDICINE

## 2023-04-04 PROCEDURE — 85025 COMPLETE CBC W/AUTO DIFF WBC: CPT

## 2023-04-04 PROCEDURE — 6360000002 HC RX W HCPCS: Performed by: INTERNAL MEDICINE

## 2023-04-04 PROCEDURE — 1200000000 HC SEMI PRIVATE

## 2023-04-04 PROCEDURE — 94640 AIRWAY INHALATION TREATMENT: CPT

## 2023-04-04 PROCEDURE — 94660 CPAP INITIATION&MGMT: CPT

## 2023-04-04 PROCEDURE — 94669 MECHANICAL CHEST WALL OSCILL: CPT

## 2023-04-04 PROCEDURE — 6370000000 HC RX 637 (ALT 250 FOR IP): Performed by: NURSE PRACTITIONER

## 2023-04-04 PROCEDURE — 2700000000 HC OXYGEN THERAPY PER DAY

## 2023-04-04 PROCEDURE — C8929 TTE W OR WO FOL WCON,DOPPLER: HCPCS

## 2023-04-04 PROCEDURE — 92523 SPEECH SOUND LANG COMPREHEN: CPT

## 2023-04-04 PROCEDURE — 2580000003 HC RX 258: Performed by: INTERNAL MEDICINE

## 2023-04-04 PROCEDURE — 94761 N-INVAS EAR/PLS OXIMETRY MLT: CPT

## 2023-04-04 PROCEDURE — 36415 COLL VENOUS BLD VENIPUNCTURE: CPT

## 2023-04-04 RX ORDER — ACETAMINOPHEN 325 MG/1
650 TABLET ORAL EVERY 4 HOURS PRN
Status: DISCONTINUED | OUTPATIENT
Start: 2023-04-04 | End: 2023-04-06 | Stop reason: HOSPADM

## 2023-04-04 RX ADMIN — ROFLUMILAST 500 MCG: 500 TABLET ORAL at 08:48

## 2023-04-04 RX ADMIN — IPRATROPIUM BROMIDE AND ALBUTEROL SULFATE 1 AMPULE: 2.5; .5 SOLUTION RESPIRATORY (INHALATION) at 19:50

## 2023-04-04 RX ADMIN — IPRATROPIUM BROMIDE AND ALBUTEROL SULFATE 1 AMPULE: 2.5; .5 SOLUTION RESPIRATORY (INHALATION) at 06:04

## 2023-04-04 RX ADMIN — BUDESONIDE 500 MCG: 0.5 INHALANT RESPIRATORY (INHALATION) at 19:50

## 2023-04-04 RX ADMIN — PRAMIPEXOLE DIHYDROCHLORIDE 0.5 MG: 0.25 TABLET ORAL at 08:45

## 2023-04-04 RX ADMIN — ATORVASTATIN CALCIUM 80 MG: 80 TABLET, FILM COATED ORAL at 08:45

## 2023-04-04 RX ADMIN — CLOPIDOGREL BISULFATE 75 MG: 75 TABLET ORAL at 08:45

## 2023-04-04 RX ADMIN — SODIUM CHLORIDE, PRESERVATIVE FREE 10 ML: 5 INJECTION INTRAVENOUS at 08:46

## 2023-04-04 RX ADMIN — FAMOTIDINE 20 MG: 20 TABLET, FILM COATED ORAL at 08:45

## 2023-04-04 RX ADMIN — ARFORMOTEROL TARTRATE 15 MCG: 15 SOLUTION RESPIRATORY (INHALATION) at 07:43

## 2023-04-04 RX ADMIN — ASPIRIN 325 MG: 325 TABLET, COATED ORAL at 08:45

## 2023-04-04 RX ADMIN — SODIUM CHLORIDE, PRESERVATIVE FREE 10 ML: 5 INJECTION INTRAVENOUS at 21:30

## 2023-04-04 RX ADMIN — BUDESONIDE 500 MCG: 0.5 INHALANT RESPIRATORY (INHALATION) at 07:43

## 2023-04-04 RX ADMIN — OXYCODONE AND ACETAMINOPHEN 1 TABLET: 5; 325 TABLET ORAL at 16:19

## 2023-04-04 RX ADMIN — IPRATROPIUM BROMIDE AND ALBUTEROL SULFATE 1 AMPULE: 2.5; .5 SOLUTION RESPIRATORY (INHALATION) at 15:42

## 2023-04-04 RX ADMIN — ARFORMOTEROL TARTRATE 15 MCG: 15 SOLUTION RESPIRATORY (INHALATION) at 19:50

## 2023-04-04 RX ADMIN — DILTIAZEM HYDROCHLORIDE 120 MG: 120 CAPSULE, COATED, EXTENDED RELEASE ORAL at 08:45

## 2023-04-04 RX ADMIN — ACETAMINOPHEN 650 MG: 325 TABLET ORAL at 20:56

## 2023-04-04 RX ADMIN — ENOXAPARIN SODIUM 40 MG: 100 INJECTION SUBCUTANEOUS at 08:46

## 2023-04-04 RX ADMIN — FAMOTIDINE 20 MG: 20 TABLET, FILM COATED ORAL at 20:56

## 2023-04-04 ASSESSMENT — PAIN DESCRIPTION - LOCATION
LOCATION: BACK
LOCATION: BACK

## 2023-04-04 ASSESSMENT — PAIN - FUNCTIONAL ASSESSMENT
PAIN_FUNCTIONAL_ASSESSMENT: PREVENTS OR INTERFERES SOME ACTIVE ACTIVITIES AND ADLS
PAIN_FUNCTIONAL_ASSESSMENT: PREVENTS OR INTERFERES SOME ACTIVE ACTIVITIES AND ADLS

## 2023-04-04 ASSESSMENT — PAIN DESCRIPTION - FREQUENCY: FREQUENCY: CONTINUOUS

## 2023-04-04 ASSESSMENT — PAIN DESCRIPTION - ONSET: ONSET: ON-GOING

## 2023-04-04 ASSESSMENT — PAIN DESCRIPTION - DIRECTION: RADIATING_TOWARDS: BACK

## 2023-04-04 ASSESSMENT — PAIN DESCRIPTION - PAIN TYPE
TYPE: CHRONIC PAIN
TYPE: CHRONIC PAIN

## 2023-04-04 ASSESSMENT — PAIN SCALES - GENERAL
PAINLEVEL_OUTOF10: 5
PAINLEVEL_OUTOF10: 3

## 2023-04-04 ASSESSMENT — PAIN DESCRIPTION - ORIENTATION
ORIENTATION: POSTERIOR
ORIENTATION: POSTERIOR

## 2023-04-04 ASSESSMENT — PAIN DESCRIPTION - DESCRIPTORS
DESCRIPTORS: ACHING
DESCRIPTORS: ACHING

## 2023-04-05 PROBLEM — I63.9 CEREBROVASCULAR ACCIDENT (CVA) (HCC): Status: ACTIVE | Noted: 2023-02-26

## 2023-04-05 LAB
BASOPHILS # BLD: 0.1 K/UL (ref 0–0.2)
BASOPHILS NFR BLD: 0.6 %
DEPRECATED RDW RBC AUTO: 15.3 % (ref 12.4–15.4)
EOSINOPHIL # BLD: 0.2 K/UL (ref 0–0.6)
EOSINOPHIL NFR BLD: 1.6 %
HCT VFR BLD AUTO: 42 % (ref 40.5–52.5)
HGB BLD-MCNC: 13.8 G/DL (ref 13.5–17.5)
LYMPHOCYTES # BLD: 1.8 K/UL (ref 1–5.1)
LYMPHOCYTES NFR BLD: 12.1 %
MCH RBC QN AUTO: 30.2 PG (ref 26–34)
MCHC RBC AUTO-ENTMCNC: 32.9 G/DL (ref 31–36)
MCV RBC AUTO: 91.7 FL (ref 80–100)
MONOCYTES # BLD: 0.8 K/UL (ref 0–1.3)
MONOCYTES NFR BLD: 5.7 %
NEUTROPHILS # BLD: 11.9 K/UL (ref 1.7–7.7)
NEUTROPHILS NFR BLD: 80 %
PLATELET # BLD AUTO: 278 K/UL (ref 135–450)
PMV BLD AUTO: 8.9 FL (ref 5–10.5)
RBC # BLD AUTO: 4.58 M/UL (ref 4.2–5.9)
WBC # BLD AUTO: 14.9 K/UL (ref 4–11)

## 2023-04-05 PROCEDURE — 99222 1ST HOSP IP/OBS MODERATE 55: CPT | Performed by: INTERNAL MEDICINE

## 2023-04-05 PROCEDURE — 97110 THERAPEUTIC EXERCISES: CPT

## 2023-04-05 PROCEDURE — 97116 GAIT TRAINING THERAPY: CPT

## 2023-04-05 PROCEDURE — 6370000000 HC RX 637 (ALT 250 FOR IP): Performed by: NURSE PRACTITIONER

## 2023-04-05 PROCEDURE — 36415 COLL VENOUS BLD VENIPUNCTURE: CPT

## 2023-04-05 PROCEDURE — 94660 CPAP INITIATION&MGMT: CPT

## 2023-04-05 PROCEDURE — 99232 SBSQ HOSP IP/OBS MODERATE 35: CPT | Performed by: INTERNAL MEDICINE

## 2023-04-05 PROCEDURE — 94640 AIRWAY INHALATION TREATMENT: CPT

## 2023-04-05 PROCEDURE — 94761 N-INVAS EAR/PLS OXIMETRY MLT: CPT

## 2023-04-05 PROCEDURE — 2700000000 HC OXYGEN THERAPY PER DAY

## 2023-04-05 PROCEDURE — 94669 MECHANICAL CHEST WALL OSCILL: CPT

## 2023-04-05 PROCEDURE — 2580000003 HC RX 258: Performed by: INTERNAL MEDICINE

## 2023-04-05 PROCEDURE — 6360000002 HC RX W HCPCS: Performed by: INTERNAL MEDICINE

## 2023-04-05 PROCEDURE — 6370000000 HC RX 637 (ALT 250 FOR IP): Performed by: INTERNAL MEDICINE

## 2023-04-05 PROCEDURE — 1200000000 HC SEMI PRIVATE

## 2023-04-05 PROCEDURE — 85025 COMPLETE CBC W/AUTO DIFF WBC: CPT

## 2023-04-05 RX ADMIN — SODIUM CHLORIDE, PRESERVATIVE FREE 10 ML: 5 INJECTION INTRAVENOUS at 09:02

## 2023-04-05 RX ADMIN — ACETAMINOPHEN 650 MG: 325 TABLET ORAL at 17:25

## 2023-04-05 RX ADMIN — FAMOTIDINE 20 MG: 20 TABLET, FILM COATED ORAL at 09:01

## 2023-04-05 RX ADMIN — ATORVASTATIN CALCIUM 80 MG: 80 TABLET, FILM COATED ORAL at 09:01

## 2023-04-05 RX ADMIN — PRAMIPEXOLE DIHYDROCHLORIDE 0.5 MG: 0.25 TABLET ORAL at 09:01

## 2023-04-05 RX ADMIN — ARFORMOTEROL TARTRATE 15 MCG: 15 SOLUTION RESPIRATORY (INHALATION) at 19:46

## 2023-04-05 RX ADMIN — FAMOTIDINE 20 MG: 20 TABLET, FILM COATED ORAL at 20:38

## 2023-04-05 RX ADMIN — ROFLUMILAST 500 MCG: 500 TABLET ORAL at 09:00

## 2023-04-05 RX ADMIN — CLOPIDOGREL BISULFATE 75 MG: 75 TABLET ORAL at 09:01

## 2023-04-05 RX ADMIN — ENOXAPARIN SODIUM 40 MG: 100 INJECTION SUBCUTANEOUS at 09:01

## 2023-04-05 RX ADMIN — DILTIAZEM HYDROCHLORIDE 120 MG: 120 CAPSULE, COATED, EXTENDED RELEASE ORAL at 09:01

## 2023-04-05 RX ADMIN — ASPIRIN 325 MG: 325 TABLET, COATED ORAL at 09:01

## 2023-04-05 RX ADMIN — ARFORMOTEROL TARTRATE 15 MCG: 15 SOLUTION RESPIRATORY (INHALATION) at 08:28

## 2023-04-05 RX ADMIN — BUDESONIDE 500 MCG: 0.5 INHALANT RESPIRATORY (INHALATION) at 19:51

## 2023-04-05 RX ADMIN — BUDESONIDE 500 MCG: 0.5 INHALANT RESPIRATORY (INHALATION) at 08:28

## 2023-04-05 RX ADMIN — SODIUM CHLORIDE, PRESERVATIVE FREE 10 ML: 5 INJECTION INTRAVENOUS at 20:39

## 2023-04-05 ASSESSMENT — PAIN SCALES - GENERAL
PAINLEVEL_OUTOF10: 0
PAINLEVEL_OUTOF10: 6

## 2023-04-05 NOTE — CONSULTS
Unstable angina pectoris (HCC)    Acute hypokalemia    Hypophosphatemia    Long term current use of diuretic    Acute on chronic combined systolic (congestive) and diastolic (congestive) heart failure (HCC)    Hypotension    COPD, very severe (HCC)    Chronic respiratory failure with hypoxia and hypercapnia (HCC)    Nocturnal hypoxia    Acute unilateral cerebral infarction in a watershed distribution [facility])    Aphasia due to acute cerebrovascular accident (CVA) ([facility] [address]    Acute cerebrovascular accident (CVA) due to stenosis of left carotid artery (HCC)    ALEXANDREA treated with BiPAP    Tachy-bear syndrome ([address]    Acute CVA (cerebrovascular accident) ([address]    Atelectasis    Focal neurological deficit    Acute focal neurological deficit    Arterial ischemic stroke, ICA, right, acute ([address]    Vertebral artery stenosis, left         Cardiac Testing: I have reviewed the findings below. EKG:  ECHO:   STRESS TEST:  CATH:  BYPASS:  VASCULAR:    Past Medical History:   has a past medical history of Atrial fibrillation ([facility] [address] Bronchitis chronic, Emphysema of lung ([facility] [address] HTN (hypertension), Influenza A, Lung disease, Pneumonia, and Stroke. Surgical History:   has a past surgical history that includes fracture surgery and hernia repair. Social History:   reports that he quit smoking about 6 years ago. His smoking use included cigarettes. He has a 30.00 pack-year smoking history. He has never used smokeless tobacco. He reports that he does not drink alcohol and does not use drugs. Family History:  No evidence for sudden cardiac death or premature CAD    Medications:  Reviewed and are listed in nursing record. and/or listed below  Outpatient Medications:  Prior to Admission medications    Medication Sig Start Date End Date Taking? Authorizing Provider   oxyCODONE-acetaminophen (PERCOCET) 5-325 MG per tablet Take 1 tablet by mouth every 6 hours as needed for Pain for up to 3 days.  Max Daily Amount: 4 tablets 4/3/23 4/6/23
equal, round, reactive to light  III,IV,VI: Extra Ocular Movements are intact. No nystagmus  V: Facial sensation is intact  VII: Facial strength and movements: Left facial asymmetry  VIII: Hearing: Intact  IX: Palate elevation is symmetric  XI: Shoulder shrug is intact  XII: Tongue movements are normal  Musculoskeletal: He can move his right side spontaneously. Left-sided weakness -4/5. Tone: Normal tone. Reflexes: Symmetric 2+ in the arms and 2+ in the legs   Planters: flexor bilaterally. Coordination:  left pronator drift, no dysmetria with FNF in upper extremities. Normal REM on the right. Sensation: normal to all modalities in both arms and legs. Gait/Posture: Unsteady  Data:  LABS:   Lab Results   Component Value Date/Time     03/31/2023 04:41 AM    K 3.3 03/31/2023 04:41 AM    K 3.9 03/15/2023 06:55 AM     03/31/2023 04:41 AM    CO2 34 03/31/2023 04:41 AM    BUN 15 03/31/2023 04:41 AM    CREATININE 0.6 03/31/2023 04:41 AM    GFRAA >60 04/07/2022 04:48 AM    LABGLOM >60 03/31/2023 04:41 AM    GLUCOSE 141 03/31/2023 04:41 AM    PHOS 2.5 03/31/2022 07:37 AM    MG 2.30 03/31/2023 04:41 AM    CALCIUM 8.4 03/31/2023 04:41 AM     Lab Results   Component Value Date/Time    WBC 15.1 03/31/2023 04:41 AM    RBC 4.65 03/31/2023 04:41 AM    HGB 14.1 03/31/2023 04:41 AM    HCT 42.4 03/31/2023 04:41 AM    MCV 91.2 03/31/2023 04:41 AM    RDW 15.2 03/31/2023 04:41 AM     03/31/2023 04:41 AM     Lab Results   Component Value Date    INR 1.10 03/29/2023    PROTIME 14.2 03/29/2023       Neuroimaging was independently reviewed by myself and discussed results with the patient and family. MRI showed acute right hemispheric CVA and subacute left MCA stroke. Reviewed notes from different physicians  Reviewed lab and blood testing    Impression:  Acute left-sided weakness secondary to acute right hemispheric CVA.   Thromboembolic versus cardioembolic from A-fib  Recent left MCA stroke and S/P left ICA
stroke, that presents with left-sided weakness. Assessment:  Acute on chronic hypoxic and hypercapnic respiratory failure  Severe COPD  Former smoker  Pulmonary edema  Centrilobular emphysema  Bronchiectasis    Plan:  - Given dose of lasix with improvement in his oxygenation and work of breathing. Pt reports feeling much better. Would hold off on additional lasix dose  - cont supplemental O2 therapy with goal saturation 88-92%.  He is at his home baseline of 3L  - cont NIPPV nightly 18/8  - cont home inhalers - Nasra Bunnywl, Albuterol PRN  - cont Daliresp  - PT/OT    Baljeet Kruse MD    1:12 PM

## 2023-04-06 ENCOUNTER — APPOINTMENT (OUTPATIENT)
Dept: CARDIAC CATH/INVASIVE PROCEDURES | Age: 73
End: 2023-04-06
Payer: MEDICARE

## 2023-04-06 ENCOUNTER — ANESTHESIA (OUTPATIENT)
Dept: CARDIAC CATH/INVASIVE PROCEDURES | Age: 73
End: 2023-04-06
Payer: MEDICARE

## 2023-04-06 ENCOUNTER — ANESTHESIA EVENT (OUTPATIENT)
Dept: CARDIAC CATH/INVASIVE PROCEDURES | Age: 73
End: 2023-04-06
Payer: MEDICARE

## 2023-04-06 VITALS
WEIGHT: 168.43 LBS | TEMPERATURE: 97.6 F | DIASTOLIC BLOOD PRESSURE: 82 MMHG | OXYGEN SATURATION: 94 % | SYSTOLIC BLOOD PRESSURE: 125 MMHG | RESPIRATION RATE: 16 BRPM | HEIGHT: 72 IN | HEART RATE: 75 BPM | BODY MASS INDEX: 22.81 KG/M2

## 2023-04-06 LAB
LV EF: 55 %
LVEF MODALITY: NORMAL
SARS-COV-2 RDRP RESP QL NAA+PROBE: NOT DETECTED

## 2023-04-06 PROCEDURE — 6370000000 HC RX 637 (ALT 250 FOR IP): Performed by: NURSE PRACTITIONER

## 2023-04-06 PROCEDURE — 94660 CPAP INITIATION&MGMT: CPT

## 2023-04-06 PROCEDURE — 6370000000 HC RX 637 (ALT 250 FOR IP): Performed by: INTERNAL MEDICINE

## 2023-04-06 PROCEDURE — 94761 N-INVAS EAR/PLS OXIMETRY MLT: CPT

## 2023-04-06 PROCEDURE — 2580000003 HC RX 258: Performed by: INTERNAL MEDICINE

## 2023-04-06 PROCEDURE — 6360000002 HC RX W HCPCS: Performed by: NURSE ANESTHETIST, CERTIFIED REGISTERED

## 2023-04-06 PROCEDURE — 3700000000 HC ANESTHESIA ATTENDED CARE

## 2023-04-06 PROCEDURE — 93325 DOPPLER ECHO COLOR FLOW MAPG: CPT

## 2023-04-06 PROCEDURE — 2700000000 HC OXYGEN THERAPY PER DAY

## 2023-04-06 PROCEDURE — 93315 ECHO TRANSESOPHAGEAL: CPT

## 2023-04-06 PROCEDURE — 6360000002 HC RX W HCPCS: Performed by: INTERNAL MEDICINE

## 2023-04-06 PROCEDURE — 94669 MECHANICAL CHEST WALL OSCILL: CPT

## 2023-04-06 PROCEDURE — 93320 DOPPLER ECHO COMPLETE: CPT

## 2023-04-06 PROCEDURE — 94640 AIRWAY INHALATION TREATMENT: CPT

## 2023-04-06 PROCEDURE — 3700000001 HC ADD 15 MINUTES (ANESTHESIA)

## 2023-04-06 PROCEDURE — 87635 SARS-COV-2 COVID-19 AMP PRB: CPT

## 2023-04-06 RX ORDER — PROPOFOL 10 MG/ML
INJECTION, EMULSION INTRAVENOUS PRN
Status: DISCONTINUED | OUTPATIENT
Start: 2023-04-06 | End: 2023-04-06 | Stop reason: SDUPTHER

## 2023-04-06 RX ORDER — HYDROCODONE BITARTRATE AND ACETAMINOPHEN 5; 325 MG/1; MG/1
1 TABLET ORAL ONCE
Status: COMPLETED | OUTPATIENT
Start: 2023-04-06 | End: 2023-04-06

## 2023-04-06 RX ORDER — OXYCODONE HYDROCHLORIDE AND ACETAMINOPHEN 5; 325 MG/1; MG/1
1 TABLET ORAL EVERY 6 HOURS PRN
Qty: 12 TABLET | Refills: 0 | Status: SHIPPED | OUTPATIENT
Start: 2023-04-06 | End: 2023-04-09

## 2023-04-06 RX ADMIN — BUDESONIDE 500 MCG: 0.5 INHALANT RESPIRATORY (INHALATION) at 07:20

## 2023-04-06 RX ADMIN — ARFORMOTEROL TARTRATE 15 MCG: 15 SOLUTION RESPIRATORY (INHALATION) at 07:20

## 2023-04-06 RX ADMIN — FAMOTIDINE 20 MG: 20 TABLET, FILM COATED ORAL at 11:51

## 2023-04-06 RX ADMIN — CLOPIDOGREL BISULFATE 75 MG: 75 TABLET ORAL at 11:53

## 2023-04-06 RX ADMIN — DILTIAZEM HYDROCHLORIDE 120 MG: 120 CAPSULE, COATED, EXTENDED RELEASE ORAL at 11:54

## 2023-04-06 RX ADMIN — PROPOFOL 170 MG: 10 INJECTION, EMULSION INTRAVENOUS at 10:04

## 2023-04-06 RX ADMIN — SODIUM CHLORIDE, PRESERVATIVE FREE 10 ML: 5 INJECTION INTRAVENOUS at 11:54

## 2023-04-06 RX ADMIN — ENOXAPARIN SODIUM 40 MG: 100 INJECTION SUBCUTANEOUS at 11:54

## 2023-04-06 RX ADMIN — ROFLUMILAST 500 MCG: 500 TABLET ORAL at 11:58

## 2023-04-06 RX ADMIN — FAMOTIDINE 20 MG: 20 TABLET, FILM COATED ORAL at 20:10

## 2023-04-06 RX ADMIN — ATORVASTATIN CALCIUM 80 MG: 80 TABLET, FILM COATED ORAL at 11:53

## 2023-04-06 RX ADMIN — PRAMIPEXOLE DIHYDROCHLORIDE 0.5 MG: 0.25 TABLET ORAL at 11:52

## 2023-04-06 RX ADMIN — ACETAMINOPHEN 650 MG: 325 TABLET ORAL at 13:34

## 2023-04-06 RX ADMIN — HYDROCODONE BITARTRATE AND ACETAMINOPHEN 1 TABLET: 5; 325 TABLET ORAL at 15:34

## 2023-04-06 RX ADMIN — ASPIRIN 325 MG: 325 TABLET, COATED ORAL at 11:53

## 2023-04-06 ASSESSMENT — PAIN SCALES - GENERAL
PAINLEVEL_OUTOF10: 3
PAINLEVEL_OUTOF10: 9
PAINLEVEL_OUTOF10: 0

## 2023-04-06 ASSESSMENT — PAIN DESCRIPTION - LOCATION
LOCATION: BACK
LOCATION: BACK

## 2023-04-06 ASSESSMENT — PAIN DESCRIPTION - DESCRIPTORS: DESCRIPTORS: ACHING

## 2023-04-06 NOTE — PLAN OF CARE
Increase patients ADLs/functional status to baseline.
PT Eval   Increase function to baseline.
Problem: Discharge Planning  Goal: Discharge to home or other facility with appropriate resources  3/30/2023 8797 by Kayla Chavarria RN  Outcome: Progressing  Flowsheets (Taken 3/30/2023 0800)  Discharge to home or other facility with appropriate resources: Refer to discharge planning if patient needs post-hospital services based on physician order or complex needs related to functional status, cognitive ability or social support system     Problem: Safety - Adult  Goal: Free from fall injury  3/30/2023 0838 by Kayla Chavarria RN  Outcome: Progressing  Hourly rounds     Problem: Chronic Conditions and Co-morbidities  Goal: Patient's chronic conditions and co-morbidity symptoms are monitored and maintained or improved  Outcome: Progressing  Flowsheets (Taken 3/30/2023 0800)  Care Plan - Patient's Chronic Conditions and Co-Morbidity Symptoms are Monitored and Maintained or Improved:   Monitor and assess patient's chronic conditions and comorbid symptoms for stability, deterioration, or improvement   Collaborate with multidisciplinary team to address chronic and comorbid conditions and prevent exacerbation or deterioration  Rapid response at 0620  for SOB to ICU for Bipap.  Uses Bipap at home did bring to hospital.
Problem: Discharge Planning  Goal: Discharge to home or other facility with appropriate resources  4/3/2023 1506 by Moshe Calvo RN  Outcome: Progressing  4/3/2023 0652 by Renee Campoverde RN  Outcome: Progressing     Problem: Pain  Goal: Verbalizes/displays adequate comfort level or baseline comfort level  4/3/2023 1506 by Moshe Calvo RN  Outcome: Progressing  4/3/2023 0652 by Renee Campoverde RN  Outcome: Progressing     Problem: Safety - Adult  Goal: Free from fall injury  4/3/2023 1506 by Moshe Calvo RN  Outcome: Progressing  4/3/2023 0652 by Renee Campoverde RN  Outcome: Progressing     Problem: ABCDS Injury Assessment  Goal: Absence of physical injury  Outcome: Progressing     Problem: Chronic Conditions and Co-morbidities  Goal: Patient's chronic conditions and co-morbidity symptoms are monitored and maintained or improved  Outcome: Progressing     Problem: Neurosensory - Adult  Goal: Achieves stable or improved neurological status  Outcome: Progressing  Goal: Achieves maximal functionality and self care  Outcome: Progressing     Problem: Cardiovascular - Adult  Goal: Maintains optimal cardiac output and hemodynamic stability  Outcome: Progressing  Goal: Absence of cardiac dysrhythmias or at baseline  Outcome: Progressing     Problem: Skin/Tissue Integrity - Adult  Goal: Skin integrity remains intact  Outcome: Progressing  Goal: Incisions, wounds, or drain sites healing without S/S of infection  Outcome: Progressing     Problem: Musculoskeletal - Adult  Goal: Return mobility to safest level of function  Outcome: Progressing  Goal: Return ADL status to a safe level of function  Outcome: Progressing     Problem: Skin/Tissue Integrity  Goal: Absence of new skin breakdown  Description: 1. Monitor for areas of redness and/or skin breakdown  2. Assess vascular access sites hourly  3. Every 4-6 hours minimum:  Change oxygen saturation probe site  4.   Every 4-6 hours:  If on nasal continuous positive
Problem: Discharge Planning  Goal: Discharge to home or other facility with appropriate resources  Outcome: Progressing     Problem: Pain  Goal: Verbalizes/displays adequate comfort level or baseline comfort level  Outcome: Progressing     Problem: Safety - Adult  Goal: Free from fall injury  Outcome: Progressing
Problem: Discharge Planning  Goal: Discharge to home or other facility with appropriate resources  Outcome: Progressing     Problem: Pain  Goal: Verbalizes/displays adequate comfort level or baseline comfort level  Outcome: Progressing     Problem: Safety - Adult  Goal: Free from fall injury  Outcome: Progressing
Problem: Discharge Planning  Goal: Discharge to home or other facility with appropriate resources  Outcome: Progressing     Problem: Pain  Goal: Verbalizes/displays adequate comfort level or baseline comfort level  Outcome: Progressing     Problem: Safety - Adult  Goal: Free from fall injury  Outcome: Progressing     Problem: ABCDS Injury Assessment  Goal: Absence of physical injury  Outcome: Progressing
Problem: Discharge Planning  Goal: Discharge to home or other facility with appropriate resources  Outcome: Progressing     Problem: Pain  Goal: Verbalizes/displays adequate comfort level or baseline comfort level  Outcome: Progressing     Problem: Safety - Adult  Goal: Free from fall injury  Outcome: Progressing     Problem: ABCDS Injury Assessment  Goal: Absence of physical injury  Outcome: Progressing     Problem: Chronic Conditions and Co-morbidities  Goal: Patient's chronic conditions and co-morbidity symptoms are monitored and maintained or improved  Outcome: Progressing     Problem: Neurosensory - Adult  Goal: Achieves stable or improved neurological status  Outcome: Progressing  Goal: Achieves maximal functionality and self care  Outcome: Progressing     Problem: Cardiovascular - Adult  Goal: Maintains optimal cardiac output and hemodynamic stability  Outcome: Progressing  Goal: Absence of cardiac dysrhythmias or at baseline  Outcome: Progressing     Problem: Skin/Tissue Integrity - Adult  Goal: Skin integrity remains intact  Outcome: Progressing  Goal: Incisions, wounds, or drain sites healing without S/S of infection  Outcome: Progressing     Problem: Musculoskeletal - Adult  Goal: Return mobility to safest level of function  Outcome: Progressing  Goal: Return ADL status to a safe level of function  Outcome: Progressing     Problem: Skin/Tissue Integrity  Goal: Absence of new skin breakdown  Description: 1. Monitor for areas of redness and/or skin breakdown  2. Assess vascular access sites hourly  3. Every 4-6 hours minimum:  Change oxygen saturation probe site  4. Every 4-6 hours:  If on nasal continuous positive airway pressure, respiratory therapy assess nares and determine need for appliance change or resting period.   Outcome: Progressing
Problem: Discharge Planning  Goal: Discharge to home or other facility with appropriate resources  Outcome: Progressing  Flowsheets (Taken 3/31/2023 0800)  Discharge to home or other facility with appropriate resources: Refer to discharge planning if patient needs post-hospital services based on physician order or complex needs related to functional status, cognitive ability or social support system     Problem: Pain  Goal: Verbalizes/displays adequate comfort level or baseline comfort level  Outcome: Progressing     Problem: Safety - Adult  Goal: Free from fall injury  Outcome: Progressing     Problem: ABCDS Injury Assessment  Goal: Absence of physical injury  Outcome: Progressing     Problem: Chronic Conditions and Co-morbidities  Goal: Patient's chronic conditions and co-morbidity symptoms are monitored and maintained or improved  Outcome: Progressing  Flowsheets (Taken 3/31/2023 0800)  Care Plan - Patient's Chronic Conditions and Co-Morbidity Symptoms are Monitored and Maintained or Improved:   Collaborate with multidisciplinary team to address chronic and comorbid conditions and prevent exacerbation or deterioration   Monitor and assess patient's chronic conditions and comorbid symptoms for stability, deterioration, or improvement
Problem: Pain  Goal: Verbalizes/displays adequate comfort level or baseline comfort level  4/4/2023 1006 by Andrei Segal RN  Outcome: Progressing  Note: Pt has not had any c/o pain thus far this shift. Pt resting comfortably in bed. Will monitor. Problem: Safety - Adult  Goal: Free from fall injury  4/4/2023 1006 by Andrei Segal RN  Outcome: Progressing  Note: Pt is a fall risk. Fall risk protocol in place. See Geroge Or Fall Score. Pt bed is in low position, bed alarm is on, side rails up, fall risk bracelet applied. , non-skid footwear in use. Patient/family educated on fall risk protocol, instructed to call for assistance when needed and belongings are in reach. assistance. Will continue with hourly rounds for po intake, pain needs, toileting and repositioning as needed. Will continue to monitor for needs. Problem: Skin/Tissue Integrity  Goal: Absence of new skin breakdown  Description: 1. Monitor for areas of redness and/or skin breakdown  2. Assess vascular access sites hourly  3. Every 4-6 hours minimum:  Change oxygen saturation probe site  4. Every 4-6 hours:  If on nasal continuous positive airway pressure, respiratory therapy assess nares and determine need for appliance change or resting period. Outcome: Progressing  Note: Pt at risk for skin breakdown. See Nolan score. Pt is able to help to reposition self in bed. Heels elevated off bed. Sacral heart mepilex intact to protect, site inspected and intact underneath. Will continue to turn and reposition patient every two hours and as needed. Will continue to keep patient clean and dry, applying skin care cream as needed. Pillows used for repositioning q2hs. Will continue to monitor and assess for skin breakdown.
Problem: Pain  Goal: Verbalizes/displays adequate comfort level or baseline comfort level  Flowsheets (Taken 4/5/2023 0043)  Verbalizes/displays adequate comfort level or baseline comfort level: Implement non-pharmacological measures as appropriate and evaluate response  Note: Pt encouraged to reposition, turn, elevate LLE with pillow support. Ice applied to left ankle.
Problem: Pain  Goal: Verbalizes/displays adequate comfort level or baseline comfort level  Outcome: Progressing   Pt will be satisfied with pain control. Pt uses numeric pain rating scale with reassessments after pain med administration. Will continue to monitor progression throughout shift. Problem: Safety - Adult  Goal: Free from fall injury  Outcome: Progressing   Pt will remain free from falls throughout hospital stay. Fall precautions in place, bed alarm on, bed in lowest position with wheels locked and side rails 2/4 up. Room door open and hourly rounding completed. Will continue to monitor throughout shift.
Problem: Skin/Tissue Integrity  Goal: Absence of new skin breakdown  Description: 1. Monitor for areas of redness and/or skin breakdown  2. Assess vascular access sites hourly  3. Every 4-6 hours minimum:  Change oxygen saturation probe site  4. Every 4-6 hours:  If on nasal continuous positive airway pressure, respiratory therapy assess nares and determine need for appliance change or resting period. Note: Pt turns self side to side in bed. Pillow support offered under elbows/heels.
SLP completed evaluation. Please refer to notes in EMR.     Jaylan HOOKS-SLP  Clinical Fellow  [id number]
TODAYS DATE:  3/30/2023    Discussed personal risk factors for Stroke /TIA with patient/family, and ways to reduce the risk for a recurrent stroke. Patient's personal risk factors which were identified are:     [] Alcohol Abuse: check with your physician before any alcohol consumption. [] Atrial fibrillation: may cause blood clots. [] Drug Abuse: Seek help, talk with your doctor  [] Clotting Disorder  [] Diabetes  [] Family history of stroke or heart disease  [x] High Blood Pressure/Hypertension: work with your physician. [x] High cholesterol: monitor cholesterol levels with your physician.   [] Overweight/Obesity: work with your physician for your ideal body weight.  [] Physical Inactivity: get regular exercise as directed by your physician. [x] Personal history of previous TIA or stroke  [] Poor Diet; decrease salt (sodium) in your diet, follow diet directed by physician. [] Smoking: Cigarette/Cigar: stop smoking. Reviewed the Following Education with Patient and/or Family:   -Signs and Symptoms of Stroke:     (facial droop, weakness/numbness especially on one side, speech difficulty, sudden confusion, sudden loss of vision, sudden severe headache,       sudden loss of balance or having difficulty walking, syncope or seizure)  -How to activate EMS (911)   -Importance of Follow Up Appointment at Discharge   -Importance of Compliance with Medications Prescribed at Discharge     Pt verbalized understanding.      Family Present during Education: yes     Stroke Education Booklet given to patient/family which includes above education: yes     Electronically signed by Chip Nielsen RN on 3/30/2023 at 3:51 AM
and disease management, be more comfortable, and reduce lower extremity edema prior to discharge    Pricilla hSaffer RN      :
or resting period.   Outcome: Progressing
Level of Function:   Assess patient stability and activity tolerance for standing, transferring and ambulating with or without assistive devices   Instruct patient/family in ordered activity level  Goal: Return ADL status to a safe level of function  Outcome: Progressing  Flowsheets (Taken 3/31/2023 2000)  Return ADL Status to a Safe Level of Function:   Administer medication as ordered   Assess activities of daily living deficits and provide assistive devices as needed   Obtain physical therapy/occupational therapy consults as needed   Assist and instruct patient to increase activity and self care as tolerated

## 2023-04-06 NOTE — ANESTHESIA POSTPROCEDURE EVALUATION
Department of Anesthesiology  Postprocedure Note    Patient: Rhonda Day  MRN: [id number]  YOB: 1950  Date of evaluation: 4/6/2023      Procedure Summary     Date: 04/06/23 Room / Location: [facility]iac [facility] Lab    Anesthesia Start: 1001 Anesthesia Stop: 1034    Procedure: TRANSESOPHAGEAL ECHO Diagnosis:     Scheduled Providers:  Responsible Provider: Butch Allison MD    Anesthesia Type: MAC ASA Status: 3          Anesthesia Type: No value filed.     Maxim Phase I:      Maxim Phase II:        Anesthesia Post Evaluation    Patient location during evaluation: PACU  Patient participation: complete - patient participated  Level of consciousness: awake and alert  Pain score: 0  Airway patency: patent  Nausea & Vomiting: no nausea and no vomiting  Complications: no  Cardiovascular status: blood pressure returned to baseline  Respiratory status: acceptable  Hydration status: stable

## 2023-04-06 NOTE — CARE COORDINATION
Pt does not answer phone- per RN he is sleeping. Called son Yoon Alvarado, who says he and pt are going to talk about dc plans tomorrow. MD ok with referral to ARU if pt agrees- will need cert. Pt had used [facility] in past but had not agreed to referral as yet.
Spoke with MD who states patient is currently getting a PRAVEENA and then she plans on discharging him. Plan is for patient to discharge to [facility] and authorization is good through today. Placed call to [facility] with OVM to update her. She states they are ready for him today; just needs a rapid covid. Covid ordered and RN aware. Message left for call back from son and DIL. CASE MANAGEMENT DISCHARGE SUMMARY      Discharge to: [facility] completed: yes  Hospital Exemption Notification (HENS) completed: yes    IMM given: (date) 4/6/23    Transportation: ambulance    Medical Transport explained to pt/family. Pt/family voice no agency preference. Agency used: [facility]    time:5pm   Ambulance form completed: Yes    Confirmed discharge plan with:patient/son/DIL/FRANCISCA/Lizzy with OVM     Patient: yes     Family:  yes    Name:Bang Contact number:[phone]     Facility/Agency, name:  [facility] faxed   Phone number for report to facility: [phone]     RN, name: Vinny Brownlee    Note: Discharging nurse to complete KT, reconcile AVS, and place final copy with patient's discharge packet. RN to ensure that written prescriptions for  Level II medications are sent with patient to the facility as per protocol.
Therapy rec's of SNF noted. Discussed with patient's DIL and she states they have discussed and would like a referral to be made to  [facility]. Placed call to [facility] with OVM and notified of referral. She states she will review and call back with whether or not they are able to accept. Precert initiated via [employer] portal.          0423 addendum:  Received call back from [facility] and she states they are able to accept. Authorization received from patient's [facility] so no barriers from CM standpoint.
Housework, Shopping, Mobility    PT AM-PAC:   /24  OT AM-PAC:   /24    Family can provide assistance at DC: Yes  Would you like Case Management to discuss the discharge plan with any other family members/significant others, and if so, who? Yes (family)  Plans to Return to Present Housing: Yes  Other Identified Issues/Barriers to RETURNING to current housing: recent cva  Potential Assistance needed at discharge: Home Care            Potential DME: Other (Comment) (follow for needs)  Patient expects to discharge to: [address] [place] [address] for transportation at discharge: Family    Financial    Payor: [address] [place] [address], [place] / Plan: Milena Yi / Product Type: *No Product type* /     Does insurance require precert for SNF: Yes    Potential assistance Purchasing Medications: No  Meds-to-Beds request:        [address] [place] [address] Sailaja Leung [address] [phone] Katerine Petrona [phone]  [address] [place] [phone]  Phone: [phone] Fax: [phone]    [address] [phone] - F [phone]  [address] [place] [address]  Phone: [phone] Fax: [phone]    Day[address] Stat[address], SOURAV [address]  [address] [facility] [address]  Phone: [phone] Fax: [phone]      Notes:    Factors facilitating achievement of predicted outcomes: Family support    Barriers to discharge: recent CVA    Additional Case Management Notes: Pt is from home w  aries [employer] and DIL. Recent CVA- was in ARU- DC to home w OP PT. States may be interested in [facility]- follow for needs. Has had [facility] in the past- offered to make referral- pt prefers to wait till needs known.      The Plan for Transition of Care is related to the following treatment goals of Left arm weakness [R29.898]  Focal neurological deficit [R29.818]  Cerebrovascular accident (CVA), unspecified mechanism ([facility] [address] [F10.4]    IF APPLICABLE: The

## 2023-04-06 NOTE — DISCHARGE INSTR - COC
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, graft applied (Active)   Site color red 04/02/23 1000   Number of days: 7       Flap (Donor) 03/30/23 Ear Left Skin removed by ear to be grafted to nose (Active)   Site color red 04/02/23 1000   Number of days: 7        Elimination:  Continence: Bowel: Yes  Bladder: Yes  Urinary Catheter: None   Colostomy/Ileostomy/Ileal Conduit: No       Date of Last BM: 4/6/23    Intake/Output Summary (Last 24 hours) at 4/6/2023 1050  Last data filed at 4/6/2023 0909  Gross per 24 hour   Intake 925 ml   Output 1350 ml   Net -425 ml     I/O last 3 completed shifts: In: 9317 [P.O.:1380; I.V.:5]  Out: 1900 [Urine:1900]    Safety Concerns:      At Risk for Falls    Impairments/Disabilities:      Left sided weakness, expressive aphagia    Nutrition Therapy:  Current Nutrition Therapy:   - Oral Diet:  Low Sodium (3-4gm)    Routes of Feeding: Oral  Liquids: No Restrictions  Daily Fluid Restriction: no  Last Modified Barium Swallow with Video (Video Swallowing Test): not done    Treatments at the Time of Hospital Discharge:   Respiratory Treatments: arformoterol tartrate (BROVANA) nebulizer solution 15 mcgDose: 15 mcg : Nebulization : 2 TIMES DAILY  budesonide (PULMICORT) nebulizer suspension 500 mcgOrdered Dose: 0.5 mg : Admin Dose: 500 mcg : Nebulization : 2 TIMES DAILY  ipratropium-albuterol (DUONEB) nebulizer solution 1 ampuleDose: 1 ampule : Inhalation : EVERY 4 HOURS PRN : Shortness of Breath,

## 2023-04-06 NOTE — PROCEDURES
PRAVEENA Findings:  Ejection fraction is visually estimated at 55%. Normal RV size and systolic function. There is spontaneous echo contrast present in the left atrium and left   atrial appendage, suggestive of stagnant blood flow. However, there is no   clear evidence of mass or thrombus seen. A bubble study was performed and is suggestive of a very small PFO. Mild mitral regurgitation. Moderate tricuspid regurgitation. There is mild aortic calcification with a small calcified nodule noted near   the aortic root. Recommendations:  1. Although no definitive thrombus was seen on PRAVEENA, cannot definitively exclude possible intracardiac source of thrombus, and given history of atrial fibrillation, would recommend resuming anticoagulation as soon as deemed safe from neurologic standpoint. 2. Patient was also noted to have a very small PFO and can consider future referral to structural heart clinic for possible PFO closure.

## 2023-04-06 NOTE — DISCHARGE SUMMARY
bowel sounds. Musculoskeletal:  No clubbing, cyanosis or edema bilaterally. Full range of motion without deformity. Skin: Skin color, texture, turgor normal.  No rashes or lesions. Neurologic:  Neurovascularly intact without any focal sensory/motor deficits. Cranial nerves: II-XII intact, grossly non-focal.  Psychiatric:  Alert and oriented, thought content appropriate, normal insight  Capillary Refill: Brisk,< 3 seconds   Peripheral Pulses: +2 palpable, equal bilaterally       Labs: For convenience and continuity at follow-up the following most recent labs are provided:      CBC:    Lab Results   Component Value Date/Time    WBC 14.9 04/05/2023 09:44 AM    HGB 13.8 04/05/2023 09:44 AM    HCT 42.0 04/05/2023 09:44 AM     04/05/2023 09:44 AM       Renal:    Lab Results   Component Value Date/Time     04/03/2023 05:52 PM    K 3.9 04/03/2023 05:52 PM    CL 96 04/03/2023 05:52 PM    CO2 33 04/03/2023 05:52 PM    BUN 15 04/03/2023 05:52 PM    CREATININE 0.6 04/03/2023 05:52 PM    CALCIUM 8.8 04/03/2023 05:52 PM    PHOS 2.5 03/31/2022 07:37 AM         Significant Diagnostic Studies    Radiology:   XR CHEST PORTABLE   Final Result   Right basilar atelectasis. Otherwise unremarkable chest.         MRI brain without contrast   Final Result   They are several punctate areas of acute infarct involving the posterior   right frontal lobe, the right parietal lobe, and in the right occipital lobe. An embolic source should be considered. Late subacute infarcts involving the   left frontal and parietal periventricular white matter. Motion artifact degrades the images. Cerebral atrophy. Chronic small vessel   ischemic changes. XR CHEST PORTABLE   Final Result   Mild vascular congestion         CTA HEAD NECK W CONTRAST   Final Result   Patent vascular stent from distal left common carotid artery to proximal left   internal carotid artery, without associated flow-limiting stenosis.       Left

## 2023-04-06 NOTE — ANESTHESIA PRE PROCEDURE
tablet by mouth daily 3/14/23   Karen Arredondo MD   VENTOLIN  (71 Base) MCG/ACT inhaler INHALE TWO (2) PUFFS EVERY 6 HOURS AS NEEDED FOR WHEEZING OR SHORTNESS OF BREATH 3/8/23   She Dey MD   pramipexole (MIRAPEX) 0.5 MG tablet Take 0.5 mg by mouth daily    Historical Provider, MD   Roflumilast (DALIRESP) 500 MCG tablet TAKE (1) TABLET DAILY 10/24/22   She Dey MD   nitroGLYCERIN (NITROSTAT) 0.4 MG SL tablet PLACE 1 TABLET UNDER THE TONGUE EVERY 5 MINUTES AS NEEDED FOR CHEST PAIN 5/4/22   Javier Cavanaugh MD       Current medications:    Current Facility-Administered Medications   Medication Dose Route Frequency Provider Last Rate Last Admin    acetaminophen (TYLENOL) tablet 650 mg  650 mg Oral Q4H PRN ERIN Kern - CNP   650 mg at 04/05/23 1725    arformoterol tartrate (BROVANA) nebulizer solution 15 mcg  15 mcg Nebulization BID Haley Pike MD   15 mcg at 04/06/23 0720    budesonide (PULMICORT) nebulizer suspension 500 mcg  0.5 mg Nebulization BID Haley Pike MD   500 mcg at 04/06/23 0720    perflutren lipid microspheres (DEFINITY) injection 1.5 mL  1.5 mL IntraVENous ONCE PRN Adams Huang MD        aspirin EC tablet 325 mg  325 mg Oral Daily Ning Emery MD   325 mg at 04/05/23 0901    atorvastatin (LIPITOR) tablet 80 mg  80 mg Oral Daily Ning Emery MD   80 mg at 04/05/23 0901    clopidogrel (PLAVIX) tablet 75 mg  75 mg Oral Daily Ning Emery MD   75 mg at 04/05/23 0901    dilTIAZem (CARDIZEM CD) extended release capsule 120 mg  120 mg Oral Daily Ning Emery MD   120 mg at 04/05/23 0901    famotidine (PEPCID) tablet 20 mg  20 mg Oral BID Ning Emery MD   20 mg at 04/05/23 2038    guaiFENesin (MUCINEX) extended release tablet 600 mg  600 mg Oral BID PRN Ning Emery MD        pramipexole (MIRAPEX) tablet 0.5 mg  0.5 mg Oral Daily Ning Emery MD   0.5 mg at 04/05/23 0901    Roflumilast (DALIRESP) tablet 500 mcg  500 mcg

## 2023-04-06 NOTE — DISCHARGE INSTRUCTIONS
FOLLOW-UP APPOINTMENTS    [place] OFFICE - Follow-up appointment on April 18th at 10:15am with Ashely Mclain CNP. Please arrive 15 minutes early to complete necessary paperwork. [facility] Office [address]  [address]:  [phone]. If you are unable to make this appointment, please call to reschedule [phone]. To get to the office go to the side of the hospital at [facility], enter at the [facility], entrance that faces [address]. Take the elevator to the 3rd floor turn right off elevator then take hallway to the right. Go down the holman and office will be on your right [address]. [place] OFFICE - Keep follow-up appointment on May 8th at 1:30pm with Dr. Diana Michaud, [facility]. You and your one visitor will need to have your mouth and nose covered. This can be with a mask. No children please. [facility]) office location. [address] [place] [address], [address]/ [address]. Office #: [phone]. Located behind [address]. If you are unable to make this appointment, please call to reschedule.

## 2023-04-07 NOTE — PROGRESS NOTES
0036-P/t requested bipap be removed.  Placed on 3L NC, 96%    0435- p/t placed back on bipap    0530- p/t removed bipap
03/30/23 0621   NIV Type   Skin Protection for O2 Device Yes   Location Nose   NIV Started/Stopped On   Equipment Type v60   Mode Bilevel   Mask Type Full face mask   Mask Size Large   Settings/Measurements   PIP Observed 22 cm H20   IPAP 18 cmH20   CPAP/EPAP 8 cmH2O   Vt (Measured) 428 mL   Rate Ordered 10   Resp (!) 37   Insp Rise Time (%) 1 %   FiO2  50 %   I Time/ I Time % 1.05 s   Minute Volume (L/min) 15.7 Liters   Mask Leak (lpm) 42 lpm   Comfort Level Fair   Using Accessory Muscles Yes   SpO2 94   Patient's Home Machine No   Alarm Settings   Alarms On Y   Low Pressure (cmH2O) 6 cmH2O   High Pressure (cmH2O) 30 cmH2O   Delay Alarm 20 sec(s)   RR Low (bpm) 6   RR High (bpm) 48 br/min
03/30/23 0630   Settings/Measurements   IPAP 18 cmH20   CPAP/EPAP 8 cmH2O   Vt (Measured) 677 mL   Resp 28   Insp Rise Time (%) 1 %   I Time/ I Time % 1 s   Minute Volume (L/min) 18 Liters
03/30/23 0750   NIV Type   Skin Protection for O2 Device Yes   Location Nose  (FACIAL PAD FROM GRAFT ON PT)   NIV Started/Stopped On   Equipment Type V60   Mode Bilevel   Mask Type Full face mask   Mask Size Large   Settings/Measurements   IPAP 18 cmH20   CPAP/EPAP 10 cmH2O   Vt (Measured) 706 mL   Resp 20   Insp Rise Time (%) 1 %   FiO2  (S)  30 %   I Time/ I Time % 1 s   Minute Volume (L/min) 14.2 Liters   Mask Leak (lpm) 37 lpm   Comfort Level Good   Using Accessory Muscles No   SpO2 96   Patient's Home Machine No   Alarm Settings   Alarms On Y   Low Pressure (cmH2O) 6 cmH2O   High Pressure (cmH2O) 30 cmH2O   Delay Alarm 20 sec(s)   Apnea (secs) 20 secs   RR Low (bpm) 6   RR High (bpm) 50 br/min   Breath Sounds   Right Upper Lobe Diminished   Right Middle Lobe Diminished   Right Lower Lobe Diminished   Left Upper Lobe Diminished   Left Lower Lobe Diminished   Oxygen Therapy/Pulse Ox   O2 Therapy Oxygen   $Oxygen $Daily Charge   O2 Device PAP (positive airway pressure)   SpO2 96 %   $Pulse Oximeter $Spot check (multiple/continuous)
03/30/23 2344   NIV Type   Skin Assessment Other (comment/note)   Skin Protection for O2 Device Yes   Location Nose   NIV Started/Stopped On   Equipment Type V60   Mode Bilevel   Mask Type Full face mask   Mask Size Large   Settings/Measurements   IPAP 18 cmH20   CPAP/EPAP 10 cmH2O   Rate Ordered 8   Resp 15   FiO2  30 %   Minute Volume (L/min) 12.6 Liters   Mask Leak (lpm) 43 lpm   Comfort Level Good   Using Accessory Muscles No   SpO2 95   Patient's Home Machine No   Alarm Settings   Alarms On Y   Oxygen Therapy/Pulse Ox   Heart Rate 63   SpO2 94 %
03/31/23 0439   NIV Type   Skin Assessment Clean, dry, & intact   Skin Protection for O2 Device Yes   Orientation Middle   Location Nose   NIV Started/Stopped On   Equipment Type V60   Mode Bilevel   Mask Type Full face mask   Mask Size Large   Settings/Measurements   IPAP 18 cmH20   CPAP/EPAP 10 cmH2O   Resp 15   FiO2  30 %   Minute Volume (L/min) 10.8 Liters   Mask Leak (lpm) 11 lpm   Comfort Level Good   Using Accessory Muscles No   SpO2 95   Patient's Home Machine No   Alarm Settings   Alarms On Y   Breath Sounds   Right Upper Lobe Diminished   Right Middle Lobe Diminished   Right Lower Lobe Diminished   Left Upper Lobe Diminished   Left Lower Lobe Diminished   Oxygen Therapy/Pulse Ox   Heart Rate 66   SpO2 95 %
03/31/23 2310   NIV Type   Skin Protection for O2 Device Yes   Orientation Middle;Right   Location Nose   NIV Started/Stopped On   Equipment Type v60   Mode Bilevel   Mask Type Full face mask   Mask Size Large   Settings/Measurements   IPAP 18 cmH20   CPAP/EPAP 10 cmH2O   Rate Ordered 8   Resp 18   FiO2  40 %   Minute Volume (L/min) 13.8 Liters   Mask Leak (lpm) 110 lpm  (gauze on nose and cheek)   Comfort Level Fair   Using Accessory Muscles No   SpO2 96   Patient's Home Machine No   Alarm Settings   Alarms On Y   Low Pressure (cmH2O) 6 cmH2O   High Pressure (cmH2O) 30 cmH2O   Delay Alarm 20 sec(s)   RR Low (bpm) 6   RR High (bpm) 50 br/min   Oxygen Therapy/Pulse Ox   Heart Rate 74   SpO2 96 %
04/01/23 4832   NIV Type   $NIV $Daily Charge   Equipment Type V60   Mode Bilevel   Mask Type Full face mask   Mask Size Large   Settings/Measurements   PIP Observed 15 cm H20   IPAP 18 cmH20   CPAP/EPAP 10 cmH2O   Resp 20   FiO2  40 %   Minute Volume (L/min) 17 Liters   Comfort Level Good   Using Accessory Muscles No   SpO2 122   Patient's Home Machine No   Alarm Settings   Alarms On Y
04/03/23 2330   NIV Type   Skin Protection for O2 Device Yes   Location Nose   NIV Started/Stopped On   Equipment Type v60   Mode Bilevel   Mask Type Full face mask   Settings/Measurements   PIP Observed 11 cm H20  (ramp applied for pt comfort)   IPAP 18 cmH20   CPAP/EPAP 10 cmH2O   Rate Ordered 14   Resp 18   Insp Rise Time (%) 2 %   FiO2  35 %   I Time/ I Time % 1 s   Minute Volume (L/min) 12.8 Liters   Mask Leak (lpm) 25 lpm   Comfort Level Good   Using Accessory Muscles No   SpO2 92   Patient's Home Machine No   Alarm Settings   Alarms On Y   Low Pressure (cmH2O) 6 cmH2O   High Pressure (cmH2O) 30 cmH2O   Delay Alarm 20 sec(s)   RR Low (bpm) 6   RR High (bpm) 50 br/min
04/04/23 0606   NIV Type   Skin Protection for O2 Device Yes   Location Nose   NIV Started/Stopped On   Equipment Type v60   Mode Bilevel   Mask Type Full face mask   Mask Size Large   Settings/Measurements   PIP Observed 13 cm H20  (ramp applied for comfort)   IPAP 16 cmH20   CPAP/EPAP 8 cmH2O   Rate Ordered 14   Resp 15   Insp Rise Time (%) 2 %   FiO2  35 %   I Time/ I Time % 1.1 s   Minute Volume (L/min) 13.8 Liters   Mask Leak (lpm) 50 lpm   Comfort Level Good   Using Accessory Muscles No   SpO2 95   Patient's Home Machine No   Alarm Settings   Alarms On Y   Low Pressure (cmH2O) 6 cmH2O   High Pressure (cmH2O) 30 cmH2O   Delay Alarm 20 sec(s)   RR Low (bpm) 6   RR High (bpm) 50 br/min   Patient Observation   Observations Tv 800
04/04/23 6838   NIV Type   NIV Started/Stopped On   Equipment Type v60   Mode Bilevel   Mask Type Full face mask   Mask Size Large   Settings/Measurements   IPAP 16 cmH20   CPAP/EPAP 8 cmH2O   Rate Ordered 14   Resp (!) 1   FiO2  35 %   Mask Leak (lpm) 50 lpm   Comfort Level Good   Using Accessory Muscles No   SpO2 96   Patient's Home Machine No   Alarm Settings   Alarms On Y   Low Pressure (cmH2O) 6 cmH2O   High Pressure (cmH2O) 30 cmH2O   Patient Observation   Observations mepilex on nose
04/05/23 1402   Encounter Summary   Encounter Overview/Reason  Initial Encounter   Service Provided For: Patient   Referral/Consult From: Other    Support System Family members   Last Encounter    (4/5 initial, listened and sppt, no needs)   Complexity of Encounter Low   Begin Time 1355   End Time  1402   Total Time Calculated 7 min
04/05/23 2202   NIV Type   $NIV $Daily Charge   Skin Assessment Clean, dry, & intact   Skin Protection for O2 Device Yes   Orientation Middle   Location Nose   Intervention(s) Skin Barrier   NIV Started/Stopped On   Equipment Type v60   Mode Bilevel   Mask Type Full face mask   Mask Size Large   Settings/Measurements   PIP Observed 17 cm H20   IPAP 16 cmH20   CPAP/EPAP 8 cmH2O   Rate Ordered 14   Resp 20   FiO2  35 %   Minute Volume (L/min) 12.5 Liters   Mask Leak (lpm) 20 lpm   Comfort Level Good   Using Accessory Muscles No   SpO2 94   Alarm Settings   Alarms On Y   Low Pressure (cmH2O) 6 cmH2O   High Pressure (cmH2O) 30 cmH2O   Apnea (secs) 20 secs   RR Low (bpm) 6   RR High (bpm) 40 br/min
04/05/23 2618   NIV Type   NIV Started/Stopped On   Equipment Type v60   Mode Bilevel   Mask Type Full face mask   Mask Size Large   Settings/Measurements   PIP Observed 16 cm H20   IPAP 16 cmH20   CPAP/EPAP 8 cmH2O   Rate Ordered 14   Resp 17   FiO2  35 %   Minute Volume (L/min) 13.8 Liters   Mask Leak (lpm) 13 lpm   Comfort Level Good   Using Accessory Muscles No   SpO2 94   Patient's Home Machine No   Alarm Settings   Alarms On Y   Low Pressure (cmH2O) 6 cmH2O   High Pressure (cmH2O) 30 cmH2O   RR Low (bpm) 6   RR High (bpm) 50 br/min
1100: Patient's family notified RN about a concern with restarting Eliquis. Per the family, they spoke with neurology and were asked to reach out to the patients dermatologist before starting eliquis. Patient's son spoke with patient's dermatologist; dermatologist said it is okay for patient to resume taking Eliquis. 1800: Patient's son called RN and stated that the patient's vascular MD at [facility]. (Dr. Nathaniel Self [phone]) would like to be notified before Eliquis is restarted. Dr. Nathaniel Self requested that Dr. Sacha Topete call him, so writer notified Dr. Sacha Topete who stated that the situation will be addressed in the morning. 1830: Patients family updated.
4 Eyes Admission Assessment     I agree as the admission nurse that 2 RN's have performed a thorough Head to Toe Skin Assessment on the patient. ALL assessment sites listed below have been assessed on admission. Areas assessed by both nurses: ***  [x]   Head, Face, and Ears   [x]   Shoulders, Back, and Chest  [x]   Arms, Elbows, and Hands   [x]   Coccyx, Sacrum, and Ischium  [x]   Legs, Feet, and Heels        Does the Patient have Skin Breakdown?   Yes a wound was noted on the Admission Assessment and an LDA was Initiated documentation include the Katalina-wound, Wound Assessment, Measurements, Dressing Treatment, Drainage, and Color\",         Nolan Prevention initiated:  No   Wound Care Orders initiated:  No      WOC nurse consulted for Pressure Injury (Stage 3,4, Unstageable, DTI, NWPT, and Complex wounds) or Nolan score 18 or lower:  No      Nurse 1 eSignature: Electronically signed by Ganga Wallace RN on 3/30/23 at 4:08 AM EDT    **SHARE this note so that the co-signing nurse is able to place an eSignature**    Nurse 2 eSignature: {Esignature:[id number]}
56 Dr Noa Ruiz at bedside with ICU RN & informed patient that he had a small stroke on the right side of his brain and that he needed to restart Eliquis. Son not at bedside & MD asked RN to update son when he returned to room.   Patricia Moran, RN
Antonia Yougn charge RN called [facility] transport to see status. Transport is no longer coming today due to staffing issues. Clinical RN notified. Will notify family with plan.
Comprehensive Nutrition Assessment    Type and Reason for Visit:  RD Nutrition Re-Screen/LOS    Nutrition Recommendations/Plan:   Modify diet to regular JENSEN once diet is advanced  Monitor need for ONS  Monitor nutrition adequacy, pertinent labs, bowel habits, wt changes, and clinical progress     Malnutrition Assessment:  Malnutrition Status: At risk for malnutrition (Comment) (04/06/23 1137)    Context:  Acute Illness     Findings of the 6 clinical characteristics of malnutrition:  Weight Loss:  Unable to assess (may be r/t fluid)         Nutrition Assessment:    LOS Assessment: 68 y.o m with hx of CHF, COPD, HTN, and recent left MCA stroke w/ left ICA stenting admitted with focal neurological deficit. Pt recently discharaged from ARU on 3/15. Rapid response called on 3/30 for respiratory distress. S/p MRI of brain on 3/31 showed acute right hemispheric CVA. S/p swallow eval on 4/2 SLP recommends regular solids with thin liquids. Pt out of room at time of visit, unable to interview at this time. Currently NPO for planned PRAVEENA today. Previously on regular low Na diet, PO intakes % per EMR. Wt loss of -7% x 1 month, 181lbs on 3/3 and currently 168lbs. May be r/t fluid shifts. Down -1L since admission. Will continue to monitor. Nutrition Related Findings:    Labs reviewed. BLE trace edema. + BM  4/5. Wound Type: Surgical Incision       Current Nutrition Intake & Therapies:    Average Meal Intake: %, 51-75%  Average Supplements Intake: None Ordered  Diet NPO    Anthropometric Measures:  Height: 6' (182.9 cm)  Ideal Body Weight (IBW): 178 lbs (81 kg)       Current Body Weight: 168 lb 6.9 oz (76.4 kg), 94.6 % IBW.  Weight Source: Bed Scale  Current BMI (kg/m2): 22.8        Weight Adjustment For: No Adjustment                 BMI Categories: Normal Weight (BMI 22.0 to 24.9) age over 72    Estimated Daily Nutrient Needs:  Energy Requirements Based On: Kcal/kg (25-30 kcal/kg)  Weight Used for Energy
Daughter in law at bedside and concerned about bleeding on side of patient's nose surgical site and medications. Patient on 3L 02 n/c. No active bleeding from nose or upper left cheek, dressings remain intact with old drainage. Patient sees Dr Amilcar Boone for Dermatology and has home care instructions for both nose & cheek sites. Patient sees Dr Nader Bland, at [facility] for Vascular Surgery. Waiting for Hospitalist to round and update.   Rhonda Rodriguez RN
Dr Dilma Silva into see patient, son at bedside and RN. Waiting on MRI then will decide on blood thinner therapy, son verbalized understanding.  Mk Rodriges RN
Dr. Edmond Dakin rounding at the bedside. No change in Patient condition. The Patient continues to complain of left hand tingling since admission. The Physician is aware. No further interventions at this time.
ECHO tech to bedside.
Gave face to face report to receiving C2 nurse
Hospitalist Progress Note      PCP: Margarita Cerna    Date of Admission: 3/29/2023    Chief Complaint: extremity weakness    Hospital Course: reviewed     Subjective: resting in bed, still notes some dec'd sensation on left hand, thinks speech back to normal       Medications:  Reviewed    Infusion Medications    sodium chloride       Scheduled Medications    aspirin  325 mg Oral Daily    atorvastatin  80 mg Oral Daily    mometasone-formoterol  2 puff Inhalation BID    clopidogrel  75 mg Oral Daily    dilTIAZem  120 mg Oral Daily    famotidine  20 mg Oral BID    pramipexole  0.5 mg Oral Daily    Roflumilast  500 mcg Oral Daily    enoxaparin  40 mg SubCUTAneous Daily    sodium chloride flush  5-40 mL IntraVENous 2 times per day    mupirocin   Each Nostril BID     PRN Meds: guaiFENesin, ondansetron **OR** ondansetron, polyethylene glycol, labetalol, melatonin, calcium carbonate, ipratropium-albuterol, sodium chloride flush, sodium chloride      Intake/Output Summary (Last 24 hours) at 3/30/2023 0900  Last data filed at 3/30/2023 0800  Gross per 24 hour   Intake --   Output 450 ml   Net -450 ml       Physical Exam Performed:    BP 86/64   Pulse 87   Temp 98 °F (36.7 °C) (Axillary)   Resp 15   Ht 6' (1.829 m)   Wt 173 lb 8 oz (78.7 kg)   SpO2 95%   BMI 23.53 kg/m²     General appearance: No apparent distress, appears stated age and cooperative. HEENT: Pupils equal, round, and reactive to light. Conjunctivae/corneas clear. Neck: Supple, with full range of motion. No jugular venous distention. Trachea midline. Respiratory:  Normal respiratory effort. Clear to auscultation, bilaterally without Rales/Wheezes/Rhonchi. Cardiovascular: Regular rate and rhythm with normal S1/S2 without murmurs, rubs or gallops. Abdomen: Soft, non-tender, non-distended with normal bowel sounds. Musculoskeletal: No clubbing, cyanosis or edema bilaterally. Full range of motion without deformity.   Skin: Skin color, texture,
Hospitalist rounded and updated by RN at bedside. MD will call patient's daughter in law, as she is not currently at bedside for updates. Patient resting in bed.  Rhonda Rodriguez RN
ICU MD/Pulmonology & NP updated by ICU RN. Patient placed on 3L nasal cannula d/t coughing up moderate amounts this creamy sputum. Patient states he uses 3 L  02 at home and a bipap at night.    Reanna Ellsworth RN
Large bandage on nose
MD spoke with cardiology, would like patient to have PRAVEENA. Patient ate breakfast, will make NPO from this point on in case of possible procedure today. Patient aware and verbalized understanding.     Juarez Jean RN
MRI called & patient scheduled for scan this afternoon. MRI will call when ready.  Alejandro Vila, RN
Neurology called for in patient consult, spoke with Amy. Woodie Goodpasture, RN
Nose & left upper cheek dressings changed by son, per Dermatology home instructions.   Vianca Velez RN
On call physician for [facility] paged via [phone]. Left message to return call to Dr Caity López.
Patient admitted to room 365 from ED. Patient oriented to room, call light, bed rails, phone, lights and bathroom. Patient instructed about the schedule of the day including: vital sign frequency, lab draws, possible tests, frequency of MD and staff rounds, including RN/MD rounding together at bedside, daily weights, and I &O's. Patient instructed about prescribed diet, how to use 8MENU, and television. bed alarm in place, patient aware of placement and reason. Telemetry box in place, patient aware of placement and reason. Bed locked, in lowest position, side rails up 2/4, call light within reach. Will continue to monitor.
Patient called out stating he felt like he was having a difficult time breathing. 92% on 3L. Called RT to bedside and is in route.
Patient requesting to come off of BiPAP at this time. 96% on 3L nasal cannula. Care ongoing.
Patient taken from room 115 to 220 with all belongings and paper chart. Report given to Meme by Carlin Link.
Patient: Yahri Elam MRN: [id number]  Date of  Admission: 3/29/2023   YOB: 1950  Age: 68 y.o. Sex: male    Unit: [place] [address] CVU  Room/Bed: [facility] Admitting Physician: Arnol Matta    Attending Physician:  Devendra Forbes MD         Pulmonary Service Note      SUBJECTIVE:    Pt with no complaints today. He denies any SOB, chest pain, N/V, diarrhea, abdominal pain.          OBJECTIVE    Medications    Continuous Infusions:   sodium chloride         Scheduled Meds:   aspirin  325 mg Oral Daily    atorvastatin  80 mg Oral Daily    mometasone-formoterol  2 puff Inhalation BID    clopidogrel  75 mg Oral Daily    dilTIAZem  120 mg Oral Daily    famotidine  20 mg Oral BID    pramipexole  0.5 mg Oral Daily    Roflumilast  500 mcg Oral Daily    enoxaparin  40 mg SubCUTAneous Daily    sodium chloride flush  5-40 mL IntraVENous 2 times per day    mupirocin   Each Nostril BID       PRN Meds:  guaiFENesin, ondansetron **OR** ondansetron, polyethylene glycol, labetalol, melatonin, calcium carbonate, ipratropium-albuterol, sodium chloride flush, sodium chloride    Physical    Patient Vitals for the past 24 hrs:   BP Temp Temp src Pulse Resp SpO2 Height Weight   03/31/23 0715 -- -- -- 69 -- -- -- --   03/31/23 0700 130/68 -- -- 63 12 95 % -- --   03/31/23 0600 127/73 -- -- 67 13 96 % -- --   03/31/23 0500 117/69 -- -- 57 14 92 % -- --   03/31/23 0439 -- -- -- 66 15 95 % -- --   03/31/23 0400 99/63 98.2 °F (36.8 °C) Oral 60 15 95 % 6' (1.829 m) 164 lb 3.9 oz (74.5 kg)   03/31/23 0300 111/69 -- -- 58 13 96 % -- --   03/31/23 0200 110/83 -- -- 60 19 95 % -- --   03/31/23 0100 128/79 -- -- 59 16 97 % -- --   03/31/23 0000 104/74 98.5 °F (36.9 °C) Oral 54 15 94 % -- --   03/30/23 2344 -- -- -- 63 15 94 % -- --   03/30/23 2300 127/69 -- -- 52 16 97 % -- --   03/30/23 2200 107/80 -- -- 66 20 96 % -- --   03/30/23 2100 119/62 -- -- 56 18 95 % -- --   03/30/23 2000 117/63 98.3 °F (36.8 °C) Oral 63 25 96 % -- --
Per Hospitalist note from Rapid response this am  patient was to be transferred to PCU level of care, however was sent to ICU d/t no available bed on PCU at time of transfer per clinical. CN aware.  Marquis Oviedo RN
Physical Therapy  Facility/Department: [facility] A2 CARD TELEMETRY  Daily Treatment Note  NAME: Johan Jack  : 1950  MRN: [id number]    Date of Service: 2023    Discharge Recommendations:  [facility]        Patient Diagnosis(es): The primary encounter diagnosis was Left arm weakness. Diagnoses of Cerebrovascular accident (CVA), unspecified mechanism ([address] and Midline low back pain without sciatica, unspecified chronicity were also pertinent to this visit. Assessment   Assessment: Pt was limited by weakness and HANKINS with hypoxia; desating from 97% to 88% on 3Ls recovering to 97% within 3 mins. Presented with fair trunk and LE control during bed mobility requiring sequencing cues. Transfers and gait training were unsteady but no overt LOB noted. Gait distance was limited by BLE weakness and SOB. Plan    Physcial Therapy Plan  General Plan: 3-5 times per week     Restrictions  Restrictions/Precautions  Restrictions/Precautions: General Precautions, Up as Tolerated  Required Braces or Orthoses?: No  Position Activity Restriction  Other position/activity restrictions: 3L O2, Tele     Subjective    Subjective  Subjective: Pt was in bed willing to participate     Objective   Vitals  Heart Rate: (!) 102  BP: 137/85  MAP (Calculated): 102  SpO2: 95 %  O2 Device:  (3L)  Bed Mobility Training  Bed Mobility Training: Yes  Supine to Sit: Min assist from the handrails  Scooting: Stand-by assistance  Balance  Sitting - Static: Good (unsupported)  Sitting - Dynamic: Fair (occasional)  Standing - Static: Constant support; Fair  Standing - Dynamic: Fair with a RW  Transfer Training  Sit to Stand: Contact-guard assistance  Stand to Sit: Contact-guard assistance  Bed to Chair: Contact-guard assistance  Gait  Overall Level of Assistance: Contact-guard assistance  Gait Abnormalities: slow unsteady shuffle  Distance (ft): 5 Fwd& 5ft backwards   Assistive Device: Walker, rolling     PT
Physical Therapy  Pt declined therapy services this afternoon due to fatigue and SOB. RN was made aware.    Stephen Wahl PTA
Physical Therapy/Occupational Therapy    Order received, Chart reviewed. Patient has been transferred to ICU since initial OT/PT orders were placed. Will need new orders to tart OT/PT services . Thank you.     Electronically signed by Tiny Rabago OT on 3/30/2023 at 7:56 AM  Sisi Berumen PT, DPT
Physical Therapy/Occupational Therapy    Orders received, chart reviewed. Attempted OT treatment. Pt off floor at cath lab. Will continue to follow. Thank you.     Electronically signed by Danish Luther OT on 4/6/2023 at 10:33 AM    Jaison Woodward PT
Pt called to RN saying he was SOB, RT advised to place pt back on bipap and I would be over to assess. Gave pt tx via bipap and adjusted settings for comfort/compliance. Will monitor. Pt aware of tx, and txs for 0800.
Pt ok for discharge per MD. Discharge instructions and script given. IV removed. No questions or concerns at this time. Transported by [facility] to [facility].
RT found patient off BIPAP machine.
Report called to Carin Sheridan RN at [facility] by Crys Rich RN at this time. All questions answered. 1635: facility was updated on change in patient's anticoagulation orders.
Shift: 0700 - 1900    Admitting diagnosis: Extremity weakness    Presentation to hospital: weakness, worsening left arm weakness    Surgery: no     Nursing assessment at handoff  stable    Emergency Contact/POA:Son  Family updated: yes - Son & daughter in law at bedside    Most recent vitals: /74   Pulse 76   Temp 97.8 °F (36.6 °C) (Axillary)   Resp 15   Ht 6' (1.829 m)   Wt 164 lb 3.9 oz (74.5 kg)   SpO2 92%   BMI 22.28 kg/m²      Rhythm: Atrial Fibrillation      NC/HFNC- 3lpm  Respiratory support: - No ventilator support  - BiPAP   IPAP: 18 cmH20, CPAP/EPAP: 10 cmH2O only when sleeping    Vent days: Day 0    Increased O2 requirements: no    Admission weight Weight: 173 lb 8 oz (78.7 kg)  Today's weight   Wt Readings from Last 1 Encounters:   03/31/23 164 lb 3.9 oz (74.5 kg)         UOP >30ml/hr: yes - voids per urinal     Hull need assessed each shift: no    Restraints: no  Order current and documentation up to date?  Not needed    Lines/Drains  LDA Insertion Date Discontinued Date Dressing Changes   PIV  3/30 x2 3/31 x1    TLC       Arterial       Hull       Vas Cath      ETT       Surgical drains        Night Shift Hospitalist Interventions    Problem(Brief) Date Time Intervention Physician contacted                                               Drip rates at handoff:    sodium chloride         Hospital Course Daily Updates:  Admit Day# 1  3/30/23 Dayshift  - off bipap by 1000  - 3L 02 per nasal cannula, home 02 requirement  - Transfer to PCU when bed available  - Dermatology incisions/wounds to left side of nose & left upper cheek, dressings changed by son   Admit day #2 Dayshift  - MRI head done, + small stroke right side of head  -Consult Neurology & updated son concerning MRI results & need to restart Eliquis  - Transfer out ICU ordered  -      Lab Data:   CBC:   Recent Labs     03/30/23  1003 03/31/23  0441   WBC 20.1* 15.1*   HGB 14.6 14.1   HCT 44.5 42.4   MCV 91.5 91.2    325
Shift: [id number]    Admitting diagnosis: Extremity weakness    Presentation to hospital: weakness, worsening left arm weakness    Surgery: no     Nursing assessment at handoff  stable    Emergency Contact/POA:Son  Family updated: no    Most recent vitals: /82   Pulse 58   Temp 98.5 °F (36.9 °C) (Oral)   Resp 18   Ht 6' (1.829 m)   Wt 167 lb 12.3 oz (76.1 kg)   SpO2 97%   BMI 22.75 kg/m²      Rhythm: Atrial Fibrillation      NC/HFNC- 4 lpm  Respiratory support: - No ventilator support  - BiPAP   IPAP: 18 cmH20, CPAP/EPAP: 10 cmH2O only when sleeping -had for 2.5hrs, patient expressed feeling sick with it    Vent days: Day 0    Increased O2 requirements: no    Admission weight Weight: 173 lb 8 oz (78.7 kg)  Today's weight   Wt Readings from Last 1 Encounters:   04/01/23 167 lb 12.3 oz (76.1 kg)         UOP >30ml/hr: yes - voids per urinal     Hull need assessed each shift: no    Restraints: no  Order current and documentation up to date?  Not needed    Lines/Drains  LDA Insertion Date Discontinued Date Dressing Changes   PIV  3/30 x2 3/31 x1    TLC       Arterial       Hull       Vas Cath      ETT       Surgical drains        Night Shift Hospitalist Interventions    Problem(Brief) Date Time Intervention Physician contacted                                               Drip rates at handoff:    sodium chloride         Hospital Course Daily Updates:  Admit Day# 1  3/30/23 Dayshift  - off bipap by 1000  - 3L 02 per nasal cannula, home 02 requirement  - Transfer to PCU when bed available  - Dermatology incisions/wounds to left side of nose & left upper cheek, dressings changed by son   Admit day #2 Dayshift  - MRI head done, + small stroke right side of head  -Consult Neurology & updated son concerning MRI results & need to restart Eliquis  - Transfer out ICU ordered  Admit Day #2 Night  -had bipap x 2.5hrs only, feeling sick w/ it  -still awaiting update regarding Eliquis  -able to sleep well  -noted better
Spoke to the patient's son over the phone as well as his nurse. Apparently NS are not comfortable starting Eliquis at this point.   We will keep aspirin and Plavix and continue with the initial plan of restarting Eliquis on 4-17
[facility] for 8:30-9pm tonight. Patient's daughter Kayla Jean notified by Norma Vernon. Facility updated with transport time.
Went into pt room @ 0600 due to increased rr and accessory muscle use, respiratory was contacted and gave a triple albuterol treatment, after condition failed to improve a rapid response was initiated. Following the decision for the pt to be transferred to , family was contacted, this nurse spoke to daughter-in-law eliseo and informed her of the event and the change in level of care.
04/01/2023 04:25 AM    GFRAA >60 04/07/2022 04:48 AM    LABGLOM >60 04/01/2023 04:25 AM    GLUCOSE 137 04/01/2023 04:25 AM    PHOS 2.5 03/31/2022 07:37 AM    MG 2.20 04/01/2023 04:25 AM    CALCIUM 8.5 04/01/2023 04:25 AM     Lab Results   Component Value Date/Time    WBC 15.5 04/01/2023 04:25 AM    RBC 4.65 04/01/2023 04:25 AM    HGB 14.2 04/01/2023 04:25 AM    HCT 42.2 04/01/2023 04:25 AM    MCV 90.7 04/01/2023 04:25 AM    RDW 14.8 04/01/2023 04:25 AM     04/01/2023 04:25 AM     Lab Results   Component Value Date    INR 1.10 03/29/2023    PROTIME 14.2 03/29/2023       I reviewed blood testing and other test results and discussed results with the patient      Impression: No changes  Acute left-sided weakness secondary to acute right hemispheric CVA. Thromboembolic versus cardioembolic from A-fib  Recent left MCA stroke and S/P left ICA stenting   PAF  COPD exacerbation with respiratory failure  Hypertension  Hyperlipidemia  Vertebral stenosis and occlusion-- conservative approach and maximize medical therapy      Recommendation  Continue DAPT for now  Statin  Discussed plan with the son over the phone  Restart Eliquis on the 17th  PT, OT and speech  Respiratory support  Continue current blood pressure medication  Neurochecks  Telemetry  DVT and GI prophylaxis  Blood pressure monitor and blood pressure control  Neurosurgery regarding his recent left ICA stenting  Stroke education provided to the patient and his son over the phone   Nothing to add from neurology at this point  We will follow from periphery  Please call for questions        Kemar Cox MD   [phone]      This dictation was generated by voice recognition computer software. Although all attempts are made to edit the dictation for accuracy, there may be errors in the transcription that are not intended.
3-5x  Current Treatment Recommendations: Strengthening, Balance training, Functional mobility training, Endurance training, Gait training, Pain management, Equipment evaluation, education, & procurement, Safety education & training, Self-Care / ADL, Patient/Caregiver education & training     Restrictions  Restrictions/Precautions  Restrictions/Precautions: General Precautions, Up as Tolerated  Required Braces or Orthoses?: No  Position Activity Restriction  Other position/activity restrictions: O2, Tele    Subjective   General  Patient assessed for rehabilitation services?: Yes  Additional Pertinent Hx: \"The patient is a 68y.o.  years old male with multiple medical problem including recent left MCA stroke when he was admitted to [facility]. last month, hypertension and history of A-fib who was admitted to [facility] 2 days ago with worsening left-sided weakness and aphasia. Patient was admitted to [facility] back last month and then to ARU at [facility]. He was discharged home on the 15th of this month. Over the last 2 to 3 days, has been having difficulties with more left-sided weakness and speech difficulties. Degree was severe and Duration was persistent. No other relieving or aggravating factors. The  Patient was taking Eliquis for A-fib which was discontinued during his last admission at [facility] where he was admitted with left ischemic MCA stroke.   He underwent left ICA stenting and seen by neurology and neurosurgery\"     Social/Functional History  Social/Functional History  Lives With: Son  Type of Home: House  Home Layout: One level  Home Access: Stairs to enter without rails  Entrance Stairs - Number of Steps: 2  Bathroom Shower/Tub: Walk-in shower  Bathroom Equipment: Shower chair  Bathroom Accessibility: Accessible  Home Equipment: eliseo Lima  Has the patient had two or more falls in the past year or any fall with injury in the past year?:
Recent Labs     03/29/23  1814   PROT 6.0*   INR 1.10     APTT: No results for input(s): APTT in the last 72 hours.   ABG:   Recent Labs     03/30/23  0633   PHART 7.333*   PYC5AJE 71.5*   PO2ART 133.0*     Consults (if GI or Nephrology- which group?)-  Pulmonology, Hospitalist
bilaterally. Skin: Skin color, texture, turgor normal.  No rashes or lesions. Neurologic:   4 out of 5 strength on LUE. No other focal weakness  Psychiatric: Alert and oriented,   Capillary Refill: Brisk, 3 seconds, normal   Peripheral Pulses: +2 palpable, equal bilaterally       Labs:   Recent Labs     04/03/23  1752   WBC 15.5*   HGB 14.6   HCT 44.0        Recent Labs     04/03/23  1752      K 3.9   CL 96*   CO2 33*   BUN 15   CREATININE 0.6*   CALCIUM 8.8     No results for input(s): AST, ALT, BILIDIR, BILITOT, ALKPHOS in the last 72 hours. No results for input(s): INR in the last 72 hours. No results for input(s): Ivanna Sai in the last 72 hours. Urinalysis:      Lab Results   Component Value Date/Time    NITRU Negative 04/03/2022 01:35 PM    WBCUA 0-2 03/29/2022 03:55 PM    BACTERIA 1+ 01/11/2018 08:50 AM    RBCUA 0-2 03/29/2022 03:55 PM    BLOODU Negative 04/03/2022 01:35 PM    SPECGRAV 1.010 04/03/2022 01:35 PM    GLUCOSEU Negative 04/03/2022 01:35 PM       Radiology:  XR CHEST PORTABLE   Final Result   Right basilar atelectasis. Otherwise unremarkable chest.         MRI brain without contrast   Final Result   They are several punctate areas of acute infarct involving the posterior   right frontal lobe, the right parietal lobe, and in the right occipital lobe. An embolic source should be considered. Late subacute infarcts involving the   left frontal and parietal periventricular white matter. Motion artifact degrades the images. Cerebral atrophy. Chronic small vessel   ischemic changes. XR CHEST PORTABLE   Final Result   Mild vascular congestion         CTA HEAD NECK W CONTRAST   Final Result   Patent vascular stent from distal left common carotid artery to proximal left   internal carotid artery, without associated flow-limiting stenosis. Left dominance of the vertebral arteries with hypoplastic right vertebral   artery.  Superimposed occlusion of V1 and
of motion without deformity. Skin: Skin color, texture, turgor normal.  No rashes or lesions. Neurologic:  Neurovascularly intact without any focal sensory/motor deficits. Cranial nerves: II-XII intact, grossly non-focal.  Psychiatric: Alert and oriented, thought content appropriate, normal insight  Capillary Refill: Brisk, 3 seconds, normal   Peripheral Pulses: +2 palpable, equal bilaterally       Labs:   Recent Labs     03/29/23  1814 03/30/23  1003 03/31/23  0441   WBC 16.4* 20.1* 15.1*   HGB 13.7 14.6 14.1   HCT 41.5 44.5 42.4    347 325     Recent Labs     03/29/23  1814 03/30/23  1003 03/31/23  0441   * 143 142   K 3.3* 3.7 3.3*   CL 92* 97* 101   CO2 33* 31 34*   BUN 22* 16 15   CREATININE 1.0 0.8 0.6*   CALCIUM 8.5 8.4 8.4     Recent Labs     03/29/23  1814   AST 11*   ALT 12   BILITOT 0.5   ALKPHOS 94     Recent Labs     03/29/23  1814   INR 1.10     Recent Labs     03/29/23  1814   TROPONINI <0.01       Urinalysis:      Lab Results   Component Value Date/Time    NITRU Negative 04/03/2022 01:35 PM    WBCUA 0-2 03/29/2022 03:55 PM    BACTERIA 1+ 01/11/2018 08:50 AM    RBCUA 0-2 03/29/2022 03:55 PM    BLOODU Negative 04/03/2022 01:35 PM    SPECGRAV 1.010 04/03/2022 01:35 PM    GLUCOSEU Negative 04/03/2022 01:35 PM       Radiology:  MRI brain without contrast   Final Result   They are several punctate areas of acute infarct involving the posterior   right frontal lobe, the right parietal lobe, and in the right occipital lobe. An embolic source should be considered. Late subacute infarcts involving the   left frontal and parietal periventricular white matter. Motion artifact degrades the images. Cerebral atrophy. Chronic small vessel   ischemic changes.          XR CHEST PORTABLE   Final Result   Mild vascular congestion         CTA HEAD NECK W CONTRAST   Final Result   Patent vascular stent from distal left common carotid artery to proximal left   internal carotid artery, without
Tabitha Villegas MD   [phone]      This dictation was generated by voice recognition computer software. Although all attempts are made to edit the dictation for accuracy, there may be errors in the transcription that are not intended.
flow-limiting stenosis. Left dominance of the vertebral arteries with hypoplastic right vertebral   artery. Superimposed occlusion of V1 and proximal to mid V2 segments of the   right vertebral artery, stable. Severe stenosis at the origin of the left vertebral artery, stable. Mild stenosis in the mid left common carotid artery, stable. 50% stenosis in the proximal right internal carotid artery, likely stable. 50% stenosis in the P1 segment of the right PCA. Tiny age-indeterminate infarction in the posterior right frontal lobe. Associated punctate calcification along the adjacent sulcus, new since   February 25, 0351, likely embolic and within adjacent vessel. Subacute to   chronic infarctions in the left frontal parietal centrum semiovale. CT HEAD WO CONTRAST   Final Result   Tiny age indeterminate infarction in the posterior right frontal lobe, new   since February 25, 2023. Subacute to chronic infarctions in the left frontal parietal centrum semi   ovale. Moderate chronic microvascular disease. Results reported to GWEN Mcdowell by radiology results communication at   6:26 p.m. on March 29, 2023. Additional findings reported to GWEN Mcdowell at 6:37 p.m. on March 29, 2023.              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]      Priority: Medium    ALEXANDREA treated with BiPAP [G47.33]      Priority: Medium    COPD, very severe ([facility] [J44.9]      Priority: Medium    Chronic respiratory failure with hypoxia and hypercapnia (HCC) [J96.11, J96.12]      Priority: Medium    Acute focal neurological deficit [R29.818]     Arterial ischemic stroke, ICA, right, acute (HCC) [I63.231]     Vertebral artery stenosis, left [I65.02]     Focal neurological deficit [R29.818]     PAF (paroxysmal atrial fibrillation) ([facility] [I48.0]
occlusion of V1 and proximal to mid V2 segments of the   right vertebral artery, stable. Severe stenosis at the origin of the left vertebral artery, stable. Mild stenosis in the mid left common carotid artery, stable. 50% stenosis in the proximal right internal carotid artery, likely stable. 50% stenosis in the P1 segment of the right PCA. Tiny age-indeterminate infarction in the posterior right frontal lobe. Associated punctate calcification along the adjacent sulcus, new since   February 25, 7353, likely embolic and within adjacent vessel. Subacute to   chronic infarctions in the left frontal parietal centrum semiovale. CT HEAD WO CONTRAST   Final Result   Tiny age indeterminate infarction in the posterior right frontal lobe, new   since February 25, 2023. Subacute to chronic infarctions in the left frontal parietal centrum semi   ovale. Moderate chronic microvascular disease. Results reported to GWEN Benoit by radiology results communication at   6:26 p.m. on March 29, 2023. Additional findings reported to GWEN Benoit at 6:37 p.m. on March 29, 2023.              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]      Priority: Medium    ALEXANDREA treated with BiPAP [G47.33]      Priority: Medium    COPD, very severe ([facility] [J44.9]      Priority: Medium    Chronic respiratory failure with hypoxia and hypercapnia ([facility]) [J96.11, J96.12]      Priority: Medium    Acute focal neurological deficit [R29.818]     Arterial ischemic stroke, ICA, right, acute ([facility]) [I63.231]     Vertebral artery stenosis, left [I65.02]     Focal neurological deficit [R29.818]     PAF (paroxysmal atrial fibrillation) ([facility]) [I48.0]     HTN (hypertension), benign [I10]        Recrudescent Stroke Symptoms- 2/2 acute CVA  acute infarct in
reps of LE strengthening exercise    PLAN OF CARE  Frequency: 1-2 treatment sessions per day, 5-7 days per week  Physcial Therapy Plan  General Plan: 3-5 times per week  Current Treatment Recommendations: Strengthening;Balance training;Functional mobility training;ROM; Transfer training; Endurance training;IADL training;ADL/Self-care training;Gait training;Stair training;Neuromuscular re-education;Cognitive/Perceptual training;Home exercise program;Safety education & training;Patient/Caregiver education & training;Equipment evaluation, education, & procurement; Therapeutic activities  Safety Devices  Type of Devices: All fall risk precautions in place;Call light within reach;Gait belt;Nurse notified; Bed alarm in place; Left in bed    EDUCATION  Education  Education Given To: Patient  Education Provided: Role of Therapy;Plan of Care;Transfer Training  Education Method: Demonstration  Barriers to Learning: Cognition  Education Outcome: Verbalized understanding;Demonstrated understanding;Continued education needed    ELOS:          Therapy Time   Individual Concurrent Group Co-treatment   Time In 1404         Time Out 1422         Minutes 18           Timed Code Treatment Minutes: 25 Minutes       Shruthi Burgos PT, 04/03/23 at 2:40 PM
to Sit: Minimum assistance;Assist X2;Additional time; Adaptive equipment  Bed to Chair: Minimum assistance;Assist X2;Additional time; Adaptive equipment (RW, chair>bed)     ADL  Additional Comments: Pt declined ADLs on this date  OT Exercises  Exercise Treatment: Pt provided with home exercise program, x3 reps for education  Resistive Exercises: shoulder shrugs, elbow flexion/extension, finger flexion/extension, shoulder aDduction/aBduction, shoulder flexion, wrist flexion/extension     Safety Devices  Type of Devices: All fall risk precautions in place;Call light within reach;Gait belt;Nurse notified; Bed alarm in place; Left in bed  Restraints  Restraints Initially in Place: No     Patient Education  Education Given To: Patient  Education Provided: Plan of Care;Transfer Training;Role of Therapy;Home Exercise Program  Education Method: Verbal;Demonstration  Barriers to Learning: Cognition; Hearing  Education Outcome: Continued education needed;Demonstrated understanding  Disease Specific Education: Pt educated on importance of OOB mobility, prevention of complications of bedrest, and general safety during hospitalization.  Pt verbalized understanding    AM-PAC Daily Activity - Inpatient   AM-PAC Inpatient Daily Activity Raw Score: 14  AM-PAC Inpatient ADL T-Scale Score : 33.39  ADL Inpatient CMS 0-100% Score: 59.67  ADL Inpatient CMS G-Code Modifier : CK    Goals  Short Term Goals  Time Frame for Short Term Goals: 1 week (4/09) unless noted -- all goals ongoing 4/03  Short Term Goal 1: Pt will complete toilet transfers with min Ax1  Short Term Goal 2: Pt will tolerate 1-2 standing level ADLs with min Ax1  Short Term Goal 3: Pt will complete LB dressing with min A  Short Term Goal 4: Pt will participate in x15 BUE exercises for strength and endurance  Patient Goals   Patient goals : to return home       Therapy Time   Individual Concurrent Group Co-treatment   Time In       6838   Time Out       1433   Minutes       26
Focal neurological deficit [R29.818]     PAF (paroxysmal atrial fibrillation) ([facility]) [I48.0]     HTN (hypertension), benign [I10]        Recrudescent Stroke Symptoms- 2/2 acute CVA  -  CT-A head/neck completed. L ICA stent was patent. There were no acute thrombi. 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 I suspect recurrent symptoms are from inadequate BP and cerebral perfusion pressure. BP has been borderline low thus far in the ER. Will give 1L NS over 2 hours and monitor BP response as well as neurologic response. If symptoms improved with higher BP consider starting midodrine.  -  MRI brain requested to evaluate for e/o new stroke, noted acute infarct in posterior rt frontal lobe/parietal lobe/occiptal lobe, consider embolic source   -Neuro consulted, apprec recs, spoke with family who will [facility] and [facility], earliest to restart eliquis would be 4/5/23(if Nsurg agreeable)      acute on Chronic Respiratory Failure,- in addition to Very severe COPD, ALEXANDREA  -  Titrate level of respiratory support according to patient's needs. Target goal SpO2 = 90-94%. Currently patient is requiring 4 L/min O2 support which is his baseline.  -  Continue home ICS/LABA or equivalent and home roflumilast.  -  Short acting bronchodilators made available on an as-needed basis. -  Nocturnal and nap BiPAP per home settings 18/10.   -pt had resp distress 3/30 am, started bipap, given iv lasix(had gotten 1L ns bolus in 3 hrs to help with bp), transferred to ICU on 3/30 AM  -pulm consulted 3/30, apprec recs     HFpEF, Atrial fibrillation, Tachy-Dagoberto Syndrome  -  Last TTE 2/26/23:  LVEF = 60%. -  Continue home diltiazem 120mg daily for now. -held home eliquis(per recs from Neurology/Nsurg from [facility]), restart around 4/17     H/O Ischemic CVA  -  Proximal Left ICA subtotal occlusion. Suffered Left MCA territory watershed infarction.   -  Chronic bilateral proximal vertebral artery occlusions
abnormal extra-axial fluid collection. The gray-white differentiation is maintained without evidence of an acute infarct. There is no evidence of hydrocephalus. ORBITS: The visualized portion of the orbits demonstrate no acute abnormality. SINUSES: The visualized paranasal sinuses and mastoid air cells demonstrate no acute abnormality. SOFT TISSUES/SKULL: No acute abnormality of the visualized skull or soft tissues. Tiny age indeterminate infarction in the posterior right frontal lobe, new since February 25, 2023. Subacute to chronic infarctions in the left frontal parietal centrum semi ovale. Moderate chronic microvascular disease. Results reported to CNP Patel Zhong by radiology results communication at 6:26 p.m. on March 29, 2023. Additional findings reported to CNP Patel Zhong at 6:37 p.m. on March 29, 2023. XR CHEST PORTABLE    Result Date: 4/1/2023  EXAMINATION: ONE XRAY VIEW OF THE CHEST 4/1/2023 4:59 pm COMPARISON: 03/29/2023. HISTORY: ORDERING SYSTEM PROVIDED HISTORY: chronic resp. failure. follow up CXR TECHNOLOGIST PROVIDED HISTORY: Reason for exam:->chronic resp. failure. follow up CXR FINDINGS: The cardiomediastinal silhouette is stable. Aortic vascular calcification. There is increased linear atelectasis at the right lung base. The lungs otherwise remain clear with no infiltrate, pleural fluid or evidence of overt failure. Right basilar atelectasis. Otherwise unremarkable chest.     XR CHEST PORTABLE    Result Date: 3/29/2023  EXAMINATION: ONE XRAY VIEW OF THE CHEST 3/29/2023 6:31 pm COMPARISON: 03/27/2023 HISTORY: ORDERING SYSTEM PROVIDED HISTORY: pain or SOB TECHNOLOGIST PROVIDED HISTORY: Reason for exam:->pain or SOB Reason for Exam: Extremity Weakness FINDINGS: The lungs are normally expanded. However, there is no mild fullness in the perihilar regions. Colon is interposed in front of the liver without change.  The previously noted device over the left hemithorax is no longer
and pull out main details. The pt was educated on word-finding strategies such as the use of a carrier phrase when he is \"stuck\". Per the [address] ([facility]) this pt presents with anomic aphasia. Pt would benefit from ongoing ST. See the rest of this report for further results and findings. Formal Assessments: Western Aphasia Battery Revised:  Subtest: Score: Comment   Spontaneous Speech: Content 9/10 When given a picture scene (cookie jar scene) the pt stated \"The little boy is grabbing a cooking from the jar, the mom is doing dishes and the sink is leaking\" when asked if he sees anything else he stated \"that's it\". Spontaneous Speech: Fluency 8/10 Some instances of word-finding difficulties in conversational and prompted speech. Initial phonemic stuttering; noted baseline stuttering from EMR (3/7/23). Auditory Verbal Comprehension: Y/N Questions 10/10    Sequential Commands 6/1 Pt was able to successfully follow basic one and two step commands, pt had increased difficulty completing conditional commands (do this before you do this, or do this after you do this) vs. sequential commands. For example, \" the toothbrush after you  the toothpaste\" and the pt grabbed for the toothbrush first.       Repetition 10/10       Object Naming 10/10    Reading DNT/10 DNT during session; would benefit from ongoing assessment. Writing 3/5 Pt reports his hand writing looks \"bigger and less clear\" than what it looks like at baseline. The pt's writing sample was unclear with single words and sentences. Bedside Language Score: 70/95 *(score does not include reading)*    Bedside aphasia Score:  88.33/100      Pt was informally assessed via temporal/spatial orientation: pt was able to successfully orient to month, year, date (after delayed recall, originally stating it was the 5th), day of week, as well as self/situation.  The pt's divergent naming skills were informally assessed:
assistance  Balance  Sitting: Impaired  Sitting - Static: Fair (occasional)  Sitting - Dynamic: Occasional  Standing: Impaired  Standing - Static: Constant support;Poor  Transfer Training  Transfer Training: Yes  Overall Level of Assistance: Moderate assistance;Assist X2  Sit to Stand: Moderate assistance;Assist X2  Stand to Sit: Minimum assistance;Assist X2  Stand Pivot Transfers: Moderate assistance;Assist X2  Bed to Chair: Moderate assistance;Assist X2  Gait Training  Gait Training: No                                                                        AM-PAC Score  AM-PAC Inpatient Mobility Raw Score : 12 (04/02/23 1220)  AM-PAC Inpatient T-Scale Score : 35.33 (04/02/23 1220)  Mobility Inpatient CMS 0-100% Score: 68.66 (04/02/23 1220)  Mobility Inpatient CMS G-Code Modifier : CL (04/02/23 1220)               Goals  Short Term Goals  Time Frame for Short Term Goals: by 4/10/2023  Short Term Goal 1: Pt will preform bed mobility indep  Short Term Goal 2: Pt will preform functional transfer with CGA  Short Term Goal 3: Pt will AMB 50ft with Min A and LRAD  Short Term Goal 4: Pt will ascend and decend 2 steps with CGA  Short Term Goal 5: by 4/07 pt will preformed 10-15 reps of LE strengthening exercise  Patient Goals   Patient Goals : \"to return home\"       Education  Patient Education  Education Given To: Patient; Family  Education Provided: Role of Therapy;Plan of Care;Precautions;Transfer Training  Education Method: Demonstration;Verbal  Barriers to Learning: None  Education Outcome: Verbalized understanding      Therapy Time   Individual Concurrent Group Co-treatment   Time In       1014   Time Out       1047   Minutes       33   Timed Code Treatment Minutes: 23 Minutes (10 min eval)       Letty Bloom PT     If pt is unable to be seen after this session, please let this note serve as discharge summary. Please see case management note for discharge disposition. Thank you.
services. Pt in agreement. Pt is tolerating IDDSI Level 7 regular solids, IDDSI Level 0 thin liquids, meds as tolerated as LRD. \"    Vitals/labs:   SpO2: 98  RR: 20  BP: 135/87  HR: 81  O2 device: NC @ 5L    CBC:   Recent Labs     04/01/23  0425   WBC 15.5*   HGB 14.2         BMP:  Recent Labs     04/01/23  0425      K 3.7   CL 99   CO2 33*   BUN 18   CREATININE 0.7*   GLUCOSE 137*          Cranial nerve exam:   CN V (trigeminal): ophthalmic, maxillary, and mandibular facial sensation- WNL b/l  CN VII (facial): WNL b/l  CN IX/X (glossopharyngeal/vagus): MPT: DNT; pitch range: DNT; vocal quality: Adequate; cough: Strong-perceptually, Congested, and Non-Productive  CN XII (hypoglossal): WNL b/l    Laryngeal function exam:   Secretions: oral mucosa pink and moist   Vocal quality: See CN exam above  MPT: See CN exam above  S/Z ratio: See CN exam above  Pitch range: See CN exam above  Cough: See CN exam above    Oral Care Status:    [] Oral Care [facility]  [] Poor oral care status  [x] Edentulous  [] Upper Dentures  [] Lower Dentures  [] Missing/Broken Teeth  [] Evidence of dental cavities/carries    PO trials:   IDDSI 0 (thin): no anterior bolus loss , suspect functional A-P bolus transit, swallow timing subjectively appears timely, no clinical s/s of aspiration, and vitals stable     IDDSI 4 (puree): no anterior bolus loss , suspect functional A-P bolus transit, swallow timing subjectively appears timely, oral clearance grossly WFL, no clinical s/s of aspiration, and vitals stable     IDDSI 7 (regular): no anterior bolus loss , suspect functional A-P bolus transit, swallow timing subjectively appears timely, grossly functional mastication, oral clearance grossly WFL, no clinical s/s of aspiration, and vitals stable     3 oz water: PASS     Impressions:  RN ok'd SLP entry. RN reports no concern for swallow function and reports pt tolerating diet. Pt alert, cooperative and agreeable for PO trials.    Pt seen with
stent was patent. There were no acute thrombi. 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 .   -Neuro consulted,  input appreciated   -Family has some questions about restarting Eliquis. Family was requested by neurology to discuss with neurosurgery about it. Family preferred to have a physician to physician conversation. Spoke with Dr. Tila Dey neurosurgeon [place] holding the carotid stent in the latter part of February 2023. He is aware about patient's new stroke and current hospitalization at [facility]. He advised to get repeat 2D echo, if echo has thrombus/clot advised to DC aspirin and Plavix start on Eliquis and Brilinta  If no clot-adviseTEE and rule out  If no clot continue aspirin and Plavix for another 2 months  He will follow-up outpatient and switch back to Eliquis whenever appropriate  2D echo was ordered  Discussed with family, and updated the recommendation from Dr. Tila Dey in order to start Eliquis       Chronic Respiratory Failure,- in addition to Very severe COPD, ALEXANDREA  -  Titrate level of respiratory support according to patient's needs. Target goal SpO2 = 90-94%. Currently patient is requiring 4 L/min O2 support which is his baseline.  -  Continue home ICS/LABA or equivalent and home roflumilast.  -  Short acting bronchodilators made available on an as-needed basis. -  Nocturnal and nap BiPAP per home settings 18/10.   -pt had resp distress 3/30 am, started bipap, given iv lasix(had gotten 1L ns bolus in 3 hrs to help with bp), transferred to ICU on 3/30 AM  -pulm consulted 3/30, apprec recs  -Pulmonary advise noninvasive ventilation during night discharge     HFpEF, Atrial fibrillation, Tachy-Dagoberto Syndrome  -  Last TTE 2/26/23:  LVEF = 60%. -  Continue home diltiazem 120mg daily for now.    -held home eliquis(per recs from Neurology/Nsurg from [facility]),        Oropharyngeal Dysphagia  -  Patient's diet had been advanced to regular by the time of
1:28 pm TECHNIQUE: Multiplanar multisequence MRI of the brain was performed without the administration of intravenous contrast. COMPARISON: 02/25/2023, CT head 03/29/2023 HISTORY: ORDERING SYSTEM PROVIDED HISTORY:  Recent ischemic CVA d/t L ICA subtotal occlusion. Suffered left MCA watershed infarction. Currently hospitalized with recrudescent symptoms. TECHNOLOGIST PROVIDED HISTORY: Reason for Exam:  Recent ischemic CVA d/t L ICA subtotal occlusion. Suffered left MCA watershed infarction. Currently hospitalized with recrudescent symptoms. What is the sedation requirement? None Decision Support Exception - unselect if not a suspected or confirmed emergency medical condition->Emergency Medical Condition (MA) Reason for Exam:  Recent ischemic CVA d/t L ICA subtotal occlusion. Suffered left MCA watershed infarction. Currently hospitalized with recrudescent symptoms. Initial evaluation FINDINGS: INTRACRANIAL STRUCTURES/VENTRICLES: There are areas of restricted diffusion in the posterior right frontal lobe and in the right parietal lobe and right occipital lobe which are acute. An embolic source should be considered. There are late subacute areas of infarct in the left periventricular white matter. Motion artifact degrades the images. There is mild cerebral atrophy. There are multiple punctate and confluent areas of high signal in the periventricular white matter and centrum semiovale that are likely related to chronic small vessel ischemic disease. There is no midline shift or mass effect. ORBITS: The visualized portion of the orbits demonstrate no acute abnormality. SINUSES:  The visualized paranasal sinuses and mastoid air cells are for the most part clear. BONES/SOFT TISSUES: The bone marrow signal intensity appears normal. The soft tissues demonstrate no acute abnormality.      They are several punctate areas of acute infarct involving the posterior right frontal lobe, the right parietal lobe, and in the
Motion artifact degrades the images. Cerebral atrophy. Chronic small vessel ischemic changes. Assessment:  Principal Problem:    Focal neurological deficit  Active Problems:    COPD, very severe (HCC)    Chronic respiratory failure with hypoxia and hypercapnia (HCC)    ALEXANDREA treated with BiPAP    Tachy-bear syndrome (HCC)    HTN (hypertension), benign    PAF (paroxysmal atrial fibrillation) (HCC)    Acute focal neurological deficit    Arterial ischemic stroke, ICA, right, acute (HCC)    Vertebral artery stenosis, left  Resolved Problems:    * No resolved hospital problems.  *          Plan:   Oxygen supplementation to keep saturation between 90 to 94% only  Please titrate oxygen as per the above parameters  Patient when seen was on 3 L of nasal oxygen with saturation 96%  Patient is getting BiPAP at nighttime which needs to be continued-patient may need NIV at the time of discharge-discussed with case management  Bronchodilators  Letter was changed to Brovana and Pulmicort which the patient was taking before  Patient has some atelectasis on x-ray chest and will benefit from incentive therapy and Acapella  Patient has recurrent acute strokes and internal medicine team/neurology/vascular surgery decide upon restarting of anticoagulation or not  No need for any antibiotics from pulm standpoint of view  Patient does not appear to have any significant oropharyngeal dysphagia   Management of atrial fibrillation including need for any anticoagulation as per EP  Monitor input output and BMP  Correct electrolytes on whenever necessary basis  PUD prophylaxis  PT OT     Patient discussed with nursing and case management        Electronically signed by:  Tylene Seip, MD    4/3/2023    7:50 AM.

## 2023-04-15 PROCEDURE — 93228 REMOTE 30 DAY ECG REV/REPORT: CPT | Performed by: INTERNAL MEDICINE

## 2023-04-24 ENCOUNTER — TELEPHONE (OUTPATIENT)
Dept: CARDIOLOGY CLINIC | Age: 73
End: 2023-04-24

## 2023-05-02 ENCOUNTER — TELEPHONE (OUTPATIENT)
Dept: PULMONOLOGY | Age: 73
End: 2023-05-02

## 2023-05-02 NOTE — TELEPHONE ENCOUNTER
Clinical Nurse Melisa Sparks from [facility] called stating for the non invasive vent there asking for a peer to peer. Peer to Peer needs to be done by 5/4/23 12pm CST. She stated these are not scheduled so they need best number to reach physician for peer to peer. Please call Melisa Sparks back at [phone] to let her know you would like peer to peer. If not no need to return call.  Please advise

## 2023-05-10 RX ORDER — TORSEMIDE 10 MG/1
10 TABLET ORAL DAILY
Qty: 30 TABLET | OUTPATIENT
Start: 2023-05-10

## 2023-05-10 RX ORDER — TORSEMIDE 10 MG/1
10 TABLET ORAL DAILY
Qty: 30 TABLET | Refills: 2 | Status: CANCELLED | OUTPATIENT
Start: 2023-05-10

## 2023-05-11 RX ORDER — TORSEMIDE 20 MG/1
TABLET ORAL
Qty: 360 TABLET | Refills: 3 | OUTPATIENT
Start: 2023-05-11

## 2023-05-11 RX ORDER — TORSEMIDE 10 MG/1
10 TABLET ORAL DAILY
Qty: 30 TABLET | Refills: 2 | Status: SHIPPED | OUTPATIENT
Start: 2023-05-11

## 2023-05-12 ENCOUNTER — HOSPITAL ENCOUNTER (OUTPATIENT)
Dept: CT IMAGING | Age: 73
Discharge: HOME | End: 2023-05-12
Payer: MEDICARE

## 2023-05-12 DIAGNOSIS — I67.1 CEREBRAL ANEURYSM, NONRUPTURED: ICD-10-CM

## 2023-05-12 PROCEDURE — 70498 CT ANGIOGRAPHY NECK: CPT

## 2023-05-12 PROCEDURE — 6360000004 HC RX CONTRAST MEDICATION: Performed by: NEUROLOGICAL SURGERY

## 2023-05-12 RX ADMIN — IOPAMIDOL 75 ML: 755 INJECTION, SOLUTION INTRAVENOUS at 17:04

## 2023-05-16 ENCOUNTER — TELEPHONE (OUTPATIENT)
Dept: CARDIOLOGY CLINIC | Age: 73
End: 2023-05-16

## 2023-05-16 NOTE — TELEPHONE ENCOUNTER
Pt 's daughter in-law Ade Bryant) calling. Stating pt is still having swelling (ankles)  But, states it is not any worse-just not improved. NO WT gain    States he is having some SOB @ times. But, she is wondering if it is related to his extreme fatigue. Pt is to see Dr. Kourtney Nathan week    Pt is now taking Torsemide 10 mg daily. Per [place] pt was on 20 mg daily. Since last ov 8.18.22  Pt has had 2 strokes and in and out of rehab. Pt does need to schedule follow up. Do you want to see pt Sooner or Later?     Testing/Reports in [address]

## 2023-05-18 ENCOUNTER — APPOINTMENT (OUTPATIENT)
Dept: GENERAL RADIOLOGY | Age: 73
DRG: 189 | End: 2023-05-18
Payer: MEDICARE

## 2023-05-18 ENCOUNTER — HOSPITAL ENCOUNTER (INPATIENT)
Age: 73
LOS: 3 days | Discharge: HOME | DRG: 189 | End: 2023-05-21
Attending: EMERGENCY MEDICINE | Admitting: INTERNAL MEDICINE
Payer: MEDICARE

## 2023-05-18 DIAGNOSIS — J44.1 COPD EXACERBATION (HCC): ICD-10-CM

## 2023-05-18 DIAGNOSIS — R06.09 EXERTIONAL DYSPNEA: ICD-10-CM

## 2023-05-18 DIAGNOSIS — Z86.79 HISTORY OF CHF (CONGESTIVE HEART FAILURE): ICD-10-CM

## 2023-05-18 DIAGNOSIS — R77.8 ELEVATED TROPONIN: ICD-10-CM

## 2023-05-18 DIAGNOSIS — J96.02 ACUTE RESPIRATORY FAILURE WITH HYPERCAPNIA (HCC): Primary | ICD-10-CM

## 2023-05-18 PROBLEM — J44.9 COPD (CHRONIC OBSTRUCTIVE PULMONARY DISEASE) (HCC): Status: ACTIVE | Noted: 2023-05-18

## 2023-05-18 PROBLEM — I25.10 CAD (CORONARY ARTERY DISEASE): Status: ACTIVE | Noted: 2023-05-18

## 2023-05-18 PROBLEM — E78.5 HYPERLIPIDEMIA: Status: ACTIVE | Noted: 2023-05-18

## 2023-05-18 LAB
ALBUMIN SERPL-MCNC: 4 G/DL (ref 3.4–5)
ALBUMIN/GLOB SERPL: 1.3 {RATIO} (ref 1.1–2.2)
ALP SERPL-CCNC: 101 U/L (ref 40–129)
ALT SERPL-CCNC: 16 U/L (ref 10–40)
ANION GAP SERPL CALCULATED.3IONS-SCNC: 11 MMOL/L (ref 3–16)
AST SERPL-CCNC: 15 U/L (ref 15–37)
BASE EXCESS BLDV CALC-SCNC: 4.6 MMOL/L (ref -3–3)
BASE EXCESS BLDV CALC-SCNC: 6 MMOL/L (ref -3–3)
BASOPHILS # BLD: 0.1 K/UL (ref 0–0.2)
BASOPHILS NFR BLD: 0.5 %
BILIRUB SERPL-MCNC: 0.9 MG/DL (ref 0–1)
BUN SERPL-MCNC: 13 MG/DL (ref 7–20)
CALCIUM SERPL-MCNC: 9.3 MG/DL (ref 8.3–10.6)
CHLORIDE SERPL-SCNC: 95 MMOL/L (ref 99–110)
CO2 BLDV-SCNC: 33 MMOL/L
CO2 BLDV-SCNC: 36 MMOL/L
CO2 SERPL-SCNC: 32 MMOL/L (ref 21–32)
COHGB MFR BLDV: 1.6 % (ref 0–1.5)
COHGB MFR BLDV: 1.9 % (ref 0–1.5)
CREAT SERPL-MCNC: 0.8 MG/DL (ref 0.8–1.3)
DEPRECATED RDW RBC AUTO: 16.1 % (ref 12.4–15.4)
EKG ATRIAL RATE: 91 BPM
EKG DIAGNOSIS: NORMAL
EKG Q-T INTERVAL: 354 MS
EKG QRS DURATION: 90 MS
EKG QTC CALCULATION (BAZETT): 459 MS
EKG R AXIS: -8 DEGREES
EKG T AXIS: 90 DEGREES
EKG VENTRICULAR RATE: 101 BPM
EOSINOPHIL # BLD: 0.3 K/UL (ref 0–0.6)
EOSINOPHIL NFR BLD: 2.1 %
GFR SERPLBLD CREATININE-BSD FMLA CKD-EPI: >60 ML/MIN/{1.73_M2}
GLUCOSE SERPL-MCNC: 142 MG/DL (ref 70–99)
HCO3 BLDV-SCNC: 30.9 MMOL/L (ref 23–29)
HCO3 BLDV-SCNC: 33.7 MMOL/L (ref 23–29)
HCT VFR BLD AUTO: 46.1 % (ref 40.5–52.5)
HGB BLD-MCNC: 15.4 G/DL (ref 13.5–17.5)
LYMPHOCYTES # BLD: 1.5 K/UL (ref 1–5.1)
LYMPHOCYTES NFR BLD: 10.5 %
MCH RBC QN AUTO: 30.8 PG (ref 26–34)
MCHC RBC AUTO-ENTMCNC: 33.4 G/DL (ref 31–36)
MCV RBC AUTO: 92.3 FL (ref 80–100)
METHGB MFR BLDV: 0.6 %
METHGB MFR BLDV: 0.7 %
MONOCYTES # BLD: 1 K/UL (ref 0–1.3)
MONOCYTES NFR BLD: 7.1 %
NEUTROPHILS # BLD: 11.8 K/UL (ref 1.7–7.7)
NEUTROPHILS NFR BLD: 79.8 %
NT-PROBNP SERPL-MCNC: 751 PG/ML (ref 0–124)
O2 THERAPY: ABNORMAL
O2 THERAPY: ABNORMAL
PCO2 BLDV: 51.8 MMHG (ref 40–50)
PCO2 BLDV: 60.1 MMHG (ref 40–50)
PH BLDV: 7.37 [PH] (ref 7.35–7.45)
PH BLDV: 7.39 [PH] (ref 7.35–7.45)
PLATELET # BLD AUTO: 341 K/UL (ref 135–450)
PMV BLD AUTO: 8.9 FL (ref 5–10.5)
PO2 BLDV: 39.5 MMHG (ref 25–40)
PO2 BLDV: 44.1 MMHG (ref 25–40)
POTASSIUM SERPL-SCNC: 3.9 MMOL/L (ref 3.5–5.1)
PROT SERPL-MCNC: 7.1 G/DL (ref 6.4–8.2)
RBC # BLD AUTO: 5 M/UL (ref 4.2–5.9)
SAO2 % BLDV: 71 %
SAO2 % BLDV: 79 %
SODIUM SERPL-SCNC: 138 MMOL/L (ref 136–145)
TROPONIN, HIGH SENSITIVITY: 50 NG/L (ref 0–22)
TROPONIN, HIGH SENSITIVITY: 53 NG/L (ref 0–22)
WBC # BLD AUTO: 14.8 K/UL (ref 4–11)

## 2023-05-18 PROCEDURE — 5A09457 ASSISTANCE WITH RESPIRATORY VENTILATION, 24-96 CONSECUTIVE HOURS, CONTINUOUS POSITIVE AIRWAY PRESSURE: ICD-10-PCS | Performed by: INTERNAL MEDICINE

## 2023-05-18 PROCEDURE — 96374 THER/PROPH/DIAG INJ IV PUSH: CPT

## 2023-05-18 PROCEDURE — 82803 BLOOD GASES ANY COMBINATION: CPT

## 2023-05-18 PROCEDURE — 94640 AIRWAY INHALATION TREATMENT: CPT

## 2023-05-18 PROCEDURE — 6370000000 HC RX 637 (ALT 250 FOR IP): Performed by: EMERGENCY MEDICINE

## 2023-05-18 PROCEDURE — 94761 N-INVAS EAR/PLS OXIMETRY MLT: CPT

## 2023-05-18 PROCEDURE — 94660 CPAP INITIATION&MGMT: CPT

## 2023-05-18 PROCEDURE — 1200000000 HC SEMI PRIVATE

## 2023-05-18 PROCEDURE — 85025 COMPLETE CBC W/AUTO DIFF WBC: CPT

## 2023-05-18 PROCEDURE — 6370000000 HC RX 637 (ALT 250 FOR IP): Performed by: INTERNAL MEDICINE

## 2023-05-18 PROCEDURE — 6360000002 HC RX W HCPCS: Performed by: INTERNAL MEDICINE

## 2023-05-18 PROCEDURE — 2700000000 HC OXYGEN THERAPY PER DAY

## 2023-05-18 PROCEDURE — 84484 ASSAY OF TROPONIN QUANT: CPT

## 2023-05-18 PROCEDURE — 93010 ELECTROCARDIOGRAM REPORT: CPT | Performed by: INTERNAL MEDICINE

## 2023-05-18 PROCEDURE — 83880 ASSAY OF NATRIURETIC PEPTIDE: CPT

## 2023-05-18 PROCEDURE — 99285 EMERGENCY DEPT VISIT HI MDM: CPT

## 2023-05-18 PROCEDURE — 6360000002 HC RX W HCPCS: Performed by: EMERGENCY MEDICINE

## 2023-05-18 PROCEDURE — 80053 COMPREHEN METABOLIC PANEL: CPT

## 2023-05-18 PROCEDURE — 93005 ELECTROCARDIOGRAM TRACING: CPT | Performed by: EMERGENCY MEDICINE

## 2023-05-18 PROCEDURE — 71046 X-RAY EXAM CHEST 2 VIEWS: CPT

## 2023-05-18 RX ORDER — ONDANSETRON 4 MG/1
4 TABLET, ORALLY DISINTEGRATING ORAL EVERY 8 HOURS PRN
Status: DISCONTINUED | OUTPATIENT
Start: 2023-05-18 | End: 2023-05-21 | Stop reason: HOSPADM

## 2023-05-18 RX ORDER — ENOXAPARIN SODIUM 100 MG/ML
40 INJECTION SUBCUTANEOUS DAILY
Status: CANCELLED | OUTPATIENT
Start: 2023-05-18

## 2023-05-18 RX ORDER — FAMOTIDINE 20 MG/1
20 TABLET, FILM COATED ORAL 2 TIMES DAILY
Status: DISCONTINUED | OUTPATIENT
Start: 2023-05-18 | End: 2023-05-21 | Stop reason: HOSPADM

## 2023-05-18 RX ORDER — IPRATROPIUM BROMIDE AND ALBUTEROL SULFATE 2.5; .5 MG/3ML; MG/3ML
1 SOLUTION RESPIRATORY (INHALATION)
Status: DISCONTINUED | OUTPATIENT
Start: 2023-05-18 | End: 2023-05-21 | Stop reason: HOSPADM

## 2023-05-18 RX ORDER — METHOCARBAMOL 500 MG/1
1000 TABLET, FILM COATED ORAL ONCE
Status: COMPLETED | OUTPATIENT
Start: 2023-05-18 | End: 2023-05-18

## 2023-05-18 RX ORDER — ONDANSETRON 2 MG/ML
4 INJECTION INTRAMUSCULAR; INTRAVENOUS EVERY 6 HOURS PRN
Status: DISCONTINUED | OUTPATIENT
Start: 2023-05-18 | End: 2023-05-21 | Stop reason: HOSPADM

## 2023-05-18 RX ORDER — DILTIAZEM HYDROCHLORIDE 120 MG/1
120 CAPSULE, COATED, EXTENDED RELEASE ORAL DAILY
Status: DISCONTINUED | OUTPATIENT
Start: 2023-05-19 | End: 2023-05-21 | Stop reason: HOSPADM

## 2023-05-18 RX ORDER — METHYLPREDNISOLONE SODIUM SUCCINATE 125 MG/2ML
62.5 INJECTION, POWDER, LYOPHILIZED, FOR SOLUTION INTRAMUSCULAR; INTRAVENOUS ONCE
Status: COMPLETED | OUTPATIENT
Start: 2023-05-18 | End: 2023-05-18

## 2023-05-18 RX ORDER — ALBUTEROL SULFATE 90 UG/1
1 AEROSOL, METERED RESPIRATORY (INHALATION) EVERY 4 HOURS PRN
Status: DISCONTINUED | OUTPATIENT
Start: 2023-05-18 | End: 2023-05-21 | Stop reason: HOSPADM

## 2023-05-18 RX ORDER — SODIUM CHLORIDE 9 MG/ML
INJECTION, SOLUTION INTRAVENOUS PRN
Status: DISCONTINUED | OUTPATIENT
Start: 2023-05-18 | End: 2023-05-21 | Stop reason: HOSPADM

## 2023-05-18 RX ORDER — ACETAMINOPHEN 325 MG/1
650 TABLET ORAL EVERY 6 HOURS PRN
Status: DISCONTINUED | OUTPATIENT
Start: 2023-05-18 | End: 2023-05-21 | Stop reason: HOSPADM

## 2023-05-18 RX ORDER — ALBUTEROL SULFATE 90 UG/1
1 AEROSOL, METERED RESPIRATORY (INHALATION) 4 TIMES DAILY
Status: DISCONTINUED | OUTPATIENT
Start: 2023-05-18 | End: 2023-05-18

## 2023-05-18 RX ORDER — LORAZEPAM 0.5 MG/1
0.5 TABLET ORAL ONCE
Status: COMPLETED | OUTPATIENT
Start: 2023-05-18 | End: 2023-05-18

## 2023-05-18 RX ORDER — IPRATROPIUM BROMIDE AND ALBUTEROL SULFATE 2.5; .5 MG/3ML; MG/3ML
1 SOLUTION RESPIRATORY (INHALATION) ONCE
Status: COMPLETED | OUTPATIENT
Start: 2023-05-18 | End: 2023-05-18

## 2023-05-18 RX ORDER — SODIUM CHLORIDE 0.9 % (FLUSH) 0.9 %
5-40 SYRINGE (ML) INJECTION EVERY 12 HOURS SCHEDULED
Status: DISCONTINUED | OUTPATIENT
Start: 2023-05-18 | End: 2023-05-21 | Stop reason: HOSPADM

## 2023-05-18 RX ORDER — LORAZEPAM 2 MG/ML
0.5 INJECTION INTRAMUSCULAR ONCE
Status: COMPLETED | OUTPATIENT
Start: 2023-05-18 | End: 2023-05-18

## 2023-05-18 RX ORDER — DOXYCYCLINE HYCLATE 100 MG
100 TABLET ORAL ONCE
Status: COMPLETED | OUTPATIENT
Start: 2023-05-18 | End: 2023-05-18

## 2023-05-18 RX ORDER — NITROGLYCERIN 0.4 MG/1
0.4 TABLET SUBLINGUAL EVERY 5 MIN PRN
Status: DISCONTINUED | OUTPATIENT
Start: 2023-05-18 | End: 2023-05-21 | Stop reason: HOSPADM

## 2023-05-18 RX ORDER — POLYETHYLENE GLYCOL 3350 17 G/17G
17 POWDER, FOR SOLUTION ORAL DAILY PRN
Status: DISCONTINUED | OUTPATIENT
Start: 2023-05-18 | End: 2023-05-21 | Stop reason: HOSPADM

## 2023-05-18 RX ORDER — ROFLUMILAST 500 UG/1
500 TABLET ORAL DAILY
Status: DISCONTINUED | OUTPATIENT
Start: 2023-05-19 | End: 2023-05-21 | Stop reason: HOSPADM

## 2023-05-18 RX ORDER — ACETAMINOPHEN 650 MG/1
650 SUPPOSITORY RECTAL EVERY 6 HOURS PRN
Status: DISCONTINUED | OUTPATIENT
Start: 2023-05-18 | End: 2023-05-21 | Stop reason: HOSPADM

## 2023-05-18 RX ORDER — METHYLPREDNISOLONE SODIUM SUCCINATE 40 MG/ML
40 INJECTION, POWDER, LYOPHILIZED, FOR SOLUTION INTRAMUSCULAR; INTRAVENOUS 2 TIMES DAILY
Status: DISCONTINUED | OUTPATIENT
Start: 2023-05-18 | End: 2023-05-20

## 2023-05-18 RX ORDER — GUAIFENESIN 600 MG/1
600 TABLET, EXTENDED RELEASE ORAL 2 TIMES DAILY PRN
Status: DISCONTINUED | OUTPATIENT
Start: 2023-05-18 | End: 2023-05-21 | Stop reason: HOSPADM

## 2023-05-18 RX ORDER — PRAMIPEXOLE DIHYDROCHLORIDE 0.25 MG/1
0.5 TABLET ORAL DAILY
Status: DISCONTINUED | OUTPATIENT
Start: 2023-05-19 | End: 2023-05-21 | Stop reason: HOSPADM

## 2023-05-18 RX ORDER — ATORVASTATIN CALCIUM 80 MG/1
80 TABLET, FILM COATED ORAL DAILY
Status: DISCONTINUED | OUTPATIENT
Start: 2023-05-18 | End: 2023-05-21 | Stop reason: HOSPADM

## 2023-05-18 RX ORDER — IPRATROPIUM BROMIDE AND ALBUTEROL SULFATE 2.5; .5 MG/3ML; MG/3ML
1 SOLUTION RESPIRATORY (INHALATION)
Status: DISCONTINUED | OUTPATIENT
Start: 2023-05-18 | End: 2023-05-18 | Stop reason: SDUPTHER

## 2023-05-18 RX ORDER — POTASSIUM CHLORIDE 750 MG/1
10 TABLET, EXTENDED RELEASE ORAL DAILY
Status: DISCONTINUED | OUTPATIENT
Start: 2023-05-18 | End: 2023-05-21 | Stop reason: HOSPADM

## 2023-05-18 RX ORDER — SODIUM CHLORIDE 0.9 % (FLUSH) 0.9 %
5-40 SYRINGE (ML) INJECTION PRN
Status: DISCONTINUED | OUTPATIENT
Start: 2023-05-18 | End: 2023-05-21 | Stop reason: HOSPADM

## 2023-05-18 RX ORDER — BUDESONIDE 0.5 MG/2ML
0.5 INHALANT ORAL 2 TIMES DAILY
Status: DISCONTINUED | OUTPATIENT
Start: 2023-05-18 | End: 2023-05-21 | Stop reason: HOSPADM

## 2023-05-18 RX ORDER — LORAZEPAM 2 MG/ML
1 INJECTION INTRAMUSCULAR EVERY 4 HOURS PRN
Status: DISCONTINUED | OUTPATIENT
Start: 2023-05-18 | End: 2023-05-21 | Stop reason: HOSPADM

## 2023-05-18 RX ORDER — ARFORMOTEROL TARTRATE 15 UG/2ML
15 SOLUTION RESPIRATORY (INHALATION) 2 TIMES DAILY
Status: DISCONTINUED | OUTPATIENT
Start: 2023-05-18 | End: 2023-05-21 | Stop reason: HOSPADM

## 2023-05-18 RX ORDER — TORSEMIDE 20 MG/1
10 TABLET ORAL DAILY
Status: DISCONTINUED | OUTPATIENT
Start: 2023-05-19 | End: 2023-05-21 | Stop reason: HOSPADM

## 2023-05-18 RX ADMIN — ATORVASTATIN CALCIUM 80 MG: 80 TABLET, FILM COATED ORAL at 21:30

## 2023-05-18 RX ADMIN — METHYLPREDNISOLONE SODIUM SUCCINATE 62.5 MG: 125 INJECTION INTRAMUSCULAR; INTRAVENOUS at 13:56

## 2023-05-18 RX ADMIN — METHOCARBAMOL 1000 MG: 500 TABLET ORAL at 13:56

## 2023-05-18 RX ADMIN — TICAGRELOR 90 MG: 90 TABLET ORAL at 21:05

## 2023-05-18 RX ADMIN — METHYLPREDNISOLONE 40 MG: 40 INJECTION, POWDER, LYOPHILIZED, FOR SOLUTION INTRAMUSCULAR; INTRAVENOUS at 21:05

## 2023-05-18 RX ADMIN — DOXYCYCLINE HYCLATE 100 MG: 100 TABLET, COATED ORAL at 15:19

## 2023-05-18 RX ADMIN — LORAZEPAM 0.5 MG: 2 INJECTION INTRAMUSCULAR; INTRAVENOUS at 18:20

## 2023-05-18 RX ADMIN — APIXABAN 5 MG: 5 TABLET, FILM COATED ORAL at 21:05

## 2023-05-18 RX ADMIN — IPRATROPIUM BROMIDE AND ALBUTEROL SULFATE 1 AMPULE: .5; 2.5 SOLUTION RESPIRATORY (INHALATION) at 13:56

## 2023-05-18 RX ADMIN — FAMOTIDINE 20 MG: 20 TABLET, FILM COATED ORAL at 21:05

## 2023-05-18 RX ADMIN — POTASSIUM CHLORIDE 10 MEQ: 750 TABLET, EXTENDED RELEASE ORAL at 21:05

## 2023-05-18 RX ADMIN — BUDESONIDE 500 MCG: 0.5 SUSPENSION RESPIRATORY (INHALATION) at 19:44

## 2023-05-18 RX ADMIN — ARFORMOTEROL TARTRATE 15 MCG: 15 SOLUTION RESPIRATORY (INHALATION) at 19:44

## 2023-05-18 RX ADMIN — LORAZEPAM 0.5 MG: 0.5 TABLET ORAL at 16:56

## 2023-05-18 RX ADMIN — IPRATROPIUM BROMIDE AND ALBUTEROL SULFATE 1 AMPULE: 2.5; .5 SOLUTION RESPIRATORY (INHALATION) at 19:44

## 2023-05-18 ASSESSMENT — PAIN - FUNCTIONAL ASSESSMENT: PAIN_FUNCTIONAL_ASSESSMENT: 0-10

## 2023-05-18 ASSESSMENT — ENCOUNTER SYMPTOMS
ABDOMINAL PAIN: 0
VOMITING: 0
WHEEZING: 1
NAUSEA: 0
STRIDOR: 0
SHORTNESS OF BREATH: 1
COUGH: 1

## 2023-05-18 ASSESSMENT — PAIN SCALES - GENERAL: PAINLEVEL_OUTOF10: 4

## 2023-05-19 LAB
ALBUMIN SERPL-MCNC: 3.7 G/DL (ref 3.4–5)
ANION GAP SERPL CALCULATED.3IONS-SCNC: 13 MMOL/L (ref 3–16)
BUN SERPL-MCNC: 15 MG/DL (ref 7–20)
CALCIUM SERPL-MCNC: 9.1 MG/DL (ref 8.3–10.6)
CHLORIDE SERPL-SCNC: 95 MMOL/L (ref 99–110)
CO2 SERPL-SCNC: 29 MMOL/L (ref 21–32)
CREAT SERPL-MCNC: 0.7 MG/DL (ref 0.8–1.3)
DEPRECATED RDW RBC AUTO: 16.2 % (ref 12.4–15.4)
GFR SERPLBLD CREATININE-BSD FMLA CKD-EPI: >60 ML/MIN/{1.73_M2}
GLUCOSE SERPL-MCNC: 179 MG/DL (ref 70–99)
HCT VFR BLD AUTO: 44.2 % (ref 40.5–52.5)
HGB BLD-MCNC: 14.8 G/DL (ref 13.5–17.5)
MAGNESIUM SERPL-MCNC: 2.1 MG/DL (ref 1.8–2.4)
MCH RBC QN AUTO: 31 PG (ref 26–34)
MCHC RBC AUTO-ENTMCNC: 33.5 G/DL (ref 31–36)
MCV RBC AUTO: 92.5 FL (ref 80–100)
NT-PROBNP SERPL-MCNC: 684 PG/ML (ref 0–124)
PHOSPHATE SERPL-MCNC: 3.5 MG/DL (ref 2.5–4.9)
PLATELET # BLD AUTO: 307 K/UL (ref 135–450)
PMV BLD AUTO: 8.9 FL (ref 5–10.5)
POTASSIUM SERPL-SCNC: 4.4 MMOL/L (ref 3.5–5.1)
PROCALCITONIN SERPL IA-MCNC: 0.1 NG/ML (ref 0–0.15)
RBC # BLD AUTO: 4.77 M/UL (ref 4.2–5.9)
SODIUM SERPL-SCNC: 137 MMOL/L (ref 136–145)
WBC # BLD AUTO: 11.9 K/UL (ref 4–11)

## 2023-05-19 PROCEDURE — 36415 COLL VENOUS BLD VENIPUNCTURE: CPT

## 2023-05-19 PROCEDURE — 99291 CRITICAL CARE FIRST HOUR: CPT | Performed by: STUDENT IN AN ORGANIZED HEALTH CARE EDUCATION/TRAINING PROGRAM

## 2023-05-19 PROCEDURE — 85027 COMPLETE CBC AUTOMATED: CPT

## 2023-05-19 PROCEDURE — 6370000000 HC RX 637 (ALT 250 FOR IP): Performed by: INTERNAL MEDICINE

## 2023-05-19 PROCEDURE — 1200000000 HC SEMI PRIVATE

## 2023-05-19 PROCEDURE — 2700000000 HC OXYGEN THERAPY PER DAY

## 2023-05-19 PROCEDURE — 2580000003 HC RX 258: Performed by: STUDENT IN AN ORGANIZED HEALTH CARE EDUCATION/TRAINING PROGRAM

## 2023-05-19 PROCEDURE — 84145 PROCALCITONIN (PCT): CPT

## 2023-05-19 PROCEDURE — 2580000003 HC RX 258: Performed by: INTERNAL MEDICINE

## 2023-05-19 PROCEDURE — 2580000003 HC RX 258

## 2023-05-19 PROCEDURE — 6360000002 HC RX W HCPCS: Performed by: INTERNAL MEDICINE

## 2023-05-19 PROCEDURE — 94761 N-INVAS EAR/PLS OXIMETRY MLT: CPT

## 2023-05-19 PROCEDURE — 83880 ASSAY OF NATRIURETIC PEPTIDE: CPT

## 2023-05-19 PROCEDURE — 94660 CPAP INITIATION&MGMT: CPT

## 2023-05-19 PROCEDURE — 80069 RENAL FUNCTION PANEL: CPT

## 2023-05-19 PROCEDURE — 94640 AIRWAY INHALATION TREATMENT: CPT

## 2023-05-19 PROCEDURE — 83735 ASSAY OF MAGNESIUM: CPT

## 2023-05-19 RX ORDER — WATER 1000 ML/1000ML
INJECTION, SOLUTION INTRAVENOUS
Status: COMPLETED
Start: 2023-05-19 | End: 2023-05-19

## 2023-05-19 RX ADMIN — IPRATROPIUM BROMIDE AND ALBUTEROL SULFATE 1 AMPULE: 2.5; .5 SOLUTION RESPIRATORY (INHALATION) at 15:29

## 2023-05-19 RX ADMIN — IPRATROPIUM BROMIDE AND ALBUTEROL SULFATE 1 AMPULE: 2.5; .5 SOLUTION RESPIRATORY (INHALATION) at 11:23

## 2023-05-19 RX ADMIN — TICAGRELOR 90 MG: 90 TABLET ORAL at 20:16

## 2023-05-19 RX ADMIN — POTASSIUM CHLORIDE 10 MEQ: 750 TABLET, EXTENDED RELEASE ORAL at 10:24

## 2023-05-19 RX ADMIN — ATORVASTATIN CALCIUM 80 MG: 80 TABLET, FILM COATED ORAL at 10:24

## 2023-05-19 RX ADMIN — IPRATROPIUM BROMIDE AND ALBUTEROL SULFATE 1 AMPULE: 2.5; .5 SOLUTION RESPIRATORY (INHALATION) at 07:30

## 2023-05-19 RX ADMIN — METHYLPREDNISOLONE 40 MG: 40 INJECTION, POWDER, LYOPHILIZED, FOR SOLUTION INTRAMUSCULAR; INTRAVENOUS at 20:16

## 2023-05-19 RX ADMIN — Medication 10 ML: at 10:32

## 2023-05-19 RX ADMIN — GUAIFENESIN 600 MG: 600 TABLET ORAL at 10:25

## 2023-05-19 RX ADMIN — ARFORMOTEROL TARTRATE 15 MCG: 15 SOLUTION RESPIRATORY (INHALATION) at 19:08

## 2023-05-19 RX ADMIN — BUDESONIDE 500 MCG: 0.5 SUSPENSION RESPIRATORY (INHALATION) at 19:08

## 2023-05-19 RX ADMIN — IPRATROPIUM BROMIDE AND ALBUTEROL SULFATE 1 AMPULE: 2.5; .5 SOLUTION RESPIRATORY (INHALATION) at 19:08

## 2023-05-19 RX ADMIN — FAMOTIDINE 20 MG: 20 TABLET, FILM COATED ORAL at 20:16

## 2023-05-19 RX ADMIN — BUDESONIDE 500 MCG: 0.5 SUSPENSION RESPIRATORY (INHALATION) at 07:30

## 2023-05-19 RX ADMIN — DILTIAZEM HYDROCHLORIDE 120 MG: 120 CAPSULE, COATED, EXTENDED RELEASE ORAL at 10:24

## 2023-05-19 RX ADMIN — APIXABAN 5 MG: 5 TABLET, FILM COATED ORAL at 10:24

## 2023-05-19 RX ADMIN — TICAGRELOR 90 MG: 90 TABLET ORAL at 10:24

## 2023-05-19 RX ADMIN — METHYLPREDNISOLONE 40 MG: 40 INJECTION, POWDER, LYOPHILIZED, FOR SOLUTION INTRAMUSCULAR; INTRAVENOUS at 10:24

## 2023-05-19 RX ADMIN — APIXABAN 5 MG: 5 TABLET, FILM COATED ORAL at 20:16

## 2023-05-19 RX ADMIN — FAMOTIDINE 20 MG: 20 TABLET, FILM COATED ORAL at 10:24

## 2023-05-19 RX ADMIN — Medication 10 ML: at 10:25

## 2023-05-19 RX ADMIN — TORSEMIDE 10 MG: 20 TABLET ORAL at 10:24

## 2023-05-19 RX ADMIN — PRAMIPEXOLE DIHYDROCHLORIDE 0.5 MG: 0.25 TABLET ORAL at 10:24

## 2023-05-19 RX ADMIN — WATER 10 ML: 1 INJECTION INTRAMUSCULAR; INTRAVENOUS; SUBCUTANEOUS at 10:25

## 2023-05-19 RX ADMIN — ROFLUMILAST 500 MCG: 500 TABLET ORAL at 13:16

## 2023-05-19 ASSESSMENT — ENCOUNTER SYMPTOMS
SORE THROAT: 0
EYE REDNESS: 0
BACK PAIN: 0
EYE DISCHARGE: 0
ABDOMINAL DISTENTION: 0
ABDOMINAL PAIN: 0
TROUBLE SWALLOWING: 0
COUGH: 1
CONSTIPATION: 0
SHORTNESS OF BREATH: 1
WHEEZING: 0
DIARRHEA: 0
VOMITING: 0
NAUSEA: 0
EYE ITCHING: 0
STRIDOR: 0
EYE PAIN: 0
COLOR CHANGE: 0

## 2023-05-20 PROCEDURE — 6370000000 HC RX 637 (ALT 250 FOR IP): Performed by: INTERNAL MEDICINE

## 2023-05-20 PROCEDURE — 99233 SBSQ HOSP IP/OBS HIGH 50: CPT | Performed by: STUDENT IN AN ORGANIZED HEALTH CARE EDUCATION/TRAINING PROGRAM

## 2023-05-20 PROCEDURE — 1200000000 HC SEMI PRIVATE

## 2023-05-20 PROCEDURE — 6360000002 HC RX W HCPCS: Performed by: INTERNAL MEDICINE

## 2023-05-20 PROCEDURE — 94761 N-INVAS EAR/PLS OXIMETRY MLT: CPT

## 2023-05-20 PROCEDURE — 94660 CPAP INITIATION&MGMT: CPT

## 2023-05-20 PROCEDURE — 94640 AIRWAY INHALATION TREATMENT: CPT

## 2023-05-20 PROCEDURE — 2580000003 HC RX 258

## 2023-05-20 PROCEDURE — 2700000000 HC OXYGEN THERAPY PER DAY

## 2023-05-20 RX ORDER — PREDNISONE 20 MG/1
40 TABLET ORAL DAILY
Status: DISCONTINUED | OUTPATIENT
Start: 2023-05-20 | End: 2023-05-21 | Stop reason: HOSPADM

## 2023-05-20 RX ORDER — WATER 1000 ML/1000ML
INJECTION, SOLUTION INTRAVENOUS
Status: COMPLETED
Start: 2023-05-20 | End: 2023-05-20

## 2023-05-20 RX ADMIN — DILTIAZEM HYDROCHLORIDE 120 MG: 120 CAPSULE, COATED, EXTENDED RELEASE ORAL at 09:56

## 2023-05-20 RX ADMIN — TORSEMIDE 10 MG: 20 TABLET ORAL at 09:57

## 2023-05-20 RX ADMIN — POTASSIUM CHLORIDE 10 MEQ: 750 TABLET, EXTENDED RELEASE ORAL at 09:57

## 2023-05-20 RX ADMIN — PRAMIPEXOLE DIHYDROCHLORIDE 0.5 MG: 0.25 TABLET ORAL at 09:58

## 2023-05-20 RX ADMIN — BUDESONIDE 500 MCG: 0.5 SUSPENSION RESPIRATORY (INHALATION) at 19:48

## 2023-05-20 RX ADMIN — TICAGRELOR 90 MG: 90 TABLET ORAL at 09:57

## 2023-05-20 RX ADMIN — WATER 1 ML: 1 INJECTION INTRAMUSCULAR; INTRAVENOUS; SUBCUTANEOUS at 10:10

## 2023-05-20 RX ADMIN — METHYLPREDNISOLONE 40 MG: 40 INJECTION, POWDER, LYOPHILIZED, FOR SOLUTION INTRAMUSCULAR; INTRAVENOUS at 10:04

## 2023-05-20 RX ADMIN — IPRATROPIUM BROMIDE AND ALBUTEROL SULFATE 1 AMPULE: 2.5; .5 SOLUTION RESPIRATORY (INHALATION) at 15:42

## 2023-05-20 RX ADMIN — ATORVASTATIN CALCIUM 80 MG: 80 TABLET, FILM COATED ORAL at 09:56

## 2023-05-20 RX ADMIN — BUDESONIDE 500 MCG: 0.5 SUSPENSION RESPIRATORY (INHALATION) at 07:25

## 2023-05-20 RX ADMIN — ARFORMOTEROL TARTRATE 15 MCG: 15 SOLUTION RESPIRATORY (INHALATION) at 07:25

## 2023-05-20 RX ADMIN — ROFLUMILAST 500 MCG: 500 TABLET ORAL at 11:23

## 2023-05-20 RX ADMIN — FAMOTIDINE 20 MG: 20 TABLET, FILM COATED ORAL at 09:58

## 2023-05-20 RX ADMIN — IPRATROPIUM BROMIDE AND ALBUTEROL SULFATE 1 AMPULE: 2.5; .5 SOLUTION RESPIRATORY (INHALATION) at 11:22

## 2023-05-20 RX ADMIN — ARFORMOTEROL TARTRATE 15 MCG: 15 SOLUTION RESPIRATORY (INHALATION) at 19:48

## 2023-05-20 RX ADMIN — IPRATROPIUM BROMIDE AND ALBUTEROL SULFATE 1 AMPULE: 2.5; .5 SOLUTION RESPIRATORY (INHALATION) at 19:48

## 2023-05-20 RX ADMIN — FAMOTIDINE 20 MG: 20 TABLET, FILM COATED ORAL at 21:00

## 2023-05-20 RX ADMIN — APIXABAN 5 MG: 5 TABLET, FILM COATED ORAL at 21:00

## 2023-05-20 RX ADMIN — APIXABAN 5 MG: 5 TABLET, FILM COATED ORAL at 09:58

## 2023-05-20 RX ADMIN — IPRATROPIUM BROMIDE AND ALBUTEROL SULFATE 1 AMPULE: 2.5; .5 SOLUTION RESPIRATORY (INHALATION) at 07:25

## 2023-05-20 RX ADMIN — TICAGRELOR 90 MG: 90 TABLET ORAL at 21:00

## 2023-05-21 VITALS
HEART RATE: 79 BPM | OXYGEN SATURATION: 95 % | WEIGHT: 163.7 LBS | BODY MASS INDEX: 22.2 KG/M2 | TEMPERATURE: 98 F | DIASTOLIC BLOOD PRESSURE: 77 MMHG | RESPIRATION RATE: 18 BRPM | SYSTOLIC BLOOD PRESSURE: 135 MMHG

## 2023-05-21 PROCEDURE — 2700000000 HC OXYGEN THERAPY PER DAY

## 2023-05-21 PROCEDURE — 6370000000 HC RX 637 (ALT 250 FOR IP): Performed by: STUDENT IN AN ORGANIZED HEALTH CARE EDUCATION/TRAINING PROGRAM

## 2023-05-21 PROCEDURE — 6370000000 HC RX 637 (ALT 250 FOR IP): Performed by: INTERNAL MEDICINE

## 2023-05-21 PROCEDURE — 94640 AIRWAY INHALATION TREATMENT: CPT

## 2023-05-21 PROCEDURE — 2580000003 HC RX 258: Performed by: STUDENT IN AN ORGANIZED HEALTH CARE EDUCATION/TRAINING PROGRAM

## 2023-05-21 PROCEDURE — 94761 N-INVAS EAR/PLS OXIMETRY MLT: CPT

## 2023-05-21 PROCEDURE — 6360000002 HC RX W HCPCS: Performed by: INTERNAL MEDICINE

## 2023-05-21 PROCEDURE — 94660 CPAP INITIATION&MGMT: CPT

## 2023-05-21 RX ORDER — PREDNISONE 20 MG/1
40 TABLET ORAL DAILY
Qty: 4 TABLET | Refills: 0 | Status: SHIPPED | OUTPATIENT
Start: 2023-05-22 | End: 2023-05-24

## 2023-05-21 RX ADMIN — IPRATROPIUM BROMIDE AND ALBUTEROL SULFATE 1 AMPULE: 2.5; .5 SOLUTION RESPIRATORY (INHALATION) at 15:30

## 2023-05-21 RX ADMIN — IPRATROPIUM BROMIDE AND ALBUTEROL SULFATE 1 AMPULE: 2.5; .5 SOLUTION RESPIRATORY (INHALATION) at 11:32

## 2023-05-21 RX ADMIN — ATORVASTATIN CALCIUM 80 MG: 80 TABLET, FILM COATED ORAL at 08:26

## 2023-05-21 RX ADMIN — POTASSIUM CHLORIDE 10 MEQ: 750 TABLET, EXTENDED RELEASE ORAL at 08:26

## 2023-05-21 RX ADMIN — ARFORMOTEROL TARTRATE 15 MCG: 15 SOLUTION RESPIRATORY (INHALATION) at 07:31

## 2023-05-21 RX ADMIN — ROFLUMILAST 500 MCG: 500 TABLET ORAL at 09:01

## 2023-05-21 RX ADMIN — DILTIAZEM HYDROCHLORIDE 120 MG: 120 CAPSULE, COATED, EXTENDED RELEASE ORAL at 08:26

## 2023-05-21 RX ADMIN — PRAMIPEXOLE DIHYDROCHLORIDE 0.5 MG: 0.25 TABLET ORAL at 08:26

## 2023-05-21 RX ADMIN — BUDESONIDE 500 MCG: 0.5 SUSPENSION RESPIRATORY (INHALATION) at 07:31

## 2023-05-21 RX ADMIN — PREDNISONE 40 MG: 20 TABLET ORAL at 08:26

## 2023-05-21 RX ADMIN — TORSEMIDE 10 MG: 20 TABLET ORAL at 08:27

## 2023-05-21 RX ADMIN — APIXABAN 5 MG: 5 TABLET, FILM COATED ORAL at 08:26

## 2023-05-21 RX ADMIN — FAMOTIDINE 20 MG: 20 TABLET, FILM COATED ORAL at 08:26

## 2023-05-21 RX ADMIN — TICAGRELOR 90 MG: 90 TABLET ORAL at 08:26

## 2023-05-21 RX ADMIN — Medication 10 ML: at 08:28

## 2023-05-21 RX ADMIN — IPRATROPIUM BROMIDE AND ALBUTEROL SULFATE 1 AMPULE: 2.5; .5 SOLUTION RESPIRATORY (INHALATION) at 07:31

## 2023-05-23 ENCOUNTER — OFFICE VISIT (OUTPATIENT)
Dept: PULMONOLOGY | Age: 73
End: 2023-05-23
Payer: MEDICARE

## 2023-05-23 VITALS
BODY MASS INDEX: 22.08 KG/M2 | OXYGEN SATURATION: 95 % | HEIGHT: 72 IN | DIASTOLIC BLOOD PRESSURE: 78 MMHG | SYSTOLIC BLOOD PRESSURE: 121 MMHG | RESPIRATION RATE: 17 BRPM | WEIGHT: 163 LBS | HEART RATE: 96 BPM

## 2023-05-23 DIAGNOSIS — J96.11 CHRONIC RESPIRATORY FAILURE WITH HYPOXIA AND HYPERCAPNIA (HCC): ICD-10-CM

## 2023-05-23 DIAGNOSIS — J96.12 CHRONIC RESPIRATORY FAILURE WITH HYPOXIA AND HYPERCAPNIA (HCC): ICD-10-CM

## 2023-05-23 DIAGNOSIS — I48.21 PERMANENT ATRIAL FIBRILLATION (HCC): ICD-10-CM

## 2023-05-23 DIAGNOSIS — J40 TRACHEOBRONCHITIS: ICD-10-CM

## 2023-05-23 DIAGNOSIS — J44.9 STAGE 3 SEVERE COPD BY GOLD CLASSIFICATION (HCC): Primary | ICD-10-CM

## 2023-05-23 DIAGNOSIS — I49.5 TACHY-BRADY SYNDROME (HCC): ICD-10-CM

## 2023-05-23 PROCEDURE — G8420 CALC BMI NORM PARAMETERS: HCPCS | Performed by: INTERNAL MEDICINE

## 2023-05-23 PROCEDURE — 3023F SPIROM DOC REV: CPT | Performed by: INTERNAL MEDICINE

## 2023-05-23 PROCEDURE — 99214 OFFICE O/P EST MOD 30 MIN: CPT | Performed by: INTERNAL MEDICINE

## 2023-05-23 PROCEDURE — 1123F ACP DISCUSS/DSCN MKR DOCD: CPT | Performed by: INTERNAL MEDICINE

## 2023-05-23 PROCEDURE — G8427 DOCREV CUR MEDS BY ELIG CLIN: HCPCS | Performed by: INTERNAL MEDICINE

## 2023-05-23 PROCEDURE — 3078F DIAST BP <80 MM HG: CPT | Performed by: INTERNAL MEDICINE

## 2023-05-23 PROCEDURE — 1111F DSCHRG MED/CURRENT MED MERGE: CPT | Performed by: INTERNAL MEDICINE

## 2023-05-23 PROCEDURE — 3017F COLORECTAL CA SCREEN DOC REV: CPT | Performed by: INTERNAL MEDICINE

## 2023-05-23 PROCEDURE — 1036F TOBACCO NON-USER: CPT | Performed by: INTERNAL MEDICINE

## 2023-05-23 PROCEDURE — 3074F SYST BP LT 130 MM HG: CPT | Performed by: INTERNAL MEDICINE

## 2023-05-23 RX ORDER — DOXYCYCLINE HYCLATE 100 MG/1
100 CAPSULE ORAL 2 TIMES DAILY
Qty: 10 CAPSULE | Refills: 0 | Status: SHIPPED | OUTPATIENT
Start: 2023-05-23 | End: 2023-05-28

## 2023-05-23 ASSESSMENT — SLEEP AND FATIGUE QUESTIONNAIRES
HOW LIKELY ARE YOU TO NOD OFF OR FALL ASLEEP WHILE SITTING AND READING: 0
HOW LIKELY ARE YOU TO NOD OFF OR FALL ASLEEP IN A CAR, WHILE STOPPED FOR A FEW MINUTES IN TRAFFIC: 0
NECK CIRCUMFERENCE (INCHES): 17
HOW LIKELY ARE YOU TO NOD OFF OR FALL ASLEEP WHILE SITTING INACTIVE IN A PUBLIC PLACE: 0
HOW LIKELY ARE YOU TO NOD OFF OR FALL ASLEEP WHEN YOU ARE A PASSENGER IN A CAR FOR AN HOUR WITHOUT A BREAK: 0
HOW LIKELY ARE YOU TO NOD OFF OR FALL ASLEEP WHILE SITTING QUIETLY AFTER LUNCH WITHOUT ALCOHOL: 0
HOW LIKELY ARE YOU TO NOD OFF OR FALL ASLEEP WHILE SITTING AND TALKING TO SOMEONE: 0
HOW LIKELY ARE YOU TO NOD OFF OR FALL ASLEEP WHILE WATCHING TV: 3
HOW LIKELY ARE YOU TO NOD OFF OR FALL ASLEEP WHILE LYING DOWN TO REST IN THE AFTERNOON WHEN CIRCUMSTANCES PERMIT: 3
ESS TOTAL SCORE: 6

## 2023-05-23 NOTE — PATIENT INSTRUCTIONS
Offered to [facility] CT lung screen patient refused. States they will call back if they would like to [facility].

## 2023-05-23 NOTE — PROGRESS NOTES
[facility]                                                            Outpatient Follow Up Note    CHIEF COMPLAINT: Follow up COPD        HPI:   Discharged 5/21 after admission for COPD AE  Still with cough and yellow sputum  Feeling congested still  On prednisone taper   Uses his Neb 4 times/day   On 2L - benefiting from it and feels better   No smoking. Trying to use his BiPAP every night. Still feels pressure is high. Past Medical History:   Diagnosis Date    Atrial fibrillation ([address]     Bronchitis chronic     Emphysema of lung ([facility] [address]     HTN (hypertension)     Influenza A 01/10/2018    Lung disease     copd    Pneumonia     12/2009    Stroke 02/25/2023    L MCA watershed       Past Surgical History:        Procedure Laterality Date    FRACTURE SURGERY      johnson in femur/hip on right    HERNIA REPAIR         Allergies:  has No Known Allergies. Social History:    TOBACCO:   reports that he quit smoking about 6 years ago. His smoking use included cigarettes. He has a 30.00 pack-year smoking history. He has never used smokeless tobacco.  ETOH:   reports no history of alcohol use.       Family History:       Problem Relation Age of Onset    Diabetes Sister     Cancer Sister     Cancer Sister     Cancer Brother     Asthma Daughter     Asthma Son     Diabetes Mother     Cancer Father        Current Medications:    Current Outpatient Medications:     predniSONE (DELTASONE) 20 MG tablet, Take 2 tablets by mouth daily for 2 doses Always take with food, Disp: 4 tablet, Rfl: 0    torsemide (DEMADEX) 10 MG tablet, Take 1 tablet by mouth daily, Disp: 30 tablet, Rfl: 2    ticagrelor (BRILINTA) 90 MG TABS tablet, Take 1 tablet by mouth 2 times daily, Disp: 60 tablet, Rfl: 0    arformoterol tartrate (BROVANA) 15 MCG/2ML NEBU, Take 2 mLs by nebulization in the morning and 2 mLs in the evening., Disp: 120 mL, Rfl: 3    budesonide (PULMICORT) 0.5

## 2023-06-05 RX ORDER — APIXABAN 5 MG/1
TABLET, FILM COATED ORAL
Qty: 60 TABLET | Refills: 0 | OUTPATIENT
Start: 2023-06-05

## 2023-06-05 RX ORDER — TICAGRELOR 90 MG/1
TABLET ORAL
Qty: 60 TABLET | Refills: 0 | OUTPATIENT
Start: 2023-06-05

## 2023-06-05 NOTE — PROGRESS NOTES
abdominal aortic aneurysm. Adrenal glands unremarkable. Kidneys are unremarkable. Bones/Soft Tissues: Compression deformities of T12 and T11. loss of approximately 40% vertebral body height centrally. Lexiscan 19  Summary  Normal LV function. There is normal isotope uptake at stress and rest. There is no evidence of  myocardial ischemia or scar. EKG 19  afib controlled ventricular response no ischemia or infarct. EKG 4/3/18  Atrial fibrillation Controlled ventricular rate     EKG 3.11.18   afib RVR     CXR 3/5/18  FINDINGS:   The lungs are hyperinflated. There has been resolution of bibasilar   infiltrates. There is scarring in the right lower lobe. Heart size and   pulmonary vessels are stable. Echo doppler of heart 18   Normal left ventricular systolic function with an estimated ejection   fraction of 55%. Normal left ventricular diastolic filling pressure. The right ventricle is mildly enlarged. The right atrium is mildly dilated. Mild mitral annular calcification. Mild mitral regurgitation. Aortic valve sclerosis without stenosis. Mild tricuspid regurgitation. Systolic pulmonary artery pressure (SPAP) is normal and estimated at 37 mmHg   (RA pressure 8 mmHg). There is a left pleural effusion. EK/10/18  Atrial fibrillation with rapid ventricular response Incomplete right bundle branch block   Abnormal ECG No previous ECGs available      CXR 1/10/18  Increased interstitial opacity. Correlate with any clinical evidence developing interstitial pulmonary edema. No focal infiltrate is identified. Assessment:  1. Chronic combined systolic and diastolic congestive heart failure ([address]    2. Cerebrovascular accident (CVA), unspecified mechanism ([address]    3. HTN (hypertension), benign    4. Permanent atrial fibrillation ([address]    5. Former smoker    6. ALEXANDREA treated with BiPAP    7. Mixed hyperlipidemia    8.  Medication management        Essential

## 2023-06-06 ENCOUNTER — OFFICE VISIT (OUTPATIENT)
Dept: CARDIOLOGY CLINIC | Age: 73
End: 2023-06-06
Payer: MEDICARE

## 2023-06-06 VITALS
HEART RATE: 72 BPM | HEIGHT: 72 IN | OXYGEN SATURATION: 94 % | WEIGHT: 165 LBS | SYSTOLIC BLOOD PRESSURE: 106 MMHG | BODY MASS INDEX: 22.35 KG/M2 | DIASTOLIC BLOOD PRESSURE: 70 MMHG

## 2023-06-06 DIAGNOSIS — I10 HTN (HYPERTENSION), BENIGN: ICD-10-CM

## 2023-06-06 DIAGNOSIS — Z87.891 FORMER SMOKER: Chronic | ICD-10-CM

## 2023-06-06 DIAGNOSIS — I48.21 PERMANENT ATRIAL FIBRILLATION (HCC): ICD-10-CM

## 2023-06-06 DIAGNOSIS — I63.9 CEREBROVASCULAR ACCIDENT (CVA), UNSPECIFIED MECHANISM (HCC): ICD-10-CM

## 2023-06-06 DIAGNOSIS — E78.2 MIXED HYPERLIPIDEMIA: ICD-10-CM

## 2023-06-06 DIAGNOSIS — I50.42 CHRONIC COMBINED SYSTOLIC AND DIASTOLIC CONGESTIVE HEART FAILURE (HCC): Primary | ICD-10-CM

## 2023-06-06 DIAGNOSIS — G47.33 OSA TREATED WITH BIPAP: ICD-10-CM

## 2023-06-06 DIAGNOSIS — Z79.899 MEDICATION MANAGEMENT: ICD-10-CM

## 2023-06-06 PROCEDURE — 3017F COLORECTAL CA SCREEN DOC REV: CPT | Performed by: INTERNAL MEDICINE

## 2023-06-06 PROCEDURE — 1111F DSCHRG MED/CURRENT MED MERGE: CPT | Performed by: INTERNAL MEDICINE

## 2023-06-06 PROCEDURE — 1123F ACP DISCUSS/DSCN MKR DOCD: CPT | Performed by: INTERNAL MEDICINE

## 2023-06-06 PROCEDURE — 1036F TOBACCO NON-USER: CPT | Performed by: INTERNAL MEDICINE

## 2023-06-06 PROCEDURE — 3074F SYST BP LT 130 MM HG: CPT | Performed by: INTERNAL MEDICINE

## 2023-06-06 PROCEDURE — G8427 DOCREV CUR MEDS BY ELIG CLIN: HCPCS | Performed by: INTERNAL MEDICINE

## 2023-06-06 PROCEDURE — 99214 OFFICE O/P EST MOD 30 MIN: CPT | Performed by: INTERNAL MEDICINE

## 2023-06-06 PROCEDURE — 3078F DIAST BP <80 MM HG: CPT | Performed by: INTERNAL MEDICINE

## 2023-06-06 PROCEDURE — G8420 CALC BMI NORM PARAMETERS: HCPCS | Performed by: INTERNAL MEDICINE

## 2023-06-06 NOTE — PATIENT INSTRUCTIONS
Plan:  Current medications reviewed. Refills given as warranted. Continue not taking Brilinta. Continue taking aspirin and eliquis. Continue all current medications  No additional cardiac testing at this time. Repeat blood work before you see me next in October  -CBC, CMP, TSH, Lipids   -you will need to be fasting for blood work  -it is ok to take your medicine with sips of water or black coffee  7. I would like for you to take home your after visit medication list from today's visit and compare it with your home medications. Please call my office if any of your medications are different so I can update your list in the computer.    -call me when you are ready for refills.     Follow up with me in October

## 2023-06-30 RX ORDER — BUDESONIDE AND FORMOTEROL FUMARATE DIHYDRATE 160; 4.5 UG/1; UG/1
AEROSOL RESPIRATORY (INHALATION)
Qty: 10.2 G | Refills: 5 | Status: SHIPPED | OUTPATIENT
Start: 2023-06-30

## 2023-07-06 ENCOUNTER — HOSPITAL ENCOUNTER (EMERGENCY)
Age: 73
Discharge: ANOTHER HOSPITAL | End: 2023-07-07
Attending: EMERGENCY MEDICINE
Payer: MEDICARE

## 2023-07-06 ENCOUNTER — APPOINTMENT (OUTPATIENT)
Dept: CT IMAGING | Age: 73
End: 2023-07-06
Attending: EMERGENCY MEDICINE
Payer: MEDICARE

## 2023-07-06 ENCOUNTER — APPOINTMENT (OUTPATIENT)
Dept: GENERAL RADIOLOGY | Age: 73
End: 2023-07-06
Attending: EMERGENCY MEDICINE
Payer: MEDICARE

## 2023-07-06 DIAGNOSIS — A41.9 SEVERE SEPSIS (HCC): ICD-10-CM

## 2023-07-06 DIAGNOSIS — I48.91 ATRIAL FIBRILLATION, UNSPECIFIED TYPE (HCC): ICD-10-CM

## 2023-07-06 DIAGNOSIS — J96.22 ACUTE ON CHRONIC RESPIRATORY FAILURE WITH HYPOXIA AND HYPERCAPNIA (HCC): Primary | ICD-10-CM

## 2023-07-06 DIAGNOSIS — R65.20 SEVERE SEPSIS (HCC): ICD-10-CM

## 2023-07-06 DIAGNOSIS — J18.9 PNEUMONIA DUE TO INFECTIOUS ORGANISM, UNSPECIFIED LATERALITY, UNSPECIFIED PART OF LUNG: ICD-10-CM

## 2023-07-06 DIAGNOSIS — J96.21 ACUTE ON CHRONIC RESPIRATORY FAILURE WITH HYPOXIA AND HYPERCAPNIA (HCC): Primary | ICD-10-CM

## 2023-07-06 DIAGNOSIS — I21.4 NSTEMI (NON-ST ELEVATED MYOCARDIAL INFARCTION) (HCC): ICD-10-CM

## 2023-07-06 LAB
ALBUMIN SERPL-MCNC: 4.2 G/DL (ref 3.4–5)
ALBUMIN/GLOB SERPL: 1.2 {RATIO} (ref 1.1–2.2)
ALP SERPL-CCNC: 153 U/L (ref 40–129)
ALT SERPL-CCNC: 11 U/L (ref 10–40)
AMORPH SED URNS QL MICRO: ABNORMAL /HPF
ANION GAP SERPL CALCULATED.3IONS-SCNC: 9 MMOL/L (ref 3–16)
APTT BLD: 31.8 SEC (ref 22.7–35.9)
AST SERPL-CCNC: 12 U/L (ref 15–37)
BACTERIA URNS QL MICRO: ABNORMAL /HPF
BASE EXCESS BLDV CALC-SCNC: -1.9 MMOL/L (ref -3–3)
BASE EXCESS BLDV CALC-SCNC: -11 MMOL/L (ref -3–3)
BASE EXCESS BLDV CALC-SCNC: -7.8 MMOL/L (ref -3–3)
BASOPHILS # BLD: 0 K/UL (ref 0–0.2)
BASOPHILS NFR BLD: 0 %
BILIRUB SERPL-MCNC: 0.5 MG/DL (ref 0–1)
BILIRUB UR QL STRIP.AUTO: NEGATIVE
BUN SERPL-MCNC: 8 MG/DL (ref 7–20)
CALCIUM SERPL-MCNC: 9.1 MG/DL (ref 8.3–10.6)
CHLORIDE SERPL-SCNC: 104 MMOL/L (ref 99–110)
CLARITY UR: CLEAR
CO2 BLDV-SCNC: 17 MMOL/L
CO2 BLDV-SCNC: 29 MMOL/L
CO2 BLDV-SCNC: 31 MMOL/L
CO2 SERPL-SCNC: 31 MMOL/L (ref 21–32)
COHGB MFR BLDV: 0.6 % (ref 0–1.5)
COHGB MFR BLDV: 1.3 % (ref 0–1.5)
COHGB MFR BLDV: 1.3 % (ref 0–1.5)
COLOR UR: YELLOW
CREAT SERPL-MCNC: 1 MG/DL (ref 0.8–1.3)
DEPRECATED RDW RBC AUTO: 15.4 % (ref 12.4–15.4)
EOSINOPHIL # BLD: 0.3 K/UL (ref 0–0.6)
EOSINOPHIL NFR BLD: 1 %
EPI CELLS #/AREA URNS HPF: ABNORMAL /HPF (ref 0–5)
GFR SERPLBLD CREATININE-BSD FMLA CKD-EPI: >60 ML/MIN/{1.73_M2}
GLUCOSE SERPL-MCNC: 233 MG/DL (ref 70–99)
GLUCOSE UR STRIP.AUTO-MCNC: NEGATIVE MG/DL
HCO3 BLDV-SCNC: 15.7 MMOL/L (ref 23–29)
HCO3 BLDV-SCNC: 25.9 MMOL/L (ref 23–29)
HCO3 BLDV-SCNC: 28.3 MMOL/L (ref 23–29)
HCT VFR BLD AUTO: 46.3 % (ref 40.5–52.5)
HGB BLD-MCNC: 14.7 G/DL (ref 13.5–17.5)
HGB UR QL STRIP.AUTO: ABNORMAL
INR PPP: 1.17 (ref 0.84–1.16)
KETONES UR STRIP.AUTO-MCNC: NEGATIVE MG/DL
LACTATE BLDV-SCNC: 2 MMOL/L (ref 0.4–2)
LACTATE BLDV-SCNC: 2.4 MMOL/L (ref 0.4–1.9)
LACTATE BLDV-SCNC: 3.1 MMOL/L (ref 0.4–1.9)
LEUKOCYTE ESTERASE UR QL STRIP.AUTO: NEGATIVE
LYMPHOCYTES # BLD: 7.4 K/UL (ref 1–5.1)
LYMPHOCYTES NFR BLD: 29 %
MAGNESIUM SERPL-MCNC: 2.3 MG/DL (ref 1.8–2.4)
MCH RBC QN AUTO: 29.4 PG (ref 26–34)
MCHC RBC AUTO-ENTMCNC: 31.6 G/DL (ref 31–36)
MCV RBC AUTO: 92.9 FL (ref 80–100)
METHGB MFR BLDV: 0 %
METHGB MFR BLDV: 0.3 %
METHGB MFR BLDV: 0.3 %
MONOCYTES # BLD: 2.3 K/UL (ref 0–1.3)
MONOCYTES NFR BLD: 9 %
NEUTROPHILS # BLD: 15.6 K/UL (ref 1.7–7.7)
NEUTROPHILS NFR BLD: 61 %
NITRITE UR QL STRIP.AUTO: NEGATIVE
NT-PROBNP SERPL-MCNC: 1975 PG/ML (ref 0–124)
O2 THERAPY: ABNORMAL
PATH INTERP BLD-IMP: YES
PCO2 BLDV: 100.3 MMHG (ref 40–50)
PCO2 BLDV: 38.2 MMHG (ref 40–50)
PCO2 BLDV: 74.7 MMHG (ref 40–50)
PH BLDV: 7.03 [PH] (ref 7.35–7.45)
PH BLDV: 7.2 [PH] (ref 7.35–7.45)
PH BLDV: 7.23 [PH] (ref 7.35–7.45)
PH UR STRIP.AUTO: 5 [PH] (ref 5–8)
PLATELET # BLD AUTO: 650 K/UL (ref 135–450)
PLATELET BLD QL SMEAR: ADEQUATE
PMV BLD AUTO: 9.1 FL (ref 5–10.5)
PO2 BLDV: 32.1 MMHG (ref 25–40)
PO2 BLDV: 40.3 MMHG (ref 25–40)
PO2 BLDV: 41.2 MMHG (ref 25–40)
POTASSIUM SERPL-SCNC: 4.9 MMOL/L (ref 3.5–5.1)
PROT SERPL-MCNC: 7.6 G/DL (ref 6.4–8.2)
PROT UR STRIP.AUTO-MCNC: ABNORMAL MG/DL
PROTHROMBIN TIME: 14.9 SEC (ref 11.5–14.8)
RBC # BLD AUTO: 4.99 M/UL (ref 4.2–5.9)
RBC #/AREA URNS HPF: ABNORMAL /HPF (ref 0–4)
SAO2 % BLDV: 37 %
SAO2 % BLDV: 64 %
SAO2 % BLDV: 66 %
SLIDE REVIEW: ABNORMAL
SODIUM SERPL-SCNC: 144 MMOL/L (ref 136–145)
SP GR UR STRIP.AUTO: 1.01 (ref 1–1.03)
TROPONIN, HIGH SENSITIVITY: 29 NG/L (ref 0–22)
TROPONIN, HIGH SENSITIVITY: 50 NG/L (ref 0–22)
TROPONIN, HIGH SENSITIVITY: 77 NG/L (ref 0–22)
UA COMPLETE W REFLEX CULTURE PNL UR: ABNORMAL
UA DIPSTICK W REFLEX MICRO PNL UR: YES
URN SPEC COLLECT METH UR: ABNORMAL
UROBILINOGEN UR STRIP-ACNC: 0.2 E.U./DL
WBC # BLD AUTO: 25.6 K/UL (ref 4–11)
WBC #/AREA URNS HPF: ABNORMAL /HPF (ref 0–5)

## 2023-07-06 PROCEDURE — 96376 TX/PRO/DX INJ SAME DRUG ADON: CPT

## 2023-07-06 PROCEDURE — 96365 THER/PROPH/DIAG IV INF INIT: CPT

## 2023-07-06 PROCEDURE — 96368 THER/DIAG CONCURRENT INF: CPT

## 2023-07-06 PROCEDURE — 6360000002 HC RX W HCPCS

## 2023-07-06 PROCEDURE — 83605 ASSAY OF LACTIC ACID: CPT

## 2023-07-06 PROCEDURE — 71260 CT THORAX DX C+: CPT

## 2023-07-06 PROCEDURE — 82803 BLOOD GASES ANY COMBINATION: CPT

## 2023-07-06 PROCEDURE — 83880 ASSAY OF NATRIURETIC PEPTIDE: CPT

## 2023-07-06 PROCEDURE — 84145 PROCALCITONIN (PCT): CPT

## 2023-07-06 PROCEDURE — 6370000000 HC RX 637 (ALT 250 FOR IP): Performed by: EMERGENCY MEDICINE

## 2023-07-06 PROCEDURE — 36556 INSERT NON-TUNNEL CV CATH: CPT

## 2023-07-06 PROCEDURE — 99285 EMERGENCY DEPT VISIT HI MDM: CPT

## 2023-07-06 PROCEDURE — 6360000004 HC RX CONTRAST MEDICATION: Performed by: EMERGENCY MEDICINE

## 2023-07-06 PROCEDURE — 81001 URINALYSIS AUTO W/SCOPE: CPT

## 2023-07-06 PROCEDURE — 96375 TX/PRO/DX INJ NEW DRUG ADDON: CPT

## 2023-07-06 PROCEDURE — 85730 THROMBOPLASTIN TIME PARTIAL: CPT

## 2023-07-06 PROCEDURE — 96366 THER/PROPH/DIAG IV INF ADDON: CPT

## 2023-07-06 PROCEDURE — 83735 ASSAY OF MAGNESIUM: CPT

## 2023-07-06 PROCEDURE — 36415 COLL VENOUS BLD VENIPUNCTURE: CPT

## 2023-07-06 PROCEDURE — 93005 ELECTROCARDIOGRAM TRACING: CPT | Performed by: EMERGENCY MEDICINE

## 2023-07-06 PROCEDURE — 6360000002 HC RX W HCPCS: Performed by: EMERGENCY MEDICINE

## 2023-07-06 PROCEDURE — 2580000003 HC RX 258: Performed by: EMERGENCY MEDICINE

## 2023-07-06 PROCEDURE — 74177 CT ABD & PELVIS W/CONTRAST: CPT

## 2023-07-06 PROCEDURE — 84484 ASSAY OF TROPONIN QUANT: CPT

## 2023-07-06 PROCEDURE — 85025 COMPLETE CBC W/AUTO DIFF WBC: CPT

## 2023-07-06 PROCEDURE — 87040 BLOOD CULTURE FOR BACTERIA: CPT

## 2023-07-06 PROCEDURE — 80053 COMPREHEN METABOLIC PANEL: CPT

## 2023-07-06 PROCEDURE — 85610 PROTHROMBIN TIME: CPT

## 2023-07-06 PROCEDURE — 71045 X-RAY EXAM CHEST 1 VIEW: CPT

## 2023-07-06 PROCEDURE — 96367 TX/PROPH/DG ADDL SEQ IV INF: CPT

## 2023-07-06 PROCEDURE — 2500000003 HC RX 250 WO HCPCS: Performed by: EMERGENCY MEDICINE

## 2023-07-06 RX ORDER — HEPARIN SODIUM 10000 [USP'U]/100ML
12 INJECTION, SOLUTION INTRAVENOUS CONTINUOUS
Status: DISCONTINUED | OUTPATIENT
Start: 2023-07-07 | End: 2023-07-07 | Stop reason: HOSPADM

## 2023-07-06 RX ORDER — DILTIAZEM HYDROCHLORIDE 5 MG/ML
5 INJECTION INTRAVENOUS ONCE
Status: COMPLETED | OUTPATIENT
Start: 2023-07-06 | End: 2023-07-06

## 2023-07-06 RX ORDER — LEVALBUTEROL 1.25 MG/.5ML
0.63 SOLUTION, CONCENTRATE RESPIRATORY (INHALATION) ONCE
Status: COMPLETED | OUTPATIENT
Start: 2023-07-06 | End: 2023-07-06

## 2023-07-06 RX ORDER — NITROGLYCERIN 0.4 MG/1
0.4 TABLET SUBLINGUAL ONCE
Status: DISCONTINUED | OUTPATIENT
Start: 2023-07-06 | End: 2023-07-06

## 2023-07-06 RX ORDER — HEPARIN SODIUM 1000 [USP'U]/ML
4000 INJECTION, SOLUTION INTRAVENOUS; SUBCUTANEOUS PRN
Status: DISCONTINUED | OUTPATIENT
Start: 2023-07-06 | End: 2023-07-07 | Stop reason: HOSPADM

## 2023-07-06 RX ORDER — TICAGRELOR 90 MG/1
TABLET ORAL
COMMUNITY
Start: 2023-06-08

## 2023-07-06 RX ORDER — DEXAMETHASONE SODIUM PHOSPHATE 10 MG/ML
10 INJECTION, SOLUTION INTRAMUSCULAR; INTRAVENOUS ONCE
Status: COMPLETED | OUTPATIENT
Start: 2023-07-06 | End: 2023-07-06

## 2023-07-06 RX ORDER — ONDANSETRON 2 MG/ML
4 INJECTION INTRAMUSCULAR; INTRAVENOUS ONCE
Status: COMPLETED | OUTPATIENT
Start: 2023-07-06 | End: 2023-07-06

## 2023-07-06 RX ORDER — NALOXONE HYDROCHLORIDE 1 MG/ML
0.4 INJECTION INTRAMUSCULAR; INTRAVENOUS; SUBCUTANEOUS ONCE
Status: COMPLETED | OUTPATIENT
Start: 2023-07-06 | End: 2023-07-06

## 2023-07-06 RX ORDER — NITROGLYCERIN 20 MG/100ML
5-200 INJECTION INTRAVENOUS CONTINUOUS
Status: DISCONTINUED | OUTPATIENT
Start: 2023-07-06 | End: 2023-07-07 | Stop reason: HOSPADM

## 2023-07-06 RX ORDER — POTASSIUM CHLORIDE 20 MEQ/1
10 TABLET, EXTENDED RELEASE ORAL DAILY
COMMUNITY
Start: 2023-05-10

## 2023-07-06 RX ORDER — IPRATROPIUM BROMIDE AND ALBUTEROL SULFATE 2.5; .5 MG/3ML; MG/3ML
3 SOLUTION RESPIRATORY (INHALATION) ONCE
Status: COMPLETED | OUTPATIENT
Start: 2023-07-06 | End: 2023-07-06

## 2023-07-06 RX ORDER — HYDROXYZINE HYDROCHLORIDE 25 MG/1
TABLET, FILM COATED ORAL
COMMUNITY
Start: 2023-05-22 | End: 2023-07-06

## 2023-07-06 RX ORDER — LEVALBUTEROL 1.25 MG/.5ML
SOLUTION, CONCENTRATE RESPIRATORY (INHALATION)
Status: COMPLETED
Start: 2023-07-06 | End: 2023-07-06

## 2023-07-06 RX ORDER — LANOLIN ALCOHOL/MO/W.PET/CERES
CREAM (GRAM) TOPICAL
COMMUNITY
Start: 2023-03-30

## 2023-07-06 RX ORDER — FUROSEMIDE 10 MG/ML
40 INJECTION INTRAMUSCULAR; INTRAVENOUS ONCE
Status: COMPLETED | OUTPATIENT
Start: 2023-07-06 | End: 2023-07-06

## 2023-07-06 RX ORDER — HEPARIN SODIUM 1000 [USP'U]/ML
2000 INJECTION, SOLUTION INTRAVENOUS; SUBCUTANEOUS PRN
Status: DISCONTINUED | OUTPATIENT
Start: 2023-07-06 | End: 2023-07-07 | Stop reason: HOSPADM

## 2023-07-06 RX ORDER — ASPIRIN 300 MG/1
300 SUPPOSITORY RECTAL ONCE
Status: COMPLETED | OUTPATIENT
Start: 2023-07-06 | End: 2023-07-06

## 2023-07-06 RX ADMIN — IPRATROPIUM BROMIDE AND ALBUTEROL SULFATE 3 DOSE: .5; 2.5 SOLUTION RESPIRATORY (INHALATION) at 21:11

## 2023-07-06 RX ADMIN — NITROGLYCERIN 5 MCG/MIN: 20 INJECTION INTRAVENOUS at 22:59

## 2023-07-06 RX ADMIN — NALOXONE HYDROCHLORIDE 0.4 MG: 1 INJECTION PARENTERAL at 21:12

## 2023-07-06 RX ADMIN — CEFEPIME 2000 MG: 2 INJECTION, POWDER, FOR SOLUTION INTRAVENOUS at 21:21

## 2023-07-06 RX ADMIN — DEXAMETHASONE SODIUM PHOSPHATE 10 MG: 10 INJECTION, SOLUTION INTRAMUSCULAR; INTRAVENOUS at 21:10

## 2023-07-06 RX ADMIN — VANCOMYCIN HYDROCHLORIDE 2000 MG: 1 INJECTION, POWDER, LYOPHILIZED, FOR SOLUTION INTRAVENOUS at 23:03

## 2023-07-06 RX ADMIN — DILTIAZEM HYDROCHLORIDE 5 MG: 5 INJECTION INTRAVENOUS at 21:57

## 2023-07-06 RX ADMIN — DILTIAZEM HYDROCHLORIDE 2.5 MG/HR: 5 INJECTION INTRAVENOUS at 21:52

## 2023-07-06 RX ADMIN — IOMEPROL INJECTION 75 ML: 714 INJECTION, SOLUTION INTRAVASCULAR at 23:04

## 2023-07-06 RX ADMIN — NITROGLYCERIN 0.5 INCH: 20 OINTMENT TOPICAL at 21:10

## 2023-07-06 RX ADMIN — FUROSEMIDE 40 MG: 10 INJECTION, SOLUTION INTRAMUSCULAR; INTRAVENOUS at 22:20

## 2023-07-06 RX ADMIN — LEVALBUTEROL HYDROCHLORIDE 0.63 MG: 1.25 SOLUTION, CONCENTRATE RESPIRATORY (INHALATION) at 23:06

## 2023-07-06 RX ADMIN — LEVALBUTEROL 0.63 MG: 1.25 SOLUTION, CONCENTRATE RESPIRATORY (INHALATION) at 23:06

## 2023-07-06 RX ADMIN — ASPIRIN 300 MG: 300 SUPPOSITORY RECTAL at 23:02

## 2023-07-06 RX ADMIN — ONDANSETRON 4 MG: 2 INJECTION INTRAMUSCULAR; INTRAVENOUS at 22:21

## 2023-07-06 ASSESSMENT — PAIN - FUNCTIONAL ASSESSMENT: PAIN_FUNCTIONAL_ASSESSMENT: ADULT NONVERBAL PAIN SCALE (NPVS)

## 2023-07-07 ENCOUNTER — ANCILLARY PROCEDURE (OUTPATIENT)
Dept: EMERGENCY DEPT | Age: 73
End: 2023-07-07
Attending: EMERGENCY MEDICINE
Payer: MEDICARE

## 2023-07-07 ENCOUNTER — HOSPITAL ENCOUNTER (INPATIENT)
Age: 73
LOS: 5 days | Discharge: NURSING, REHAB OR LONG-TERM CARE | DRG: 871 | End: 2023-07-12
Attending: INTERNAL MEDICINE | Admitting: INTERNAL MEDICINE
Payer: MEDICARE

## 2023-07-07 VITALS
DIASTOLIC BLOOD PRESSURE: 74 MMHG | SYSTOLIC BLOOD PRESSURE: 103 MMHG | RESPIRATION RATE: 22 BRPM | HEART RATE: 102 BPM | HEIGHT: 72 IN | TEMPERATURE: 98.1 F | WEIGHT: 173.9 LBS | BODY MASS INDEX: 23.55 KG/M2 | OXYGEN SATURATION: 100 %

## 2023-07-07 PROBLEM — I21.4 NSTEMI (NON-ST ELEVATED MYOCARDIAL INFARCTION) (HCC): Status: ACTIVE | Noted: 2023-07-07

## 2023-07-07 PROBLEM — R79.89 TROPONIN LEVEL ELEVATED: Status: ACTIVE | Noted: 2023-07-07

## 2023-07-07 PROBLEM — R65.21 SEPTIC SHOCK (HCC): Status: ACTIVE | Noted: 2018-01-11

## 2023-07-07 PROBLEM — Z79.899 LONG TERM CURRENT USE OF DIURETIC: Chronic | Status: ACTIVE | Noted: 2022-03-29

## 2023-07-07 PROBLEM — R77.8 TROPONIN LEVEL ELEVATED: Status: ACTIVE | Noted: 2023-07-07

## 2023-07-07 PROBLEM — E44.0 MODERATE MALNUTRITION (HCC): Chronic | Status: ACTIVE | Noted: 2023-07-07

## 2023-07-07 PROBLEM — J18.9 PNEUMONIA OF RIGHT LOWER LOBE DUE TO INFECTIOUS ORGANISM: Status: ACTIVE | Noted: 2023-07-07

## 2023-07-07 PROBLEM — R06.02 SOB (SHORTNESS OF BREATH): Status: ACTIVE | Noted: 2023-07-07

## 2023-07-07 LAB
ANION GAP SERPL CALCULATED.3IONS-SCNC: 15 MMOL/L (ref 3–16)
APTT BLD: 34.7 SEC (ref 22.7–35.9)
BACTERIA BLD CULT ORG #2: NORMAL
BACTERIA BLD CULT: NORMAL
BASE EXCESS BLDA CALC-SCNC: 0.3 MMOL/L (ref -3–3)
BASE EXCESS BLDA CALC-SCNC: 1 MMOL/L (ref -3–3)
BASOPHILS # BLD: 0 K/UL (ref 0–0.2)
BASOPHILS NFR BLD: 0.2 %
BUN SERPL-MCNC: 13 MG/DL (ref 7–20)
CALCIUM SERPL-MCNC: 8.3 MG/DL (ref 8.3–10.6)
CHLORIDE SERPL-SCNC: 104 MMOL/L (ref 99–110)
CO2 BLDA-SCNC: 28 MMOL/L
CO2 BLDA-SCNC: 61.4 MMOL/L
CO2 SERPL-SCNC: 24 MMOL/L (ref 21–32)
COHGB MFR BLDA: 1.2 % (ref 0–1.5)
CREAT SERPL-MCNC: 1.2 MG/DL (ref 0.8–1.3)
DEPRECATED RDW RBC AUTO: 15.6 % (ref 12.4–15.4)
EKG ATRIAL RATE: 163 BPM
EKG DIAGNOSIS: NORMAL
EKG Q-T INTERVAL: 278 MS
EKG QRS DURATION: 82 MS
EKG QTC CALCULATION (BAZETT): 440 MS
EKG R AXIS: 54 DEGREES
EKG T AXIS: 85 DEGREES
EKG VENTRICULAR RATE: 151 BPM
EOSINOPHIL # BLD: 0 K/UL (ref 0–0.6)
EOSINOPHIL NFR BLD: 0 %
EST. AVERAGE GLUCOSE BLD GHB EST-MCNC: 128.4 MG/DL
GFR SERPLBLD CREATININE-BSD FMLA CKD-EPI: >60 ML/MIN/{1.73_M2}
GLUCOSE BLD-MCNC: 264 MG/DL (ref 70–99)
GLUCOSE SERPL-MCNC: 302 MG/DL (ref 70–99)
HBA1C MFR BLD: 6.1 %
HCO3 BLDA-SCNC: 26 MMOL/L (ref 21–29)
HCO3 BLDA-SCNC: 26.5 MMOL/L (ref 21–29)
HCT VFR BLD AUTO: 39.5 % (ref 40.5–52.5)
HGB BLD-MCNC: 12.8 G/DL (ref 13.5–17.5)
HGB BLDA-MCNC: 12.9 G/DL (ref 13.5–17.5)
LYMPHOCYTES # BLD: 0.6 K/UL (ref 1–5.1)
LYMPHOCYTES NFR BLD: 3.2 %
MAGNESIUM SERPL-MCNC: 1.9 MG/DL (ref 1.8–2.4)
MCH RBC QN AUTO: 30 PG (ref 26–34)
MCHC RBC AUTO-ENTMCNC: 32.4 G/DL (ref 31–36)
MCV RBC AUTO: 92.7 FL (ref 80–100)
METHGB MFR BLDA: 0.2 %
MONOCYTES # BLD: 0.2 K/UL (ref 0–1.3)
MONOCYTES NFR BLD: 1.1 %
NEUTROPHILS # BLD: 19.1 K/UL (ref 1.7–7.7)
NEUTROPHILS NFR BLD: 95.5 %
O2 THERAPY: ABNORMAL
PATH INTERP BLD-IMP: NORMAL
PCO2 BLDA: 44.6 MM HG (ref 35–45)
PCO2 BLDA: 45.1 MMHG (ref 35–45)
PERFORMED ON: ABNORMAL
PERFORMED ON: NORMAL
PH BLDA: 7.37 [PH] (ref 7.35–7.45)
PH BLDA: 7.38 [PH] (ref 7.35–7.45)
PHOSPHATE SERPL-MCNC: 3.1 MG/DL (ref 2.5–4.9)
PLATELET # BLD AUTO: 603 K/UL (ref 135–450)
PMV BLD AUTO: 9.5 FL (ref 5–10.5)
PO2 BLDA: 85.6 MM HG (ref 75–108)
PO2 BLDA: 88.5 MMHG (ref 75–108)
POC SAMPLE TYPE: NORMAL
POTASSIUM SERPL-SCNC: 4.4 MMOL/L (ref 3.5–5.1)
PROCALCITONIN SERPL IA-MCNC: 0.08 NG/ML (ref 0–0.15)
PROCALCITONIN SERPL IA-MCNC: 1.3 NG/ML (ref 0–0.15)
RBC # BLD AUTO: 4.26 M/UL (ref 4.2–5.9)
SAO2 % BLDA: 96 % (ref 93–100)
SAO2 % BLDA: 97.6 %
SODIUM SERPL-SCNC: 143 MMOL/L (ref 136–145)
TROPONIN, HIGH SENSITIVITY: 187 NG/L (ref 0–22)
VANCOMYCIN SERPL-MCNC: 20.5 UG/ML
WBC # BLD AUTO: 20 K/UL (ref 4–11)

## 2023-07-07 PROCEDURE — 82803 BLOOD GASES ANY COMBINATION: CPT

## 2023-07-07 PROCEDURE — 94761 N-INVAS EAR/PLS OXIMETRY MLT: CPT

## 2023-07-07 PROCEDURE — 6360000002 HC RX W HCPCS: Performed by: INTERNAL MEDICINE

## 2023-07-07 PROCEDURE — 99285 EMERGENCY DEPT VISIT HI MDM: CPT

## 2023-07-07 PROCEDURE — 99291 CRITICAL CARE FIRST HOUR: CPT | Performed by: INTERNAL MEDICINE

## 2023-07-07 PROCEDURE — 84145 PROCALCITONIN (PCT): CPT

## 2023-07-07 PROCEDURE — 2580000003 HC RX 258: Performed by: EMERGENCY MEDICINE

## 2023-07-07 PROCEDURE — 94640 AIRWAY INHALATION TREATMENT: CPT

## 2023-07-07 PROCEDURE — 84100 ASSAY OF PHOSPHORUS: CPT

## 2023-07-07 PROCEDURE — 96368 THER/DIAG CONCURRENT INF: CPT

## 2023-07-07 PROCEDURE — 93308 TTE F-UP OR LMTD: CPT

## 2023-07-07 PROCEDURE — 84484 ASSAY OF TROPONIN QUANT: CPT

## 2023-07-07 PROCEDURE — 83735 ASSAY OF MAGNESIUM: CPT

## 2023-07-07 PROCEDURE — 2500000003 HC RX 250 WO HCPCS: Performed by: INTERNAL MEDICINE

## 2023-07-07 PROCEDURE — 80202 ASSAY OF VANCOMYCIN: CPT

## 2023-07-07 PROCEDURE — 6370000000 HC RX 637 (ALT 250 FOR IP): Performed by: INTERNAL MEDICINE

## 2023-07-07 PROCEDURE — 2000000000 HC ICU R&B

## 2023-07-07 PROCEDURE — 2500000003 HC RX 250 WO HCPCS: Performed by: EMERGENCY MEDICINE

## 2023-07-07 PROCEDURE — 93010 ELECTROCARDIOGRAM REPORT: CPT | Performed by: INTERNAL MEDICINE

## 2023-07-07 PROCEDURE — 2580000003 HC RX 258: Performed by: INTERNAL MEDICINE

## 2023-07-07 PROCEDURE — 02HV33Z INSERTION OF INFUSION DEVICE INTO SUPERIOR VENA CAVA, PERCUTANEOUS APPROACH: ICD-10-PCS | Performed by: INTERNAL MEDICINE

## 2023-07-07 PROCEDURE — 96366 THER/PROPH/DIAG IV INF ADDON: CPT

## 2023-07-07 PROCEDURE — 6360000002 HC RX W HCPCS: Performed by: EMERGENCY MEDICINE

## 2023-07-07 PROCEDURE — 83036 HEMOGLOBIN GLYCOSYLATED A1C: CPT

## 2023-07-07 PROCEDURE — 36600 WITHDRAWAL OF ARTERIAL BLOOD: CPT

## 2023-07-07 PROCEDURE — 80048 BASIC METABOLIC PNL TOTAL CA: CPT

## 2023-07-07 PROCEDURE — 76937 US GUIDE VASCULAR ACCESS: CPT

## 2023-07-07 PROCEDURE — 6360000002 HC RX W HCPCS: Performed by: NURSE PRACTITIONER

## 2023-07-07 PROCEDURE — 96367 TX/PROPH/DG ADDL SEQ IV INF: CPT

## 2023-07-07 PROCEDURE — 2700000000 HC OXYGEN THERAPY PER DAY

## 2023-07-07 PROCEDURE — 5A09457 ASSISTANCE WITH RESPIRATORY VENTILATION, 24-96 CONSECUTIVE HOURS, CONTINUOUS POSITIVE AIRWAY PRESSURE: ICD-10-PCS | Performed by: INTERNAL MEDICINE

## 2023-07-07 PROCEDURE — 87449 NOS EACH ORGANISM AG IA: CPT

## 2023-07-07 PROCEDURE — 99222 1ST HOSP IP/OBS MODERATE 55: CPT | Performed by: INTERNAL MEDICINE

## 2023-07-07 PROCEDURE — 85730 THROMBOPLASTIN TIME PARTIAL: CPT

## 2023-07-07 PROCEDURE — 85025 COMPLETE CBC W/AUTO DIFF WBC: CPT

## 2023-07-07 PROCEDURE — 94660 CPAP INITIATION&MGMT: CPT

## 2023-07-07 RX ORDER — FAMOTIDINE 20 MG/1
20 TABLET, FILM COATED ORAL 2 TIMES DAILY
Status: DISCONTINUED | OUTPATIENT
Start: 2023-07-07 | End: 2023-07-11

## 2023-07-07 RX ORDER — HEPARIN SODIUM 1000 [USP'U]/ML
2000 INJECTION, SOLUTION INTRAVENOUS; SUBCUTANEOUS PRN
Status: DISCONTINUED | OUTPATIENT
Start: 2023-07-07 | End: 2023-07-07

## 2023-07-07 RX ORDER — NOREPINEPHRINE BITARTRATE 0.06 MG/ML
1-100 INJECTION, SOLUTION INTRAVENOUS CONTINUOUS
Status: DISCONTINUED | OUTPATIENT
Start: 2023-07-07 | End: 2023-07-09

## 2023-07-07 RX ORDER — PRAMIPEXOLE DIHYDROCHLORIDE 0.25 MG/1
0.5 TABLET ORAL DAILY
Status: DISCONTINUED | OUTPATIENT
Start: 2023-07-07 | End: 2023-07-12 | Stop reason: HOSPADM

## 2023-07-07 RX ORDER — ONDANSETRON 2 MG/ML
4 INJECTION INTRAMUSCULAR; INTRAVENOUS EVERY 6 HOURS PRN
Status: DISCONTINUED | OUTPATIENT
Start: 2023-07-07 | End: 2023-07-12 | Stop reason: HOSPADM

## 2023-07-07 RX ORDER — PROCHLORPERAZINE EDISYLATE 5 MG/ML
10 INJECTION INTRAMUSCULAR; INTRAVENOUS EVERY 6 HOURS PRN
Status: DISCONTINUED | OUTPATIENT
Start: 2023-07-07 | End: 2023-07-12 | Stop reason: HOSPADM

## 2023-07-07 RX ORDER — ONDANSETRON 4 MG/1
4 TABLET, ORALLY DISINTEGRATING ORAL EVERY 6 HOURS PRN
Status: DISCONTINUED | OUTPATIENT
Start: 2023-07-07 | End: 2023-07-12 | Stop reason: HOSPADM

## 2023-07-07 RX ORDER — HEPARIN SODIUM 10000 [USP'U]/100ML
12 INJECTION, SOLUTION INTRAVENOUS CONTINUOUS
Status: DISCONTINUED | OUTPATIENT
Start: 2023-07-07 | End: 2023-07-07

## 2023-07-07 RX ORDER — ARFORMOTEROL TARTRATE 15 UG/2ML
15 SOLUTION RESPIRATORY (INHALATION) 2 TIMES DAILY
Status: DISCONTINUED | OUTPATIENT
Start: 2023-07-07 | End: 2023-07-12 | Stop reason: HOSPADM

## 2023-07-07 RX ORDER — DILTIAZEM HCL IN NACL,ISO-OSM 125 MG/125
2.5-15 PLASTIC BAG, INJECTION (ML) INTRAVENOUS CONTINUOUS
Status: DISCONTINUED | OUTPATIENT
Start: 2023-07-07 | End: 2023-07-07

## 2023-07-07 RX ORDER — ONDANSETRON 2 MG/ML
4 INJECTION INTRAMUSCULAR; INTRAVENOUS EVERY 6 HOURS PRN
Status: DISCONTINUED | OUTPATIENT
Start: 2023-07-07 | End: 2023-07-07

## 2023-07-07 RX ORDER — SODIUM CHLORIDE 0.9 % (FLUSH) 0.9 %
5-40 SYRINGE (ML) INJECTION PRN
Status: DISCONTINUED | OUTPATIENT
Start: 2023-07-07 | End: 2023-07-12 | Stop reason: HOSPADM

## 2023-07-07 RX ORDER — ALBUTEROL SULFATE 90 UG/1
2 AEROSOL, METERED RESPIRATORY (INHALATION) EVERY 6 HOURS PRN
Status: DISCONTINUED | OUTPATIENT
Start: 2023-07-07 | End: 2023-07-12 | Stop reason: HOSPADM

## 2023-07-07 RX ORDER — INSULIN LISPRO 100 [IU]/ML
0-4 INJECTION, SOLUTION INTRAVENOUS; SUBCUTANEOUS NIGHTLY
Status: DISCONTINUED | OUTPATIENT
Start: 2023-07-07 | End: 2023-07-07

## 2023-07-07 RX ORDER — TORSEMIDE 20 MG/1
10 TABLET ORAL DAILY
Status: DISCONTINUED | OUTPATIENT
Start: 2023-07-07 | End: 2023-07-11

## 2023-07-07 RX ORDER — SODIUM CHLORIDE 9 MG/ML
INJECTION, SOLUTION INTRAVENOUS PRN
Status: DISCONTINUED | OUTPATIENT
Start: 2023-07-07 | End: 2023-07-12 | Stop reason: HOSPADM

## 2023-07-07 RX ORDER — DEXTROSE MONOHYDRATE 100 MG/ML
INJECTION, SOLUTION INTRAVENOUS CONTINUOUS PRN
Status: DISCONTINUED | OUTPATIENT
Start: 2023-07-07 | End: 2023-07-12 | Stop reason: HOSPADM

## 2023-07-07 RX ORDER — GUAIFENESIN 600 MG/1
600 TABLET, EXTENDED RELEASE ORAL 2 TIMES DAILY PRN
Status: DISCONTINUED | OUTPATIENT
Start: 2023-07-07 | End: 2023-07-12 | Stop reason: HOSPADM

## 2023-07-07 RX ORDER — ATORVASTATIN CALCIUM 80 MG/1
80 TABLET, FILM COATED ORAL DAILY
Status: DISCONTINUED | OUTPATIENT
Start: 2023-07-07 | End: 2023-07-12 | Stop reason: HOSPADM

## 2023-07-07 RX ORDER — BUDESONIDE 0.5 MG/2ML
0.5 INHALANT ORAL 2 TIMES DAILY
Status: DISCONTINUED | OUTPATIENT
Start: 2023-07-07 | End: 2023-07-07 | Stop reason: SDUPTHER

## 2023-07-07 RX ORDER — SODIUM CHLORIDE 0.9 % (FLUSH) 0.9 %
5-40 SYRINGE (ML) INJECTION EVERY 12 HOURS SCHEDULED
Status: DISCONTINUED | OUTPATIENT
Start: 2023-07-07 | End: 2023-07-12 | Stop reason: HOSPADM

## 2023-07-07 RX ORDER — POLYETHYLENE GLYCOL 3350 17 G/17G
17 POWDER, FOR SOLUTION ORAL DAILY PRN
Status: DISCONTINUED | OUTPATIENT
Start: 2023-07-07 | End: 2023-07-12 | Stop reason: HOSPADM

## 2023-07-07 RX ORDER — LORAZEPAM 2 MG/ML
0.5 INJECTION INTRAMUSCULAR ONCE
Status: COMPLETED | OUTPATIENT
Start: 2023-07-07 | End: 2023-07-07

## 2023-07-07 RX ORDER — METHYLPREDNISOLONE SODIUM SUCCINATE 40 MG/ML
40 INJECTION, POWDER, LYOPHILIZED, FOR SOLUTION INTRAMUSCULAR; INTRAVENOUS ONCE
Status: COMPLETED | OUTPATIENT
Start: 2023-07-07 | End: 2023-07-07

## 2023-07-07 RX ORDER — ARFORMOTEROL TARTRATE 15 UG/2ML
15 SOLUTION RESPIRATORY (INHALATION) 2 TIMES DAILY
Status: DISCONTINUED | OUTPATIENT
Start: 2023-07-07 | End: 2023-07-07 | Stop reason: SDUPTHER

## 2023-07-07 RX ORDER — ONDANSETRON 4 MG/1
4 TABLET, ORALLY DISINTEGRATING ORAL EVERY 8 HOURS PRN
Status: DISCONTINUED | OUTPATIENT
Start: 2023-07-07 | End: 2023-07-07

## 2023-07-07 RX ORDER — ONDANSETRON 2 MG/ML
4 INJECTION INTRAMUSCULAR; INTRAVENOUS ONCE
Status: COMPLETED | OUTPATIENT
Start: 2023-07-07 | End: 2023-07-07

## 2023-07-07 RX ORDER — ACETAMINOPHEN 325 MG/1
650 TABLET ORAL EVERY 6 HOURS PRN
Status: DISCONTINUED | OUTPATIENT
Start: 2023-07-07 | End: 2023-07-12 | Stop reason: HOSPADM

## 2023-07-07 RX ORDER — INSULIN LISPRO 100 [IU]/ML
0-8 INJECTION, SOLUTION INTRAVENOUS; SUBCUTANEOUS
Status: DISCONTINUED | OUTPATIENT
Start: 2023-07-07 | End: 2023-07-07

## 2023-07-07 RX ORDER — BUDESONIDE 0.5 MG/2ML
0.5 INHALANT ORAL 2 TIMES DAILY
Status: DISCONTINUED | OUTPATIENT
Start: 2023-07-07 | End: 2023-07-12 | Stop reason: HOSPADM

## 2023-07-07 RX ORDER — NITROGLYCERIN 20 MG/100ML
5-200 INJECTION INTRAVENOUS CONTINUOUS
Status: DISCONTINUED | OUTPATIENT
Start: 2023-07-07 | End: 2023-07-07

## 2023-07-07 RX ORDER — ROFLUMILAST 500 UG/1
500 TABLET ORAL DAILY
Status: DISCONTINUED | OUTPATIENT
Start: 2023-07-07 | End: 2023-07-12 | Stop reason: HOSPADM

## 2023-07-07 RX ORDER — ACETAMINOPHEN 650 MG/1
650 SUPPOSITORY RECTAL EVERY 6 HOURS PRN
Status: DISCONTINUED | OUTPATIENT
Start: 2023-07-07 | End: 2023-07-12 | Stop reason: HOSPADM

## 2023-07-07 RX ORDER — HEPARIN SODIUM 1000 [USP'U]/ML
4000 INJECTION, SOLUTION INTRAVENOUS; SUBCUTANEOUS PRN
Status: DISCONTINUED | OUTPATIENT
Start: 2023-07-07 | End: 2023-07-07

## 2023-07-07 RX ORDER — IPRATROPIUM BROMIDE AND ALBUTEROL SULFATE 2.5; .5 MG/3ML; MG/3ML
1 SOLUTION RESPIRATORY (INHALATION)
Status: DISCONTINUED | OUTPATIENT
Start: 2023-07-07 | End: 2023-07-12 | Stop reason: HOSPADM

## 2023-07-07 RX ADMIN — METHYLPREDNISOLONE SODIUM SUCCINATE 40 MG: 40 INJECTION INTRAMUSCULAR; INTRAVENOUS at 11:54

## 2023-07-07 RX ADMIN — LORAZEPAM 0.5 MG: 2 INJECTION INTRAMUSCULAR; INTRAVENOUS at 22:24

## 2023-07-07 RX ADMIN — Medication 15 MCG/MIN: at 05:54

## 2023-07-07 RX ADMIN — PROCHLORPERAZINE EDISYLATE 10 MG: 5 INJECTION, SOLUTION INTRAMUSCULAR; INTRAVENOUS at 20:22

## 2023-07-07 RX ADMIN — Medication 10 ML: at 20:39

## 2023-07-07 RX ADMIN — Medication 10 ML: at 09:47

## 2023-07-07 RX ADMIN — ONDANSETRON 4 MG: 2 INJECTION INTRAMUSCULAR; INTRAVENOUS at 19:09

## 2023-07-07 RX ADMIN — APIXABAN 5 MG: 5 TABLET, FILM COATED ORAL at 20:40

## 2023-07-07 RX ADMIN — ROFLUMILAST 500 MCG: 500 TABLET ORAL at 10:54

## 2023-07-07 RX ADMIN — ARFORMOTEROL TARTRATE 15 MCG: 15 SOLUTION RESPIRATORY (INHALATION) at 21:47

## 2023-07-07 RX ADMIN — HEPARIN SODIUM 12 UNITS/KG/HR: 10000 INJECTION, SOLUTION INTRAVENOUS at 05:50

## 2023-07-07 RX ADMIN — VANCOMYCIN HYDROCHLORIDE 1250 MG: 1.25 INJECTION, POWDER, LYOPHILIZED, FOR SOLUTION INTRAVENOUS at 17:21

## 2023-07-07 RX ADMIN — CEFEPIME 2000 MG: 2 INJECTION, POWDER, FOR SOLUTION INTRAVENOUS at 20:44

## 2023-07-07 RX ADMIN — FAMOTIDINE 20 MG: 20 TABLET ORAL at 20:40

## 2023-07-07 RX ADMIN — PRAMIPEXOLE DIHYDROCHLORIDE 0.5 MG: 0.25 TABLET ORAL at 09:46

## 2023-07-07 RX ADMIN — IPRATROPIUM BROMIDE AND ALBUTEROL SULFATE 1 DOSE: 2.5; .5 SOLUTION RESPIRATORY (INHALATION) at 15:56

## 2023-07-07 RX ADMIN — SODIUM CHLORIDE: 9 INJECTION, SOLUTION INTRAVENOUS at 05:58

## 2023-07-07 RX ADMIN — NOREPINEPHRINE BITARTRATE 5 MCG/MIN: 1 SOLUTION INTRAVENOUS at 00:57

## 2023-07-07 RX ADMIN — CEFEPIME 2000 MG: 2 INJECTION, POWDER, FOR SOLUTION INTRAVENOUS at 06:01

## 2023-07-07 RX ADMIN — IPRATROPIUM BROMIDE AND ALBUTEROL SULFATE 1 DOSE: 2.5; .5 SOLUTION RESPIRATORY (INHALATION) at 08:31

## 2023-07-07 RX ADMIN — CEFEPIME 2000 MG: 2 INJECTION, POWDER, FOR SOLUTION INTRAVENOUS at 13:11

## 2023-07-07 RX ADMIN — IPRATROPIUM BROMIDE AND ALBUTEROL SULFATE 1 DOSE: 2.5; .5 SOLUTION RESPIRATORY (INHALATION) at 12:40

## 2023-07-07 RX ADMIN — IPRATROPIUM BROMIDE AND ALBUTEROL SULFATE 1 DOSE: 2.5; .5 SOLUTION RESPIRATORY (INHALATION) at 21:47

## 2023-07-07 RX ADMIN — APIXABAN 5 MG: 5 TABLET, FILM COATED ORAL at 11:55

## 2023-07-07 RX ADMIN — BUDESONIDE 500 MCG: 0.5 SUSPENSION RESPIRATORY (INHALATION) at 08:32

## 2023-07-07 RX ADMIN — ARFORMOTEROL TARTRATE 15 MCG: 15 SOLUTION RESPIRATORY (INHALATION) at 08:32

## 2023-07-07 RX ADMIN — BUDESONIDE 500 MCG: 0.5 SUSPENSION RESPIRATORY (INHALATION) at 21:47

## 2023-07-07 RX ADMIN — FAMOTIDINE 20 MG: 20 TABLET ORAL at 09:46

## 2023-07-07 RX ADMIN — ONDANSETRON 4 MG: 2 INJECTION INTRAMUSCULAR; INTRAVENOUS at 21:21

## 2023-07-07 RX ADMIN — TORSEMIDE 10 MG: 20 TABLET ORAL at 09:47

## 2023-07-07 RX ADMIN — ATORVASTATIN CALCIUM 80 MG: 80 TABLET, FILM COATED ORAL at 09:46

## 2023-07-07 RX ADMIN — HEPARIN SODIUM 12 UNITS/KG/HR: 10000 INJECTION, SOLUTION INTRAVENOUS at 00:03

## 2023-07-07 ASSESSMENT — PAIN SCALES - GENERAL
PAINLEVEL_OUTOF10: 0

## 2023-07-07 NOTE — ED NOTES
Transport set up with [facility] for transport to [facility].      Cosmo Granda, EMT-P  07/07/23 5612

## 2023-07-07 NOTE — ED TRIAGE NOTES
2055 Pt arrived via EMS c/o respiratory distress and possible CVA symptoms from home. EMS unable to get O2 saturation above 70% on their pap machine-see ambulance run report for further information. Pt immediately placed on bi-pap upon arrival, IV with blood work obtained. Pt is not responding to stimuli. At this time, pt is awake, alert and oriented, answering questions appropriately and moving extremities. Pt to CT as able to tolerate supine position.

## 2023-07-07 NOTE — ED NOTES
Placed PT on large mask for BiPap, FiO2 100%, 20/8 per V.O. Dr. Jack Guerrero.      Lili Preciado, EMT-P  07/06/23 8473

## 2023-07-07 NOTE — ED PROVIDER NOTES
[address] ENCOUNTER        Patient Name: Eze Carrera  MRN: [id number]  [address] [facility] [place] 1950  Date of evaluation: 7/6/2023  PCP: John Carrera  Note Started: 9:38 PM EDT 7/6/23    CHIEF COMPLAINT       Respiratory Distress      HISTORY OF PRESENT ILLNESS: 1 or more Elements       Eze Carrera is a 68 y.o. male who was brought in by EMS as a respiratory distress. Per EMS, patient was on his home CPAP satting 60%. Placed him on CPAP on 10 and was satting 70% en route to the hospital. Gave albuterol. Per report, patient has history of COPD and recent strokes. Initially, family not present at bedside to provide further history. Later, per the son, patient has been recently sick with nausea and nominal pain. This has been ongoing for about a month. They tried to get a CT abdomen pelvis ordered on Monday. No vomiting at home. Says patient was recently treated for pneumonia with Levaquin and was on prednisone for his COPD. He was taken off of both the prednisone and Levaquin after a recent chest x-ray that showed clear lungs. He wears 3 L nasal cannula O2 at baseline and CPAP as needed at home. Reports having baseline cough. No fevers or chills. No other complaints, modifying factors or associated symptoms. History obtained by the patient unless stated otherwise as above on HPI. No limitations unless specified as above on HPI.      Past medical history:   Past Medical History:   Diagnosis Date    Atrial fibrillation ([address])     Bronchitis chronic     Emphysema of lung ([address])     HTN (hypertension)     Influenza A 01/10/2018    Lung disease     copd    Pneumonia     12/2009    Stroke 02/25/2023    [facility]       Past surgical history:   Past Surgical History:   Procedure Laterality Date    FRACTURE SURGERY      johnson in femur/hip on right    HERNIA REPAIR         Home medications:   Prior to Admission medications    Medication Sig Start Date End Date

## 2023-07-07 NOTE — PROGRESS NOTES
Per Low-dose heparin drip protocol:    Wt = 78.9 kg  IBW = 77.6 kg    Heparin to be dosed on actual body wt = 78.9 kg    Baseline aPTT = 31.8 sec  Bolus dose = 4000 units  (60 units/kg with 4000 unit max)    Initiate drip at 12 units/kg/h =     Use aPTT for monitoring since patient on Eliquis at home. First aPTT ordered for 7/7 @ 0700    Adjust drip as needed per the following protocol:    aPTT < 35    Heparin 60 units/kg bolus  Increase infusion by 4 units/kg/hr   aTT 35-51    Heparin 30 units/kg bolus Increase infusion by 2 units/kg/hr    aPTT 52-87    No bolus   No change   aPTT 88-95    No bolus   Decrease infusion by 2 units/kg/hr    aPTT     Hold heparin for 1 hour Decrease infusion by 3 units/kg/hr   aPTT 105-112    Hold heparin for 1 hour Decrease infusion by 4 units/kg/hr   aPTT > 112    Hold heparin for 1 hour Decrease infusion by 6 units/kg/hr     Obtain aPTT 6-8 hours after bolus and 6 hours after any dose change until two consecutive therapeutic aPTT are achieved- then daily.

## 2023-07-07 NOTE — CONSULTS
Pharmacy to dose vancomycin loading dose in ED x1 per Dr. Ehsan Lau. Patient is a 67 y/o M being treated for HAP. Wt = 78.9 kg    Give vancomycin 2000 mg x once in the ED. If patient is admitted and further vancomycin dosing is wanted, please have admitting MD re-consult Pharmacy for maintenance inpatient dosing.     Thank you for the consult,    Pharmacy

## 2023-07-08 LAB
ALBUMIN SERPL-MCNC: 3.1 G/DL (ref 3.4–5)
ALP SERPL-CCNC: 93 U/L (ref 40–129)
ALT SERPL-CCNC: 11 U/L (ref 10–40)
ANION GAP SERPL CALCULATED.3IONS-SCNC: 13 MMOL/L (ref 3–16)
AST SERPL-CCNC: 13 U/L (ref 15–37)
BASOPHILS # BLD: 0.1 K/UL (ref 0–0.2)
BASOPHILS NFR BLD: 0.2 %
BILIRUB DIRECT SERPL-MCNC: <0.2 MG/DL (ref 0–0.3)
BILIRUB INDIRECT SERPL-MCNC: ABNORMAL MG/DL (ref 0–1)
BILIRUB SERPL-MCNC: 0.3 MG/DL (ref 0–1)
BUN SERPL-MCNC: 19 MG/DL (ref 7–20)
CALCIUM SERPL-MCNC: 8.6 MG/DL (ref 8.3–10.6)
CHLORIDE SERPL-SCNC: 104 MMOL/L (ref 99–110)
CO2 SERPL-SCNC: 27 MMOL/L (ref 21–32)
CREAT SERPL-MCNC: 0.9 MG/DL (ref 0.8–1.3)
DEPRECATED RDW RBC AUTO: 15.5 % (ref 12.4–15.4)
EOSINOPHIL # BLD: 0 K/UL (ref 0–0.6)
EOSINOPHIL NFR BLD: 0 %
GFR SERPLBLD CREATININE-BSD FMLA CKD-EPI: >60 ML/MIN/{1.73_M2}
GLUCOSE SERPL-MCNC: 214 MG/DL (ref 70–99)
HCT VFR BLD AUTO: 35.4 % (ref 40.5–52.5)
HGB BLD-MCNC: 11.6 G/DL (ref 13.5–17.5)
INR PPP: 1.19 (ref 0.84–1.16)
LACTATE BLDV-SCNC: 2.1 MMOL/L (ref 0.4–2)
LEGIONELLA AG UR QL: NORMAL
LYMPHOCYTES # BLD: 1.1 K/UL (ref 1–5.1)
LYMPHOCYTES NFR BLD: 5.1 %
MAGNESIUM SERPL-MCNC: 2.2 MG/DL (ref 1.8–2.4)
MCH RBC QN AUTO: 30 PG (ref 26–34)
MCHC RBC AUTO-ENTMCNC: 32.7 G/DL (ref 31–36)
MCV RBC AUTO: 91.7 FL (ref 80–100)
MONOCYTES # BLD: 1 K/UL (ref 0–1.3)
MONOCYTES NFR BLD: 4.9 %
NEUTROPHILS # BLD: 19.1 K/UL (ref 1.7–7.7)
NEUTROPHILS NFR BLD: 89.8 %
NT-PROBNP SERPL-MCNC: 6944 PG/ML (ref 0–124)
PLATELET # BLD AUTO: 453 K/UL (ref 135–450)
PMV BLD AUTO: 9.1 FL (ref 5–10.5)
POTASSIUM SERPL-SCNC: 4.4 MMOL/L (ref 3.5–5.1)
PROCALCITONIN SERPL IA-MCNC: 1.07 NG/ML (ref 0–0.15)
PROT SERPL-MCNC: 5.9 G/DL (ref 6.4–8.2)
PROTHROMBIN TIME: 15.2 SEC (ref 11.5–14.8)
RBC # BLD AUTO: 3.86 M/UL (ref 4.2–5.9)
S PNEUM AG UR QL: NORMAL
SODIUM SERPL-SCNC: 144 MMOL/L (ref 136–145)
VANCOMYCIN SERPL-MCNC: 13 UG/ML
WBC # BLD AUTO: 21.3 K/UL (ref 4–11)

## 2023-07-08 PROCEDURE — 6360000002 HC RX W HCPCS: Performed by: INTERNAL MEDICINE

## 2023-07-08 PROCEDURE — 99233 SBSQ HOSP IP/OBS HIGH 50: CPT | Performed by: INTERNAL MEDICINE

## 2023-07-08 PROCEDURE — 85610 PROTHROMBIN TIME: CPT

## 2023-07-08 PROCEDURE — 6370000000 HC RX 637 (ALT 250 FOR IP): Performed by: INTERNAL MEDICINE

## 2023-07-08 PROCEDURE — 94761 N-INVAS EAR/PLS OXIMETRY MLT: CPT

## 2023-07-08 PROCEDURE — 2000000000 HC ICU R&B

## 2023-07-08 PROCEDURE — 80202 ASSAY OF VANCOMYCIN: CPT

## 2023-07-08 PROCEDURE — 83735 ASSAY OF MAGNESIUM: CPT

## 2023-07-08 PROCEDURE — 94660 CPAP INITIATION&MGMT: CPT

## 2023-07-08 PROCEDURE — 84145 PROCALCITONIN (PCT): CPT

## 2023-07-08 PROCEDURE — 6360000002 HC RX W HCPCS: Performed by: NURSE PRACTITIONER

## 2023-07-08 PROCEDURE — 94640 AIRWAY INHALATION TREATMENT: CPT

## 2023-07-08 PROCEDURE — 83880 ASSAY OF NATRIURETIC PEPTIDE: CPT

## 2023-07-08 PROCEDURE — 2580000003 HC RX 258: Performed by: INTERNAL MEDICINE

## 2023-07-08 PROCEDURE — 87641 MR-STAPH DNA AMP PROBE: CPT

## 2023-07-08 PROCEDURE — 85025 COMPLETE CBC W/AUTO DIFF WBC: CPT

## 2023-07-08 PROCEDURE — 80076 HEPATIC FUNCTION PANEL: CPT

## 2023-07-08 PROCEDURE — 83605 ASSAY OF LACTIC ACID: CPT

## 2023-07-08 PROCEDURE — 2700000000 HC OXYGEN THERAPY PER DAY

## 2023-07-08 PROCEDURE — 80048 BASIC METABOLIC PNL TOTAL CA: CPT

## 2023-07-08 RX ADMIN — FAMOTIDINE 20 MG: 20 TABLET ORAL at 20:20

## 2023-07-08 RX ADMIN — GUAIFENESIN 600 MG: 600 TABLET, EXTENDED RELEASE ORAL at 08:55

## 2023-07-08 RX ADMIN — BUDESONIDE 500 MCG: 0.5 SUSPENSION RESPIRATORY (INHALATION) at 20:50

## 2023-07-08 RX ADMIN — Medication 10 ML: at 20:21

## 2023-07-08 RX ADMIN — CEFEPIME 2000 MG: 2 INJECTION, POWDER, FOR SOLUTION INTRAVENOUS at 14:04

## 2023-07-08 RX ADMIN — BUDESONIDE 500 MCG: 0.5 SUSPENSION RESPIRATORY (INHALATION) at 08:13

## 2023-07-08 RX ADMIN — ONDANSETRON 4 MG: 2 INJECTION INTRAMUSCULAR; INTRAVENOUS at 23:33

## 2023-07-08 RX ADMIN — PRAMIPEXOLE DIHYDROCHLORIDE 0.5 MG: 0.25 TABLET ORAL at 08:56

## 2023-07-08 RX ADMIN — GUAIFENESIN 600 MG: 600 TABLET, EXTENDED RELEASE ORAL at 20:20

## 2023-07-08 RX ADMIN — VANCOMYCIN HYDROCHLORIDE 750 MG: 750 INJECTION, POWDER, LYOPHILIZED, FOR SOLUTION INTRAVENOUS at 11:54

## 2023-07-08 RX ADMIN — CEFEPIME 2000 MG: 2 INJECTION, POWDER, FOR SOLUTION INTRAVENOUS at 21:21

## 2023-07-08 RX ADMIN — IPRATROPIUM BROMIDE AND ALBUTEROL SULFATE 1 DOSE: 2.5; .5 SOLUTION RESPIRATORY (INHALATION) at 11:48

## 2023-07-08 RX ADMIN — Medication 10 ML: at 09:00

## 2023-07-08 RX ADMIN — SODIUM CHLORIDE, PRESERVATIVE FREE 10 ML: 5 INJECTION INTRAVENOUS at 20:21

## 2023-07-08 RX ADMIN — ARFORMOTEROL TARTRATE 15 MCG: 15 SOLUTION RESPIRATORY (INHALATION) at 08:15

## 2023-07-08 RX ADMIN — FAMOTIDINE 20 MG: 20 TABLET ORAL at 08:56

## 2023-07-08 RX ADMIN — ATORVASTATIN CALCIUM 80 MG: 80 TABLET, FILM COATED ORAL at 08:56

## 2023-07-08 RX ADMIN — IPRATROPIUM BROMIDE AND ALBUTEROL SULFATE 1 DOSE: 2.5; .5 SOLUTION RESPIRATORY (INHALATION) at 20:49

## 2023-07-08 RX ADMIN — ARFORMOTEROL TARTRATE 15 MCG: 15 SOLUTION RESPIRATORY (INHALATION) at 21:02

## 2023-07-08 RX ADMIN — APIXABAN 5 MG: 5 TABLET, FILM COATED ORAL at 08:56

## 2023-07-08 RX ADMIN — IPRATROPIUM BROMIDE AND ALBUTEROL SULFATE 1 DOSE: 2.5; .5 SOLUTION RESPIRATORY (INHALATION) at 08:13

## 2023-07-08 RX ADMIN — IPRATROPIUM BROMIDE AND ALBUTEROL SULFATE 1 DOSE: 2.5; .5 SOLUTION RESPIRATORY (INHALATION) at 15:23

## 2023-07-08 RX ADMIN — TORSEMIDE 10 MG: 20 TABLET ORAL at 08:55

## 2023-07-08 RX ADMIN — VANCOMYCIN HYDROCHLORIDE 750 MG: 750 INJECTION, POWDER, LYOPHILIZED, FOR SOLUTION INTRAVENOUS at 23:55

## 2023-07-08 RX ADMIN — ROFLUMILAST 500 MCG: 500 TABLET ORAL at 09:00

## 2023-07-08 RX ADMIN — CEFEPIME 2000 MG: 2 INJECTION, POWDER, FOR SOLUTION INTRAVENOUS at 06:18

## 2023-07-08 RX ADMIN — APIXABAN 5 MG: 5 TABLET, FILM COATED ORAL at 20:20

## 2023-07-08 ASSESSMENT — PAIN SCALES - GENERAL
PAINLEVEL_OUTOF10: 2
PAINLEVEL_OUTOF10: 0
PAINLEVEL_OUTOF10: 3

## 2023-07-08 ASSESSMENT — PAIN DESCRIPTION - ORIENTATION
ORIENTATION: RIGHT;LEFT;LOWER
ORIENTATION: RIGHT;LEFT;LOWER

## 2023-07-08 ASSESSMENT — PAIN DESCRIPTION - ONSET
ONSET: ON-GOING
ONSET: ON-GOING

## 2023-07-08 ASSESSMENT — PAIN DESCRIPTION - DESCRIPTORS
DESCRIPTORS: ACHING
DESCRIPTORS: ACHING

## 2023-07-08 ASSESSMENT — PAIN DESCRIPTION - FREQUENCY
FREQUENCY: CONTINUOUS
FREQUENCY: CONTINUOUS

## 2023-07-08 ASSESSMENT — PAIN - FUNCTIONAL ASSESSMENT
PAIN_FUNCTIONAL_ASSESSMENT: PREVENTS OR INTERFERES WITH MANY ACTIVE NOT PASSIVE ACTIVITIES
PAIN_FUNCTIONAL_ASSESSMENT: PREVENTS OR INTERFERES WITH MANY ACTIVE NOT PASSIVE ACTIVITIES

## 2023-07-08 ASSESSMENT — PAIN DESCRIPTION - LOCATION
LOCATION: FOOT
LOCATION: FOOT

## 2023-07-08 NOTE — RESULT ENCOUNTER NOTE
Culture result reviewed. No further treatment needed. Currently admitted at [facility] for treatment. Please see their notes related to care of the patient.

## 2023-07-09 ENCOUNTER — APPOINTMENT (OUTPATIENT)
Dept: CT IMAGING | Age: 73
DRG: 871 | End: 2023-07-09
Attending: INTERNAL MEDICINE
Payer: MEDICARE

## 2023-07-09 ENCOUNTER — APPOINTMENT (OUTPATIENT)
Dept: GENERAL RADIOLOGY | Age: 73
DRG: 871 | End: 2023-07-09
Attending: INTERNAL MEDICINE
Payer: MEDICARE

## 2023-07-09 PROBLEM — K31.0 ACUTE DISTENTION OF STOMACH: Status: ACTIVE | Noted: 2023-07-09

## 2023-07-09 PROBLEM — R14.0 ABDOMINAL DISTENSION: Status: ACTIVE | Noted: 2023-07-09

## 2023-07-09 LAB
ANION GAP SERPL CALCULATED.3IONS-SCNC: 10 MMOL/L (ref 3–16)
BASOPHILS # BLD: 0 K/UL (ref 0–0.2)
BASOPHILS NFR BLD: 0.2 %
BUN SERPL-MCNC: 27 MG/DL (ref 7–20)
CALCIUM SERPL-MCNC: 8.7 MG/DL (ref 8.3–10.6)
CHLORIDE SERPL-SCNC: 102 MMOL/L (ref 99–110)
CO2 SERPL-SCNC: 31 MMOL/L (ref 21–32)
CREAT SERPL-MCNC: 0.9 MG/DL (ref 0.8–1.3)
DEPRECATED RDW RBC AUTO: 15.6 % (ref 12.4–15.4)
EOSINOPHIL # BLD: 0.1 K/UL (ref 0–0.6)
EOSINOPHIL NFR BLD: 0.5 %
GFR SERPLBLD CREATININE-BSD FMLA CKD-EPI: >60 ML/MIN/{1.73_M2}
GLUCOSE SERPL-MCNC: 142 MG/DL (ref 70–99)
HCT VFR BLD AUTO: 37.3 % (ref 40.5–52.5)
HGB BLD-MCNC: 12.1 G/DL (ref 13.5–17.5)
LYMPHOCYTES # BLD: 1.6 K/UL (ref 1–5.1)
LYMPHOCYTES NFR BLD: 9.1 %
MAGNESIUM SERPL-MCNC: 2.1 MG/DL (ref 1.8–2.4)
MCH RBC QN AUTO: 30 PG (ref 26–34)
MCHC RBC AUTO-ENTMCNC: 32.5 G/DL (ref 31–36)
MCV RBC AUTO: 92.3 FL (ref 80–100)
MONOCYTES # BLD: 1.5 K/UL (ref 0–1.3)
MONOCYTES NFR BLD: 8.6 %
MRSA DNA SPEC QL NAA+PROBE: NORMAL
NEUTROPHILS # BLD: 14.2 K/UL (ref 1.7–7.7)
NEUTROPHILS NFR BLD: 81.6 %
PLATELET # BLD AUTO: 370 K/UL (ref 135–450)
PMV BLD AUTO: 8.6 FL (ref 5–10.5)
POTASSIUM SERPL-SCNC: 3.7 MMOL/L (ref 3.5–5.1)
PROCALCITONIN SERPL IA-MCNC: 0.76 NG/ML (ref 0–0.15)
RBC # BLD AUTO: 4.04 M/UL (ref 4.2–5.9)
SARS-COV-2 RDRP RESP QL NAA+PROBE: NOT DETECTED
SODIUM SERPL-SCNC: 143 MMOL/L (ref 136–145)
WBC # BLD AUTO: 17.4 K/UL (ref 4–11)

## 2023-07-09 PROCEDURE — 6360000002 HC RX W HCPCS: Performed by: INTERNAL MEDICINE

## 2023-07-09 PROCEDURE — 74018 RADEX ABDOMEN 1 VIEW: CPT

## 2023-07-09 PROCEDURE — 2580000003 HC RX 258: Performed by: INTERNAL MEDICINE

## 2023-07-09 PROCEDURE — 94761 N-INVAS EAR/PLS OXIMETRY MLT: CPT

## 2023-07-09 PROCEDURE — 94660 CPAP INITIATION&MGMT: CPT

## 2023-07-09 PROCEDURE — 2700000000 HC OXYGEN THERAPY PER DAY

## 2023-07-09 PROCEDURE — 6370000000 HC RX 637 (ALT 250 FOR IP): Performed by: INTERNAL MEDICINE

## 2023-07-09 PROCEDURE — 2000000000 HC ICU R&B

## 2023-07-09 PROCEDURE — 51702 INSERT TEMP BLADDER CATH: CPT

## 2023-07-09 PROCEDURE — 83735 ASSAY OF MAGNESIUM: CPT

## 2023-07-09 PROCEDURE — 84145 PROCALCITONIN (PCT): CPT

## 2023-07-09 PROCEDURE — 99233 SBSQ HOSP IP/OBS HIGH 50: CPT | Performed by: INTERNAL MEDICINE

## 2023-07-09 PROCEDURE — 85025 COMPLETE CBC W/AUTO DIFF WBC: CPT

## 2023-07-09 PROCEDURE — 94640 AIRWAY INHALATION TREATMENT: CPT

## 2023-07-09 PROCEDURE — 74177 CT ABD & PELVIS W/CONTRAST: CPT

## 2023-07-09 PROCEDURE — 80048 BASIC METABOLIC PNL TOTAL CA: CPT

## 2023-07-09 PROCEDURE — 6360000004 HC RX CONTRAST MEDICATION: Performed by: INTERNAL MEDICINE

## 2023-07-09 PROCEDURE — 87635 SARS-COV-2 COVID-19 AMP PRB: CPT

## 2023-07-09 PROCEDURE — 99222 1ST HOSP IP/OBS MODERATE 55: CPT | Performed by: SURGERY

## 2023-07-09 RX ORDER — ENOXAPARIN SODIUM 100 MG/ML
1 INJECTION SUBCUTANEOUS 2 TIMES DAILY
Status: DISCONTINUED | OUTPATIENT
Start: 2023-07-09 | End: 2023-07-12 | Stop reason: HOSPADM

## 2023-07-09 RX ORDER — METHYLPREDNISOLONE SODIUM SUCCINATE 40 MG/ML
40 INJECTION, POWDER, LYOPHILIZED, FOR SOLUTION INTRAMUSCULAR; INTRAVENOUS EVERY 6 HOURS
Status: DISCONTINUED | OUTPATIENT
Start: 2023-07-09 | End: 2023-07-10 | Stop reason: RX

## 2023-07-09 RX ORDER — SODIUM CHLORIDE 9 MG/ML
INJECTION, SOLUTION INTRAVENOUS CONTINUOUS
Status: DISCONTINUED | OUTPATIENT
Start: 2023-07-09 | End: 2023-07-10

## 2023-07-09 RX ORDER — METHYLPREDNISOLONE SODIUM SUCCINATE 40 MG/ML
40 INJECTION, POWDER, LYOPHILIZED, FOR SOLUTION INTRAMUSCULAR; INTRAVENOUS EVERY 12 HOURS
Status: DISCONTINUED | OUTPATIENT
Start: 2023-07-09 | End: 2023-07-09

## 2023-07-09 RX ADMIN — IPRATROPIUM BROMIDE AND ALBUTEROL SULFATE 1 DOSE: 2.5; .5 SOLUTION RESPIRATORY (INHALATION) at 20:06

## 2023-07-09 RX ADMIN — CEFEPIME 2000 MG: 2 INJECTION, POWDER, FOR SOLUTION INTRAVENOUS at 14:01

## 2023-07-09 RX ADMIN — ARFORMOTEROL TARTRATE 15 MCG: 15 SOLUTION RESPIRATORY (INHALATION) at 20:19

## 2023-07-09 RX ADMIN — METHYLPREDNISOLONE SODIUM SUCCINATE 40 MG: 40 INJECTION INTRAMUSCULAR; INTRAVENOUS at 17:30

## 2023-07-09 RX ADMIN — CEFEPIME 2000 MG: 2 INJECTION, POWDER, FOR SOLUTION INTRAVENOUS at 05:19

## 2023-07-09 RX ADMIN — ROFLUMILAST 500 MCG: 500 TABLET ORAL at 09:43

## 2023-07-09 RX ADMIN — CEFEPIME 2000 MG: 2 INJECTION, POWDER, FOR SOLUTION INTRAVENOUS at 21:31

## 2023-07-09 RX ADMIN — Medication 10 ML: at 09:00

## 2023-07-09 RX ADMIN — ARFORMOTEROL TARTRATE 15 MCG: 15 SOLUTION RESPIRATORY (INHALATION) at 08:23

## 2023-07-09 RX ADMIN — GUAIFENESIN 600 MG: 600 TABLET, EXTENDED RELEASE ORAL at 09:45

## 2023-07-09 RX ADMIN — ENOXAPARIN SODIUM 70 MG: 100 INJECTION SUBCUTANEOUS at 21:33

## 2023-07-09 RX ADMIN — VANCOMYCIN HYDROCHLORIDE 750 MG: 750 INJECTION, POWDER, LYOPHILIZED, FOR SOLUTION INTRAVENOUS at 11:11

## 2023-07-09 RX ADMIN — BUDESONIDE 500 MCG: 0.5 SUSPENSION RESPIRATORY (INHALATION) at 20:07

## 2023-07-09 RX ADMIN — IOHEXOL 75 ML: 350 INJECTION, SOLUTION INTRAVENOUS at 16:24

## 2023-07-09 RX ADMIN — Medication 10 ML: at 21:33

## 2023-07-09 RX ADMIN — ATORVASTATIN CALCIUM 80 MG: 80 TABLET, FILM COATED ORAL at 09:43

## 2023-07-09 RX ADMIN — BUDESONIDE 500 MCG: 0.5 SUSPENSION RESPIRATORY (INHALATION) at 08:23

## 2023-07-09 RX ADMIN — IPRATROPIUM BROMIDE AND ALBUTEROL SULFATE 1 DOSE: 2.5; .5 SOLUTION RESPIRATORY (INHALATION) at 12:32

## 2023-07-09 RX ADMIN — SODIUM CHLORIDE 200 ML: 9 INJECTION, SOLUTION INTRAVENOUS at 21:30

## 2023-07-09 RX ADMIN — SODIUM CHLORIDE: 9 INJECTION, SOLUTION INTRAVENOUS at 19:30

## 2023-07-09 RX ADMIN — METHYLPREDNISOLONE SODIUM SUCCINATE 40 MG: 40 INJECTION INTRAMUSCULAR; INTRAVENOUS at 23:35

## 2023-07-09 RX ADMIN — IPRATROPIUM BROMIDE AND ALBUTEROL SULFATE 1 DOSE: 2.5; .5 SOLUTION RESPIRATORY (INHALATION) at 08:23

## 2023-07-09 RX ADMIN — FAMOTIDINE 20 MG: 20 TABLET ORAL at 09:43

## 2023-07-09 RX ADMIN — TORSEMIDE 10 MG: 20 TABLET ORAL at 09:43

## 2023-07-09 RX ADMIN — PRAMIPEXOLE DIHYDROCHLORIDE 0.5 MG: 0.25 TABLET ORAL at 09:43

## 2023-07-09 RX ADMIN — ACETAMINOPHEN 650 MG: 325 TABLET ORAL at 09:43

## 2023-07-09 RX ADMIN — APIXABAN 5 MG: 5 TABLET, FILM COATED ORAL at 09:45

## 2023-07-09 ASSESSMENT — PAIN SCALES - GENERAL
PAINLEVEL_OUTOF10: 0

## 2023-07-10 ENCOUNTER — APPOINTMENT (OUTPATIENT)
Dept: GENERAL RADIOLOGY | Age: 73
DRG: 871 | End: 2023-07-10
Attending: INTERNAL MEDICINE
Payer: MEDICARE

## 2023-07-10 LAB
ALBUMIN SERPL-MCNC: 3.2 G/DL (ref 3.4–5)
ALBUMIN/GLOB SERPL: 1 {RATIO} (ref 1.1–2.2)
ALP SERPL-CCNC: 97 U/L (ref 40–129)
ALT SERPL-CCNC: 12 U/L (ref 10–40)
ANION GAP SERPL CALCULATED.3IONS-SCNC: 12 MMOL/L (ref 3–16)
AST SERPL-CCNC: 10 U/L (ref 15–37)
BACTERIA BLD CULT ORG #2: NORMAL
BACTERIA BLD CULT: NORMAL
BASOPHILS # BLD: 0 K/UL (ref 0–0.2)
BASOPHILS NFR BLD: 0.1 %
BILIRUB SERPL-MCNC: 0.5 MG/DL (ref 0–1)
BUN SERPL-MCNC: 24 MG/DL (ref 7–20)
CALCIUM SERPL-MCNC: 8.6 MG/DL (ref 8.3–10.6)
CHLORIDE SERPL-SCNC: 101 MMOL/L (ref 99–110)
CO2 SERPL-SCNC: 30 MMOL/L (ref 21–32)
CREAT SERPL-MCNC: 0.7 MG/DL (ref 0.8–1.3)
DEPRECATED RDW RBC AUTO: 15.4 % (ref 12.4–15.4)
EOSINOPHIL # BLD: 0 K/UL (ref 0–0.6)
EOSINOPHIL NFR BLD: 0 %
GFR SERPLBLD CREATININE-BSD FMLA CKD-EPI: >60 ML/MIN/{1.73_M2}
GLUCOSE SERPL-MCNC: 167 MG/DL (ref 70–99)
HCT VFR BLD AUTO: 37.8 % (ref 40.5–52.5)
HGB BLD-MCNC: 12.3 G/DL (ref 13.5–17.5)
LYMPHOCYTES # BLD: 0.8 K/UL (ref 1–5.1)
LYMPHOCYTES NFR BLD: 6 %
MCH RBC QN AUTO: 30.3 PG (ref 26–34)
MCHC RBC AUTO-ENTMCNC: 32.6 G/DL (ref 31–36)
MCV RBC AUTO: 92.8 FL (ref 80–100)
MONOCYTES # BLD: 0.1 K/UL (ref 0–1.3)
MONOCYTES NFR BLD: 1.1 %
NEUTROPHILS # BLD: 12.3 K/UL (ref 1.7–7.7)
NEUTROPHILS NFR BLD: 92.8 %
PLATELET # BLD AUTO: 371 K/UL (ref 135–450)
PMV BLD AUTO: 8.8 FL (ref 5–10.5)
POTASSIUM SERPL-SCNC: 4.5 MMOL/L (ref 3.5–5.1)
PROT SERPL-MCNC: 6.3 G/DL (ref 6.4–8.2)
RBC # BLD AUTO: 4.07 M/UL (ref 4.2–5.9)
SODIUM SERPL-SCNC: 143 MMOL/L (ref 136–145)
WBC # BLD AUTO: 13.2 K/UL (ref 4–11)

## 2023-07-10 PROCEDURE — 6360000002 HC RX W HCPCS: Performed by: INTERNAL MEDICINE

## 2023-07-10 PROCEDURE — 94640 AIRWAY INHALATION TREATMENT: CPT

## 2023-07-10 PROCEDURE — 85025 COMPLETE CBC W/AUTO DIFF WBC: CPT

## 2023-07-10 PROCEDURE — 80053 COMPREHEN METABOLIC PANEL: CPT

## 2023-07-10 PROCEDURE — 2580000003 HC RX 258: Performed by: INTERNAL MEDICINE

## 2023-07-10 PROCEDURE — 2700000000 HC OXYGEN THERAPY PER DAY

## 2023-07-10 PROCEDURE — 99232 SBSQ HOSP IP/OBS MODERATE 35: CPT | Performed by: SURGERY

## 2023-07-10 PROCEDURE — 99291 CRITICAL CARE FIRST HOUR: CPT | Performed by: INTERNAL MEDICINE

## 2023-07-10 PROCEDURE — APPSS15 APP SPLIT SHARED TIME 0-15 MINUTES: Performed by: NURSE PRACTITIONER

## 2023-07-10 PROCEDURE — 6360000004 HC RX CONTRAST MEDICATION

## 2023-07-10 PROCEDURE — 6370000000 HC RX 637 (ALT 250 FOR IP): Performed by: INTERNAL MEDICINE

## 2023-07-10 PROCEDURE — 74240 X-RAY XM UPR GI TRC 1CNTRST: CPT

## 2023-07-10 PROCEDURE — 94761 N-INVAS EAR/PLS OXIMETRY MLT: CPT

## 2023-07-10 PROCEDURE — 2000000000 HC ICU R&B

## 2023-07-10 PROCEDURE — APPNB30 APP NON BILLABLE TIME 0-30 MINS: Performed by: NURSE PRACTITIONER

## 2023-07-10 RX ORDER — DEXAMETHASONE SODIUM PHOSPHATE 4 MG/ML
6 INJECTION, SOLUTION INTRA-ARTICULAR; INTRALESIONAL; INTRAMUSCULAR; INTRAVENOUS; SOFT TISSUE EVERY 12 HOURS
Status: DISCONTINUED | OUTPATIENT
Start: 2023-07-10 | End: 2023-07-12 | Stop reason: HOSPADM

## 2023-07-10 RX ORDER — SODIUM CHLORIDE 9 MG/ML
INJECTION, SOLUTION INTRAVENOUS CONTINUOUS
Status: DISCONTINUED | OUTPATIENT
Start: 2023-07-10 | End: 2023-07-11

## 2023-07-10 RX ADMIN — ARFORMOTEROL TARTRATE 15 MCG: 15 SOLUTION RESPIRATORY (INHALATION) at 07:24

## 2023-07-10 RX ADMIN — SODIUM CHLORIDE: 9 INJECTION, SOLUTION INTRAVENOUS at 08:12

## 2023-07-10 RX ADMIN — IOHEXOL 100 ML: 350 INJECTION, SOLUTION INTRAVENOUS at 15:02

## 2023-07-10 RX ADMIN — Medication 10 ML: at 08:53

## 2023-07-10 RX ADMIN — METHYLPREDNISOLONE SODIUM SUCCINATE 40 MG: 40 INJECTION INTRAMUSCULAR; INTRAVENOUS at 11:39

## 2023-07-10 RX ADMIN — BUDESONIDE 500 MCG: 0.5 SUSPENSION RESPIRATORY (INHALATION) at 07:24

## 2023-07-10 RX ADMIN — METHYLPREDNISOLONE SODIUM SUCCINATE 40 MG: 40 INJECTION INTRAMUSCULAR; INTRAVENOUS at 05:02

## 2023-07-10 RX ADMIN — ARFORMOTEROL TARTRATE 15 MCG: 15 SOLUTION RESPIRATORY (INHALATION) at 20:37

## 2023-07-10 RX ADMIN — PRAMIPEXOLE DIHYDROCHLORIDE 0.5 MG: 0.25 TABLET ORAL at 08:53

## 2023-07-10 RX ADMIN — IPRATROPIUM BROMIDE AND ALBUTEROL SULFATE 1 DOSE: 2.5; .5 SOLUTION RESPIRATORY (INHALATION) at 11:32

## 2023-07-10 RX ADMIN — ENOXAPARIN SODIUM 70 MG: 100 INJECTION SUBCUTANEOUS at 08:53

## 2023-07-10 RX ADMIN — IPRATROPIUM BROMIDE AND ALBUTEROL SULFATE 1 DOSE: 2.5; .5 SOLUTION RESPIRATORY (INHALATION) at 16:17

## 2023-07-10 RX ADMIN — Medication 10 ML: at 23:52

## 2023-07-10 RX ADMIN — CEFEPIME 2000 MG: 2 INJECTION, POWDER, FOR SOLUTION INTRAVENOUS at 05:04

## 2023-07-10 RX ADMIN — BUDESONIDE 500 MCG: 0.5 SUSPENSION RESPIRATORY (INHALATION) at 20:37

## 2023-07-10 RX ADMIN — DEXAMETHASONE SODIUM PHOSPHATE 6 MG: 4 INJECTION, SOLUTION INTRAMUSCULAR; INTRAVENOUS at 17:10

## 2023-07-10 RX ADMIN — IPRATROPIUM BROMIDE AND ALBUTEROL SULFATE 1 DOSE: 2.5; .5 SOLUTION RESPIRATORY (INHALATION) at 20:37

## 2023-07-10 RX ADMIN — FAMOTIDINE 20 MG: 20 TABLET ORAL at 08:53

## 2023-07-10 RX ADMIN — ENOXAPARIN SODIUM 70 MG: 100 INJECTION SUBCUTANEOUS at 21:23

## 2023-07-10 RX ADMIN — CEFEPIME 2000 MG: 2 INJECTION, POWDER, FOR SOLUTION INTRAVENOUS at 12:45

## 2023-07-10 RX ADMIN — TORSEMIDE 10 MG: 20 TABLET ORAL at 08:53

## 2023-07-10 RX ADMIN — ROFLUMILAST 500 MCG: 500 TABLET ORAL at 09:03

## 2023-07-10 RX ADMIN — FAMOTIDINE 20 MG: 20 TABLET ORAL at 21:24

## 2023-07-10 RX ADMIN — IPRATROPIUM BROMIDE AND ALBUTEROL SULFATE 1 DOSE: 2.5; .5 SOLUTION RESPIRATORY (INHALATION) at 07:23

## 2023-07-10 RX ADMIN — CEFEPIME 2000 MG: 2 INJECTION, POWDER, FOR SOLUTION INTRAVENOUS at 21:28

## 2023-07-10 RX ADMIN — ATORVASTATIN CALCIUM 80 MG: 80 TABLET, FILM COATED ORAL at 08:53

## 2023-07-10 ASSESSMENT — PAIN SCALES - GENERAL
PAINLEVEL_OUTOF10: 0

## 2023-07-10 NOTE — CARE COORDINATION
Discharge Planning:     (CM) called Bowen Lou with [facility] ([phone]) and provided LTAC referral for patient due to patient's critical care needs. [facility] following.       [facility]   [phone]    Electronically signed by JORGE Acosta on 7/10/2023 at 1:23 PM

## 2023-07-10 NOTE — CARE COORDINATION
Case Management Assessment  Initial Evaluation    Date/Time of Evaluation: 7/10/2023 3:28 PM  Assessment Completed by: Renee Anthony    If patient is discharged prior to next notation, then this note serves as note for discharge by case management. Patient Name: Neida Dimas                   YOB: 1950  Diagnosis: SOB (shortness of breath) [R06.02]                   Date / Time: 7/7/2023  3:31 AM    Patient Admission Status: Inpatient   Readmission Risk (Low < 19, Mod (19-27), High > 27): Readmission Risk Score: 25.1    Current PCP: [facility]  PCP verified by CM? Yes    Chart Reviewed: Yes      History Provided by: Patient  Patient Orientation: Alert and Oriented    Patient Cognition: Alert    Hospitalization in the last 30 days (Readmission):  No    If yes, Readmission Assessment in CM Navigator will be completed. Advance Directives:      Code Status: Full Code   Patient's Primary Decision Maker is: Named in [facility] [place]     Primary Decision MakerIven Carola Child - [phone]    Secondary Decision Maker: Barrettjayshree Kassidy - Daughter-in-Law - [phone]    Supplemental (Other) Decision Maker: Lynnette Burdick - [phone]    Discharge Planning:    Patient lives with: Children (lives with family) Type of Home: Long-Term Acute Care (Referred to [facility] for critical care needs.)  Primary Care Giver: Family (Lives with family. States his daughter and daughter in law helps with hisa care.)  Patient Support Systems include: Family Members, Children (Daughter and son)   Current Financial resources: None  Current community resources: None  Current services prior to admission: None            Current DME:              Type of Home Care services:  None    ADLS  Prior functional level:  Independent in ADLs/IADLs, Mobility, Dressing, Toileting, Feeding (used a RW at home)  Current functional level: Assistance with the following:, Bathing, Dressing, Toileting, Cooking,

## 2023-07-11 ENCOUNTER — ANESTHESIA EVENT (OUTPATIENT)
Dept: ENDOSCOPY | Age: 73
DRG: 871 | End: 2023-07-11
Payer: MEDICARE

## 2023-07-11 ENCOUNTER — ANESTHESIA (OUTPATIENT)
Dept: ENDOSCOPY | Age: 73
DRG: 871 | End: 2023-07-11
Payer: MEDICARE

## 2023-07-11 PROBLEM — J96.00 ACUTE RESPIRATORY FAILURE (HCC): Status: ACTIVE | Noted: 2023-07-11

## 2023-07-11 LAB
ANION GAP SERPL CALCULATED.3IONS-SCNC: 10 MMOL/L (ref 3–16)
BASOPHILS # BLD: 0 K/UL (ref 0–0.2)
BASOPHILS NFR BLD: 0.1 %
BUN SERPL-MCNC: 28 MG/DL (ref 7–20)
CALCIUM SERPL-MCNC: 8.8 MG/DL (ref 8.3–10.6)
CHLORIDE SERPL-SCNC: 108 MMOL/L (ref 99–110)
CO2 SERPL-SCNC: 31 MMOL/L (ref 21–32)
CREAT SERPL-MCNC: 0.7 MG/DL (ref 0.8–1.3)
DEPRECATED RDW RBC AUTO: 14.9 % (ref 12.4–15.4)
EOSINOPHIL # BLD: 0 K/UL (ref 0–0.6)
EOSINOPHIL NFR BLD: 0 %
GFR SERPLBLD CREATININE-BSD FMLA CKD-EPI: >60 ML/MIN/{1.73_M2}
GLUCOSE SERPL-MCNC: 131 MG/DL (ref 70–99)
HCT VFR BLD AUTO: 37.5 % (ref 40.5–52.5)
HGB BLD-MCNC: 12.4 G/DL (ref 13.5–17.5)
LYMPHOCYTES # BLD: 1.1 K/UL (ref 1–5.1)
LYMPHOCYTES NFR BLD: 7.8 %
MCH RBC QN AUTO: 30.3 PG (ref 26–34)
MCHC RBC AUTO-ENTMCNC: 33 G/DL (ref 31–36)
MCV RBC AUTO: 91.9 FL (ref 80–100)
MONOCYTES # BLD: 0.9 K/UL (ref 0–1.3)
MONOCYTES NFR BLD: 6.4 %
NEUTROPHILS # BLD: 11.9 K/UL (ref 1.7–7.7)
NEUTROPHILS NFR BLD: 85.7 %
PLATELET # BLD AUTO: 375 K/UL (ref 135–450)
PMV BLD AUTO: 9.4 FL (ref 5–10.5)
POTASSIUM SERPL-SCNC: 4.2 MMOL/L (ref 3.5–5.1)
RBC # BLD AUTO: 4.08 M/UL (ref 4.2–5.9)
REPORT: NORMAL
RESP PATH DNA+RNA PNL NPH NAA+NON-PROBE: NORMAL
SODIUM SERPL-SCNC: 144 MMOL/L (ref 136–145)
SODIUM SERPL-SCNC: 149 MMOL/L (ref 136–145)
WBC # BLD AUTO: 13.9 K/UL (ref 4–11)

## 2023-07-11 PROCEDURE — 6360000002 HC RX W HCPCS: Performed by: INTERNAL MEDICINE

## 2023-07-11 PROCEDURE — 7100000000 HC PACU RECOVERY - FIRST 15 MIN: Performed by: INTERNAL MEDICINE

## 2023-07-11 PROCEDURE — 6370000000 HC RX 637 (ALT 250 FOR IP): Performed by: INTERNAL MEDICINE

## 2023-07-11 PROCEDURE — 02HV33Z INSERTION OF INFUSION DEVICE INTO SUPERIOR VENA CAVA, PERCUTANEOUS APPROACH: ICD-10-PCS | Performed by: INTERNAL MEDICINE

## 2023-07-11 PROCEDURE — 7100000001 HC PACU RECOVERY - ADDTL 15 MIN: Performed by: INTERNAL MEDICINE

## 2023-07-11 PROCEDURE — 80048 BASIC METABOLIC PNL TOTAL CA: CPT

## 2023-07-11 PROCEDURE — 0DJ08ZZ INSPECTION OF UPPER INTESTINAL TRACT, VIA NATURAL OR ARTIFICIAL OPENING ENDOSCOPIC: ICD-10-PCS | Performed by: INTERNAL MEDICINE

## 2023-07-11 PROCEDURE — 2700000000 HC OXYGEN THERAPY PER DAY

## 2023-07-11 PROCEDURE — 3609017100 HC EGD: Performed by: INTERNAL MEDICINE

## 2023-07-11 PROCEDURE — 85025 COMPLETE CBC W/AUTO DIFF WBC: CPT

## 2023-07-11 PROCEDURE — 36569 INSJ PICC 5 YR+ W/O IMAGING: CPT

## 2023-07-11 PROCEDURE — 99232 SBSQ HOSP IP/OBS MODERATE 35: CPT | Performed by: SURGERY

## 2023-07-11 PROCEDURE — 2580000003 HC RX 258: Performed by: STUDENT IN AN ORGANIZED HEALTH CARE EDUCATION/TRAINING PROGRAM

## 2023-07-11 PROCEDURE — 94761 N-INVAS EAR/PLS OXIMETRY MLT: CPT

## 2023-07-11 PROCEDURE — 0202U NFCT DS 22 TRGT SARS-COV-2: CPT

## 2023-07-11 PROCEDURE — 2709999900 HC NON-CHARGEABLE SUPPLY: Performed by: INTERNAL MEDICINE

## 2023-07-11 PROCEDURE — C1751 CATH, INF, PER/CENT/MIDLINE: HCPCS

## 2023-07-11 PROCEDURE — 99233 SBSQ HOSP IP/OBS HIGH 50: CPT | Performed by: INTERNAL MEDICINE

## 2023-07-11 PROCEDURE — APPNB30 APP NON BILLABLE TIME 0-30 MINS: Performed by: NURSE PRACTITIONER

## 2023-07-11 PROCEDURE — 2000000000 HC ICU R&B

## 2023-07-11 PROCEDURE — APPSS15 APP SPLIT SHARED TIME 0-15 MINUTES: Performed by: NURSE PRACTITIONER

## 2023-07-11 PROCEDURE — 94640 AIRWAY INHALATION TREATMENT: CPT

## 2023-07-11 PROCEDURE — 2500000003 HC RX 250 WO HCPCS: Performed by: INTERNAL MEDICINE

## 2023-07-11 PROCEDURE — 2580000003 HC RX 258: Performed by: INTERNAL MEDICINE

## 2023-07-11 PROCEDURE — 3700000001 HC ADD 15 MINUTES (ANESTHESIA): Performed by: INTERNAL MEDICINE

## 2023-07-11 PROCEDURE — 84295 ASSAY OF SERUM SODIUM: CPT

## 2023-07-11 PROCEDURE — 3700000000 HC ANESTHESIA ATTENDED CARE: Performed by: INTERNAL MEDICINE

## 2023-07-11 PROCEDURE — 6360000002 HC RX W HCPCS: Performed by: NURSE ANESTHETIST, CERTIFIED REGISTERED

## 2023-07-11 PROCEDURE — 2500000003 HC RX 250 WO HCPCS: Performed by: NURSE ANESTHETIST, CERTIFIED REGISTERED

## 2023-07-11 PROCEDURE — 6370000000 HC RX 637 (ALT 250 FOR IP): Performed by: PHYSICIAN ASSISTANT

## 2023-07-11 RX ORDER — SODIUM CHLORIDE 0.9 % (FLUSH) 0.9 %
5-40 SYRINGE (ML) INJECTION PRN
Status: DISCONTINUED | OUTPATIENT
Start: 2023-07-11 | End: 2023-07-12 | Stop reason: HOSPADM

## 2023-07-11 RX ORDER — LIDOCAINE HYDROCHLORIDE 20 MG/ML
INJECTION, SOLUTION INFILTRATION; PERINEURAL PRN
Status: DISCONTINUED | OUTPATIENT
Start: 2023-07-11 | End: 2023-07-11 | Stop reason: SDUPTHER

## 2023-07-11 RX ORDER — SODIUM CHLORIDE 450 MG/100ML
INJECTION, SOLUTION INTRAVENOUS CONTINUOUS
Status: DISCONTINUED | OUTPATIENT
Start: 2023-07-11 | End: 2023-07-11

## 2023-07-11 RX ORDER — SODIUM CHLORIDE 0.9 % (FLUSH) 0.9 %
5-40 SYRINGE (ML) INJECTION EVERY 12 HOURS SCHEDULED
Status: DISCONTINUED | OUTPATIENT
Start: 2023-07-11 | End: 2023-07-12 | Stop reason: HOSPADM

## 2023-07-11 RX ORDER — KETAMINE HCL IN NACL, ISO-OSM 100MG/10ML
SYRINGE (ML) INJECTION PRN
Status: DISCONTINUED | OUTPATIENT
Start: 2023-07-11 | End: 2023-07-11 | Stop reason: SDUPTHER

## 2023-07-11 RX ORDER — ONDANSETRON 2 MG/ML
INJECTION INTRAMUSCULAR; INTRAVENOUS PRN
Status: DISCONTINUED | OUTPATIENT
Start: 2023-07-11 | End: 2023-07-11 | Stop reason: SDUPTHER

## 2023-07-11 RX ORDER — LIDOCAINE HYDROCHLORIDE 10 MG/ML
5 INJECTION, SOLUTION EPIDURAL; INFILTRATION; INTRACAUDAL; PERINEURAL ONCE
Status: DISCONTINUED | OUTPATIENT
Start: 2023-07-11 | End: 2023-07-12 | Stop reason: HOSPADM

## 2023-07-11 RX ORDER — SODIUM CHLORIDE 9 MG/ML
25 INJECTION, SOLUTION INTRAVENOUS PRN
Status: DISCONTINUED | OUTPATIENT
Start: 2023-07-11 | End: 2023-07-12 | Stop reason: HOSPADM

## 2023-07-11 RX ORDER — PROPOFOL 10 MG/ML
INJECTION, EMULSION INTRAVENOUS PRN
Status: DISCONTINUED | OUTPATIENT
Start: 2023-07-11 | End: 2023-07-11 | Stop reason: SDUPTHER

## 2023-07-11 RX ORDER — SODIUM CHLORIDE 450 MG/100ML
INJECTION, SOLUTION INTRAVENOUS CONTINUOUS
Status: DISCONTINUED | OUTPATIENT
Start: 2023-07-11 | End: 2023-07-12

## 2023-07-11 RX ORDER — PROPOFOL 10 MG/ML
INJECTION, EMULSION INTRAVENOUS CONTINUOUS PRN
Status: DISCONTINUED | OUTPATIENT
Start: 2023-07-11 | End: 2023-07-11 | Stop reason: SDUPTHER

## 2023-07-11 RX ORDER — FAMOTIDINE 10 MG/ML
20 INJECTION, SOLUTION INTRAVENOUS 2 TIMES DAILY
Status: DISCONTINUED | OUTPATIENT
Start: 2023-07-11 | End: 2023-07-12 | Stop reason: HOSPADM

## 2023-07-11 RX ADMIN — PROPOFOL 75 MCG/KG/MIN: 10 INJECTION, EMULSION INTRAVENOUS at 15:01

## 2023-07-11 RX ADMIN — CEFEPIME 2000 MG: 2 INJECTION, POWDER, FOR SOLUTION INTRAVENOUS at 04:57

## 2023-07-11 RX ADMIN — IPRATROPIUM BROMIDE AND ALBUTEROL SULFATE 1 DOSE: 2.5; .5 SOLUTION RESPIRATORY (INHALATION) at 09:11

## 2023-07-11 RX ADMIN — Medication 40 MG: at 15:01

## 2023-07-11 RX ADMIN — FAMOTIDINE 20 MG: 10 INJECTION, SOLUTION INTRAVENOUS at 20:23

## 2023-07-11 RX ADMIN — PROPOFOL 50 MG: 10 INJECTION, EMULSION INTRAVENOUS at 15:01

## 2023-07-11 RX ADMIN — ONDANSETRON 4 MG: 2 INJECTION INTRAMUSCULAR; INTRAVENOUS at 14:56

## 2023-07-11 RX ADMIN — CEFEPIME 2000 MG: 2 INJECTION, POWDER, FOR SOLUTION INTRAVENOUS at 21:12

## 2023-07-11 RX ADMIN — PRAMIPEXOLE DIHYDROCHLORIDE 0.5 MG: 0.25 TABLET ORAL at 08:54

## 2023-07-11 RX ADMIN — IPRATROPIUM BROMIDE AND ALBUTEROL SULFATE 1 DOSE: 2.5; .5 SOLUTION RESPIRATORY (INHALATION) at 16:40

## 2023-07-11 RX ADMIN — DEXAMETHASONE SODIUM PHOSPHATE 6 MG: 4 INJECTION, SOLUTION INTRAMUSCULAR; INTRAVENOUS at 17:14

## 2023-07-11 RX ADMIN — ARFORMOTEROL TARTRATE 15 MCG: 15 SOLUTION RESPIRATORY (INHALATION) at 09:11

## 2023-07-11 RX ADMIN — ATORVASTATIN CALCIUM 80 MG: 80 TABLET, FILM COATED ORAL at 08:54

## 2023-07-11 RX ADMIN — CEFEPIME 2000 MG: 2 INJECTION, POWDER, FOR SOLUTION INTRAVENOUS at 13:38

## 2023-07-11 RX ADMIN — Medication 10 MG: at 15:06

## 2023-07-11 RX ADMIN — Medication 10 ML: at 20:24

## 2023-07-11 RX ADMIN — DEXAMETHASONE SODIUM PHOSPHATE 6 MG: 4 INJECTION, SOLUTION INTRAMUSCULAR; INTRAVENOUS at 05:14

## 2023-07-11 RX ADMIN — SODIUM CHLORIDE: 9 INJECTION, SOLUTION INTRAVENOUS at 00:46

## 2023-07-11 RX ADMIN — IPRATROPIUM BROMIDE AND ALBUTEROL SULFATE 1 DOSE: 2.5; .5 SOLUTION RESPIRATORY (INHALATION) at 20:35

## 2023-07-11 RX ADMIN — ENOXAPARIN SODIUM 70 MG: 100 INJECTION SUBCUTANEOUS at 20:22

## 2023-07-11 RX ADMIN — POLYVINYL ALCOHOL, POVIDONE 1 DROP: 14; 6 SOLUTION/ DROPS OPHTHALMIC at 04:58

## 2023-07-11 RX ADMIN — ARFORMOTEROL TARTRATE 15 MCG: 15 SOLUTION RESPIRATORY (INHALATION) at 20:35

## 2023-07-11 RX ADMIN — SODIUM CHLORIDE: 4.5 INJECTION, SOLUTION INTRAVENOUS at 17:44

## 2023-07-11 RX ADMIN — ROFLUMILAST 500 MCG: 500 TABLET ORAL at 08:54

## 2023-07-11 RX ADMIN — BUDESONIDE 500 MCG: 0.5 SUSPENSION RESPIRATORY (INHALATION) at 20:35

## 2023-07-11 RX ADMIN — LIDOCAINE HYDROCHLORIDE 100 MG: 20 INJECTION, SOLUTION INFILTRATION; PERINEURAL at 15:01

## 2023-07-11 RX ADMIN — BUDESONIDE 500 MCG: 0.5 SUSPENSION RESPIRATORY (INHALATION) at 09:11

## 2023-07-11 RX ADMIN — FAMOTIDINE 20 MG: 20 TABLET ORAL at 08:54

## 2023-07-11 ASSESSMENT — ENCOUNTER SYMPTOMS: SHORTNESS OF BREATH: 1

## 2023-07-11 ASSESSMENT — PAIN - FUNCTIONAL ASSESSMENT: PAIN_FUNCTIONAL_ASSESSMENT: 0-10

## 2023-07-11 ASSESSMENT — LIFESTYLE VARIABLES: SMOKING_STATUS: 0

## 2023-07-11 ASSESSMENT — COPD QUESTIONNAIRES: CAT_SEVERITY: SEVERE

## 2023-07-11 NOTE — CARE COORDINATION
Discharge Planning. The SW received a Voicemail from [facility] from [employer]. [facility] stated they have accepted the patient and have started Pre-Cert on 0/50/4418.     Electronically signed by JORGE Horowitz on 7/11/2023 at 1:00 PM

## 2023-07-11 NOTE — CARE COORDINATION
Discharge Planning. The BIGG received a call from Todd from [facility] who informed the SW that the patient's insurance has the intent to deny and requested a Peer to Peer to be completed tomorrow. The BIGG provided Todd with the MD contact information to give the insurance company to reach out to the MD between 4pm-5pm tomorrow.      Electronically signed by JORGE Fernandez on 7/11/2023 at 3:34 PM

## 2023-07-11 NOTE — CONSULTS
PICC line education:    -Risks  -Benefits  -Alternatives  -Procedure    Discussed the above with patient, verbalized understanding, answered all questions. Provided with information on PICC care to review. PICC tip verified via [employer] (Ok to use). Reported off to patient's  Nurse Georgie Mejia.

## 2023-07-11 NOTE — CONSULTS
Gastroenterology Consult Note        Patient: Louise Carne  : 1950  Acct#:      Date:  2023    Subjective:       History of Present Illness  Patient is a 68 y.o. male admitted with SOB (shortness of breath) [R06.02] who is seen in consult for Gastric distention. Presented intitally with SOB was admitted to ICU and placed on Bipap. He was found to have copd exacerbation and pnemonia. He had an acute onset of non-bloody emesis 23. Surgery was consulted and an NG tube was placed for decompression. UGI done yesterday which did not show any gastric outlet obstruction but did mention pyloric stenosis. He is on eliquis (for afib) currently on hold last took  at 9am. He is currently resting in bed. Does have generalized abdominal pain worse epigastric. Nausea improved since NGT placement. Past Medical History:   Diagnosis Date    Arthritis     Atrial fibrillation ([address])     Bronchitis chronic     CAD (coronary artery disease)     CHF (congestive heart failure) ([address])     Emphysema of lung ([address])     HTN (hypertension)     Hx of blood clots     Influenza A 01/10/2018    Lung disease     copd    Pneumonia     2009    Stroke 2023    L MCA watershed      Past Surgical History:   Procedure Laterality Date    FRACTURE SURGERY      johnson in femur/hip on right    HERNIA REPAIR      SKIN BIOPSY        Past Endoscopic History Per patient >10 years ago. No reports available. Admission Meds  No current facility-administered medications on file prior to encounter.      Current Outpatient Medications on File Prior to Encounter   Medication Sig Dispense Refill    melatonin 3 MG TABS tablet Take by mouth      BRILINTA 90 MG TABS tablet  (Patient not taking: Reported on 2023)      potassium chloride (KLOR-CON M) 20 MEQ extended release tablet Take 0.5 tablets by mouth daily      budesonide (PULMICORT) 0.5 MG/2ML nebulizer suspension Take 2 mLs by nebulization in the morning and 2 mLs in

## 2023-07-11 NOTE — ANESTHESIA POSTPROCEDURE EVALUATION
Department of Anesthesiology  Postprocedure Note    Patient: Tamera Cabral  MRN: [id number]  YOB: 1950  Date of evaluation: 7/11/2023      Procedure Summary     Date: 07/11/23 Room / Location: [address] [place] [address]    Anesthesia Start: 1450 Anesthesia Stop: [phone]    Procedure: EGD DIAGNOSTIC ONLY (Abdomen) Diagnosis:       Gastric outlet obstruction      (Gastric outlet obstruction [K31.1])    Surgeons: Ramirez Lopez MD Responsible Provider: Anayeli Parsons MD    Anesthesia Type: general, MAC ASA Status: 4          Anesthesia Type: No value filed.     Maxim Phase I: Maxim Score: 9    Maxim Phase II:        Anesthesia Post Evaluation    Patient location during evaluation: PACU  Patient participation: complete - patient participated  Level of consciousness: awake and alert  Airway patency: patent  Nausea & Vomiting: no vomiting and no nausea  Complications: no  Cardiovascular status: hemodynamically stable  Respiratory status: acceptable  Hydration status: stable

## 2023-07-12 VITALS
DIASTOLIC BLOOD PRESSURE: 86 MMHG | RESPIRATION RATE: 22 BRPM | OXYGEN SATURATION: 95 % | SYSTOLIC BLOOD PRESSURE: 144 MMHG | HEIGHT: 68 IN | BODY MASS INDEX: 25.05 KG/M2 | HEART RATE: 88 BPM | WEIGHT: 165.3 LBS | TEMPERATURE: 98 F

## 2023-07-12 LAB
ANION GAP SERPL CALCULATED.3IONS-SCNC: 13 MMOL/L (ref 3–16)
BASOPHILS # BLD: 0 K/UL (ref 0–0.2)
BASOPHILS NFR BLD: 0.1 %
BUN SERPL-MCNC: 29 MG/DL (ref 7–20)
CALCIUM SERPL-MCNC: 8.7 MG/DL (ref 8.3–10.6)
CHLORIDE SERPL-SCNC: 103 MMOL/L (ref 99–110)
CO2 SERPL-SCNC: 29 MMOL/L (ref 21–32)
CREAT SERPL-MCNC: 0.7 MG/DL (ref 0.8–1.3)
DEPRECATED RDW RBC AUTO: 15.2 % (ref 12.4–15.4)
EOSINOPHIL # BLD: 0 K/UL (ref 0–0.6)
EOSINOPHIL NFR BLD: 0 %
GFR SERPLBLD CREATININE-BSD FMLA CKD-EPI: >60 ML/MIN/{1.73_M2}
GLUCOSE SERPL-MCNC: 136 MG/DL (ref 70–99)
HCT VFR BLD AUTO: 40.2 % (ref 40.5–52.5)
HGB BLD-MCNC: 12.9 G/DL (ref 13.5–17.5)
LYMPHOCYTES # BLD: 1 K/UL (ref 1–5.1)
LYMPHOCYTES NFR BLD: 6.8 %
MCH RBC QN AUTO: 30 PG (ref 26–34)
MCHC RBC AUTO-ENTMCNC: 32 G/DL (ref 31–36)
MCV RBC AUTO: 93.5 FL (ref 80–100)
MONOCYTES # BLD: 0.9 K/UL (ref 0–1.3)
MONOCYTES NFR BLD: 6.2 %
NEUTROPHILS # BLD: 12.7 K/UL (ref 1.7–7.7)
NEUTROPHILS NFR BLD: 86.9 %
PLATELET # BLD AUTO: 432 K/UL (ref 135–450)
PMV BLD AUTO: 9.2 FL (ref 5–10.5)
POTASSIUM SERPL-SCNC: 4.2 MMOL/L (ref 3.5–5.1)
RBC # BLD AUTO: 4.29 M/UL (ref 4.2–5.9)
SODIUM SERPL-SCNC: 145 MMOL/L (ref 136–145)
WBC # BLD AUTO: 14.6 K/UL (ref 4–11)

## 2023-07-12 PROCEDURE — APPNB30 APP NON BILLABLE TIME 0-30 MINS: Performed by: NURSE PRACTITIONER

## 2023-07-12 PROCEDURE — 2700000000 HC OXYGEN THERAPY PER DAY

## 2023-07-12 PROCEDURE — 6360000002 HC RX W HCPCS: Performed by: INTERNAL MEDICINE

## 2023-07-12 PROCEDURE — 6370000000 HC RX 637 (ALT 250 FOR IP): Performed by: INTERNAL MEDICINE

## 2023-07-12 PROCEDURE — 99232 SBSQ HOSP IP/OBS MODERATE 35: CPT | Performed by: SURGERY

## 2023-07-12 PROCEDURE — 97116 GAIT TRAINING THERAPY: CPT

## 2023-07-12 PROCEDURE — 2580000003 HC RX 258: Performed by: INTERNAL MEDICINE

## 2023-07-12 PROCEDURE — 99233 SBSQ HOSP IP/OBS HIGH 50: CPT | Performed by: INTERNAL MEDICINE

## 2023-07-12 PROCEDURE — 97530 THERAPEUTIC ACTIVITIES: CPT

## 2023-07-12 PROCEDURE — 80048 BASIC METABOLIC PNL TOTAL CA: CPT

## 2023-07-12 PROCEDURE — 2500000003 HC RX 250 WO HCPCS: Performed by: INTERNAL MEDICINE

## 2023-07-12 PROCEDURE — 85025 COMPLETE CBC W/AUTO DIFF WBC: CPT

## 2023-07-12 PROCEDURE — 97166 OT EVAL MOD COMPLEX 45 MIN: CPT

## 2023-07-12 PROCEDURE — APPSS15 APP SPLIT SHARED TIME 0-15 MINUTES: Performed by: NURSE PRACTITIONER

## 2023-07-12 PROCEDURE — 94640 AIRWAY INHALATION TREATMENT: CPT

## 2023-07-12 PROCEDURE — 94761 N-INVAS EAR/PLS OXIMETRY MLT: CPT

## 2023-07-12 PROCEDURE — 97162 PT EVAL MOD COMPLEX 30 MIN: CPT

## 2023-07-12 PROCEDURE — 97535 SELF CARE MNGMENT TRAINING: CPT

## 2023-07-12 RX ADMIN — POLYVINYL ALCOHOL, POVIDONE 1 DROP: 14; 6 SOLUTION/ DROPS OPHTHALMIC at 08:28

## 2023-07-12 RX ADMIN — ENOXAPARIN SODIUM 70 MG: 100 INJECTION SUBCUTANEOUS at 08:27

## 2023-07-12 RX ADMIN — PRAMIPEXOLE DIHYDROCHLORIDE 0.5 MG: 0.25 TABLET ORAL at 08:27

## 2023-07-12 RX ADMIN — Medication 10 ML: at 08:32

## 2023-07-12 RX ADMIN — BUDESONIDE 500 MCG: 0.5 SUSPENSION RESPIRATORY (INHALATION) at 12:26

## 2023-07-12 RX ADMIN — DEXAMETHASONE SODIUM PHOSPHATE 6 MG: 4 INJECTION, SOLUTION INTRAMUSCULAR; INTRAVENOUS at 04:28

## 2023-07-12 RX ADMIN — ATORVASTATIN CALCIUM 80 MG: 80 TABLET, FILM COATED ORAL at 08:27

## 2023-07-12 RX ADMIN — ARFORMOTEROL TARTRATE 15 MCG: 15 SOLUTION RESPIRATORY (INHALATION) at 12:22

## 2023-07-12 RX ADMIN — FAMOTIDINE 20 MG: 10 INJECTION, SOLUTION INTRAVENOUS at 08:26

## 2023-07-12 RX ADMIN — CEFEPIME 2000 MG: 2 INJECTION, POWDER, FOR SOLUTION INTRAVENOUS at 13:59

## 2023-07-12 RX ADMIN — CEFEPIME 2000 MG: 2 INJECTION, POWDER, FOR SOLUTION INTRAVENOUS at 04:37

## 2023-07-12 RX ADMIN — Medication 10 ML: at 08:36

## 2023-07-12 RX ADMIN — ROFLUMILAST 500 MCG: 500 TABLET ORAL at 08:31

## 2023-07-12 RX ADMIN — IPRATROPIUM BROMIDE AND ALBUTEROL SULFATE 1 DOSE: 2.5; .5 SOLUTION RESPIRATORY (INHALATION) at 12:26

## 2023-07-12 NOTE — CARE COORDINATION
CM received call from [place] at [facility] who report P2P has been change to 11:  am today. CM sent Dr. Nimo Mccauley new update p2p time via perfect serve. Electronically signed by Heladio Sanchez on 7/12/2023 at 8:29 AM     Per Dr. Nimo Mccauley Patient not appropreciate for LTAC. CM called [facility] to inform the above. Electronically signed by Heladio Sanchez on 7/12/2023 at 10:58 AM     Discharge Planning Note Re: Skilled Nursing     CM/SW noted consult for discharge planning. Chart reviewed. Noted recommendations for SNF. CM met with patient and Vero Segura Patient's son (via phone). Introduced self and explained role of CM and discharge planning. Patient is agreeable to SNF on dc. [place] of choice list was provided with basic dialogue that supports the patient's individualized plan of care/goals, treatment preferences and shares the quality data associated with the providers. [x] Yes [] No.  Patient and Vero Segura (Son)reports Pt. Has been to [facility] in past.     Referral made to [facility] in [address] Spoke with Jaime Roche in Admissions. Who reports can ACCEPT patient. Vero Segura (son) provided second choice [facility] in [place]. CM called [phone] provided referral to Heydi Burgos in Admissions. CM/SW will follow-up on referrals and provide any additional documentation necessary to facilitate placement. Electronically signed by Heladio Sanchez on 7/12/2023 at 15:61 AM     Pre-Cert completed via [address] [place] [address] [id number]  Reported the above to Jamie Roche in Admissions. Electronically signed by Heladio Sanchez on 7/12/2023 at 11:56 AM     Patient noted to have a discharge order.   Pt has been medically cleared for transition to [address]    Patient discharged to   [facility]  [address] [facility] [address], [address] [facility]  Phone: [phone]  Fax:  [phone]       GAIL Completed:

## 2023-07-12 NOTE — CASE COMMUNICATION
CM calls RN after discharge notice, for Transport plan. Patient's Son called, CM provided update that Patient is accepted at [facility]. KEVIN spoke with Frantz Sage at [facility] , gavin for patient to transport there today. CM awaiting call back from RN. If patient got to [facility] today [phone]  FAX for [address] [facility].      Electronically signed by Ke Streeter on 7/12/2023 at 3:32 PM

## 2023-07-12 NOTE — DISCHARGE INSTR - COC
Swallow with Video (Video Swallowing Test): not done    Treatments at the Time of Hospital Discharge:   Respiratory Treatments: 3 Liters  Oxygen Therapy:  is on oxygen at 3 L/min per nasal cannula. Ventilator:    - No ventilator support    Rehab Therapies: Physical Therapy  Weight Bearing Status/Restrictions: No weight bearing restrictions  Other Medical Equipment (for information only, NOT a DME order):  wheelchair  Other Treatments:    Patient's personal belongings (please select all that are sent with patient):  Clothing, electric razor, cell phone    RN SIGNATURE:  Electronically signed by Jagdish Stratton RN on 7/12/23 at 3:38 PM EDT    CASE MANAGEMENT/SOCIAL WORK SECTION    Inpatient Status Date: 07/07/2023    Readmission Risk Assessment Score: 23  Readmission Risk              Risk of Unplanned Readmission:  37           Discharging to Facility/ [facility]  [address] [facility] [address], [facility]  Phone: [phone]  Fax:         / signature: Electronically signed by Oswaldo Sin on 7/12/23 at 3:25 PM EDT    PHYSICIAN SECTION    Prognosis: Fair    Condition at Discharge: Stable    Rehab Potential (if transferring to Rehab): Fair    Recommended Labs or Other Treatments After Discharge:     Physician Certification: I certify the above information and transfer of Ken Hewitt  is necessary for the continuing treatment of the diagnosis listed and that he requires [address] for greater 30 days.      Update Admission H&P: No change in H&P    PHYSICIAN SIGNATURE:  Electronically signed by Jagdish Stratton RN on 7/12/23 at 3:35 PM EDT

## 2023-07-28 ENCOUNTER — TELEPHONE (OUTPATIENT)
Dept: PULMONOLOGY | Age: 73
End: 2023-07-28

## 2023-07-28 NOTE — TELEPHONE ENCOUNTER
Pts son called stating is being discharged from a nursing facility using Airvo. Pts son would like to know if Dr. Vilchis Rather would like the pt to continue use with it at home. Please advise. Also feels his Cpap machine could be lowered, he feels its too strong.      Bang(son)- [phone]

## 2023-08-07 ENCOUNTER — TELEPHONE (OUTPATIENT)
Dept: PULMONOLOGY | Age: 73
End: 2023-08-07

## 2023-08-07 DIAGNOSIS — J96.11 CHRONIC RESPIRATORY FAILURE WITH HYPOXIA AND HYPERCAPNIA (HCC): ICD-10-CM

## 2023-08-07 DIAGNOSIS — J44.9 STAGE 3 SEVERE COPD BY GOLD CLASSIFICATION (HCC): Primary | ICD-10-CM

## 2023-08-07 DIAGNOSIS — J96.12 CHRONIC RESPIRATORY FAILURE WITH HYPOXIA AND HYPERCAPNIA (HCC): ICD-10-CM

## 2023-08-07 NOTE — TELEPHONE ENCOUNTER
Pt's son called stating his father is asking for his bipap pressure to be lowered. States it feels suffocating to him. Please advise         Severe COPD   Tracheobronchitis   Chronic hypoxemic hypercapnic respiratory failure BiPAP 16/8 cm H2O. Optimal compliance and efficacy upon review today. Feels pressure is high. Hypoxia on exertion   30 pack year smoking - quit smoking 2016      Plan:       Complete prednisone taper  Doxycycline 100 mg PO BID for 5 days. Decrease BiPAP 15/6 cmH2O- done by me today   Advised to use BiPAP every night and during naps   Continue Symbicort BID and DuoNeb QID  Continue Daliresp 500 mcg PO daily  Continue O2 2-4 LPM on exertion. Advised to titrate O2 using her pulse oximeter- target O2 sat 90-92%. Advised to schedule his LDCT screening for lung cancer- he is interested now. Risks, benefits and alternatives including doing nothing were discussed with patient. Patient is up to date with Covid, Pneumococcal vaccine and influenza vaccine   Acapella and Mucinex   Advised to continue with smoking cessation.    Follow up in 3-6 months

## 2023-09-05 NOTE — PROGRESS NOTES
fraction is visually estimated to be 60%. No obvious regional wall   motion abnormalities. Unable to determine diastolic function due to atrial fibrillation. The right ventricle is normal in size and function. TAPSE is 1.71 cm. No pericardial effusion noted. Nuclear Stress Test: 05/02/2019   Summary    Normal LV function. There is normal isotope uptake at stress and rest. There is no evidence of    myocardial ischemia or scar. Cardiology Labs Reviewed:   CBC: No results for input(s): WBC, HGB, HCT, PLT in the last 72 hours. BMP:No results for input(s): NA, K, CO2, BUN, CREATININE, LABGLOM, GLUCOSE in the last 72 hours. PT/INR: No results for input(s): PROTIME, INR in the last 72 hours. APTT:No results for input(s): APTT in the last 72 hours. FASTING LIPID PANEL:  Lab Results   Component Value Date/Time    HDL 38 02/27/2023 04:59 AM    LDLCALC 128 02/27/2023 04:59 AM    TRIG 105 02/27/2023 04:59 AM     LIVER PROFILE:No results for input(s): AST, ALT, ALB in the last 72 hours. 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 Continue eliquis, torsemide, diltiazem 120 mg daily (had been decreased 3/2023 while hospitalized due to pauses)  2. Antiplatelet therapy managed by [facility]surgery at [email or web address] unclear if he is currently taking brilinta or aspirin (recent L carotid stent 3/2023 and multiple CVAs); he has multiple with [facility]  3.  Check BP at home and call the office if consistently out of goal

## 2023-09-06 ENCOUNTER — OFFICE VISIT (OUTPATIENT)
Dept: CARDIOLOGY CLINIC | Age: 73
End: 2023-09-06
Payer: MEDICARE

## 2023-09-06 VITALS
DIASTOLIC BLOOD PRESSURE: 62 MMHG | BODY MASS INDEX: 23.76 KG/M2 | HEART RATE: 78 BPM | SYSTOLIC BLOOD PRESSURE: 108 MMHG | WEIGHT: 156.8 LBS | OXYGEN SATURATION: 97 % | HEIGHT: 68 IN

## 2023-09-06 DIAGNOSIS — I48.21 PERMANENT ATRIAL FIBRILLATION (HCC): ICD-10-CM

## 2023-09-06 DIAGNOSIS — I50.32 CHRONIC DIASTOLIC (CONGESTIVE) HEART FAILURE (HCC): Primary | ICD-10-CM

## 2023-09-06 PROCEDURE — G8420 CALC BMI NORM PARAMETERS: HCPCS | Performed by: NURSE PRACTITIONER

## 2023-09-06 PROCEDURE — 3017F COLORECTAL CA SCREEN DOC REV: CPT | Performed by: NURSE PRACTITIONER

## 2023-09-06 PROCEDURE — 99214 OFFICE O/P EST MOD 30 MIN: CPT | Performed by: NURSE PRACTITIONER

## 2023-09-06 PROCEDURE — 3078F DIAST BP <80 MM HG: CPT | Performed by: NURSE PRACTITIONER

## 2023-09-06 PROCEDURE — 1123F ACP DISCUSS/DSCN MKR DOCD: CPT | Performed by: NURSE PRACTITIONER

## 2023-09-06 PROCEDURE — 1036F TOBACCO NON-USER: CPT | Performed by: NURSE PRACTITIONER

## 2023-09-06 PROCEDURE — 3074F SYST BP LT 130 MM HG: CPT | Performed by: NURSE PRACTITIONER

## 2023-09-06 PROCEDURE — G8427 DOCREV CUR MEDS BY ELIG CLIN: HCPCS | Performed by: NURSE PRACTITIONER

## 2023-09-06 RX ORDER — MULTIVIT-MIN/IRON/FOLIC ACID/K 18-600-40
CAPSULE ORAL
COMMUNITY

## 2023-09-06 RX ORDER — LORAZEPAM 0.5 MG/1
TABLET ORAL
COMMUNITY
Start: 2023-08-16

## 2023-09-06 RX ORDER — POLYETHYLENE GLYCOL 3350 17 G/17G
17 POWDER, FOR SOLUTION ORAL DAILY PRN
COMMUNITY

## 2023-09-06 RX ORDER — AZITHROMYCIN 250 MG/1
TABLET, FILM COATED ORAL
COMMUNITY
Start: 2023-08-26

## 2023-09-06 NOTE — PATIENT INSTRUCTIONS
Everything looks great today, good job!   Continue current medications  Follow up with Dr. Juliette Francisco in 4 months

## 2023-09-07 ASSESSMENT — ENCOUNTER SYMPTOMS
GASTROINTESTINAL NEGATIVE: 1
SHORTNESS OF BREATH: 1

## 2023-09-29 DIAGNOSIS — J44.9 STAGE 3 SEVERE COPD BY GOLD CLASSIFICATION (HCC): Primary | ICD-10-CM

## 2023-09-29 RX ORDER — ROFLUMILAST 500 UG/1
TABLET ORAL
Qty: 30 TABLET | Refills: 5 | Status: SHIPPED | OUTPATIENT
Start: 2023-09-29

## 2023-09-29 RX ORDER — FAMOTIDINE 20 MG/1
TABLET, FILM COATED ORAL
Qty: 180 TABLET | Refills: 3 | OUTPATIENT
Start: 2023-09-29

## 2023-10-25 RX ORDER — TORSEMIDE 10 MG/1
10 TABLET ORAL DAILY
Qty: 90 TABLET | Refills: 3 | Status: SHIPPED | OUTPATIENT
Start: 2023-10-25

## 2023-10-25 RX ORDER — DILTIAZEM HYDROCHLORIDE 120 MG/1
CAPSULE, COATED, EXTENDED RELEASE ORAL
Qty: 30 CAPSULE | Refills: 0 | OUTPATIENT
Start: 2023-10-25

## 2023-10-30 RX ORDER — DILTIAZEM HYDROCHLORIDE 180 MG/1
CAPSULE, COATED, EXTENDED RELEASE ORAL
Qty: 90 CAPSULE | Refills: 3 | Status: SHIPPED | OUTPATIENT
Start: 2023-10-30

## 2023-11-16 ENCOUNTER — TELEPHONE (OUTPATIENT)
Dept: PULMONOLOGY | Age: 73
End: 2023-11-16

## 2023-11-16 NOTE — TELEPHONE ENCOUNTER
Patient did not show for 3 mof/u COPD/Acrenio (CR scanned)  appointment  with Dr. Noa Thomas on 11/16/23    Same Day Cancellation: No    Patient rescheduled:  No    New appointment:     Patient was also no show on: n/a    LOV 5/23/23    Assessment:       Severe COPD   Tracheobronchitis   Chronic hypoxemic hypercapnic respiratory failure BiPAP 16/8 cm H2O. Optimal compliance and efficacy upon review today. Feels pressure is high. Hypoxia on exertion   30 pack year smoking - quit smoking 2016      Plan:       Complete prednisone taper  Doxycycline 100 mg PO BID for 5 days. Decrease BiPAP 15/6 cmH2O- done by me today   Advised to use BiPAP every night and during naps   Continue Symbicort BID and DuoNeb QID  Continue Daliresp 500 mcg PO daily  Continue O2 2-4 LPM on exertion. Advised to titrate O2 using her pulse oximeter- target O2 sat 90-92%. Advised to schedule his LDCT screening for lung cancer- he is interested now. Risks, benefits and alternatives including doing nothing were discussed with patient. Patient is up to date with Covid, Pneumococcal vaccine and influenza vaccine   Acapella and Mucinex   Advised to continue with smoking cessation.    Follow up in 3-6 months

## 2023-12-04 DIAGNOSIS — J44.9 STAGE 3 SEVERE COPD BY GOLD CLASSIFICATION (HCC): ICD-10-CM

## 2023-12-04 NOTE — TELEPHONE ENCOUNTER
LOV  05/23/23    NOV  none    LFD  03/08/23        Assessment:       Severe COPD   Tracheobronchitis   Chronic hypoxemic hypercapnic respiratory failure BiPAP 16/8 cm H2O. Optimal compliance and efficacy upon review today. Feels pressure is high. Hypoxia on exertion   30 pack year smoking - quit smoking 2016      Plan:       Complete prednisone taper  Doxycycline 100 mg PO BID for 5 days. Decrease BiPAP 15/6 cmH2O- done by me today   Advised to use BiPAP every night and during naps   Continue Symbicort BID and DuoNeb QID  Continue Daliresp 500 mcg PO daily  Continue O2 2-4 LPM on exertion. Advised to titrate O2 using her pulse oximeter- target O2 sat 90-92%. Advised to schedule his LDCT screening for lung cancer- he is interested now. Risks, benefits and alternatives including doing nothing were discussed with patient. Patient is up to date with Covid, Pneumococcal vaccine and influenza vaccine   Acapella and Mucinex   Advised to continue with smoking cessation.    Follow up in 3-6 months

## 2023-12-04 NOTE — TELEPHONE ENCOUNTER
Melquiades for aries [facility] [phone] to saira appt.

## 2023-12-05 RX ORDER — ALBUTEROL SULFATE 90 UG/1
AEROSOL, METERED RESPIRATORY (INHALATION)
Qty: 18 G | Refills: 5 | Status: SHIPPED | OUTPATIENT
Start: 2023-12-05

## 2023-12-14 ENCOUNTER — TELEPHONE (OUTPATIENT)
Dept: CARDIOLOGY CLINIC | Age: 73
End: 2023-12-14

## 2023-12-15 RX ORDER — POTASSIUM CHLORIDE 20 MEQ/1
20 TABLET, EXTENDED RELEASE ORAL DAILY
Qty: 90 TABLET | Refills: 3 | OUTPATIENT
Start: 2023-12-15

## 2023-12-15 RX ORDER — POTASSIUM CHLORIDE 20 MEQ/1
20 TABLET, EXTENDED RELEASE ORAL DAILY
Qty: 90 TABLET | Refills: 3 | Status: SHIPPED | OUTPATIENT
Start: 2023-12-15

## 2023-12-15 NOTE — TELEPHONE ENCOUNTER
Medication refused, this is a duplicate request. Potassium Chloride just signed for refill today 12/15/23 by Stevenson Ojeda To same pharmacy.

## 2023-12-15 NOTE — TELEPHONE ENCOUNTER
Refilled meds  Need follow up   Last seen By MAGDA Chun may see her or me   Check her note what she said.

## 2023-12-19 ENCOUNTER — HOSPITAL ENCOUNTER (INPATIENT)
Age: 73
LOS: 1 days | Discharge: HOME | DRG: 190 | End: 2023-12-22
Attending: STUDENT IN AN ORGANIZED HEALTH CARE EDUCATION/TRAINING PROGRAM
Payer: MEDICARE

## 2023-12-19 ENCOUNTER — APPOINTMENT (OUTPATIENT)
Dept: GENERAL RADIOLOGY | Age: 73
DRG: 190 | End: 2023-12-19
Payer: MEDICARE

## 2023-12-19 DIAGNOSIS — Z99.81 OXYGEN DEPENDENT: ICD-10-CM

## 2023-12-19 DIAGNOSIS — J44.1 ACUTE EXACERBATION OF CHRONIC OBSTRUCTIVE PULMONARY DISEASE (COPD) (HCC): Primary | ICD-10-CM

## 2023-12-19 DIAGNOSIS — J96.21 ACUTE ON CHRONIC RESPIRATORY FAILURE WITH HYPOXIA (HCC): ICD-10-CM

## 2023-12-19 DIAGNOSIS — E87.6 HYPOKALEMIA: ICD-10-CM

## 2023-12-19 LAB
ALBUMIN SERPL-MCNC: 3.9 G/DL (ref 3.4–5)
ALBUMIN/GLOB SERPL: 1.1 {RATIO} (ref 1.1–2.2)
ALP SERPL-CCNC: 127 U/L (ref 40–129)
ALT SERPL-CCNC: 15 U/L (ref 10–40)
ANION GAP SERPL CALCULATED.3IONS-SCNC: 11 MMOL/L (ref 3–16)
AST SERPL-CCNC: 18 U/L (ref 15–37)
BASOPHILS # BLD: 0.1 K/UL (ref 0–0.2)
BASOPHILS NFR BLD: 0.4 %
BILIRUB SERPL-MCNC: 0.8 MG/DL (ref 0–1)
BUN SERPL-MCNC: 15 MG/DL (ref 7–20)
CALCIUM SERPL-MCNC: 9.5 MG/DL (ref 8.3–10.6)
CHLORIDE SERPL-SCNC: 94 MMOL/L (ref 99–110)
CO2 SERPL-SCNC: 31 MMOL/L (ref 21–32)
CREAT SERPL-MCNC: 1 MG/DL (ref 0.8–1.3)
DEPRECATED RDW RBC AUTO: 15.5 % (ref 12.4–15.4)
EOSINOPHIL # BLD: 0.3 K/UL (ref 0–0.6)
EOSINOPHIL NFR BLD: 2.4 %
FLUAV RNA RESP QL NAA+PROBE: NOT DETECTED
FLUBV RNA RESP QL NAA+PROBE: NOT DETECTED
GFR SERPLBLD CREATININE-BSD FMLA CKD-EPI: >60 ML/MIN/{1.73_M2}
GLUCOSE SERPL-MCNC: 169 MG/DL (ref 70–99)
HCT VFR BLD AUTO: 46.3 % (ref 40.5–52.5)
HGB BLD-MCNC: 15.2 G/DL (ref 13.5–17.5)
INR PPP: 1.19 (ref 0.84–1.16)
LYMPHOCYTES # BLD: 1.9 K/UL (ref 1–5.1)
LYMPHOCYTES NFR BLD: 14.6 %
MAGNESIUM SERPL-MCNC: 2.1 MG/DL (ref 1.8–2.4)
MCH RBC QN AUTO: 29.6 PG (ref 26–34)
MCHC RBC AUTO-ENTMCNC: 32.8 G/DL (ref 31–36)
MCV RBC AUTO: 90.4 FL (ref 80–100)
MONOCYTES # BLD: 0.9 K/UL (ref 0–1.3)
MONOCYTES NFR BLD: 7.2 %
NEUTROPHILS # BLD: 9.6 K/UL (ref 1.7–7.7)
NEUTROPHILS NFR BLD: 75.4 %
NT-PROBNP SERPL-MCNC: 359 PG/ML (ref 0–124)
PLATELET # BLD AUTO: 341 K/UL (ref 135–450)
PMV BLD AUTO: 9.2 FL (ref 5–10.5)
POTASSIUM SERPL-SCNC: 3.1 MMOL/L (ref 3.5–5.1)
PROT SERPL-MCNC: 7.3 G/DL (ref 6.4–8.2)
PROTHROMBIN TIME: 15.1 SEC (ref 11.5–14.8)
RBC # BLD AUTO: 5.13 M/UL (ref 4.2–5.9)
SARS-COV-2 RNA RESP QL NAA+PROBE: NOT DETECTED
SODIUM SERPL-SCNC: 136 MMOL/L (ref 136–145)
TROPONIN, HIGH SENSITIVITY: 36 NG/L (ref 0–22)
TROPONIN, HIGH SENSITIVITY: 38 NG/L (ref 0–22)
WBC # BLD AUTO: 12.7 K/UL (ref 4–11)

## 2023-12-19 PROCEDURE — 93005 ELECTROCARDIOGRAM TRACING: CPT | Performed by: STUDENT IN AN ORGANIZED HEALTH CARE EDUCATION/TRAINING PROGRAM

## 2023-12-19 PROCEDURE — 96375 TX/PRO/DX INJ NEW DRUG ADDON: CPT

## 2023-12-19 PROCEDURE — 85610 PROTHROMBIN TIME: CPT

## 2023-12-19 PROCEDURE — 2500000003 HC RX 250 WO HCPCS: Performed by: STUDENT IN AN ORGANIZED HEALTH CARE EDUCATION/TRAINING PROGRAM

## 2023-12-19 PROCEDURE — 87636 SARSCOV2 & INF A&B AMP PRB: CPT

## 2023-12-19 PROCEDURE — 6370000000 HC RX 637 (ALT 250 FOR IP): Performed by: PHYSICIAN ASSISTANT

## 2023-12-19 PROCEDURE — 84484 ASSAY OF TROPONIN QUANT: CPT

## 2023-12-19 PROCEDURE — 6360000002 HC RX W HCPCS: Performed by: PHYSICIAN ASSISTANT

## 2023-12-19 PROCEDURE — 71045 X-RAY EXAM CHEST 1 VIEW: CPT

## 2023-12-19 PROCEDURE — 99285 EMERGENCY DEPT VISIT HI MDM: CPT

## 2023-12-19 PROCEDURE — 83880 ASSAY OF NATRIURETIC PEPTIDE: CPT

## 2023-12-19 PROCEDURE — 80053 COMPREHEN METABOLIC PANEL: CPT

## 2023-12-19 PROCEDURE — 2580000003 HC RX 258: Performed by: STUDENT IN AN ORGANIZED HEALTH CARE EDUCATION/TRAINING PROGRAM

## 2023-12-19 PROCEDURE — 96365 THER/PROPH/DIAG IV INF INIT: CPT

## 2023-12-19 PROCEDURE — 85025 COMPLETE CBC W/AUTO DIFF WBC: CPT

## 2023-12-19 PROCEDURE — 87040 BLOOD CULTURE FOR BACTERIA: CPT

## 2023-12-19 PROCEDURE — 83735 ASSAY OF MAGNESIUM: CPT

## 2023-12-19 PROCEDURE — 94640 AIRWAY INHALATION TREATMENT: CPT

## 2023-12-19 RX ORDER — ALBUTEROL SULFATE 2.5 MG/3ML
2.5 SOLUTION RESPIRATORY (INHALATION) ONCE
Status: COMPLETED | OUTPATIENT
Start: 2023-12-19 | End: 2023-12-19

## 2023-12-19 RX ORDER — ONDANSETRON 2 MG/ML
4 INJECTION INTRAMUSCULAR; INTRAVENOUS ONCE
Status: COMPLETED | OUTPATIENT
Start: 2023-12-19 | End: 2023-12-19

## 2023-12-19 RX ORDER — IPRATROPIUM BROMIDE AND ALBUTEROL SULFATE 2.5; .5 MG/3ML; MG/3ML
1 SOLUTION RESPIRATORY (INHALATION) ONCE
Status: COMPLETED | OUTPATIENT
Start: 2023-12-19 | End: 2023-12-19

## 2023-12-19 RX ORDER — METHYLPREDNISOLONE SODIUM SUCCINATE 125 MG/2ML
125 INJECTION, POWDER, LYOPHILIZED, FOR SOLUTION INTRAMUSCULAR; INTRAVENOUS ONCE
Status: COMPLETED | OUTPATIENT
Start: 2023-12-19 | End: 2023-12-19

## 2023-12-19 RX ADMIN — ALBUTEROL SULFATE 2.5 MG: 2.5 SOLUTION RESPIRATORY (INHALATION) at 23:07

## 2023-12-19 RX ADMIN — METHYLPREDNISOLONE SODIUM SUCCINATE 125 MG: 125 INJECTION INTRAMUSCULAR; INTRAVENOUS at 21:57

## 2023-12-19 RX ADMIN — IPRATROPIUM BROMIDE AND ALBUTEROL SULFATE 1 DOSE: 2.5; .5 SOLUTION RESPIRATORY (INHALATION) at 23:07

## 2023-12-19 RX ADMIN — POTASSIUM BICARBONATE 40 MEQ: 782 TABLET, EFFERVESCENT ORAL at 22:28

## 2023-12-19 RX ADMIN — DOXYCYCLINE 100 MG: 100 INJECTION, POWDER, LYOPHILIZED, FOR SOLUTION INTRAVENOUS at 22:55

## 2023-12-19 RX ADMIN — ONDANSETRON 4 MG: 2 INJECTION INTRAMUSCULAR; INTRAVENOUS at 23:57

## 2023-12-20 PROBLEM — J96.21 ACUTE ON CHRONIC RESPIRATORY FAILURE WITH HYPOXIA (HCC): Status: ACTIVE | Noted: 2023-12-20

## 2023-12-20 LAB
BACTERIA URNS QL MICRO: ABNORMAL /HPF
BASE EXCESS BLDV CALC-SCNC: 2.9 MMOL/L (ref -3–3)
BILIRUB UR QL STRIP.AUTO: NEGATIVE
CLARITY UR: CLEAR
CO2 BLDV-SCNC: 29 MMOL/L
COHGB MFR BLDV: 1.4 % (ref 0–1.5)
COLOR UR: YELLOW
EKG ATRIAL RATE: 70 BPM
EKG DIAGNOSIS: NORMAL
EKG Q-T INTERVAL: 398 MS
EKG QRS DURATION: 94 MS
EKG QTC CALCULATION (BAZETT): 456 MS
EKG R AXIS: -27 DEGREES
EKG T AXIS: 11 DEGREES
EKG VENTRICULAR RATE: 79 BPM
FINE GRAN CASTS #/AREA URNS HPF: ABNORMAL /LPF (ref 0–2)
GLUCOSE UR STRIP.AUTO-MCNC: NEGATIVE MG/DL
HCO3 BLDV-SCNC: 27.8 MMOL/L (ref 23–29)
HGB UR QL STRIP.AUTO: NEGATIVE
KETONES UR STRIP.AUTO-MCNC: NEGATIVE MG/DL
LEUKOCYTE ESTERASE UR QL STRIP.AUTO: NEGATIVE
METHGB MFR BLDV: 0.8 %
MUCOUS THREADS #/AREA URNS LPF: ABNORMAL /LPF
NITRITE UR QL STRIP.AUTO: NEGATIVE
O2 THERAPY: NORMAL
PCO2 BLDV: 43.5 MMHG (ref 40–50)
PH BLDV: 7.42 [PH] (ref 7.35–7.45)
PH UR STRIP.AUTO: 6 [PH] (ref 5–8)
PO2 BLDV: 39.5 MMHG (ref 25–40)
PROT UR STRIP.AUTO-MCNC: 30 MG/DL
RBC #/AREA URNS HPF: ABNORMAL /HPF (ref 0–4)
SAO2 % BLDV: 75 %
SP GR UR STRIP.AUTO: 1.02 (ref 1–1.03)
UA COMPLETE W REFLEX CULTURE PNL UR: ABNORMAL
UA DIPSTICK W REFLEX MICRO PNL UR: YES
URN SPEC COLLECT METH UR: ABNORMAL
UROBILINOGEN UR STRIP-ACNC: 0.2 E.U./DL
WBC #/AREA URNS HPF: ABNORMAL /HPF (ref 0–5)

## 2023-12-20 PROCEDURE — 97535 SELF CARE MNGMENT TRAINING: CPT

## 2023-12-20 PROCEDURE — 6370000000 HC RX 637 (ALT 250 FOR IP)

## 2023-12-20 PROCEDURE — 97530 THERAPEUTIC ACTIVITIES: CPT

## 2023-12-20 PROCEDURE — 99222 1ST HOSP IP/OBS MODERATE 55: CPT | Performed by: INTERNAL MEDICINE

## 2023-12-20 PROCEDURE — G0378 HOSPITAL OBSERVATION PER HR: HCPCS

## 2023-12-20 PROCEDURE — 96366 THER/PROPH/DIAG IV INF ADDON: CPT

## 2023-12-20 PROCEDURE — 6360000002 HC RX W HCPCS: Performed by: INTERNAL MEDICINE

## 2023-12-20 PROCEDURE — 81001 URINALYSIS AUTO W/SCOPE: CPT

## 2023-12-20 PROCEDURE — 2700000000 HC OXYGEN THERAPY PER DAY

## 2023-12-20 PROCEDURE — 97162 PT EVAL MOD COMPLEX 30 MIN: CPT

## 2023-12-20 PROCEDURE — 2580000003 HC RX 258

## 2023-12-20 PROCEDURE — 94761 N-INVAS EAR/PLS OXIMETRY MLT: CPT

## 2023-12-20 PROCEDURE — 96376 TX/PRO/DX INJ SAME DRUG ADON: CPT

## 2023-12-20 PROCEDURE — 97116 GAIT TRAINING THERAPY: CPT

## 2023-12-20 PROCEDURE — 94640 AIRWAY INHALATION TREATMENT: CPT

## 2023-12-20 PROCEDURE — 87449 NOS EACH ORGANISM AG IA: CPT

## 2023-12-20 PROCEDURE — 36415 COLL VENOUS BLD VENIPUNCTURE: CPT

## 2023-12-20 PROCEDURE — 97166 OT EVAL MOD COMPLEX 45 MIN: CPT

## 2023-12-20 PROCEDURE — 93010 ELECTROCARDIOGRAM REPORT: CPT | Performed by: INTERNAL MEDICINE

## 2023-12-20 PROCEDURE — 82803 BLOOD GASES ANY COMBINATION: CPT

## 2023-12-20 PROCEDURE — 6360000002 HC RX W HCPCS

## 2023-12-20 PROCEDURE — 2580000003 HC RX 258: Performed by: STUDENT IN AN ORGANIZED HEALTH CARE EDUCATION/TRAINING PROGRAM

## 2023-12-20 PROCEDURE — 96367 TX/PROPH/DG ADDL SEQ IV INF: CPT

## 2023-12-20 PROCEDURE — 2500000003 HC RX 250 WO HCPCS: Performed by: STUDENT IN AN ORGANIZED HEALTH CARE EDUCATION/TRAINING PROGRAM

## 2023-12-20 RX ORDER — FAMOTIDINE 20 MG/1
20 TABLET, FILM COATED ORAL 2 TIMES DAILY
Status: DISCONTINUED | OUTPATIENT
Start: 2023-12-20 | End: 2023-12-22 | Stop reason: HOSPADM

## 2023-12-20 RX ORDER — ACETAMINOPHEN 650 MG/1
650 SUPPOSITORY RECTAL EVERY 6 HOURS PRN
Status: DISCONTINUED | OUTPATIENT
Start: 2023-12-20 | End: 2023-12-22 | Stop reason: HOSPADM

## 2023-12-20 RX ORDER — GUAIFENESIN 600 MG/1
600 TABLET, EXTENDED RELEASE ORAL 2 TIMES DAILY
Status: DISCONTINUED | OUTPATIENT
Start: 2023-12-20 | End: 2023-12-22 | Stop reason: HOSPADM

## 2023-12-20 RX ORDER — ATORVASTATIN CALCIUM 80 MG/1
80 TABLET, FILM COATED ORAL DAILY
Status: DISCONTINUED | OUTPATIENT
Start: 2023-12-20 | End: 2023-12-22 | Stop reason: HOSPADM

## 2023-12-20 RX ORDER — ALBUTEROL SULFATE 2.5 MG/3ML
2.5 SOLUTION RESPIRATORY (INHALATION)
Status: DISCONTINUED | OUTPATIENT
Start: 2023-12-20 | End: 2023-12-22 | Stop reason: HOSPADM

## 2023-12-20 RX ORDER — IPRATROPIUM BROMIDE AND ALBUTEROL SULFATE 2.5; .5 MG/3ML; MG/3ML
1 SOLUTION RESPIRATORY (INHALATION)
Status: DISCONTINUED | OUTPATIENT
Start: 2023-12-20 | End: 2023-12-20

## 2023-12-20 RX ORDER — METHYLPREDNISOLONE SODIUM SUCCINATE 40 MG/ML
40 INJECTION, POWDER, LYOPHILIZED, FOR SOLUTION INTRAMUSCULAR; INTRAVENOUS EVERY 12 HOURS
Status: DISCONTINUED | OUTPATIENT
Start: 2023-12-20 | End: 2023-12-22

## 2023-12-20 RX ORDER — DILTIAZEM HYDROCHLORIDE 180 MG/1
180 CAPSULE, COATED, EXTENDED RELEASE ORAL DAILY
Status: DISCONTINUED | OUTPATIENT
Start: 2023-12-20 | End: 2023-12-22 | Stop reason: HOSPADM

## 2023-12-20 RX ORDER — WATER 10 ML/10ML
INJECTION INTRAMUSCULAR; INTRAVENOUS; SUBCUTANEOUS
Status: DISPENSED
Start: 2023-12-20 | End: 2023-12-20

## 2023-12-20 RX ORDER — POLYETHYLENE GLYCOL 3350 17 G/17G
17 POWDER, FOR SOLUTION ORAL DAILY PRN
Status: DISCONTINUED | OUTPATIENT
Start: 2023-12-20 | End: 2023-12-22 | Stop reason: HOSPADM

## 2023-12-20 RX ORDER — POTASSIUM CHLORIDE 20 MEQ/1
20 TABLET, EXTENDED RELEASE ORAL DAILY
Status: DISCONTINUED | OUTPATIENT
Start: 2023-12-20 | End: 2023-12-22 | Stop reason: HOSPADM

## 2023-12-20 RX ORDER — ONDANSETRON 4 MG/1
4 TABLET, ORALLY DISINTEGRATING ORAL EVERY 8 HOURS PRN
Status: DISCONTINUED | OUTPATIENT
Start: 2023-12-20 | End: 2023-12-22 | Stop reason: HOSPADM

## 2023-12-20 RX ORDER — TORSEMIDE 20 MG/1
10 TABLET ORAL DAILY
Status: DISCONTINUED | OUTPATIENT
Start: 2023-12-20 | End: 2023-12-22 | Stop reason: HOSPADM

## 2023-12-20 RX ORDER — ASPIRIN 81 MG/1
81 TABLET, CHEWABLE ORAL DAILY
Status: DISCONTINUED | OUTPATIENT
Start: 2023-12-20 | End: 2023-12-22 | Stop reason: HOSPADM

## 2023-12-20 RX ORDER — SODIUM CHLORIDE 0.9 % (FLUSH) 0.9 %
5-40 SYRINGE (ML) INJECTION EVERY 12 HOURS SCHEDULED
Status: DISCONTINUED | OUTPATIENT
Start: 2023-12-20 | End: 2023-12-22 | Stop reason: HOSPADM

## 2023-12-20 RX ORDER — SODIUM CHLORIDE 0.9 % (FLUSH) 0.9 %
5-40 SYRINGE (ML) INJECTION PRN
Status: DISCONTINUED | OUTPATIENT
Start: 2023-12-20 | End: 2023-12-22 | Stop reason: HOSPADM

## 2023-12-20 RX ORDER — SODIUM CHLORIDE 9 MG/ML
INJECTION, SOLUTION INTRAVENOUS PRN
Status: DISCONTINUED | OUTPATIENT
Start: 2023-12-20 | End: 2023-12-22 | Stop reason: HOSPADM

## 2023-12-20 RX ORDER — ONDANSETRON 2 MG/ML
4 INJECTION INTRAMUSCULAR; INTRAVENOUS EVERY 6 HOURS PRN
Status: DISCONTINUED | OUTPATIENT
Start: 2023-12-20 | End: 2023-12-22 | Stop reason: HOSPADM

## 2023-12-20 RX ORDER — METHYLPREDNISOLONE SODIUM SUCCINATE 40 MG/ML
40 INJECTION, POWDER, LYOPHILIZED, FOR SOLUTION INTRAMUSCULAR; INTRAVENOUS EVERY 6 HOURS
Status: DISCONTINUED | OUTPATIENT
Start: 2023-12-20 | End: 2023-12-20

## 2023-12-20 RX ORDER — PREDNISONE 20 MG/1
40 TABLET ORAL DAILY
Status: DISCONTINUED | OUTPATIENT
Start: 2023-12-22 | End: 2023-12-20

## 2023-12-20 RX ORDER — ACETAMINOPHEN 325 MG/1
650 TABLET ORAL EVERY 6 HOURS PRN
Status: DISCONTINUED | OUTPATIENT
Start: 2023-12-20 | End: 2023-12-22 | Stop reason: HOSPADM

## 2023-12-20 RX ADMIN — DILTIAZEM HYDROCHLORIDE 180 MG: 180 CAPSULE, COATED, EXTENDED RELEASE ORAL at 09:28

## 2023-12-20 RX ADMIN — Medication 10 ML: at 20:08

## 2023-12-20 RX ADMIN — ALBUTEROL SULFATE 2.5 MG: 2.5 SOLUTION RESPIRATORY (INHALATION) at 11:32

## 2023-12-20 RX ADMIN — AZITHROMYCIN MONOHYDRATE 500 MG: 500 INJECTION, POWDER, LYOPHILIZED, FOR SOLUTION INTRAVENOUS at 02:41

## 2023-12-20 RX ADMIN — APIXABAN 5 MG: 5 TABLET, FILM COATED ORAL at 02:36

## 2023-12-20 RX ADMIN — ALBUTEROL SULFATE 2.5 MG: 2.5 SOLUTION RESPIRATORY (INHALATION) at 20:12

## 2023-12-20 RX ADMIN — ALBUTEROL SULFATE 2.5 MG: 2.5 SOLUTION RESPIRATORY (INHALATION) at 08:18

## 2023-12-20 RX ADMIN — GUAIFENESIN 600 MG: 600 TABLET ORAL at 20:07

## 2023-12-20 RX ADMIN — FAMOTIDINE 20 MG: 20 TABLET ORAL at 09:28

## 2023-12-20 RX ADMIN — METHYLPREDNISOLONE SODIUM SUCCINATE 40 MG: 40 INJECTION INTRAMUSCULAR; INTRAVENOUS at 02:37

## 2023-12-20 RX ADMIN — FAMOTIDINE 20 MG: 20 TABLET ORAL at 20:07

## 2023-12-20 RX ADMIN — APIXABAN 5 MG: 5 TABLET, FILM COATED ORAL at 20:07

## 2023-12-20 RX ADMIN — ATORVASTATIN CALCIUM 80 MG: 80 TABLET, FILM COATED ORAL at 09:28

## 2023-12-20 RX ADMIN — TIOTROPIUM BROMIDE INHALATION SPRAY 2 PUFF: 3.12 SPRAY, METERED RESPIRATORY (INHALATION) at 08:20

## 2023-12-20 RX ADMIN — GUAIFENESIN 600 MG: 600 TABLET ORAL at 09:28

## 2023-12-20 RX ADMIN — TORSEMIDE 10 MG: 20 TABLET ORAL at 09:28

## 2023-12-20 RX ADMIN — Medication 10 ML: at 09:29

## 2023-12-20 RX ADMIN — METHYLPREDNISOLONE SODIUM SUCCINATE 40 MG: 40 INJECTION INTRAMUSCULAR; INTRAVENOUS at 09:29

## 2023-12-20 RX ADMIN — Medication 2 PUFF: at 20:19

## 2023-12-20 RX ADMIN — SODIUM CHLORIDE: 9 INJECTION, SOLUTION INTRAVENOUS at 11:35

## 2023-12-20 RX ADMIN — DOXYCYCLINE 100 MG: 100 INJECTION, POWDER, LYOPHILIZED, FOR SOLUTION INTRAVENOUS at 11:36

## 2023-12-20 RX ADMIN — Medication 2 PUFF: at 08:22

## 2023-12-20 RX ADMIN — FAMOTIDINE 20 MG: 20 TABLET ORAL at 02:36

## 2023-12-20 RX ADMIN — ASPIRIN 81 MG 81 MG: 81 TABLET ORAL at 09:29

## 2023-12-20 RX ADMIN — ALBUTEROL SULFATE 2.5 MG: 2.5 SOLUTION RESPIRATORY (INHALATION) at 16:23

## 2023-12-20 RX ADMIN — METHYLPREDNISOLONE SODIUM SUCCINATE 40 MG: 40 INJECTION INTRAMUSCULAR; INTRAVENOUS at 20:08

## 2023-12-20 RX ADMIN — POTASSIUM CHLORIDE 20 MEQ: 1500 TABLET, EXTENDED RELEASE ORAL at 09:28

## 2023-12-20 RX ADMIN — APIXABAN 5 MG: 5 TABLET, FILM COATED ORAL at 09:28

## 2023-12-20 RX ADMIN — GUAIFENESIN 600 MG: 600 TABLET ORAL at 02:36

## 2023-12-20 NOTE — PROGRESS NOTES
Pt admitted to room 215 from ER. Tele box in place, pt aware of reason. Pt oriented to room and call light. Bed alarm on for pt safety, pt aware of reason.

## 2023-12-20 NOTE — PROGRESS NOTES
0945- Morning assessment and vital signs complete. Morning labs reviewed. Patient given scheduled medications as prescribed. He is alert and oriented x4. Patient denies pain at this time. He is currently working with OT/PT. Call light is within reach. 1145- New consult noted for Pulmonary from Mo/GWEN. Consult placed with MD/Jeffery for COPD.    1400- New orders noted. Patient given sputum container and also clean catch container for urine. Patient educated on the need for sputum and urine specimen. Verbal understanding stated.

## 2023-12-20 NOTE — ED PROVIDER NOTES
[address] ENCOUNTER        Pt Name: Abbie Steven  MRN: [id number]  [address] [facility] 1950  Date of evaluation: 12/19/2023  Provider: Gato Ornelas PA-C  PCP: ERIN Barnes NP  Note Started: 10:30 PM EST 12/19/23       I have seen and evaluated this patient with my supervising physician Jenny Magana MD.      [address]       Chief Complaint   Patient presents with    Nausea     Intermittent nausea for a week. Shortness of Breath     SOB, cough, congestion, leg swelling (left worse than right)  x 3 days. Hx of COPD & CHF. 3.5 L NC at baseline. Patient states he is coughing up a lot of phlegm. Patient denies being around anyone sick. HISTORY OF PRESENT ILLNESS: 1 or more Elements     History From: Patient, son, granddaughter - daughter RN supervisor ICU this hospital.    Abbie Steven is a 68 y.o. male who presents to the emergency department for evaluation of shortness of breath. Patient is O2 dependent at 3.5 L. Currently saturating at rest 96%. It is reported by son and verified by granddaughter that the patient has increased HANKINS transitioning room to room within the house. They have not assessed his oxygen saturation with the shortness of breath noted with exertion. Patient complaining of some occasional nausea without vomiting, diarrhea or constipation. No current urinary complaints. Patient does report shortness of breath slightly beyond usual baseline with past few days and much worse with any exertion in the house. He does indicate some nocturnal leg cramps over the past couple nights. Some mild bilateral pedal edema left greater than right. Sputum production is reported in nursing notes. Patient reporting no lightheadedness or nasal congestion. Denies any fevers or chills. Patient has had Pneumovax 1/12/2018, flu shot 10/22/2022. He has had initial COVID vaccination with 2 boosters.   Last COVID booster flu studies are negative. X-ray showing of eczematous changes of congestion without overt pulmonary venous congestion or pulmonary edema. Patient is O2 dependent at 3.5 L continuous x 10 years. Laboratories noting initial troponin 38 repeat troponin 36. Patient WBC 12.7 without shift. The patient proBNP 359. INR 1.19, magnesium 2.10, potassium 3.1 and Effer-K 40 mEq administered. COVID and flu studies are negative. ED treatment albuterol x 1, DuoNeb x 1, Solu-Medrol 125 mg IV x 1, Effer-K 40 mEq, doxycycline 100 mg IVPB every 12 hour initiated. Zofran 4 mg IV x 1. Disposition Considerations (tests considered but not done, Admit vs D/C, Shared Decision Making, Pt Expectation of Test or Tx.):      This patient benefit admission for further management of acute COPD exacerbation in the presence of O2 dependency. Does not require increased oxygen. Patient does have increased shortness of breath worse with exertion and this is particular what brought him in. Family also concerned regarding CHF exacerbation which I do not find to be the case. This patient will clearly benefit admission for further treatment of acute COPD exacerbation. This case was reviewed and discussed with attending physician who personally evaluated the patient. I did reassess patient at 11:13 PM.  He is currently getting his breathing treatments. I did send PerfectServe note to hospitalist at 11:18 PM.      I am the Primary Clinician of Record. FINAL IMPRESSION      1. Acute exacerbation of chronic obstructive pulmonary disease (COPD) ([address] )    2. Hypokalemia    3. Oxygen dependent    4.  Acute on chronic respiratory failure with hypoxia [facility])          DISPOSITION/PLAN     DISPOSITION Admitted 12/20/2023 12:24:39 AM      PATIENT REFERRED TO:  Annelise Ye, APRN - NP  [address] [facility] [address] [facility]  [phone]    Schedule an appointment as soon as possible for a visit in 1 week(s)      Chica Conrad [place] Devora,

## 2023-12-20 NOTE — PROGRESS NOTES
Occupational Therapy  Facility/Department: [facility] A2 CARD TELEMETRY  Occupational Therapy Initial Assessment & Treatment Note     Name: Miya Oden  : 1950  MRN: [id number]  Date of Service: 2023    Discharge Recommendations:  24 hour supervision or assist, Home with Home health OT  OT Equipment Recommendations  Other: pt owns all necessary AE/ DME       Patient Diagnosis(es): The primary encounter diagnosis was Acute exacerbation of chronic obstructive pulmonary disease (COPD) ([address]). Diagnoses of Hypokalemia, Oxygen dependent, and Acute on chronic respiratory failure with hypoxia (HCC) were also pertinent to this visit. Past Medical History:  has a past medical history of Arthritis, Atrial fibrillation ([address]), Bronchitis chronic, CAD (coronary artery disease), CHF (congestive heart failure) ([address]), Emphysema of lung ([address]), HTN (hypertension), Hx of blood clots, Influenza A, Lung disease, Pneumonia, and Stroke. Past Surgical History:  has a past surgical history that includes fracture surgery; hernia repair; skin biopsy; and Upper gastrointestinal endoscopy (N/A, 2023). Assessment   Performance deficits / Impairments: Decreased functional mobility ; Decreased ADL status; Decreased endurance;Decreased strength  Assessment: 67 y/o male presenting to [facility] ED w/ acute respiratory failure w/ hypoxia. Pt tolerated session well requiring MOD IND- SBA for bed mobility and SB-CGA overall for functional mobility/ transfers and ADLs assessed (grooming in stance, LB dress, toileting). Pt on 4L of SpO2 via NC at time of eval and demo'd good awareness of energy conservation techniques w/ activity engagement via performing pursed lip breathing during periods of inc fatigue/ SOB and exhaling on exertion during functional transfers.  However, pt remains limited by impaired endurance and activity tolerance resulting in inc time required to complete therapeutic activities and seated/ standing rest breaks to

## 2023-12-20 NOTE — PROGRESS NOTES
Unable to complete med rec upon admission as pt does not know which meds he takes.  Family to bring in med list in AM.

## 2023-12-20 NOTE — PROGRESS NOTES
Physical Therapy  Facility/Department: [facility] A2 CARD TELEMETRY  Physical Therapy Initial Assessment    Name: Junior Hill  : 1950  MRN: [id number]  Date of Service: 2023    Discharge Recommendations:  24 hour supervision or assist, Home with Home health PT   PT Equipment Recommendations  Equipment Needed: No  Other: pt has 4WW and w/c at home      Patient Diagnosis(es): The primary encounter diagnosis was Acute exacerbation of chronic obstructive pulmonary disease (COPD) ([address]). Diagnoses of Hypokalemia, Oxygen dependent, and Acute on chronic respiratory failure with hypoxia (HCC) were also pertinent to this visit. Past Medical History:  has a past medical history of Arthritis, Atrial fibrillation ([address]), Bronchitis chronic, CAD (coronary artery disease), CHF (congestive heart failure) ([address]), Emphysema of lung ([address]), HTN (hypertension), Hx of blood clots, Influenza A, Lung disease, Pneumonia, and Stroke. Past Surgical History:  has a past surgical history that includes fracture surgery; hernia repair; skin biopsy; and Upper gastrointestinal endoscopy (N/A, 2023). Assessment   Body Structures, Functions, Activity Limitations Requiring Skilled Therapeutic Intervention: Decreased functional mobility ; Decreased strength;Decreased endurance;Decreased balance  Assessment: Pt is a 68year old male admitted with acute COPD exacerbation. Pt able to complete bed mobility with supervision, functional transfers with SBA, and ambulate community distances with SBA using RW. He does require seated rest breaks after each bout of ambulation d/t SOB and increased work of breathing. Pt on 4L O2 throughout session, with O2 sats decreasing to 86% with ambulation, however recover quickly with 90% or greater after 1 min seated rest. Pt limited by deconditioning and SOB with activity however he demo's good awareness of energy conservation techniques, need for rest breaks, and PLB.  He is currently functioning below

## 2023-12-20 NOTE — ED PROVIDER NOTES
Attending Supervisory Note/Shared Visit     I personally saw the patient and performed a substantive portion of the visit including all aspects of the medical decision making as addressed:    70-year-old male with history of CHF, COPD who presents with shortness of breath, decreased exercise tolerance, diminished breath sounds throughout on examination, faint wheezes heard at the bilateral apices. Tachypneic, especially when talking, talks in 3-5 word sentences but otherwise appears comfortable on exam, no impending respiratory failure. Protecting airway. Workup consistent with likely COPD exacerbation, does not appear to be volume overloaded at this time, no evidence of pulmonary edema,  troponin markedly less than most recent baseline. EKG obtained. The Ekg interpreted by me shows  Rhythm atrial fibrillation  Rate of 79 bpm  Axis is normal  Intervals and durations normal  ST Segments: Nonspecific ST abnormality. Compared to prior EKG dated 7/6/2023, patient has significant decrease in rate    Medications   ipratropium 0.5 mg-albuterol 2.5 mg (DUONEB) nebulizer solution 1 Dose    albuterol (PROVENTIL) (2.5 MG/3ML) 0.083% nebulizer solution 2.5 mg    doxycycline (VIBRAMYCIN) 100 mg in sodium chloride 0.9 % 100 mL IVPB    methylPREDNISolone sodium succ (SOLU-MEDROL) injection 125 mg (125 mg IntraVENous Given 12/19/23 2157)   potassium bicarb-citric acid (EFFER-K) effervescent tablet 40 mEq (40 mEq Oral Given 12/19/23 2228)     Troponin: obtained to assess for myocardial injury, heart strain, renal dysfunction. EKG: obtained to eval for myocardial injury, infarct, heart strain, predisposing features suggestive of increased risk of dysrhythmia, possible drug toxicity. CMP-CMP was ordered to rule out electrolyte abnormalities, liver dysfunction, kidney dysfunction, electrolyte imbalance, abnormal transaminases, or any other pathology that might be causing the patient's symptoms.      CBC-CBC was ordered to rule out anemia, infection, abnormal platelet count, polycythemia, abnormal Red cell pathology, or any other pathology that might be causing the patient's symptoms     COVID, flu obtained. COVID and flu both negative. Chest x-ray shows emphysematous changes. No consolidative pneumonia, no pulmonary edema. XR CHEST PORTABLE   Preliminary Result   Underlying emphysema. Mild congestion, but no pulmonary edema or focal   infiltrates. No active pleural disease. Labs Reviewed   CBC WITH AUTO DIFFERENTIAL - Abnormal; Notable for the following components:       Result Value    WBC 12.7 (*)     RDW 15.5 (*)     Neutrophils Absolute 9.6 (*)     All other components within normal limits   TROPONIN - Abnormal; Notable for the following components:    Troponin, High Sensitivity 38 (*)     All other components within normal limits   BRAIN NATRIURETIC PEPTIDE - Abnormal; Notable for the following components:    Pro- (*)     All other components within normal limits   PROTIME-INR - Abnormal; Notable for the following components:    Protime 15.1 (*)     INR 1.19 (*)     All other components within normal limits   COMPREHENSIVE METABOLIC PANEL - Abnormal; Notable for the following components:    Potassium 3.1 (*)     Chloride 94 (*)     Glucose 169 (*)     All other components within normal limits   COVID-19 & INFLUENZA COMBO   CULTURE, BLOOD 1   CULTURE, BLOOD 2   MAGNESIUM   BLOOD GAS, VENOUS   TROPONIN   URINALYSIS WITH REFLEX TO CULTURE     Requires admission for tx of COPD exacerbation, acute on chronic resp failure with hypoxia. CRITICAL CARE TIME   I personally saw the patient and independently provided 23 minutes of non-concurrent critical care out of the total shared critical care time provided, excluding separately reportable procedures.   There was a high probability of clinically significant/life threatening deterioration in the patient's condition which required my urgent

## 2023-12-20 NOTE — PROGRESS NOTES
12/20/23 0408   RT Protocol   History Pulmonary Disease 2   Respiratory pattern 0   Breath sounds 2   Cough 0   Indications for Bronchodilator Therapy On home bronchodilators   Bronchodilator Assessment Score 4

## 2023-12-20 NOTE — H&P
diltiazem and torsemide as above. Nausea. Zofran as needed. Disposition:   Current Living situation: Home  Expected Disposition: Home  Estimated D/C: 2 days    Diet No diet orders on file   DVT Prophylaxis [] Lovenox, []  Heparin, [] SCDs, [] Ambulation,  [x] Eliquis, [] Xarelto, [] Coumadin   Code Status Prior   Surrogate Decision Maker/ POA Daughter-Diana     Personally reviewed Lab Studies and Imaging     Discussed management of the case with Dr. Olivia Sher    EKG interpreted personally: A-fib present, rate controlled. Right bundle branch block also present. Imaging that was interpreted personally includes chest x-ray indicating emphysema with mild congestion, no active pleural disease noted. History from:     patient    History of Present Illness:     Chief Complaint: Dyspnea on exertion  Iris Ragsdale is a 68 y.o. male with pmh of atrial fibrillation, hypertension, HFpEF, ALEXANDREA on BiPAP, COPD on 3 L baseline oxygen at home who presents with acute onset of dyspnea on exertion as well as nausea. Patient states that he has been experiencing nausea for last 3 weeks on and off. In addition to that patient states that starting today he has also been experiencing acute onset of dyspnea with exertion. Patient states that he has normally on 2.5 L of oxygen at baseline at home for COPD. Patient states that while on his oxygen he has noted it is     Review of Systems:        Pertinent positives and negatives discussed in HPI     Objective:   No intake or output data in the 24 hours ending 12/20/23 0045   Vitals:   Vitals:    12/19/23 2307 12/19/23 2311 12/19/23 2326 12/19/23 2342   BP:  125/82 (!) 124/92 129/88   Pulse:  74 85 89   Resp:  29 24 19   Temp:       TempSrc:       SpO2: 99% 97% 95% 93%   Weight:       Height:           Medications Prior to Admission     Prior to Admission medications    Medication Sig Start Date End Date Taking?  Authorizing Provider   potassium chloride (KLOR-CON M) 20 MEQ Negative 07/06/2023 11:05 PM    UROBILINOGEN 0.2 07/06/2023 11:05 PM    BILIRUBINUR Negative 07/06/2023 11:05 PM    BLOODU TRACE-LYSED 07/06/2023 11:05 PM    GLUCOSEU Negative 07/06/2023 11:05 PM    Vertie Letters Negative 07/06/2023 11:05 PM    AMORPHOUS Rare 07/06/2023 11:05 PM     Urine Cultures: No results found for: \"LABURIN\"  Blood Cultures:   Lab Results   Component Value Date/Time    BC No Growth after 4 days of incubation. 07/06/2023 09:00 PM     Lab Results   Component Value Date/Time    BLOODCULT2 No Growth after 4 days of incubation. 07/06/2023 09:20 PM     Organism: No results found for: \"ORG\"    Imaging/Diagnostics Last 24 Hours   XR CHEST PORTABLE    Result Date: 12/19/2023  EXAMINATION: ONE XRAY VIEW OF THE CHEST 12/19/2023 9:40 pm COMPARISON: July 6, 2023 HISTORY: ORDERING SYSTEM PROVIDED HISTORY: SOB TECHNOLOGIST PROVIDED HISTORY: Reason for exam:->SOB Reason for Exam: sob FINDINGS: Redemonstration of underlying COPD. Perihilar congestion but no pulmonary edema. No evidence of pleural effusion or pneumothorax. No focal infiltrates. Heart and mediastinum appear stable. Underlying emphysema. Mild congestion, but no pulmonary edema or focal infiltrates. No active pleural disease.          Electronically signed by Saeed Michaud DO on 12/20/2023 at 12:45 AM

## 2023-12-21 LAB
ANION GAP SERPL CALCULATED.3IONS-SCNC: 10 MMOL/L (ref 3–16)
BASE EXCESS BLDV CALC-SCNC: 3.2 MMOL/L (ref -3–3)
BASOPHILS # BLD: 0 K/UL (ref 0–0.2)
BASOPHILS NFR BLD: 0.1 %
BUN SERPL-MCNC: 27 MG/DL (ref 7–20)
CALCIUM SERPL-MCNC: 8.5 MG/DL (ref 8.3–10.6)
CHLORIDE SERPL-SCNC: 100 MMOL/L (ref 99–110)
CO2 BLDV-SCNC: 30 MMOL/L
CO2 SERPL-SCNC: 29 MMOL/L (ref 21–32)
COHGB MFR BLDV: 1.7 % (ref 0–1.5)
CREAT SERPL-MCNC: 0.9 MG/DL (ref 0.8–1.3)
DEPRECATED RDW RBC AUTO: 15.2 % (ref 12.4–15.4)
EOSINOPHIL # BLD: 0 K/UL (ref 0–0.6)
EOSINOPHIL NFR BLD: 0 %
GFR SERPLBLD CREATININE-BSD FMLA CKD-EPI: >60 ML/MIN/{1.73_M2}
GLUCOSE SERPL-MCNC: 167 MG/DL (ref 70–99)
HCO3 BLDV-SCNC: 28.7 MMOL/L (ref 23–29)
HCT VFR BLD AUTO: 40 % (ref 40.5–52.5)
HGB BLD-MCNC: 13.3 G/DL (ref 13.5–17.5)
LEGIONELLA AG UR QL: NORMAL
LYMPHOCYTES # BLD: 1 K/UL (ref 1–5.1)
LYMPHOCYTES NFR BLD: 6.5 %
MCH RBC QN AUTO: 30.3 PG (ref 26–34)
MCHC RBC AUTO-ENTMCNC: 33.2 G/DL (ref 31–36)
MCV RBC AUTO: 91.1 FL (ref 80–100)
METHGB MFR BLDV: 0.7 %
MONOCYTES # BLD: 0.3 K/UL (ref 0–1.3)
MONOCYTES NFR BLD: 1.9 %
NEUTROPHILS # BLD: 14 K/UL (ref 1.7–7.7)
NEUTROPHILS NFR BLD: 91.5 %
O2 THERAPY: ABNORMAL
PCO2 BLDV: 46.9 MMHG (ref 40–50)
PH BLDV: 7.4 [PH] (ref 7.35–7.45)
PLATELET # BLD AUTO: 304 K/UL (ref 135–450)
PMV BLD AUTO: 9.3 FL (ref 5–10.5)
PNEUMONIA PANEL MOLECULAR: NORMAL
PO2 BLDV: 43 MMHG (ref 25–40)
POTASSIUM SERPL-SCNC: 4.2 MMOL/L (ref 3.5–5.1)
RBC # BLD AUTO: 4.39 M/UL (ref 4.2–5.9)
REPORT: NORMAL
S PNEUM AG UR QL: NORMAL
SAO2 % BLDV: 79 %
SODIUM SERPL-SCNC: 139 MMOL/L (ref 136–145)
WBC # BLD AUTO: 15.4 K/UL (ref 4–11)

## 2023-12-21 PROCEDURE — 2580000003 HC RX 258

## 2023-12-21 PROCEDURE — 2060000000 HC ICU INTERMEDIATE R&B

## 2023-12-21 PROCEDURE — 6370000000 HC RX 637 (ALT 250 FOR IP)

## 2023-12-21 PROCEDURE — 6360000002 HC RX W HCPCS: Performed by: INTERNAL MEDICINE

## 2023-12-21 PROCEDURE — 94761 N-INVAS EAR/PLS OXIMETRY MLT: CPT

## 2023-12-21 PROCEDURE — 80048 BASIC METABOLIC PNL TOTAL CA: CPT

## 2023-12-21 PROCEDURE — 99232 SBSQ HOSP IP/OBS MODERATE 35: CPT | Performed by: INTERNAL MEDICINE

## 2023-12-21 PROCEDURE — 82803 BLOOD GASES ANY COMBINATION: CPT

## 2023-12-21 PROCEDURE — 85025 COMPLETE CBC W/AUTO DIFF WBC: CPT

## 2023-12-21 PROCEDURE — 96376 TX/PRO/DX INJ SAME DRUG ADON: CPT

## 2023-12-21 PROCEDURE — 87070 CULTURE OTHR SPECIMN AEROBIC: CPT

## 2023-12-21 PROCEDURE — 96366 THER/PROPH/DIAG IV INF ADDON: CPT

## 2023-12-21 PROCEDURE — 6360000002 HC RX W HCPCS

## 2023-12-21 PROCEDURE — 87205 SMEAR GRAM STAIN: CPT

## 2023-12-21 PROCEDURE — 2700000000 HC OXYGEN THERAPY PER DAY

## 2023-12-21 PROCEDURE — 87633 RESP VIRUS 12-25 TARGETS: CPT

## 2023-12-21 PROCEDURE — 36415 COLL VENOUS BLD VENIPUNCTURE: CPT

## 2023-12-21 PROCEDURE — 94640 AIRWAY INHALATION TREATMENT: CPT

## 2023-12-21 PROCEDURE — 96375 TX/PRO/DX INJ NEW DRUG ADDON: CPT

## 2023-12-21 RX ORDER — WATER 10 ML/10ML
INJECTION INTRAMUSCULAR; INTRAVENOUS; SUBCUTANEOUS
Status: COMPLETED
Start: 2023-12-21 | End: 2023-12-21

## 2023-12-21 RX ORDER — FUROSEMIDE 10 MG/ML
20 INJECTION INTRAMUSCULAR; INTRAVENOUS 2 TIMES DAILY
Status: DISCONTINUED | OUTPATIENT
Start: 2023-12-21 | End: 2023-12-22 | Stop reason: HOSPADM

## 2023-12-21 RX ADMIN — Medication 2 PUFF: at 20:04

## 2023-12-21 RX ADMIN — GUAIFENESIN 600 MG: 600 TABLET ORAL at 21:24

## 2023-12-21 RX ADMIN — ALBUTEROL SULFATE 2.5 MG: 2.5 SOLUTION RESPIRATORY (INHALATION) at 08:27

## 2023-12-21 RX ADMIN — ALBUTEROL SULFATE 2.5 MG: 2.5 SOLUTION RESPIRATORY (INHALATION) at 11:32

## 2023-12-21 RX ADMIN — WATER 10 ML: 1 INJECTION INTRAMUSCULAR; INTRAVENOUS; SUBCUTANEOUS at 21:25

## 2023-12-21 RX ADMIN — ASPIRIN 81 MG 81 MG: 81 TABLET ORAL at 08:50

## 2023-12-21 RX ADMIN — POTASSIUM CHLORIDE 20 MEQ: 1500 TABLET, EXTENDED RELEASE ORAL at 08:50

## 2023-12-21 RX ADMIN — ALBUTEROL SULFATE 2.5 MG: 2.5 SOLUTION RESPIRATORY (INHALATION) at 20:04

## 2023-12-21 RX ADMIN — Medication 2 PUFF: at 08:26

## 2023-12-21 RX ADMIN — FUROSEMIDE 20 MG: 10 INJECTION, SOLUTION INTRAMUSCULAR; INTRAVENOUS at 17:13

## 2023-12-21 RX ADMIN — ALBUTEROL SULFATE 2.5 MG: 2.5 SOLUTION RESPIRATORY (INHALATION) at 15:47

## 2023-12-21 RX ADMIN — Medication 10 ML: at 08:51

## 2023-12-21 RX ADMIN — APIXABAN 5 MG: 5 TABLET, FILM COATED ORAL at 08:50

## 2023-12-21 RX ADMIN — FAMOTIDINE 20 MG: 20 TABLET ORAL at 08:50

## 2023-12-21 RX ADMIN — FUROSEMIDE 20 MG: 10 INJECTION, SOLUTION INTRAMUSCULAR; INTRAVENOUS at 08:50

## 2023-12-21 RX ADMIN — AZITHROMYCIN MONOHYDRATE 500 MG: 500 INJECTION, POWDER, LYOPHILIZED, FOR SOLUTION INTRAVENOUS at 01:58

## 2023-12-21 RX ADMIN — DILTIAZEM HYDROCHLORIDE 180 MG: 180 CAPSULE, COATED, EXTENDED RELEASE ORAL at 08:50

## 2023-12-21 RX ADMIN — METHYLPREDNISOLONE SODIUM SUCCINATE 40 MG: 40 INJECTION INTRAMUSCULAR; INTRAVENOUS at 21:24

## 2023-12-21 RX ADMIN — Medication 10 ML: at 21:25

## 2023-12-21 RX ADMIN — ATORVASTATIN CALCIUM 80 MG: 80 TABLET, FILM COATED ORAL at 08:50

## 2023-12-21 RX ADMIN — METHYLPREDNISOLONE SODIUM SUCCINATE 40 MG: 40 INJECTION INTRAMUSCULAR; INTRAVENOUS at 08:50

## 2023-12-21 RX ADMIN — APIXABAN 5 MG: 5 TABLET, FILM COATED ORAL at 21:24

## 2023-12-21 RX ADMIN — TIOTROPIUM BROMIDE INHALATION SPRAY 2 PUFF: 3.12 SPRAY, METERED RESPIRATORY (INHALATION) at 08:26

## 2023-12-21 RX ADMIN — FAMOTIDINE 20 MG: 20 TABLET ORAL at 21:24

## 2023-12-21 RX ADMIN — GUAIFENESIN 600 MG: 600 TABLET ORAL at 08:50

## 2023-12-21 NOTE — PLAN OF CARE
Problem: Pain  Goal: Verbalizes/displays adequate comfort level or baseline comfort level  12/20/2023 2114 by Amandeep Hoskins RN  Outcome: Progressing  12/20/2023 1002 by Cash Leone RN  Outcome: Progressing     Problem: Safety - Adult  Goal: Free from fall injury  Outcome: Progressing     Problem: ABCDS Injury Assessment  Goal: Absence of physical injury  Outcome: Progressing     Problem: Skin/Tissue Integrity  Goal: Absence of new skin breakdown  Description: 1. Monitor for areas of redness and/or skin breakdown  2. Assess vascular access sites hourly  3. Every 4-6 hours minimum:  Change oxygen saturation probe site  4. Every 4-6 hours:  If on nasal continuous positive airway pressure, respiratory therapy assess nares and determine need for appliance change or resting period.   Outcome: Progressing     Problem: Discharge Planning  Goal: Discharge to home or other facility with appropriate resources  Outcome: Progressing

## 2023-12-21 NOTE — PLAN OF CARE
Problem: Pain  Goal: Verbalizes/displays adequate comfort level or baseline comfort level  Outcome: Progressing   Patient free of pain at this time. Will continue to reassess and PRNs available when indicated.

## 2023-12-21 NOTE — PROGRESS NOTES
Hospital Medicine Progress Note      Date of Admission: 12/19/2023  Hospital Day: 3    Chief Admission Complaint:  SOB     Subjective:  breathing better today, no new complaints    Presenting Admission History:       Lara Amaya is a 68 y.o. male with a pmh of COPD, A-fib, hypertension, HFpEF, ALEXANDREA on BiPAP who presents with Acute on chronic respiratory failure with hypoxia     Assessment/Plan:      Current Principal Problem:  Acute on chronic respiratory failure with hypoxia (HCC)    Acute on chronic respiratory failure with hypoxia. Requiring increased oxygen to 4 L on presentation, tachypnea. Chest x-ray indicating mild congestion, but no pleural disease noted. Solu-Medrol IV. DuoNebs every 4 hours while awake. Spiriva and Dulera inhalers ordered per Gold guidelines. Continue nasal cannula oxygen therapy at minimum 3.5 L per home baseline. May supplement additionally as needed. BiPAP while asleep. Doxycycline changed to Zithromax with admission. Mucinex. Pulm consulted. Atrial fibrillation, secondary hypercoaguable state. .. Follow with cardiology. Noted on EKG in ED. [id number] score greater than 2. Continue home Eliquis for anticoagulation. Continue diltiazem for rate control. Tele. Hypokalemia. Likely secondary to use of torsemide. Continue home potassium supplementation. History of CVA. Carotid stent placed in February 2023. Patient previously on Brilinta, however chart review states that patient no longer taking Brilinta and is currently managed with aspirin. Continue aspirin plus Eliquis per home regimen. HFpEF. Stable. torsemide changed to IV lasix x 4 doses. Hypertension. Stable. Continue home diltiazem and torsemide as above. Nausea. Zofran as needed      Physical Exam Performed:      General appearance:  No apparent distress  Respiratory:  Normal respiratory effort. Cardiovascular:  Regular rate and rhythm.   Abdomen:  Soft, non-tender,

## 2023-12-22 ENCOUNTER — TELEPHONE (OUTPATIENT)
Dept: PULMONOLOGY | Age: 73
End: 2023-12-22

## 2023-12-22 VITALS
TEMPERATURE: 97.5 F | DIASTOLIC BLOOD PRESSURE: 91 MMHG | RESPIRATION RATE: 18 BRPM | BODY MASS INDEX: 24.32 KG/M2 | OXYGEN SATURATION: 98 % | HEART RATE: 82 BPM | WEIGHT: 160.5 LBS | SYSTOLIC BLOOD PRESSURE: 115 MMHG | HEIGHT: 68 IN

## 2023-12-22 LAB
ANION GAP SERPL CALCULATED.3IONS-SCNC: 8 MMOL/L (ref 3–16)
BUN SERPL-MCNC: 31 MG/DL (ref 7–20)
CALCIUM SERPL-MCNC: 8.5 MG/DL (ref 8.3–10.6)
CHLORIDE SERPL-SCNC: 98 MMOL/L (ref 99–110)
CO2 SERPL-SCNC: 30 MMOL/L (ref 21–32)
CREAT SERPL-MCNC: 0.9 MG/DL (ref 0.8–1.3)
DEPRECATED RDW RBC AUTO: 15.4 % (ref 12.4–15.4)
GFR SERPLBLD CREATININE-BSD FMLA CKD-EPI: >60 ML/MIN/{1.73_M2}
GLUCOSE SERPL-MCNC: 172 MG/DL (ref 70–99)
HCT VFR BLD AUTO: 40.5 % (ref 40.5–52.5)
HGB BLD-MCNC: 13.1 G/DL (ref 13.5–17.5)
MCH RBC QN AUTO: 29.3 PG (ref 26–34)
MCHC RBC AUTO-ENTMCNC: 32.5 G/DL (ref 31–36)
MCV RBC AUTO: 90.4 FL (ref 80–100)
PLATELET # BLD AUTO: 308 K/UL (ref 135–450)
PMV BLD AUTO: 9.4 FL (ref 5–10.5)
POTASSIUM SERPL-SCNC: 4.4 MMOL/L (ref 3.5–5.1)
RBC # BLD AUTO: 4.48 M/UL (ref 4.2–5.9)
SODIUM SERPL-SCNC: 136 MMOL/L (ref 136–145)
WBC # BLD AUTO: 13.2 K/UL (ref 4–11)

## 2023-12-22 PROCEDURE — 94640 AIRWAY INHALATION TREATMENT: CPT

## 2023-12-22 PROCEDURE — 97116 GAIT TRAINING THERAPY: CPT

## 2023-12-22 PROCEDURE — 94761 N-INVAS EAR/PLS OXIMETRY MLT: CPT

## 2023-12-22 PROCEDURE — 97110 THERAPEUTIC EXERCISES: CPT

## 2023-12-22 PROCEDURE — 6370000000 HC RX 637 (ALT 250 FOR IP)

## 2023-12-22 PROCEDURE — 2580000003 HC RX 258

## 2023-12-22 PROCEDURE — 6360000002 HC RX W HCPCS: Performed by: INTERNAL MEDICINE

## 2023-12-22 PROCEDURE — 6370000000 HC RX 637 (ALT 250 FOR IP): Performed by: INTERNAL MEDICINE

## 2023-12-22 PROCEDURE — 85027 COMPLETE CBC AUTOMATED: CPT

## 2023-12-22 PROCEDURE — 80048 BASIC METABOLIC PNL TOTAL CA: CPT

## 2023-12-22 PROCEDURE — 99232 SBSQ HOSP IP/OBS MODERATE 35: CPT | Performed by: INTERNAL MEDICINE

## 2023-12-22 PROCEDURE — 6360000002 HC RX W HCPCS

## 2023-12-22 PROCEDURE — 2700000000 HC OXYGEN THERAPY PER DAY

## 2023-12-22 PROCEDURE — 97530 THERAPEUTIC ACTIVITIES: CPT

## 2023-12-22 RX ORDER — PREDNISONE 10 MG/1
TABLET ORAL
Qty: 30 TABLET | Refills: 0 | Status: SHIPPED | OUTPATIENT
Start: 2023-12-22

## 2023-12-22 RX ORDER — PREDNISONE 20 MG/1
40 TABLET ORAL DAILY
Status: DISCONTINUED | OUTPATIENT
Start: 2023-12-22 | End: 2023-12-22 | Stop reason: HOSPADM

## 2023-12-22 RX ORDER — TIOTROPIUM BROMIDE INHALATION SPRAY 1.56 UG/1
2 SPRAY, METERED RESPIRATORY (INHALATION) DAILY
Qty: 1 EACH | Refills: 5 | Status: SHIPPED | OUTPATIENT
Start: 2023-12-22 | End: 2024-02-20

## 2023-12-22 RX ADMIN — FUROSEMIDE 20 MG: 10 INJECTION, SOLUTION INTRAMUSCULAR; INTRAVENOUS at 09:49

## 2023-12-22 RX ADMIN — AZITHROMYCIN MONOHYDRATE 500 MG: 500 INJECTION, POWDER, LYOPHILIZED, FOR SOLUTION INTRAVENOUS at 02:16

## 2023-12-22 RX ADMIN — POLYETHYLENE GLYCOL 3350 17 G: 17 POWDER, FOR SOLUTION ORAL at 09:49

## 2023-12-22 RX ADMIN — Medication 2 PUFF: at 08:13

## 2023-12-22 RX ADMIN — ATORVASTATIN CALCIUM 80 MG: 80 TABLET, FILM COATED ORAL at 09:50

## 2023-12-22 RX ADMIN — GUAIFENESIN 600 MG: 600 TABLET ORAL at 09:50

## 2023-12-22 RX ADMIN — APIXABAN 5 MG: 5 TABLET, FILM COATED ORAL at 09:50

## 2023-12-22 RX ADMIN — DILTIAZEM HYDROCHLORIDE 180 MG: 180 CAPSULE, COATED, EXTENDED RELEASE ORAL at 09:50

## 2023-12-22 RX ADMIN — TIOTROPIUM BROMIDE INHALATION SPRAY 2 PUFF: 3.12 SPRAY, METERED RESPIRATORY (INHALATION) at 08:13

## 2023-12-22 RX ADMIN — ASPIRIN 81 MG 81 MG: 81 TABLET ORAL at 09:50

## 2023-12-22 RX ADMIN — FAMOTIDINE 20 MG: 20 TABLET ORAL at 09:50

## 2023-12-22 RX ADMIN — POTASSIUM CHLORIDE 20 MEQ: 1500 TABLET, EXTENDED RELEASE ORAL at 09:50

## 2023-12-22 RX ADMIN — Medication 10 ML: at 09:49

## 2023-12-22 RX ADMIN — ALBUTEROL SULFATE 2.5 MG: 2.5 SOLUTION RESPIRATORY (INHALATION) at 11:52

## 2023-12-22 RX ADMIN — PREDNISONE 40 MG: 20 TABLET ORAL at 09:50

## 2023-12-22 RX ADMIN — ALBUTEROL SULFATE 2.5 MG: 2.5 SOLUTION RESPIRATORY (INHALATION) at 08:13

## 2023-12-22 NOTE — PROGRESS NOTES
Physician Progress Note      PATIENT:               Alessandra Castro  CSN #:                  [id number]  :                       1950  ADMIT DATE:       2023 9:11 PM  [address] [place] [address] DATE:        2023 2:41 PM  RESPONDING  PROVIDER #:        Miguel MARQUEZ          QUERY TEXT:    Pt admitted with acute respiratory failure. Noted documentation of acute   exacerbation of COPD per ED consult note. If possible, please document in   progress notes and discharge summary:    The medical record reflects the following:  Risk Factors: COPD, chronic resp failure, CHF  Clinical Indicators: Per ED notes \"Workup consistent with likely COPD   exacerbation, does not appear to be volume overloaded at this time, no   evidence of pulmonary edema,  troponin markedly less than most recent   baseline\". Treatment: Supplemental oxygen, serial labs, Duonebs, solumedrol, supportive   care    Thank you,  Maritza Valdes RN BSN  Options provided:  -- Acute respiratory failure due to AE COPD confirmed present on admission  -- AE COPD ruled out, acute respiratory failure is due to ##please specify,   please specify. -- Other - I will add my own diagnosis  -- Disagree - Not applicable / Not valid  -- Disagree - Clinically unable to determine / Unknown  -- Refer to Clinical Documentation Reviewer    PROVIDER RESPONSE TEXT:    Acute respiratory failure due to COPD was confirmed as present on admission.     Query created by: Maritza Valdes on 2023 10:47 AM      Electronically signed by:  Chen Julien 2023 4:52 PM

## 2023-12-22 NOTE — PROGRESS NOTES
Pt d/c'd home in personal vechile. Removed  IV and stopped bleeding. Catheter intact. Pt tolerated well. No redness noted at site. Notified [facility] and removed tele box. Reviewed d/c instructions, home meds, and  f/u information utilizing teach-back method. Scripts given to patient. Patient verbalized understanding.

## 2023-12-22 NOTE — DISCHARGE INSTR - COC
Continuity of Care Form    Patient Name: Andrew Guillen   :  1950  MRN:  [id number]    Admit date:  2023  Discharge date:  ***    Code Status Order: Full Code   Advance Directives:     Admitting Physician:  Joane Lesches, MD  PCP: ERIN Smith NP    Discharging Nurse: [facility] Unit/Room#: 0215/0215-01  Discharging Unit Phone Number: ***    Emergency Contact:   Extended Emergency Contact Information  Primary Emergency Contact: [address] Phone: [phone]  Mobile Phone: [phone]  Relation: Child  Preferred language: English   needed?  No  Secondary Emergency Contact: Jean BaptisteDiana   [facility] [address] Phone: [phone]  Relation: Daughter-in-Law    Past Surgical History:  Past Surgical History:   Procedure Laterality Date    FRACTURE SURGERY      johnson in femur/hip on right    HERNIA REPAIR      SKIN BIOPSY      UPPER GASTROINTESTINAL ENDOSCOPY N/A 2023    EGD DIAGNOSTIC ONLY performed by Fortino Candelario MD at [facility]       Immunization History:   Immunization History   Administered Date(s) Administered    COVID-19, MODERNA BLUE border, Primary or Immunocompromised, (age 12y+), IM, 100 mcg/0.5mL 2021, 04/10/2021    COVID-19, PFIZER Bivalent, DO NOT Dilute, (age 12y+), IM, 27 mcg/0.3 mL 10/22/2022    COVID-19, PFIZER PURPLE top, DILUTE for use, (age 15 y+), 30mcg/0.3mL 2021    Influenza A (H1X4-00) Vaccine IM 2010    Influenza A (O2C8-78) Vaccine PF IM 2010    Influenza Virus Vaccine 2010, 2012, 10/06/2014    Influenza, FLUARIX, FLULAVAL, FLUZONE (age 10 mo+) AND AFLURIA, (age 1 y+), PF, 0.5mL 2018, 10/22/2018    Influenza, FLUZONE (age 72 y+), High Dose, 0.7mL 10/06/2020, 10/22/2022    Influenza, High Dose (Fluzone 65 yrs and older) 10/13/2015, 2019, 2021    Pneumococcal, PCV-13, PREVNAR 15, (age 6w+), IM, 0.5mL 2016    Pneumococcal, PPSV23, PNEUMOVAX 21, (age 2y+), SC/IM, 0.5mL 04/01/2009, 09/17/2012, 01/12/2018    TDaP, ADACEL (age 6y-58y), BOOSTRIX (age 10y+), IM, 0.5mL 09/17/2012    Td vaccine (adult) 09/01/2007       Active Problems:  Patient Active Problem List   Diagnosis Code    COPD with acute exacerbation ([address]) J44.1    HTN (hypertension), benign I10    Former smoker Z87.891    PAF (paroxysmal atrial fibrillation) ([facility]) I48.0    Acute on chronic respiratory failure with hypoxia and hypercapnia ([facility]) J96.21, J96.22    Acute hyponatremia E87.1    Hyperbilirubinemia E80.6    Septic shock ([facility]) A41.9, R65.21    Influenzal pneumonia J11.00    Unstable angina pectoris ([facility]) I20.0    Acute hypokalemia E87.6    Hypophosphatemia E83.39    Long term current use of diuretic Z79.899    Acute on chronic combined systolic (congestive) and diastolic (congestive) heart failure ([facility]) I50.43    Hypotension I95.9    COPD, very severe ([facility]) J44.9    Nocturnal hypoxia G47.34    Acute unilateral cerebral infarction in a watershed distribution [facility]) I63.89    Aphasia due to acute cerebrovascular accident (CVA) ([address]) I63.9, R47.01    Cerebrovascular accident (CVA) ([address]) I63.9    ALEXANDREA treated with BiPAP G47.33    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    Acute CVA (cerebrovascular accident) ([address]) I63.9    Atelectasis J98.11    Focal neurological deficit R29.818    Acute focal neurological deficit R29.818    Arterial ischemic stroke, ICA, right, acute ([facility]) I63.231    Vertebral artery stenosis, left I65.02    CAD (coronary artery disease) I25.10    Hyperlipidemia E78.5    NSTEMI (non-ST elevated myocardial infarction) ([address]) I21.4    Moderate malnutrition ([facility]) E44.0    Pneumonia of right lower lobe due to infectious organism J18.9    Troponin level elevated R79.89    Abdominal distension R14.0    Acute distention of stomach K31.0    Acute respiratory failure ([facility]) J96.00    Acute on chronic respiratory failure with hypoxia ([facility]) J96.21       Isolation/Infection:   Isolation            No

## 2023-12-22 NOTE — PLAN OF CARE
Problem: Discharge Planning  Goal: Discharge to home or other facility with appropriate resources  12/22/2023 1035 by Agatha Collazo RN  Outcome: Progressing  12/22/2023 0234 by Ramya Guillen RN  Outcome: Progressing     Problem: Pain  Goal: Verbalizes/displays adequate comfort level or baseline comfort level  12/22/2023 1035 by Agatha Collazo RN  Outcome: Progressing  12/22/2023 0234 by Ramya Guillen RN  Outcome: Progressing     Problem: Safety - Adult  Goal: Free from fall injury  12/22/2023 1035 by Agatha Collazo RN  Outcome: Progressing  12/22/2023 0234 by Ramya Guillen RN  Outcome: Progressing     Problem: ABCDS Injury Assessment  Goal: Absence of physical injury  12/22/2023 1035 by Agatha Collazo RN  Outcome: Progressing  12/22/2023 0234 by Ramya Guillen RN  Outcome: Progressing     Problem: Skin/Tissue Integrity  Goal: Absence of new skin breakdown  Description: 1. Monitor for areas of redness and/or skin breakdown  2. Assess vascular access sites hourly  3. Every 4-6 hours minimum:  Change oxygen saturation probe site  4. Every 4-6 hours:  If on nasal continuous positive airway pressure, respiratory therapy assess nares and determine need for appliance change or resting period.   12/22/2023 1035 by Agatha Collazo RN  Outcome: Progressing  12/22/2023 0234 by Ramya Guillen RN  Outcome: Progressing     Problem: Chronic Conditions and Co-morbidities  Goal: Patient's chronic conditions and co-morbidity symptoms are monitored and maintained or improved  12/22/2023 1035 by Agatha Collazo RN  Outcome: Progressing  12/22/2023 0234 by Rayma Guillen RN  Outcome: Progressing

## 2023-12-22 NOTE — PLAN OF CARE
Problem: Discharge Planning  Goal: Discharge to home or other facility with appropriate resources  12/22/2023 1401 by Neida Mckeon RN  Outcome: Adequate for Discharge  12/22/2023 1035 by Neida Mckeon RN  Outcome: Progressing  12/22/2023 0234 by Arizona Bosworth, RN  Outcome: Progressing     Problem: Pain  Goal: Verbalizes/displays adequate comfort level or baseline comfort level  12/22/2023 1401 by Neida Mckeon RN  Outcome: Adequate for Discharge  12/22/2023 1035 by Neida Mckeon RN  Outcome: Progressing  12/22/2023 0234 by Arizona Bosworth, RN  Outcome: Progressing     Problem: Safety - Adult  Goal: Free from fall injury  12/22/2023 1401 by Neida Mckeon RN  Outcome: Adequate for Discharge  12/22/2023 1035 by Neida Mckeon RN  Outcome: Progressing  12/22/2023 0234 by Arizona Bosworth, RN  Outcome: Progressing     Problem: ABCDS Injury Assessment  Goal: Absence of physical injury  12/22/2023 1401 by Neida Mckeon RN  Outcome: Adequate for Discharge  12/22/2023 1035 by Neida Mckeon RN  Outcome: Progressing  12/22/2023 0234 by Arizona Bosworth, RN  Outcome: Progressing     Problem: Skin/Tissue Integrity  Goal: Absence of new skin breakdown  Description: 1. Monitor for areas of redness and/or skin breakdown  2. Assess vascular access sites hourly  3. Every 4-6 hours minimum:  Change oxygen saturation probe site  4. Every 4-6 hours:  If on nasal continuous positive airway pressure, respiratory therapy assess nares and determine need for appliance change or resting period.   12/22/2023 1401 by Neida Mckeon RN  Outcome: Adequate for Discharge  12/22/2023 1035 by Neida Mckeon RN  Outcome: Progressing  12/22/2023 0234 by Arizona Bosworth, RN  Outcome: Progressing     Problem: Chronic Conditions and Co-morbidities  Goal: Patient's chronic conditions and co-morbidity symptoms are monitored and maintained or improved  12/22/2023 1401 by Neida Mckeon

## 2023-12-22 NOTE — PROGRESS NOTES
Physical Therapy  Facility/Department: [facility] A2 CARD TELEMETRY  Daily Treatment Note  NAME: Magali Casey  : 1950  MRN: [id number]    Date of Service: 2023    Discharge Recommendations:  24 hour supervision or assist, Home with Home health PT   PT Equipment Recommendations  Equipment Needed: No  Other: pt has 4WW and w/c at home    Patient Diagnosis(es): The primary encounter diagnosis was Acute exacerbation of chronic obstructive pulmonary disease (COPD) ([address] Diagnoses of Hypokalemia, Oxygen dependent, and Acute on chronic respiratory failure with hypoxia (HCC) were also pertinent to this visit. Assessment   Assessment: pt progressing toward meeting Acute PT goals as evidenced by increased gait distance to 150ft with RW with sba, pt requires 3-20 second standing rest breaks to recover SpO2 to mid 90s from 88%, pt on 4L portable O2 as portable unit does not have a 3.5L setting, pt asymptomatic throughout session except for typical shortness of breath, pt tolerated transfer training and gait training and seated therex without complaint, pt on 3.5LO2 at end of session at 98%, pt will benefit from continued Acute Inpatient Skilled PT to address functional mobility deficits, agree with previous PT rec for 24hr assist and HHPT  Activity Tolerance: Patient tolerated treatment well  Equipment Needed: No  Other: pt has 4WW and w/c at home     Plan    Physical Therapy Plan  General Plan: 3-5 times per week  Current Treatment Recommendations: Strengthening;Balance training;Functional mobility training;Transfer training; Endurance training;Gait training;Stair training;Home exercise program;Safety education & training;Patient/Caregiver education & training; Therapeutic activities; Equipment evaluation, education, & procurement     Restrictions  Restrictions/Precautions  Restrictions/Precautions: Fall Risk, General Precautions  Position Activity Restriction  Other position/activity restrictions: up with assist,

## 2023-12-22 NOTE — TELEPHONE ENCOUNTER
----- Message from Haley Gil MD sent at 12/22/2023  9:24 AM EST -----  Follow-up in the pulmonary clinic within 2 to 3 weeks after discharge

## 2023-12-23 LAB
BACTERIA BLD CULT ORG #2: NORMAL
BACTERIA BLD CULT: NORMAL
BACTERIA SPEC RESP CULT: NORMAL
GRAM STN SPEC: NORMAL

## 2023-12-28 DIAGNOSIS — J44.9 STAGE 3 SEVERE COPD BY GOLD CLASSIFICATION (HCC): ICD-10-CM

## 2023-12-28 RX ORDER — ALBUTEROL SULFATE 90 UG/1
2 AEROSOL, METERED RESPIRATORY (INHALATION) EVERY 4 HOURS PRN
Qty: 18 G | Refills: 5 | Status: SHIPPED | OUTPATIENT
Start: 2023-12-28

## 2023-12-28 NOTE — TELEPHONE ENCOUNTER
Per review of note from [facility] note: \"Follow-up in the pulmonary clinic within 2 to 3 weeks after discharge.  Patient sees Dr. Conrad.\"    If no availabilities then place on cancellation list please, and/or await Dr. Conrad's return to evaluate schedule

## 2023-12-28 NOTE — TELEPHONE ENCOUNTER
Refill Request     CONFIRM preferrred pharmacy with the patient.    If Mail Order Rx - Pend for 90 day refill.      Last Seen: Last Seen Department: 5/23/2023  Last Seen by PCP: Visit date not found    Last Written: 12/5/23    If no future appointment scheduled, route STAFF MESSAGE with patient name to the  Pool for scheduling.      Next Appointment:   Future Appointments   Date Time Provider Department Center   1/2/2024  2:30 PM A CT T [facility] CT [facility]   2/5/2024 10:30 AM Lalito Conrad MD [facility]       Message sent to  to schedule appt with patient?  NO      Requested Prescriptions     Pending Prescriptions Disp Refills    VENTOLIN  (90 Base) MCG/ACT inhaler 18 g 5

## 2023-12-28 NOTE — TELEPHONE ENCOUNTER
Pt is scheduled 2/5/24 with Dr. Conrad. Daughter in law called asking if Pt should be seen sooner as he was just Dc'd from hospital. Please advise.

## 2023-12-29 NOTE — PROGRESS NOTES
Physician Progress Note      PATIENT:               Alessandra Castro  CSN #:                  [id number]  :                       1950  ADMIT DATE:       2023 9:11 PM  [facility] DATE:        2023 2:41 PM  RESPONDING  PROVIDER #:        Miguel MARQUEZ          QUERY TEXT:    Patient admitted with AE COPD. Noted documentation of acute on chronic   respiratory failure in notes. In order to support the diagnosis of acute   respiratory failure, please include additional clinical indicators in your   documentation. Or please document if the diagnosis of acute respiratory   failure has been ruled out after further study. The medical record reflects the following:  Risk Factors: COPD, chronic respiratory failure, CHF, emphysema  Clinical Indicators: RR up to 29, supplemental oxygen at base line. Spo2 down   to 89%. Per notes \"Acute on chronic hypoxic respiratory failure. On 2 to 4   L of oxygen at baseline both during the day and at bedtime\". Per ED consult   note \"Pulmonary:  Effort: Pulmonary effort is normal\". Treatment:  Supplemental oxygen, serial labs, Duonebs, solumedrol, supportive   care    Acute Respiratory Failure Clinical Indicators per  MS-DRG Training Guide and   Quick Reference Guide:  pO2 < 60 mmHg or SpO2 (pulse oximetry) < 91% breathing room air  pCO2 > 50 and pH < 7.35  P/F ratio (pO2 / FIO2) < 300  pO2 decrease or pCO2 increase by 10 mmHg from baseline (if known)  Supplemental oxygen of 40% or more  Presence of respiratory distress, tachypnea, dyspnea, shortness of breath,   wheezing  Unable to speak in complete sentences  Use of accessory muscles to breathe  Extreme anxiety and feeling of impending doom  Tripod position  Confusion/altered mental status/obtunded    Thank you,  Maritza Valdes RN BSN  Options provided:  -- Acute Respiratory Failure as evidenced by, Please document evidence.   -- Acute Respiratory Failure ruled out after study and Chronic Respiratory   Failure

## 2024-01-02 ENCOUNTER — HOSPITAL ENCOUNTER (OUTPATIENT)
Dept: CT IMAGING | Age: 74
Discharge: HOME | End: 2024-01-02
Payer: MEDICARE

## 2024-01-02 DIAGNOSIS — I67.1 CEREBRAL ANEURYSM, NONRUPTURED: ICD-10-CM

## 2024-01-02 PROCEDURE — 6360000004 HC RX CONTRAST MEDICATION: Performed by: NEUROLOGICAL SURGERY

## 2024-01-02 PROCEDURE — 70498 CT ANGIOGRAPHY NECK: CPT

## 2024-01-02 RX ADMIN — IOPAMIDOL 75 ML: 755 INJECTION, SOLUTION INTRAVENOUS at 15:01

## 2024-01-03 DIAGNOSIS — J44.9 CHRONIC OBSTRUCTIVE PULMONARY DISEASE, UNSPECIFIED COPD TYPE (HCC): Primary | ICD-10-CM

## 2024-01-10 RX ORDER — IPRATROPIUM BROMIDE AND ALBUTEROL SULFATE 2.5; .5 MG/3ML; MG/3ML
SOLUTION RESPIRATORY (INHALATION)
Qty: 360 ML | Refills: 5 | Status: SHIPPED | OUTPATIENT
Start: 2024-01-10

## 2024-01-18 ENCOUNTER — TELEPHONE (OUTPATIENT)
Dept: PULMONOLOGY | Age: 74
End: 2024-01-18

## 2024-01-18 DIAGNOSIS — J44.9 CHRONIC OBSTRUCTIVE PULMONARY DISEASE, UNSPECIFIED COPD TYPE (HCC): Primary | ICD-10-CM

## 2024-01-18 RX ORDER — BUDESONIDE AND FORMOTEROL FUMARATE DIHYDRATE 160; 4.5 UG/1; UG/1
AEROSOL RESPIRATORY (INHALATION)
Qty: 10.2 G | Refills: 5 | Status: SHIPPED | OUTPATIENT
Start: 2024-01-18 | End: 2024-01-22

## 2024-01-18 NOTE — TELEPHONE ENCOUNTER
Pts daughter called stated Pt would like to be switched back to his symibcort inhaler he had prior to the hospital admission. Pharmacy is [facility] in [place].  Please advise

## 2024-01-22 ENCOUNTER — TELEPHONE (OUTPATIENT)
Dept: PULMONOLOGY | Age: 74
End: 2024-01-22

## 2024-01-22 RX ORDER — PREDNISONE 20 MG/1
20 TABLET ORAL 2 TIMES DAILY
Qty: 10 TABLET | Refills: 0 | Status: SHIPPED | OUTPATIENT
Start: 2024-01-22 | End: 2024-01-27

## 2024-01-22 RX ORDER — FLUTICASONE PROPIONATE AND SALMETEROL 250; 50 UG/1; UG/1
1 POWDER RESPIRATORY (INHALATION) EVERY 12 HOURS
Qty: 60 EACH | Refills: 5 | Status: SHIPPED | OUTPATIENT
Start: 2024-01-22 | End: 2024-02-05 | Stop reason: CLARIF

## 2024-01-22 NOTE — TELEPHONE ENCOUNTER
Pt son also called stating that Advair would be okay with the pt, if the script can be sent in ASAP as the pt has been out of medication since Friday?

## 2024-01-22 NOTE — TELEPHONE ENCOUNTER
Pt daughter in law called and wanted to know what was going on with Pt medication symbicort. I told her it was sent in to the pharmacy then upon deeper look in his chart it looks like symbicort needs a pa or they will pay for advair. Pt daughter in law would like this worked on asap due to Pt not doing very well. Please advise

## 2024-01-22 NOTE — TELEPHONE ENCOUNTER
Malcolm from [facility] pharmacy called stating that pt insurance doesn't want to cover Symbicort, preferred is Advair.  Please advise if appropriate?  New script will need to be sent to pharmacy.     OV 5/23/23:    Assessment:       Severe COPD   Tracheobronchitis   Chronic hypoxemic hypercapnic respiratory failure BiPAP 16/8 cm H2O. Optimal compliance and efficacy upon review today. Feels pressure is high.   Hypoxia on exertion   30 pack year smoking - quit smoking 2016      Plan:       Complete prednisone taper  Doxycycline 100 mg PO BID for 5 days.   Decrease BiPAP 15/6 cmH2O- done by me today   Advised to use BiPAP every night and during naps   Continue Symbicort BID and DuoNeb QID  Continue Daliresp 500 mcg PO daily  Continue O2 2-4 LPM on exertion. Advised to titrate O2 using her pulse oximeter- target O2 sat 90-92%.  Advised to schedule his LDCT screening for lung cancer- he is interested now. Risks, benefits and alternatives including doing nothing were discussed with patient.  Patient is up to date with Covid, Pneumococcal vaccine and influenza vaccine   Ronaldopejania and Mucinex   Advised to continue with smoking cessation.   Follow up in 3-6 months

## 2024-01-22 NOTE — TELEPHONE ENCOUNTER
Pt daughter in law called and wanted to know what was going on with his symbicort it looks according to amandas message micki from [facility] called stated it needs a pa or they will pay for adviar Pt daughter in law wants this worked on asap due to Pt not doing well please advise what would you like to do. Please advise asap

## 2024-01-22 NOTE — TELEPHONE ENCOUNTER
Script pending.  Spoke with pt Son Bang and took the following symptoms as well.  Please advise.     Do you have the following symptoms?  Shortness of Breath  yes, at times  Wheezing  no  Cough  no  Cough Characteristics:  Productive    yes  Sputum Color    clear  Hemoptysis   no  Consistency of sputum   thick     Fever:    no    Temp:n/a  Chills/Sweats:  no  What other symptoms are you having?:  has been without inhaler for a few days-having more difficulty breathing    How long have you had these symptoms?   2 days     [facility]          Review medications and allergies:        Allergies?  No Known Allergies          Currently on Antibiotics?  (Drug/Dose/Frequency and how long on?) none        Systemic Steroids?  (Drug/Dose/Frequency and how long on?) none      Last sick call taken on n/a.  Meds prescribed were n/a, by n/a.

## 2024-01-23 RX ORDER — BUDESONIDE 0.5 MG/2ML
500 INHALANT ORAL 2 TIMES DAILY
Qty: 60 EACH | Refills: 3 | Status: SHIPPED | OUTPATIENT
Start: 2024-01-23 | End: 2025-01-22

## 2024-01-23 RX ORDER — FORMOTEROL FUMARATE DIHYDRATE 20 UG/2ML
20 SOLUTION RESPIRATORY (INHALATION)
Qty: 120 ML | Refills: 2 | Status: SHIPPED | OUTPATIENT
Start: 2024-01-23

## 2024-01-23 NOTE — TELEPHONE ENCOUNTER
Daughter in law stated Pt picked up the advair. And he has taking it but its hard for him to use due to it not feeling like anything is entering his mouth still have not heard from insurance about Symbicort  are we doing PA on Symbicort  now or are we waiting to see how he does on the adviar Pt daughter in  law would like status on Symbicort.

## 2024-01-26 ENCOUNTER — HOSPITAL ENCOUNTER (OUTPATIENT)
Age: 74
Discharge: HOME | End: 2024-01-26
Payer: MEDICARE

## 2024-01-26 LAB
ALBUMIN SERPL-MCNC: 4.3 G/DL (ref 3.4–5)
ALBUMIN/GLOB SERPL: 1.5 {RATIO} (ref 1.1–2.2)
ALP SERPL-CCNC: 106 U/L (ref 40–129)
ALT SERPL-CCNC: 13 U/L (ref 10–40)
ANION GAP SERPL CALCULATED.3IONS-SCNC: 13 MMOL/L (ref 3–16)
AST SERPL-CCNC: 13 U/L (ref 15–37)
BASOPHILS # BLD: 0.2 K/UL (ref 0–0.2)
BASOPHILS NFR BLD: 1 %
BILIRUB SERPL-MCNC: 0.5 MG/DL (ref 0–1)
BUN SERPL-MCNC: 30 MG/DL (ref 7–20)
CALCIUM SERPL-MCNC: 9.7 MG/DL (ref 8.3–10.6)
CHLORIDE SERPL-SCNC: 96 MMOL/L (ref 99–110)
CO2 SERPL-SCNC: 32 MMOL/L (ref 21–32)
CREAT SERPL-MCNC: 1.1 MG/DL (ref 0.8–1.3)
DEPRECATED RDW RBC AUTO: 16 % (ref 12.4–15.4)
EOSINOPHIL # BLD: 0 K/UL (ref 0–0.6)
EOSINOPHIL NFR BLD: 0 %
GFR SERPLBLD CREATININE-BSD FMLA CKD-EPI: >60 ML/MIN/{1.73_M2}
GLUCOSE SERPL-MCNC: 191 MG/DL (ref 70–99)
HCT VFR BLD AUTO: 42.2 % (ref 40.5–52.5)
HGB BLD-MCNC: 13.7 G/DL (ref 13.5–17.5)
LYMPHOCYTES # BLD: 0.8 K/UL (ref 1–5.1)
LYMPHOCYTES NFR BLD: 5 %
MCH RBC QN AUTO: 29.5 PG (ref 26–34)
MCHC RBC AUTO-ENTMCNC: 32.5 G/DL (ref 31–36)
MCV RBC AUTO: 90.6 FL (ref 80–100)
MONOCYTES # BLD: 0.2 K/UL (ref 0–1.3)
MONOCYTES NFR BLD: 1 %
NEUTROPHILS # BLD: 15.6 K/UL (ref 1.7–7.7)
NEUTROPHILS NFR BLD: 93 %
PLATELET # BLD AUTO: 382 K/UL (ref 135–450)
PMV BLD AUTO: 9.1 FL (ref 5–10.5)
POTASSIUM SERPL-SCNC: 4 MMOL/L (ref 3.5–5.1)
PROT SERPL-MCNC: 7.1 G/DL (ref 6.4–8.2)
RBC # BLD AUTO: 4.66 M/UL (ref 4.2–5.9)
SODIUM SERPL-SCNC: 141 MMOL/L (ref 136–145)
WBC # BLD AUTO: 16.8 K/UL (ref 4–11)

## 2024-01-26 PROCEDURE — 80053 COMPREHEN METABOLIC PANEL: CPT

## 2024-01-26 PROCEDURE — 85025 COMPLETE CBC W/AUTO DIFF WBC: CPT

## 2024-02-05 ENCOUNTER — OFFICE VISIT (OUTPATIENT)
Dept: PULMONOLOGY | Age: 74
End: 2024-02-05
Payer: MEDICARE

## 2024-02-05 ENCOUNTER — TELEPHONE (OUTPATIENT)
Dept: PULMONOLOGY | Age: 74
End: 2024-02-05

## 2024-02-05 VITALS
HEART RATE: 89 BPM | SYSTOLIC BLOOD PRESSURE: 122 MMHG | HEIGHT: 68 IN | WEIGHT: 171.4 LBS | OXYGEN SATURATION: 92 % | DIASTOLIC BLOOD PRESSURE: 78 MMHG | RESPIRATION RATE: 24 BRPM | BODY MASS INDEX: 25.98 KG/M2

## 2024-02-05 DIAGNOSIS — J96.11 CHRONIC RESPIRATORY FAILURE WITH HYPOXIA AND HYPERCAPNIA (HCC): ICD-10-CM

## 2024-02-05 DIAGNOSIS — J44.9 STAGE 3 SEVERE COPD BY GOLD CLASSIFICATION (HCC): Primary | ICD-10-CM

## 2024-02-05 DIAGNOSIS — J44.9 CHRONIC OBSTRUCTIVE PULMONARY DISEASE, UNSPECIFIED COPD TYPE (HCC): ICD-10-CM

## 2024-02-05 DIAGNOSIS — J96.12 CHRONIC RESPIRATORY FAILURE WITH HYPOXIA AND HYPERCAPNIA (HCC): ICD-10-CM

## 2024-02-05 PROCEDURE — 3074F SYST BP LT 130 MM HG: CPT | Performed by: INTERNAL MEDICINE

## 2024-02-05 PROCEDURE — 99214 OFFICE O/P EST MOD 30 MIN: CPT | Performed by: INTERNAL MEDICINE

## 2024-02-05 PROCEDURE — 1123F ACP DISCUSS/DSCN MKR DOCD: CPT | Performed by: INTERNAL MEDICINE

## 2024-02-05 PROCEDURE — 3078F DIAST BP <80 MM HG: CPT | Performed by: INTERNAL MEDICINE

## 2024-02-05 NOTE — PROGRESS NOTES
nitroGLYCERIN (NITROSTAT) 0.4 MG SL tablet, PLACE 1 TABLET UNDER THE TONGUE EVERY 5 MINUTES AS NEEDED FOR CHEST PAIN, Disp: 25 tablet, Rfl: 3      Objective:   PHYSICAL EXAM:    /78   Pulse 89   Resp 24   Ht 1.727 m (5' 8\")   Wt 77.7 kg (171 lb 6.4 oz)   SpO2 92% Comment: on POC setting 2  BMI 26.06 kg/m²   Gen: No distress. Ill appearing   Eyes: PERRL. No sclera icterus. No conjunctival injection.   ENT: No discharge. Pharynx clear.   Neck: Trachea midline. No obvious mass.   Resp: No accessory muscle use. No crackles. Few wheezes. No rhonchi. No dullness on percussion.  Decreased air entry.   CV: Regular rate. Regular rhythm. No murmur or rub. No edema.   GI: Non-tender. Non-distended. No hernia.   Skin: Warm and dry. No nodule on exposed extremities.   Lymph: No cervical LAD. No supraclavicular LAD.   M/S: No cyanosis. No joint deformity. No clubbing.   Neuro: Awake. Alert. Moves all four extremities.   Psych: Oriented x 3. No anxiety.             DATA reviewed by me:   PFTs 10/26/2017 FVC 2.39(49%) FEV1 1.05(29%) FEV1/FVC 44% TLC 7.39(98%) DLCO 11.81 (37%) 6MW 640 F 2L exertion               LDCT chest 10/26/2017    Moderate emphysema.   No suspicious pulmonary nodules or masses   Remote appearing compression deformities.   Lobular contour of the liver raising the question of underlying cirrhosis               CT chest 7/6/23   1. No pulmonary emboli.  2. Cardiomegaly with peribronchial and diffuse interstitial pulmonary edema.  3. Patchy consolidative changes suspicious for pneumonia within the right  lower lobe.                     CXR 12/19/23  images reviewed by me and showed:   Underlying emphysema.    Mild congestion, but no pulmonary edema or focal infiltrates.    No active pleural disease.      Overnight pulse oximeter 7/30/2022 BiPAP with 2.5 L no significant desaturation  BiPAP titration 11/28/22 BiPAP 18/10 cmH2O       BiPAP data 06/25-07/24 2022 reviewed by me. Uses 3-4 hrs/night with 53%

## 2024-02-21 DIAGNOSIS — J44.9 CHRONIC OBSTRUCTIVE PULMONARY DISEASE, UNSPECIFIED COPD TYPE (HCC): Primary | ICD-10-CM

## 2024-02-22 RX ORDER — FLUTICASONE PROPIONATE AND SALMETEROL 250; 50 UG/1; UG/1
POWDER RESPIRATORY (INHALATION)
Qty: 90 EACH | Refills: 0 | Status: SHIPPED | OUTPATIENT
Start: 2024-02-22

## 2024-03-05 ENCOUNTER — HOSPITAL ENCOUNTER (EMERGENCY)
Age: 74
Discharge: HOME | End: 2024-03-05
Attending: EMERGENCY MEDICINE
Payer: MEDICARE

## 2024-03-05 ENCOUNTER — APPOINTMENT (OUTPATIENT)
Dept: GENERAL RADIOLOGY | Age: 74
End: 2024-03-05
Payer: MEDICARE

## 2024-03-05 VITALS
TEMPERATURE: 98.2 F | HEART RATE: 83 BPM | SYSTOLIC BLOOD PRESSURE: 126 MMHG | DIASTOLIC BLOOD PRESSURE: 83 MMHG | OXYGEN SATURATION: 98 % | RESPIRATION RATE: 16 BRPM

## 2024-03-05 DIAGNOSIS — M79.89 SWELLING OF LOWER EXTREMITY: Primary | ICD-10-CM

## 2024-03-05 LAB
ANION GAP SERPL CALCULATED.3IONS-SCNC: 11 MMOL/L (ref 3–16)
BASOPHILS # BLD: 0 K/UL (ref 0–0.2)
BASOPHILS NFR BLD: 0.4 %
BUN SERPL-MCNC: 17 MG/DL (ref 7–20)
CALCIUM SERPL-MCNC: 8.5 MG/DL (ref 8.3–10.6)
CHLORIDE SERPL-SCNC: 99 MMOL/L (ref 99–110)
CO2 SERPL-SCNC: 31 MMOL/L (ref 21–32)
CREAT SERPL-MCNC: 0.7 MG/DL (ref 0.8–1.3)
DEPRECATED RDW RBC AUTO: 15.7 % (ref 12.4–15.4)
EOSINOPHIL # BLD: 0.3 K/UL (ref 0–0.6)
EOSINOPHIL NFR BLD: 2.3 %
GFR SERPLBLD CREATININE-BSD FMLA CKD-EPI: >60 ML/MIN/{1.73_M2}
GLUCOSE SERPL-MCNC: 118 MG/DL (ref 70–99)
HCT VFR BLD AUTO: 44.2 % (ref 40.5–52.5)
HGB BLD-MCNC: 14.7 G/DL (ref 13.5–17.5)
LYMPHOCYTES # BLD: 2 K/UL (ref 1–5.1)
LYMPHOCYTES NFR BLD: 17.4 %
MCH RBC QN AUTO: 30.4 PG (ref 26–34)
MCHC RBC AUTO-ENTMCNC: 33.3 G/DL (ref 31–36)
MCV RBC AUTO: 91.3 FL (ref 80–100)
MONOCYTES # BLD: 0.9 K/UL (ref 0–1.3)
MONOCYTES NFR BLD: 7.5 %
NEUTROPHILS # BLD: 8.4 K/UL (ref 1.7–7.7)
NEUTROPHILS NFR BLD: 72.4 %
NT-PROBNP SERPL-MCNC: 464 PG/ML (ref 0–449)
PLATELET # BLD AUTO: 336 K/UL (ref 135–450)
PMV BLD AUTO: 8.9 FL (ref 5–10.5)
POTASSIUM SERPL-SCNC: 4 MMOL/L (ref 3.5–5.1)
RBC # BLD AUTO: 4.84 M/UL (ref 4.2–5.9)
SODIUM SERPL-SCNC: 141 MMOL/L (ref 136–145)
TROPONIN, HIGH SENSITIVITY: 27 NG/L (ref 0–22)
TROPONIN, HIGH SENSITIVITY: 31 NG/L (ref 0–22)
WBC # BLD AUTO: 11.7 K/UL (ref 4–11)

## 2024-03-05 PROCEDURE — 36415 COLL VENOUS BLD VENIPUNCTURE: CPT

## 2024-03-05 PROCEDURE — 80048 BASIC METABOLIC PNL TOTAL CA: CPT

## 2024-03-05 PROCEDURE — 99285 EMERGENCY DEPT VISIT HI MDM: CPT

## 2024-03-05 PROCEDURE — 6360000002 HC RX W HCPCS: Performed by: EMERGENCY MEDICINE

## 2024-03-05 PROCEDURE — 83880 ASSAY OF NATRIURETIC PEPTIDE: CPT

## 2024-03-05 PROCEDURE — 6370000000 HC RX 637 (ALT 250 FOR IP): Performed by: EMERGENCY MEDICINE

## 2024-03-05 PROCEDURE — 93005 ELECTROCARDIOGRAM TRACING: CPT | Performed by: EMERGENCY MEDICINE

## 2024-03-05 PROCEDURE — 71045 X-RAY EXAM CHEST 1 VIEW: CPT

## 2024-03-05 PROCEDURE — 84484 ASSAY OF TROPONIN QUANT: CPT

## 2024-03-05 PROCEDURE — 96374 THER/PROPH/DIAG INJ IV PUSH: CPT

## 2024-03-05 PROCEDURE — 85025 COMPLETE CBC W/AUTO DIFF WBC: CPT

## 2024-03-05 RX ORDER — IPRATROPIUM BROMIDE AND ALBUTEROL SULFATE 2.5; .5 MG/3ML; MG/3ML
1 SOLUTION RESPIRATORY (INHALATION) ONCE
Status: COMPLETED | OUTPATIENT
Start: 2024-03-05 | End: 2024-03-05

## 2024-03-05 RX ORDER — FUROSEMIDE 10 MG/ML
40 INJECTION INTRAMUSCULAR; INTRAVENOUS ONCE
Status: COMPLETED | OUTPATIENT
Start: 2024-03-05 | End: 2024-03-05

## 2024-03-05 RX ADMIN — FUROSEMIDE 40 MG: 10 INJECTION, SOLUTION INTRAMUSCULAR; INTRAVENOUS at 23:10

## 2024-03-05 RX ADMIN — IPRATROPIUM BROMIDE AND ALBUTEROL SULFATE 1 DOSE: .5; 2.5 SOLUTION RESPIRATORY (INHALATION) at 22:30

## 2024-03-05 ASSESSMENT — PAIN - FUNCTIONAL ASSESSMENT: PAIN_FUNCTIONAL_ASSESSMENT: NONE - DENIES PAIN

## 2024-03-06 LAB
EKG ATRIAL RATE: 70 BPM
EKG DIAGNOSIS: NORMAL
EKG Q-T INTERVAL: 416 MS
EKG QRS DURATION: 94 MS
EKG QTC CALCULATION (BAZETT): 495 MS
EKG R AXIS: -14 DEGREES
EKG T AXIS: 25 DEGREES
EKG VENTRICULAR RATE: 85 BPM

## 2024-03-06 PROCEDURE — 93010 ELECTROCARDIOGRAM REPORT: CPT | Performed by: INTERNAL MEDICINE

## 2024-03-06 NOTE — DISCHARGE INSTRUCTIONS
As discussed, you were given initial dose of a water pill through the IV to start decreasing the swelling in your legs.  As discussed, please call your primary care doctor tomorrow to discuss continued water pill therapy

## 2024-03-06 NOTE — ED PROVIDER NOTES
exacerbation, COPD exacerbation    CONSULTS: (Who and What was discussed)  None    Discussion with Other Professionals : None        Social Determinants : None    Patient's care impacted by chronic condition(s):   Past Medical History:   Diagnosis Date    Arthritis     Atrial fibrillation (HCC)     Bronchitis chronic     CAD (coronary artery disease)     CHF (congestive heart failure) (HCC)     Emphysema of lung (HCC)     HTN (hypertension)     Hx of blood clots     Influenza A 01/10/2018    Lung disease     copd    Pneumonia     12/2009    Stroke 02/25/2023    [facility]         Records Reviewed : None    Clinical information obtained from an independent historian.     Disposition Considerations (include 1 Tests not done, Shared Decision Making, Pt Expectation of Test or Tx.):     BNP is mildly elevated.  Renal function is at baseline.  Troponin was mildly elevated however no significant delta on repeat.  This is consistent with prior high sensitive troponin values.  He has no signs of ischemia on EKG.  I had a prolonged discussion and shared decision-making discussion with the patient as well as family member regarding appropriate neck step.  Advised that it could be reasonable because of his symptoms of shortness of breath and dyspnea on exertion to have admitted for mild volume overload and IV diuresis.  Family states that his primary care doctor has not wanted to increase dose of diuretics in the past and are hesitant about this.  Patient ambulated a short distance to the emergency department, did not have hypoxia.  Patient does prefer outpatient management.  Patient will be given dose of Lasix in the emergency department and then remainder of diuretic therapy will be deferred to his primary care doctor.  Family is agreeable with this plan and prefer this as opposed to hospitalization.      The patient will be discharged from the emergency department. The patient was counseled on their diagnosis and any

## 2024-03-13 RX ORDER — FUROSEMIDE 40 MG/1
40 TABLET ORAL DAILY
Qty: 90 TABLET | Refills: 0 | OUTPATIENT
Start: 2024-03-13

## 2024-03-13 NOTE — TELEPHONE ENCOUNTER
Refill request for furosemide however patient's medication list states he is on torsemide.  Please verify with patient which diuretic he is taking.  Thank you

## 2024-03-13 NOTE — TELEPHONE ENCOUNTER
Spoke with Diana on HIPAA and she notes that he is NOT taking lasix and is ON torsemide.  Script refused.

## 2024-03-18 RX ORDER — FUROSEMIDE 40 MG/1
40 TABLET ORAL DAILY
Qty: 90 TABLET | Refills: 3 | OUTPATIENT
Start: 2024-03-18

## 2024-03-22 DIAGNOSIS — J44.9 CHRONIC OBSTRUCTIVE PULMONARY DISEASE, UNSPECIFIED COPD TYPE (HCC): ICD-10-CM

## 2024-03-26 RX ORDER — FLUTICASONE PROPIONATE AND SALMETEROL 250; 50 UG/1; UG/1
POWDER RESPIRATORY (INHALATION)
Qty: 90 EACH | Refills: 0 | Status: SHIPPED | OUTPATIENT
Start: 2024-03-26

## 2024-04-29 DIAGNOSIS — J44.9 CHRONIC OBSTRUCTIVE PULMONARY DISEASE, UNSPECIFIED COPD TYPE (HCC): ICD-10-CM

## 2024-04-30 RX ORDER — FLUTICASONE PROPIONATE AND SALMETEROL 250; 50 UG/1; UG/1
POWDER RESPIRATORY (INHALATION)
Qty: 90 EACH | Refills: 3 | Status: SHIPPED | OUTPATIENT
Start: 2024-04-30

## 2024-05-08 DIAGNOSIS — J44.9 CHRONIC OBSTRUCTIVE PULMONARY DISEASE, UNSPECIFIED COPD TYPE (HCC): ICD-10-CM

## 2024-05-08 RX ORDER — BUDESONIDE 0.5 MG/2ML
INHALANT ORAL
Qty: 60 ML | Refills: 3 | Status: SHIPPED | OUTPATIENT
Start: 2024-05-08

## 2024-05-28 ENCOUNTER — APPOINTMENT (OUTPATIENT)
Dept: GENERAL RADIOLOGY | Age: 74
DRG: 190 | End: 2024-05-28
Payer: MEDICARE

## 2024-05-28 ENCOUNTER — HOSPITAL ENCOUNTER (INPATIENT)
Age: 74
LOS: 2 days | Discharge: HOME | DRG: 190 | End: 2024-05-31
Attending: EMERGENCY MEDICINE | Admitting: INTERNAL MEDICINE
Payer: MEDICARE

## 2024-05-28 DIAGNOSIS — I50.33 ACUTE ON CHRONIC DIASTOLIC CONGESTIVE HEART FAILURE (HCC): ICD-10-CM

## 2024-05-28 DIAGNOSIS — I50.32 CHRONIC DIASTOLIC CONGESTIVE HEART FAILURE (HCC): Primary | ICD-10-CM

## 2024-05-28 DIAGNOSIS — J44.1 COPD EXACERBATION (HCC): ICD-10-CM

## 2024-05-28 PROBLEM — J44.9 COPD (CHRONIC OBSTRUCTIVE PULMONARY DISEASE) (HCC): Status: ACTIVE | Noted: 2024-05-28

## 2024-05-28 LAB
ANION GAP SERPL CALCULATED.3IONS-SCNC: 12 MMOL/L (ref 3–16)
BASE EXCESS BLDA CALC-SCNC: 0.5 MMOL/L (ref -3–3)
BASOPHILS # BLD: 0.1 K/UL (ref 0–0.2)
BASOPHILS NFR BLD: 0.6 %
BUN SERPL-MCNC: 20 MG/DL (ref 7–20)
CALCIUM SERPL-MCNC: 8.8 MG/DL (ref 8.3–10.6)
CHLORIDE SERPL-SCNC: 98 MMOL/L (ref 99–110)
CO2 BLDA-SCNC: 28.2 MMOL/L
CO2 SERPL-SCNC: 28 MMOL/L (ref 21–32)
COHGB MFR BLDA: 0.5 % (ref 0–1.5)
CREAT SERPL-MCNC: 0.9 MG/DL (ref 0.8–1.3)
DEPRECATED RDW RBC AUTO: 14.8 % (ref 12.4–15.4)
EKG ATRIAL RATE: 79 BPM
EKG DIAGNOSIS: NORMAL
EKG Q-T INTERVAL: 404 MS
EKG QRS DURATION: 100 MS
EKG QTC CALCULATION (BAZETT): 436 MS
EKG R AXIS: -23 DEGREES
EKG T AXIS: 13 DEGREES
EKG VENTRICULAR RATE: 70 BPM
EOSINOPHIL # BLD: 0.3 K/UL (ref 0–0.6)
EOSINOPHIL NFR BLD: 2.9 %
GFR SERPLBLD CREATININE-BSD FMLA CKD-EPI: 89 ML/MIN/{1.73_M2}
GLUCOSE BLD-MCNC: 150 MG/DL (ref 70–99)
GLUCOSE SERPL-MCNC: 158 MG/DL (ref 70–99)
HCO3 BLDA-SCNC: 26.7 MMOL/L (ref 21–29)
HCT VFR BLD AUTO: 46.6 % (ref 40.5–52.5)
HGB BLD-MCNC: 15.3 G/DL (ref 13.5–17.5)
HGB BLDA-MCNC: 16.5 G/DL (ref 13.5–17.5)
LYMPHOCYTES # BLD: 2.2 K/UL (ref 1–5.1)
LYMPHOCYTES NFR BLD: 18.5 %
MAGNESIUM SERPL-MCNC: 2 MG/DL (ref 1.8–2.4)
MCH RBC QN AUTO: 30.5 PG (ref 26–34)
MCHC RBC AUTO-ENTMCNC: 32.8 G/DL (ref 31–36)
MCV RBC AUTO: 92.8 FL (ref 80–100)
METHGB MFR BLDA: 0.5 %
MONOCYTES # BLD: 1 K/UL (ref 0–1.3)
MONOCYTES NFR BLD: 8.1 %
NEUTROPHILS # BLD: 8.2 K/UL (ref 1.7–7.7)
NEUTROPHILS NFR BLD: 69.9 %
NT-PROBNP SERPL-MCNC: 446 PG/ML (ref 0–449)
O2 THERAPY: ABNORMAL
PCO2 BLDA: 48.1 MMHG (ref 35–45)
PERFORMED ON: ABNORMAL
PH BLDA: 7.36 [PH] (ref 7.35–7.45)
PLATELET # BLD AUTO: 316 K/UL (ref 135–450)
PMV BLD AUTO: 9.1 FL (ref 5–10.5)
PO2 BLDA: 58.8 MMHG (ref 75–108)
POTASSIUM SERPL-SCNC: 4.6 MMOL/L (ref 3.5–5.1)
RBC # BLD AUTO: 5.02 M/UL (ref 4.2–5.9)
SAO2 % BLDA: 91.2 %
SODIUM SERPL-SCNC: 138 MMOL/L (ref 136–145)
TROPONIN, HIGH SENSITIVITY: 20 NG/L (ref 0–22)
WBC # BLD AUTO: 11.7 K/UL (ref 4–11)

## 2024-05-28 PROCEDURE — 80048 BASIC METABOLIC PNL TOTAL CA: CPT

## 2024-05-28 PROCEDURE — 2500000003 HC RX 250 WO HCPCS: Performed by: INTERNAL MEDICINE

## 2024-05-28 PROCEDURE — 85025 COMPLETE CBC W/AUTO DIFF WBC: CPT

## 2024-05-28 PROCEDURE — 83735 ASSAY OF MAGNESIUM: CPT

## 2024-05-28 PROCEDURE — 96375 TX/PRO/DX INJ NEW DRUG ADDON: CPT

## 2024-05-28 PROCEDURE — 93010 ELECTROCARDIOGRAM REPORT: CPT | Performed by: INTERNAL MEDICINE

## 2024-05-28 PROCEDURE — 94761 N-INVAS EAR/PLS OXIMETRY MLT: CPT

## 2024-05-28 PROCEDURE — 2700000000 HC OXYGEN THERAPY PER DAY

## 2024-05-28 PROCEDURE — G0378 HOSPITAL OBSERVATION PER HR: HCPCS

## 2024-05-28 PROCEDURE — 6370000000 HC RX 637 (ALT 250 FOR IP): Performed by: NURSE PRACTITIONER

## 2024-05-28 PROCEDURE — 96366 THER/PROPH/DIAG IV INF ADDON: CPT

## 2024-05-28 PROCEDURE — 94660 CPAP INITIATION&MGMT: CPT

## 2024-05-28 PROCEDURE — 6360000002 HC RX W HCPCS: Performed by: NURSE PRACTITIONER

## 2024-05-28 PROCEDURE — 6360000002 HC RX W HCPCS: Performed by: INTERNAL MEDICINE

## 2024-05-28 PROCEDURE — 85014 HEMATOCRIT: CPT

## 2024-05-28 PROCEDURE — 83605 ASSAY OF LACTIC ACID: CPT

## 2024-05-28 PROCEDURE — 6370000000 HC RX 637 (ALT 250 FOR IP): Performed by: INTERNAL MEDICINE

## 2024-05-28 PROCEDURE — 71045 X-RAY EXAM CHEST 1 VIEW: CPT

## 2024-05-28 PROCEDURE — 2580000003 HC RX 258: Performed by: NURSE PRACTITIONER

## 2024-05-28 PROCEDURE — 82947 ASSAY GLUCOSE BLOOD QUANT: CPT

## 2024-05-28 PROCEDURE — 96376 TX/PRO/DX INJ SAME DRUG ADON: CPT

## 2024-05-28 PROCEDURE — 82330 ASSAY OF CALCIUM: CPT

## 2024-05-28 PROCEDURE — 94640 AIRWAY INHALATION TREATMENT: CPT

## 2024-05-28 PROCEDURE — 96365 THER/PROPH/DIAG IV INF INIT: CPT

## 2024-05-28 PROCEDURE — 84484 ASSAY OF TROPONIN QUANT: CPT

## 2024-05-28 PROCEDURE — 83880 ASSAY OF NATRIURETIC PEPTIDE: CPT

## 2024-05-28 PROCEDURE — 36600 WITHDRAWAL OF ARTERIAL BLOOD: CPT

## 2024-05-28 PROCEDURE — 2580000003 HC RX 258: Performed by: INTERNAL MEDICINE

## 2024-05-28 PROCEDURE — 99285 EMERGENCY DEPT VISIT HI MDM: CPT

## 2024-05-28 PROCEDURE — 84132 ASSAY OF SERUM POTASSIUM: CPT

## 2024-05-28 PROCEDURE — 36415 COLL VENOUS BLD VENIPUNCTURE: CPT

## 2024-05-28 PROCEDURE — 93005 ELECTROCARDIOGRAM TRACING: CPT | Performed by: EMERGENCY MEDICINE

## 2024-05-28 PROCEDURE — 84295 ASSAY OF SERUM SODIUM: CPT

## 2024-05-28 PROCEDURE — 6370000000 HC RX 637 (ALT 250 FOR IP): Performed by: EMERGENCY MEDICINE

## 2024-05-28 PROCEDURE — 82803 BLOOD GASES ANY COMBINATION: CPT

## 2024-05-28 RX ORDER — SODIUM CHLORIDE 9 MG/ML
INJECTION, SOLUTION INTRAVENOUS PRN
Status: DISCONTINUED | OUTPATIENT
Start: 2024-05-28 | End: 2024-05-31 | Stop reason: HOSPADM

## 2024-05-28 RX ORDER — DILTIAZEM HYDROCHLORIDE 5 MG/ML
15 INJECTION INTRAVENOUS ONCE
Status: COMPLETED | OUTPATIENT
Start: 2024-05-28 | End: 2024-05-28

## 2024-05-28 RX ORDER — IPRATROPIUM BROMIDE AND ALBUTEROL SULFATE 2.5; .5 MG/3ML; MG/3ML
1 SOLUTION RESPIRATORY (INHALATION) EVERY 4 HOURS PRN
Status: DISCONTINUED | OUTPATIENT
Start: 2024-05-28 | End: 2024-05-31 | Stop reason: HOSPADM

## 2024-05-28 RX ORDER — IPRATROPIUM BROMIDE AND ALBUTEROL SULFATE 2.5; .5 MG/3ML; MG/3ML
1 SOLUTION RESPIRATORY (INHALATION)
Status: DISCONTINUED | OUTPATIENT
Start: 2024-05-28 | End: 2024-05-31 | Stop reason: HOSPADM

## 2024-05-28 RX ORDER — POTASSIUM CHLORIDE 20 MEQ/1
20 TABLET, EXTENDED RELEASE ORAL DAILY
Status: DISCONTINUED | OUTPATIENT
Start: 2024-05-28 | End: 2024-05-31 | Stop reason: HOSPADM

## 2024-05-28 RX ORDER — DILTIAZEM HYDROCHLORIDE 180 MG/1
180 CAPSULE, COATED, EXTENDED RELEASE ORAL DAILY
Status: DISCONTINUED | OUTPATIENT
Start: 2024-05-28 | End: 2024-05-31 | Stop reason: HOSPADM

## 2024-05-28 RX ORDER — PRAMIPEXOLE DIHYDROCHLORIDE 0.25 MG/1
0.5 TABLET ORAL DAILY
Status: DISCONTINUED | OUTPATIENT
Start: 2024-05-28 | End: 2024-05-31 | Stop reason: HOSPADM

## 2024-05-28 RX ORDER — ROFLUMILAST 500 UG/1
500 TABLET ORAL DAILY
Status: DISCONTINUED | OUTPATIENT
Start: 2024-05-28 | End: 2024-05-31 | Stop reason: HOSPADM

## 2024-05-28 RX ORDER — IPRATROPIUM BROMIDE AND ALBUTEROL SULFATE 2.5; .5 MG/3ML; MG/3ML
1 SOLUTION RESPIRATORY (INHALATION)
Status: DISCONTINUED | OUTPATIENT
Start: 2024-05-28 | End: 2024-05-28

## 2024-05-28 RX ORDER — TORSEMIDE 20 MG/1
10 TABLET ORAL DAILY
Status: DISCONTINUED | OUTPATIENT
Start: 2024-05-28 | End: 2024-05-29

## 2024-05-28 RX ORDER — SODIUM CHLORIDE 0.9 % (FLUSH) 0.9 %
5-40 SYRINGE (ML) INJECTION EVERY 12 HOURS SCHEDULED
Status: DISCONTINUED | OUTPATIENT
Start: 2024-05-28 | End: 2024-05-31 | Stop reason: HOSPADM

## 2024-05-28 RX ORDER — BUDESONIDE 0.5 MG/2ML
0.5 INHALANT ORAL
Status: DISCONTINUED | OUTPATIENT
Start: 2024-05-28 | End: 2024-05-31 | Stop reason: HOSPADM

## 2024-05-28 RX ORDER — IPRATROPIUM BROMIDE AND ALBUTEROL SULFATE 2.5; .5 MG/3ML; MG/3ML
1 SOLUTION RESPIRATORY (INHALATION) ONCE
Status: COMPLETED | OUTPATIENT
Start: 2024-05-28 | End: 2024-05-28

## 2024-05-28 RX ORDER — POLYETHYLENE GLYCOL 3350 17 G/17G
17 POWDER, FOR SOLUTION ORAL DAILY PRN
Status: DISCONTINUED | OUTPATIENT
Start: 2024-05-28 | End: 2024-05-31 | Stop reason: HOSPADM

## 2024-05-28 RX ORDER — FUROSEMIDE 10 MG/ML
40 INJECTION INTRAMUSCULAR; INTRAVENOUS ONCE
Status: COMPLETED | OUTPATIENT
Start: 2024-05-28 | End: 2024-05-28

## 2024-05-28 RX ORDER — FUROSEMIDE 10 MG/ML
40 INJECTION INTRAMUSCULAR; INTRAVENOUS 2 TIMES DAILY
Status: DISCONTINUED | OUTPATIENT
Start: 2024-05-28 | End: 2024-05-30

## 2024-05-28 RX ORDER — PREDNISONE 20 MG/1
40 TABLET ORAL DAILY
Status: DISCONTINUED | OUTPATIENT
Start: 2024-05-28 | End: 2024-05-29

## 2024-05-28 RX ORDER — ATORVASTATIN CALCIUM 80 MG/1
80 TABLET, FILM COATED ORAL DAILY
Status: DISCONTINUED | OUTPATIENT
Start: 2024-05-28 | End: 2024-05-31 | Stop reason: HOSPADM

## 2024-05-28 RX ORDER — FAMOTIDINE 20 MG/1
20 TABLET, FILM COATED ORAL 2 TIMES DAILY
Status: DISCONTINUED | OUTPATIENT
Start: 2024-05-28 | End: 2024-05-31 | Stop reason: HOSPADM

## 2024-05-28 RX ORDER — PROCHLORPERAZINE EDISYLATE 5 MG/ML
10 INJECTION INTRAMUSCULAR; INTRAVENOUS EVERY 6 HOURS PRN
Status: DISCONTINUED | OUTPATIENT
Start: 2024-05-28 | End: 2024-05-31 | Stop reason: HOSPADM

## 2024-05-28 RX ORDER — LORAZEPAM 0.5 MG/1
0.5 TABLET ORAL EVERY 6 HOURS PRN
Status: DISCONTINUED | OUTPATIENT
Start: 2024-05-28 | End: 2024-05-31 | Stop reason: HOSPADM

## 2024-05-28 RX ORDER — ACETAMINOPHEN 650 MG/1
650 SUPPOSITORY RECTAL EVERY 6 HOURS PRN
Status: DISCONTINUED | OUTPATIENT
Start: 2024-05-28 | End: 2024-05-31 | Stop reason: HOSPADM

## 2024-05-28 RX ORDER — SODIUM CHLORIDE 0.9 % (FLUSH) 0.9 %
5-40 SYRINGE (ML) INJECTION PRN
Status: DISCONTINUED | OUTPATIENT
Start: 2024-05-28 | End: 2024-05-31 | Stop reason: HOSPADM

## 2024-05-28 RX ORDER — IPRATROPIUM BROMIDE AND ALBUTEROL SULFATE 2.5; .5 MG/3ML; MG/3ML
1 SOLUTION RESPIRATORY (INHALATION) EVERY 4 HOURS PRN
Status: DISCONTINUED | OUTPATIENT
Start: 2024-05-28 | End: 2024-05-28

## 2024-05-28 RX ORDER — ACETAMINOPHEN 325 MG/1
650 TABLET ORAL EVERY 6 HOURS PRN
Status: DISCONTINUED | OUTPATIENT
Start: 2024-05-28 | End: 2024-05-31 | Stop reason: HOSPADM

## 2024-05-28 RX ADMIN — Medication 10 ML: at 07:52

## 2024-05-28 RX ADMIN — FUROSEMIDE 40 MG: 10 INJECTION, SOLUTION INTRAMUSCULAR; INTRAVENOUS at 13:20

## 2024-05-28 RX ADMIN — FAMOTIDINE 20 MG: 20 TABLET, FILM COATED ORAL at 07:53

## 2024-05-28 RX ADMIN — SODIUM CHLORIDE 5 MG/HR: 900 INJECTION, SOLUTION INTRAVENOUS at 15:48

## 2024-05-28 RX ADMIN — TORSEMIDE 10 MG: 20 TABLET ORAL at 07:53

## 2024-05-28 RX ADMIN — IPRATROPIUM BROMIDE AND ALBUTEROL SULFATE 1 DOSE: 2.5; .5 SOLUTION RESPIRATORY (INHALATION) at 01:27

## 2024-05-28 RX ADMIN — BUDESONIDE 500 MCG: 0.5 SUSPENSION RESPIRATORY (INHALATION) at 20:00

## 2024-05-28 RX ADMIN — PREDNISONE 40 MG: 20 TABLET ORAL at 12:34

## 2024-05-28 RX ADMIN — BUDESONIDE 500 MCG: 0.5 SUSPENSION RESPIRATORY (INHALATION) at 07:58

## 2024-05-28 RX ADMIN — PROCHLORPERAZINE EDISYLATE 10 MG: 5 INJECTION INTRAMUSCULAR; INTRAVENOUS at 12:11

## 2024-05-28 RX ADMIN — POTASSIUM CHLORIDE 20 MEQ: 1500 TABLET, EXTENDED RELEASE ORAL at 07:53

## 2024-05-28 RX ADMIN — ACETAMINOPHEN 650 MG: 325 TABLET ORAL at 07:52

## 2024-05-28 RX ADMIN — IPRATROPIUM BROMIDE AND ALBUTEROL SULFATE 1 DOSE: 2.5; .5 SOLUTION RESPIRATORY (INHALATION) at 12:54

## 2024-05-28 RX ADMIN — Medication 10 ML: at 21:14

## 2024-05-28 RX ADMIN — APIXABAN 5 MG: 5 TABLET, FILM COATED ORAL at 21:14

## 2024-05-28 RX ADMIN — Medication 10 ML: at 13:46

## 2024-05-28 RX ADMIN — ATORVASTATIN CALCIUM 80 MG: 80 TABLET, FILM COATED ORAL at 07:53

## 2024-05-28 RX ADMIN — ROFLUMILAST 500 MCG: 500 TABLET ORAL at 07:52

## 2024-05-28 RX ADMIN — IPRATROPIUM BROMIDE AND ALBUTEROL SULFATE 1 DOSE: 2.5; .5 SOLUTION RESPIRATORY (INHALATION) at 07:58

## 2024-05-28 RX ADMIN — FUROSEMIDE 40 MG: 10 INJECTION, SOLUTION INTRAMUSCULAR; INTRAVENOUS at 17:53

## 2024-05-28 RX ADMIN — DILTIAZEM HYDROCHLORIDE 15 MG: 5 INJECTION, SOLUTION INTRAVENOUS at 13:44

## 2024-05-28 RX ADMIN — TICAGRELOR 90 MG: 90 TABLET ORAL at 07:53

## 2024-05-28 RX ADMIN — PRAMIPEXOLE DIHYDROCHLORIDE 0.5 MG: 0.25 TABLET ORAL at 07:53

## 2024-05-28 RX ADMIN — FAMOTIDINE 20 MG: 20 TABLET, FILM COATED ORAL at 21:14

## 2024-05-28 RX ADMIN — IPRATROPIUM BROMIDE AND ALBUTEROL SULFATE 1 DOSE: 2.5; .5 SOLUTION RESPIRATORY (INHALATION) at 12:49

## 2024-05-28 RX ADMIN — DILTIAZEM HYDROCHLORIDE 180 MG: 180 CAPSULE, COATED, EXTENDED RELEASE ORAL at 07:53

## 2024-05-28 RX ADMIN — APIXABAN 5 MG: 5 TABLET, FILM COATED ORAL at 07:53

## 2024-05-28 RX ADMIN — TICAGRELOR 90 MG: 90 TABLET ORAL at 21:14

## 2024-05-28 RX ADMIN — IPRATROPIUM BROMIDE AND ALBUTEROL SULFATE 1 DOSE: 2.5; .5 SOLUTION RESPIRATORY (INHALATION) at 19:56

## 2024-05-28 RX ADMIN — IPRATROPIUM BROMIDE AND ALBUTEROL SULFATE 1 DOSE: 2.5; .5 SOLUTION RESPIRATORY (INHALATION) at 15:37

## 2024-05-28 ASSESSMENT — LIFESTYLE VARIABLES
HOW OFTEN DO YOU HAVE A DRINK CONTAINING ALCOHOL: MONTHLY OR LESS
HOW MANY STANDARD DRINKS CONTAINING ALCOHOL DO YOU HAVE ON A TYPICAL DAY: PATIENT DOES NOT DRINK

## 2024-05-28 ASSESSMENT — PAIN SCALES - GENERAL
PAINLEVEL_OUTOF10: 7
PAINLEVEL_OUTOF10: 7
PAINLEVEL_OUTOF10: 4
PAINLEVEL_OUTOF10: 0
PAINLEVEL_OUTOF10: 0

## 2024-05-28 ASSESSMENT — PAIN DESCRIPTION - PAIN TYPE
TYPE: ACUTE PAIN
TYPE: ACUTE PAIN

## 2024-05-28 ASSESSMENT — PAIN DESCRIPTION - LOCATION
LOCATION: FOOT

## 2024-05-28 ASSESSMENT — PAIN DESCRIPTION - ORIENTATION
ORIENTATION: MID
ORIENTATION: RIGHT
ORIENTATION: RIGHT

## 2024-05-28 ASSESSMENT — PAIN - FUNCTIONAL ASSESSMENT: PAIN_FUNCTIONAL_ASSESSMENT: NONE - DENIES PAIN

## 2024-05-28 ASSESSMENT — PAIN DESCRIPTION - DESCRIPTORS: DESCRIPTORS: STABBING
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
## 2024-05-28 NOTE — PROGRESS NOTES
Patient Blood pressure better and patient resting.  HR still 108-120 A-fib with dilt once given, which has been 45 minutes.  We have a bed 212 and primary nurse calling for report.  Respiratory repeating ABG.  Paged Dr. Webb for parameters for HR to know when to consider Cardizem drip to communicate to A2 nurse for transition.

## 2024-05-28 NOTE — PROGRESS NOTES
Respiratory at bedside ad BiPAP on patient.  Patient A-fib RVR with , Primary nurse Kathy Webb, new order 15 mg of dilt.  Per Tracey if remains elevated will need to start the Cardizem drop. This nurse remaining with patient until bed catalino ible.

## 2024-05-28 NOTE — PROGRESS NOTES
05/28/24 1541   NIV Type   NIV Started/Stopped On   Equipment Type v60   Mode Bilevel   Mask Type Full face mask   Assessment   Pulse (!) 104   Respirations 18   SpO2 100 %   Comfort Level Good   Using Accessory Muscles No   Mask Compliance Good   Skin Assessment Clean, dry, & intact   Skin Protection for O2 Device (S)  Yes  (RT FOUND PT WITHOUT)   Orientation Middle   Location Nose   Intervention(s) Skin Barrier   Settings/Measurements   PIP Observed 10 cm H20   IPAP 10 cmH20   CPAP/EPAP 5 cmH2O   Vt (Measured) 985 mL   Rate Ordered 12   FiO2  50 %   I Time/ I Time % 1 s   Minute Volume (L/min) 18 Liters   Mask Leak (lpm) 30 lpm   Patient's Home Machine No   Alarm Settings   Alarms On Y   Low Pressure (cmH2O) 6 cmH2O   High Pressure (cmH2O) 30 cmH2O   Apnea (secs) 20 secs   RR Low (bpm) 6   RR High (bpm) 40 br/min

## 2024-05-28 NOTE — PROGRESS NOTES
Patient ABG result in and patient has new orders for bipap and one time lasix 40 mg placed.  Being transferred to higher level of care.  Waiting on a bed to get cleaned.

## 2024-05-28 NOTE — PROGRESS NOTES
05/28/24 1330   Oxygen Therapy/Pulse Ox   O2 Therapy Oxygen   O2 Device PAP (positive airway pressure)   FiO2  100 %   Pulse (!) 120   Respirations 30   SpO2 91 %   Blood Gas  Performed? Yes   $ABG $Arterial Puncture   Earnest's Test #1 Pos   Site #1 Right Radial   Site Prepped #1 Yes   Number of Attempts #1 2   Pressure Held #1 Yes   Complications #1 Hematoma   Post-procedure #1 None   Specimen Status #1 Point of care   How Tolerated? Tolerated well
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
## 2024-05-28 NOTE — PLAN OF CARE
CHF Care Plan      Patient's EF (Ejection Fraction) is greater than 40%    Heart Failure Medications:  Diuretics:: Torsemide    (One of the following REQUIRED for EF </= 40%/SYSTOLIC FAILURE but MAY be used in EF% >40%/DIASTOLIC FAILURE)        ACE:: None        ARB:: None         ARNI:: None    (Beta Blockers)  NON- Evidenced Based Beta Blocker (for EF% >40%/DIASTOLIC FAILURE): None    Evidenced Based Beta Blocker::(REQUIRED for EF% <40%/SYSTOLIC FAILURE) None  ...................................................................................................................................................    Failed to redirect to the Timeline version of the [employer] SmartLink.      Patient's weights and intake/output reviewed    Daily Weight log at bedside, patient/family participation in use of log: \"yes    Patient's current weight today shows a difference of 8.8 lbs more than last documented weight.      Intake/Output Summary (Last 24 hours) at 5/28/2024 0622  Last data filed at 5/28/2024 0315  Gross per 24 hour   Intake 400 ml   Output 0 ml   Net 400 ml       Education Booklet Provided: yes    Comorbidities Reviewed Yes    Patient has a past medical history of Arthritis, Atrial fibrillation (HCC), Bronchitis chronic, CAD (coronary artery disease), CHF (congestive heart failure) (HCC), Emphysema of lung (HCC), HTN (hypertension), Hx of blood clots, Influenza A, Lung disease, Pneumonia, and Stroke.     >>For CHF and Comorbidity documentation on Education Time and Topics, please see Education Tab      Pt resting in bed at this time on  3.5 L O2. Pt with complaints of shortness of breath. Pt with pitting lower extremity edema.     Patient and/or Family's stated Goal of Care this Admission: reduce shortness of breath, increase activity tolerance, better understand heart failure and disease management, be more comfortable, and reduce lower extremity edema prior to discharge        :

## 2024-05-28 NOTE — PROGRESS NOTES
05/28/24 1959   NIV Type   NIV Started/Stopped On   Equipment Type V60   Mode Bilevel   Mask Type Full face mask   Assessment   Pulse 94   Respirations 17   SpO2 98 %   Comfort Level Good   Using Accessory Muscles No   Mask Compliance Good   Skin Assessment Clean, dry, & intact   Skin Protection for O2 Device Yes   Orientation Middle   Location Nose   Intervention(s) Skin Barrier   Settings/Measurements   PIP Observed 10 cm H20   IPAP 10 cmH20   CPAP/EPAP 5 cmH2O   Vt (Measured) 856 mL   Rate Ordered 12   FiO2  50 %   I Time/ I Time % 1 s   Minute Volume (L/min) 15.5 Liters   Mask Leak (lpm) 14 lpm   Patient's Home Machine No   Alarm Settings   Alarms On Y   Low Pressure (cmH2O) 6 cmH2O   High Pressure (cmH2O) 30 cmH2O   Apnea (secs) 20 secs   RR Low (bpm) 6   RR High (bpm) 40 br/min

## 2024-05-28 NOTE — PROGRESS NOTES
Patient admitted to room 369 from ED.  Patient oriented to room, call light, bed rails, phone, lights and bathroom.  Patient instructed about the schedule of the day including: vital sign frequency, lab draws, possible tests, frequency of MD and staff rounds, including RN/MD rounding together at bedside, daily weights, and I &O's.  Patient instructed about prescribed diet, how to use 8MENU, and television.  With bed alarm in place, patient aware of placement and reason.  With Telemetry box  in place, patient aware of placement and reason.  Bed locked, in lowest position, side rails up 2/4, call light within reach.  Will continue to monitor.

## 2024-05-28 NOTE — PROGRESS NOTES
05/28/24 1330   NIV Type   $NIV $Daily Charge   NIV Started/Stopped On   Equipment Type v60   Assessment   Pulse (!) 120   Heart Rate Source Monitor   Respirations 30   SpO2 91 %   Level of Consciousness 0   Comfort Level Good   Using Accessory Muscles Yes   Mask Compliance Good   Settings/Measurements   PIP Observed 21 cm H20   IPAP 15 cmH20   CPAP/EPAP 5 cmH2O   Vt (Measured) 988 mL   Rate Ordered 12   Insp Rise Time (%) 1 %   FiO2  100 %   I Time/ I Time % 1 s   Minute Volume (L/min) 16.3 Liters   Alarm Settings   Alarms On Y   Oxygen Therapy/Pulse Ox   O2 Therapy Oxygen   O2 Device PAP (positive airway pressure)
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
## 2024-05-28 NOTE — PROGRESS NOTES
4 Eyes Skin Assessment and Patient belongings     The patient is being assess for  Admission    I agree that 2 Nurses have performed a thorough Head to Toe Skin Assessment on the patient. ALL assessment sites listed below have been assessed.       Areas assessed by both nurses:   [x]   Head, Face, and Ears   [x]   Shoulders, Back, and Chest  [x]   Arms, Elbows, and Hands   [x]   Coccyx, Sacrum, and IschIum  [x]   Legs, Feet, and Heels        Does the Patient have Skin Breakdown?  No         Nolan Prevention initiated:  No   Wound Care Orders initiated:  No      [facility] nurse consulted for Pressure Injury (Stage 3,4, Unstageable, DTI, NWPT, and Complex wounds), New and Established Ostomies:  No      I agree that 2 Nurses have reviewed patient belongings with the patient/family and documented in the flowsheet upon admission or transfer to the unit.     Belongings  Dental Appliances: None  Vision - Corrective Lenses: Eyeglasses, At home  Hearing Aid: None  Clothing: Shorts, Shirt, Footwear, Socks  Jewelry: Watch  Electronic Devices: Cell Phone,   Weapons (Notify Protective Services/Security): None  Other Valuables: Wheelchair, Home Medical Equipment, At bedside (Portable oxygen)  Home Medications: Kept at bedside (Inhaler + inhaler powder (will put in lock box))  Valuables Given To: Patient  Provide Name(s) of Who Valuable(s) Were Given To: Bang Arvizu       Nurse 1 eSignature: Electronically signed by Darnell Villasenor RN on 5/28/24 at 4:15 AM EDT    **SHARE this note so that the co-signing nurse is able to place an eSignature**    Nurse 2 eSignature: Electronically signed by Mic Ortiz RN on 5/28/24 at 4:16 AM EDT

## 2024-05-28 NOTE — ACP (ADVANCE CARE PLANNING)
Advance Care Planning     General Advance Care Planning (ACP) Conversation    Date of Conversation: 5/28/2024  Conducted with: Patient with Decision Making Capacity  Other persons present: None    Healthcare Decision Maker:    Primary Decision Maker: ItaBang BEDOLLA - Child - [phone]    Secondary Decision Maker: ItaDiana - Daughter-in-Law - [phone]    Supplemental (Other) Decision Maker: ItaPao - Grandchild - [phone]  Click here to complete Healthcare Decision Makers including selection of the Healthcare Decision Maker Relationship (ie \"Primary\").  Today we documented Decision Maker(s) consistent with Legal Next of Kin hierarchy.    Content/Action Overview:  Has ACP document(s) on file - reflects the patient's care preferences  Reviewed DNR/DNI and patient elects Full Code (Attempt Resuscitation)      Length of Voluntary ACP Conversation in minutes:  <16 minutes (Non-Billable)    Lucrecia Easton RN

## 2024-05-28 NOTE — PROGRESS NOTES
4 Eyes Skin Assessment and Patient belongings     The patient is being assess for  Transfer to New Unit    I agree that 2 Nurses have performed a thorough Head to Toe Skin Assessment on the patient. ALL assessment sites listed below have been assessed.       Areas assessed by both nurses:   [x]   Head, Face, and Ears   [x]   Shoulders, Back, and Chest  [x]   Arms, Elbows, and Hands   [x]   Coccyx, Sacrum, and IschIum  [x]   Legs, Feet, and Heels        Does the Patient have Skin Breakdown?  No         Nolan Prevention initiated:  No   Wound Care Orders initiated:  No      [facility] nurse consulted for Pressure Injury (Stage 3,4, Unstageable, DTI, NWPT, and Complex wounds), New and Established Ostomies:  No      Scattered bruising and redness noted in groin and coccyx blanchable. Verified 4 eye with A2 nurses who will sign as a second witness.       I agree that 2 Nurses have reviewed patient belongings with the patient/family and documented in the flowsheet upon admission or transfer to the unit.     Belongings  Dental Appliances: None  Vision - Corrective Lenses: Eyeglasses, At home  Hearing Aid: None  Clothing: Shorts, Shirt, Footwear, Socks  Jewelry: Watch  Electronic Devices: Cell Phone,   Weapons (Notify Protective Services/Security): None  Other Valuables: Wheelchair, Home Medical Equipment, At bedside (Portable oxygen)  Home Medications: Kept at bedside (Inhaler + inhaler powder (will put in lock box))  Valuables Given To: Patient  Provide Name(s) of Who Valuable(s) Were Given To: Bang Arvizu     Patient has his bipap machine, own pulse ox, phone and .  Clothes(tshirt, shorts, and underwear), and an inhaler with him in his bag that I have placed and verified to send to PCU and given to Norma    Patient also had his own wheelchair that student took to [facility].     Nurse 1 eSignature: Electronically signed by Anita Samuel RN on 5/28/24 at 2:13 PM EDT    **SHARE this note so that the

## 2024-05-28 NOTE — PROGRESS NOTES
Rapid response called for increased SOB while even getting breathing treatments, 3.5 litters baseline, on NRB with RR 30.  New orders for ABG and xray chest stat.  Physicians at bedside waiting on ABG to determine next steps.
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
## 2024-05-28 NOTE — CARE COORDINATION
Case Management Assessment  Initial Evaluation    Date/Time of Evaluation: 5/28/2024 1:49 PM  Assessment Completed by: Lucrecia Easton RN    If patient is discharged prior to next notation, then this note serves as note for discharge by case management.    Patient Name: Bang Jean Baptiste                   YOB: 1950  Diagnosis: COPD (chronic obstructive pulmonary disease) ([facility]) [J44.9]  Chronic diastolic congestive heart failure (HCC) [I50.32]                   Date / Time: 5/28/2024 12:42 AM    Patient Admission Status: Observation   Readmission Risk (Low < 19, Mod (19-27), High > 27): Readmission Risk Score: 20.1    Current PCP: Grace Rose APRN - NP  PCP verified by CM?      Chart Reviewed: Yes      History Provided by:    Patient Orientation:      Patient Cognition:      Hospitalization in the last 30 days (Readmission):  No    If yes, Readmission Assessment in CM Navigator will be completed.    Advance Directives:      Code Status: Full Code   Patient's Primary Decision Maker is:      Primary Decision Maker: Jean BaptisteBang POA - Child - [phone]    Secondary Decision Maker: Diana Jean Baptiste - Daughter-in-Law - [phone]    Supplemental (Other) Decision Maker: Pao Jean Baptiste - Grandchild - [phone]    Discharge Planning:    Patient lives with: Children, Family Members Type of Home: House  Primary Care Giver:    Patient Support Systems include: Children   Current Financial resources:    Current community resources:    Current services prior to admission: Oxygen Therapy, Home Care            Current DME:              Type of Home Care services:  Nursing Services    ADLS  Prior functional level:    Current functional level:      PT AM-PAC:   /24  OT AM-PAC:   /24    Family can provide assistance at DC:    Would you like Case Management to discuss the discharge plan with any other family members/significant others, and if so, who?    Plans to Return to Present Housing:    Other  agrees with the discharge plan. Freedom of choice list with basic dialogue that supports the patient's individualized plan of care/goals and shares the quality data associated with the providers was provided to:     Patient Representative Name:       The Patient and/or Patient Representative Agree with the Discharge Plan?      Lucrecia Easton RN  Case Management Department
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
## 2024-05-28 NOTE — FLOWSHEET NOTE
Patient transfer to room 212 from [facility] s/p rapid response. Belongings verified with RN manager, Anita. Pt on cont. bipap. Vitals below.    05/28/24 1458   Vital Signs   Temp 97.3 °F (36.3 °C)   Temp Source Axillary   Pulse (!) 101   Heart Rate Source Monitor   Respirations 22   /70   MAP (Calculated) 88   BP Location Right upper arm   BP Method Automatic   Patient Position Semi fowlers   Pain Assessment   Pain Assessment None - Denies Pain   Oxygen Therapy   SpO2 95 %   O2 Device PAP (positive airway pressure)   FiO2  50 %

## 2024-05-28 NOTE — PROGRESS NOTES
05/28/24 1438   Oxygen Therapy/Pulse Ox   Blood Gas  Performed? Yes   $ABG $Arterial Puncture   Earnest's Test #1 Pos   Site #1 Left Radial   Site Prepped #1 Yes   Number of Attempts #1 1   Pressure Held #1 Yes   Complications #1 None   Post-procedure #1 Standard   Specimen Status #1 To lab   How Tolerated? Tolerated well

## 2024-05-28 NOTE — PLAN OF CARE
Problem: Discharge Planning  Goal: Discharge to home or other facility with appropriate resources  Outcome: Progressing     Problem: Safety - Adult  Goal: Free from fall injury  Outcome: Progressing     Problem: ABCDS Injury Assessment  Goal: Absence of physical injury  Outcome: Progressing     Problem: Respiratory - Adult  Goal: Achieves optimal ventilation and oxygenation  Outcome: Progressing     Problem: Cardiovascular - Adult  Goal: Absence of cardiac dysrhythmias or at baseline  Outcome: Progressing

## 2024-05-28 NOTE — CONSULTS
Nutrition Note    RD received consult for CHF nutrition education. Declined verbal diet education this date. Accepted handouts on low sodium, fluid restriction and weight monitoring.     Will continue to monitor per nutrition standards of care.     Rita Feliciano MS, RD, LD on 5/28/2024 at 2:04 PM  Office: [phone]

## 2024-05-29 PROBLEM — I07.1 TRICUSPID REGURGITATION: Status: ACTIVE | Noted: 2024-05-29

## 2024-05-29 PROBLEM — R91.8 PULMONARY INFILTRATES: Status: ACTIVE | Noted: 2024-05-29

## 2024-05-29 PROBLEM — D75.1 SECONDARY POLYCYTHEMIA: Status: ACTIVE | Noted: 2024-05-29

## 2024-05-29 PROBLEM — Q21.12 PFO (PATENT FORAMEN OVALE): Status: ACTIVE | Noted: 2024-05-29

## 2024-05-29 PROBLEM — J96.02 ACUTE RESPIRATORY FAILURE WITH HYPOXIA AND HYPERCAPNIA (HCC): Status: ACTIVE | Noted: 2024-05-29

## 2024-05-29 PROBLEM — J96.01 ACUTE RESPIRATORY FAILURE WITH HYPOXIA AND HYPERCAPNIA (HCC): Status: ACTIVE | Noted: 2024-05-29

## 2024-05-29 LAB
BASE EXCESS BLDA CALC-SCNC: 6 MMOL/L (ref -3–3)
CA-I BLD-SCNC: 1.23 MMOL/L (ref 1.12–1.32)
CO2 BLDA-SCNC: 35 MMOL/L
GLUCOSE BLD-MCNC: 168 MG/DL (ref 70–99)
HCO3 BLDA-SCNC: 32.8 MMOL/L (ref 21–29)
HCT VFR BLD AUTO: 54 % (ref 40.5–52.5)
HGB BLD CALC-MCNC: 18.4 GM/DL (ref 13.5–17.5)
LACTATE BLD-SCNC: 1.97 MMOL/L (ref 0.4–2)
MAGNESIUM SERPL-MCNC: 2 MG/DL (ref 1.8–2.4)
PCO2 BLDA: 75.2 MM HG (ref 35–45)
PERFORMED ON: ABNORMAL
PH BLDA: 7.25 [PH] (ref 7.35–7.45)
PO2 BLDA: 280.4 MM HG (ref 75–108)
POC SAMPLE TYPE: ABNORMAL
POTASSIUM BLD-SCNC: 4.7 MMOL/L (ref 3.5–5.1)
PROCALCITONIN SERPL IA-MCNC: 0.15 NG/ML (ref 0–0.15)
SAO2 % BLDA: 100 % (ref 93–100)
SODIUM BLD-SCNC: 145 MMOL/L (ref 136–145)

## 2024-05-29 PROCEDURE — 2580000003 HC RX 258: Performed by: INTERNAL MEDICINE

## 2024-05-29 PROCEDURE — 2060000000 HC ICU INTERMEDIATE R&B

## 2024-05-29 PROCEDURE — 94660 CPAP INITIATION&MGMT: CPT

## 2024-05-29 PROCEDURE — 94640 AIRWAY INHALATION TREATMENT: CPT

## 2024-05-29 PROCEDURE — 84145 PROCALCITONIN (PCT): CPT

## 2024-05-29 PROCEDURE — 36415 COLL VENOUS BLD VENIPUNCTURE: CPT

## 2024-05-29 PROCEDURE — 2580000003 HC RX 258: Performed by: NURSE PRACTITIONER

## 2024-05-29 PROCEDURE — 2500000003 HC RX 250 WO HCPCS: Performed by: INTERNAL MEDICINE

## 2024-05-29 PROCEDURE — 83735 ASSAY OF MAGNESIUM: CPT

## 2024-05-29 PROCEDURE — 99223 1ST HOSP IP/OBS HIGH 75: CPT | Performed by: INTERNAL MEDICINE

## 2024-05-29 PROCEDURE — 96366 THER/PROPH/DIAG IV INF ADDON: CPT

## 2024-05-29 PROCEDURE — 6360000002 HC RX W HCPCS: Performed by: NURSE PRACTITIONER

## 2024-05-29 PROCEDURE — 6360000002 HC RX W HCPCS: Performed by: INTERNAL MEDICINE

## 2024-05-29 PROCEDURE — 96376 TX/PRO/DX INJ SAME DRUG ADON: CPT

## 2024-05-29 PROCEDURE — 6370000000 HC RX 637 (ALT 250 FOR IP): Performed by: INTERNAL MEDICINE

## 2024-05-29 PROCEDURE — 94761 N-INVAS EAR/PLS OXIMETRY MLT: CPT

## 2024-05-29 PROCEDURE — 5A09457 ASSISTANCE WITH RESPIRATORY VENTILATION, 24-96 CONSECUTIVE HOURS, CONTINUOUS POSITIVE AIRWAY PRESSURE: ICD-10-PCS | Performed by: INTERNAL MEDICINE

## 2024-05-29 PROCEDURE — 6370000000 HC RX 637 (ALT 250 FOR IP): Performed by: NURSE PRACTITIONER

## 2024-05-29 PROCEDURE — 96375 TX/PRO/DX INJ NEW DRUG ADDON: CPT

## 2024-05-29 PROCEDURE — 2700000000 HC OXYGEN THERAPY PER DAY

## 2024-05-29 RX ADMIN — WATER 40 MG: 1 INJECTION INTRAMUSCULAR; INTRAVENOUS; SUBCUTANEOUS at 20:59

## 2024-05-29 RX ADMIN — BUDESONIDE 500 MCG: 0.5 SUSPENSION RESPIRATORY (INHALATION) at 08:21

## 2024-05-29 RX ADMIN — DOXYCYCLINE 100 MG: 100 INJECTION, POWDER, LYOPHILIZED, FOR SOLUTION INTRAVENOUS at 23:27

## 2024-05-29 RX ADMIN — FAMOTIDINE 20 MG: 20 TABLET, FILM COATED ORAL at 12:20

## 2024-05-29 RX ADMIN — ATORVASTATIN CALCIUM 80 MG: 80 TABLET, FILM COATED ORAL at 12:21

## 2024-05-29 RX ADMIN — TICAGRELOR 90 MG: 90 TABLET ORAL at 12:21

## 2024-05-29 RX ADMIN — FUROSEMIDE 40 MG: 10 INJECTION, SOLUTION INTRAMUSCULAR; INTRAVENOUS at 17:15

## 2024-05-29 RX ADMIN — IPRATROPIUM BROMIDE AND ALBUTEROL SULFATE 1 DOSE: 2.5; .5 SOLUTION RESPIRATORY (INHALATION) at 15:43

## 2024-05-29 RX ADMIN — WATER 40 MG: 1 INJECTION INTRAMUSCULAR; INTRAVENOUS; SUBCUTANEOUS at 12:14

## 2024-05-29 RX ADMIN — DOXYCYCLINE 100 MG: 100 INJECTION, POWDER, LYOPHILIZED, FOR SOLUTION INTRAVENOUS at 12:28

## 2024-05-29 RX ADMIN — FUROSEMIDE 40 MG: 10 INJECTION, SOLUTION INTRAMUSCULAR; INTRAVENOUS at 09:36

## 2024-05-29 RX ADMIN — APIXABAN 5 MG: 5 TABLET, FILM COATED ORAL at 12:20

## 2024-05-29 RX ADMIN — BUDESONIDE 500 MCG: 0.5 SUSPENSION RESPIRATORY (INHALATION) at 19:57

## 2024-05-29 RX ADMIN — Medication 10 ML: at 21:00

## 2024-05-29 RX ADMIN — SODIUM CHLORIDE 2.5 MG/HR: 900 INJECTION, SOLUTION INTRAVENOUS at 06:54

## 2024-05-29 RX ADMIN — LORAZEPAM 0.5 MG: 0.5 TABLET ORAL at 12:21

## 2024-05-29 RX ADMIN — ROFLUMILAST 500 MCG: 500 TABLET ORAL at 12:35

## 2024-05-29 RX ADMIN — APIXABAN 5 MG: 5 TABLET, FILM COATED ORAL at 22:00

## 2024-05-29 RX ADMIN — POTASSIUM CHLORIDE 20 MEQ: 1500 TABLET, EXTENDED RELEASE ORAL at 12:20

## 2024-05-29 RX ADMIN — IPRATROPIUM BROMIDE AND ALBUTEROL SULFATE 1 DOSE: 2.5; .5 SOLUTION RESPIRATORY (INHALATION) at 08:21

## 2024-05-29 RX ADMIN — ACETAMINOPHEN 650 MG: 325 TABLET ORAL at 12:20

## 2024-05-29 RX ADMIN — IPRATROPIUM BROMIDE AND ALBUTEROL SULFATE 1 DOSE: 2.5; .5 SOLUTION RESPIRATORY (INHALATION) at 11:26

## 2024-05-29 RX ADMIN — TICAGRELOR 90 MG: 90 TABLET ORAL at 22:01

## 2024-05-29 RX ADMIN — PRAMIPEXOLE DIHYDROCHLORIDE 0.5 MG: 0.25 TABLET ORAL at 12:20

## 2024-05-29 RX ADMIN — Medication 10 ML: at 09:37

## 2024-05-29 RX ADMIN — FAMOTIDINE 20 MG: 20 TABLET, FILM COATED ORAL at 22:01

## 2024-05-29 RX ADMIN — IPRATROPIUM BROMIDE AND ALBUTEROL SULFATE 1 DOSE: 2.5; .5 SOLUTION RESPIRATORY (INHALATION) at 19:57

## 2024-05-29 ASSESSMENT — PAIN DESCRIPTION - LOCATION: LOCATION: HEAD

## 2024-05-29 ASSESSMENT — PAIN SCALES - GENERAL: PAINLEVEL_OUTOF10: 3

## 2024-05-29 ASSESSMENT — PAIN DESCRIPTION - DESCRIPTORS: DESCRIPTORS: ACHING

## 2024-05-29 NOTE — PROGRESS NOTES
05/29/24 1546   NIV Type   Equipment Type v60   Mode Bilevel   Mask Type Full face mask   Assessment   Pulse 80   Respirations 19   SpO2 96 %   Settings/Measurements   PIP Observed 10 cm H20   IPAP 10 cmH20   CPAP/EPAP 5 cmH2O   Vt (Measured) 822 mL   Rate Ordered 18   FiO2  40 %   I Time/ I Time % 1 s   Minute Volume (L/min) 14.2 Liters   Mask Leak (lpm) 16 lpm   Patient's Home Machine No   Alarm Settings   Alarms On Y   Low Pressure (cmH2O) 6 cmH2O   High Pressure (cmH2O) 30 cmH2O   Apnea (secs) 20 secs   RR Low (bpm) 6   RR High (bpm) 40 br/min
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
## 2024-05-29 NOTE — PLAN OF CARE
Problem: Discharge Planning  Goal: Discharge to home or other facility with appropriate resources  5/28/2024 0917 by Mercy Simpson RN  Outcome: Progressing     Problem: Safety - Adult  Goal: Free from fall injury  Outcome: Progressing     Problem: ABCDS Injury Assessment  Goal: Absence of physical injury  Outcome: Progressing     Problem: Respiratory - Adult  Goal: Achieves optimal ventilation and oxygenation  Outcome: Progressing     Problem: Cardiovascular - Adult  Goal: Absence of cardiac dysrhythmias or at baseline  Outcome: Progressing     Problem: Chronic Conditions and Co-morbidities  Goal: Patient's chronic conditions and co-morbidity symptoms are monitored and maintained or improved  Outcome: Progressing

## 2024-05-29 NOTE — DISCHARGE INSTRUCTIONS
Heart Failure Resources:  Heart Failure Interactive Workbook:  Go to [email or web address]/publication/?s=621591 for a Free Heart Failure Interactive Workbook provided by The American Heart Association. This interactive workbook will provide information on Healthier Living with Heart Failure. Please copy and paste link into search bar. Use your mouse to scroll through the pages.    HF Mystic sana:   Heart Failure Free smart phone sana available for iPhone and Android download. Use your phone to track sodium intake, fluid intake, symptoms, and weight.     Low Sodium Diet / Recipes:  Go to [email or web address] website for “renal” diet which is Low Sodium! [employer] is a dialysis company, but this website offers free seasonal cookbooks. Each quarter, they will release 25-30 new recipes with a breakdown of calories, sodium, and glucose. You can also go to www.[employer]/recipes website for free recipes.     Home Exercise Program:   Identification of Green/Yellow/Red zones:  You should be able to identify when you feel good (green zone), if you have 1-2 symptoms of HF (yellow zone), or if you are in need of medical attention (red zone).  In your CHF education folder you were provided a “stop light tool” to outline this information.     We want to you to rate your exertion levels:    Our therapy team has discussed means of identification with you such as the \"Keisha scale.\"  The Keisha rating scale ranges from 6 to 20, where 6 means \"no exertion at all\" and 20 means \"maximal exertion.\" The goal is to use this to gauge how much effort it is taking for you to do your normal daily tasks.   You should be able to recognize when too much exertion is being expended.    Elements of Energy Conservation:   Prioritize/Plan: Decide what needs to be done today, and what can wait for a later date, write to do lists, plan ahead to avoid extra trips, and gather supplies and equipment needed before starting an activity.   Position:  Avoid tiring and awkward posture that may impair breathing.  Pace: Slow and steady pace, never rushing!  Pursed lip breathing.  Pursed Lip Breathing: \"Smell the roses, blow out the candles\".

## 2024-05-29 NOTE — PLAN OF CARE
Problem: Safety - Adult  Goal: Free from fall injury  5/29/2024 0736 by Jasmin Bain RN  Outcome: Progressing     Problem: ABCDS Injury Assessment  Goal: Absence of physical injury  5/29/2024 0736 by Jasmin Bain RN  Outcome: Progressing     Problem: Respiratory - Adult  Goal: Achieves optimal ventilation and oxygenation  5/29/2024 0736 by Jasmin Bain RN  Outcome: Progressing     Problem: Cardiovascular - Adult  Goal: Absence of cardiac dysrhythmias or at baseline  5/29/2024 0736 by Jasmin Bain RN  Outcome: Progressing     Problem: Pain  Goal: Verbalizes/displays adequate comfort level or baseline comfort level  Outcome: Progressing     Problem: Skin/Tissue Integrity  Goal: Absence of new skin breakdown  Description: 1.  Monitor for areas of redness and/or skin breakdown  2.  Assess vascular access sites hourly  3.  Every 4-6 hours minimum:  Change oxygen saturation probe site  4.  Every 4-6 hours:  If on nasal continuous positive airway pressure, respiratory therapy assess nares and determine need for appliance change or resting period.  Outcome: Progressing     Problem: Chronic Conditions and Co-morbidities  Goal: Patient's chronic conditions and co-morbidity symptoms are monitored and maintained or improved  5/29/2024 0736 by Jasmin Bain RN  Outcome: Progressing   CHF Care Plan      Patient's EF (Ejection Fraction) is greater than 40%    Heart Failure Medications:  Diuretics:: Furosemide    (One of the following REQUIRED for EF </= 40%/SYSTOLIC FAILURE but MAY be used in EF% >40%/DIASTOLIC FAILURE)        ACE:: None        ARB:: None         ARNI:: None    (Beta Blockers)  NON- Evidenced Based Beta Blocker (for EF% >40%/DIASTOLIC FAILURE): None    Evidenced Based Beta Blocker::(REQUIRED for EF% <40%/SYSTOLIC FAILURE)  None  ...................................................................................................................................................    Failed to redirect to the Timeline version of the [employer] SmartLink.      Patient's weights and intake/output reviewed    Daily Weight log at bedside, patient/family participation in use of log: \"yes    Patient's current weight today 179 pounds      Intake/Output Summary (Last 24 hours) at 5/29/2024 0736  Last data filed at 5/29/2024 0149  Gross per 24 hour   Intake 490 ml   Output 1750 ml   Net -1260 ml       Education Booklet Provided: yes    Comorbidities Reviewed Yes    Patient has a past medical history of Arthritis, Atrial fibrillation (HCC), Bronchitis chronic, CAD (coronary artery disease), CHF (congestive heart failure) (HCC), Emphysema of lung (HCC), HTN (hypertension), Hx of blood clots, Influenza A, Lung disease, Pneumonia, and Stroke.     >>For CHF and Comorbidity documentation on Education Time and Topics, please see Education Tab      Pt resting in bed at this time on BiPAP. Pt with complaints of shortness of breath. Pt with nonpitting lower extremity edema.     Patient and/or Family's stated Goal of Care this Admission: reduce shortness of breath, increase activity tolerance, better understand heart failure and disease management, be more comfortable, and reduce lower extremity edema prior to discharge        :

## 2024-05-29 NOTE — PROGRESS NOTES
05/29/24 0822   NIV Type   NIV Started/Stopped On   Equipment Type v60   Mode Bilevel   Mask Type Full face mask   Assessment   Respirations 18   SpO2 96 %   Using Accessory Muscles No   Mask Compliance Good   Skin Assessment Clean, dry, & intact   Skin Protection for O2 Device Yes   Orientation Middle   Location Nose   Settings/Measurements   PIP Observed 11 cm H20   IPAP 10 cmH20   CPAP/EPAP 5 cmH2O   Vt (Measured) 764 mL   Rate Ordered 15   Insp Rise Time (%) 1 %   FiO2  45 %   I Time/ I Time % 1 s   Minute Volume (L/min) 13.6 Liters   Mask Leak (lpm) 0 lpm   Patient's Home Machine No   Alarm Settings   Alarms On Y   Low Pressure (cmH2O) 6 cmH2O   High Pressure (cmH2O) 30 cmH2O   Apnea (secs) 20 secs   RR Low (bpm) 6   RR High (bpm) 40 br/min

## 2024-05-29 NOTE — PROGRESS NOTES
05/29/24 1126   NIV Type   Equipment Type v60   Mode Bilevel   Mask Type Full face mask   Assessment   Respirations 18   SpO2 96 %   Breath Sounds   Breath Sounds Bilateral Diminished   Settings/Measurements   PIP Observed 11 cm H20   IPAP 10 cmH20   CPAP/EPAP 5 cmH2O   Vt (Measured) 846 mL   Rate Ordered 15   FiO2  45 %   I Time/ I Time % 1 s   Minute Volume (L/min) 11.2 Liters   Mask Leak (lpm) 12 lpm   Patient's Home Machine No   Alarm Settings   Alarms On Y   Low Pressure (cmH2O) 6 cmH2O   High Pressure (cmH2O) 30 cmH2O   Apnea (secs) 20 secs   RR Low (bpm) 6   RR High (bpm) 40 br/min   Oxygen Therapy/Pulse Ox   O2 Therapy Oxygen   O2 Device PAP (positive airway pressure)

## 2024-05-29 NOTE — PROGRESS NOTES
Writer placed call to pt's son, Bang, requesting to bring pt's home bipap unit to the hospital. Bang said he would be up first thing tomorrow morning with unit.

## 2024-05-29 NOTE — PROGRESS NOTES
05/29/24 0050   NIV Type   $NIV $Daily Charge   Equipment Type v60   Mode Bilevel   Mask Type Full face mask   Assessment   Pulse 72   Respirations 16   SpO2 97 %   Comfort Level Good   Using Accessory Muscles No   Mask Compliance Good   Skin Protection for O2 Device Yes   Orientation Middle   Location Nose   Settings/Measurements   PIP Observed 10 cm H20   IPAP 10 cmH20   CPAP/EPAP 5 cmH2O   Vt (Measured) 733 mL   Rate Ordered 15  (pt found on 15)   FiO2  50 %  (decreased to 45%)   I Time/ I Time % 1 s   Minute Volume (L/min) 13 Liters   Mask Leak (lpm) 14 lpm   Patient's Home Machine No   Alarm Settings   Alarms On Y   Low Pressure (cmH2O) 6 cmH2O   High Pressure (cmH2O) 30 cmH2O   Apnea (secs) 20 secs   RR Low (bpm) 6   RR High (bpm) 40 br/min

## 2024-05-29 NOTE — PROGRESS NOTES
05/29/24 0351   NIV Type   Equipment Type v60   Mode Bilevel   Mask Type Full face mask   Assessment   Respirations 16   SpO2 99 %   Comfort Level Good   Using Accessory Muscles No   Mask Compliance Good   Skin Protection for O2 Device Yes   Orientation Middle   Location Nose   Settings/Measurements   PIP Observed 11 cm H20   IPAP 10 cmH20   CPAP/EPAP 5 cmH2O   Vt (Measured) 716 mL   Rate Ordered 15   FiO2  45 %   I Time/ I Time % 1 s   Minute Volume (L/min) 11.7 Liters   Mask Leak (lpm) 6 lpm   Patient's Home Machine No   Alarm Settings   Alarms On Y   Low Pressure (cmH2O) 6 cmH2O   High Pressure (cmH2O) 30 cmH2O   Apnea (secs) 20 secs   RR Low (bpm) 6   RR High (bpm) 40 br/min

## 2024-05-29 NOTE — CONSULTS
Air trapping is present.  Moderate  reduction in diffusion capacity is present.  2.  Overall, these lung function tests are consistent with very severe COPD.      Patient sleep early in 2022 had shown patient to have sleep apnea and which was corrected with BiPAP 18/10      Assessment:  Principal Problem:    COPD (chronic obstructive pulmonary disease) ([facility])  Active Problems:    COPD, very severe ([facility])    ALEXANDREA treated with BiPAP    PAF (paroxysmal atrial fibrillation) ([facility])    Acute on chronic respiratory failure with hypoxia and hypercapnia ([facility])    Pulmonary infiltrates    PFO (patent foramen ovale)    Secondary polycythemia    Tricuspid regurgitation  Resolved Problems:    * No resolved hospital problems. *          Plan:   Oxygen supplementation to keep saturating 90 to 94% only  Please titrate the oxygen as per the above parameters  BiPAP on intermittent basis  Patient does have some secondary polycythemia at this time and there is a possibility that patient has  associated nocturnal hypoxemia  Patient's family to be requested to get the home BiPAP unit and to be assessed about patient's BiPAP and its efficacy and also hypoxemia  Bronchodilators to continue  Doxycycline to continue  IV Solu-Medrol to continue for now  Patient also has atrial fibrillation with controlled ventricular rate on the monitor patient's Cardizem infusion being tapered to p.o. Cardizem  Monitor cardiac rhythm and hemodynamics closely  Patient has been started on IV Lasix twice daily which can be continued  Monitor input output and BMP  Correct electrolytes and whenever necessary basis  Patient's recent echocardiogram showed patient to have PFO  Incentive spirometry ordered for the patient  Patient is on Eliquis and Brilinta which can be continued  Cardiac medications as per primary team   Monitor for any bleeding  PUD prophylaxis    Case discussed with patient and nursing            Electronically signed by:  REBECCA WELCH MD     5/29/2024    6:18 PM.

## 2024-05-29 NOTE — PLAN OF CARE
CHF Care Plan      Patient's EF (Ejection Fraction) is greater than 40%    Heart Failure Medications:  Diuretics:: Torsemide    (One of the following REQUIRED for EF </= 40%/SYSTOLIC FAILURE but MAY be used in EF% >40%/DIASTOLIC FAILURE)        ACE:: None        ARB:: None         ARNI:: None    (Beta Blockers)  NON- Evidenced Based Beta Blocker (for EF% >40%/DIASTOLIC FAILURE): None    Evidenced Based Beta Blocker::(REQUIRED for EF% <40%/SYSTOLIC FAILURE) None  ...................................................................................................................................................    Failed to redirect to the Timeline version of the [employer] SmartLink.      Patient's weights and intake/output reviewed    Daily Weight log at bedside, patient/family participation in use of log: \"yes    Patient's current weight today shows a difference of 0 lbs more than last documented weight.      Intake/Output Summary (Last 24 hours) at 5/29/2024 0424  Last data filed at 5/29/2024 0149  Gross per 24 hour   Intake 490 ml   Output 1750 ml   Net -1260 ml         Education Booklet Provided: yes    Comorbidities Reviewed Yes    Patient has a past medical history of Arthritis, Atrial fibrillation (HCC), Bronchitis chronic, CAD (coronary artery disease), CHF (congestive heart failure) (HCC), Emphysema of lung (HCC), HTN (hypertension), Hx of blood clots, Influenza A, Lung disease, Pneumonia, and Stroke.     >>For CHF and Comorbidity documentation on Education Time and Topics, please see Education Tab      Pt resting in bed at this time on PAP Pt with complaints of shortness of breath. Pt with pitting lower extremity edema.     Patient and/or Family's stated Goal of Care this Admission: reduce shortness of breath, increase activity tolerance, better understand heart failure and disease management, be more comfortable, and reduce lower extremity edema prior to discharge        :

## 2024-05-30 LAB
ANION GAP SERPL CALCULATED.3IONS-SCNC: 12 MMOL/L (ref 3–16)
BUN SERPL-MCNC: 33 MG/DL (ref 7–20)
CALCIUM SERPL-MCNC: 9.3 MG/DL (ref 8.3–10.6)
CHLORIDE SERPL-SCNC: 98 MMOL/L (ref 99–110)
CO2 SERPL-SCNC: 30 MMOL/L (ref 21–32)
CREAT SERPL-MCNC: 1 MG/DL (ref 0.8–1.3)
GFR SERPLBLD CREATININE-BSD FMLA CKD-EPI: 79 ML/MIN/{1.73_M2}
GLUCOSE SERPL-MCNC: 184 MG/DL (ref 70–99)
MAGNESIUM SERPL-MCNC: 2.2 MG/DL (ref 1.8–2.4)
POTASSIUM SERPL-SCNC: 4 MMOL/L (ref 3.5–5.1)
SODIUM SERPL-SCNC: 140 MMOL/L (ref 136–145)

## 2024-05-30 PROCEDURE — 99233 SBSQ HOSP IP/OBS HIGH 50: CPT | Performed by: INTERNAL MEDICINE

## 2024-05-30 PROCEDURE — 80048 BASIC METABOLIC PNL TOTAL CA: CPT

## 2024-05-30 PROCEDURE — 83735 ASSAY OF MAGNESIUM: CPT

## 2024-05-30 PROCEDURE — 2060000000 HC ICU INTERMEDIATE R&B

## 2024-05-30 PROCEDURE — 6370000000 HC RX 637 (ALT 250 FOR IP): Performed by: NURSE PRACTITIONER

## 2024-05-30 PROCEDURE — 6370000000 HC RX 637 (ALT 250 FOR IP): Performed by: INTERNAL MEDICINE

## 2024-05-30 PROCEDURE — 94761 N-INVAS EAR/PLS OXIMETRY MLT: CPT

## 2024-05-30 PROCEDURE — 6360000002 HC RX W HCPCS: Performed by: NURSE PRACTITIONER

## 2024-05-30 PROCEDURE — 6360000002 HC RX W HCPCS: Performed by: INTERNAL MEDICINE

## 2024-05-30 PROCEDURE — 2580000003 HC RX 258: Performed by: NURSE PRACTITIONER

## 2024-05-30 PROCEDURE — 36415 COLL VENOUS BLD VENIPUNCTURE: CPT

## 2024-05-30 PROCEDURE — 2500000003 HC RX 250 WO HCPCS: Performed by: INTERNAL MEDICINE

## 2024-05-30 PROCEDURE — 2580000003 HC RX 258: Performed by: INTERNAL MEDICINE

## 2024-05-30 PROCEDURE — 94640 AIRWAY INHALATION TREATMENT: CPT

## 2024-05-30 PROCEDURE — 2700000000 HC OXYGEN THERAPY PER DAY

## 2024-05-30 PROCEDURE — 94660 CPAP INITIATION&MGMT: CPT

## 2024-05-30 RX ADMIN — FAMOTIDINE 20 MG: 20 TABLET, FILM COATED ORAL at 20:30

## 2024-05-30 RX ADMIN — TICAGRELOR 90 MG: 90 TABLET ORAL at 07:56

## 2024-05-30 RX ADMIN — IPRATROPIUM BROMIDE AND ALBUTEROL SULFATE 1 DOSE: 2.5; .5 SOLUTION RESPIRATORY (INHALATION) at 20:46

## 2024-05-30 RX ADMIN — Medication 10 ML: at 20:31

## 2024-05-30 RX ADMIN — DILTIAZEM HYDROCHLORIDE 180 MG: 180 CAPSULE, COATED, EXTENDED RELEASE ORAL at 07:56

## 2024-05-30 RX ADMIN — DOXYCYCLINE 100 MG: 100 INJECTION, POWDER, LYOPHILIZED, FOR SOLUTION INTRAVENOUS at 23:40

## 2024-05-30 RX ADMIN — DOXYCYCLINE 100 MG: 100 INJECTION, POWDER, LYOPHILIZED, FOR SOLUTION INTRAVENOUS at 12:58

## 2024-05-30 RX ADMIN — POTASSIUM CHLORIDE 20 MEQ: 1500 TABLET, EXTENDED RELEASE ORAL at 07:56

## 2024-05-30 RX ADMIN — APIXABAN 5 MG: 5 TABLET, FILM COATED ORAL at 20:30

## 2024-05-30 RX ADMIN — APIXABAN 5 MG: 5 TABLET, FILM COATED ORAL at 07:56

## 2024-05-30 RX ADMIN — PRAMIPEXOLE DIHYDROCHLORIDE 0.5 MG: 0.25 TABLET ORAL at 07:56

## 2024-05-30 RX ADMIN — BUDESONIDE 500 MCG: 0.5 SUSPENSION RESPIRATORY (INHALATION) at 20:46

## 2024-05-30 RX ADMIN — IPRATROPIUM BROMIDE AND ALBUTEROL SULFATE 1 DOSE: 2.5; .5 SOLUTION RESPIRATORY (INHALATION) at 15:33

## 2024-05-30 RX ADMIN — WATER 40 MG: 1 INJECTION INTRAMUSCULAR; INTRAVENOUS; SUBCUTANEOUS at 12:55

## 2024-05-30 RX ADMIN — BUDESONIDE 500 MCG: 0.5 SUSPENSION RESPIRATORY (INHALATION) at 11:55

## 2024-05-30 RX ADMIN — WATER 40 MG: 1 INJECTION INTRAMUSCULAR; INTRAVENOUS; SUBCUTANEOUS at 03:44

## 2024-05-30 RX ADMIN — IPRATROPIUM BROMIDE AND ALBUTEROL SULFATE 1 DOSE: 2.5; .5 SOLUTION RESPIRATORY (INHALATION) at 08:00

## 2024-05-30 RX ADMIN — FUROSEMIDE 40 MG: 10 INJECTION, SOLUTION INTRAMUSCULAR; INTRAVENOUS at 07:55

## 2024-05-30 RX ADMIN — ATORVASTATIN CALCIUM 80 MG: 80 TABLET, FILM COATED ORAL at 07:56

## 2024-05-30 RX ADMIN — Medication 10 ML: at 07:57

## 2024-05-30 RX ADMIN — IPRATROPIUM BROMIDE AND ALBUTEROL SULFATE 1 DOSE: 2.5; .5 SOLUTION RESPIRATORY (INHALATION) at 11:55

## 2024-05-30 RX ADMIN — FAMOTIDINE 20 MG: 20 TABLET, FILM COATED ORAL at 07:56

## 2024-05-30 RX ADMIN — TICAGRELOR 90 MG: 90 TABLET ORAL at 20:30

## 2024-05-30 RX ADMIN — WATER 40 MG: 1 INJECTION INTRAMUSCULAR; INTRAVENOUS; SUBCUTANEOUS at 20:30

## 2024-05-30 ASSESSMENT — PAIN SCALES - GENERAL: PAINLEVEL_OUTOF10: 0
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
## 2024-05-30 NOTE — PLAN OF CARE
CHF Care Plan      Patient's EF (Ejection Fraction) is greater than 40%    Heart Failure Medications:  Diuretics:: Furosemide    (One of the following REQUIRED for EF </= 40%/SYSTOLIC FAILURE but MAY be used in EF% >40%/DIASTOLIC FAILURE)        ACE:: None        ARB:: None         ARNI:: None    (Beta Blockers)  NON- Evidenced Based Beta Blocker (for EF% >40%/DIASTOLIC FAILURE): None    Evidenced Based Beta Blocker::(REQUIRED for EF% <40%/SYSTOLIC FAILURE) None  ...................................................................................................................................................    Failed to redirect to the Timeline version of the [employer] SmartLink.      Patient's weights and intake/output reviewed    Daily Weight log at bedside, patient/family participation in use of log: \"yes    Patient's current weight today shows a difference of 4.5 lbs less than last documented weight.      Intake/Output Summary (Last 24 hours) at 5/30/2024 1246  Last data filed at 5/30/2024 0606  Gross per 24 hour   Intake 480 ml   Output 1250 ml   Net -770 ml       Education Booklet Provided: yes    Comorbidities Reviewed Yes    Patient has a past medical history of Arthritis, Atrial fibrillation (HCC), Bronchitis chronic, CAD (coronary artery disease), CHF (congestive heart failure) (HCC), Emphysema of lung (HCC), HTN (hypertension), Hx of blood clots, Influenza A, Lung disease, Pneumonia, and Stroke.     >>For CHF and Comorbidity documentation on Education Time and Topics, please see Education Tab      Pt resting in bed at this time on  4 L O2. Pt denies shortness of breath. Pt without lower extremity edema.     Patient and/or Family's stated Goal of Care this Admission: reduce shortness of breath, increase activity tolerance, better understand heart failure and disease management, be more comfortable, and reduce lower extremity edema prior to discharge        :

## 2024-05-30 NOTE — PROGRESS NOTES
05/29/24 2000   NIV Type   Equipment Type   (v60)   Mode Bilevel   Mask Type Full face mask   Assessment   Pulse 83   Respirations 19   SpO2 95 %   Settings/Measurements   PIP Observed 10 cm H20   IPAP 10 cmH20   CPAP/EPAP 5 cmH2O   Vt (Measured) 719 mL   Rate Ordered 15   Insp Rise Time (%) 1 %   FiO2  (S)  35 %   I Time/ I Time % 29 s   Minute Volume (L/min) 19.3 Liters   Mask Leak (lpm) 13 lpm   Patient's Home Machine No   Alarm Settings   Alarms On Y   Low Pressure (cmH2O) 6 cmH2O   High Pressure (cmH2O) 30 cmH2O   Apnea (secs) 20 secs   RR Low (bpm) 6   RR High (bpm) 40 br/min   Oxygen Therapy/Pulse Ox   O2 Device PAP (positive airway pressure)

## 2024-05-30 NOTE — CARE COORDINATION
Spoke with patient at bedside for introduction.  Patient is from home with his son and DIL.  He was independent prior to admission.  He has oxygen at home through [employer].   He is unsure about any discharge needs at this time.  Will continue to follow should any needs arise.

## 2024-05-30 NOTE — PROGRESS NOTES
05/30/24 0006   NIV Type   NIV Started/Stopped On   Equipment Type V60   Mode Bilevel   Mask Type Full face mask   Mask Size Large   Assessment   Respirations 19   SpO2 95 %   Comfort Level Good   Using Accessory Muscles No   Mask Compliance Good   Skin Assessment Clean, dry, & intact   Skin Protection for O2 Device Yes   Location Nose   Intervention(s) Skin Barrier   Breath Sounds   Right Upper Lobe Diminished   Right Middle Lobe Diminished   Right Lower Lobe Diminished   Left Upper Lobe Diminished   Left Lower Lobe Diminished   Settings/Measurements   IPAP 10 cmH20   CPAP/EPAP 5 cmH2O   Vt (Measured) 818 mL   Rate Ordered 15   FiO2  35 %   Minute Volume (L/min) 15.4 Liters   Mask Leak (lpm) 21 lpm   Patient's Home Machine No   Alarm Settings   Alarms On Y
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
## 2024-05-30 NOTE — PLAN OF CARE
Problem: Safety - Adult  Goal: Free from fall injury  Outcome: Progressing  Note: Pt will remain free from falls throughout hospital stay. Fall precautions in place, bed alarm on, bed in lowest position with wheels locked and side rails 2/4 up. Room door open and hourly rounding completed. Will continue to monitor throughout shift.      Problem: ABCDS Injury Assessment  Goal: Absence of physical injury  Outcome: Progressing     Problem: Skin/Tissue Integrity  Goal: Absence of new skin breakdown  Description: 1.  Monitor for areas of redness and/or skin breakdown  2.  Assess vascular access sites hourly  3.  Every 4-6 hours minimum:  Change oxygen saturation probe site  4.  Every 4-6 hours:  If on nasal continuous positive airway pressure, respiratory therapy assess nares and determine need for appliance change or resting period.  Outcome: Progressing   CHF Care Plan      Patient's EF (Ejection Fraction) is greater than 40%    Heart Failure Medications:  Diuretics:: Furosemide    (One of the following REQUIRED for EF </= 40%/SYSTOLIC FAILURE but MAY be used in EF% >40%/DIASTOLIC FAILURE)        ACE:: None        ARB:: None         ARNI:: None    (Beta Blockers)  NON- Evidenced Based Beta Blocker (for EF% >40%/DIASTOLIC FAILURE): None    Evidenced Based Beta Blocker::(REQUIRED for EF% <40%/SYSTOLIC FAILURE) None  ...................................................................................................................................................    Failed to redirect to the Timeline version of the [employer] Smart[employer].      Patient's weights and intake/output reviewed    Daily Weight log at bedside, patient/family participation in use of log: \"yes    Patient's current weight today shows a difference of 0 lbs from the last documented weight.      Intake/Output Summary (Last 24 hours) at 5/30/2024 0057  Last data filed at 5/29/2024 1842  Gross per 24 hour   Intake 480 ml   Output 2500 ml   Net -2020 ml        Education Booklet Provided: yes    Comorbidities Reviewed Yes    Patient has a past medical history of Arthritis, Atrial fibrillation (HCC), Bronchitis chronic, CAD (coronary artery disease), CHF (congestive heart failure) (HCC), Emphysema of lung (HCC), HTN (hypertension), Hx of blood clots, Influenza A, Lung disease, Pneumonia, and Stroke.     >>For CHF and Comorbidity documentation on Education Time and Topics, please see Education Tab      Pt resting in bed at this time on BiPAP. Pt with complaints of shortness of breath. Pt without lower extremity edema.     Patient and/or Family's stated Goal of Care this Admission: reduce shortness of breath, increase activity tolerance, better understand heart failure and disease management, be more comfortable, and reduce lower extremity edema prior to discharge        :

## 2024-05-31 VITALS
BODY MASS INDEX: 25.6 KG/M2 | RESPIRATION RATE: 17 BRPM | HEART RATE: 95 BPM | HEIGHT: 68 IN | DIASTOLIC BLOOD PRESSURE: 79 MMHG | TEMPERATURE: 97.7 F | SYSTOLIC BLOOD PRESSURE: 122 MMHG | WEIGHT: 168.9 LBS | OXYGEN SATURATION: 93 %

## 2024-05-31 LAB
ANION GAP SERPL CALCULATED.3IONS-SCNC: 11 MMOL/L (ref 3–16)
BASE EXCESS BLDA CALC-SCNC: 4.8 MMOL/L (ref -3–3)
BUN SERPL-MCNC: 42 MG/DL (ref 7–20)
CALCIUM SERPL-MCNC: 9.4 MG/DL (ref 8.3–10.6)
CHLORIDE SERPL-SCNC: 97 MMOL/L (ref 99–110)
CO2 BLDA-SCNC: 31.4 MMOL/L
CO2 SERPL-SCNC: 29 MMOL/L (ref 21–32)
COHGB MFR BLDA: 0.3 % (ref 0–1.5)
CREAT SERPL-MCNC: 0.9 MG/DL (ref 0.8–1.3)
GFR SERPLBLD CREATININE-BSD FMLA CKD-EPI: 89 ML/MIN/{1.73_M2}
GLUCOSE SERPL-MCNC: 170 MG/DL (ref 70–99)
HCO3 BLDA-SCNC: 30 MMOL/L (ref 21–29)
HGB BLDA-MCNC: 16.2 G/DL (ref 13.5–17.5)
MAGNESIUM SERPL-MCNC: 2.4 MG/DL (ref 1.8–2.4)
METHGB MFR BLDA: 0.2 %
NT-PROBNP SERPL-MCNC: 1763 PG/ML (ref 0–449)
O2 THERAPY: ABNORMAL
PCO2 BLDA: 46 MMHG (ref 35–45)
PH BLDA: 7.43 [PH] (ref 7.35–7.45)
PO2 BLDA: 66.2 MMHG (ref 75–108)
POTASSIUM SERPL-SCNC: 4.3 MMOL/L (ref 3.5–5.1)
SAO2 % BLDA: 93.3 %
SODIUM SERPL-SCNC: 137 MMOL/L (ref 136–145)

## 2024-05-31 PROCEDURE — 83880 ASSAY OF NATRIURETIC PEPTIDE: CPT

## 2024-05-31 PROCEDURE — 6370000000 HC RX 637 (ALT 250 FOR IP): Performed by: NURSE PRACTITIONER

## 2024-05-31 PROCEDURE — 6360000002 HC RX W HCPCS: Performed by: INTERNAL MEDICINE

## 2024-05-31 PROCEDURE — 36600 WITHDRAWAL OF ARTERIAL BLOOD: CPT

## 2024-05-31 PROCEDURE — 94640 AIRWAY INHALATION TREATMENT: CPT

## 2024-05-31 PROCEDURE — 94761 N-INVAS EAR/PLS OXIMETRY MLT: CPT

## 2024-05-31 PROCEDURE — 36415 COLL VENOUS BLD VENIPUNCTURE: CPT

## 2024-05-31 PROCEDURE — 2580000003 HC RX 258: Performed by: INTERNAL MEDICINE

## 2024-05-31 PROCEDURE — 2700000000 HC OXYGEN THERAPY PER DAY

## 2024-05-31 PROCEDURE — 83735 ASSAY OF MAGNESIUM: CPT

## 2024-05-31 PROCEDURE — 99232 SBSQ HOSP IP/OBS MODERATE 35: CPT | Performed by: INTERNAL MEDICINE

## 2024-05-31 PROCEDURE — 2580000003 HC RX 258: Performed by: NURSE PRACTITIONER

## 2024-05-31 PROCEDURE — 6370000000 HC RX 637 (ALT 250 FOR IP): Performed by: INTERNAL MEDICINE

## 2024-05-31 PROCEDURE — 6360000002 HC RX W HCPCS: Performed by: NURSE PRACTITIONER

## 2024-05-31 PROCEDURE — 80048 BASIC METABOLIC PNL TOTAL CA: CPT

## 2024-05-31 PROCEDURE — 82803 BLOOD GASES ANY COMBINATION: CPT

## 2024-05-31 RX ORDER — PREDNISONE 20 MG/1
40 TABLET ORAL 2 TIMES DAILY
Qty: 8 TABLET | Refills: 0 | Status: SHIPPED | OUTPATIENT
Start: 2024-05-31 | End: 2024-06-02

## 2024-05-31 RX ORDER — PREDNISONE 20 MG/1
20 TABLET ORAL DAILY
Status: DISCONTINUED | OUTPATIENT
Start: 2024-05-31 | End: 2024-05-31 | Stop reason: HOSPADM

## 2024-05-31 RX ORDER — DOXYCYCLINE HYCLATE 100 MG
100 TABLET ORAL 2 TIMES DAILY
Qty: 5 TABLET | Refills: 0 | Status: SHIPPED | OUTPATIENT
Start: 2024-05-31 | End: 2024-06-03

## 2024-05-31 RX ORDER — TORSEMIDE 20 MG/1
10 TABLET ORAL DAILY
Status: DISCONTINUED | OUTPATIENT
Start: 2024-05-31 | End: 2024-05-31 | Stop reason: HOSPADM

## 2024-05-31 RX ADMIN — Medication 10 ML: at 09:24

## 2024-05-31 RX ADMIN — BUDESONIDE 500 MCG: 0.5 SUSPENSION RESPIRATORY (INHALATION) at 08:39

## 2024-05-31 RX ADMIN — ROFLUMILAST 500 MCG: 500 TABLET ORAL at 09:24

## 2024-05-31 RX ADMIN — FAMOTIDINE 20 MG: 20 TABLET, FILM COATED ORAL at 09:24

## 2024-05-31 RX ADMIN — TICAGRELOR 90 MG: 90 TABLET ORAL at 09:24

## 2024-05-31 RX ADMIN — DILTIAZEM HYDROCHLORIDE 180 MG: 180 CAPSULE, COATED, EXTENDED RELEASE ORAL at 09:24

## 2024-05-31 RX ADMIN — APIXABAN 5 MG: 5 TABLET, FILM COATED ORAL at 09:24

## 2024-05-31 RX ADMIN — IPRATROPIUM BROMIDE AND ALBUTEROL SULFATE 1 DOSE: 2.5; .5 SOLUTION RESPIRATORY (INHALATION) at 08:39

## 2024-05-31 RX ADMIN — ATORVASTATIN CALCIUM 80 MG: 80 TABLET, FILM COATED ORAL at 09:24

## 2024-05-31 RX ADMIN — PRAMIPEXOLE DIHYDROCHLORIDE 0.5 MG: 0.25 TABLET ORAL at 09:24

## 2024-05-31 RX ADMIN — POTASSIUM CHLORIDE 20 MEQ: 1500 TABLET, EXTENDED RELEASE ORAL at 09:24

## 2024-05-31 RX ADMIN — WATER 40 MG: 1 INJECTION INTRAMUSCULAR; INTRAVENOUS; SUBCUTANEOUS at 03:52

## 2024-05-31 NOTE — PROGRESS NOTES
INPATIENT PULMONARY CRITICAL CARE PROGRESS NOTE      Reason for visit  hypercarbic respiratory failure, COPD, CHF, A-fib       SUBJECTIVE: Patient when seen this morning states is feeling better, patient does not have any significant cough or expectoration or shortness of breath or wheezing which is more than usual to his baseline, patient was afebrile and hemoglobin 10, patient was on 4 L of nasal oxygen with sats around 93%, patient takes 3 to 3-1/2 L of oxygen at home on a regular basis as per patient, patient continues to be in atrial fibrillation with controlled rate on the monitor, patient's urine output has been adequate, patient has acute events of -3.4 L, patient blood sugars are slightly on the higher side, no other pertinent review of system of concern       Physical Exam:  Blood pressure 117/76, pulse 73, temperature 97.5 °F (36.4 °C), temperature source Axillary, resp. rate 20, height 1.727 m (5' 8\"), weight 76.6 kg (168 lb 14.4 oz), SpO2 97 %.'   Constitutional:  No acute distress.   HENT:  Oropharynx is clear and moist. No thyromegaly.  Eyes:  Conjunctivae are normal. Pupils equal, round, and reactive to light. No scleral icterus.   Neck: . No tracheal deviation present. No obvious thyroid mass.  Short and large neck  Cardiovascular: S1-S2 irregularly irregular, LLSB murmur, no right ventricular heave. No lower extremity edema.  Pulmonary/Chest: No significant wheezes.  No rales.  Chest wall is not dull to percussion.  No accessory muscle usage or stridor.  Decreased breath sound intensity  Abdominal: Soft. Bowel sounds present. No distension or hernia. No tenderness.    Musculoskeletal: No cyanosis. No clubbing. No obvious joint deformity.   Lymphadenopathy: No cervical or supraclavicular adenopathy.   Skin: Skin is warm and dry. No rash or nodules on the exposed extremities.  Psychiatric: Normal mood and affect. Behavior is normal.  No anxiety.   Neurologic: Alert, awake and oriented. PERRL.Less  supplementation to keep saturating 90 to 94% only  Please titrate the oxygen as per the above parameters  Patient was on 4 L of nasal oxygen saturating 94% when seen,  Patient has been using his home BiPAP machine without any issues  Patient does have some secondary polycythemia at this time and there is a possibility that patient has a surgery nocturnal hypoxemia  Patient's  acid-base balance is maintained on the home BiPAP  Bronchodilators to continue  Doxycycline to continue  IV Solu-Medrol to be changed to p.o. prednisone which can be tapered over the course of next few weeks time  Patient blood sugars are slightly on the high side with steroids which needs to be trended  Patient also has atrial fibrillation with controlled ventricular rate on the monitor patient's Cardizem infusion being tapered to p.o. Cardizem  Monitor cardiac rhythm and hemodynamics closely  Patient has been started on IV Lasix twice daily which can be continued  Monitor input output and BMP  Correct electrolytes and whenever necessary basis  Patient's recent echocardiogram showed patient to have PFO  Incentive spirometry ordered for the patient  Patient is on Eliquis and Brilinta which can be continued  Cardiac medications as per primary team   Monitor for any bleeding  PUD prophylaxis    Case discussed with patient and nursing  Case discussed with case management    ?Discharge planning             Electronically signed by:  REBECCA WELCH MD    5/31/2024    7:56 AM.
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
## 2024-05-31 NOTE — PLAN OF CARE
Problem: Discharge Planning  Goal: Discharge to home or other facility with appropriate resources  Outcome: Adequate for Discharge     Problem: Safety - Adult  Goal: Free from fall injury  5/31/2024 1224 by Chai Dinh RN  Outcome: Adequate for Discharge     Problem: ABCDS Injury Assessment  Goal: Absence of physical injury  5/31/2024 1224 by Chai Dinh RN  Outcome: Adequate for Discharge     Problem: Respiratory - Adult  Goal: Achieves optimal ventilation and oxygenation  Outcome: Adequate for Discharge     Problem: Cardiovascular - Adult  Goal: Absence of cardiac dysrhythmias or at baseline  Outcome: Adequate for Discharge     Problem: Pain  Goal: Verbalizes/displays adequate comfort level or baseline comfort level  Outcome: Adequate for Discharge     Problem: Chronic Conditions and Co-morbidities  Goal: Patient's chronic conditions and co-morbidity symptoms are monitored and maintained or improved  5/31/2024 1224 by Chai Dinh RN  Outcome: Adequate for Discharge     Problem: Skin/Tissue Integrity  Goal: Absence of new skin breakdown  Description: 1.  Monitor for areas of redness and/or skin breakdown  2.  Assess vascular access sites hourly  3.  Every 4-6 hours minimum:  Change oxygen saturation probe site  4.  Every 4-6 hours:  If on nasal continuous positive airway pressure, respiratory therapy assess nares and determine need for appliance change or resting period.  5/31/2024 1224 by Chai Dinh RN  Outcome: Adequate for Discharge    CHF Care Plan      Patient's EF (Ejection Fraction) is greater than 40%    Heart Failure Medications:  Diuretics:: Torsemide    (One of the following REQUIRED for EF </= 40%/SYSTOLIC FAILURE but MAY be used in EF% >40%/DIASTOLIC FAILURE)        ACE:: None        ARB:: None         ARNI:: None    (Beta Blockers)  NON- Evidenced Based Beta Blocker (for EF% >40%/DIASTOLIC FAILURE): None    Evidenced Based Beta Blocker::(REQUIRED for EF% <40%/SYSTOLIC FAILURE)  None  ...................................................................................................................................................    Failed to redirect to the Timeline version of the [employer] SmartLink.      Patient's weights and intake/output reviewed    Daily Weight log at bedside, patient/family participation in use of log: \"yes    Patient's current weight today shows a difference of 1 lbs less than last documented weight.      Intake/Output Summary (Last 24 hours) at 5/31/2024 1224  Last data filed at 5/31/2024 1018  Gross per 24 hour   Intake 580 ml   Output 1450 ml   Net -870 ml       Education Booklet Provided: yes    Comorbidities Reviewed Yes    Patient has a past medical history of Arthritis, Atrial fibrillation (HCC), Bronchitis chronic, CAD (coronary artery disease), CHF (congestive heart failure) (HCC), Emphysema of lung (HCC), HTN (hypertension), Hx of blood clots, Influenza A, Lung disease, Pneumonia, and Stroke.     >>For CHF and Comorbidity documentation on Education Time and Topics, please see Education Tab      Pt sitting in bed at this time on  4 L O2. Pt denies shortness of breath. Pt without lower extremity edema.     Patient and/or Family's stated Goal of Care this Admission: reduce shortness of breath, increase activity tolerance, better understand heart failure and disease management, and be more comfortable prior to discharge        :

## 2024-05-31 NOTE — CARE COORDINATION
Discharge order noted.   Spoke with patient at bedside.   He states his son is coming to pick him up and he has oxygen with him.  He denies any needs from CM.

## 2024-05-31 NOTE — PLAN OF CARE
Problem: Safety - Adult  Goal: Free from fall injury  Outcome: Progressing  Note: Pt will remain free from falls throughout hospital stay. Fall precautions in place, bed alarm on, bed in lowest position with wheels locked and side rails 2/4 up. Will continue to monitor throughout shift.      Problem: ABCDS Injury Assessment  Goal: Absence of physical injury  Outcome: Progressing     Problem: Skin/Tissue Integrity  Goal: Absence of new skin breakdown  Description: 1.  Monitor for areas of redness and/or skin breakdown  2.  Assess vascular access sites hourly  3.  Every 4-6 hours minimum:  Change oxygen saturation probe site  4.  Every 4-6 hours:  If on nasal continuous positive airway pressure, respiratory therapy assess nares and determine need for appliance change or resting period.  Outcome: Progressing     Problem: Chronic Conditions and Co-morbidities  Goal: Patient's chronic conditions and co-morbidity symptoms are monitored and maintained or improved  Outcome: Progressing  Note:   CHF Care Plan      Patient's EF (Ejection Fraction) is greater than 40%    Heart Failure Medications:  Diuretics:: None    (One of the following REQUIRED for EF </= 40%/SYSTOLIC FAILURE but MAY be used in EF% >40%/DIASTOLIC FAILURE)        ACE:: None        ARB:: None         ARNI:: None    (Beta Blockers)  NON- Evidenced Based Beta Blocker (for EF% >40%/DIASTOLIC FAILURE): None    Evidenced Based Beta Blocker::(REQUIRED for EF% <40%/SYSTOLIC FAILURE) None  ...................................................................................................................................................    Failed to redirect to the Timeline version of the [employer] SmartLink.      Patient's weights and intake/output reviewed    Daily Weight log at bedside, patient/family participation in use of log: \"yes    Patient's current weight today shows a difference of 10 lbs less than last documented weight.      Intake/Output Summary (Last 24

## 2024-05-31 NOTE — DISCHARGE SUMMARY
Pt d/c'd home.  Removed  IV and stopped bleeding.  Catheter intact. Pt tolerated well. No redness noted at site.  Notified [facility] and removed tele box. Reviewed d/c instructions, home meds, and  f/u information utilizing teach-back method.

## 2024-06-03 ENCOUNTER — FOLLOWUP TELEPHONE ENCOUNTER (OUTPATIENT)
Dept: TELEMETRY | Age: 74
End: 2024-06-03

## 2024-06-03 NOTE — TELEPHONE ENCOUNTER
Care Transitions Initial Follow Up Call    Call within 2 business days of discharge: Yes     Patient: Bang Jean Baptiste Patient : 1950 MRN: [id number]    [unfilled]    RARS: Readmission Risk Score: 17.5       Spoke with: Diana daughter in law     Discharge department/facility: home    Non-face-to-face services provided:  Scheduled appointment with PCP-7 days    Spoke to daughter in law for hospital up.  Denies any needs.   States he is doing well    Follow Up  Future Appointments   Date Time Provider Department Center   2024  1:45 PM Lalito Conrad MD [facility]   7/10/2024  1:00 PM Roberto Carlos Mallory MD [facility]       Anitha Sun RN

## 2024-06-03 NOTE — TELEPHONE ENCOUNTER
1st Attempt; No Answer- Left HIPAA compliant voicemail with Non-Urgent Heart Failure Resource Line number for call back.     Anitha Sun RN

## 2024-06-11 ENCOUNTER — TELEPHONE (OUTPATIENT)
Dept: PULMONOLOGY | Age: 74
End: 2024-06-11

## 2024-06-11 ENCOUNTER — OFFICE VISIT (OUTPATIENT)
Dept: PULMONOLOGY | Age: 74
End: 2024-06-11
Payer: MEDICARE

## 2024-06-11 VITALS
SYSTOLIC BLOOD PRESSURE: 102 MMHG | WEIGHT: 168 LBS | OXYGEN SATURATION: 97 % | DIASTOLIC BLOOD PRESSURE: 60 MMHG | RESPIRATION RATE: 18 BRPM | HEIGHT: 72 IN | BODY MASS INDEX: 22.75 KG/M2 | HEART RATE: 74 BPM

## 2024-06-11 DIAGNOSIS — J44.9 STAGE 3 SEVERE COPD BY GOLD CLASSIFICATION (HCC): Primary | ICD-10-CM

## 2024-06-11 DIAGNOSIS — J96.11 CHRONIC HYPOXEMIC RESPIRATORY FAILURE (HCC): ICD-10-CM

## 2024-06-11 DIAGNOSIS — R93.89 ABNORMAL CHEST X-RAY: ICD-10-CM

## 2024-06-11 PROCEDURE — 99214 OFFICE O/P EST MOD 30 MIN: CPT | Performed by: INTERNAL MEDICINE

## 2024-06-11 PROCEDURE — 3078F DIAST BP <80 MM HG: CPT | Performed by: INTERNAL MEDICINE

## 2024-06-11 PROCEDURE — 3074F SYST BP LT 130 MM HG: CPT | Performed by: INTERNAL MEDICINE

## 2024-06-11 PROCEDURE — 1123F ACP DISCUSS/DSCN MKR DOCD: CPT | Performed by: INTERNAL MEDICINE

## 2024-06-11 ASSESSMENT — SLEEP AND FATIGUE QUESTIONNAIRES
HOW LIKELY ARE YOU TO NOD OFF OR FALL ASLEEP WHILE WATCHING TV: WOULD NEVER DOZE
HOW LIKELY ARE YOU TO NOD OFF OR FALL ASLEEP WHILE SITTING AND READING: HIGH CHANCE OF DOZING
HOW LIKELY ARE YOU TO NOD OFF OR FALL ASLEEP WHILE SITTING QUIETLY AFTER LUNCH WITHOUT ALCOHOL: WOULD NEVER DOZE
HOW LIKELY ARE YOU TO NOD OFF OR FALL ASLEEP IN A CAR, WHILE STOPPED FOR A FEW MINUTES IN TRAFFIC: WOULD NEVER DOZE
HOW LIKELY ARE YOU TO NOD OFF OR FALL ASLEEP WHILE SITTING INACTIVE IN A PUBLIC PLACE: WOULD NEVER DOZE
NECK CIRCUMFERENCE (INCHES): 17
HOW LIKELY ARE YOU TO NOD OFF OR FALL ASLEEP WHILE LYING DOWN TO REST IN THE AFTERNOON WHEN CIRCUMSTANCES PERMIT: HIGH CHANCE OF DOZING
ESS TOTAL SCORE: 6
HOW LIKELY ARE YOU TO NOD OFF OR FALL ASLEEP WHEN YOU ARE A PASSENGER IN A CAR FOR AN HOUR WITHOUT A BREAK: WOULD NEVER DOZE
HOW LIKELY ARE YOU TO NOD OFF OR FALL ASLEEP WHILE SITTING AND TALKING TO SOMEONE: WOULD NEVER DOZE

## 2024-06-11 NOTE — TELEPHONE ENCOUNTER
Faxed pressure change order, full face mask order, heated tubing order, CR, and ov notes to [facility] at [phone] via [employer]ax.

## 2024-06-11 NOTE — PROGRESS NOTES
[facility] Pulmonary, Critical Care and Sleep Specialists                                                            Outpatient Follow Up Note    CHIEF COMPLAINT: Follow up COPD        HPI:   Recent admission to [place] for COPD AE/CHF  Overall doing pretty good   Some cough with yellow sputum  No hemoptysis   Uses Neb 4 times/day   Compliant with is O2 2L - benefiting from it and feels better   No smoking  Uses BiPAP every night. Mask and pressure are good. Does not drive. Feels pressure could go up again.   Uses wheel chair/walker for 2 years       Past Medical History:   Diagnosis Date    Arthritis     Atrial fibrillation (HCC)     Bronchitis chronic     CAD (coronary artery disease)     CHF (congestive heart failure) (HCC)     Emphysema of lung (HCC)     HTN (hypertension)     Hx of blood clots     Influenza A 01/10/2018    Lung disease     copd    Pneumonia     12/2009    Stroke 02/25/2023    L MCA watershed       Past Surgical History:        Procedure Laterality Date    FRACTURE SURGERY      johnson in femur/hip on right    HERNIA REPAIR      SKIN BIOPSY      UPPER GASTROINTESTINAL ENDOSCOPY N/A 7/11/2023    EGD DIAGNOSTIC ONLY performed by Carlton Ramirez MD at [facility] ENDOSCOPY       Allergies:  has No Known Allergies.  Social History:    TOBACCO:   reports that he quit smoking about 7 years ago. His smoking use included cigarettes. He started smoking about 37 years ago. He has a 60.0 pack-year smoking history. He has never used smokeless tobacco.  ETOH:   reports no history of alcohol use.      Family History:       Problem Relation Age of Onset    Diabetes Sister     Cancer Sister     Cancer Sister     Cancer Brother     Asthma Daughter     Asthma Son     Diabetes Mother     Cancer Father        Current Medications:    Current Outpatient Medications:     budesonide (PULMICORT) 0.5 MG/2ML nebulizer suspension, USE ONE (1) VIAL VIA NEBULIZER TWICE DAILY, Disp: 60 mL, Rfl: 3

## 2024-06-14 RX ORDER — APIXABAN 5 MG/1
TABLET, FILM COATED ORAL
Qty: 60 TABLET | Refills: 0 | Status: SHIPPED | OUTPATIENT
Start: 2024-06-14

## 2024-06-14 NOTE — TELEPHONE ENCOUNTER
He was admitted 2/2023 for L MCA CVA and had L carotid stent 3/2/2023. ???    Brendan's Plan 9/7/2023  2. Antiplatelet therapy managed by neurosurgery at [facility]; unclear if he is currently taking brilinta or aspirin (recent L carotid stent 3/2023 and multiple CVAs); he has multiple with [facility] arranged     On Eliquis for PAF

## 2024-07-07 NOTE — PROGRESS NOTES
Pt resting in bed, only complaint is right foot pain that started 2-3 weeks ago, hard area approximately 2\"diameter in middle of ball of right foot. Pt has not had it look at was 7/10 stabbing, now 4/10 post tylenol.    07-Jul-2024 21:50

## 2024-07-08 RX ORDER — APIXABAN 5 MG/1
TABLET, FILM COATED ORAL
Qty: 60 TABLET | Refills: 0 | Status: SHIPPED | OUTPATIENT
Start: 2024-07-08 | End: 2024-07-10 | Stop reason: SDUPTHER

## 2024-07-10 ENCOUNTER — OFFICE VISIT (OUTPATIENT)
Dept: CARDIOLOGY CLINIC | Age: 74
End: 2024-07-10
Payer: MEDICARE

## 2024-07-10 VITALS
OXYGEN SATURATION: 93 % | BODY MASS INDEX: 23.89 KG/M2 | WEIGHT: 176.4 LBS | HEIGHT: 72 IN | HEART RATE: 89 BPM | DIASTOLIC BLOOD PRESSURE: 60 MMHG | SYSTOLIC BLOOD PRESSURE: 132 MMHG

## 2024-07-10 DIAGNOSIS — R60.0 LOCALIZED EDEMA: ICD-10-CM

## 2024-07-10 DIAGNOSIS — G47.33 OSA TREATED WITH BIPAP: ICD-10-CM

## 2024-07-10 DIAGNOSIS — J44.9 COPD, VERY SEVERE (HCC): ICD-10-CM

## 2024-07-10 DIAGNOSIS — Z79.899 MEDICATION MANAGEMENT: ICD-10-CM

## 2024-07-10 DIAGNOSIS — I10 HTN (HYPERTENSION), BENIGN: Primary | ICD-10-CM

## 2024-07-10 DIAGNOSIS — I48.0 PAF (PAROXYSMAL ATRIAL FIBRILLATION) (HCC): ICD-10-CM

## 2024-07-10 DIAGNOSIS — Z98.890 HISTORY OF LEFT COMMON CAROTID ARTERY STENT PLACEMENT: ICD-10-CM

## 2024-07-10 DIAGNOSIS — Z95.828 HISTORY OF LEFT COMMON CAROTID ARTERY STENT PLACEMENT: ICD-10-CM

## 2024-07-10 DIAGNOSIS — Q21.12 PFO (PATENT FORAMEN OVALE): ICD-10-CM

## 2024-07-10 DIAGNOSIS — E78.2 MIXED HYPERLIPIDEMIA: ICD-10-CM

## 2024-07-10 PROCEDURE — 3075F SYST BP GE 130 - 139MM HG: CPT | Performed by: INTERNAL MEDICINE

## 2024-07-10 PROCEDURE — 1123F ACP DISCUSS/DSCN MKR DOCD: CPT | Performed by: INTERNAL MEDICINE

## 2024-07-10 PROCEDURE — 99214 OFFICE O/P EST MOD 30 MIN: CPT | Performed by: INTERNAL MEDICINE

## 2024-07-10 PROCEDURE — 3078F DIAST BP <80 MM HG: CPT | Performed by: INTERNAL MEDICINE

## 2024-07-10 RX ORDER — GUAIFENESIN 400 MG/1
400 TABLET ORAL 4 TIMES DAILY PRN
COMMUNITY

## 2024-07-10 RX ORDER — TICAGRELOR 90 MG/1
TABLET ORAL
Qty: 180 TABLET | Refills: 3 | OUTPATIENT
Start: 2024-07-10

## 2024-07-10 RX ORDER — TORSEMIDE 20 MG/1
20 TABLET ORAL DAILY
Qty: 90 TABLET | Refills: 3 | Status: SHIPPED | OUTPATIENT
Start: 2024-07-10

## 2024-07-10 RX ORDER — DILTIAZEM HYDROCHLORIDE 180 MG/1
180 CAPSULE, COATED, EXTENDED RELEASE ORAL DAILY
Qty: 90 CAPSULE | Refills: 3 | Status: SHIPPED | OUTPATIENT
Start: 2024-07-10

## 2024-07-10 RX ORDER — NITROGLYCERIN 0.4 MG/1
0.4 TABLET SUBLINGUAL EVERY 5 MIN PRN
Qty: 25 TABLET | Refills: 3 | Status: SHIPPED | OUTPATIENT
Start: 2024-07-10

## 2024-07-10 RX ORDER — TICAGRELOR 90 MG/1
90 TABLET ORAL
Qty: 60 TABLET | Status: CANCELLED | OUTPATIENT
Start: 2024-07-10

## 2024-07-10 NOTE — PROGRESS NOTES
[facility] Office Note  7/10/2024     Subjective:  Mr. Jean Baptiste is here for follow up for Perm afib, HTN,  combined CHF, COPD, PFO,secondary polycythemia, prior VTE,   CVAx2 and chronic hypoxic resp failure, Left carotid stent 2/28/23 on brilinta ALEXANDREA CPAP   C/o coughing yellow phlegm    [place]:  Multiple admissions since LOV 6/6/23 for shortness of breath and COPD exacerbations.      Today, his son is present.  He presents in a wheelchair with 3.5 L home oxygen.  He reports he is feeling better.  He missed his breathing treatments when the power was out and then he had to go to the hospital.  He is coughing up yellow phlegm.  He has yellow phlegm all the time.  Patient denies current edema, chest pain,  palpitations, dizziness or syncope.  Patient is taking all cardiac medications as prescribed and tolerates them well.  Denies recent issues with bleeding or bruising.      Patient is vaccinated against Covid. Moderna 2/2 Pfizer 2/2                                 PMH:  2 admissions for CVAs.  2/25/23 and 3/29/23.  2/28/23 Left carotid stent placed.      Admitted 5/18-5/21/23 for COPD exacerbation    COPD, former smoker,  AFIB.  He failed cardioversion 4.13.18  Recently admitted 4/3-4/7/22 for aCHF, aCOPD, and permanent afib. He was discharged with these changes: Toprol XL 25 mg daily and torsemide 80 mg daily, but [id number] daily . Echo 3/14/2022 noted EF 55%.  Pro BNP 2,103.  This went down to 1,569.   OV~4/14/22 Toprol D/C due to low BP    Review of Systems:         12 point ROS negative in all areas as listed below except as in [place]  Constitutional, EENT,  GI, , Musculoskeletal, skin, neurological, hematological, endocrine, Psychiatric    Reviewed past medical history, social, and family history.   Non smoker  No alcohol   Family history is negative for significant premature cardiac disease  Past Medical History:   Diagnosis Date    Arthritis     Atrial fibrillation (HCC)     Bronchitis chronic     CAD

## 2024-07-10 NOTE — PATIENT INSTRUCTIONS
Plan:  Labs reviewed in epic and discussed with patient.  Current medications reviewed.  Refills given as warranted.  Try to keep your feet elevated when you are sitting to help decrease your swelling.   No cardiac testing at this time.  Increase your torsemide to 20 mg daily.  It is ok to take two 10 mg tablets until your current bottle is finished.    Repeat blood work in 2 weeks  -CBC, CMP, TSH, Lipids   -you will need to be fasting for blood work  -it is ok to take your medicine with sips of water or black coffee  You can help decrease your swelling by:  -Weighing yourself every morning   -Following a No Added Salt/Low Sodium diet of less than 3000 mg sodium daily  -Following a Fluid Limitation of less than 2 liters (64 ounces daily)     -this also includes foods like soup, ice cream, popsicles  -Elevating your legs when sitting  -Wearing compression socks during the day  8.   Take to Dr. Jones about refilling your brilinta.    Follow up with me in 6 months.  Call in November to make your next appointment for January.

## 2024-07-24 ENCOUNTER — HOSPITAL ENCOUNTER (OUTPATIENT)
Age: 74
Discharge: HOME | End: 2024-07-24
Payer: MEDICARE

## 2024-07-24 DIAGNOSIS — Z79.899 MEDICATION MANAGEMENT: ICD-10-CM

## 2024-07-24 LAB
ALBUMIN SERPL-MCNC: 3.8 G/DL (ref 3.4–5)
ALBUMIN/GLOB SERPL: 1.4 {RATIO} (ref 1.1–2.2)
ALP SERPL-CCNC: 111 U/L (ref 40–129)
ALT SERPL-CCNC: 7 U/L (ref 10–40)
ANION GAP SERPL CALCULATED.3IONS-SCNC: 10 MMOL/L (ref 3–16)
AST SERPL-CCNC: 11 U/L (ref 15–37)
BASOPHILS # BLD: 0.1 K/UL (ref 0–0.2)
BASOPHILS NFR BLD: 0.5 %
BILIRUB SERPL-MCNC: 0.8 MG/DL (ref 0–1)
BUN SERPL-MCNC: 18 MG/DL (ref 7–20)
CALCIUM SERPL-MCNC: 9 MG/DL (ref 8.3–10.6)
CHLORIDE SERPL-SCNC: 100 MMOL/L (ref 99–110)
CO2 SERPL-SCNC: 30 MMOL/L (ref 21–32)
CREAT SERPL-MCNC: 0.8 MG/DL (ref 0.8–1.3)
DEPRECATED RDW RBC AUTO: 15.2 % (ref 12.4–15.4)
EOSINOPHIL # BLD: 0.4 K/UL (ref 0–0.6)
EOSINOPHIL NFR BLD: 3.6 %
GFR SERPLBLD CREATININE-BSD FMLA CKD-EPI: >90 ML/MIN/{1.73_M2}
GLUCOSE SERPL-MCNC: 136 MG/DL (ref 70–99)
HCT VFR BLD AUTO: 42.9 % (ref 40.5–52.5)
HGB BLD-MCNC: 14 G/DL (ref 13.5–17.5)
LYMPHOCYTES # BLD: 1.7 K/UL (ref 1–5.1)
LYMPHOCYTES NFR BLD: 16.4 %
MCH RBC QN AUTO: 30 PG (ref 26–34)
MCHC RBC AUTO-ENTMCNC: 32.7 G/DL (ref 31–36)
MCV RBC AUTO: 91.7 FL (ref 80–100)
MONOCYTES # BLD: 0.8 K/UL (ref 0–1.3)
MONOCYTES NFR BLD: 7.8 %
NEUTROPHILS # BLD: 7.4 K/UL (ref 1.7–7.7)
NEUTROPHILS NFR BLD: 71.7 %
PLATELET # BLD AUTO: 271 K/UL (ref 135–450)
PMV BLD AUTO: 8.8 FL (ref 5–10.5)
POTASSIUM SERPL-SCNC: 3.8 MMOL/L (ref 3.5–5.1)
PROT SERPL-MCNC: 6.6 G/DL (ref 6.4–8.2)
RBC # BLD AUTO: 4.68 M/UL (ref 4.2–5.9)
SODIUM SERPL-SCNC: 140 MMOL/L (ref 136–145)
WBC # BLD AUTO: 10.4 K/UL (ref 4–11)

## 2024-07-24 PROCEDURE — 85025 COMPLETE CBC W/AUTO DIFF WBC: CPT

## 2024-07-24 PROCEDURE — 36415 COLL VENOUS BLD VENIPUNCTURE: CPT

## 2024-07-24 PROCEDURE — 80053 COMPREHEN METABOLIC PANEL: CPT

## 2024-07-24 PROCEDURE — 84443 ASSAY THYROID STIM HORMONE: CPT

## 2024-07-24 PROCEDURE — 80061 LIPID PANEL: CPT

## 2024-07-25 LAB
CHOLEST SERPL-MCNC: 138 MG/DL (ref 0–199)
HDLC SERPL-MCNC: 40 MG/DL (ref 40–60)
LDLC SERPL CALC-MCNC: 70 MG/DL
TRIGL SERPL-MCNC: 142 MG/DL (ref 0–150)
TSH SERPL DL<=0.005 MIU/L-ACNC: 1.15 UIU/ML (ref 0.27–4.2)
VLDLC SERPL CALC-MCNC: 28 MG/DL

## 2024-07-25 NOTE — RESULT ENCOUNTER NOTE
Roberto Carlos Mallory MD  P [facility] Cardio Practice Staff  Cholesterol and thyroid tests are normal.      Called and spoke to pt daughter eliseo per HIPAA, she v/u and relayed message to pt.

## 2024-08-28 DIAGNOSIS — J44.9 CHRONIC OBSTRUCTIVE PULMONARY DISEASE, UNSPECIFIED COPD TYPE (HCC): ICD-10-CM

## 2024-08-30 RX ORDER — IPRATROPIUM BROMIDE AND ALBUTEROL SULFATE 2.5; .5 MG/3ML; MG/3ML
SOLUTION RESPIRATORY (INHALATION)
Qty: 360 ML | Refills: 5 | Status: SHIPPED | OUTPATIENT
Start: 2024-08-30

## 2024-10-17 ENCOUNTER — OFFICE VISIT (OUTPATIENT)
Dept: PULMONOLOGY | Age: 74
End: 2024-10-17

## 2024-10-17 VITALS
BODY MASS INDEX: 24.68 KG/M2 | DIASTOLIC BLOOD PRESSURE: 80 MMHG | WEIGHT: 182 LBS | SYSTOLIC BLOOD PRESSURE: 130 MMHG | HEART RATE: 94 BPM | OXYGEN SATURATION: 94 %

## 2024-10-17 DIAGNOSIS — R93.89 ABNORMAL CHEST X-RAY: ICD-10-CM

## 2024-10-17 DIAGNOSIS — J44.9 CHRONIC OBSTRUCTIVE PULMONARY DISEASE, UNSPECIFIED COPD TYPE (HCC): Primary | ICD-10-CM

## 2024-10-17 DIAGNOSIS — J96.12 CHRONIC RESPIRATORY FAILURE WITH HYPOXIA AND HYPERCAPNIA: ICD-10-CM

## 2024-10-17 DIAGNOSIS — J96.11 CHRONIC RESPIRATORY FAILURE WITH HYPOXIA AND HYPERCAPNIA: ICD-10-CM

## 2024-10-17 DIAGNOSIS — R09.02 HYPOXIA: ICD-10-CM

## 2024-10-17 RX ORDER — PREDNISONE 10 MG/1
TABLET ORAL
Qty: 30 TABLET | Refills: 0 | Status: SHIPPED | OUTPATIENT
Start: 2024-10-17

## 2024-10-17 RX ORDER — DOXYCYCLINE HYCLATE 100 MG
100 TABLET ORAL 2 TIMES DAILY
Qty: 10 TABLET | Refills: 0 | Status: SHIPPED | OUTPATIENT
Start: 2024-10-17 | End: 2024-10-22

## 2024-10-17 NOTE — PROGRESS NOTES
[facility] Pulmonary, Critical Care and Sleep Specialists                                                            Outpatient Follow Up Note    CHIEF COMPLAINT: Follow up COPD        HPI:   Doing okay   Compliant with inhaled bronchodilators.   Compliant with O2 and feels better/benefiting when using it   Uses Neb 4 times/day   No smoking  Uses BiPAP every night. Mask and pressure are good. Does not drive. Sleeps 12:30 am and gets up 8-9 am. Sleep onset few min.   Uses wheel chair/walker for 2 years       Past Medical History:   Diagnosis Date    Arthritis     Atrial fibrillation (HCC)     Bronchitis chronic     CAD (coronary artery disease)     CHF (congestive heart failure) (HCC)     Emphysema of lung (HCC)     HTN (hypertension)     Hx of blood clots     Influenza A 01/10/2018    Lung disease     copd    Pneumonia     12/2009    Stroke 02/25/2023    L MCA watershed       Past Surgical History:        Procedure Laterality Date    FRACTURE SURGERY      johnson in femur/hip on right    HERNIA REPAIR      SKIN BIOPSY      UPPER GASTROINTESTINAL ENDOSCOPY N/A 7/11/2023    EGD DIAGNOSTIC ONLY performed by Carlton Ramirez MD at [facility] ENDOSCOPY       Allergies:  has No Known Allergies.  Social History:    TOBACCO:   reports that he quit smoking about 8 years ago. His smoking use included cigarettes. He started smoking about 38 years ago. He has a 60 pack-year smoking history. He has never used smokeless tobacco.  ETOH:   reports no history of alcohol use.      Family History:       Problem Relation Age of Onset    Diabetes Sister     Cancer Sister     Cancer Sister     Cancer Brother     Asthma Daughter     Asthma Son     Diabetes Mother     Cancer Father        Current Medications:    Current Outpatient Medications:     ipratropium 0.5 mg-albuterol 2.5 mg (DUONEB) 0.5-2.5 (3) MG/3ML SOLN nebulizer solution, USE ONE (1) VIAL VIA NEBULIZER EVERY 6 HOURS AS NEEDED FOR SHORTNESS OF BREATH,

## 2024-10-29 RX ORDER — DILTIAZEM HYDROCHLORIDE 180 MG/1
180 CAPSULE, COATED, EXTENDED RELEASE ORAL DAILY
Qty: 90 CAPSULE | Refills: 3 | Status: SHIPPED | OUTPATIENT
Start: 2024-10-29

## 2024-11-04 DIAGNOSIS — J44.9 CHRONIC OBSTRUCTIVE PULMONARY DISEASE, UNSPECIFIED COPD TYPE (HCC): ICD-10-CM

## 2024-11-04 RX ORDER — FLUTICASONE PROPIONATE AND SALMETEROL 250; 50 UG/1; UG/1
POWDER RESPIRATORY (INHALATION)
Qty: 90 EACH | Refills: 3 | Status: SHIPPED | OUTPATIENT
Start: 2024-11-04

## 2024-12-04 ENCOUNTER — TELEPHONE (OUTPATIENT)
Dept: PULMONOLOGY | Age: 74
End: 2024-12-04

## 2024-12-04 DIAGNOSIS — J96.11 CHRONIC RESPIRATORY FAILURE WITH HYPOXIA AND HYPERCAPNIA: ICD-10-CM

## 2024-12-04 DIAGNOSIS — R09.02 HYPOXIA: ICD-10-CM

## 2024-12-04 DIAGNOSIS — J96.12 CHRONIC RESPIRATORY FAILURE WITH HYPOXIA AND HYPERCAPNIA: ICD-10-CM

## 2024-12-04 DIAGNOSIS — J44.9 CHRONIC OBSTRUCTIVE PULMONARY DISEASE, UNSPECIFIED COPD TYPE (HCC): Primary | ICD-10-CM

## 2024-12-04 NOTE — TELEPHONE ENCOUNTER
Patient's nebulizer is broken. Needs new script for nebulizer sent to [facility] in [place].

## 2024-12-05 DIAGNOSIS — J44.9 STAGE 3 SEVERE COPD BY GOLD CLASSIFICATION (HCC): ICD-10-CM

## 2024-12-05 RX ORDER — ALBUTEROL SULFATE 90 UG/1
AEROSOL, METERED RESPIRATORY (INHALATION)
Qty: 18 G | Refills: 5 | Status: SHIPPED | OUTPATIENT
Start: 2024-12-05

## 2024-12-09 ENCOUNTER — TELEPHONE (OUTPATIENT)
Dept: PULMONOLOGY | Age: 74
End: 2024-12-09

## 2024-12-09 NOTE — TELEPHONE ENCOUNTER
Submitted PA for Ventolin  (90 Base)MCG/ACT aerosol   Via CMM Key: [id number]  STATUS: PENDING.    Follow up done daily; if no decision with in three days we will refax.  If another three days goes by with no decision will call the insurance for status.

## 2024-12-16 RX ORDER — POTASSIUM CHLORIDE 1500 MG/1
20 TABLET, EXTENDED RELEASE ORAL DAILY
Qty: 90 TABLET | Refills: 3 | Status: SHIPPED | OUTPATIENT
Start: 2024-12-16

## 2025-01-03 ENCOUNTER — TRANSCRIBE ORDERS (OUTPATIENT)
Dept: ADMINISTRATIVE | Age: 75
End: 2025-01-03

## 2025-01-03 DIAGNOSIS — I63.232 CEREBRAL INFARCTION DUE TO UNSPECIFIED OCCLUSION OR STENOSIS OF LEFT CAROTID ARTERIES (HCC): Primary | ICD-10-CM

## 2025-01-05 ENCOUNTER — APPOINTMENT (OUTPATIENT)
Dept: GENERAL RADIOLOGY | Age: 75
End: 2025-01-05
Payer: MEDICARE

## 2025-01-05 ENCOUNTER — HOSPITAL ENCOUNTER (EMERGENCY)
Age: 75
Discharge: HOME | End: 2025-01-05
Payer: MEDICARE

## 2025-01-05 VITALS
OXYGEN SATURATION: 98 % | DIASTOLIC BLOOD PRESSURE: 68 MMHG | HEART RATE: 68 BPM | WEIGHT: 181.2 LBS | HEIGHT: 72 IN | BODY MASS INDEX: 24.54 KG/M2 | SYSTOLIC BLOOD PRESSURE: 127 MMHG | TEMPERATURE: 97.6 F | RESPIRATION RATE: 20 BRPM

## 2025-01-05 DIAGNOSIS — I50.9 CHRONIC CONGESTIVE HEART FAILURE, UNSPECIFIED HEART FAILURE TYPE (HCC): Primary | ICD-10-CM

## 2025-01-05 DIAGNOSIS — R06.02 SHORTNESS OF BREATH: ICD-10-CM

## 2025-01-05 LAB
ALBUMIN SERPL-MCNC: 4.2 G/DL (ref 3.4–5)
ALBUMIN/GLOB SERPL: 1.3 {RATIO} (ref 1.1–2.2)
ALP SERPL-CCNC: 130 U/L (ref 40–129)
ALT SERPL-CCNC: 16 U/L (ref 10–40)
ANION GAP SERPL CALCULATED.3IONS-SCNC: 13 MMOL/L (ref 3–16)
AST SERPL-CCNC: 17 U/L (ref 15–37)
BASOPHILS # BLD: 0.1 K/UL (ref 0–0.2)
BASOPHILS NFR BLD: 0.9 %
BILIRUB SERPL-MCNC: 0.5 MG/DL (ref 0–1)
BUN SERPL-MCNC: 13 MG/DL (ref 7–20)
CALCIUM SERPL-MCNC: 9.3 MG/DL (ref 8.3–10.6)
CHLORIDE SERPL-SCNC: 98 MMOL/L (ref 99–110)
CO2 SERPL-SCNC: 31 MMOL/L (ref 21–32)
CREAT SERPL-MCNC: 0.9 MG/DL (ref 0.8–1.3)
DEPRECATED RDW RBC AUTO: 15.3 % (ref 12.4–15.4)
EKG DIAGNOSIS: NORMAL
EKG Q-T INTERVAL: 380 MS
EKG QRS DURATION: 104 MS
EKG QTC CALCULATION (BAZETT): 421 MS
EKG R AXIS: -16 DEGREES
EKG T AXIS: 17 DEGREES
EKG VENTRICULAR RATE: 74 BPM
EOSINOPHIL # BLD: 0.3 K/UL (ref 0–0.6)
EOSINOPHIL NFR BLD: 2.2 %
FLUAV RNA RESP QL NAA+PROBE: NOT DETECTED
FLUBV RNA RESP QL NAA+PROBE: NOT DETECTED
GFR SERPLBLD CREATININE-BSD FMLA CKD-EPI: 89 ML/MIN/{1.73_M2}
GLUCOSE SERPL-MCNC: 105 MG/DL (ref 70–99)
HCT VFR BLD AUTO: 44.4 % (ref 40.5–52.5)
HGB BLD-MCNC: 14.8 G/DL (ref 13.5–17.5)
LYMPHOCYTES # BLD: 1.9 K/UL (ref 1–5.1)
LYMPHOCYTES NFR BLD: 15.1 %
MAGNESIUM SERPL-MCNC: 2.02 MG/DL (ref 1.8–2.4)
MCH RBC QN AUTO: 30.7 PG (ref 26–34)
MCHC RBC AUTO-ENTMCNC: 33.3 G/DL (ref 31–36)
MCV RBC AUTO: 92.2 FL (ref 80–100)
MONOCYTES # BLD: 1 K/UL (ref 0–1.3)
MONOCYTES NFR BLD: 7.6 %
NEUTROPHILS # BLD: 9.3 K/UL (ref 1.7–7.7)
NEUTROPHILS NFR BLD: 74.2 %
NT-PROBNP SERPL-MCNC: 435 PG/ML (ref 0–449)
PLATELET # BLD AUTO: 341 K/UL (ref 135–450)
PMV BLD AUTO: 9.3 FL (ref 5–10.5)
POTASSIUM SERPL-SCNC: 3.4 MMOL/L (ref 3.5–5.1)
PROT SERPL-MCNC: 7.4 G/DL (ref 6.4–8.2)
RBC # BLD AUTO: 4.81 M/UL (ref 4.2–5.9)
SARS-COV-2 RNA RESP QL NAA+PROBE: NOT DETECTED
SODIUM SERPL-SCNC: 142 MMOL/L (ref 136–145)
TROPONIN, HIGH SENSITIVITY: 28 NG/L (ref 0–22)
TROPONIN, HIGH SENSITIVITY: 31 NG/L (ref 0–22)
WBC # BLD AUTO: 12.6 K/UL (ref 4–11)

## 2025-01-05 PROCEDURE — 85025 COMPLETE CBC W/AUTO DIFF WBC: CPT

## 2025-01-05 PROCEDURE — 6360000002 HC RX W HCPCS: Performed by: PHYSICIAN ASSISTANT

## 2025-01-05 PROCEDURE — 71045 X-RAY EXAM CHEST 1 VIEW: CPT

## 2025-01-05 PROCEDURE — 80053 COMPREHEN METABOLIC PANEL: CPT

## 2025-01-05 PROCEDURE — 87636 SARSCOV2 & INF A&B AMP PRB: CPT

## 2025-01-05 PROCEDURE — 83880 ASSAY OF NATRIURETIC PEPTIDE: CPT

## 2025-01-05 PROCEDURE — 93005 ELECTROCARDIOGRAM TRACING: CPT | Performed by: STUDENT IN AN ORGANIZED HEALTH CARE EDUCATION/TRAINING PROGRAM

## 2025-01-05 PROCEDURE — 96374 THER/PROPH/DIAG INJ IV PUSH: CPT

## 2025-01-05 PROCEDURE — 84484 ASSAY OF TROPONIN QUANT: CPT

## 2025-01-05 PROCEDURE — 93010 ELECTROCARDIOGRAM REPORT: CPT | Performed by: INTERNAL MEDICINE

## 2025-01-05 PROCEDURE — 83735 ASSAY OF MAGNESIUM: CPT

## 2025-01-05 PROCEDURE — 99285 EMERGENCY DEPT VISIT HI MDM: CPT

## 2025-01-05 PROCEDURE — 6370000000 HC RX 637 (ALT 250 FOR IP): Performed by: PHYSICIAN ASSISTANT

## 2025-01-05 RX ORDER — FUROSEMIDE 10 MG/ML
40 INJECTION INTRAMUSCULAR; INTRAVENOUS ONCE
Status: COMPLETED | OUTPATIENT
Start: 2025-01-05 | End: 2025-01-05

## 2025-01-05 RX ORDER — DOXYCYCLINE HYCLATE 100 MG
100 TABLET ORAL 2 TIMES DAILY
Qty: 20 TABLET | Refills: 0 | Status: SHIPPED | OUTPATIENT
Start: 2025-01-05 | End: 2025-01-15

## 2025-01-05 RX ORDER — DOXYCYCLINE HYCLATE 100 MG
100 TABLET ORAL ONCE
Status: COMPLETED | OUTPATIENT
Start: 2025-01-05 | End: 2025-01-05

## 2025-01-05 RX ADMIN — FUROSEMIDE 40 MG: 10 INJECTION, SOLUTION INTRAMUSCULAR; INTRAVENOUS at 12:53

## 2025-01-05 RX ADMIN — POTASSIUM BICARBONATE 40 MEQ: 782 TABLET, EFFERVESCENT ORAL at 12:52

## 2025-01-05 RX ADMIN — AMOXICILLIN AND CLAVULANATE POTASSIUM 1 TABLET: 875; 125 TABLET, FILM COATED ORAL at 12:52

## 2025-01-05 RX ADMIN — DOXYCYCLINE HYCLATE 100 MG: 100 TABLET, COATED ORAL at 12:52

## 2025-01-05 RX ADMIN — POTASSIUM BICARBONATE 20 MEQ: 782 TABLET, EFFERVESCENT ORAL at 12:39

## 2025-01-05 ASSESSMENT — LIFESTYLE VARIABLES
HOW MANY STANDARD DRINKS CONTAINING ALCOHOL DO YOU HAVE ON A TYPICAL DAY: PATIENT DOES NOT DRINK
HOW OFTEN DO YOU HAVE A DRINK CONTAINING ALCOHOL: NEVER

## 2025-01-05 ASSESSMENT — PAIN - FUNCTIONAL ASSESSMENT: PAIN_FUNCTIONAL_ASSESSMENT: NONE - DENIES PAIN

## 2025-01-05 NOTE — ED PROVIDER NOTES
and dyspnea on exertion worsening for the past 3 days.  Has a history of CHF and COPD and normally wears 3 L of oxygen at home.  He had no adventitious breath sounds on exam.  There was 1+ pitting edema of the lower extremities.  Initial troponin was elevated at 31 with repeat downtrending to 28.  He is chronically elevated in his baseline today.  There is a slight leukocytosis noted white count of 12.6 and an increase in absolute atrial is 9.3.  No bandemia noted.  No other concerning findings on CBC.  Potassium is decreased at 3.4 and he did receive replacement here in the emergency department.  Chlorides decreased 98.  He is mildly hyperglycemic.  Alkaline phosphatase elevated 230.  No other concerning findings on CMP.  proBNP was 435.  Magnesium was 2.02.  COVID and flu test were negative.  Chest x-ray shows pulmonary vascular congestion.  EKG was interpreted as A-fib which is rate controlled at this time.  He had no evidence of tachycardia or hypoxia here in the emergency department.  Due to his history of COPD and CHF will start him on prophylactic antibiotics for pneumonia coverage.  Relatively benign physical exam with no evidence of fluid overload at this time feel comfortable discharging patient home with strict return instructions.  Patient received potassium, Lasix, Augmentin and doxycycline.  He presented to the emergency department afebrile and hemodynamically stable. Risk management discussed and shared decision making had with patient and/or surrogate. All questions were answered. Patient will follow up with primary care provider for further evaluation/treatment.  All questions answered.  Patient will return to ED for new/worsening symptoms.    Patient was sent home with a prescription for doxycycline and Augmentin.    CRITICAL CARE TIME  0 Minutes of critical care time spent not including separately billable procedures.    MDM      I estimate there is LOW risk for ACUTE CORONARY SYNDROME, CHRONIC

## 2025-04-17 ENCOUNTER — OFFICE VISIT (OUTPATIENT)
Dept: PULMONOLOGY | Age: 75
End: 2025-04-17
Payer: MEDICARE

## 2025-04-17 VITALS
WEIGHT: 181 LBS | BODY MASS INDEX: 24.52 KG/M2 | HEIGHT: 72 IN | DIASTOLIC BLOOD PRESSURE: 64 MMHG | OXYGEN SATURATION: 95 % | SYSTOLIC BLOOD PRESSURE: 124 MMHG | HEART RATE: 85 BPM | RESPIRATION RATE: 16 BRPM

## 2025-04-17 DIAGNOSIS — J44.9 STAGE 3 SEVERE COPD BY GOLD CLASSIFICATION (HCC): Primary | ICD-10-CM

## 2025-04-17 DIAGNOSIS — J96.12 CHRONIC RESPIRATORY FAILURE WITH HYPOXIA AND HYPERCAPNIA: ICD-10-CM

## 2025-04-17 DIAGNOSIS — R09.02 HYPOXIA: ICD-10-CM

## 2025-04-17 DIAGNOSIS — J96.11 CHRONIC RESPIRATORY FAILURE WITH HYPOXIA AND HYPERCAPNIA: ICD-10-CM

## 2025-04-17 PROCEDURE — 99214 OFFICE O/P EST MOD 30 MIN: CPT | Performed by: INTERNAL MEDICINE

## 2025-04-17 PROCEDURE — 3074F SYST BP LT 130 MM HG: CPT | Performed by: INTERNAL MEDICINE

## 2025-04-17 PROCEDURE — 1123F ACP DISCUSS/DSCN MKR DOCD: CPT | Performed by: INTERNAL MEDICINE

## 2025-04-17 PROCEDURE — 1159F MED LIST DOCD IN RCRD: CPT | Performed by: INTERNAL MEDICINE

## 2025-04-17 PROCEDURE — G2211 COMPLEX E/M VISIT ADD ON: HCPCS | Performed by: INTERNAL MEDICINE

## 2025-04-17 PROCEDURE — 3078F DIAST BP <80 MM HG: CPT | Performed by: INTERNAL MEDICINE

## 2025-04-17 RX ORDER — HYDROCODONE BITARTRATE AND ACETAMINOPHEN 5; 325 MG/1; MG/1
TABLET ORAL
COMMUNITY
Start: 2025-02-13

## 2025-04-17 ASSESSMENT — SLEEP AND FATIGUE QUESTIONNAIRES
HOW LIKELY ARE YOU TO NOD OFF OR FALL ASLEEP IN A CAR, WHILE STOPPED FOR A FEW MINUTES IN TRAFFIC: WOULD NEVER DOZE
HOW LIKELY ARE YOU TO NOD OFF OR FALL ASLEEP WHILE SITTING AND TALKING TO SOMEONE: WOULD NEVER DOZE
HOW LIKELY ARE YOU TO NOD OFF OR FALL ASLEEP WHEN YOU ARE A PASSENGER IN A CAR FOR AN HOUR WITHOUT A BREAK: WOULD NEVER DOZE
ESS TOTAL SCORE: 2
HOW LIKELY ARE YOU TO NOD OFF OR FALL ASLEEP WHILE SITTING INACTIVE IN A PUBLIC PLACE: WOULD NEVER DOZE
HOW LIKELY ARE YOU TO NOD OFF OR FALL ASLEEP WHILE WATCHING TV: WOULD NEVER DOZE
HOW LIKELY ARE YOU TO NOD OFF OR FALL ASLEEP WHILE LYING DOWN TO REST IN THE AFTERNOON WHEN CIRCUMSTANCES PERMIT: SLIGHT CHANCE OF DOZING
HOW LIKELY ARE YOU TO NOD OFF OR FALL ASLEEP WHILE SITTING QUIETLY AFTER LUNCH WITHOUT ALCOHOL: WOULD NEVER DOZE
HOW LIKELY ARE YOU TO NOD OFF OR FALL ASLEEP WHILE SITTING AND READING: SLIGHT CHANCE OF DOZING

## 2025-04-17 NOTE — PROGRESS NOTES
pleural  effusion.  2. Calcific atherosclerosis aorta.  3. Cardiomegaly.        Overnight pulse oximeter 7/30/2022 BiPAP with 2.5 L no significant desaturation  BiPAP titration 11/28/22 BiPAP 18/10 cmH2O       BiPAP data 06/25-07/24 2022 reviewed by me. Uses 3-4 hrs/night with 53% compliance and AHI of  7.9. 95% 13.4/6.4   BiPAP data 10/04-11/02 2022 reviewed by me. Uses 8-9 hrs/night with 100% compliance and AHI of  0.9. BiPAP 14/6 cmH2O   BiPAP data 01/24-02/22 2023 reviewed by me. Uses 8-9 hrs/night with 97% compliance and AHI of  1.5. BiPAP 14/6 cmH2O   BiPAP data 04/22-05/21 2023 reviewed by me. Uses 3-4 hrs/night with 20% compliance and AHI of  1.6. BiPAP 16/8 cmH2O   BiPAP data 12/30-01/28 2024 reviewed by me. Uses 8-9 hrs/night with 100% compliance and AHI of  0.1. BiPAP 14/5 cmH2O   BiPAP data 05/07-06/05 2024 reviewed by me. Uses 8-9 hrs/night with 87% compliance and AHI of  0.1. BiPAP 14/5 cmH2O   BiPAP data 03/08-04/06 2025 reviewed by me. Uses 7-8 hrs/night with 100% compliance and AHI of  0.2. BiPAP 16/6 cmH2O     Assessment:       Severe COPD   Chronic hypoxemic hypercapnic respiratory failure BiPAP 16/6  cm H2O. Optimal compliance with controlled AHI upon review today.   Hypoxia on exertion   Diastolic CHF, CAD, Afib on Eliquis   30 pack year smoking - quit smoking 2016      Plan:      Continue BiPAP 16/6 cmh2O every night and during naps   Continue Advair BID   Continue DuoNeb BID-QID  Continue Daliresp 500 mcg PO daily  Continue O2 2-4 LPM on exertion. Advised to titrate O2 using her pulse oximeter- target O2 sat 90-92%.  The patient has declined further imaging and expressed a lack of interest in pursuing any form of cancer treatment, including chemotherapy, radiation therapy, or surgery, if diagnosed with lung cancer.  Acapella and Mucinex   Advised to continue with smoking cessation.   Follow up in 6 months

## 2025-05-04 NOTE — PROGRESS NOTES
No acute changes. No changes in neurology. Strong peripheral pulses/Capillary refill less/equal to 2 seconds

## 2025-05-27 DIAGNOSIS — J44.9 STAGE 3 SEVERE COPD BY GOLD CLASSIFICATION (HCC): ICD-10-CM

## 2025-05-27 DIAGNOSIS — J44.9 CHRONIC OBSTRUCTIVE PULMONARY DISEASE, UNSPECIFIED COPD TYPE (HCC): ICD-10-CM

## 2025-05-27 RX ORDER — FLUTICASONE PROPIONATE AND SALMETEROL 250; 50 UG/1; UG/1
POWDER RESPIRATORY (INHALATION)
Qty: 90 EACH | Refills: 3 | Status: SHIPPED | OUTPATIENT
Start: 2025-05-27

## 2025-05-27 RX ORDER — ALBUTEROL SULFATE 90 UG/1
INHALANT RESPIRATORY (INHALATION)
Qty: 18 G | Refills: 5 | Status: SHIPPED | OUTPATIENT
Start: 2025-05-27

## 2025-06-30 ENCOUNTER — TELEPHONE (OUTPATIENT)
Dept: CARDIOLOGY CLINIC | Age: 75
End: 2025-06-30

## 2025-06-30 NOTE — TELEPHONE ENCOUNTER
Pt needed to cancel hsfu scheduled 7.2.25 RKG 6 month follow up.  Next available is 9.2025.  Pt can't do morning appt due to breathing difficulities.  Pt is not currently symptomatic.  Is there an overbook to offer for f/u or is sept 2025 appt ok.  Pt can go to any of the locations but has a very hard time making it to morning appt.  Please advise if we can offer overbook.  Best call back is [phone]

## 2025-07-21 ENCOUNTER — TELEPHONE (OUTPATIENT)
Dept: PULMONOLOGY | Age: 75
End: 2025-07-21

## 2025-07-21 DIAGNOSIS — J44.9 CHRONIC OBSTRUCTIVE PULMONARY DISEASE, UNSPECIFIED COPD TYPE (HCC): ICD-10-CM

## 2025-07-21 RX ORDER — IPRATROPIUM BROMIDE AND ALBUTEROL SULFATE 2.5; .5 MG/3ML; MG/3ML
SOLUTION RESPIRATORY (INHALATION)
Qty: 360 ML | Refills: 5 | Status: SHIPPED | OUTPATIENT
Start: 2025-07-21

## 2025-07-21 RX ORDER — APIXABAN 5 MG/1
TABLET, FILM COATED ORAL
Qty: 60 TABLET | Refills: 0 | Status: SHIPPED | OUTPATIENT
Start: 2025-07-21

## 2025-07-21 NOTE — TELEPHONE ENCOUNTER
Last Office Visit: 7/10/2024 Provider: ANDREW  **Is provider OOT? No    Next Office Visit: 8/4/2025 Provider: ANDREW    **Has patient already had 30 day supply? No    LAST LABS:   CBC:  Lab Results   Component Value Date    WBC 12.6 (H) 01/05/2025    HGB 14.8 01/05/2025    HCT 44.4 01/05/2025    MCV 92.2 01/05/2025     01/05/2025    LYMPHOPCT 15.1 01/05/2025    RBC 4.81 01/05/2025    MCH 30.7 01/05/2025    MCHC 33.3 01/05/2025    RDW 15.3 01/05/2025           BMP:  Lab Results   Component Value Date/Time     01/05/2025 10:15 AM    K 3.4 01/05/2025 10:15 AM    CL 98 01/05/2025 10:15 AM    CO2 31 01/05/2025 10:15 AM    BUN 13 01/05/2025 10:15 AM    CREATININE 0.9 01/05/2025 10:15 AM    GLUCOSE 105 01/05/2025 10:15 AM    CALCIUM 9.3 01/05/2025 10:15 AM    LABGLOM 89 01/05/2025 10:15 AM    LABGLOM >60 03/05/2024 08:51 PM      Lab orders needed? no

## 2025-07-21 NOTE — TELEPHONE ENCOUNTER
Refill request needed: patient will be out of medication Tuesday 7/22.      ipratropium 0.5 mg-albuterol 2.5 mg (DUONEB) nebulizer solution 1 Dose   [[id number]]  Order DetailsOrdered Dose: 1 Dose Route: Inhalation Frequency: EVERY 4 HOURS PRN for Shortness of Breath   Admin Dose: 1 Dose        Pharm[facility]: [facility]

## 2025-07-25 RX ORDER — TORSEMIDE 20 MG/1
20 TABLET ORAL DAILY
Qty: 30 TABLET | Refills: 0 | Status: SHIPPED | OUTPATIENT
Start: 2025-07-25

## 2025-07-25 NOTE — TELEPHONE ENCOUNTER
Last Office Visit: 7/10/2024 Provider: ANDREW  **Is provider OOT? No    Next Office Visit: 8/4/2025 Provider: ANDREW    **Has patient already had 30 day supply? No    LAST LABS:   BMP:  Lab Results   Component Value Date/Time     01/05/2025 10:15 AM    K 3.4 01/05/2025 10:15 AM    CL 98 01/05/2025 10:15 AM    CO2 31 01/05/2025 10:15 AM    BUN 13 01/05/2025 10:15 AM    CREATININE 0.9 01/05/2025 10:15 AM    GLUCOSE 105 01/05/2025 10:15 AM    CALCIUM 9.3 01/05/2025 10:15 AM    LABGLOM 89 01/05/2025 10:15 AM    LABGLOM >60 03/05/2024 08:51 PM      Lab orders needed? no

## 2025-08-04 ENCOUNTER — OFFICE VISIT (OUTPATIENT)
Dept: CARDIOLOGY CLINIC | Age: 75
End: 2025-08-04
Payer: MEDICARE

## 2025-08-04 VITALS
SYSTOLIC BLOOD PRESSURE: 136 MMHG | HEART RATE: 80 BPM | DIASTOLIC BLOOD PRESSURE: 80 MMHG | WEIGHT: 167.4 LBS | OXYGEN SATURATION: 93 % | BODY MASS INDEX: 22.67 KG/M2 | HEIGHT: 72 IN

## 2025-08-04 DIAGNOSIS — I10 ESSENTIAL HYPERTENSION: Primary | ICD-10-CM

## 2025-08-04 DIAGNOSIS — Z95.828 HISTORY OF LEFT COMMON CAROTID ARTERY STENT PLACEMENT: ICD-10-CM

## 2025-08-04 DIAGNOSIS — Z86.73 HISTORY OF CVA IN ADULTHOOD: ICD-10-CM

## 2025-08-04 DIAGNOSIS — I50.43 ACUTE ON CHRONIC COMBINED SYSTOLIC (CONGESTIVE) AND DIASTOLIC (CONGESTIVE) HEART FAILURE (HCC): ICD-10-CM

## 2025-08-04 DIAGNOSIS — Z79.899 MEDICATION MANAGEMENT: ICD-10-CM

## 2025-08-04 DIAGNOSIS — J96.11 CHRONIC HYPOXIC RESPIRATORY FAILURE (HCC): ICD-10-CM

## 2025-08-04 DIAGNOSIS — Z98.890 HISTORY OF LEFT COMMON CAROTID ARTERY STENT PLACEMENT: ICD-10-CM

## 2025-08-04 DIAGNOSIS — I48.21 PERMANENT ATRIAL FIBRILLATION (HCC): ICD-10-CM

## 2025-08-04 DIAGNOSIS — E78.2 MIXED HYPERLIPIDEMIA: ICD-10-CM

## 2025-08-04 PROCEDURE — 1160F RVW MEDS BY RX/DR IN RCRD: CPT | Performed by: INTERNAL MEDICINE

## 2025-08-04 PROCEDURE — 3075F SYST BP GE 130 - 139MM HG: CPT | Performed by: INTERNAL MEDICINE

## 2025-08-04 PROCEDURE — 99214 OFFICE O/P EST MOD 30 MIN: CPT | Performed by: INTERNAL MEDICINE

## 2025-08-04 PROCEDURE — 1159F MED LIST DOCD IN RCRD: CPT | Performed by: INTERNAL MEDICINE

## 2025-08-04 PROCEDURE — 1123F ACP DISCUSS/DSCN MKR DOCD: CPT | Performed by: INTERNAL MEDICINE

## 2025-08-04 PROCEDURE — 3078F DIAST BP <80 MM HG: CPT | Performed by: INTERNAL MEDICINE

## 2025-08-04 RX ORDER — TORSEMIDE 20 MG/1
20 TABLET ORAL DAILY
Qty: 90 TABLET | Refills: 3 | Status: SHIPPED | OUTPATIENT
Start: 2025-08-04

## 2025-08-04 RX ORDER — NITROGLYCERIN 0.4 MG/1
0.4 TABLET SUBLINGUAL EVERY 5 MIN PRN
Qty: 25 TABLET | Refills: 3 | Status: SHIPPED | OUTPATIENT
Start: 2025-08-04

## 2025-08-04 RX ORDER — DILTIAZEM HYDROCHLORIDE 180 MG/1
180 CAPSULE, COATED, EXTENDED RELEASE ORAL DAILY
Qty: 90 CAPSULE | Refills: 3 | Status: SHIPPED | OUTPATIENT
Start: 2025-08-04

## 2025-08-11 ENCOUNTER — TELEPHONE (OUTPATIENT)
Dept: PULMONOLOGY | Age: 75
End: 2025-08-11

## 2025-08-11 DIAGNOSIS — J96.11 CHRONIC HYPOXEMIC RESPIRATORY FAILURE (HCC): Primary | ICD-10-CM

## 2025-08-25 DIAGNOSIS — J96.11 CHRONIC HYPOXEMIC RESPIRATORY FAILURE (HCC): Primary | ICD-10-CM

## 2025-08-29 ENCOUNTER — TELEPHONE (OUTPATIENT)
Dept: PULMONOLOGY | Age: 75
End: 2025-08-29

## 2025-08-29 DIAGNOSIS — G47.33 OSA TREATED WITH BIPAP: Primary | ICD-10-CM

## (undated) DEVICE — MOUTHPIECE ENDOSCP L CTRL OPN AND SIDE PORTS DISP

## (undated) DEVICE — BW-412T DISP COMBO CLEANING BRUSH: Brand: SINGLE USE COMBINATION CLEANING BRUSH

## (undated) DEVICE — AIR/WATER CLEANING ADAPTER FOR OLYMPUS® GI ENDOSCOPE: Brand: BULLDOG®

## (undated) DEVICE — VALVE SUCTION AIR H2O SET ORCA POD + DISP

## (undated) DEVICE — GOWN AURORA NONREINF LG: Brand: MEDLINE INDUSTRIES, INC.

## (undated) DEVICE — SOLUTION IV IRRIG WATER 500ML POUR BRL ST 2F7113

## (undated) DEVICE — ENDOSCOPIC KIT 6X3/16 FT COLON W/ 1.1 OZ 2 GWN W/O BRSH